# Patient Record
Sex: MALE | Race: WHITE
[De-identification: names, ages, dates, MRNs, and addresses within clinical notes are randomized per-mention and may not be internally consistent; named-entity substitution may affect disease eponyms.]

---

## 2017-03-14 ENCOUNTER — HOSPITAL ENCOUNTER (INPATIENT)
Dept: HOSPITAL 49 - FMS | Age: 82
LOS: 7 days | Discharge: NURSING, REHAB OR LONG-TERM CARE | DRG: 470 | End: 2017-03-21
Attending: ORTHOPAEDIC SURGERY | Admitting: ORTHOPAEDIC SURGERY
Payer: MEDICARE

## 2017-03-14 DIAGNOSIS — I48.0: ICD-10-CM

## 2017-03-14 DIAGNOSIS — I10: ICD-10-CM

## 2017-03-14 DIAGNOSIS — K21.9: ICD-10-CM

## 2017-03-14 DIAGNOSIS — Z87.891: ICD-10-CM

## 2017-03-14 DIAGNOSIS — Z79.82: ICD-10-CM

## 2017-03-14 DIAGNOSIS — M17.0: Primary | ICD-10-CM

## 2017-03-14 DIAGNOSIS — I48.91: ICD-10-CM

## 2017-03-14 DIAGNOSIS — D62: ICD-10-CM

## 2017-03-14 DIAGNOSIS — E78.5: ICD-10-CM

## 2017-03-14 DIAGNOSIS — Z83.3: ICD-10-CM

## 2017-03-14 DIAGNOSIS — E11.9: ICD-10-CM

## 2017-03-14 LAB
BILIRUBIN: NEGATIVE MG/DL
BLOOD: NEGATIVE ERY/UL
BUN SERPL-MCNC: 23 MG/DL (ref 6–25)
BUN/CREAT RATIO (CALC): 20 (ref 12.1–20.1)
CHLORIDE: 104 MMOL/L (ref 98–107)
CLARITY UR: CLEAR
CO2 (BICARBONATE): 23 MMOL/L (ref 23–33)
COLOR: YELLOW
CREATININE: 1.1 MG/DL (ref 0.7–1.2)
GLUCOSE (U): NORMAL MG/DL
GLUCOSE SERPL-MCNC: 90 MG/DL (ref 83–110)
KETONE (U): NEGATIVE MG/DL
LEUKOCYTES: NEGATIVE LEU/UL
NITRITE: NEGATIVE MG/DL
POTASSIUM: 4.7 MMOL/L (ref 3.5–5.1)
PROTEIN: NEGATIVE MG/DL
SPECIFIC GRAVITY: 1.01 (ref 1–1.03)
UROBILINOGEN: 0.2 MG/DL (ref 0.2–1)

## 2017-03-14 PROCEDURE — C1776 JOINT DEVICE (IMPLANTABLE): HCPCS

## 2017-03-14 PROCEDURE — 30233N1 TRANSFUSION OF NONAUTOLOGOUS RED BLOOD CELLS INTO PERIPHERAL VEIN, PERCUTANEOUS APPROACH: ICD-10-PCS | Performed by: INTERNAL MEDICINE

## 2017-03-14 PROCEDURE — C1713 ANCHOR/SCREW BN/BN,TIS/BN: HCPCS

## 2017-03-14 PROCEDURE — 0SRC0J9 REPLACEMENT OF RIGHT KNEE JOINT WITH SYNTHETIC SUBSTITUTE, CEMENTED, OPEN APPROACH: ICD-10-PCS | Performed by: ORTHOPAEDIC SURGERY

## 2017-03-14 PROCEDURE — 8E0YXBZ COMPUTER ASSISTED PROCEDURE OF LOWER EXTREMITY: ICD-10-PCS | Performed by: ORTHOPAEDIC SURGERY

## 2017-03-14 PROCEDURE — P9016 RBC LEUKOCYTES REDUCED: HCPCS

## 2017-03-15 LAB
BUN SERPL-MCNC: 17 MG/DL (ref 6–25)
BUN/CREAT RATIO (CALC): 17 (ref 12.1–20.1)
CHLORIDE: 101 MMOL/L (ref 98–107)
CO2 (BICARBONATE): 24 MMOL/L (ref 23–33)
CREATININE: 1 MG/DL (ref 0.7–1.2)
GLUCOSE SERPL-MCNC: 118 MG/DL (ref 83–110)
HCT: 32 % (ref 42–52)
HGB BLD-MCNC: 10.4 G/DL (ref 13.2–18)
MCH RBC QN AUTO: 30.1 PG (ref 25–31)
MCHC RBC AUTO-ENTMCNC: 32.5 G/DL (ref 32–36)
MCV: 92.8 FL (ref 78–100)
MPV: 10.4 FL (ref 6–9.5)
PLT: 158 K/UL (ref 150–400)
POTASSIUM: 5 MMOL/L (ref 3.5–5.1)
RBC: 3.45 M/UL (ref 4.7–6)
RDW: 13.2 % (ref 11.5–14)
WBC: 8.1 K/UL (ref 4–10.5)

## 2017-03-16 LAB
BUN SERPL-MCNC: 17 MG/DL (ref 6–25)
BUN/CREAT RATIO (CALC): 15 (ref 12.1–20.1)
CHLORIDE: 96 MMOL/L (ref 98–107)
CO2 (BICARBONATE): 25 MMOL/L (ref 23–33)
CREATININE: 1.1 MG/DL (ref 0.7–1.2)
GLUCOSE SERPL-MCNC: 173 MG/DL (ref 83–110)
HCT: 25.3 % (ref 42–52)
HGB BLD-MCNC: 8.5 G/DL (ref 13.2–18)
MCH RBC QN AUTO: 31 PG (ref 25–31)
MCHC RBC AUTO-ENTMCNC: 33.6 G/DL (ref 32–36)
MCV: 92.3 FL (ref 78–100)
MPV: 10.9 FL (ref 6–9.5)
PLT: 116 K/UL (ref 150–400)
POTASSIUM: 5 MMOL/L (ref 3.5–5.1)
RBC: 2.74 M/UL (ref 4.7–6)
RDW: 12.6 % (ref 11.5–14)
WBC: 6 K/UL (ref 4–10.5)

## 2017-03-17 LAB
BUN SERPL-MCNC: 17 MG/DL (ref 6–25)
BUN/CREAT RATIO (CALC): 14 (ref 12.1–20.1)
CHLORIDE: 98 MMOL/L (ref 98–107)
CO2 (BICARBONATE): 28 MMOL/L (ref 23–33)
CREATININE: 1.2 MG/DL (ref 0.7–1.2)
GLUCOSE SERPL-MCNC: 151 MG/DL (ref 83–110)
HCT: 26.6 % (ref 42–52)
HGB BLD-MCNC: 8.7 G/DL (ref 13.2–18)
MCH RBC QN AUTO: 30.6 PG (ref 25–31)
MCHC RBC AUTO-ENTMCNC: 32.7 G/DL (ref 32–36)
MCV: 93.7 FL (ref 78–100)
MPV: 11.1 FL (ref 6–9.5)
PLT: 128 K/UL (ref 150–400)
POTASSIUM: 4.9 MMOL/L (ref 3.5–5.1)
RBC: 2.84 M/UL (ref 4.7–6)
RDW: 12.8 % (ref 11.5–14)
WBC: 6.6 K/UL (ref 4–10.5)

## 2017-03-19 LAB
BUN SERPL-MCNC: 18 MG/DL (ref 6–25)
BUN/CREAT RATIO (CALC): 20 (ref 12.1–20.1)
CHLORIDE: 96 MMOL/L (ref 98–107)
CO2 (BICARBONATE): 25 MMOL/L (ref 23–33)
CREATININE: 0.9 MG/DL (ref 0.7–1.2)
GLUCOSE SERPL-MCNC: 195 MG/DL (ref 83–110)
HCT: 22.8 % (ref 42–52)
HGB BLD-MCNC: 7.5 G/DL (ref 13.2–18)
MAGNESIUM SERPL-MCNC: 1.68 MG/DL (ref 1.4–2.1)
MCH RBC QN AUTO: 31.1 PG (ref 25–31)
MCHC RBC AUTO-ENTMCNC: 32.9 G/DL (ref 32–36)
MCV: 94.6 FL (ref 78–100)
MPV: 10.2 FL (ref 6–9.5)
PLT: 138 K/UL (ref 150–400)
POTASSIUM: 5.1 MMOL/L (ref 3.5–5.1)
RBC: 2.41 M/UL (ref 4.7–6)
RDW: 12.9 % (ref 11.5–14)
WBC: 5.2 K/UL (ref 4–10.5)

## 2017-03-20 LAB
BUN SERPL-MCNC: 19 MG/DL (ref 6–25)
BUN SERPL-MCNC: 19 MG/DL (ref 6–25)
BUN/CREAT RATIO (CALC): 21 (ref 12.1–20.1)
BUN/CREAT RATIO (CALC): 21 (ref 12.1–20.1)
CHLORIDE: 94 MMOL/L (ref 98–107)
CHLORIDE: 97 MMOL/L (ref 98–107)
CO2 (BICARBONATE): 25 MMOL/L (ref 23–33)
CO2 (BICARBONATE): 26 MMOL/L (ref 23–33)
CREATININE: 0.9 MG/DL (ref 0.7–1.2)
CREATININE: 0.9 MG/DL (ref 0.7–1.2)
GLUCOSE SERPL-MCNC: 165 MG/DL (ref 83–110)
GLUCOSE SERPL-MCNC: 166 MG/DL (ref 83–110)
HCT: 29.2 % (ref 42–52)
HGB BLD-MCNC: 9.6 G/DL (ref 13.2–18)
MAGNESIUM SERPL-MCNC: 1.68 MG/DL (ref 1.4–2.1)
MCH RBC QN AUTO: 30.5 PG (ref 25–31)
MCHC RBC AUTO-ENTMCNC: 32.9 G/DL (ref 32–36)
MCV: 92.7 FL (ref 78–100)
MPV: 9.7 FL (ref 6–9.5)
PLT: 170 K/UL (ref 150–400)
POTASSIUM: 4.8 MMOL/L (ref 3.5–5.1)
POTASSIUM: 4.9 MMOL/L (ref 3.5–5.1)
RBC: 3.15 M/UL (ref 4.7–6)
RDW: 14 % (ref 11.5–14)
WBC: 4.1 K/UL (ref 4–10.5)

## 2017-03-21 ENCOUNTER — HOSPITAL ENCOUNTER (INPATIENT)
Dept: HOSPITAL 49 - FSNU | Age: 82
LOS: 14 days | Discharge: HOME HEALTH | DRG: 561 | End: 2017-04-04
Admitting: ORTHOPAEDIC SURGERY
Payer: MEDICARE

## 2017-03-21 DIAGNOSIS — E11.9: ICD-10-CM

## 2017-03-21 DIAGNOSIS — Z96.651: ICD-10-CM

## 2017-03-21 DIAGNOSIS — Z47.1: Primary | ICD-10-CM

## 2017-03-21 DIAGNOSIS — E78.5: ICD-10-CM

## 2017-03-21 DIAGNOSIS — I48.91: ICD-10-CM

## 2017-03-21 DIAGNOSIS — I10: ICD-10-CM

## 2017-03-21 LAB
BUN SERPL-MCNC: 24 MG/DL (ref 6–25)
BUN/CREAT RATIO (CALC): 24 (ref 12.1–20.1)
CHLORIDE: 94 MMOL/L (ref 98–107)
CO2 (BICARBONATE): 28 MMOL/L (ref 23–33)
CREATININE: 1 MG/DL (ref 0.7–1.2)
GLUCOSE SERPL-MCNC: 186 MG/DL (ref 83–110)
HCT: 28.4 % (ref 42–52)
HGB BLD-MCNC: 9.1 G/DL (ref 13.2–18)
MCH RBC QN AUTO: 30 PG (ref 25–31)
MCHC RBC AUTO-ENTMCNC: 32 G/DL (ref 32–36)
MCV: 93.7 FL (ref 78–100)
MPV: 9.9 FL (ref 6–9.5)
PLT: 184 K/UL (ref 150–400)
POTASSIUM: 5.2 MMOL/L (ref 3.5–5.1)
RBC: 3.03 M/UL (ref 4.7–6)
RDW: 14.1 % (ref 11.5–14)
WBC: 5.9 K/UL (ref 4–10.5)

## 2018-02-28 ENCOUNTER — OFFICE VISIT CONVERTED (OUTPATIENT)
Dept: FAMILY MEDICINE CLINIC | Age: 83
End: 2018-02-28
Attending: FAMILY MEDICINE

## 2018-05-23 ENCOUNTER — OFFICE VISIT CONVERTED (OUTPATIENT)
Dept: FAMILY MEDICINE CLINIC | Age: 83
End: 2018-05-23
Attending: FAMILY MEDICINE

## 2018-07-14 ENCOUNTER — OFFICE VISIT CONVERTED (OUTPATIENT)
Dept: FAMILY MEDICINE CLINIC | Age: 83
End: 2018-07-14
Attending: NURSE PRACTITIONER

## 2018-08-20 ENCOUNTER — OFFICE VISIT CONVERTED (OUTPATIENT)
Dept: FAMILY MEDICINE CLINIC | Age: 83
End: 2018-08-20
Attending: FAMILY MEDICINE

## 2018-11-20 ENCOUNTER — OFFICE VISIT CONVERTED (OUTPATIENT)
Dept: FAMILY MEDICINE CLINIC | Age: 83
End: 2018-11-20
Attending: FAMILY MEDICINE

## 2019-02-14 ENCOUNTER — OFFICE VISIT CONVERTED (OUTPATIENT)
Dept: FAMILY MEDICINE CLINIC | Age: 84
End: 2019-02-14
Attending: FAMILY MEDICINE

## 2019-02-15 ENCOUNTER — HOSPITAL ENCOUNTER (OUTPATIENT)
Dept: OTHER | Facility: HOSPITAL | Age: 84
Discharge: HOME | End: 2019-02-15
Attending: FAMILY MEDICINE

## 2019-02-15 LAB
ALBUMIN SERPL-MCNC: 3.9 G/DL (ref 3.5–5)
ALBUMIN/GLOB SERPL: 1.4 {RATIO} (ref 1.4–2.6)
ALP SERPL-CCNC: 98 U/L (ref 56–155)
ALT SERPL-CCNC: 25 U/L (ref 10–40)
ANION GAP SERPL CALC-SCNC: 19 MMOL/L (ref 8–19)
AST SERPL-CCNC: 24 U/L (ref 15–50)
BASOPHILS # BLD MANUAL: 0.03 10*3/UL (ref 0–0.2)
BASOPHILS NFR BLD MANUAL: 0.6 % (ref 0–3)
BILIRUB SERPL-MCNC: 0.29 MG/DL (ref 0.2–1.3)
BUN SERPL-MCNC: 27 MG/DL (ref 5–25)
BUN/CREAT SERPL: 20 {RATIO} (ref 6–20)
CALCIUM SERPL-MCNC: 9.6 MG/DL (ref 8.7–10.4)
CHLORIDE SERPL-SCNC: 109 MMOL/L (ref 99–111)
CHOLEST SERPL-MCNC: 145 MG/DL (ref 107–200)
CHOLEST/HDLC SERPL: 2.3 {RATIO} (ref 3–6)
CONV CO2: 22 MMOL/L (ref 22–32)
CONV CREATININE URINE, RANDOM: 83.9 MG/DL (ref 10–300)
CONV MICROALBUM.,U,RANDOM: 75.4 MG/L (ref 0–20)
CONV TOTAL PROTEIN: 6.7 G/DL (ref 6.3–8.2)
CREAT UR-MCNC: 1.33 MG/DL (ref 0.7–1.2)
DEPRECATED RDW RBC AUTO: 46 FL
EOSINOPHIL # BLD MANUAL: 0.27 10*3/UL (ref 0–0.7)
EOSINOPHIL NFR BLD MANUAL: 5.2 % (ref 0–7)
ERYTHROCYTE [DISTWIDTH] IN BLOOD BY AUTOMATED COUNT: 13 % (ref 11.5–14.5)
EST. AVERAGE GLUCOSE BLD GHB EST-MCNC: 151 MG/DL
GFR SERPLBLD BASED ON 1.73 SQ M-ARVRAT: 49 ML/MIN/{1.73_M2}
GLOBULIN UR ELPH-MCNC: 2.8 G/DL (ref 2–3.5)
GLUCOSE SERPL-MCNC: 148 MG/DL (ref 70–99)
GRANS (ABSOLUTE): 2.79 10*3/UL (ref 2–8)
GRANS: 53.3 % (ref 30–85)
HBA1C MFR BLD: 11.6 G/DL (ref 14–18)
HBA1C MFR BLD: 6.9 % (ref 3.5–5.7)
HCT VFR BLD AUTO: 35.4 % (ref 42–52)
HDLC SERPL-MCNC: 62 MG/DL (ref 40–60)
IMM GRANULOCYTES # BLD: 0.02 10*3/UL (ref 0–0.54)
IMM GRANULOCYTES NFR BLD: 0.4 % (ref 0–0.43)
LDLC SERPL CALC-MCNC: 47 MG/DL (ref 70–100)
LYMPHOCYTES # BLD MANUAL: 1.58 10*3/UL (ref 1–5)
LYMPHOCYTES NFR BLD MANUAL: 10.2 % (ref 3–10)
MCH RBC QN AUTO: 31 PG (ref 27–31)
MCHC RBC AUTO-ENTMCNC: 32.8 G/DL (ref 33–37)
MCV RBC AUTO: 94.7 FL (ref 80–96)
MICROALBUMIN/CREAT UR: 89.9 MG/G{CRE} (ref 0–25)
MONOCYTES # BLD AUTO: 0.53 10*3/UL (ref 0.2–1.2)
OSMOLALITY SERPL CALC.SUM OF ELEC: 306 MOSM/KG (ref 273–304)
PLATELET # BLD AUTO: 171 10*3/UL (ref 130–400)
PMV BLD AUTO: 11.3 FL (ref 7.4–10.4)
POTASSIUM SERPL-SCNC: 5.5 MMOL/L (ref 3.5–5.3)
RBC # BLD AUTO: 3.74 10*6/UL (ref 4.7–6.1)
SODIUM SERPL-SCNC: 144 MMOL/L (ref 135–147)
TRIGL SERPL-MCNC: 181 MG/DL (ref 40–150)
VARIANT LYMPHS NFR BLD MANUAL: 30.3 % (ref 20–45)
VLDLC SERPL-MCNC: 36 MG/DL (ref 5–37)
WBC # BLD AUTO: 5.22 10*3/UL (ref 4.8–10.8)

## 2019-03-13 ENCOUNTER — HOSPITAL ENCOUNTER (OUTPATIENT)
Dept: OTHER | Facility: HOSPITAL | Age: 84
Discharge: HOME | End: 2019-03-13
Attending: FAMILY MEDICINE

## 2019-03-13 LAB — TSH SERPL-ACNC: 3.76 M[IU]/L (ref 0.27–4.2)

## 2019-04-08 ENCOUNTER — OFFICE VISIT CONVERTED (OUTPATIENT)
Dept: FAMILY MEDICINE CLINIC | Age: 84
End: 2019-04-08
Attending: FAMILY MEDICINE

## 2019-04-08 ENCOUNTER — HOSPITAL ENCOUNTER (OUTPATIENT)
Dept: OTHER | Facility: HOSPITAL | Age: 84
Discharge: HOME | End: 2019-04-08
Attending: FAMILY MEDICINE

## 2019-04-15 ENCOUNTER — HOSPITAL ENCOUNTER (OUTPATIENT)
Dept: OTHER | Facility: HOSPITAL | Age: 84
Discharge: HOME | End: 2019-04-15
Attending: FAMILY MEDICINE

## 2019-04-15 ENCOUNTER — OFFICE VISIT CONVERTED (OUTPATIENT)
Dept: FAMILY MEDICINE CLINIC | Age: 84
End: 2019-04-15
Attending: FAMILY MEDICINE

## 2019-04-18 ENCOUNTER — OFFICE VISIT CONVERTED (OUTPATIENT)
Dept: FAMILY MEDICINE CLINIC | Age: 84
End: 2019-04-18
Attending: NURSE PRACTITIONER

## 2019-04-18 ENCOUNTER — HOSPITAL ENCOUNTER (OUTPATIENT)
Dept: OTHER | Facility: HOSPITAL | Age: 84
Discharge: HOME | End: 2019-04-18
Attending: NURSE PRACTITIONER

## 2019-04-18 LAB
ALBUMIN SERPL-MCNC: 4.1 G/DL (ref 3.5–5)
ALBUMIN/GLOB SERPL: 1.5 {RATIO} (ref 1.4–2.6)
ALP SERPL-CCNC: 75 U/L (ref 56–155)
ALT SERPL-CCNC: 28 U/L (ref 10–40)
AMYLASE SERPL-CCNC: 92 U/L (ref 30–150)
ANION GAP SERPL CALC-SCNC: 17 MMOL/L (ref 8–19)
AST SERPL-CCNC: 32 U/L (ref 15–50)
BASOPHILS # BLD AUTO: 0.02 10*3/UL (ref 0–0.2)
BASOPHILS NFR BLD AUTO: 0.2 % (ref 0–3)
BILIRUB SERPL-MCNC: 0.7 MG/DL (ref 0.2–1.3)
BUN SERPL-MCNC: 31 MG/DL (ref 5–25)
BUN/CREAT SERPL: 24 {RATIO} (ref 6–20)
CALCIUM SERPL-MCNC: 9.4 MG/DL (ref 8.7–10.4)
CHLORIDE SERPL-SCNC: 112 MMOL/L (ref 99–111)
CONV ABS IMM GRAN: 0.03 10*3/UL (ref 0–0.2)
CONV CO2: 20 MMOL/L (ref 22–32)
CONV IMMATURE GRAN: 0.4 % (ref 0–1.8)
CONV TOTAL PROTEIN: 6.9 G/DL (ref 6.3–8.2)
CREAT UR-MCNC: 1.29 MG/DL (ref 0.7–1.2)
DEPRECATED RDW RBC AUTO: 53.2 FL (ref 35.1–43.9)
EOSINOPHIL # BLD AUTO: 0.07 10*3/UL (ref 0–0.7)
EOSINOPHIL # BLD AUTO: 0.8 % (ref 0–7)
ERYTHROCYTE [DISTWIDTH] IN BLOOD BY AUTOMATED COUNT: 14.8 % (ref 11.6–14.4)
GFR SERPLBLD BASED ON 1.73 SQ M-ARVRAT: 51 ML/MIN/{1.73_M2}
GLOBULIN UR ELPH-MCNC: 2.8 G/DL (ref 2–3.5)
GLUCOSE SERPL-MCNC: 141 MG/DL (ref 70–99)
HBA1C MFR BLD: 10.7 G/DL (ref 14–18)
HCT VFR BLD AUTO: 33.6 % (ref 42–52)
LIPASE SERPL-CCNC: 37 U/L (ref 5–51)
LYMPHOCYTES # BLD AUTO: 1.28 10*3/UL (ref 1–5)
MCH RBC QN AUTO: 31 PG (ref 27–31)
MCHC RBC AUTO-ENTMCNC: 31.8 G/DL (ref 33–37)
MCV RBC AUTO: 97.4 FL (ref 80–96)
MONOCYTES # BLD AUTO: 0.77 10*3/UL (ref 0.2–1.2)
MONOCYTES NFR BLD AUTO: 9.1 % (ref 3–10)
NEUTROPHILS # BLD AUTO: 6.27 10*3/UL (ref 2–8)
NEUTROPHILS NFR BLD AUTO: 74.3 % (ref 30–85)
NRBC CBCN: 0 % (ref 0–0.7)
OSMOLALITY SERPL CALC.SUM OF ELEC: 305 MOSM/KG (ref 273–304)
PLATELET # BLD AUTO: 175 10*3/UL (ref 130–400)
PMV BLD AUTO: 11.5 FL (ref 9.4–12.4)
POTASSIUM SERPL-SCNC: 5.7 MMOL/L (ref 3.5–5.3)
RBC # BLD AUTO: 3.45 10*6/UL (ref 4.7–6.1)
SODIUM SERPL-SCNC: 143 MMOL/L (ref 135–147)
VARIANT LYMPHS NFR BLD MANUAL: 15.2 % (ref 20–45)
WBC # BLD AUTO: 8.44 10*3/UL (ref 4.8–10.8)

## 2019-04-22 LAB — BACTERIA SPEC AEROBE CULT: NORMAL

## 2019-04-24 ENCOUNTER — OFFICE VISIT CONVERTED (OUTPATIENT)
Dept: OTOLARYNGOLOGY | Facility: CLINIC | Age: 84
End: 2019-04-24
Attending: OTOLARYNGOLOGY

## 2019-04-24 ENCOUNTER — CONVERSION ENCOUNTER (OUTPATIENT)
Dept: OTOLARYNGOLOGY | Facility: CLINIC | Age: 84
End: 2019-04-24

## 2019-05-02 ENCOUNTER — HOSPITAL ENCOUNTER (OUTPATIENT)
Dept: DIABETES SERVICES | Facility: HOSPITAL | Age: 84
Setting detail: RECURRING SERIES
Discharge: HOME | End: 2019-05-03
Attending: FAMILY MEDICINE

## 2019-05-29 ENCOUNTER — OFFICE VISIT CONVERTED (OUTPATIENT)
Dept: FAMILY MEDICINE CLINIC | Age: 84
End: 2019-05-29
Attending: FAMILY MEDICINE

## 2019-05-30 ENCOUNTER — HOSPITAL ENCOUNTER (OUTPATIENT)
Dept: OTHER | Facility: HOSPITAL | Age: 84
Discharge: HOME | End: 2019-05-30
Attending: FAMILY MEDICINE

## 2019-05-30 LAB
ALBUMIN SERPL-MCNC: 4.2 G/DL (ref 3.5–5)
ALBUMIN/GLOB SERPL: 1.4 {RATIO} (ref 1.4–2.6)
ALP SERPL-CCNC: 95 U/L (ref 56–155)
ALT SERPL-CCNC: 26 U/L (ref 10–40)
ANION GAP SERPL CALC-SCNC: 19 MMOL/L (ref 8–19)
AST SERPL-CCNC: 35 U/L (ref 15–50)
BASOPHILS # BLD MANUAL: 0.04 10*3/UL (ref 0–0.2)
BASOPHILS NFR BLD MANUAL: 0.8 % (ref 0–3)
BILIRUB SERPL-MCNC: 0.29 MG/DL (ref 0.2–1.3)
BUN SERPL-MCNC: 30 MG/DL (ref 5–25)
BUN/CREAT SERPL: 21 {RATIO} (ref 6–20)
CALCIUM SERPL-MCNC: 9.6 MG/DL (ref 8.7–10.4)
CHLORIDE SERPL-SCNC: 103 MMOL/L (ref 99–111)
CHOLEST SERPL-MCNC: 182 MG/DL (ref 107–200)
CHOLEST/HDLC SERPL: 3 {RATIO} (ref 3–6)
CONV CO2: 23 MMOL/L (ref 22–32)
CONV TOTAL PROTEIN: 7.1 G/DL (ref 6.3–8.2)
CREAT UR-MCNC: 1.45 MG/DL (ref 0.7–1.2)
DEPRECATED RDW RBC AUTO: 47.2 FL
EOSINOPHIL # BLD MANUAL: 0.28 10*3/UL (ref 0–0.7)
EOSINOPHIL NFR BLD MANUAL: 5.8 % (ref 0–7)
ERYTHROCYTE [DISTWIDTH] IN BLOOD BY AUTOMATED COUNT: 13.5 % (ref 11.5–14.5)
EST. AVERAGE GLUCOSE BLD GHB EST-MCNC: 108 MG/DL
GFR SERPLBLD BASED ON 1.73 SQ M-ARVRAT: 44 ML/MIN/{1.73_M2}
GLOBULIN UR ELPH-MCNC: 2.9 G/DL (ref 2–3.5)
GLUCOSE SERPL-MCNC: 174 MG/DL (ref 70–99)
GRANS (ABSOLUTE): 2.63 10*3/UL (ref 2–8)
GRANS: 54.4 % (ref 30–85)
HBA1C MFR BLD: 11.6 G/DL (ref 14–18)
HBA1C MFR BLD: 5.4 % (ref 3.5–5.7)
HCT VFR BLD AUTO: 35.5 % (ref 42–52)
HDLC SERPL-MCNC: 61 MG/DL (ref 40–60)
IMM GRANULOCYTES # BLD: 0.02 10*3/UL (ref 0–0.54)
IMM GRANULOCYTES NFR BLD: 0.4 % (ref 0–0.43)
LDLC SERPL CALC-MCNC: 91 MG/DL (ref 70–100)
LYMPHOCYTES # BLD MANUAL: 1.39 10*3/UL (ref 1–5)
LYMPHOCYTES NFR BLD MANUAL: 9.9 % (ref 3–10)
MCH RBC QN AUTO: 30.8 PG (ref 27–31)
MCHC RBC AUTO-ENTMCNC: 32.7 G/DL (ref 33–37)
MCV RBC AUTO: 94.2 FL (ref 80–96)
MONOCYTES # BLD AUTO: 0.48 10*3/UL (ref 0.2–1.2)
OSMOLALITY SERPL CALC.SUM OF ELEC: 298 MOSM/KG (ref 273–304)
PLATELET # BLD AUTO: 166 10*3/UL (ref 130–400)
PMV BLD AUTO: 11.1 FL (ref 7.4–10.4)
POTASSIUM SERPL-SCNC: 5.6 MMOL/L (ref 3.5–5.3)
RBC # BLD AUTO: 3.77 10*6/UL (ref 4.7–6.1)
SODIUM SERPL-SCNC: 139 MMOL/L (ref 135–147)
TRIGL SERPL-MCNC: 151 MG/DL (ref 40–150)
TSH SERPL-ACNC: 3.12 M[IU]/L (ref 0.27–4.2)
VARIANT LYMPHS NFR BLD MANUAL: 28.7 % (ref 20–45)
VLDLC SERPL-MCNC: 30 MG/DL (ref 5–37)
WBC # BLD AUTO: 4.84 10*3/UL (ref 4.8–10.8)

## 2019-06-28 ENCOUNTER — OFFICE VISIT CONVERTED (OUTPATIENT)
Dept: FAMILY MEDICINE CLINIC | Age: 84
End: 2019-06-28
Attending: FAMILY MEDICINE

## 2019-09-05 ENCOUNTER — HOSPITAL ENCOUNTER (OUTPATIENT)
Dept: OTHER | Facility: HOSPITAL | Age: 84
Discharge: HOME | End: 2019-09-05
Attending: FAMILY MEDICINE

## 2019-09-05 ENCOUNTER — OFFICE VISIT CONVERTED (OUTPATIENT)
Dept: FAMILY MEDICINE CLINIC | Age: 84
End: 2019-09-05
Attending: FAMILY MEDICINE

## 2019-09-05 LAB
ALBUMIN SERPL-MCNC: 4.3 G/DL (ref 3.5–5)
ALBUMIN/GLOB SERPL: 1.5 {RATIO} (ref 1.4–2.6)
ALP SERPL-CCNC: 96 U/L (ref 56–155)
ALT SERPL-CCNC: 22 U/L (ref 10–40)
ANION GAP SERPL CALC-SCNC: 20 MMOL/L (ref 8–19)
AST SERPL-CCNC: 24 U/L (ref 15–50)
BASOPHILS # BLD MANUAL: 0.05 10*3/UL (ref 0–0.2)
BASOPHILS NFR BLD MANUAL: 0.9 % (ref 0–3)
BILIRUB SERPL-MCNC: 0.24 MG/DL (ref 0.2–1.3)
BUN SERPL-MCNC: 26 MG/DL (ref 5–25)
BUN/CREAT SERPL: 19 {RATIO} (ref 6–20)
CALCIUM SERPL-MCNC: 9.9 MG/DL (ref 8.7–10.4)
CHLORIDE SERPL-SCNC: 108 MMOL/L (ref 99–111)
CONV CO2: 19 MMOL/L (ref 22–32)
CONV TOTAL PROTEIN: 7.2 G/DL (ref 6.3–8.2)
CREAT UR-MCNC: 1.4 MG/DL (ref 0.7–1.2)
DEPRECATED RDW RBC AUTO: 50.7 FL
EOSINOPHIL # BLD MANUAL: 0.21 10*3/UL (ref 0–0.7)
EOSINOPHIL NFR BLD MANUAL: 3.7 % (ref 0–7)
ERYTHROCYTE [DISTWIDTH] IN BLOOD BY AUTOMATED COUNT: 14.7 % (ref 11.5–14.5)
EST. AVERAGE GLUCOSE BLD GHB EST-MCNC: 151 MG/DL
GFR SERPLBLD BASED ON 1.73 SQ M-ARVRAT: 46 ML/MIN/{1.73_M2}
GLOBULIN UR ELPH-MCNC: 2.9 G/DL (ref 2–3.5)
GLUCOSE SERPL-MCNC: 120 MG/DL (ref 70–99)
GRANS (ABSOLUTE): 3.26 10*3/UL (ref 2–8)
GRANS: 57 % (ref 30–85)
HBA1C MFR BLD: 11.7 G/DL (ref 14–18)
HBA1C MFR BLD: 6.9 % (ref 3.5–5.7)
HCT VFR BLD AUTO: 36.5 % (ref 42–52)
IMM GRANULOCYTES # BLD: 0.03 10*3/UL (ref 0–0.54)
IMM GRANULOCYTES NFR BLD: 0.5 % (ref 0–0.43)
LYMPHOCYTES # BLD MANUAL: 1.67 10*3/UL (ref 1–5)
LYMPHOCYTES NFR BLD MANUAL: 8.7 % (ref 3–10)
MCH RBC QN AUTO: 30 PG (ref 27–31)
MCHC RBC AUTO-ENTMCNC: 32.1 G/DL (ref 33–37)
MCV RBC AUTO: 93.6 FL (ref 80–96)
MONOCYTES # BLD AUTO: 0.5 10*3/UL (ref 0.2–1.2)
OSMOLALITY SERPL CALC.SUM OF ELEC: 298 MOSM/KG (ref 273–304)
PLATELET # BLD AUTO: 170 10*3/UL (ref 130–400)
PMV BLD AUTO: 10.7 FL (ref 7.4–10.4)
POTASSIUM SERPL-SCNC: 5.9 MMOL/L (ref 3.5–5.3)
RBC # BLD AUTO: 3.9 10*6/UL (ref 4.7–6.1)
SODIUM SERPL-SCNC: 141 MMOL/L (ref 135–147)
VARIANT LYMPHS NFR BLD MANUAL: 29.2 % (ref 20–45)
WBC # BLD AUTO: 5.72 10*3/UL (ref 4.8–10.8)

## 2019-09-12 ENCOUNTER — HOSPITAL ENCOUNTER (OUTPATIENT)
Dept: OTHER | Facility: HOSPITAL | Age: 84
Discharge: HOME | End: 2019-09-12
Attending: FAMILY MEDICINE

## 2019-09-12 LAB
ALBUMIN SERPL-MCNC: 4.4 G/DL (ref 3.5–5)
ALBUMIN/GLOB SERPL: 1.7 {RATIO} (ref 1.4–2.6)
ALP SERPL-CCNC: 93 U/L (ref 56–155)
ALT SERPL-CCNC: 25 U/L (ref 10–40)
ANION GAP SERPL CALC-SCNC: 22 MMOL/L (ref 8–19)
AST SERPL-CCNC: 28 U/L (ref 15–50)
BILIRUB SERPL-MCNC: 0.39 MG/DL (ref 0.2–1.3)
BUN SERPL-MCNC: 30 MG/DL (ref 5–25)
BUN/CREAT SERPL: 19 {RATIO} (ref 6–20)
CALCIUM SERPL-MCNC: 9.3 MG/DL (ref 8.7–10.4)
CHLORIDE SERPL-SCNC: 109 MMOL/L (ref 99–111)
CONV CO2: 17 MMOL/L (ref 22–32)
CONV TOTAL PROTEIN: 7 G/DL (ref 6.3–8.2)
CREAT UR-MCNC: 1.62 MG/DL (ref 0.7–1.2)
GFR SERPLBLD BASED ON 1.73 SQ M-ARVRAT: 38 ML/MIN/{1.73_M2}
GLOBULIN UR ELPH-MCNC: 2.6 G/DL (ref 2–3.5)
GLUCOSE SERPL-MCNC: 287 MG/DL (ref 70–99)
OSMOLALITY SERPL CALC.SUM OF ELEC: 311 MOSM/KG (ref 273–304)
POTASSIUM SERPL-SCNC: 6.1 MMOL/L (ref 3.5–5.3)
SODIUM SERPL-SCNC: 142 MMOL/L (ref 135–147)

## 2019-09-24 ENCOUNTER — HOSPITAL ENCOUNTER (OUTPATIENT)
Dept: OTHER | Facility: HOSPITAL | Age: 84
Discharge: HOME | End: 2019-09-24
Attending: FAMILY MEDICINE

## 2019-09-24 ENCOUNTER — OFFICE VISIT CONVERTED (OUTPATIENT)
Dept: FAMILY MEDICINE CLINIC | Age: 84
End: 2019-09-24
Attending: FAMILY MEDICINE

## 2019-09-24 LAB
ALBUMIN SERPL-MCNC: 4.1 G/DL (ref 3.5–5)
ALBUMIN/GLOB SERPL: 1.5 {RATIO} (ref 1.4–2.6)
ALP SERPL-CCNC: 89 U/L (ref 56–155)
ALT SERPL-CCNC: 23 U/L (ref 10–40)
ANION GAP SERPL CALC-SCNC: 20 MMOL/L (ref 8–19)
AST SERPL-CCNC: 25 U/L (ref 15–50)
BASOPHILS # BLD AUTO: 0.04 10*3/UL (ref 0–0.2)
BASOPHILS NFR BLD AUTO: 0.7 % (ref 0–3)
BILIRUB SERPL-MCNC: 0.26 MG/DL (ref 0.2–1.3)
BUN SERPL-MCNC: 27 MG/DL (ref 5–25)
BUN/CREAT SERPL: 19 {RATIO} (ref 6–20)
CALCIUM SERPL-MCNC: 9.8 MG/DL (ref 8.7–10.4)
CHLORIDE SERPL-SCNC: 111 MMOL/L (ref 99–111)
CONV ABS IMM GRAN: 0.04 10*3/UL (ref 0–0.2)
CONV CO2: 20 MMOL/L (ref 22–32)
CONV IMMATURE GRAN: 0.7 % (ref 0–1.8)
CONV TOTAL PROTEIN: 6.9 G/DL (ref 6.3–8.2)
CREAT UR-MCNC: 1.39 MG/DL (ref 0.7–1.2)
DEPRECATED RDW RBC AUTO: 53.6 FL (ref 35.1–43.9)
EOSINOPHIL # BLD AUTO: 0.13 10*3/UL (ref 0–0.7)
EOSINOPHIL # BLD AUTO: 2.3 % (ref 0–7)
ERYTHROCYTE [DISTWIDTH] IN BLOOD BY AUTOMATED COUNT: 15.2 % (ref 11.6–14.4)
GFR SERPLBLD BASED ON 1.73 SQ M-ARVRAT: 46 ML/MIN/{1.73_M2}
GLOBULIN UR ELPH-MCNC: 2.8 G/DL (ref 2–3.5)
GLUCOSE SERPL-MCNC: 72 MG/DL (ref 70–99)
HCT VFR BLD AUTO: 33.8 % (ref 42–52)
HGB BLD-MCNC: 10.4 G/DL (ref 14–18)
LYMPHOCYTES # BLD AUTO: 1.73 10*3/UL (ref 1–5)
LYMPHOCYTES NFR BLD AUTO: 30.6 % (ref 20–45)
MCH RBC QN AUTO: 29.6 PG (ref 27–31)
MCHC RBC AUTO-ENTMCNC: 30.8 G/DL (ref 33–37)
MCV RBC AUTO: 96.3 FL (ref 80–96)
MONOCYTES # BLD AUTO: 0.62 10*3/UL (ref 0.2–1.2)
MONOCYTES NFR BLD AUTO: 11 % (ref 3–10)
NEUTROPHILS # BLD AUTO: 3.1 10*3/UL (ref 2–8)
NEUTROPHILS NFR BLD AUTO: 54.7 % (ref 30–85)
NRBC CBCN: 0 % (ref 0–0.7)
OSMOLALITY SERPL CALC.SUM OF ELEC: 304 MOSM/KG (ref 273–304)
PLATELET # BLD AUTO: 183 10*3/UL (ref 130–400)
PMV BLD AUTO: 11.9 FL (ref 9.4–12.4)
POTASSIUM SERPL-SCNC: 5.6 MMOL/L (ref 3.5–5.3)
RBC # BLD AUTO: 3.51 10*6/UL (ref 4.7–6.1)
SODIUM SERPL-SCNC: 145 MMOL/L (ref 135–147)
WBC # BLD AUTO: 5.66 10*3/UL (ref 4.8–10.8)

## 2019-10-29 ENCOUNTER — OFFICE VISIT CONVERTED (OUTPATIENT)
Dept: FAMILY MEDICINE CLINIC | Age: 84
End: 2019-10-29
Attending: FAMILY MEDICINE

## 2019-12-03 ENCOUNTER — HOSPITAL ENCOUNTER (OUTPATIENT)
Dept: OTHER | Facility: HOSPITAL | Age: 84
Discharge: HOME | End: 2019-12-03
Attending: FAMILY MEDICINE

## 2019-12-03 ENCOUNTER — OFFICE VISIT CONVERTED (OUTPATIENT)
Dept: FAMILY MEDICINE CLINIC | Age: 84
End: 2019-12-03
Attending: FAMILY MEDICINE

## 2019-12-03 LAB
ALBUMIN SERPL-MCNC: 4.4 G/DL (ref 3.5–5)
ALBUMIN/GLOB SERPL: 1.6 {RATIO} (ref 1.4–2.6)
ALP SERPL-CCNC: 111 U/L (ref 56–155)
ALT SERPL-CCNC: 24 U/L (ref 10–40)
ANION GAP SERPL CALC-SCNC: 22 MMOL/L (ref 8–19)
AST SERPL-CCNC: 24 U/L (ref 15–50)
BASOPHILS # BLD MANUAL: 0.04 10*3/UL (ref 0–0.2)
BASOPHILS NFR BLD MANUAL: 0.6 % (ref 0–3)
BILIRUB SERPL-MCNC: 0.35 MG/DL (ref 0.2–1.3)
BUN SERPL-MCNC: 29 MG/DL (ref 5–25)
BUN/CREAT SERPL: 17 {RATIO} (ref 6–20)
CALCIUM SERPL-MCNC: 10.1 MG/DL (ref 8.7–10.4)
CHLORIDE SERPL-SCNC: 101 MMOL/L (ref 99–111)
CHOLEST SERPL-MCNC: 158 MG/DL (ref 107–200)
CHOLEST/HDLC SERPL: 2.7 {RATIO} (ref 3–6)
CONV CO2: 21 MMOL/L (ref 22–32)
CONV CREATININE URINE, RANDOM: 188 MG/DL (ref 10–300)
CONV MICROALBUM.,U,RANDOM: 370.9 MG/L (ref 0–20)
CONV TOTAL PROTEIN: 7.2 G/DL (ref 6.3–8.2)
CREAT UR-MCNC: 1.71 MG/DL (ref 0.7–1.2)
DEPRECATED RDW RBC AUTO: 44.2 FL
EOSINOPHIL # BLD MANUAL: 0.12 10*3/UL (ref 0–0.7)
EOSINOPHIL NFR BLD MANUAL: 1.8 % (ref 0–7)
ERYTHROCYTE [DISTWIDTH] IN BLOOD BY AUTOMATED COUNT: 12.9 % (ref 11.5–14.5)
EST. AVERAGE GLUCOSE BLD GHB EST-MCNC: 143 MG/DL
GFR SERPLBLD BASED ON 1.73 SQ M-ARVRAT: 36 ML/MIN/{1.73_M2}
GLOBULIN UR ELPH-MCNC: 2.8 G/DL (ref 2–3.5)
GLUCOSE SERPL-MCNC: 196 MG/DL (ref 70–99)
GRANS (ABSOLUTE): 3.99 10*3/UL (ref 2–8)
GRANS: 60 % (ref 30–85)
HBA1C MFR BLD: 13.4 G/DL (ref 14–18)
HBA1C MFR BLD: 6.6 % (ref 3.5–5.7)
HCT VFR BLD AUTO: 40.6 % (ref 42–52)
HDLC SERPL-MCNC: 58 MG/DL (ref 40–60)
IMM GRANULOCYTES # BLD: 0.03 10*3/UL (ref 0–0.54)
IMM GRANULOCYTES NFR BLD: 0.5 % (ref 0–0.43)
LDLC SERPL CALC-MCNC: 73 MG/DL (ref 70–100)
LYMPHOCYTES # BLD MANUAL: 1.91 10*3/UL (ref 1–5)
LYMPHOCYTES NFR BLD MANUAL: 8.3 % (ref 3–10)
MCH RBC QN AUTO: 30.4 PG (ref 27–31)
MCHC RBC AUTO-ENTMCNC: 33 G/DL (ref 33–37)
MCV RBC AUTO: 92.1 FL (ref 80–96)
MICROALBUMIN/CREAT UR: 197.3 MG/G{CRE} (ref 0–25)
MONOCYTES # BLD AUTO: 0.55 10*3/UL (ref 0.2–1.2)
OSMOLALITY SERPL CALC.SUM OF ELEC: 299 MOSM/KG (ref 273–304)
PLATELET # BLD AUTO: 181 10*3/UL (ref 130–400)
PMV BLD AUTO: 11.2 FL (ref 7.4–10.4)
POTASSIUM SERPL-SCNC: 4.5 MMOL/L (ref 3.5–5.3)
RBC # BLD AUTO: 4.41 10*6/UL (ref 4.7–6.1)
SODIUM SERPL-SCNC: 139 MMOL/L (ref 135–147)
TRIGL SERPL-MCNC: 137 MG/DL (ref 40–150)
TSH SERPL-ACNC: 4.2 M[IU]/L (ref 0.27–4.2)
VARIANT LYMPHS NFR BLD MANUAL: 28.8 % (ref 20–45)
VLDLC SERPL-MCNC: 27 MG/DL (ref 5–37)
WBC # BLD AUTO: 6.64 10*3/UL (ref 4.8–10.8)

## 2019-12-17 ENCOUNTER — HOSPITAL ENCOUNTER (OUTPATIENT)
Dept: OTHER | Facility: HOSPITAL | Age: 84
Discharge: HOME | End: 2019-12-17
Attending: FAMILY MEDICINE

## 2020-01-14 ENCOUNTER — OFFICE VISIT CONVERTED (OUTPATIENT)
Dept: FAMILY MEDICINE CLINIC | Age: 85
End: 2020-01-14
Attending: FAMILY MEDICINE

## 2020-01-27 ENCOUNTER — OFFICE VISIT CONVERTED (OUTPATIENT)
Dept: FAMILY MEDICINE CLINIC | Age: 85
End: 2020-01-27
Attending: FAMILY MEDICINE

## 2020-02-03 ENCOUNTER — OFFICE VISIT CONVERTED (OUTPATIENT)
Dept: FAMILY MEDICINE CLINIC | Age: 85
End: 2020-02-03
Attending: FAMILY MEDICINE

## 2020-02-03 ENCOUNTER — HOSPITAL ENCOUNTER (OUTPATIENT)
Dept: OTHER | Facility: HOSPITAL | Age: 85
Discharge: HOME | End: 2020-02-03
Attending: FAMILY MEDICINE

## 2020-02-03 LAB
ALBUMIN SERPL-MCNC: 4.1 G/DL (ref 3.5–5)
ALBUMIN/GLOB SERPL: 1.5 {RATIO} (ref 1.4–2.6)
ALP SERPL-CCNC: 83 U/L (ref 56–155)
ALT SERPL-CCNC: 17 U/L (ref 10–40)
ANION GAP SERPL CALC-SCNC: 15 MMOL/L (ref 8–19)
AST SERPL-CCNC: 21 U/L (ref 15–50)
BASOPHILS # BLD MANUAL: 0.04 10*3/UL (ref 0–0.2)
BASOPHILS NFR BLD MANUAL: 0.7 % (ref 0–3)
BILIRUB SERPL-MCNC: 0.33 MG/DL (ref 0.2–1.3)
BUN SERPL-MCNC: 18 MG/DL (ref 5–25)
BUN/CREAT SERPL: 17 {RATIO} (ref 6–20)
CALCIUM SERPL-MCNC: 9.5 MG/DL (ref 8.7–10.4)
CHLORIDE SERPL-SCNC: 106 MMOL/L (ref 99–111)
CONV CO2: 24 MMOL/L (ref 22–32)
CONV TOTAL PROTEIN: 6.8 G/DL (ref 6.3–8.2)
CREAT UR-MCNC: 1.06 MG/DL (ref 0.7–1.2)
DEPRECATED RDW RBC AUTO: 49.1 FL
EOSINOPHIL # BLD MANUAL: 0.21 10*3/UL (ref 0–0.7)
EOSINOPHIL NFR BLD MANUAL: 3.7 % (ref 0–7)
ERYTHROCYTE [DISTWIDTH] IN BLOOD BY AUTOMATED COUNT: 14.1 % (ref 11.5–14.5)
GFR SERPLBLD BASED ON 1.73 SQ M-ARVRAT: >60 ML/MIN/{1.73_M2}
GLOBULIN UR ELPH-MCNC: 2.7 G/DL (ref 2–3.5)
GLUCOSE SERPL-MCNC: 164 MG/DL (ref 70–99)
GRANS (ABSOLUTE): 3.36 10*3/UL (ref 2–8)
GRANS: 59.1 % (ref 30–85)
HBA1C MFR BLD: 11.2 G/DL (ref 14–18)
HCT VFR BLD AUTO: 34.6 % (ref 42–52)
IMM GRANULOCYTES # BLD: 0.04 10*3/UL (ref 0–0.54)
IMM GRANULOCYTES NFR BLD: 0.7 % (ref 0–0.43)
LYMPHOCYTES # BLD MANUAL: 1.48 10*3/UL (ref 1–5)
LYMPHOCYTES NFR BLD MANUAL: 9.7 % (ref 3–10)
MCH RBC QN AUTO: 30.5 PG (ref 27–31)
MCHC RBC AUTO-ENTMCNC: 32.4 G/DL (ref 33–37)
MCV RBC AUTO: 94.3 FL (ref 80–96)
MONOCYTES # BLD AUTO: 0.55 10*3/UL (ref 0.2–1.2)
OSMOLALITY SERPL CALC.SUM OF ELEC: 296 MOSM/KG (ref 273–304)
PLATELET # BLD AUTO: 178 10*3/UL (ref 130–400)
PMV BLD AUTO: 10.6 FL (ref 7.4–10.4)
POTASSIUM SERPL-SCNC: 5 MMOL/L (ref 3.5–5.3)
RBC # BLD AUTO: 3.67 10*6/UL (ref 4.7–6.1)
SODIUM SERPL-SCNC: 140 MMOL/L (ref 135–147)
TSH SERPL-ACNC: 3 M[IU]/L (ref 0.27–4.2)
VARIANT LYMPHS NFR BLD MANUAL: 26.1 % (ref 20–45)
WBC # BLD AUTO: 5.68 10*3/UL (ref 4.8–10.8)

## 2020-02-05 ENCOUNTER — OFFICE VISIT CONVERTED (OUTPATIENT)
Dept: FAMILY MEDICINE CLINIC | Age: 85
End: 2020-02-05
Attending: FAMILY MEDICINE

## 2020-02-12 ENCOUNTER — OFFICE VISIT CONVERTED (OUTPATIENT)
Dept: OTOLARYNGOLOGY | Facility: CLINIC | Age: 85
End: 2020-02-12
Attending: OTOLARYNGOLOGY

## 2020-03-04 ENCOUNTER — OFFICE VISIT CONVERTED (OUTPATIENT)
Dept: FAMILY MEDICINE CLINIC | Age: 85
End: 2020-03-04
Attending: FAMILY MEDICINE

## 2020-03-04 ENCOUNTER — HOSPITAL ENCOUNTER (OUTPATIENT)
Dept: OTHER | Facility: HOSPITAL | Age: 85
Discharge: HOME | End: 2020-03-04
Attending: FAMILY MEDICINE

## 2020-03-04 LAB
ALBUMIN SERPL-MCNC: 4 G/DL (ref 3.5–5)
ALBUMIN/GLOB SERPL: 1.3 {RATIO} (ref 1.4–2.6)
ALP SERPL-CCNC: 81 U/L (ref 56–155)
ALT SERPL-CCNC: 16 U/L (ref 10–40)
ANION GAP SERPL CALC-SCNC: 18 MMOL/L (ref 8–19)
AST SERPL-CCNC: 18 U/L (ref 15–50)
BASOPHILS # BLD MANUAL: 0.05 10*3/UL (ref 0–0.2)
BASOPHILS NFR BLD MANUAL: 0.9 % (ref 0–3)
BILIRUB SERPL-MCNC: 0.31 MG/DL (ref 0.2–1.3)
BUN SERPL-MCNC: 28 MG/DL (ref 5–25)
BUN/CREAT SERPL: 19 {RATIO} (ref 6–20)
CALCIUM SERPL-MCNC: 9.6 MG/DL (ref 8.7–10.4)
CHLORIDE SERPL-SCNC: 105 MMOL/L (ref 99–111)
CHOLEST SERPL-MCNC: 208 MG/DL (ref 107–200)
CHOLEST/HDLC SERPL: 3.3 {RATIO} (ref 3–6)
CONV CO2: 23 MMOL/L (ref 22–32)
CONV CREATININE URINE, RANDOM: 122.7 MG/DL (ref 10–300)
CONV MICROALBUM.,U,RANDOM: 232 MG/L (ref 0–20)
CONV TOTAL PROTEIN: 7.1 G/DL (ref 6.3–8.2)
CREAT UR-MCNC: 1.46 MG/DL (ref 0.7–1.2)
DEPRECATED RDW RBC AUTO: 47.7 FL
EOSINOPHIL # BLD MANUAL: 0.22 10*3/UL (ref 0–0.7)
EOSINOPHIL NFR BLD MANUAL: 3.9 % (ref 0–7)
ERYTHROCYTE [DISTWIDTH] IN BLOOD BY AUTOMATED COUNT: 13.9 % (ref 11.5–14.5)
EST. AVERAGE GLUCOSE BLD GHB EST-MCNC: 148 MG/DL
GFR SERPLBLD BASED ON 1.73 SQ M-ARVRAT: 43 ML/MIN/{1.73_M2}
GLOBULIN UR ELPH-MCNC: 3.1 G/DL (ref 2–3.5)
GLUCOSE SERPL-MCNC: 134 MG/DL (ref 70–99)
GRANS (ABSOLUTE): 3.05 10*3/UL (ref 2–8)
GRANS: 53.8 % (ref 30–85)
HBA1C MFR BLD: 12.5 G/DL (ref 14–18)
HBA1C MFR BLD: 6.8 % (ref 3.5–5.7)
HCT VFR BLD AUTO: 38.4 % (ref 42–52)
HDLC SERPL-MCNC: 63 MG/DL (ref 40–60)
IMM GRANULOCYTES # BLD: 0.07 10*3/UL (ref 0–0.54)
IMM GRANULOCYTES NFR BLD: 1.2 % (ref 0–0.43)
LDLC SERPL CALC-MCNC: 109 MG/DL (ref 70–100)
LYMPHOCYTES # BLD MANUAL: 1.72 10*3/UL (ref 1–5)
LYMPHOCYTES NFR BLD MANUAL: 9.9 % (ref 3–10)
MCH RBC QN AUTO: 30.3 PG (ref 27–31)
MCHC RBC AUTO-ENTMCNC: 32.6 G/DL (ref 33–37)
MCV RBC AUTO: 93.2 FL (ref 80–96)
MICROALBUMIN/CREAT UR: 189.1 MG/G{CRE} (ref 0–25)
MONOCYTES # BLD AUTO: 0.56 10*3/UL (ref 0.2–1.2)
OSMOLALITY SERPL CALC.SUM OF ELEC: 299 MOSM/KG (ref 273–304)
PLATELET # BLD AUTO: 183 10*3/UL (ref 130–400)
PMV BLD AUTO: 11.3 FL (ref 7.4–10.4)
POTASSIUM SERPL-SCNC: 5.1 MMOL/L (ref 3.5–5.3)
RBC # BLD AUTO: 4.12 10*6/UL (ref 4.7–6.1)
SODIUM SERPL-SCNC: 141 MMOL/L (ref 135–147)
TRIGL SERPL-MCNC: 181 MG/DL (ref 40–150)
TSH SERPL-ACNC: 3.84 M[IU]/L (ref 0.27–4.2)
VARIANT LYMPHS NFR BLD MANUAL: 30.3 % (ref 20–45)
VLDLC SERPL-MCNC: 36 MG/DL (ref 5–37)
WBC # BLD AUTO: 5.67 10*3/UL (ref 4.8–10.8)

## 2020-07-10 ENCOUNTER — OFFICE VISIT CONVERTED (OUTPATIENT)
Dept: FAMILY MEDICINE CLINIC | Age: 85
End: 2020-07-10
Attending: FAMILY MEDICINE

## 2020-07-10 ENCOUNTER — HOSPITAL ENCOUNTER (OUTPATIENT)
Dept: OTHER | Facility: HOSPITAL | Age: 85
Discharge: HOME | End: 2020-07-10
Attending: FAMILY MEDICINE

## 2020-07-10 LAB
ALBUMIN SERPL-MCNC: 4.1 G/DL (ref 3.5–5)
ALBUMIN/GLOB SERPL: 1.2 {RATIO} (ref 1.4–2.6)
ALP SERPL-CCNC: 113 U/L (ref 56–155)
ALT SERPL-CCNC: 22 U/L (ref 10–40)
ANION GAP SERPL CALC-SCNC: 19 MMOL/L (ref 8–19)
AST SERPL-CCNC: 30 U/L (ref 15–50)
BASOPHILS # BLD MANUAL: 0.05 10*3/UL (ref 0–0.2)
BASOPHILS NFR BLD MANUAL: 0.7 % (ref 0–3)
BILIRUB SERPL-MCNC: 0.24 MG/DL (ref 0.2–1.3)
BUN SERPL-MCNC: 34 MG/DL (ref 5–25)
BUN/CREAT SERPL: 21 {RATIO} (ref 6–20)
CALCIUM SERPL-MCNC: 9.6 MG/DL (ref 8.7–10.4)
CHLORIDE SERPL-SCNC: 103 MMOL/L (ref 99–111)
CONV CO2: 23 MMOL/L (ref 22–32)
CONV TOTAL PROTEIN: 7.4 G/DL (ref 6.3–8.2)
CREAT UR-MCNC: 1.65 MG/DL (ref 0.7–1.2)
DEPRECATED RDW RBC AUTO: 48.7 FL
EOSINOPHIL # BLD MANUAL: 0.27 10*3/UL (ref 0–0.7)
EOSINOPHIL NFR BLD MANUAL: 3.7 % (ref 0–7)
ERYTHROCYTE [DISTWIDTH] IN BLOOD BY AUTOMATED COUNT: 14.4 % (ref 11.5–14.5)
EST. AVERAGE GLUCOSE BLD GHB EST-MCNC: 160 MG/DL
GFR SERPLBLD BASED ON 1.73 SQ M-ARVRAT: 37 ML/MIN/{1.73_M2}
GLOBULIN UR ELPH-MCNC: 3.3 G/DL (ref 2–3.5)
GLUCOSE SERPL-MCNC: 179 MG/DL (ref 70–99)
GRANS (ABSOLUTE): 4.16 10*3/UL (ref 2–8)
GRANS: 56.7 % (ref 30–85)
HBA1C MFR BLD: 11.7 G/DL (ref 14–18)
HBA1C MFR BLD: 7.2 % (ref 3.5–5.7)
HCT VFR BLD AUTO: 36.1 % (ref 42–52)
IMM GRANULOCYTES # BLD: 0.03 10*3/UL (ref 0–0.54)
IMM GRANULOCYTES NFR BLD: 0.4 % (ref 0–0.43)
LYMPHOCYTES # BLD MANUAL: 2.22 10*3/UL (ref 1–5)
LYMPHOCYTES NFR BLD MANUAL: 8.3 % (ref 3–10)
MCH RBC QN AUTO: 29.8 PG (ref 27–31)
MCHC RBC AUTO-ENTMCNC: 32.4 G/DL (ref 33–37)
MCV RBC AUTO: 91.9 FL (ref 80–96)
MONOCYTES # BLD AUTO: 0.61 10*3/UL (ref 0.2–1.2)
OSMOLALITY SERPL CALC.SUM OF ELEC: 300 MOSM/KG (ref 273–304)
PLATELET # BLD AUTO: 210 10*3/UL (ref 130–400)
PMV BLD AUTO: 10.6 FL (ref 7.4–10.4)
POTASSIUM SERPL-SCNC: 5.5 MMOL/L (ref 3.5–5.3)
RBC # BLD AUTO: 3.93 10*6/UL (ref 4.7–6.1)
SODIUM SERPL-SCNC: 139 MMOL/L (ref 135–147)
T4 FREE SERPL-MCNC: 1.7 NG/DL (ref 0.9–1.8)
TSH SERPL-ACNC: 7.38 M[IU]/L (ref 0.27–4.2)
VARIANT LYMPHS NFR BLD MANUAL: 30.2 % (ref 20–45)
WBC # BLD AUTO: 7.34 10*3/UL (ref 4.8–10.8)

## 2020-09-10 ENCOUNTER — OFFICE VISIT CONVERTED (OUTPATIENT)
Dept: FAMILY MEDICINE CLINIC | Age: 85
End: 2020-09-10
Attending: FAMILY MEDICINE

## 2020-10-14 ENCOUNTER — OFFICE VISIT CONVERTED (OUTPATIENT)
Dept: FAMILY MEDICINE CLINIC | Age: 85
End: 2020-10-14
Attending: FAMILY MEDICINE

## 2020-10-14 ENCOUNTER — HOSPITAL ENCOUNTER (OUTPATIENT)
Dept: OTHER | Facility: HOSPITAL | Age: 85
Discharge: HOME | End: 2020-10-14
Attending: FAMILY MEDICINE

## 2020-10-14 LAB
ALBUMIN SERPL-MCNC: 4 G/DL (ref 3.5–5)
ALBUMIN/GLOB SERPL: 1.4 {RATIO} (ref 1.4–2.6)
ALP SERPL-CCNC: 128 U/L (ref 56–155)
ALT SERPL-CCNC: 29 U/L (ref 10–40)
ANION GAP SERPL CALC-SCNC: 20 MMOL/L (ref 8–19)
AST SERPL-CCNC: 30 U/L (ref 15–50)
BASOPHILS # BLD AUTO: 0.03 10*3/UL (ref 0–0.2)
BASOPHILS NFR BLD AUTO: 0.5 % (ref 0–3)
BILIRUB SERPL-MCNC: 0.19 MG/DL (ref 0.2–1.3)
BUN SERPL-MCNC: 36 MG/DL (ref 5–25)
BUN/CREAT SERPL: 19 {RATIO} (ref 6–20)
CALCIUM SERPL-MCNC: 9.1 MG/DL (ref 8.7–10.4)
CHLORIDE SERPL-SCNC: 103 MMOL/L (ref 99–111)
CHOLEST SERPL-MCNC: 184 MG/DL (ref 107–200)
CHOLEST/HDLC SERPL: 3.3 {RATIO} (ref 3–6)
CONV ABS IMM GRAN: 0.04 10*3/UL (ref 0–0.2)
CONV CO2: 21 MMOL/L (ref 22–32)
CONV IMMATURE GRAN: 0.6 % (ref 0–1.8)
CONV TOTAL PROTEIN: 6.9 G/DL (ref 6.3–8.2)
CREAT UR-MCNC: 1.87 MG/DL (ref 0.7–1.2)
DEPRECATED RDW RBC AUTO: 47.5 FL (ref 35.1–43.9)
EOSINOPHIL # BLD AUTO: 0.11 10*3/UL (ref 0–0.7)
EOSINOPHIL # BLD AUTO: 1.7 % (ref 0–7)
ERYTHROCYTE [DISTWIDTH] IN BLOOD BY AUTOMATED COUNT: 13.5 % (ref 11.6–14.4)
EST. AVERAGE GLUCOSE BLD GHB EST-MCNC: 154 MG/DL
GFR SERPLBLD BASED ON 1.73 SQ M-ARVRAT: 32 ML/MIN/{1.73_M2}
GLOBULIN UR ELPH-MCNC: 2.9 G/DL (ref 2–3.5)
GLUCOSE SERPL-MCNC: 101 MG/DL (ref 70–99)
HBA1C MFR BLD: 7 % (ref 3.5–5.7)
HCT VFR BLD AUTO: 37.2 % (ref 42–52)
HDLC SERPL-MCNC: 56 MG/DL (ref 40–60)
HGB BLD-MCNC: 11.9 G/DL (ref 14–18)
LDLC SERPL CALC-MCNC: 69 MG/DL (ref 70–100)
LYMPHOCYTES # BLD AUTO: 1.17 10*3/UL (ref 1–5)
LYMPHOCYTES NFR BLD AUTO: 17.7 % (ref 20–45)
MCH RBC QN AUTO: 30.3 PG (ref 27–31)
MCHC RBC AUTO-ENTMCNC: 32 G/DL (ref 33–37)
MCV RBC AUTO: 94.7 FL (ref 80–96)
MONOCYTES # BLD AUTO: 0.55 10*3/UL (ref 0.2–1.2)
MONOCYTES NFR BLD AUTO: 8.3 % (ref 3–10)
NEUTROPHILS # BLD AUTO: 4.7 10*3/UL (ref 2–8)
NEUTROPHILS NFR BLD AUTO: 71.2 % (ref 30–85)
NRBC CBCN: 0 % (ref 0–0.7)
OSMOLALITY SERPL CALC.SUM OF ELEC: 296 MOSM/KG (ref 273–304)
PLATELET # BLD AUTO: 175 10*3/UL (ref 130–400)
PMV BLD AUTO: 11.6 FL (ref 9.4–12.4)
POTASSIUM SERPL-SCNC: 4.6 MMOL/L (ref 3.5–5.3)
RBC # BLD AUTO: 3.93 10*6/UL (ref 4.7–6.1)
SODIUM SERPL-SCNC: 139 MMOL/L (ref 135–147)
T4 FREE SERPL-MCNC: 1.5 NG/DL (ref 0.9–1.8)
TRIGL SERPL-MCNC: 297 MG/DL (ref 40–150)
TSH SERPL-ACNC: 6.38 M[IU]/L (ref 0.27–4.2)
VLDLC SERPL-MCNC: 59 MG/DL (ref 5–37)
WBC # BLD AUTO: 6.6 10*3/UL (ref 4.8–10.8)

## 2020-11-17 ENCOUNTER — OFFICE VISIT CONVERTED (OUTPATIENT)
Dept: FAMILY MEDICINE CLINIC | Age: 85
End: 2020-11-17
Attending: NURSE PRACTITIONER

## 2020-11-17 ENCOUNTER — HOSPITAL ENCOUNTER (OUTPATIENT)
Dept: OTHER | Facility: HOSPITAL | Age: 85
Discharge: HOME | End: 2020-11-17
Attending: NURSE PRACTITIONER

## 2020-12-08 ENCOUNTER — HOSPITAL ENCOUNTER (OUTPATIENT)
Dept: OTHER | Facility: HOSPITAL | Age: 85
Discharge: HOME | End: 2020-12-08
Attending: FAMILY MEDICINE

## 2020-12-08 ENCOUNTER — OFFICE VISIT CONVERTED (OUTPATIENT)
Dept: FAMILY MEDICINE CLINIC | Age: 85
End: 2020-12-08
Attending: FAMILY MEDICINE

## 2020-12-08 LAB
ALBUMIN SERPL-MCNC: 3.9 G/DL (ref 3.5–5)
ALBUMIN/GLOB SERPL: 1.4 {RATIO} (ref 1.4–2.6)
ALP SERPL-CCNC: 130 U/L (ref 56–155)
ALT SERPL-CCNC: 23 U/L (ref 10–40)
ANION GAP SERPL CALC-SCNC: 17 MMOL/L (ref 8–19)
AST SERPL-CCNC: 29 U/L (ref 15–50)
BILIRUB SERPL-MCNC: 0.3 MG/DL (ref 0.2–1.3)
BUN SERPL-MCNC: 25 MG/DL (ref 5–25)
BUN/CREAT SERPL: 20 {RATIO} (ref 6–20)
CALCIUM SERPL-MCNC: 9.5 MG/DL (ref 8.7–10.4)
CHLORIDE SERPL-SCNC: 103 MMOL/L (ref 99–111)
CONV CO2: 23 MMOL/L (ref 22–32)
CONV TOTAL PROTEIN: 6.7 G/DL (ref 6.3–8.2)
CREAT UR-MCNC: 1.27 MG/DL (ref 0.7–1.2)
GFR SERPLBLD BASED ON 1.73 SQ M-ARVRAT: 51 ML/MIN/{1.73_M2}
GLOBULIN UR ELPH-MCNC: 2.8 G/DL (ref 2–3.5)
GLUCOSE SERPL-MCNC: 294 MG/DL (ref 70–99)
OSMOLALITY SERPL CALC.SUM OF ELEC: 301 MOSM/KG (ref 273–304)
POTASSIUM SERPL-SCNC: 5 MMOL/L (ref 3.5–5.3)
SODIUM SERPL-SCNC: 138 MMOL/L (ref 135–147)
T4 FREE SERPL-MCNC: 1.5 NG/DL (ref 0.9–1.8)
TSH SERPL-ACNC: 6.68 M[IU]/L (ref 0.27–4.2)

## 2021-01-14 ENCOUNTER — OFFICE VISIT CONVERTED (OUTPATIENT)
Dept: FAMILY MEDICINE CLINIC | Age: 86
End: 2021-01-14
Attending: NURSE PRACTITIONER

## 2021-03-05 ENCOUNTER — OFFICE VISIT CONVERTED (OUTPATIENT)
Dept: FAMILY MEDICINE CLINIC | Age: 86
End: 2021-03-05
Attending: FAMILY MEDICINE

## 2021-05-15 VITALS — RESPIRATION RATE: 18 BRPM | TEMPERATURE: 97.7 F | HEIGHT: 68 IN | WEIGHT: 166.25 LBS | BODY MASS INDEX: 25.2 KG/M2

## 2021-05-15 VITALS
WEIGHT: 176.25 LBS | OXYGEN SATURATION: 96 % | BODY MASS INDEX: 26.71 KG/M2 | HEART RATE: 56 BPM | HEIGHT: 68 IN | DIASTOLIC BLOOD PRESSURE: 41 MMHG | RESPIRATION RATE: 16 BRPM | SYSTOLIC BLOOD PRESSURE: 127 MMHG

## 2021-05-18 NOTE — PROGRESS NOTES
Darci Munson 1935     Office/Outpatient Visit    Visit Date: Thu, Sep 5, 2019 01:26 pm    Provider: Paulo Dyer MD (Assistant: Christie Alcaraz MA)    Location: [facility]        Electronically signed by Paulo Dyer MD on  09/19/2019 02:00:48 PM                             SUBJECTIVE:        CC:     Mr. Munson is a 84 year old White male.  This is a follow-up visit.  check up; pt unsure of his meds; possible rectal bleeding         HPI:     A1c was 5.4 on 5/30/19. He is on metformin 1,000 BID, glipizide 5 mg only if he checks glucose and its high and typically takes this at night. (200-250). Also on tradjenta 5 mg qd. He checks glucose 1-3x/day and its often 165 or 170. He eats cereal (raisin bran) or maybe eggs and alfaro at a restaurant for breakfast. Bologna sandwich for lunch. For dinner he eats hot dogs. He drinks  diet coca cola. He might snack on potato chips or peanut butter cracker.     Pt has Dx of gastroparesis. EGD in January 2019 showed retained food in stomach and gastric emptying study confirmed gastroparesis per GI note from 2014. EGD June 2018 showed 3+ gastritis and superficial ulcerations. Pt was started on erythromycin in 2014 per GI Dr. Alcaraz.     ROS:     CONSTITUTIONAL:  Negative for chills, fatigue and fever.      CARDIOVASCULAR:  Positive for chest pain.   Negative for dizziness, orthopnea, paroxysmal nocturnal dyspnea or pedal edema.      RESPIRATORY:  Negative for recent cough, dyspnea and frequent wheezing.      GASTROINTESTINAL:  Positive for acid reflux symptoms, anorexia ( loss of appetite ) and gastroparesis.   Negative for abdominal pain, constipation, diarrhea, nausea or vomiting.      MUSCULOSKELETAL:  Positive for arthralgias (left knee).      NEUROLOGICAL:  Negative for dizziness, headaches and memory loss.      PSYCHIATRIC:  Negative for anxiety and depression.          PMH/FMH/SH:     Last Reviewed on 6/28/2019 06:36 PM by Paulo Dyer    Past Medical  History:             PREVENTIVE HEALTH MAINTENANCE             EVALUATION FOR AORTIC ANEURYSM: was last done 17 with normal results     COLORECTAL CANCER SCREENING: Up to date (colonoscopy q10y; sigmoidoscopy q5y; Cologuard q3y) was last done 18, Results are in chart; colonoscopy with the following abnormalities noted-- severe sigmoid and descending colon diverticulosis and 3+ gastritis     EYE EXAM: Diabetic Eye Exam during this calendar year and results are in chart was last done 18 NO diabetic retinopathy     PSA: was last done 17 with normal results Hx prostate cancer/prostatectomy         PAST MEDICAL HISTORY         Positive for    Atrial Fibrillation: on Xarelto; ,     Coronary Artery Disease,    Hyperlipidemia and    Hypertension;     Positive for    Osteoarthritis;     Positive for    Type 2 Diabetes: complications include peripheral neuropathy and gastroparesis; and    Hypothyroidism: dx'd in 2017; 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; ;     Positive for    Prostate cancer: dx'd in ; ;         CURRENT MEDICAL PROVIDERS:    Cardiologist: KHAI Peterson    Dermatologist: Dr Alegre/Dr Couch    Gastroenterologist: Dr Alcaraz    Ophthalmologist: Dr Rodriguez    Orthopedist: Dr Atkins    Urologist: Dr Menendez    Audiologist: Dash ([facility])             ADVANCED DIRECTIVES: None         Surgical History:         Positive for    Arthroscopy: Rt knee; 3/14/17;,    Joint Replacement:    Knee Replacement: 3/2017; ; and    Prostatectomy;     Positive for    Rt hand surgery;;         Family History:     Father: ;  Lung Cancer     Mother: ;  Type 2 Diabetes         Social History:     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   Non-drinker         Substance Abuse History:     Last Reviewed on 6/28/2019 06:36 PM by Paulo Dyer        Mental Health History:     Last Reviewed on 6/28/2019 06:36 PM by Paulo Dyer        Communicable Diseases (eg STDs):     Last Reviewed on 6/28/2019 06:36 PM by Paulo Dyer            Current Problems:     Last Reviewed on 6/28/2019 06:36 PM by Paulo Dyer    Coronary artery disease     Type 2 DM     Gastric ulcer, unspecified chronicity, without mention of obstruction     Use of high risk medications     Chronic renal insufficiency, stage 3     Hypothyroidism     Unspecified PVD     Lower back pain     Chronic insomnia     Memory loss     Atrial fibrillation     Artificial joint replacement, Knee     Osteoarthritis of knee     Hearing loss     GERD     History of fall     Gastroparesis     Anemia, unspecified     Prostate cancer     Malignant melanoma of skin, other parts of face     Hypertriglyceridemia     Diabetes with neurological manifestations, type II or unspecified type, not stated as uncontrolled     Acute CVA     Cataract     Hypertension     Mixed hyperlipidemia     Type II DM     Sleep related leg cramps     Diverticulosis     Irritable bowel syndrome     Peripheral neuropathy     Epigastric abdominal pain     Screening for colorectal cancer     Other specified administrative purpose     Hyperkalemia         Immunizations:     Prevnar 13 (Pneumococcal PCV 13) 6/29/2017     Fluzone (3 + years dose) 11/20/2008     Fluzone (3 + years dose) 10/31/2012     Influenza, split virus (3+ years dose) 11/8/2007     Fluzone High-Dose pf (>=65 yr) 10/29/2013     Fluzone High-Dose pf (>=65 yr) 10/21/2014     Fluzone High-Dose pf (>=65 yr) 11/9/2015     Fluzone High-Dose pf (>=65 yr) 9/29/2016     Fluzone High-Dose pf (>=65 yr) 10/26/2017     Fluzone High-Dose pf (>=65 yr) 11/3/2018     PNEUMOVAX 23 (Pneumococcal PPV23) 2/11/2011         Allergies:     Last Reviewed on 6/28/2019 06:36 PM by  Paulo Dyer: (Adverse Reaction)    NSAIDs:        Current Medications:     Last Reviewed on 6/28/2019 06:36 PM by Paulo Dyer    Trazodone HCl 50mg Tablet 1 - 2 @ QHS PRN     Bacitracin/Neomycin/Polymyxin B 400units/3.5mg/5,000units per 1gm Topical Ointment Apply sufficient amount to affected area 2 to 3 x daily     Gabapentin 100mg Capsules 1 bid     Pantoprazole 40mg Tablets, Delayed Release 1 tab daily     Synthroid 0.112mg Tablet 1 daily in the morning     Cyclobenzaprine HCl 10mg Tablet Take 1 tablet(s) by mouth bid  prn     Glucophage 1,000mg Tablet 1 tab bid     Lisinopril 20mg Tablet 1 tab daily     Valacyclovir 500mg Tablet 1 tab qd     Tradjenta 5mg Tablet Take 1 tablet(s) by mouth daily     Nitroglycerin 0.4mg Tablets, Sublingual Dissolve 1 tablet(s) under the tongue may repeat every 5 minutes. If pain not relieved after three doses go to ER.     Ferrous Sulfate 325mg Tablets 1 tab bid     Lancet   Lancet Check blood once daily as directed     Glipizide 5mg Tablets 1 in morning, 1 at bedtime     Reglan  5mg Tablet one tablet TID     Norvasc 10mg Tablet Take 1 tablet(s) by mouth daily     Fish Oil 1,000mg Capsules 1 capsules daily     Xarelto 15mg Tablet 1 po daily     Amiodarone HCl 200mg Tablet one tablet tid     glucosamine/chondroitin 375/100/36/54mg BID     Aspirin 81mg Tablets, Enteric Coated Take 1 tablet(s) by mouth qam         OBJECTIVE:        Vitals:         Current: 9/5/2019 1:31:06 PM    Ht:  5 ft, 8 in;  Wt: 173.2 lbs;  BMI: 26.3    T: 97.9 F (oral);  BP: 124/55 mm Hg (left arm, sitting);  P: 65 bpm (left arm (BP Cuff), sitting);  sCr: 1.45 mg/dL;  GFR: 35.87        Exams:     PHYSICAL EXAM:     GENERAL:  well developed and nourished; appropriately groomed; in no apparent distress;     RESPIRATORY: normal respiratory rate and pattern with no distress; normal breath sounds with no rales, rhonchi, wheezes or rubs;     CARDIOVASCULAR: normal rate; rhythm is regular;      GASTROINTESTINAL: nontender; normal bowel sounds; no organomegaly;     MUSCULOSKELETAL: gait: slowed and uses a cane;  pain with range of motion in: left knee;  crepitus of left knee with extention and flexion;     NEUROLOGIC: mental status: oriented to person and place only;  GROSSLY INTACT     PSYCHIATRIC: affect/demeanor: in good spirits;  psychomotor: displays psychomotor retardation;  normal speech pattern; normal thought and perception;         ASSESSMENT           250.00   E11.8  Type 2 DM              DDx:     536.3   K31.84  Gastroparesis              DDx:         ORDERS:         Meds Prescribed:       Reglan  (Metoclopramide HCl) 10mg Tablet 1 tab every 6 to 8 hours  #120 ([address]) tablet(s) Refills: 2         Lab Orders:       [id number]  BDCB - [facility] CBC with 3 part diff  (Send-Out)         [id number]  COMP - [facility] Comp. Metabolic Panel  (Send-Out)         42006  A1CEG - [facility] Hemoglobin A1C  (Send-Out)                   PLAN:          Type 2 DM Pt really does not need medication for DM 2 according to most recent A1c but we will check labs and determine which medications are needed.     LABORATORY:  Labs ordered to be performed today include CBC, Comprehensive metabolic panel, and HgbA1C.            Orders:       [id number]  BDCBC - [facility] CBC with 3 part diff  (Send-Out)         [id number]  COMP - [facility] Comp. Metabolic Panel  (Send-Out)         90608  A1CEG - [facility] Hemoglobin A1C  (Send-Out)            Gastroparesis Reglan started for gastroparesis. Consider adding erythromycin 250 mg TID as well. Gastroparesis confirmed by gastric emptying study and EGD also showed retained food in 2014.           Prescriptions:       Reglan  (Metoclopramide HCl) 10mg Tablet 1 tab every 6 to 8 hours  #120 ([address]) tablet(s) Refills: 2             Other Orders:       [id number]  Office/outpatient visit; established patient, level 4  (In-House)           CHARGE CAPTURE           **Please note: ICD descriptions below are  intended for billing purposes only and may not represent clinical diagnoses**        Primary Diagnosis:         250.00 Type 2 DM            E11.8    Type 2 diabetes mellitus with unspecified complications    536.3 Gastroparesis            K31.84    Gastroparesis        Other Orders:           [id number]   Office/outpatient visit; established patient, level 4  (In-House)           ADDENDUMS:      ____________________________________    Addendum: 09/18/2019 10:09 AM - Hayley Lira         Visit Note Faxed to:        ABDEIL Ladd, [place]  (Gastroenterology); Number [phone]

## 2021-05-18 NOTE — PROGRESS NOTES
Darci Munson  1935     Office/Outpatient Visit    Visit Date: Tue, Jan 14, 2020 03:48 pm    Provider: Paulo Dyer MD (Assistant: Christie Alcaraz MA)    Location: [facility]        Electronically signed by Paulo Dyer MD on  01/15/2020 07:59:58 PM                             Subjective:        CC: Mr. Munson is a 84 year old White male.  scrape to right arm, pt says he fell         HPI:       A couple hours ago, pt was clearing some brush on his property and tripped and fell down an embankment. He was wearing long sleeves and sustained an avulsion lacertion to his right forearm, roughly 7x 5 cm.     ROS:     CONSTITUTIONAL:  Negative for chills, fatigue and fever.      CARDIOVASCULAR:  Negative for chest pain, dizziness, orthopnea, paroxysmal nocturnal dyspnea and pedal edema.      RESPIRATORY:  Negative for recent cough, dyspnea and frequent wheezing.      GASTROINTESTINAL:  Positive for acid reflux symptoms and anorexia ( loss of appetite ).   Negative for abdominal pain, constipation, diarrhea, nausea or vomiting.      MUSCULOSKELETAL:  Positive for arthralgias (left knee).      INTEGUMENTARY:  Positive for avulsion lacertion to his right forearm, roughly 7x 5 cm..      NEUROLOGICAL:  Positive for memory loss.   Negative for dizziness or headaches.      PSYCHIATRIC:  Negative for anxiety and depression.          Past Medical History / Family History / Social History:         Last Reviewed on 1/14/2020 08:22 PM by Paulo Dyer    Past Medical History:             PREVENTIVE HEALTH MAINTENANCE             EVALUATION FOR AORTIC ANEURYSM: was last done 7/5/17 with normal results     COLORECTAL CANCER SCREENING: Up to date (colonoscopy q10y; sigmoidoscopy q5y; Cologuard q3y) was last done 6/18/18, Results are in chart; colonoscopy with the following abnormalities noted-- severe sigmoid and descending colon diverticulosis and 3+ gastritis     EYE EXAM: Diabetic Eye Exam during this  calendar year and results are in chart was last done 18 NO diabetic retinopathy     PSA: was last done 17 with normal results Hx prostate cancer/prostatectomy         PAST MEDICAL HISTORY         Positive for    Atrial Fibrillation: on Xarelto; ,     Coronary Artery Disease,    Hyperlipidemia and    Hypertension;     Positive for    Osteoarthritis;     Positive for    Type 2 Diabetes: complications include peripheral neuropathy and gastroparesis; and    Hypothyroidism: dx'd in 2017; 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; ;     Positive for    Prostate cancer: dx'd in ; ;         CURRENT MEDICAL PROVIDERS:    Cardiologist: KHAI Peterson    Dermatologist: Dr Alegre/Dr Couch    Gastroenterologist: Dr Alcaraz    Ophthalmologist: Dr Rodriguez    Orthopedist: Dr Atkins    Urologist: Dr Menendez    Audiologist: Dash ([facility])             ADVANCED DIRECTIVES: None         Surgical History:         Positive for    Arthroscopy: Rt knee; 3/14/17;,    Joint Replacement:    Knee Replacement: 3/2017; ; and    Prostatectomy;     Positive for    Rt hand surgery;;         Family History:     Father: ;  Lung Cancer     Mother: ;  Type 2 Diabetes         Social History:     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  Non-drinker         Substance Abuse History:     Last Reviewed on 2020 08:22 PM by Paulo Dyer        Mental Health History:     Last Reviewed on 2020 08:22 PM by Paulo Dyer        Communicable Diseases (eg STDs):     Last Reviewed on 2020 08:22 PM by Paulo Dyer        Current Problems:     Last Reviewed on 2020 08:22 PM by Paulo Dyer    Hereditary and idiopathic neuropathy, unspecified    Irritable  bowel syndrome without diarrhea    Sleep related leg cramps    Essential (primary) hypertension    Type 2 diabetes mellitus with diabetic polyneuropathy    Mixed hyperlipidemia    Malignant neoplasm of prostate    Anemia, unspecified    Gastroparesis    Unilateral primary osteoarthritis, right knee    History of falling    Gastro-esophageal reflux disease without esophagitis    Unspecified hearing loss, bilateral    Unspecified atrial fibrillation    Presence of unspecified artificial knee joint    Low back pain    Unspecified dementia without behavioral disturbance    Primary insomnia    Hypothyroidism, unspecified    Peripheral vascular disease, unspecified    Chronic kidney disease, stage 3 (moderate)    Gastric ulcer, unspecified as acute or chronic, without hemorrhage or perforation    Atherosclerotic heart disease of native coronary artery without angina pectoris    Laceration without foreign body of right forearm, initial encounter    Encounter for other administrative examinations    Encounter for immunization        Immunizations:     Prevnar 13 (Pneumococcal PCV 13) 6/29/2017    Fluzone (3 + years dose) 11/20/2008    Fluzone (3 + years dose) 10/31/2012    Influenza, split virus (3+ years dose) 11/8/2007    Fluzone High-Dose pf (>=65 yr) 10/29/2013    Fluzone High-Dose pf (>=65 yr) 10/21/2014    Fluzone High-Dose pf (>=65 yr) 11/9/2015    Fluzone High-Dose pf (>=65 yr) 9/29/2016    Fluzone High-Dose pf (>=65 yr) 10/26/2017    Fluzone High-Dose pf (>=65 yr) 11/3/2018    Fluzone High-Dose pf (>=65 yr) 10/24/2019    PNEUMOVAX 23 (Pneumococcal PPV23) 2/11/2011        Allergies:     Last Reviewed on 1/14/2020 08:22 PM by Paulo Dyer:   (Adverse Reaction)    NSAIDs:          Current Medications:     Last Reviewed on 1/14/2020 08:22 PM by Paulo Dyer    Glipizide 5 mg oral tablet [1 in morning, 1 at bedtime]    glipiZIDE 5 mg oral tablet [TAKE 1/2 (ONE-HALF) TABLET BY MOUTH TWICE DAILY]     Glucophage 1,000 mg oral tablet [TAKE 1 TABLET BY MOUTH TWICE DAILY]    aspirin 81 mg oral tablet, delayed release (enteric coated) [Take 1 tablet(s) by mouth qam]    Nitroglycerin 0.4mg Tablets, Sublingual [Dissolve 1 tablet(s) under the tongue may repeat every 5 minutes. If pain not relieved after three doses go to ER.]    Lancet   Lancet [Check blood once daily as directed]    Pantoprazole 40 mg oral tablet, delayed release (enteric coated) [1 tab daily]    hydroCHLOROthiazide 12.5 mg oral tablet [Take 1 tablet(s) by mouth daily]    Ferrous Sulfate 325 mg (65 mg iron) oral tablet [1 tab bid]    Tradjenta 5mg Tablet [Take 1 tablet(s) by mouth daily]    Norvasc 10 mg oral tablet [Take 1 tablet(s) by mouth daily]    amiodarone 200 mg oral tablet [one tablet tid]    glucosamine/chondroitin 375/100/36/54mg BID     Xarelto 15 mg oral tablet [1 po daily]    Gabapentin 100 mg oral capsule [1 bid]    Synthroid 0.112mg Tablet [1 daily in the morning]    traZODone 50 mg oral tablet [1 - 2 @ QHS PRN]    Fish Oil 300-1,000 mg oral capsule [1 capsules daily ]    Sucralfate 1 gram oral tablet [1 tab 30 MIN BEFORE MEALS AND AT HS]    cyclobenzaprine 10 mg oral tablet [Take 1 tablet(s) by mouth bid  prn]    Reglan  10mg Tablet [1 tab every 6 to 8 hours]    Famciclovir 500 mg oral tablet [Take BID x 5 days]    Valacyclovir 500mg Tablet [1 tab qd]    neomycin-bacitracnZn-polymyxnB 3.5mg-400 unit- 5,000 unit/gram Topical Ointment [Apply sufficient amount to affected area 2 to 3 x daily]        Objective:        Vitals:         Current: 1/14/2020 3:52:08 PM    Ht:  5 ft, 8 in;  Wt: 171.2 lbs;  BMI: 26.0T: 98.4 F (oral);  BP: 146/46 mm Hg (left arm, sitting);  P: 69 bpm (left arm (BP Cuff), sitting);  sCr: 1.71 mg/dL;  GFR: 30.27        Exams:     PHYSICAL EXAM:     GENERAL:  well developed and nourished; appropriately groomed; in no apparent distress;     EYES: PERRL, EOMI     RESPIRATORY: normal respiratory rate and pattern with no  distress; normal breath sounds with no rales, rhonchi, wheezes or rubs;     CARDIOVASCULAR: normal rate; rhythm is regular;     GASTROINTESTINAL: nontender; normal bowel sounds; no organomegaly; rectal exam: guaiac negative stool; OTHER (enter);     SKIN: avulsion lacertion to his right forearm, roughly 7x 5 cm, wound is superficial with underlying dermis exposed but no subcutaneous fat is observed and there is minimal bleeding;     MUSCULOSKELETAL: gait: slowed and uses a cane;  pain with range of motion in: left knee;  crepitus of left knee with extention and flexion;     NEUROLOGIC: mental status: oriented to person and place only;  GROSSLY INTACT     PSYCHIATRIC: affect/demeanor: in good spirits;  normal psychomotor function; normal speech pattern; normal thought and perception;         Procedures:     Laceration without foreign body of right forearm, initial encounter        LACERATION REPAIR:     Informed consent obtained verbally.  The patient expressed understanding of the potential risks and complications discussed including pain, bleeding, and infection.  Sterile technique was observed.  Wound care instructions were given to the patient.          LOCATION:  avulsion lacertion to his right forearm    WOUND LENGTH:  roughly 7x 5 cm    PREP: betadine    ANESTHESIA: 1% lidocaine with epinephrine ( 2 cc ) infiltrated locally    IRRIGATION: a small amount of lidocaine prior to anesthesia    TYPE OF REPAIR: simple/single-layer repair    REASON FOR REPAIR: large defect    EPIDERMAL CLOSURE: steri-strips    FINAL DEFECT SIZE:  roughly 7x 5 cm             Assessment:         S51.811A   Laceration without foreign body of right forearm, initial encounter       Z23   Encounter for immunization           ORDERS:         Meds Prescribed:       [New Rx] Keflex 500 mg oral capsule [take 1 capsule (500 mg) by oral route 2 times per day], #10 (ten) capsules, Refills: 0 (zero)         Procedures Ordered:       [id number]   Immunization administration; one vaccine  (In-House)            67498  Tetanus and diphtheria toxoids (Td) adsorbed when admin to individuals 7 years or older, for IM use  (In-House)                      Plan:         Laceration without foreign body of right forearm, initial encounterWound cleaned with betadine then lightly irrigated with sterile lidocaine before lidocaine injected. Wound approximated by myself and then Hayley Lira. Hayley Lira applied steri strips x5, covered with non-adherent pad and wrapped with gauze and pt will RTC for wound inspection. Pt given Tdap today and keflex prescribed for infection prevention.          Prescriptions:       [New Rx] Keflex 500 mg oral capsule [take 1 capsule (500 mg) by oral route 2 times per day], #10 (ten) capsules, Refills: 0 (zero)         Encounter for immunization          Immunizations:       79075  Immunization administration; one vaccine  (In-House)            09896  Tetanus and diphtheria toxoids (Td) adsorbed when admin to individuals 7 years or older, for IM use  (In-House)                Dose (ml): 0.5  Site: right deltoid  Route: intramuscular  Administered by: Christie Alcaraz          : [employer]  Lot #: [id number]  Exp: 06/06/2021          NDC: [address]-75            Charge Capture:         Primary Diagnosis:     S51.811A  Laceration without foreign body of right forearm, initial encounter           Orders:      [id number]  Office/outpatient visit; established patient, level 3  (In-House)              Z23  Encounter for immunization           Orders:      [id number]  Immunization administration; one vaccine  (In-House)            99135  Tetanus and diphtheria toxoids (Td) adsorbed when admin to individuals 7 years or older, for IM use  (In-House)                  ADDENDUMS:      ____________________________________    Addendum: 02/13/2020 05:27 PM - Paulo Dyer        ADDENDUM: Add [id number]; Remove [id number]

## 2021-05-18 NOTE — PROGRESS NOTES
Darci Munson 1935     Office/Outpatient Visit    Visit Date: Tue, Sep 24, 2019 02:56 pm    Provider: Paulo Dyer MD (Assistant: Christie Alcaraz MA)    Location: [facility]        Electronically signed by Paulo Dyer MD on  10/13/2019 05:25:48 PM                             SUBJECTIVE:        CC:     Mr. Munson is a 84 year old White male.  This is a follow-up visit.  on hospital visit, pt says stools are still dark         HPI:     BP today is 151/53 with a HR of 66. He is on lisinopril 20 mg qd and amlodipine 10 mg qd and (amiodarone for afib). Potassium was recently elevated prompting an overnight admission. He has been not been taking lisinopril as this was d/c'd per Dr. Crouch 2/2 hyperkalemia.      DM 2 has been well controlled over the last year, with A1c being 6.6 -> 6.9 -> 5.4 and most recently 6.9 on 9/5/19. He is on metformin 1,000 BID, glipizide 5 mg only if he checks glucose and its high and typically takes this at night. (200-250). Also on tradjenta 5 mg qd. He checks glucose 1-3x/day and its often 165 or 170. He eats cereal (raisin bran) or maybe eggs and alfaro at a restaurant for breakfast. Bologna sandwich for lunch. For dinner he eats hot dogs. He drinks  diet coca cola. He might snack on potato chips or peanut butter cracker.     Pt has Dx of gastroparesis. EGD in January 2019 showed retained food in stomach and gastric emptying study confirmed gastroparesis per GI note from 2014. EGD June 2018 showed 3+ gastritis and superficial ulcerations. Pt was started on erythromycin in 2014 per GI Dr. Alcaraz. I prescribed reglan at recent visit and appetite improved but he developed diarrhea.     Cr has been trending up over the last year, with most recent Cr of 1.6 on 9/12. Baseline appears to be about 1.4. Pt was admitted to [facility] overnight (9/13-9/14) for hyperkalemia (6.0) and ITA (Cr 2.07) and melena. Sx resolved with minimal IVF and kayexalate and holding lisinopril. Nephro c/s  during admission ([place]). May have melena from indolent upper GI Bleed.     ROS:     CONSTITUTIONAL:  Negative for chills, fatigue and fever.      CARDIOVASCULAR:  Positive for chest pain.   Negative for dizziness, orthopnea, paroxysmal nocturnal dyspnea or pedal edema.      RESPIRATORY:  Negative for recent cough, dyspnea and frequent wheezing.      GASTROINTESTINAL:  Positive for acid reflux symptoms, anorexia ( loss of appetite ), melena and gastroparesis.   Negative for abdominal pain, constipation, diarrhea, nausea or vomiting.      MUSCULOSKELETAL:  Positive for arthralgias (left knee).      NEUROLOGICAL:  Negative for dizziness, headaches and memory loss.      PSYCHIATRIC:  Negative for anxiety and depression.          PMH/FMH/SH:     Last Reviewed on 6/28/2019 06:36 PM by Paulo Dyer    Past Medical History:             PREVENTIVE HEALTH MAINTENANCE             EVALUATION FOR AORTIC ANEURYSM: was last done 7/5/17 with normal results     COLORECTAL CANCER SCREENING: Up to date (colonoscopy q10y; sigmoidoscopy q5y; Cologuard q3y) was last done 6/18/18, Results are in chart; colonoscopy with the following abnormalities noted-- severe sigmoid and descending colon diverticulosis and 3+ gastritis     EYE EXAM: Diabetic Eye Exam during this calendar year and results are in chart was last done 5/8/18 NO diabetic retinopathy     PSA: was last done 11/21/17 with normal results Hx prostate cancer/prostatectomy         PAST MEDICAL HISTORY         Positive for    Atrial Fibrillation: on Xarelto; ,     Coronary Artery Disease,    Hyperlipidemia and    Hypertension;     Positive for    Osteoarthritis;     Positive for    Type 2 Diabetes: complications include peripheral neuropathy and gastroparesis; and    Hypothyroidism: dx'd in 8/2017; 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; ;     Positive for    Prostate cancer: dx'd in 2003; ;         CURRENT MEDICAL PROVIDERS:    Cardiologist: KHAI Peterson    Dermatologist: Dr Alegre/  Kho[facility]    Gastroenterologist: Dr Alcaraz    Ophthalmologist: Dr Rodriguez    Orthopedist: Dr Atkins    Urologist: Dr Menendez    Audiologist: Dash ([facility])             ADVANCED DIRECTIVES: None         Surgical History:         Positive for    Arthroscopy: Rt knee; 3/14/17;,    Joint Replacement:    Knee Replacement: 3/2017; ; and    Prostatectomy;     Positive for    Rt hand surgery;;         Family History:     Father: ;  Lung Cancer     Mother: ;  Type 2 Diabetes         Social History:     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  Non-drinker         Substance Abuse History:     Last Reviewed on 2019 06:36 PM by Paulo Dyer        Mental Health History:     Last Reviewed on 2019 06:36 PM by Paulo Dyer        Communicable Diseases (eg STDs):     Last Reviewed on 2019 06:36 PM by Paulo Dyer            Current Problems:     Last Reviewed on 2019 06:36 PM by Paulo Dyer    Coronary artery disease     Type 2 DM     Gastric ulcer, unspecified chronicity, without mention of obstruction     Use of high risk medications     Chronic renal insufficiency, stage 3     Hypothyroidism     Unspecified PVD     Lower back pain     Chronic insomnia     Memory loss     Atrial fibrillation     Artificial joint replacement, Knee     Osteoarthritis of knee     Hearing loss     GERD     History of fall     Gastroparesis     Anemia, unspecified     Prostate cancer     Malignant melanoma of skin, other parts of face     Hypertriglyceridemia     Diabetes with neurological manifestations, type II or unspecified type, not stated as uncontrolled     Acute CVA     Hypertension     Type II DM     Mixed hyperlipidemia      Sleep related leg cramps     Irritable bowel syndrome     Peripheral neuropathy     Hyperkalemia     Epigastric abdominal pain     Other specified administrative purpose         Immunizations:     Prevnar 13 (Pneumococcal PCV 13) 6/29/2017     Fluzone (3 + years dose) 11/20/2008     Fluzone (3 + years dose) 10/31/2012     Influenza, split virus (3+ years dose) 11/8/2007     Fluzone High-Dose pf (>=65 yr) 10/29/2013     Fluzone High-Dose pf (>=65 yr) 10/21/2014     Fluzone High-Dose pf (>=65 yr) 11/9/2015     Fluzone High-Dose pf (>=65 yr) 9/29/2016     Fluzone High-Dose pf (>=65 yr) 10/26/2017     Fluzone High-Dose pf (>=65 yr) 11/3/2018     PNEUMOVAX 23 (Pneumococcal PPV23) 2/11/2011         Allergies:     Last Reviewed on 9/05/2019 01:28 PM by Christie Alcaraz: (Adverse Reaction)    NSAIDs:        Current Medications:     Last Reviewed on 9/05/2019 01:29 PM by Christie Alcaraz    Glucophage 1,000mg Tablet 1 tab bid     Cyclobenzaprine HCl 10mg Tablet Take 1 tablet(s) by mouth bid  prn     Reglan  10mg Tablet 1 tab every 6 to 8 hours     Trazodone HCl 50mg Tablet 1 - 2 @ QHS PRN     Bacitracin/Neomycin/Polymyxin B 400units/3.5mg/5,000units per 1gm Topical Ointment Apply sufficient amount to affected area 2 to 3 x daily     Gabapentin 100mg Capsules 1 bid     Pantoprazole 40mg Tablets, Delayed Release 1 tab daily     Synthroid 0.112mg Tablet 1 daily in the morning     Lisinopril 20mg Tablet 1 tab daily     Valacyclovir 500mg Tablet 1 tab qd     Tradjenta 5mg Tablet Take 1 tablet(s) by mouth daily     Nitroglycerin 0.4mg Tablets, Sublingual Dissolve 1 tablet(s) under the tongue may repeat every 5 minutes. If pain not relieved after three doses go to ER.     Ferrous Sulfate 325mg Tablets 1 tab bid     Lancet   Lancet Check blood once daily as directed     Glipizide 5mg Tablets 1 in morning, 1 at bedtime     Norvasc 10mg Tablet Take 1 tablet(s) by mouth daily     Fish Oil 1,000mg Capsules 1  capsules daily     Xarelto 15mg Tablet 1 po daily     Amiodarone HCl 200mg Tablet one tablet tid     glucosamine/chondroitin 375/100/36/54mg BID     Aspirin 81mg Tablets, Enteric Coated Take 1 tablet(s) by mouth qam         OBJECTIVE:        Vitals:         Current: 9/24/2019 3:02:53 PM    Ht:  5 ft, 8 in;  Wt: 172.6 lbs;  BMI: 26.2    T: 97.9 F (oral);  BP: 151/53 mm Hg (left arm, sitting);  P: 66 bpm (left arm (BP Cuff), sitting);  sCr: 1.62 mg/dL;  GFR: 32.06        Exams:     PHYSICAL EXAM:     GENERAL:  well developed and nourished; appropriately groomed; in no apparent distress;     EYES: PERRL, EOMI     RESPIRATORY: normal respiratory rate and pattern with no distress; normal breath sounds with no rales, rhonchi, wheezes or rubs;     CARDIOVASCULAR: normal rate; rhythm is regular;     GASTROINTESTINAL: nontender; normal bowel sounds; no organomegaly; rectal exam: guaiac negative stool; OTHER (enter);     MUSCULOSKELETAL: gait: slowed and uses a cane;  pain with range of motion in: left knee;  crepitus of left knee with extention and flexion;     NEUROLOGIC: mental status: oriented to person and place only;  GROSSLY INTACT     PSYCHIATRIC: affect/demeanor: in good spirits;  psychomotor: displays psychomotor retardation;  normal speech pattern; normal thought and perception; Declines having a chaperone present during exam.          ASSESSMENT           401.1   I10  Hypertension              DDx:     250.00   E11.8  Type 2 DM              DDx:     536.3   K31.84  Gastroparesis              DDx:     585.3   N18.3  Chronic renal insufficiency, stage 3              DDx:         ORDERS:         Lab Orders:       [id number]  [facility] CBC with 3 part diff  (Send-Out)         [id number]  [facility] Comp. Metabolic Panel  (Send-Out)           Procedures Ordered:       REFER  Referral to Specialist or Other Facility  (Send-Out)                   PLAN:          Hypertension We are stopping lisinoprl 2/2 recent hyperkalemia. Cont  amlodipine 10 mg qd. HCTZ is avoided 2/2 CKD and metoprolol avoided as HR is already on the lower end (60s).     LABORATORY:  Labs ordered to be performed today include CBC and Comprehensive metabolic panel.            Orders:       [id number]  [facility] CBC with 3 part diff  (Send-Out)         [id number]  [facility] - [facility] Comp. Metabolic Panel  (Send-Out)            Type 2 DM DM 2 has been well controlled over the last year, with A1c being 6.6 -> 6.9 -> 5.4 and most recently 6.9 on 9/5/19. He is on metformin 1,000 BID, glipizide 5 mg only if he checks glucose and its high and typically takes this at night. (200-250). Also on tradjenta 5 mg qd. He checks glucose 1-3x/day and its often 165 or 170. He eats cereal (raisin bran) or maybe eggs and alfaro at a restaurant for breakfast. Bologna sandwich for lunch. For dinner he eats hot dogs. He drinks  diet coca cola. He might snack on potato chips or peanut butter cracker.          Gastroparesis Cont reglan as this has improved Sx of gastroparesis. Consider erythromycin if alternative is needed.          Chronic renal insufficiency, stage 3 Will recheck basic labs and try to arrange for nephrology apt in [place] instead of [facility].         REFERRALS:  Referral initiated to a nephrologist ( Dr. Lionel Crouch ).            Orders:       REFER  Referral to Specialist or Other Facility  (Send-Out)               CHARGE CAPTURE           **Please note: ICD descriptions below are intended for billing purposes only and may not represent clinical diagnoses**        Primary Diagnosis:         401.1 Hypertension            I10    Essential (primary) hypertension              Orders:          [id number]   Office/outpatient visit; established patient, level 4  (In-House)           250.00 Type 2 DM            E11.8    Type 2 diabetes mellitus with unspecified complications    536.3 Gastroparesis            K31.84    Gastroparesis    585.3 Chronic renal insufficiency, stage 3            N18.3    Chronic  kidney disease, stage 3 (moderate)

## 2021-05-18 NOTE — PROGRESS NOTES
Darci Munson 1935     Office/Outpatient Visit    Visit Date: Sat, Jul 14, 2018 09:04 am    Provider: Melody Goel N.P. (Assistant: Christie Alcaraz MA)    Location: [facility]        Electronically signed by Melody Goel N.P. on  07/14/2018 09:48:30 AM                             SUBJECTIVE:        CC:     Anuel Berry is a 83 year old White male.  presents today due to cough, cold/sinus symptoms PT DOES NOT HAVE MED LIST, DOES NOT KNOW WHAT HES TAKING; SAYS HE HAS A NEW MED FROM DR MELTON HE WANTS REFILLED (presumed to be sucralfate, is in med rec, pt says he thinks thats it)         HPI:         Patient to be evaluated for upper respiratory illness.  These have been present for the past one week.  The symptoms include chest congestion, cough and nasal congestion.  He denies fever.  He has already tried to relieve the symptoms with Has tried OTC cold medication but does not remember the name of it..      ROS:     CONSTITUTIONAL:  Negative for chills and fever.      EYES:  Negative for blurred vision and eye drainage.      E/N/T:  Positive for nasal congestion and sinus pressure.      CARDIOVASCULAR:  Negative for chest pain and palpitations.      RESPIRATORY:  Positive for recent cough ( typically dry ).   Negative for dyspnea or frequent wheezing.          PMH/FMH/SH:     Last Reviewed on 5/23/2018 10:47 AM by Joanna Malik    Past Medical History:             PREVENTIVE HEALTH MAINTENANCE             EVALUATION FOR AORTIC ANEURYSM: was last done 7/5/17 with normal results     COLORECTAL CANCER SCREENING: Up to date (colonoscopy q10y; sigmoidoscopy q5y; Cologuard q3y) was last done 6/18/18, Results are in chart; colonoscopy with the following abnormalities noted-- severe sigmoid and descending colon diverticulosis and 3+ gastritis     EYE EXAM: Diabetic Eye Exam during this calendar year and results are in chart was last done 5/8/18 NO diabetic retinopathy     PSA: was last done  17 with normal results Hx prostate cancer/prostatectomy         PAST MEDICAL HISTORY         Positive for    Atrial Fibrillation: on Xarelto; ,     Coronary Artery Disease,    Hyperlipidemia and    Hypertension;     Positive for    Osteoarthritis;     Positive for    Type 2 Diabetes: complications include peripheral neuropathy and gastroparesis; and    Hypothyroidism: dx'd in 2017; 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; ;     Positive for    Prostate cancer: dx'd in ; ;         CURRENT MEDICAL PROVIDERS:    Cardiologist: KHAI Peterson    Dermatologist: Dr Alegre/Dr Couch    Gastroenterologist: Dr Alcaraz    Ophthalmologist: Dr Rodriguez    Orthopedist: Dr Atkins    Urologist: Dr Menendez    Audiologist: Dash ([facility])             ADVANCED DIRECTIVES: None         Surgical History:         Positive for    Arthroscopy: Rt knee; 3/14/17;,    Joint Replacement:    Knee Replacement: 3/2017; ; and    Prostatectomy;     Positive for    Rt hand surgery;;         Family History:     Father: ;  Lung Cancer     Mother: ;  Type 2 Diabetes         Social History:     Occupation:    Retired     Marital Status:   10-19-16         Tobacco/Alcohol/Supplements:     Last Reviewed on 2018 10:47 AM by Joanna Malik    Tobacco: He has a past history of cigarette smoking; quit date:  .  Non-drinker         Substance Abuse History:     Last Reviewed on 2015 02:34 PM by Carri Cruz        Mental Health History:     Last Reviewed on 2015 02:34 PM by Carri Cruz        Communicable Diseases (eg STDs):     Last Reviewed on 2015 02:34 PM by Carri Cruz            Current Problems:     Last Reviewed on 2015 02:34 PM by Carri Cruz    Use of high risk medications     Chronic renal insufficiency, stage 3     Hypothyroidism     Unspecified PVD     Lower back pain     Chronic insomnia     Memory loss     Atrial fibrillation     Artificial joint  replacement, Knee     Osteoarthritis of knee     Hearing loss     GERD     History of fall     Gastroparesis     Anemia, unspecified     Prostate cancer     Malignant melanoma of skin, other parts of face     Hypertriglyceridemia     Diabetes with neurological manifestations, type II or unspecified type, not stated as uncontrolled     Acute CVA     Cataract     Hypertension     Mixed hyperlipidemia     Type II DM     Sleep related leg cramps     Diverticulosis     Irritable bowel syndrome     Peripheral neuropathy     Bright red blood per rectum (BRBPR)     Acute bronchitis     Penile sore     Myalgia     Screening for colorectal cancer     Other specified administrative purpose     Hyperkalemia         Immunizations:     Prevnar 13 (Pneumococcal PCV 13) 6/29/2017     Fluzone (3 + years dose) 11/20/2008     Fluzone (3 + years dose) 10/31/2012     Influenza, split virus (3+ years dose) 11/8/2007     Fluzone High-Dose pf (>=65 yr) 10/29/2013     Fluzone High-Dose pf (>=65 yr) 10/21/2014     Fluzone High-Dose pf (>=65 yr) 11/9/2015     Fluzone High-Dose pf (>=65 yr) 9/29/2016     Fluzone High-Dose pf (>=65 yr) 10/26/2017     PNEUMOVAX 23 (Pneumococcal PPV23) 2/11/2011         Allergies:     Last Reviewed on 7/14/2018 09:08 AM by Christie Alcaraz: (Adverse Reaction)    NSAIDs:        Current Medications:     Last Reviewed on 5/23/2018 10:14 AM by Cheyanne Flores    Trazodone HCl 50mg Tablet 1 - 2 @ QHS PRN     Dicyclomine HCl 10mg Capsules Take 1 capsule(s) by mouth bid     Glipizide 10mg Tablets 1/2 tab BID     Pantoprazole 40mg Tablets, Delayed Release 1 tab daily     Synthroid 0.1mg Tablet Take 1 tablet(s) by mouth daily     Lipitor 10mg Tablet 1/2 tablet by mouth daily at bedtime.     Glucophage 1,000mg Tablet 1/2 pill bid     Gabapentin 100mg Capsules 1 capsule HS     Tradjenta 5mg Tablet Take 1 tablet(s) by mouth daily     Nitroglycerin 0.4mg Tablets, Sublingual Dissolve 1 tablet(s) under the tongue  may repeat every 5 minutes. If pain not relieved after three doses go to ER.     Ferrous Sulfate 325mg Tablets 1 tab bid     Lancet   Lancet Check blood once daily as directed     Fish Oil 1,000mg Capsules 1 capsules daily     Xarelto 15mg Tablet 1 po daily     Amiodarone HCl 200mg Tablet 1 tab daily     glucosamine/chondroitin 375/100/36/54mg BID     Aspirin 81mg Tablets, Enteric Coated Take 1 tablet(s) by mouth qam     Sucralfate 1gm Tablets take 2 tabs bid         OBJECTIVE:        Vitals:         Current: 7/14/2018 9:06:30 AM    Ht:  5 ft, 8 in;  Wt: 173.8 lbs;  BMI: 26.4    T: 97.7 F (oral);  BP: 152/71 mm Hg (left arm, sitting);  P: 62 bpm (left arm (BP Cuff), sitting);  sCr: 1.18 mg/dL;  GFR: 44.90        Repeat:     9:32:21 AM     BP:   136/74mm Hg (left arm, sitting)         Exams:     PHYSICAL EXAM:     GENERAL: well developed, well nourished;  no apparent distress;     EYES: PERRL, EOMI     E/N/T: EARS: external auditory canal normal;  both TMs are dull;  NOSE: normal turbinates; bilateral maxillary sinus tenderness present; OROPHARYNX: oral mucosa is normal; posterior pharynx shows no exudate and post nasal drip;     NECK: range of motion is normal; trachea is midline;     RESPIRATORY: normal respiratory rate and pattern with no distress; normal breath sounds with no rales, rhonchi, wheezes or rubs;     CARDIOVASCULAR: normal rate; rhythm is regular;     NEUROLOGIC: mental status: alert and oriented x 3;     PSYCHIATRIC: appropriate affect and demeanor;         ASSESSMENT           461.8   J01.90  Acute sinusitis, other              DDx:         ORDERS:         Meds Prescribed:       Amoxicillin 875mg Tablet One PO BID X 10 days.  #20 (Twenty) tablet(s) Refills: 0                 PLAN:          Acute sinusitis, other         RECOMMENDATIONS given include: Push Fluids, Rest, Follow up if no improvement or worsening symptoms like high fevers, vomiting, weakness, or increasing shortness of air.    .       FOLLOW-UP: Schedule follow-up appointments on a p.r.n. basis. Chronic visit follow up           Prescriptions:       Amoxicillin 875mg Tablet One PO BID X 10 days.  #20 (Twenty) tablet(s) Refills: 0             Patient Recommendations:        For  Acute sinusitis, other:     Schedule follow-up appointments as needed.              CHARGE CAPTURE           **Please note: ICD descriptions below are intended for billing purposes only and may not represent clinical diagnoses**        Primary Diagnosis:         461.8 Acute sinusitis, other            J01.90    Acute sinusitis, unspecified              Orders:          [id number]   Office/outpatient visit; established patient, level 3  (In-House)

## 2021-05-18 NOTE — PROGRESS NOTES
Darci MunsonCora 1935     Office/Outpatient Visit    Visit Date: Tue, Nov 20, 2018 10:10 am    Provider: Joanna Malik MD (Assistant: [employer], MA)    Location: [facility]        Electronically signed by Joanna Malik MD on  11/23/2018 09:06:03 AM                             SUBJECTIVE:        CC: follow up for his diabetes and med refills         HPI:         Anuel Berry presents with hypertension.  He is not using any nonpharmacologic treatment modalities.  His current cardiac medication regimen includes an ACE inhibitor ( lisinopril ) and a calcium channel blocker ( norvasc ).  Anuel Berry does not check his blood pressure other than at his clinic appointments.  He is tolerating the medication well without side effects.  Compliance with treatment has been good; he follows up as directed.          Type II DM details; specifically, this is type 2, non-insulin requiring diabetes, complicated by peripheral neuropathy, peripheral vascular disease, and gastroparesis.  Compliance with treatment has been good; he takes his medication as directed, follows up as directed, and is keeping a glucose diary.  Depression screening is negative for anhedonia, anxious mood, guilt, sadness and feelings of worthlessness.      Tobacco screen: Non-smoker.  Current meds include an oral hypoglycemic ( Glucophage XR, Glucotrol, and tradjenta ),  Prinivil, and a lipid lowering agent.  He reports home blood glucose readings have been excellent, with average fasting glucoses running <120 mg/dL. He checks his glucose 1.  Most recent lab results include Hemoglobin A1c:  6.6 (%) (02/28/2018), LDL:  148 (mg/dL) (02/28/2018),  74 (mg/dL) (04/12/2018), HDL:  56 (mg/dL) (02/28/2018),  59 (mg/dL) (04/12/2018), Triglycerides:  304 (mg/dL) (02/28/2018),  124 (mg/dL) (04/12/2018), Microalbuminuria:  173.2 (mg/g creat) (03/01/2018),  263.8 (mg/g creat) (08/20/2018).  1 fall In regard to preventative care, he performs foot self-exams  several times per month and his last ophthalmology exam was in 11/2018.          With regard to the hypertriglyceridemia, currently, he is taking diet controlled.  Compliance with treatment has been good.  He specifically denies associated symptoms, including chest pain, muscle pain, headache, weakness, weight loss and weight gain.      chronic insomnia with LBP, he is sleeping much better on the gabapentin, but he is having more LBP radiating R hip, worse with ambulation, improves with rest, no paresthesias.  He tolerates gabapentin well, no signs of abuse or diversion         With regard to the hypothyroidism, he is currently taking Levothyroid, 100 mcg daily.  TSH was last checked 3 months ago.  The result was reported as normal ( 4 mU/L ).  He denies any related symptoms.      ROS:     CONSTITUTIONAL:  Negative for chills, fatigue and fever.      CARDIOVASCULAR:  Negative for chest pain, dizziness, orthopnea, paroxysmal nocturnal dyspnea and pedal edema.      RESPIRATORY:  Negative for recent cough, dyspnea and frequent wheezing.      GASTROINTESTINAL:  Negative for abdominal pain, constipation, diarrhea, nausea and vomiting.      NEUROLOGICAL:  Negative for dizziness, headaches and memory loss.      PSYCHIATRIC:  Positive for anxiety.   Negative for depression.          PMH/FMH/SH:     Last Reviewed on 11/20/2018 10:12 AM by Joanna Malik    Past Medical History:             PREVENTIVE HEALTH MAINTENANCE             EVALUATION FOR AORTIC ANEURYSM: was last done 7/5/17 with normal results     COLORECTAL CANCER SCREENING: Up to date (colonoscopy q10y; sigmoidoscopy q5y; Cologuard q3y) was last done 6/18/18, Results are in chart; colonoscopy with the following abnormalities noted-- severe sigmoid and descending colon diverticulosis and 3+ gastritis     EYE EXAM: Diabetic Eye Exam during this calendar year and results are in chart was last done 5/8/18 NO diabetic retinopathy     PSA: was last done 11/21/17  with normal results Hx prostate cancer/prostatectomy         PAST MEDICAL HISTORY         Positive for    Atrial Fibrillation: on Xarelto; ,     Coronary Artery Disease,    Hyperlipidemia and    Hypertension;     Positive for    Osteoarthritis;     Positive for    Type 2 Diabetes: complications include peripheral neuropathy and gastroparesis; and    Hypothyroidism: dx'd in 2017; 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; ;     Positive for    Prostate cancer: dx'd in ; ;         CURRENT MEDICAL PROVIDERS:    Cardiologist: KHAI Peterson    Dermatologist: Dr Alegre/Dr Couch    Gastroenterologist: Dr Alcaraz    Ophthalmologist: Dr Rodriguez    Orthopedist: Dr Atkins    Urologist: Dr Menendez    Audiologist: Dash ([facility])             ADVANCED DIRECTIVES: None         Surgical History:         Positive for    Arthroscopy: Rt knee; 3/14/17;,    Joint Replacement:    Knee Replacement: 3/2017; ; and    Prostatectomy;     Positive for    Rt hand surgery;;         Family History:     Father: ;  Lung Cancer     Mother: ;  Type 2 Diabetes         Social History:     Occupation:    Retired     Marital Status:   10-19-16         Tobacco/Alcohol/Supplements:     Last Reviewed on 2018 10:12 AM by Joanna Malik    Tobacco: He has a past history of cigarette smoking; quit date:  .  Non-drinker         Substance Abuse History:     Last Reviewed on 2015 02:34 PM by Carri Cruz        Mental Health History:     Last Reviewed on 2015 02:34 PM by Carri Cruz        Communicable Diseases (eg STDs):     Last Reviewed on 2015 02:34 PM by Carri Cruz            Immunizations:     Prevnar 13 (Pneumococcal PCV 13) 2017     Fluzone (3 + years dose) 2008     Fluzone (3 + years dose) 10/31/2012     Influenza, split virus (3+ years dose) 2007     Fluzone High-Dose pf (>=65 yr) 10/29/2013     Fluzone High-Dose pf (>=65 yr) 10/21/2014     Fluzone High-Dose pf  (>=65 yr) 11/9/2015     Fluzone High-Dose pf (>=65 yr) 9/29/2016     Fluzone High-Dose pf (>=65 yr) 10/26/2017     Fluzone High-Dose pf (>=65 yr) 11/3/2018     PNEUMOVAX 23 (Pneumococcal PPV23) 2/11/2011         Allergies:     Last Reviewed on 11/20/2018 09:31 AM by Randy Zhao    Mobic: (Adverse Reaction)    NSAIDs:        Current Medications:     Last Reviewed on 11/20/2018 09:32 AM by Randy Zhao    Dicyclomine HCl 10mg Capsules Take 1 capsule(s) by mouth bid     Cyclobenzaprine HCl 10mg Tablet Take 1 tablet(s) by mouth bid  prn     Trazodone HCl 50mg Tablet 1 - 2 @ [facility] PRN     Glucophage 1,000mg Tablet 1/2 pill bid     Gabapentin 100mg Capsules 1 capsule HS     Famvir 500mg Tablet Take BID x 5 days     Glipizide 10mg Tablets 1/2 tab BID     Synthroid 0.1mg Tablet Take 1 tablet(s) by mouth daily     Lipitor 10mg Tablet 1/2 tablet by mouth daily at bedtime.     Lisinopril 10mg Tablet 1 in am     Sucralfate 1gm/10ml Oral Suspension 40 ml bid     Pantoprazole 40mg Tablets, Delayed Release 1 tab daily     Tradjenta 5mg Tablet Take 1 tablet(s) by mouth daily     Nitroglycerin 0.4mg Tablets, Sublingual Dissolve 1 tablet(s) under the tongue may repeat every 5 minutes. If pain not relieved after three doses go to ER.     Ferrous Sulfate 325mg Tablets 1 tab bid     Lancet   Lancet Check blood once daily as directed     Norvasc 10mg Tablet Take 1 tablet(s) by mouth daily     Fish Oil 1,000mg Capsules 1 capsules daily     Xarelto 15mg Tablet 1 po daily     Amiodarone HCl 200mg Tablet one tablet tid     glucosamine/chondroitin 375/100/36/54mg BID     Aspirin 81mg Tablets, Enteric Coated Take 1 tablet(s) by mouth qam         OBJECTIVE:        Vitals:         Current:     Current: 11/20/2018 9:34:03 AM    Ht:  5 ft, 8 in;  Wt: 174 lbs;  BMI: 26.5    T: 97.5 F (oral);  BP: 126/92 mm Hg (right arm, sitting);  P: 70 bpm (right arm (BP Cuff), sitting);  sCr: 1.18 mg/dL;  GFR: 44.92        Repeat:     9:39:12 AM     BP:    124/54mm Hg (right arm, sitting)         Exams:     PHYSICAL EXAM:     GENERAL: Vitals recorded well developed, well nourished;  well groomed;  no apparent distress;     EYES: PERRL, EOMI     E/N/T: OROPHARYNX:  normal mucosa, dentition, gingiva, and posterior pharynx;     NECK:  supple, full ROM; no thyromegaly; no carotid bruits;     RESPIRATORY: normal respiratory rate and pattern with no distress; normal breath sounds with no rales, rhonchi, wheezes or rubs;     CARDIOVASCULAR: normal rate; rhythm is regular;  normal S1; normal S2; no systolic murmur; no edema;     GASTROINTESTINAL: nontender, nondistended; no hepatosplenomegaly or masses; no bruits; diastasis noted;     MUSCULOSKELETAL: normal gait;     NEUROLOGIC: coordination/cerebellar: normal finger-to-nose;  negative Romberg;  negative pronator drift;  GROSSLY INTACT     PSYCHIATRIC:  appropriate affect and demeanor; normal speech pattern; grossly normal memory;     Left foot exam    Protective sensation using Monofilament test: Slightly decreased, with estimated force of 0.2 grams applied.    Vascular status: normal peripheral vascular exam with palpable dorsal pedal and posterior tibal pulses and brisk digital capillary refill    Skin is intact without sores or ulcers    Right foot exam    Protective sensation using Monofilament test: Slightly decreased, with estimated force of 0.2 grams applied.    Vascular status: normal peripheral vascular exam with palpable dorsal pedal and posterior tibal pulses and brisk digital capillary refill    Skin is intact without sores or ulcers         Lab/Test Results:             Urine temperature:  confirmed (11/20/2018),     All urine drug screen levels confirmed negative:  yes (11/20/2018),     Date and time of last pill:  gabapentin 11/19/18, pt not sure of time    /mnp (11/20/2018),     Performed by:  pr (11/20/2018),     Collection Time:  10:57 (11/20/2018),             ASSESSMENT           401.1   I10   Hypertension              DDx:     250.00   E11.42  Type II DM              DDx:     272.1   E78.01  Hypertriglyceridemia              DDx:     427.31   I48.91  Atrial fibrillation              DDx:     307.42   F51.01  Chronic insomnia              DDx:     244.9   E03.9  Hypothyroidism              DDx:     724.2   M54.5  Lower back pain              DDx:     V58.69   Z79.899  Use of high risk medications              DDx:     585.3   N18.3  Chronic renal insufficiency, stage 3              DDx:     356.9   G60.9  Peripheral neuropathy              DDx:     531.90   K25.9  Gastric ulcer, unspecified chronicity, without mention of obstruction              DDx:     781.3   R27.8  Lack of coordination              DDx:         ORDERS:         Meds Prescribed:       Refill of: Gabapentin 100mg Capsules 1 bid  #60 (Sixty) capsule(s) Refills: 2         Lab Orders:       23539  Drug test prsmv read direct optical obs pr date  (In-House)         79220  DIAB - [facility] CMP A1C LIPID AND MICRO ALBUM CR RATIO: [id number]  (Send-Out)         54986  [facility] - [facility] TSH  (Send-Out)           Procedures Ordered:       REFER  Referral to Specialist or Other Facility  (Send-Out)           Other Orders:       2028F  Foot examination performed (includes examination through visual inspection, sensory exam with monofi  (In-House)                   PLAN:          Hypertension stable and well controlled          Type II DM well controlled, foot exam noted above, he is on ace, cant take statin, eye exam UTD, he wears diabetic shoes     LABORATORY:  Labs ordered to be performed today include Diabetes Panel 2;CMP, A1C, Lipid, Microalbumin:Creatinine Ratio and TSH.            Orders:       23200  DIAB - [facility] CMP A1C LIPID AND MICRO ALBUM CR RATIO: [id number]  (Send-Out)         20955  TSH - [facility] TSH  (Send-Out)            Hypertriglyceridemia off lipitor due to myalgias, will repeat labs          Atrial  fibrillation stable, on xarelto, no signs of bleeding, mild bruising          Chronic insomnia much better          Hypothyroidism repeat labs today, stable on meds          Lower back pain worse, will increase gabapentin to bid dosing          Use of high risk medications     gabo reviewed, drug screen performed and appropriate, consent is reviewed and signed and on the chart, he is aware of risk of addiction on this medication and understands that he will need to follow up for a review every 3 months and his medications will be adjusted or decreased as deemed appropriate at each visit.  No personal history of drug or alcohol abuse.  No concerns about diversion or abuse.  He denies side effects related to the medication.  He is  aware that she may be called in for pill counts           Prescriptions:       Refill of: Gabapentin 100mg Capsules 1 bid  #60 (Sixty) capsule(s) Refills: 2           Orders:       10284  Drug test prsmv read direct optical obs pr date  (In-House)            Chronic renal insufficiency, stage 3 due for labs          Peripheral neuropathy stable on gabapentin          Gastric ulcer, unspecified chronicity, without mention of obstruction stable on sucralfate and pantoprazole, he missed his appt with Dr Thakur, I will have Hayley angulo for him          Lack of coordination referal for PT         REFERRALS:  Referral initiated to physical therapy ( [facility]; for silver sneakers ).            Orders:       REFER  Referral to Specialist or Other Facility  (Send-Out)               Other Orders:       2028F  Foot examination performed (includes examination through visual inspection, sensory exam with monofi  (In-House)           CHARGE CAPTURE           **Please note: ICD descriptions below are intended for billing purposes only and may not represent clinical diagnoses**        Primary Diagnosis:         401.1 Hypertension            I10    Essential (primary)  hypertension              Orders:          80301   Office/outpatient visit; established patient, level 4  (In-House)           250.00 Type II DM            E11.42    Type 2 diabetes mellitus with diabetic polyneuropathy    272.1 Hypertriglyceridemia            E78.01    Familial hypercholesterolemia    427.31 Atrial fibrillation            I48.91    Unspecified atrial fibrillation    307.42 Chronic insomnia            F51.01    Primary insomnia    244.9 Hypothyroidism            E03.9    Hypothyroidism, unspecified    724.2 Lower back pain            M54.5    Low back pain    V58.69 Use of high risk medications            Z79.899    Other long term (current) drug therapy              Orders:          30660   Drug test prsmv read direct optical obs pr date  (In-House)           585.3 Chronic renal insufficiency, stage 3            N18.3    Chronic kidney disease, stage 3 (moderate)    356.9 Peripheral neuropathy            G60.9    Hereditary and idiopathic neuropathy, unspecified    531.90 Gastric ulcer, unspecified chronicity, without mention of obstruction            K25.9    Gastric ulcer, unspecified as acute or chronic, without hemorrhage or perforation    781.3 Lack of coordination            R27.8    Other lack of coordination        Other Orders:           2028F   Foot examination performed (includes examination through visual inspection, sensory exam with monofi  (In-House)           ADDENDUMS:      ____________________________________    Addendum: 11/27/2018 03:07 PM - Hayley Lira         Visit Note Faxed to:        Paulo Peterson  (Cardiology); Number [phone]            Addendum: 12/10/2018 09:37 AM - Hayley Lira         Visit Note Faxed to:        User Entered Recipient; Number [phone]            Addendum: 02/25/2019 04:34 PM - Liz Vázquez        Sent to referrals for PT. /mnp

## 2021-05-18 NOTE — PROGRESS NOTES
Darci Munson  1935     Office/Outpatient Visit    Visit Date: Tue, Dec 8, 2020 01:53 pm    Provider: Adrien Jackson MD (Assistant: Sheri Cortés MA)    Location: [facility]        Electronically signed by Adrien Jackson MD on  12/12/2020 01:25:11 PM                             Subjective:        CC: Anuel Bejarano is a 85 year old White male.  This is a follow-up visit.  PT DOESNT HAVE LIST OF MEDS AND STATES NO MEDS HAVE CHANGED         HPI:           Patient to be evaluated for type 2 diabetes mellitus with diabetic polyneuropathy.  Specifically, this is type 2, non-insulin requiring diabetes, complicated by peripheral neuropathy.  Compliance with treatment has been good; he takes his medication as directed and follows up as directed.  He denies experiencing any diabetes related symptoms.  Current meds include an oral hypoglycemic ( Tradjenta ( 5mg qd ) ).  He reports home blood glucose readings have been high, with average fasting glucoses in the 180 to low 200s mg/dL range.  Most recent lab results include Hemoglobin A1c:  7.0 (%) (10/14/2020).  Concurrent health problems include hypertension and hypothyroidism.        Pertaining to atrial fibrillation and coronary disease, he reports his symptoms are stable.  He follows with cardiology regularly.  His current regimen includes aspirin 81 mg daily, amiodarone 200 mg 3 times daily, Xarelto 15 mg daily, Imdur 30 mg daily and hydrochlorothiazide 25 mg daily.  He does not require rate control.      Pertaining to insomnia, sleep is stable on 50 to 100 mg of trazodone nightly as needed          Additionally, he presents with history of hypothyroidism, unspecified.  he is currently taking Synthroid, 112 mcg daily.  The result was reported as high ( 6.380 on 10/14/20 mU/L ).  He denies any related symptoms.  He reports no symptoms suggestive of adverse medication effect.  Pertinent medical history is positive for hypertension.            With regard  to the essential (primary) hypertension, his current cardiac medication regimen includes a diuretic ( HCTZ 25 mg daily ) and Imdur 30 mg daily.  Compliance with treatment has been good; he takes his medication as directed and follows up as directed.  He is tolerating the medication well without side effects.  Anuel Bejarano does not check his blood pressure other than at his clinic appointments.      ROS:     CONSTITUTIONAL:  Negative for chills, fatigue and fever.      EYES:  Negative for blurred vision.      CARDIOVASCULAR:  Negative for chest pain, dizziness, orthopnea, paroxysmal nocturnal dyspnea and pedal edema.      RESPIRATORY:  Negative for recent cough, dyspnea and frequent wheezing.      GASTROINTESTINAL:  Positive for acid reflux symptoms.   Negative for abdominal pain, constipation, diarrhea, nausea or vomiting.      MUSCULOSKELETAL:  Positive for left knee pain.      INTEGUMENTARY:  Negative for rash.      NEUROLOGICAL:  Positive for memory loss and paresthesias.   Negative for dizziness or headaches.      ENDOCRINE:  Negative for hair loss, temperature intolerances, polydipsia and polyphagia.      PSYCHIATRIC:  Positive for sleep disturbance.   Negative for anxiety, depression or suicidal thoughts.          Past Medical History / Family History / Social History:         Last Reviewed on 12/12/2020 01:24 PM by Adrien Jackson    Past Medical History:             PREVENTIVE HEALTH MAINTENANCE             EVALUATION FOR AORTIC ANEURYSM: was last done 7/5/17 with normal results     COLORECTAL CANCER SCREENING: Up to date (colonoscopy q10y; sigmoidoscopy q5y; Cologuard q3y) was last done 6/18/18, Results are in chart; colonoscopy with the following abnormalities noted-- severe sigmoid and descending colon diverticulosis and 3+ gastritis     EYE EXAM: Diabetic Eye Exam during this calendar year and results are in chart was last done 9/2020 NO diabetic retinopathy     PSA: was last done 11/21/17 with normal  results Hx prostate cancer/prostatectomy         PAST MEDICAL HISTORY         Positive for    Atrial Fibrillation: on Xarelto; ,     Coronary Artery Disease,    Hyperlipidemia and    Hypertension;     Positive for    Osteoarthritis;     Positive for    Type 2 Diabetes: complications include peripheral neuropathy and gastroparesis; and    Hypothyroidism: dx'd in 2017; 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; ;     Positive for    Prostate cancer: dx'd in ; ;     Positive for    Glaucoma;         CURRENT MEDICAL PROVIDERS:    Cardiologist: KHAI Peterson    Dermatologist: Dr Alegre/Dr Couch    E/N/T: Dr Mohan    Gastroenterologist: Dr Alcaraz    Ophthalmologist: Dr Rodriguez    Orthopedist: Dr Atkins    Urologist: Dr Menendez    Audiologist: Dash ([facility])             ADVANCED DIRECTIVES: None         Surgical History:         Positive for    Arthroscopy: Rt knee; 3/14/17;,    Cataract Removal: bilateral; ;,    Joint Replacement:    Knee Replacement: 3/2017; ; and    Prostatectomy;     Positive for    Rt hand surgery;;         Family History:     Father: ;  Lung Cancer     Mother: ;  Type 2 Diabetes         Social History:     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  Non-drinker         Substance Abuse History:     Last Reviewed on 2020 01:24 PM by Adrien Jackson        Mental Health History:     Last Reviewed on 2020 01:24 PM by Adrien Jackson        Communicable Diseases (eg STDs):     Last Reviewed on 2020 01:24 PM by Adrien Jackson        Current Problems:     Last Reviewed on 2020 01:24 PM by Adrien Jackson    Hereditary and idiopathic neuropathy, unspecified    Irritable bowel syndrome without diarrhea    Sleep related  leg cramps    Essential (primary) hypertension    Type 2 diabetes mellitus with diabetic polyneuropathy    Mixed hyperlipidemia    Malignant neoplasm of prostate    Anemia, unspecified    Unilateral primary osteoarthritis, right knee    Gastroparesis    History of falling    Gastro-esophageal reflux disease without esophagitis    Unspecified hearing loss, bilateral    Unspecified atrial fibrillation    Presence of unspecified artificial knee joint    Low back pain    Unspecified dementia without behavioral disturbance    Primary insomnia    Hypothyroidism, unspecified    Peripheral vascular disease, unspecified    Chronic kidney disease, stage 3 (moderate)    Gastric ulcer, unspecified as acute or chronic, without hemorrhage or perforation    Atherosclerotic heart disease of native coronary artery without angina pectoris    Laceration without foreign body of right forearm, initial encounter    Encounter for immunization    Unilateral primary osteoarthritis, left knee    Sensorineural hearing loss, bilateral    Other specified disorders of the skin and subcutaneous tissue    Encounter for other administrative examinations    Encounter for screening for depression    Pain in left foot        Immunizations:     influenza, high-dose, quadrivalent (FLUZONE HIGH-DOSE QUAD 2020-21) 9/10/2020    Td (adult), 2 Lf tetanus toxoid, preservative free, adsorbed (TDVAX) 1/14/2020    Prevnar 13 (Pneumococcal PCV 13) 6/29/2017    Fluzone (3 + years dose) 11/20/2008    Fluzone (3 + years dose) 10/31/2012    Influenza, split virus (3+ years dose) 11/8/2007    Fluzone High-Dose pf (>=65 yr) 10/29/2013    Fluzone High-Dose pf (>=65 yr) 10/21/2014    Fluzone High-Dose pf (>=65 yr) 11/9/2015    Fluzone High-Dose pf (>=65 yr) 9/29/2016    Fluzone High-Dose pf (>=65 yr) 10/26/2017    Fluzone High-Dose pf (>=65 yr) 11/3/2018    Fluzone High-Dose pf (>=65 yr) 10/24/2019    PNEUMOVAX 23 (Pneumococcal PPV23) 2/11/2011        Allergies:     Last  Reviewed on 12/12/2020 01:24 PM by Adrien Jackson:   (Adverse Reaction)    NSAIDs:      glipiZIDE:   (Adverse Reaction)        Current Medications:     Last Reviewed on 12/12/2020 01:24 PM by Adrien Jackson    latanoprost 0.005 % ophthalmic (eye) Drops [instill 1 drop OU]    aspirin 81 mg oral tablet, delayed release (enteric coated) [Take 1 tablet(s) by mouth qam]    Nitroglycerin 0.4mg Tablets, Sublingual [Dissolve 1 tablet(s) under the tongue may repeat every 5 minutes. If pain not relieved after three doses go to ER.]    Lancet   Lancet [Check blood once daily as directed]    pantoprazole 40 mg oral tablet, delayed release (enteric coated) [Take 1 tablet by mouth once daily]    hydroCHLOROthiazide 25 mg oral tablet [Take 1 tablet by mouth once daily]    Ferrous Sulfate 325 mg (65 mg iron) oral tablet [1 tab bid]    Tradjenta 5mg Tablet [Take 1 daily (PATIENT ASSISTANCE)]    amiodarone 200 mg oral tablet [one tablet tid]    glucosamine/chondroitin 375/100/36/54mg BID     Xarelto 15 mg oral tablet [1 po daily]    gabapentin 100 mg oral capsule [Take 1 capsule by mouth twice daily]    Synthroid 112 mcg oral tablet [TAKE 1 TABLET BY MOUTH ONCE DAILY IN THE MORNING]    traZODone 50 mg oral tablet [TAKE 1 TO 2 TABLETS BY MOUTH ONCE DAILY AT BEDTIME AS NEEDED]    Sucralfate 1 gram oral tablet [1 tab 30 MIN BEFORE MEALS AND AT HS]    cyclobenzaprine 10 mg oral tablet [Take 1 tablet by mouth twice daily as needed]    Reglan  10mg Tablet [1 tab every 6 to 8 hours]    Famciclovir 500 mg oral tablet [Take BID x 5 days]    neomycin-bacitracnZn-polymyxnB 3.5mg-400 unit- 5,000 unit/gram Topical Ointment [Apply sufficient amount to affected area 2 to 3 x daily]    melatonin 5 mg oral tablet,chewable [take 1 tab prior to bedtime each night ]    isosorbide mononitrate 30 mg oral Tablet, Extended Release 24 hr [take 1 tablet (30 mg) by oral route once daily in the morning]    mupirocin 2 % Topical Ointment [apply a small  amount to the affected area by topical route 3 times per day]        Objective:        Vitals:         Current: 12/8/2020 1:59:02 PM    Ht:  5 ft, 8 in;  Wt: 165 lbs;  BMI: 25.1T: 97.6 F (temporal);  BP: 137/73 mm Hg (left arm, sitting);  P: 77 bpm (left arm (BP Cuff), sitting);  sCr: 1.87 mg/dL;  GFR: 26.78        Exams:     PHYSICAL EXAM:     GENERAL:  well developed and nourished; appropriately groomed; in no apparent distress;     EYES: PERRL, EOMI     NECK: trachea is midline; thyroid is non-palpable;     RESPIRATORY: normal respiratory rate and pattern with no distress; normal breath sounds with no rales, rhonchi, wheezes or rubs;     CARDIOVASCULAR: normal rate; rhythm is regular;  a systolic murmur is noted: it is grade 2/6;     GASTROINTESTINAL: nontender;     MUSCULOSKELETAL: gait: slowed and uses a cane;  pain with range of motion in: left knee;  spine: kyphosis;     NEUROLOGIC: mental status: oriented to person, place, and time;  GROSSLY INTACT     PSYCHIATRIC: affect/demeanor: in good spirits;  normal psychomotor function; normal speech pattern; normal thought and perception;         Assessment:         E11.42   Type 2 diabetes mellitus with diabetic polyneuropathy       I48.91   Unspecified atrial fibrillation       I25.10   Atherosclerotic heart disease of native coronary artery without angina pectoris       F51.01   Primary insomnia       E03.9   Hypothyroidism, unspecified       I10   Essential (primary) hypertension           ORDERS:         Lab Orders:       [id number]  TSH - [facility] TSH  (Send-Out)            [id number]  COMP - [facility] Comp. Metabolic Panel  (Send-Out)                      Plan:         Type 2 diabetes mellitus with diabetic polyneuropathy- Controlled.  Continue Tradjenta 5 mg daily.  Will order A1c at next visit.        Unspecified atrial fibrillation- Stable.  Continue to follow with cardiology as directed.  Continue current regimen.        Atherosclerotic heart disease of native coronary artery  without angina pectoris- See above        Primary insomnia- Stable.  Continue trazodone 50 to 100 mg nightly as needed.        Hypothyroidism, unspecified- Symptomatically stable but not at goal but with lab values.  Last 2 TSHs have been abnormal.  Repeat TSH ordered today.  Continue Synthroid 112 mcg daily.          Orders:       [id number]  TSH - [facility] TSH  (Send-Out)              Essential (primary) hypertension- Stable.  Continue hydrochlorothiazide 25 mg daily and Imdur 30 mg daily.  Previous documentation recorded patient is on amlodipine 10 mg daily but this is not on his med list and he did not bring his medications with him today.  We will have him bring all his medications to next visit for complete reconciliation.    LABORATORY:  Labs ordered to be performed today include Comprehensive metabolic panel.            Orders:       [id number]  COMP - [facility] Comp. Metabolic Panel  (Send-Out)                  Charge Capture:         Primary Diagnosis:     E11.42  Type 2 diabetes mellitus with diabetic polyneuropathy           Orders:      [id number]  Office/outpatient visit; established patient, level 4  (In-House)              I48.91  Unspecified atrial fibrillation     I25.10  Atherosclerotic heart disease of native coronary artery without angina pectoris     F51.01  Primary insomnia     E03.9  Hypothyroidism, unspecified     I10  Essential (primary) hypertension

## 2021-05-18 NOTE — PROGRESS NOTES
Darci Munson  1935     Office/Outpatient Visit    Visit Date: Fri, Jul 10, 2020 11:38 am    Provider: Paulo Dyer MD (Assistant: Luna Price MA)    Location: [facility]        Electronically signed by Paulo Dyer MD on  07/10/2020 08:27:15 PM                             Subjective:        CC: Anuel Bejarano is a 85 year old White male.  This is a follow-up visit.  left knee injection         HPI:       BP today is 141/61 with a HR of 65. He is on amlodipine 10 mg qd, HCTZ 25 mg qd, imdur 20 mg qd and (amiodarone for afib). BP at home is about the same.      A1c over the last year+ has been 6.6 -> 6.9 -> 5.4 -> 6.9 -> 6.6 -> 6.8 on 3/4/20. He is on metformin 1,000 BID, glipizide 5 mg (only if glucose is high), and tradjenta 5 mg qd. He checks glucose 1-2x/day and its often low to mid 100s in the morning and higher at night like low 200s.      X-ray left knee on 4/8/19 showed moderate to severe tricompartmental OA. He has chronic bilateral knee pain that is worse on the left. He has received 3 steroid shots over the last 12 months, most recently just over 5 months ago on 2/3/20.    ROS:     CONSTITUTIONAL:  Negative for chills, fatigue and fever.      E/N/T:  Positive for diminished hearing ( bilaterally ).      CARDIOVASCULAR:  Negative for chest pain, dizziness, orthopnea, paroxysmal nocturnal dyspnea and pedal edema.      RESPIRATORY:  Negative for recent cough, dyspnea and frequent wheezing.      GASTROINTESTINAL:  Positive for acid reflux symptoms and anorexia ( loss of appetite ).   Negative for abdominal pain, constipation, diarrhea, nausea or vomiting.      MUSCULOSKELETAL:  Positive for arthralgias (left knee).      INTEGUMENTARY:  Positive for nodule on left posterior shoulder.      NEUROLOGICAL:  Positive for memory loss.   Negative for dizziness or headaches.      PSYCHIATRIC:  Negative for anxiety and depression.          Past Medical History / Family History / Social  History:         Last Reviewed on 7/10/2020 08:19 PM by Paulo Dyer    Past Medical History:             PREVENTIVE HEALTH MAINTENANCE             EVALUATION FOR AORTIC ANEURYSM: was last done 17 with normal results     COLORECTAL CANCER SCREENING: Up to date (colonoscopy q10y; sigmoidoscopy q5y; Cologuard q3y) was last done 18, Results are in chart; colonoscopy with the following abnormalities noted-- severe sigmoid and descending colon diverticulosis and 3+ gastritis     EYE EXAM: Diabetic Eye Exam during this calendar year and results are in chart was last done 18 NO diabetic retinopathy     PSA: was last done 17 with normal results Hx prostate cancer/prostatectomy         PAST MEDICAL HISTORY         Positive for    Atrial Fibrillation: on Xarelto; ,     Coronary Artery Disease,    Hyperlipidemia and    Hypertension;     Positive for    Osteoarthritis;     Positive for    Type 2 Diabetes: complications include peripheral neuropathy and gastroparesis; and    Hypothyroidism: dx'd in 2017; 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; ;     Positive for    Prostate cancer: dx'd in ; ;         CURRENT MEDICAL PROVIDERS:    Cardiologist: KHAI Peterson    Dermatologist: Dr Alegre/Dr Couch    E/N/T: Dr Mohan    Gastroenterologist: Dr Alcaraz    Ophthalmologist: Dr Rodriguez    Orthopedist: Dr Atkins    Urologist: Dr Menendez    Audiologist: Dash ([facility])             ADVANCED DIRECTIVES: None         Surgical History:         Positive for    Arthroscopy: Rt knee; 3/14/17;,    Joint Replacement:    Knee Replacement: 3/2017; ; and    Prostatectomy;     Positive for    Rt hand surgery;;         Family History:     Father: ;  Lung Cancer     Mother: ;  Type 2 Diabetes         Social History:     Occupation: Retired (Prior occupation: [employer]) likes to farm     Marital Status:  Liudmila  16     Children: 4 children         Tobacco/Alcohol/Supplements:     Last Reviewed on  7/10/2020 08:19 PM by Paulo Dyer    Tobacco: He has a past history of cigarette smoking; quit date:  1970.  Non-drinker         Substance Abuse History:     Last Reviewed on 7/10/2020 08:19 PM by Paulo Dyer        Mental Health History:     Last Reviewed on 7/10/2020 08:19 PM by Paulo Dyer        Communicable Diseases (eg STDs):     Last Reviewed on 7/10/2020 08:19 PM by Paulo Dyer        Current Problems:     Last Reviewed on 7/10/2020 08:19 PM by Paulo Dyer    Hereditary and idiopathic neuropathy, unspecified    Irritable bowel syndrome without diarrhea    Sleep related leg cramps    Essential (primary) hypertension    Type 2 diabetes mellitus with diabetic polyneuropathy    Mixed hyperlipidemia    Malignant neoplasm of prostate    Anemia, unspecified    Unilateral primary osteoarthritis, right knee    Gastroparesis    History of falling    Gastro-esophageal reflux disease without esophagitis    Unspecified hearing loss, bilateral    Unspecified atrial fibrillation    Presence of unspecified artificial knee joint    Low back pain    Unspecified dementia without behavioral disturbance    Primary insomnia    Hypothyroidism, unspecified    Peripheral vascular disease, unspecified    Chronic kidney disease, stage 3 (moderate)    Gastric ulcer, unspecified as acute or chronic, without hemorrhage or perforation    Atherosclerotic heart disease of native coronary artery without angina pectoris    Laceration without foreign body of right forearm, initial encounter    Encounter for immunization    Unilateral primary osteoarthritis, left knee    Sensorineural hearing loss, bilateral    Other specified disorders of the skin and subcutaneous tissue        Immunizations:     Td (adult), 2 Lf tetanus toxoid, preservative free, adsorbed (TDVAX) 1/14/2020    Prevnar 13 (Pneumococcal PCV 13) 6/29/2017    Fluzone (3 + years dose) 11/20/2008    Fluzone (3 + years dose) 10/31/2012    Influenza, split  virus (3+ years dose) 11/8/2007    Fluzone High-Dose pf (>=65 yr) 10/29/2013    Fluzone High-Dose pf (>=65 yr) 10/21/2014    Fluzone High-Dose pf (>=65 yr) 11/9/2015    Fluzone High-Dose pf (>=65 yr) 9/29/2016    Fluzone High-Dose pf (>=65 yr) 10/26/2017    Fluzone High-Dose pf (>=65 yr) 11/3/2018    Fluzone High-Dose pf (>=65 yr) 10/24/2019    PNEUMOVAX 23 (Pneumococcal PPV23) 2/11/2011        Allergies:     Last Reviewed on 7/10/2020 08:19 PM by Paulo Dyer:   (Adverse Reaction)    NSAIDs:          Current Medications:     Last Reviewed on 7/10/2020 08:19 PM by Paulo Dyer    glipiZIDE 5 mg oral tablet [Take 1/2 (one-half) tablet by mouth twice daily]    Glucophage 1,000 mg oral tablet [Take 1 tablet by mouth twice daily]    aspirin 81 mg oral tablet, delayed release (enteric coated) [Take 1 tablet(s) by mouth qam]    Nitroglycerin 0.4mg Tablets, Sublingual [Dissolve 1 tablet(s) under the tongue may repeat every 5 minutes. If pain not relieved after three doses go to ER.]    Lancet   Lancet [Check blood once daily as directed]    Pantoprazole 40 mg oral tablet, delayed release (enteric coated) [1 tab daily]    hydroCHLOROthiazide 25 mg oral tablet [take 1 tablet (25 mg) by oral route once per day]    Ferrous Sulfate 325 mg (65 mg iron) oral tablet [1 tab bid]    Tradjenta 5mg Tablet [Take 1 daily (PATIENT ASSISTANCE)]    amiodarone 200 mg oral tablet [one tablet tid]    glucosamine/chondroitin 375/100/36/54mg BID     Xarelto 15 mg oral tablet [1 po daily]    gabapentin 100 mg oral capsule [TAKE ONE CAPSULE BY MOUTH TWICE DAILY]    Synthroid 112 mcg oral tablet [TAKE 1 TABLET BY MOUTH ONCE DAILY IN THE MORNING]    traZODone 50 mg oral tablet [TAKE 1 TO 2 TABLETS BY MOUTH ONCE DAILY AT BEDTIME AS NEEDED]    Sucralfate 1 gram oral tablet [1 tab 30 MIN BEFORE MEALS AND AT HS]    cyclobenzaprine 10 mg oral tablet [Take 1 tablet by mouth twice daily as needed]    Reglan  10mg Tablet [1 tab every 6 to  8 hours]    Famciclovir 500 mg oral tablet [Take BID x 5 days]    neomycin-bacitracnZn-polymyxnB 3.5mg-400 unit- 5,000 unit/gram Topical Ointment [Apply sufficient amount to affected area 2 to 3 x daily]    melatonin 5 mg oral tablet,chewable [take 1 tab prior to bedtime each night ]    isosorbide mononitrate 20 mg oral tablet [Take 1 tablet by mouth once daily]        Objective:        Vitals:         Current: 7/10/2020 11:43:53 AM    Ht:  5 ft, 8 in;  Wt: 166.5 lbs;  BMI: 25.3T: 97.5 F (temporal);  BP: 141/61 mm Hg (left arm, sitting);  P: 65 bpm (left arm (BP Cuff), sitting);  sCr: 1.46 mg/dL;  GFR: 34.43        Exams:     PHYSICAL EXAM:     GENERAL:  well developed and nourished; appropriately groomed; in no apparent distress;     EYES: PERRL, EOMI     E/N/T: EARS: grossly diminished hearing bilaterally; OROPHARYNX: posterior pharynx, including tonsils, tongue, and uvula are normal;     NECK: trachea is midline;     RESPIRATORY: normal respiratory rate and pattern with no distress; normal breath sounds with no rales, rhonchi, wheezes or rubs;     CARDIOVASCULAR: normal rate; rhythm is regular;     GASTROINTESTINAL: nontender; normal bowel sounds;     MUSCULOSKELETAL: gait: slowed and uses a cane;  pain with range of motion in: left knee;  crepitus of left knee, TTP tibeal platue;     NEUROLOGIC: mental status: oriented to person, place, and time;  GROSSLY INTACT     PSYCHIATRIC: affect/demeanor: in good spirits;  normal psychomotor function; normal speech pattern; normal thought and perception;         Procedures:     Unilateral primary osteoarthritis, left knee        MEDICATION/VACCINATION ADMINISTRATION:     1. Kenalo mg given intra-articular; ( lot #[id number]; exp. 2021 ); administered by: Paulo Dyer Procedure Note:  Arthrocentesis/Injection     Arthrocentesis of left knee joint is performed.  Written informed consent was obtained.  The site is prepped with alcohol and betadine.  The joint is  injected with Kenalog 80 mg mg.  No complications.  Estimated blood loss: 0 cc.              Assessment:         I10   Essential (primary) hypertension       E11.42   Type 2 diabetes mellitus with diabetic polyneuropathy       M17.12   Unilateral primary osteoarthritis, left knee           ORDERS:         Lab Orders:       [id number]  BDCB - [facility] CBC with 3 part diff  (Send-Out)            63409  COMP - [facility] Comp. Metabolic Panel  (Send-Out)            [id number]  A1CEG - [facility] Hemoglobin A1C  (Send-Out)            23611  TSH - [facility] TSH  (Send-Out)              Procedures Ordered:       [id number]  Arthrocentesis, major joint  (In-House)            20610  Arthrocentesis, aspiration and/or injection; knee  (In-House)              Other Orders:         Kenalog, per 10 mg  (x8)                  Plan:         Essential (primary) hypertensionBP is slightly elevated but for now, cont amlodipine 10 mg qd, HCTZ 25 mg qd, imdur 20 mg qd and (amiodarone for afib). ACE/ARB avoided 2/2 hypokalemia.        Type 2 diabetes mellitus with diabetic polyneuropathyDM 2 is well controlled, with A1c of 6.8 on 3/4/20. Will recheck today, for now cont metformin 1,000 BID, glipizide 5 mg (only if glucose is high), and tradjenta 5 mg qd.     LABORATORY:  Labs ordered to be performed today include CBC, Comprehensive metabolic panel, HgbA1C, and TSH.            Orders:       [id number]  BDCBC - [facility] CBC with 3 part diff  (Send-Out)            95117  COMP - [facility] Comp. Metabolic Panel  (Send-Out)            06429  A1CEG - [facility] Hemoglobin A1C  (Send-Out)            08200  TSH - [facility] TSH  (Send-Out)              Unilateral primary osteoarthritis, left kneeSteroid shot given in left knee today without complication.           Orders:       20610  Arthrocentesis, major joint  (In-House)              Kenalog, per 10 mg  (x8)        20610  Arthrocentesis, aspiration and/or injection; knee  (In-House)                  Charge Capture:         Primary Diagnosis:     I10   Essential (primary) hypertension           Orders:      [id number]  Office/outpatient visit; established patient, level 4  (In-House)              E11.42  Type 2 diabetes mellitus with diabetic polyneuropathy     M17.12  Unilateral primary osteoarthritis, left knee           Orders:      32227  Arthrocentesis, major joint  (In-House)              Kenalog, per 10 mg  (x8)        27954  Arthrocentesis, aspiration and/or injection; knee  (In-House)

## 2021-05-18 NOTE — PROGRESS NOTES
Darci Munson  1935     Office/Outpatient Visit    Visit Date: Wed, Oct 14, 2020 01:55 pm    Provider: Paulo Dyer MD (Assistant: Estrella Rodrigues MA)    Location: [facility]        Electronically signed by Paulo Dyer MD on  10/14/2020 06:23:45 PM                             Subjective:        CC: Anuel Bejarano is a 85 year old White male.  Left knee injection         HPI:       Pt continues to have pain in left knee, worse with walking and especially with bending. X-ray left knee on 4/8/19 showed moderate to severe tricompartmental OA. He has chronic bilateral knee pain that is worse on the left.     ROS:     CONSTITUTIONAL:  Negative for chills, fatigue and fever.      E/N/T:  Positive for diminished hearing ( bilaterally ).      CARDIOVASCULAR:  Negative for chest pain, dizziness, orthopnea, paroxysmal nocturnal dyspnea and pedal edema.      RESPIRATORY:  Negative for recent cough, dyspnea and frequent wheezing.      GASTROINTESTINAL:  Positive for acid reflux symptoms and anorexia ( loss of appetite ).   Negative for abdominal pain, constipation, diarrhea, nausea or vomiting.      MUSCULOSKELETAL:  Positive for arthralgias (left knee).      INTEGUMENTARY:  Positive for nodule on left posterior shoulder.      NEUROLOGICAL:  Positive for memory loss and shakes with hypoglycemia.   Negative for dizziness or headaches.      PSYCHIATRIC:  Negative for anxiety and depression.          Past Medical History / Family History / Social History:         Last Reviewed on 10/14/2020 06:18 PM by Paulo Dyer    Past Medical History:             PREVENTIVE HEALTH MAINTENANCE             EVALUATION FOR AORTIC ANEURYSM: was last done 7/5/17 with normal results     COLORECTAL CANCER SCREENING: Up to date (colonoscopy q10y; sigmoidoscopy q5y; Cologuard q3y) was last done 6/18/18, Results are in chart; colonoscopy with the following abnormalities noted-- severe sigmoid and descending colon  diverticulosis and 3+ gastritis     EYE EXAM: Diabetic Eye Exam during this calendar year and results are in chart was last done 2020 NO diabetic retinopathy     PSA: was last done 17 with normal results Hx prostate cancer/prostatectomy         PAST MEDICAL HISTORY         Positive for    Atrial Fibrillation: on Xarelto; ,     Coronary Artery Disease,    Hyperlipidemia and    Hypertension;     Positive for    Osteoarthritis;     Positive for    Type 2 Diabetes: complications include peripheral neuropathy and gastroparesis; and    Hypothyroidism: dx'd in 2017; 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; ;     Positive for    Prostate cancer: dx'd in ; ;         CURRENT MEDICAL PROVIDERS:    Cardiologist: KHAI Peterson    Dermatologist: Dr Alegre/Dr Couch    E/N/T: Dr Mohan    Gastroenterologist: Dr Alcaraz    Ophthalmologist: Dr Rodriguez    Orthopedist: Dr Atkins    Urologist: Dr Menendez    Audiologist: Dash ([facility])             ADVANCED DIRECTIVES: None         Surgical History:         Positive for    Arthroscopy: Rt knee; 3/14/17;,    Joint Replacement:    Knee Replacement: 3/2017; ; and    Prostatectomy;     Positive for    Rt hand surgery;;         Family History:     Father: ;  Lung Cancer     Mother: ;  Type 2 Diabetes         Social History:     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  Non-drinker         Substance Abuse History:     Last Reviewed on 10/14/2020 06:18 PM by Paulo Dyer        Mental Health History:     Last Reviewed on 10/14/2020 06:18 PM by Paulo Dyer        Communicable Diseases (eg STDs):     Last Reviewed on 10/14/2020 06:18 PM by Paulo Dyer        Current Problems:     Last Reviewed on  10/14/2020 06:18 PM by Paulo Dyer    Hereditary and idiopathic neuropathy, unspecified    Irritable bowel syndrome without diarrhea    Sleep related leg cramps    Essential (primary) hypertension    Type 2 diabetes mellitus with diabetic polyneuropathy    Mixed hyperlipidemia    Malignant neoplasm of prostate    Anemia, unspecified    Unilateral primary osteoarthritis, right knee    Gastroparesis    History of falling    Gastro-esophageal reflux disease without esophagitis    Unspecified hearing loss, bilateral    Unspecified atrial fibrillation    Presence of unspecified artificial knee joint    Low back pain    Unspecified dementia without behavioral disturbance    Primary insomnia    Hypothyroidism, unspecified    Peripheral vascular disease, unspecified    Chronic kidney disease, stage 3 (moderate)    Gastric ulcer, unspecified as acute or chronic, without hemorrhage or perforation    Atherosclerotic heart disease of native coronary artery without angina pectoris    Laceration without foreign body of right forearm, initial encounter    Encounter for immunization    Unilateral primary osteoarthritis, left knee    Sensorineural hearing loss, bilateral    Other specified disorders of the skin and subcutaneous tissue    Encounter for other administrative examinations    Encounter for screening for depression        Immunizations:     influenza, high-dose, quadrivalent (FLUZONE HIGH-DOSE QUAD 2020-21) 9/10/2020    Td (adult), 2 Lf tetanus toxoid, preservative free, adsorbed (TDVAX) 1/14/2020    Prevnar 13 (Pneumococcal PCV 13) 6/29/2017    Fluzone (3 + years dose) 11/20/2008    Fluzone (3 + years dose) 10/31/2012    Influenza, split virus (3+ years dose) 11/8/2007    Fluzone High-Dose pf (>=65 yr) 10/29/2013    Fluzone High-Dose pf (>=65 yr) 10/21/2014    Fluzone High-Dose pf (>=65 yr) 11/9/2015    Fluzone High-Dose pf (>=65 yr) 9/29/2016    Fluzone High-Dose pf (>=65 yr) 10/26/2017    Fluzone High-Dose pf  (>=65 yr) 11/3/2018    Fluzone High-Dose pf (>=65 yr) 10/24/2019    PNEUMOVAX 23 (Pneumococcal PPV23) 2/11/2011        Allergies:     Last Reviewed on 10/14/2020 06:18 PM by Paulo Dyer:   (Adverse Reaction)    NSAIDs:          Current Medications:     Last Reviewed on 10/14/2020 06:18 PM by Paulo Dyer    glipiZIDE 5 mg oral tablet [Take 1/2 (one-half) tablet by mouth twice daily]    aspirin 81 mg oral tablet, delayed release (enteric coated) [Take 1 tablet(s) by mouth qam]    Glucophage 1,000 mg oral tablet [Take 1 tablet by mouth twice daily]    Nitroglycerin 0.4mg Tablets, Sublingual [Dissolve 1 tablet(s) under the tongue may repeat every 5 minutes. If pain not relieved after three doses go to ER.]    Lancet   Lancet [Check blood once daily as directed]    pantoprazole 40 mg oral tablet, delayed release (enteric coated) [Take 1 tablet by mouth once daily]    hydroCHLOROthiazide 25 mg oral tablet [Take 1 tablet by mouth once daily]    Ferrous Sulfate 325 mg (65 mg iron) oral tablet [1 tab bid]    Tradjenta 5mg Tablet [Take 1 daily (PATIENT ASSISTANCE)]    amiodarone 200 mg oral tablet [one tablet tid]    glucosamine/chondroitin 375/100/36/54mg BID     Xarelto 15 mg oral tablet [1 po daily]    gabapentin 100 mg oral capsule [Take 1 capsule by mouth twice daily]    Synthroid 112 mcg oral tablet [TAKE 1 TABLET BY MOUTH ONCE DAILY IN THE MORNING]    traZODone 50 mg oral tablet [TAKE 1 TO 2 TABLETS BY MOUTH ONCE DAILY AT BEDTIME AS NEEDED]    Sucralfate 1 gram oral tablet [1 tab 30 MIN BEFORE MEALS AND AT HS]    cyclobenzaprine 10 mg oral tablet [Take 1 tablet by mouth twice daily as needed]    Reglan  10mg Tablet [1 tab every 6 to 8 hours]    Famciclovir 500 mg oral tablet [Take BID x 5 days]    neomycin-bacitracnZn-polymyxnB 3.5mg-400 unit- 5,000 unit/gram Topical Ointment [Apply sufficient amount to affected area 2 to 3 x daily]    melatonin 5 mg oral tablet,chewable [take 1 tab prior to bedtime  each night ]    isosorbide mononitrate 30 mg oral Tablet, Extended Release 24 hr [take 1 tablet (30 mg) by oral route once daily in the morning]        Objective:        Vitals:         Current: 10/14/2020 1:58:28 PM    Ht:  5 ft, 8 in;  Wt: 162.6 lbs;  BMI: 24.7T: 97.2 F (temporal);  BP: 133/55 mm Hg (left arm, sitting);  P: 75 bpm (left arm (BP Cuff), sitting);  sCr: 1.65 mg/dL;  GFR: 30.16        Exams:     PHYSICAL EXAM:     GENERAL:  well developed and nourished; appropriately groomed; in no apparent distress;     EYES: PERRL, EOMI     E/N/T: EARS: grossly diminished hearing bilaterally; OROPHARYNX: posterior pharynx, including tonsils, tongue, and uvula are normal;     RESPIRATORY: normal respiratory rate and pattern with no distress; normal breath sounds with no rales, rhonchi, wheezes or rubs;     CARDIOVASCULAR: normal rate; rhythm is regular;     GASTROINTESTINAL: nontender;     MUSCULOSKELETAL: gait: slowed and uses a cane;  pain with range of motion in: left knee;  spine: kyphosis;  crepitus of left knee, TTP tibeal platue;     NEUROLOGIC: mental status: oriented to person, place, and time;  GROSSLY INTACT     PSYCHIATRIC: affect/demeanor: in good spirits;  normal psychomotor function; normal speech pattern; normal thought and perception;         Lab/Test Results:         Glucose, Serum: 43 (10/14/2020),     Performed by:: tony (10/14/2020),             Procedures:     Unilateral primary osteoarthritis, left knee    1. Kenalog 80 mg given intra-articular; administered by Paulo Dyer;  lot number [id number]; expires 06/2022 Patient experienced no reaction.  Procedure Note:  Arthrocentesis/Injection     Arthrocentesis of left knee joint is performed.  Written informed consent was obtained.  The site is prepped with alcohol and betadine.  The joint is injected with 2 cc of 2% lidocaine and Kenalog 80 mg mg.  No complications.  Estimated blood loss: 1 cc.              Assessment:         M17.12   Unilateral  primary osteoarthritis, left knee       E11.42   Type 2 diabetes mellitus with diabetic polyneuropathy           ORDERS:         Lab Orders:       [id number]  Glucose  (Send-Out)              Procedures Ordered:       [id number]  Collection of capillary blood specimen (eg, finger, heel, ear stick)  (In-House)            20610  Arthrocentesis, major joint  (In-House)            20610  Arthrocentesis, aspiration and/or injection; knee  (In-House)              Other Orders:       [id number]  Therapeutic injection  (In-House)              Kenalog, per 10 mg  (x8)                  Plan:         Unilateral primary osteoarthritis, left kneeSteroid injection administered to left knee today. As below, pt had some hypoglycemia that resolved with peanut butter and water kindly administered by Hayley Lira.           Orders:       [id number]  Therapeutic injection  (In-House)              Kenalog, per 10 mg  (x8)        67611  Arthrocentesis, major joint  (In-House)            20610  Arthrocentesis, aspiration and/or injection; knee  (In-House)              Type 2 diabetes mellitus with diabetic polyneuropathyTommy became very shaky at the end of his visit, order to check BS, result 43, gave PB crackers and sprite. He began to feel better. Sent to lab for bloodwork./libia    - I agree with this assessment. After I left the room patient felt shaky and he was cared for by Hayley Lira. I came back in the room to re-assess later and he was feeling better with the water and crackers given by Hayley.          Orders:       [id number]  Collection of capillary blood specimen (eg, finger, heel, ear stick)  (In-House)            91295  Glucose  (Send-Out)                  Charge Capture:         Primary Diagnosis:     M17.12  Unilateral primary osteoarthritis, left knee           Orders:      [id number]  Therapeutic injection  (In-House)              Kenalog, per 10 mg  (x8)        29040  Arthrocentesis, major joint  (In-House)            20610   Arthrocentesis, aspiration and/or injection; knee  (In-House)              E11.42  Type 2 diabetes mellitus with diabetic polyneuropathy           Orders:      [id number]  Collection of capillary blood specimen (eg, finger, heel, ear stick)  (In-House)                  ADDENDUMS:      ____________________________________    Addendum: 10/27/2020 05:40 PM - Paulo Dyer        ADDENDUM: Add [id number]

## 2021-05-18 NOTE — PROGRESS NOTES
Darci Munson  1935     Office/Outpatient Visit    Visit Date: Tue, Nov 17, 2020 03:30 pm    Provider: Jennifer Szymanski N.P. (Assistant: Roxanne Hall LPN)    Location: [facility]        Electronically signed by Jennifer Szymanski N.P. on  11/17/2020 09:19:45 PM                             Subjective:        CC: Anuel Bejarano is a 85 year old White male.  cow stepped on pts foot about 10 days ago. pt isn't very sure of his medications, and doesn't have a list with him.          HPI:           cow stepped on his left foot 10 days ago.  pain mild and intermittent mostly along bottom of foot.      ROS:     CONSTITUTIONAL:  Negative for chills, fatigue, fever, and weight change.      CARDIOVASCULAR:  Negative for chest pain, palpitations, tachycardia, orthopnea, and edema.      RESPIRATORY:  Negative for cough, dyspnea, and hemoptysis.      MUSCULOSKELETAL:  Positive for left foot pain.          Past Medical History / Family History / Social History:         Last Reviewed on 10/14/2020 06:18 PM by Paulo Dyer    Past Medical History:             PREVENTIVE HEALTH MAINTENANCE             EVALUATION FOR AORTIC ANEURYSM: was last done 7/5/17 with normal results     COLORECTAL CANCER SCREENING: Up to date (colonoscopy q10y; sigmoidoscopy q5y; Cologuard q3y) was last done 6/18/18, Results are in chart; colonoscopy with the following abnormalities noted-- severe sigmoid and descending colon diverticulosis and 3+ gastritis     EYE EXAM: Diabetic Eye Exam during this calendar year and results are in chart was last done 9/2020 NO diabetic retinopathy     PSA: was last done 11/21/17 with normal results Hx prostate cancer/prostatectomy         PAST MEDICAL HISTORY         Positive for    Atrial Fibrillation: on Xarelto; ,     Coronary Artery Disease,    Hyperlipidemia and    Hypertension;     Positive for    Osteoarthritis;     Positive for    Type 2 Diabetes: complications include peripheral neuropathy and  gastroparesis; and    Hypothyroidism: dx'd in 2017; 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; ;     Positive for    Prostate cancer: dx'd in ; ;     Positive for    Glaucoma;         CURRENT MEDICAL PROVIDERS:    Cardiologist: KHAI Peterson    Dermatologist: Dr Alegre/Dr Couch    E/N/T: Dr Mohan    Gastroenterologist: Dr Alcaraz    Ophthalmologist: Dr Rodriguez    Orthopedist: Dr Atkins    Urologist: Dr Menendez    Audiologist: Dash ([facility])             ADVANCED DIRECTIVES: None         Surgical History:         Positive for    Arthroscopy: Rt knee; 3/14/17;,    Cataract Removal: bilateral; ;,    Joint Replacement:    Knee Replacement: 3/2017; ; and    Prostatectomy;     Positive for    Rt hand surgery;;         Family History:     Father: ;  Lung Cancer     Mother: ;  Type 2 Diabetes         Social History:     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  Non-drinker         Substance Abuse History:     Last Reviewed on 10/14/2020 06:18 PM by Paulo Dyer        Mental Health History:     Last Reviewed on 10/14/2020 06:18 PM by Paulo Dyer        Communicable Diseases (eg STDs):     Last Reviewed on 10/14/2020 06:18 PM by Paulo Dyer        Current Problems:     Last Reviewed on 10/14/2020 06:18 PM by Paulo Dyer    Hereditary and idiopathic neuropathy, unspecified    Irritable bowel syndrome without diarrhea    Sleep related leg cramps    Essential (primary) hypertension    Type 2 diabetes mellitus with diabetic polyneuropathy    Mixed hyperlipidemia    Malignant neoplasm of prostate    Anemia, unspecified    Unilateral primary osteoarthritis, right knee    Gastroparesis    History of falling    Gastro-esophageal reflux  disease without esophagitis    Unspecified hearing loss, bilateral    Unspecified atrial fibrillation    Presence of unspecified artificial knee joint    Low back pain    Unspecified dementia without behavioral disturbance    Primary insomnia    Hypothyroidism, unspecified    Peripheral vascular disease, unspecified    Chronic kidney disease, stage 3 (moderate)    Gastric ulcer, unspecified as acute or chronic, without hemorrhage or perforation    Atherosclerotic heart disease of native coronary artery without angina pectoris    Laceration without foreign body of right forearm, initial encounter    Encounter for immunization    Unilateral primary osteoarthritis, left knee    Sensorineural hearing loss, bilateral    Other specified disorders of the skin and subcutaneous tissue    Encounter for other administrative examinations    Encounter for screening for depression        Immunizations:     influenza, high-dose, quadrivalent (FLUZONE HIGH-DOSE QUAD 2020-21) 9/10/2020    Td (adult), 2 Lf tetanus toxoid, preservative free, adsorbed (TDVAX) 1/14/2020    Prevnar 13 (Pneumococcal PCV 13) 6/29/2017    Fluzone (3 + years dose) 11/20/2008    Fluzone (3 + years dose) 10/31/2012    Influenza, split virus (3+ years dose) 11/8/2007    Fluzone High-Dose pf (>=65 yr) 10/29/2013    Fluzone High-Dose pf (>=65 yr) 10/21/2014    Fluzone High-Dose pf (>=65 yr) 11/9/2015    Fluzone High-Dose pf (>=65 yr) 9/29/2016    Fluzone High-Dose pf (>=65 yr) 10/26/2017    Fluzone High-Dose pf (>=65 yr) 11/3/2018    Fluzone High-Dose pf (>=65 yr) 10/24/2019    PNEUMOVAX 23 (Pneumococcal PPV23) 2/11/2011        Allergies:     Last Reviewed on 10/14/2020 06:18 PM by Paulo Dyer    Mobic:   (Adverse Reaction)    NSAIDs:      glipiZIDE:   (Adverse Reaction)        Current Medications:     Last Reviewed on 10/14/2020 06:18 PM by Paulo Dyer    latanoprost 0.005 % ophthalmic (eye) Drops [instill 1 drop OU]    aspirin 81 mg oral tablet,  delayed release (enteric coated) [Take 1 tablet(s) by mouth qam]    Nitroglycerin 0.4mg Tablets, Sublingual [Dissolve 1 tablet(s) under the tongue may repeat every 5 minutes. If pain not relieved after three doses go to ER.]    Lancet   Lancet [Check blood once daily as directed]    pantoprazole 40 mg oral tablet, delayed release (enteric coated) [Take 1 tablet by mouth once daily]    hydroCHLOROthiazide 25 mg oral tablet [Take 1 tablet by mouth once daily]    Ferrous Sulfate 325 mg (65 mg iron) oral tablet [1 tab bid]    Tradjenta 5mg Tablet [Take 1 daily (PATIENT ASSISTANCE)]    amiodarone 200 mg oral tablet [one tablet tid]    glucosamine/chondroitin 375/100/36/54mg BID     Xarelto 15 mg oral tablet [1 po daily]    gabapentin 100 mg oral capsule [Take 1 capsule by mouth twice daily]    Synthroid 112 mcg oral tablet [TAKE 1 TABLET BY MOUTH ONCE DAILY IN THE MORNING]    traZODone 50 mg oral tablet [TAKE 1 TO 2 TABLETS BY MOUTH ONCE DAILY AT BEDTIME AS NEEDED]    Sucralfate 1 gram oral tablet [1 tab 30 MIN BEFORE MEALS AND AT HS]    cyclobenzaprine 10 mg oral tablet [Take 1 tablet by mouth twice daily as needed]    Reglan  10mg Tablet [1 tab every 6 to 8 hours]    Famciclovir 500 mg oral tablet [Take BID x 5 days]    neomycin-bacitracnZn-polymyxnB 3.5mg-400 unit- 5,000 unit/gram Topical Ointment [Apply sufficient amount to affected area 2 to 3 x daily]    melatonin 5 mg oral tablet,chewable [take 1 tab prior to bedtime each night ]    isosorbide mononitrate 30 mg oral Tablet, Extended Release 24 hr [take 1 tablet (30 mg) by oral route once daily in the morning]        Objective:        Vitals:         Current: 11/17/2020 3:53:21 PM    Ht:  5 ft, 8 in;  Wt: 163.8 lbs;  BMI: 24.9T: 98.1 F (temporal);  BP: 149/72 mm Hg (right arm, sitting);  P: 76 bpm (right arm (BP Cuff), sitting);  sCr: 1.87 mg/dL;  GFR: 26.70        Exams:     PHYSICAL EXAM:     GENERAL:  well developed and nourished; appropriately groomed; in no  apparent distress;     RESPIRATORY: normal respiratory rate and pattern with no distress; normal breath sounds with no rales, rhonchi, wheezes or rubs;     CARDIOVASCULAR: normal rate; rhythm is regular;     SKIN: 0.5 cm healing scab top of left foot  with some mild erythema distally;     MUSCULOSKELETAL:  Normal range of motion, strength and tone;     NEUROLOGIC: mental status: alert and oriented x 3; GROSSLY INTACT     PSYCHIATRIC:  appropriate affect and demeanor; normal speech pattern; grossly normal memory;         Assessment:         M79.672   Pain in left foot           ORDERS:         Meds Prescribed:       [New Rx] mupirocin 2 % Topical Ointment [apply a small amount to the affected area by topical route 3 times per day], #15 (fifteen) grams, Refills: 0 (zero)         Radiology/Test Orders:       [id number]  Left radiologic examination, foot; complete, minimum of three views  (Send-Out)                      Plan:         Pain in left foot        RADIOLOGY:  I have ordered a left foot x-ray to be done today.            Prescriptions:       [New Rx] mupirocin 2 % Topical Ointment [apply a small amount to the affected area by topical route 3 times per day], #15 (fifteen) grams, Refills: 0 (zero)           Orders:       [id number]  Left radiologic examination, foot; complete, minimum of three views  (Send-Out)                  Charge Capture:         Primary Diagnosis:     M79.672  Pain in left foot           Orders:      [id number]  Office/outpatient visit; established patient, level 3  (In-House)

## 2021-05-18 NOTE — PROGRESS NOTES
Darci Munson 1935     Office/Outpatient Visit    Visit Date: Fri, Jun 28, 2019 10:11 am    Provider: Paulo Dyer MD (Assistant: Roxanne Hall LPN)    Location: [facility]        Electronically signed by Paulo Dyer MD on  06/28/2019 06:39:13 PM                             SUBJECTIVE:        CC: medication list has Ferrous Sulfate 325mg, pts list has 27mg Iron 2 a day.    pts list has 2 1000mg glucosamine     Mr. Munson is a 83 year old White male.  pt is here today for pain to his Left Knee. Hayley  states he is here to see about getting an injection in it.          HPI:     Pt has moderate to severe OA of left knee. This was seen on x-ray almost 3 months ago. He has chronic left knee pain that improved with a steroid shot almost 3 months ago. Pain has returned and he would like another steroid shot to improve his ambulation.         PHQ-9 Depression Screening: Completed form scanned and in chart; Total Score 11     ROS:     CONSTITUTIONAL:  Negative for chills, fatigue and fever.      CARDIOVASCULAR:  Positive for chest pain.   Negative for dizziness, orthopnea, paroxysmal nocturnal dyspnea or pedal edema.      RESPIRATORY:  Negative for recent cough, dyspnea and frequent wheezing.      GASTROINTESTINAL:  Negative for abdominal pain, constipation, diarrhea, nausea and vomiting.      MUSCULOSKELETAL:  Positive for arthralgias (left knee).      NEUROLOGICAL:  Negative for dizziness, headaches and memory loss.      PSYCHIATRIC:  Negative for anxiety and depression.          PMH/FMH/SH:     Last Reviewed on 6/28/2019 06:36 PM by Paulo Dyer    Past Medical History:             PREVENTIVE HEALTH MAINTENANCE             EVALUATION FOR AORTIC ANEURYSM: was last done 7/5/17 with normal results     COLORECTAL CANCER SCREENING: Up to date (colonoscopy q10y; sigmoidoscopy q5y; Cologuard q3y) was last done 6/18/18, Results are in chart; colonoscopy with the following abnormalities noted--  severe sigmoid and descending colon diverticulosis and 3+ gastritis     EYE EXAM: Diabetic Eye Exam during this calendar year and results are in chart was last done 18 NO diabetic retinopathy     PSA: was last done 17 with normal results Hx prostate cancer/prostatectomy         PAST MEDICAL HISTORY         Positive for    Atrial Fibrillation: on Xarelto; ,     Coronary Artery Disease,    Hyperlipidemia and    Hypertension;     Positive for    Osteoarthritis;     Positive for    Type 2 Diabetes: complications include peripheral neuropathy and gastroparesis; and    Hypothyroidism: dx'd in 2017; 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; ;     Positive for    Prostate cancer: dx'd in ; ;         CURRENT MEDICAL PROVIDERS:    Cardiologist: KHAI Peterson    Dermatologist: Dr Alegre/Dr Couch    Gastroenterologist: Dr Alcaraz    Ophthalmologist: Dr Rodriguez    Orthopedist: Dr Atkins    Urologist: Dr Menendez    Audiologist: Dash ([facility])             ADVANCED DIRECTIVES: None         Surgical History:         Positive for    Arthroscopy: Rt knee; 3/14/17;,    Joint Replacement:    Knee Replacement: 3/2017; ; and    Prostatectomy;     Positive for    Rt hand surgery;;         Family History:     Father: ;  Lung Cancer     Mother: ;  Type 2 Diabetes         Social History:     Occupation:    Retired     Marital Status:   10-19-16         Tobacco/Alcohol/Supplements:     Last Reviewed on 2019 06:36 PM by Paulo Dyer    Tobacco: He has a past history of cigarette smoking; quit date:  .  Non-drinker         Substance Abuse History:     Last Reviewed on 2019 06:36 PM by Paulo Dyer        Mental Health History:     Last Reviewed on 2019 06:36 PM by Paulo Dyer        Communicable Diseases (eg STDs):     Last Reviewed on 2019 06:36 PM by Paulo Dyer            Current Problems:     Last Reviewed on 2019 06:36 PM by Paulo Dyer    Coronary  artery disease     Type 2 DM     Gastric ulcer, unspecified chronicity, without mention of obstruction     Use of high risk medications     Chronic renal insufficiency, stage 3     Hypothyroidism     Unspecified PVD     Lower back pain     Chronic insomnia     Memory loss     Atrial fibrillation     Artificial joint replacement, Knee     Osteoarthritis of knee     Hearing loss     GERD     History of fall     Gastroparesis     Anemia, unspecified     Prostate cancer     Malignant melanoma of skin, other parts of face     Hypertriglyceridemia     Diabetes with neurological manifestations, type II or unspecified type, not stated as uncontrolled     Acute CVA     Cataract     Hypertension     Mixed hyperlipidemia     Type II DM     Sleep related leg cramps     Diverticulosis     Irritable bowel syndrome     Peripheral neuropathy     Epigastric abdominal pain     Screening for colorectal cancer     Other specified administrative purpose     Hyperkalemia         Immunizations:     Prevnar 13 (Pneumococcal PCV 13) 6/29/2017     Fluzone (3 + years dose) 11/20/2008     Fluzone (3 + years dose) 10/31/2012     Influenza, split virus (3+ years dose) 11/8/2007     Fluzone High-Dose pf (>=65 yr) 10/29/2013     Fluzone High-Dose pf (>=65 yr) 10/21/2014     Fluzone High-Dose pf (>=65 yr) 11/9/2015     Fluzone High-Dose pf (>=65 yr) 9/29/2016     Fluzone High-Dose pf (>=65 yr) 10/26/2017     Fluzone High-Dose pf (>=65 yr) 11/3/2018     PNEUMOVAX 23 (Pneumococcal PPV23) 2/11/2011         Allergies:     Last Reviewed on 6/28/2019 06:36 PM by Paulo Dyer: (Adverse Reaction)    NSAIDs:        Current Medications:     Last Reviewed on 6/28/2019 06:36 PM by Paulo Dyer    Bacitracin/Neomycin/Polymyxin B 400units/3.5mg/5,000units per 1gm Topical Ointment Apply sufficient amount to affected area 2 to 3 x daily     Gabapentin 100mg Capsules 1 bid     Pantoprazole 40mg Tablets, Delayed Release 1 tab daily     Trazodone HCl  50mg Tablet 1 - 2 @ QHS PRN     Synthroid 0.112mg Tablet 1 daily in the morning     Cyclobenzaprine HCl 10mg Tablet Take 1 tablet(s) by mouth bid  prn     Glucophage 1,000mg Tablet 1 tab bid     Lisinopril 20mg Tablet 1 tab daily     Valacyclovir 500mg Tablet 1 tab qd     Tradjenta 5mg Tablet Take 1 tablet(s) by mouth daily     Nitroglycerin 0.4mg Tablets, Sublingual Dissolve 1 tablet(s) under the tongue may repeat every 5 minutes. If pain not relieved after three doses go to ER.     Ferrous Sulfate 325mg Tablets 1 tab bid     Lancet   Lancet Check blood once daily as directed     Reglan  5mg Tablet one tablet TID     Norvasc 10mg Tablet Take 1 tablet(s) by mouth daily     Fish Oil 1,000mg Capsules 1 capsules daily     Xarelto 15mg Tablet 1 po daily     Amiodarone HCl 200mg Tablet one tablet tid     glucosamine/chondroitin 375/100/36/54mg BID     Aspirin 81mg Tablets, Enteric Coated Take 1 tablet(s) by mouth qam     Glipizide 5mg Tablets 1 in morning, 1 at bedtime         OBJECTIVE:        Vitals:         Current: 6/28/2019 10:28:13 AM    Ht:  5 ft, 8 in;  Wt: 169.2 lbs;  BMI: 25.7    T: 97.9 F (oral);  BP: 137/78 mm Hg (right arm, sitting);  P: 61 bpm (right arm (BP Cuff), sitting);  sCr: 1.45 mg/dL;  GFR: 36.12        Exams:     PHYSICAL EXAM:     GENERAL:  well developed and nourished; appropriately groomed; in no apparent distress;     RESPIRATORY: normal respiratory rate and pattern with no distress; normal breath sounds with no rales, rhonchi, wheezes or rubs;     CARDIOVASCULAR: normal rate; rhythm is regular;     GASTROINTESTINAL: nontender; normal bowel sounds; no organomegaly;     MUSCULOSKELETAL: gait: slowed;  pain with range of motion in: left knee;  crepitus of left knee with extention and flexion;     NEUROLOGIC: mental status: alert and oriented x 3; GROSSLY INTACT     PSYCHIATRIC:  appropriate affect and demeanor; normal speech pattern; grossly normal memory;         Procedures:     Osteoarthritis  of knee     Procedure Note:  Arthrocentesis/Injection     Arthrocentesis of left knee joint is performed.  Written informed consent was obtained.  The site is prepped with betadine.  The joint is injected with 2 cc of 2% lidocaine and Kenalog 80 mg.  No complications.  Estimated blood loss: 1 cc.      MEDICATION/VACCINATION ADMINISTRATION:     1. Kenalo mg (1 ml) given intra-articular; ( lot #[id number]; exp. 2020 ); administered by: Paulo Dyer Md.  2. Kenalo mg (1 ml) given intra-articular; ( lot #[id number]; exp. 2021 ); administered by: Paulo Dyer Md.              ASSESSMENT           715.16   M17.11  Osteoarthritis of knee              DDx:     V79.0   Z13.89  Screening for depression              DDx:         ORDERS:         Procedures Ordered:         Arthrocentesis, major joint  (In-House)           Arthrocentesis, aspiration and/or injection; knee  (In-House)           Other Orders:         Depression screen positive and follow up plan documented  (In-House)           Kenalog, per 10 mg (x8)                 PLAN:          Osteoarthritis of knee Kenalog 80 mg injected into left knee. No complications and pt hopes to get another injection in November (5 months) prior to rabbit hunting season.           Orders:         Arthrocentesis, major joint  (In-House)                     Kenalog, per 10 mg (x8)         Arthrocentesis, aspiration and/or injection; knee  (In-House)            Screening for depression     MIPS PHQ-9 Depression Screening: Completed form scanned and in chart; Total Score 11 Positive Depression Screen: Suicide Risk Assessment completed--denies suicidal/homicidal ideation           Orders:         Depression screen positive and follow up plan documented  (In-House)               CHARGE CAPTURE           **Please note: ICD descriptions below are intended for billing purposes only and may not represent clinical diagnoses**        Primary  Diagnosis:         715.16 Osteoarthritis of knee            M17.11    Unilateral primary osteoarthritis, right knee              Orders:          09985   Office/outpatient visit; established patient, level 3  (In-House)             20610   Arthrocentesis, major joint  (In-House)                                           Kenalog, per 10 mg (x8)           96442   Arthrocentesis, aspiration and/or injection; knee  (In-House)           V79.0 Screening for depression            Z13.89    Encounter for screening for other disorder              Orders:             Depression screen positive and follow up plan documented  (In-House)               ADDENDUMS:      ____________________________________    Addendum: 07/02/2019 02:38 PM - Paulo Dyer        ADDENDUM: Remove M17.11 and Add M17.12

## 2021-05-18 NOTE — PROGRESS NOTES
Darci Munson  1935     Office/Outpatient Visit    Visit Date: Wed, Mar 4, 2020 10:07 am    Provider: Paulo Dyer MD (Assistant: Jenn Shields LPN)    Location: [facility]        Electronically signed by Paulo Dyer MD on  03/04/2020 02:09:55 PM                             Subjective:        CC: Anuel Bejarano is a 84 year old White male.  This is a follow-up visit.          HPI:       BP today is 152/56 with a HR of 76. He is on amlodipine 10 mg qd, HCTZ 25 mg qd, and (amiodarone for afib). Lisinopril 20 mg qd was recently d/c'd by nephrology (Dr. Crouch) 2/2 hyperkalemia. BP at home is about the same.      A1c over the last year has been 6.6 -> 6.9 -> 5.4 -> 6.9 -> 6.6 on 12/3/19. He is on metformin 1,000 BID, glipizide 5 mg (only if glucose is high), and tradjenta 5 mg qd. He checks glucose 1-2x/day and its often low to mid 100s in the morning and higher at night like low 200s. He eats cereal (raisin bran) or maybe eggs and alfaro at a restaurant for breakfast. Bologna sandwich for lunch. For dinner he eats hot dogs. He drinks  diet coca cola. He might snack on potato chips or peanut butter cracker.      Pt has a raised nodule on left posterior shoulder. He says its been present for about 2 weeks. It doesn't itch or bleed.     ROS:     CONSTITUTIONAL:  Negative for chills, fatigue and fever.      E/N/T:  Positive for diminished hearing ( bilaterally ).      CARDIOVASCULAR:  Negative for chest pain, dizziness, orthopnea, paroxysmal nocturnal dyspnea and pedal edema.      RESPIRATORY:  Negative for recent cough, dyspnea and frequent wheezing.      GASTROINTESTINAL:  Positive for acid reflux symptoms and anorexia ( loss of appetite ).   Negative for abdominal pain, constipation, diarrhea, nausea or vomiting.      MUSCULOSKELETAL:  Positive for arthralgias (left knee).      INTEGUMENTARY:  Positive for nodule on left posterior shoulder.      NEUROLOGICAL:  Positive for memory loss.    Negative for dizziness or headaches.      PSYCHIATRIC:  Negative for anxiety and depression.          Past Medical History / Family History / Social History:         Last Reviewed on 3/04/2020 02:09 PM by Paulo Dyer    Past Medical History:             PREVENTIVE HEALTH MAINTENANCE             EVALUATION FOR AORTIC ANEURYSM: was last done 17 with normal results     COLORECTAL CANCER SCREENING: Up to date (colonoscopy q10y; sigmoidoscopy q5y; Cologuard q3y) was last done 18, Results are in chart; colonoscopy with the following abnormalities noted-- severe sigmoid and descending colon diverticulosis and 3+ gastritis     EYE EXAM: Diabetic Eye Exam during this calendar year and results are in chart was last done 18 NO diabetic retinopathy     PSA: was last done 17 with normal results Hx prostate cancer/prostatectomy         PAST MEDICAL HISTORY         Positive for    Atrial Fibrillation: on Xarelto; ,     Coronary Artery Disease,    Hyperlipidemia and    Hypertension;     Positive for    Osteoarthritis;     Positive for    Type 2 Diabetes: complications include peripheral neuropathy and gastroparesis; and    Hypothyroidism: dx'd in 2017; 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; ;     Positive for    Prostate cancer: dx'd in ; ;         CURRENT MEDICAL PROVIDERS:    Cardiologist: KHAI Peterson    Dermatologist: Dr Alegre/Dr Couch    E/N/T: Dr Mohan    Gastroenterologist: Dr Alcaraz    Ophthalmologist: Dr Rodriguez    Orthopedist: Dr Atkins    Urologist: Dr Menendez    Audiologist: Dash ([facility])             ADVANCED DIRECTIVES: None         Surgical History:         Positive for    Arthroscopy: Rt knee; 3/14/17;,    Joint Replacement:    Knee Replacement: 3/2017; ; and    Prostatectomy;     Positive for    Rt hand surgery;;         Family History:     Father: ;  Lung Cancer     Mother: ;  Type 2 Diabetes         Social History:     Occupation: Retired (Prior occupation: [employer])  likes to farm     Marital Status:  Liudmila  16     Children: 4 children         Tobacco/Alcohol/Supplements:     Last Reviewed on 3/04/2020 02:09 PM by Paulo Dyer    Tobacco: He has a past history of cigarette smoking; quit date:  .  Non-drinker         Substance Abuse History:     Last Reviewed on 3/04/2020 02:09 PM by Paulo Dyer        Mental Health History:     Last Reviewed on 3/04/2020 02:09 PM by Paulo Dyer        Communicable Diseases (eg STDs):     Last Reviewed on 3/04/2020 02:09 PM by Paulo Dyer        Current Problems:     Last Reviewed on 3/04/2020 02:09 PM by Paulo Dyer    Hereditary and idiopathic neuropathy, unspecified    Irritable bowel syndrome without diarrhea    Sleep related leg cramps    Essential (primary) hypertension    Type 2 diabetes mellitus with diabetic polyneuropathy    Mixed hyperlipidemia    Malignant neoplasm of prostate    Anemia, unspecified    Unilateral primary osteoarthritis, right knee    Gastroparesis    History of falling    Gastro-esophageal reflux disease without esophagitis    Unspecified hearing loss, bilateral    Unspecified atrial fibrillation    Presence of unspecified artificial knee joint    Low back pain    Unspecified dementia without behavioral disturbance    Primary insomnia    Hypothyroidism, unspecified    Peripheral vascular disease, unspecified    Chronic kidney disease, stage 3 (moderate)    Gastric ulcer, unspecified as acute or chronic, without hemorrhage or perforation    Atherosclerotic heart disease of native coronary artery without angina pectoris    Laceration without foreign body of right forearm, initial encounter    Encounter for other administrative examinations    Encounter for immunization    Unilateral primary osteoarthritis, left knee    Sensorineural hearing loss, bilateral    Other specified disorders of the skin and subcutaneous tissue        Immunizations:     Td (adult), 2 Lf tetanus toxoid,  preservative free, adsorbed (TDVAX) 1/14/2020    Prevnar 13 (Pneumococcal PCV 13) 6/29/2017    Fluzone (3 + years dose) 11/20/2008    Fluzone (3 + years dose) 10/31/2012    Influenza, split virus (3+ years dose) 11/8/2007    Fluzone High-Dose pf (>=65 yr) 10/29/2013    Fluzone High-Dose pf (>=65 yr) 10/21/2014    Fluzone High-Dose pf (>=65 yr) 11/9/2015    Fluzone High-Dose pf (>=65 yr) 9/29/2016    Fluzone High-Dose pf (>=65 yr) 10/26/2017    Fluzone High-Dose pf (>=65 yr) 11/3/2018    Fluzone High-Dose pf (>=65 yr) 10/24/2019    PNEUMOVAX 23 (Pneumococcal PPV23) 2/11/2011        Allergies:     Last Reviewed on 3/04/2020 02:09 PM by Paulo Dyer:   (Adverse Reaction)    NSAIDs:          Current Medications:     Last Reviewed on 3/04/2020 02:09 PM by Paulo Dyer    glipiZIDE 5 mg oral tablet [TAKE 1/2 (ONE-HALF) TABLET BY MOUTH TWICE DAILY]    Glucophage 1,000 mg oral tablet [TAKE 1 TABLET BY MOUTH TWICE DAILY]    aspirin 81 mg oral tablet, delayed release (enteric coated) [Take 1 tablet(s) by mouth qam]    Nitroglycerin 0.4mg Tablets, Sublingual [Dissolve 1 tablet(s) under the tongue may repeat every 5 minutes. If pain not relieved after three doses go to ER.]    Lancet   Lancet [Check blood once daily as directed]    Pantoprazole 40 mg oral tablet, delayed release (enteric coated) [1 tab daily]    hydroCHLOROthiazide 25 mg oral tablet [take 1 tablet (25 mg) by oral route once per day]    Ferrous Sulfate 325 mg (65 mg iron) oral tablet [1 tab bid]    Tradjenta 5mg Tablet [Take 1 daily (patient assistance)]    amiodarone 200 mg oral tablet [one tablet tid]    glucosamine/chondroitin 375/100/36/54mg BID     Xarelto 15 mg oral tablet [1 po daily]    gabapentin 100 mg oral capsule [TAKE 1 CAPSULE BY MOUTH TWICE DAILY]    Synthroid 0.112mg Tablet [1 daily in the morning]    traZODone 50 mg oral tablet [1 - 2 @ QHS PRN]    traZODone 50 mg oral tablet [TAKE 1 TO 2 TABLETS BY MOUTH ONCE DAILY AT BEDTIME  AS NEEDED]    Sucralfate 1 gram oral tablet [1 tab 30 MIN BEFORE MEALS AND AT HS]    cyclobenzaprine 10 mg oral tablet [Take 1 tablet(s) by mouth bid  prn]    Reglan  10mg Tablet [1 tab every 6 to 8 hours]    Famciclovir 500 mg oral tablet [Take BID x 5 days]    neomycin-bacitracnZn-polymyxnB 3.5mg-400 unit- 5,000 unit/gram Topical Ointment [Apply sufficient amount to affected area 2 to 3 x daily]    melatonin 5 mg oral tablet,chewable [take 1 tab prior to bedtime each night ]        Objective:        Vitals:         Current: 3/4/2020 10:12:17 AM    Ht:  5 ft, 8 in;  Wt: 167.6 lbs;  BMI: 25.5T: 97.8 F (oral);  BP: 152/56 mm Hg (right arm, sitting);  P: 76 bpm (right arm (BP Cuff), sitting);  sCr: 1.06 mg/dL;  GFR: 48.39        Exams:     PHYSICAL EXAM:     GENERAL:  well developed and nourished; appropriately groomed; in no apparent distress;     EYES: PERRL, EOMI     E/N/T: EARS: grossly diminished hearing bilaterally; OROPHARYNX: posterior pharynx, including tonsils, tongue, and uvula are normal;     NECK: trachea is midline;     RESPIRATORY: normal respiratory rate and pattern with no distress; normal breath sounds with no rales, rhonchi, wheezes or rubs;     CARDIOVASCULAR: normal rate; rhythm is regular;     GASTROINTESTINAL: nontender; normal bowel sounds;     SKIN: approx 1.3 cm nodule on left posterior shoulder/upper back, raised 5 or 7 mm, scaling on slightly erythematous base;     MUSCULOSKELETAL: gait: slowed and uses a cane;  pain with range of motion in: left knee;     NEUROLOGIC: mental status: oriented to person, place, and time;  GROSSLY INTACT     PSYCHIATRIC: affect/demeanor: in good spirits;  normal psychomotor function; normal speech pattern; normal thought and perception;         Assessment:         I10   Essential (primary) hypertension       E11.42   Type 2 diabetes mellitus with diabetic polyneuropathy       L98.8   Other specified disorders of the skin and subcutaneous tissue            ORDERS:         Meds Prescribed:       [New Rx] isosorbide mononitrate 20 mg oral tablet [take 1 tablet (20 mg) by oral route once per day ], #30 (thirty) tablets, Refills: 1 (one)         Lab Orders:       [id number]  DIAB - [facility] CMP A1C LIPID AND MICRO ALBUM CR RATIO: [id number]  (Send-Out)            [id number]  [facility] CBC with 3 part diff  (Send-Out)            93245  [facility] TSH  (Send-Out)            APPTO  Appointment need  (In-House)                      Plan:         Essential (primary) hypertensionBP remains elevated so imdur 20 mg qd is added. Pt is advised this often causes a headache but this often goes away after a few days or a week. Cont amlodipine 10 mg qd, HCTZ 25 mg qd, and (amiodarone for afib). Lisinopril 20 mg qd was recently d/c'd by nephrology (Dr. Crouch) 2/2 hyperkalemia.          Prescriptions:       [New Rx] isosorbide mononitrate 20 mg oral tablet [take 1 tablet (20 mg) by oral route once per day ], #30 (thirty) tablets, Refills: 1 (one)         Type 2 diabetes mellitus with diabetic polyneuropathyDiabetes has been very well controlled, will recheck labs today. Pt is not fasting. Cont metformin 1,000 BID, glipizide 5 mg (only if glucose is high), and tradjenta 5 mg qd.    LABORATORY:  Labs ordered to be performed today include CBC, Diabetes Panel 2;CMP, A1C, Lipid, Microalbumin:Creatinine Ratio, and TSH.            Orders:       [id number]  DIAB - [facility] CMP A1C LIPID AND MICRO ALBUM CR RATIO: [id number]  (Send-Out)            68855  [facility] CBC with 3 part diff  (Send-Out)            11181  [facility] TSH  (Send-Out)              Other specified disorders of the skin and subcutaneous tissuePt will RCT in 2 days for lesion removal and biopsy. If it has truly been present for just 2 weeks then wart or keratoacanthoma is most likely Dx.         FOLLOW-UP: Schedule follow-up appointment in 2 days.:.            Orders:       APPTO  Appointment need  (In-House)                   Patient Recommendations:        For  Other specified disorders of the skin and subcutaneous tissue:    Schedule a follow-up visit in 2 days.                    Monday Tuesday Wednesday Thursday Friday Saturday Sunday                Time:__________________ AM  PM    Date:______________________                APPOINTMENT INFORMATION:        Monday Tuesday Wednesday Thursday Friday Saturday Sunday            Time:___________________AM  PM   Date:_____________________             Charge Capture:         Primary Diagnosis:     I10  Essential (primary) hypertension           Orders:      [id number]  Office/outpatient visit; established patient, level 4  (In-House)              E11.42  Type 2 diabetes mellitus with diabetic polyneuropathy     L98.8  Other specified disorders of the skin and subcutaneous tissue           Orders:      APPTO  Appointment need  (In-House)

## 2021-05-18 NOTE — PROGRESS NOTES
Darci Munson  1935     Office/Outpatient Visit    Visit Date: Thu, Jan 14, 2021 12:55 pm    Provider: Melody Goel N.P. (Assistant: Regine Rodriguez MA)    Location: [facility]        Electronically signed by Melody Goel N.P. on  01/18/2021 01:15:26 PM                             Subjective:        CC: Anuel Bejarano is a 85 year old White male.  Patient presents today to discuss DM (doesnt know what meds he is taking)         HPI: Mr Munson is here today with concerns about his blood glucose. History of diabetes mellitus with polyneuropathy. Has been checking his fastingblood sugars over the past couple of weeks and his log reveals readings from 180s to 260s. He is unsure what medication that he is taking. Records have him taking Tradjenta 5 mg daily but reports that he thinks that he is taking Metformin. His daughter does his meds but she is not with him at time of visit. He reports that he was previously on Glipizide but unsure why this was stopped. Last Hemoglobin A1c:  7.0 (%) (10/14/2020). Review of chart reveals that during visit 10/2020 had episode of low blood sugar in the 40s during visit. Glipizide was stopped.    ROS:     CONSTITUTIONAL:  Negative for chills and fever.      CARDIOVASCULAR:  Negative for chest pain and palpitations.      RESPIRATORY:  Negative for dyspnea and frequent wheezing.      GASTROINTESTINAL:  Negative for abdominal pain and vomiting.      HEMATOLOGIC/LYMPHATIC:  Negative for easy bruising and lymphadenopathy.      PSYCHIATRIC:  Negative for depression and suicidal thoughts.          Past Medical History / Family History / Social History:         Last Reviewed on 12/12/2020 01:24 PM by Adrien Jackson    Past Medical History:             PREVENTIVE HEALTH MAINTENANCE             EVALUATION FOR AORTIC ANEURYSM: was last done 7/5/17 with normal results     COLORECTAL CANCER SCREENING: Up to date (colonoscopy q10y; sigmoidoscopy q5y; Cologuard q3y) was last done  18, Results are in chart; colonoscopy with the following abnormalities noted-- severe sigmoid and descending colon diverticulosis and 3+ gastritis     EYE EXAM: Diabetic Eye Exam during this calendar year and results are in chart was last done 2020 NO diabetic retinopathy     PSA: was last done 17 with normal results Hx prostate cancer/prostatectomy         PAST MEDICAL HISTORY         Positive for    Atrial Fibrillation: on Xarelto; ,     Coronary Artery Disease,    Hyperlipidemia and    Hypertension;     Positive for    Osteoarthritis;     Positive for    Type 2 Diabetes: complications include peripheral neuropathy and gastroparesis; and    Hypothyroidism: dx'd in 2017; 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; ;     Positive for    Prostate cancer: dx'd in ; ;     Positive for    Glaucoma;         CURRENT MEDICAL PROVIDERS:    Cardiologist: KHAI Peterson    Dermatologist: Dr Alegre/Dr Couch    E/N/T: Dr Mohan    Gastroenterologist: Dr Alcaraz    Ophthalmologist: Dr Rodriguez    Orthopedist: Dr Atkins    Urologist: Dr Menendez    Audiologist: Dash ([facility])             ADVANCED DIRECTIVES: None         Surgical History:         Positive for    Arthroscopy: Rt knee; 3/14/17;,    Cataract Removal: bilateral; ;,    Joint Replacement:    Knee Replacement: 3/2017; ; and    Prostatectomy;     Positive for    Rt hand surgery;;         Family History:     Father: ;  Lung Cancer     Mother: ;  Type 2 Diabetes         Social History:     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  Non-drinker         Substance Abuse History:     Last Reviewed on 2020 01:24 PM by Adrien Jackson        Mental Health History:     Last Reviewed on  12/12/2020 01:24 PM by Adrien Jackson        Communicable Diseases (eg STDs):     Last Reviewed on 12/12/2020 01:24 PM by Adrien Jackson        Current Problems:     Last Reviewed on 12/12/2020 01:24 PM by Adrien Jackson    Hereditary and idiopathic neuropathy, unspecified    Irritable bowel syndrome without diarrhea    Sleep related leg cramps    Essential (primary) hypertension    Type 2 diabetes mellitus with diabetic polyneuropathy    Mixed hyperlipidemia    Malignant neoplasm of prostate    Anemia, unspecified    Unilateral primary osteoarthritis, right knee    Gastroparesis    History of falling    Gastro-esophageal reflux disease without esophagitis    Unspecified hearing loss, bilateral    Unspecified atrial fibrillation    Presence of unspecified artificial knee joint    Low back pain    Unspecified dementia without behavioral disturbance    Primary insomnia    Hypothyroidism, unspecified    Peripheral vascular disease, unspecified    Chronic kidney disease, stage 3 (moderate)    Gastric ulcer, unspecified as acute or chronic, without hemorrhage or perforation    Atherosclerotic heart disease of native coronary artery without angina pectoris    Laceration without foreign body of right forearm, initial encounter    Encounter for immunization    Unilateral primary osteoarthritis, left knee    Sensorineural hearing loss, bilateral    Other specified disorders of the skin and subcutaneous tissue    Encounter for screening for depression    Pain in left foot    Encounter for other administrative examinations        Immunizations:     influenza, high-dose, quadrivalent (FLUZONE HIGH-DOSE QUAD 2020-21) 9/10/2020    Td (adult), 2 Lf tetanus toxoid, preservative free, adsorbed (TDVAX) 1/14/2020    Prevnar 13 (Pneumococcal PCV 13) 6/29/2017    Fluzone (3 + years dose) 11/20/2008    Fluzone (3 + years dose) 10/31/2012    Influenza, split virus (3+ years dose) 11/8/2007    Fluzone High-Dose pf (>=65 yr) 10/29/2013     Fluzone High-Dose pf (>=65 yr) 10/21/2014    Fluzone High-Dose pf (>=65 yr) 11/9/2015    Fluzone High-Dose pf (>=65 yr) 9/29/2016    Fluzone High-Dose pf (>=65 yr) 10/26/2017    Fluzone High-Dose pf (>=65 yr) 11/3/2018    Fluzone High-Dose pf (>=65 yr) 10/24/2019    PNEUMOVAX 23 (Pneumococcal PPV23) 2/11/2011        Allergies:     Last Reviewed on 1/14/2021 12:57 PM by Regine Rodriguez    Mobic:   (Adverse Reaction)    NSAIDs:      glipiZIDE:   (Adverse Reaction)        Current Medications:     Last Reviewed on 1/14/2021 12:57 PM by Regine Rodriguez    latanoprost 0.005 % ophthalmic (eye) Drops [instill 1 drop OU]    aspirin 81 mg oral tablet, delayed release (enteric coated) [Take 1 tablet(s) by mouth qam]    Nitroglycerin 0.4mg Tablets, Sublingual [Dissolve 1 tablet(s) under the tongue may repeat every 5 minutes. If pain not relieved after three doses go to ER.]    Lancet   Lancet [Check blood once daily as directed]    pantoprazole 40 mg oral tablet, delayed release (enteric coated) [Take 1 tablet by mouth once daily]    hydroCHLOROthiazide 25 mg oral tablet [Take 1 tablet by mouth once daily]    Ferrous Sulfate 325 mg (65 mg iron) oral tablet [1 tab bid]    Tradjenta 5mg Tablet [Take 1 daily (PATIENT ASSISTANCE)]    amiodarone 200 mg oral tablet [one tablet tid]    glucosamine/chondroitin 375/100/36/54mg BID     Xarelto 15 mg oral tablet [1 po daily]    gabapentin 100 mg oral capsule [Take 1 capsule by mouth twice daily]    traZODone 50 mg oral tablet [TAKE 1 TO 2 TABLETS BY MOUTH ONCE DAILY AT BEDTIME AS NEEDED]    Sucralfate 1 gram oral tablet [1 tab 30 MIN BEFORE MEALS AND AT HS]    cyclobenzaprine 10 mg oral tablet [Take 1 tablet by mouth twice daily as needed]    Reglan  10mg Tablet [1 tab every 6 to 8 hours]    Famciclovir 500 mg oral tablet [Take BID x 5 days]    neomycin-bacitracnZn-polymyxnB 3.5mg-400 unit- 5,000 unit/gram Topical Ointment [Apply sufficient amount to affected area 2 to 3 x daily]     melatonin 5 mg oral tablet,chewable [take 1 tab prior to bedtime each night ]    isosorbide mononitrate 30 mg oral Tablet, Extended Release 24 hr [take 1 tablet (30 mg) by oral route once daily in the morning]    mupirocin 2 % Topical Ointment [apply a small amount to the affected area by topical route 3 times per day]    Synthroid 125 mcg oral tablet [take 1 tablet (125 mcg) by oral route once daily]        Objective:        Vitals:         Historical:     12/8/2020  BP:   137/73 mm Hg ( (left arm, , sitting, );) 12/8/2020  P:   77bpm ( (left arm (BP Cuff), , sitting, );) 12/8/2020  Wt:   165lbs    Current: 1/14/2021 12:59:56 PM    Ht:  5 ft, 8 in;  Wt: 160.4 lbs;  BMI: 24.4T: 97.5 F (temporal);  BP: 180/58 mm Hg (left arm, sitting);  P: 71 bpm (left arm (BP Cuff), sitting);  sCr: 1.27 mg/dL;  GFR: 38.96        Repeat:     1:1:46 PM  BP:   168/52mm Hg (left arm, sitting, HR: 67) 1:33:50 PM  BP:   154/84mm Hg    Exams:     PHYSICAL EXAM:     GENERAL: well developed, well nourished;  no apparent distress;     EYES: PERRL, EOMI     RESPIRATORY: normal respiratory rate and pattern with no distress; normal breath sounds with no rales, rhonchi, wheezes or rubs;     CARDIOVASCULAR: normal rate; rhythm is regular;     NEUROLOGIC: mental status: alert and oriented x 3;     PSYCHIATRIC: appropriate affect and demeanor;         Lab/Test Results:         Hemoglobin A1c: 8.2 (01/14/2021),     Performed by:: shila (01/14/2021),             Assessment:         E11.42   Type 2 diabetes mellitus with diabetic polyneuropathy           ORDERS:         Lab Orders:       [id number]*  Hgb A1c fast lab  (In-House)              Procedures Ordered:       [id number]  Collection of capillary blood specimen (eg, finger, heel, ear stick)  (In-House)                      Plan:         Type 2 diabetes mellitus with diabetic [id number] is up to 8.2 today. Discussed with patient that I would call his daughter when she got off work to review medications  prior to making any adjustments in medications. Attempted to call afternoon of 1/14/2021 without answer. Will attempt call again tomorrow. May need to add another medication for diabetes. Additionally will discuss with his PCP.     LABORATORY:  Labs ordered to be performed today include Hgb A1c inhouse fast lab.      FOLLOW-UP: Chronic visit follow up           Orders:       [id number]*  Hgb A1c fast lab  (In-House)            [id number]  Collection of capillary blood specimen (eg, finger, heel, ear stick)  (In-House)                  Charge Capture:         Primary Diagnosis:     E11.42  Type 2 diabetes mellitus with diabetic polyneuropathy           Orders:      [id number]  Office/outpatient visit; established patient, level 3  (In-House)            [id number]*  Hgb A1c fast lab  (In-House)            [id number]  Collection of capillary blood specimen (eg, finger, heel, ear stick)  (In-House)

## 2021-05-18 NOTE — PROGRESS NOTES
"Darci Munson  1935     Office/Outpatient Visit    Visit Date: Tue, Dec 3, 2019 10:48 am    Provider: Paulo Dyer MD (Assistant: Liz Vázquez MA)    Location: [facility]        Electronically signed by Paulo Dyer MD on  12/03/2019 06:36:40 PM                             Subjective:        CC: (PT DOES NOT HAVE HCTZ OR SUCRALFATE ON MED LIST)Mr. Munson is a 84 year old White male.  This is a follow-up visit.          HPI: I gave pt steroid shots in his knees to help with arthritis pain but unfortunately knee pain persisted and he did not go rabbit hunting.       BP today is 137/59 with a HR of 74. He is on amlodipine 10 mg qd and HCTZ 12.5 mg qd. (As well as amiodarone for afib). He checks BP periodically and is typically systolic in 130s. No headaches or blurry vision.      A1c over the last year has been 6.6 -> 6.9 -> 5.4 and most recently 6.9 on 9/5/19. He is on metformin 1,000 BID, glipizide 5 mg (only if glucose is high), tradjenta 5 mg qd. He checks glucose 1-2x/day and its often higher at night. He eats cereal (raisin bran) or maybe eggs and alfaro at a restaurant for breakfast. Bologna sandwich for lunch. For dinner he eats hot dogs. He drinks  diet coca cola. He might snack on potato chips or peanut butter cracker.      Pt admits memory is \"not too good.\" He struggles to remember various things. He lives by himself, he eats at [employer], [employer] or canned food at home. He manages his finances. His daughter Sharon comes by once a week and helps with his medicine.     ROS:     CONSTITUTIONAL:  Negative for chills, fatigue and fever.      CARDIOVASCULAR:  Negative for chest pain, dizziness, orthopnea, paroxysmal nocturnal dyspnea and pedal edema.      RESPIRATORY:  Negative for recent cough, dyspnea and frequent wheezing.      GASTROINTESTINAL:  Positive for acid reflux symptoms and anorexia ( loss of appetite ).   Negative for abdominal pain, constipation, diarrhea, " nausea or vomiting.      MUSCULOSKELETAL:  Positive for arthralgias (left knee).      NEUROLOGICAL:  Positive for memory loss.   Negative for dizziness or headaches.      PSYCHIATRIC:  Negative for anxiety and depression.          Past Medical History / Family History / Social History:         Last Reviewed on 2019 06:27 PM by Paulo Dyer    Past Medical History:             PREVENTIVE HEALTH MAINTENANCE             EVALUATION FOR AORTIC ANEURYSM: was last done 17 with normal results     COLORECTAL CANCER SCREENING: Up to date (colonoscopy q10y; sigmoidoscopy q5y; Cologuard q3y) was last done 18, Results are in chart; colonoscopy with the following abnormalities noted-- severe sigmoid and descending colon diverticulosis and 3+ gastritis     EYE EXAM: Diabetic Eye Exam during this calendar year and results are in chart was last done 18 NO diabetic retinopathy     PSA: was last done 17 with normal results Hx prostate cancer/prostatectomy         PAST MEDICAL HISTORY         Positive for    Atrial Fibrillation: on Xarelto; ,     Coronary Artery Disease,    Hyperlipidemia and    Hypertension;     Positive for    Osteoarthritis;     Positive for    Type 2 Diabetes: complications include peripheral neuropathy and gastroparesis; and    Hypothyroidism: dx'd in 2017; 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; ;     Positive for    Prostate cancer: dx'd in ; ;         CURRENT MEDICAL PROVIDERS:    Cardiologist: KHAI Peterson    Dermatologist: Dr Alegre/Dr Couch    Gastroenterologist: Dr Alcaraz    Ophthalmologist: Dr Rodriguez    Orthopedist: Dr Atkins    Urologist: Dr Menendez    Audiologist: Dash ([facility])             ADVANCED DIRECTIVES: None         Surgical History:         Positive for    Arthroscopy: Rt knee; 3/14/17;,    Joint Replacement:    Knee Replacement: 3/2017; ; and    Prostatectomy;     Positive for    Rt hand surgery;;         Family History:     Father: ;  Lung Cancer      Mother: ;  Type 2 Diabetes         Social History:     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  Non-drinker         Substance Abuse History:     Last Reviewed on 2019 06:27 PM by Paulo Dyer        Mental Health History:     Last Reviewed on 2019 06:27 PM by Paulo Dyer        Communicable Diseases (eg STDs):     Last Reviewed on 2019 06:27 PM by Paulo Dyer        Current Problems:     Last Reviewed on 2019 06:27 PM by Paulo Dyer    Hereditary and idiopathic neuropathy, unspecified    Irritable bowel syndrome without diarrhea    Sleep related leg cramps    Essential (primary) hypertension    Type 2 diabetes mellitus with diabetic polyneuropathy    Mixed hyperlipidemia    Malignant neoplasm of prostate    Anemia, unspecified    Gastroparesis    Unilateral primary osteoarthritis, right knee    History of falling    Gastro-esophageal reflux disease without esophagitis    Unspecified hearing loss, bilateral    Unspecified atrial fibrillation    Presence of unspecified artificial knee joint    Primary insomnia    Low back pain    Unspecified dementia without behavioral disturbance    Chronic kidney disease, stage 3 (moderate)    Hypothyroidism, unspecified    Peripheral vascular disease, unspecified    Gastric ulcer, unspecified as acute or chronic, without hemorrhage or perforation    Atherosclerotic heart disease of native coronary artery without angina pectoris        Immunizations:     Prevnar 13 (Pneumococcal PCV 13) 2017    Fluzone (3 + years dose) 2008    Fluzone (3 + years dose) 10/31/2012    Influenza, split virus (3+ years dose) 2007    Fluzone High-Dose pf (>=65 yr) 10/29/2013     Fluzone High-Dose pf (>=65 yr) 10/21/2014    Fluzone High-Dose pf (>=65 yr) 11/9/2015    Fluzone High-Dose pf (>=65 yr) 9/29/2016    Fluzone High-Dose pf (>=65 yr) 10/26/2017    Fluzone High-Dose pf (>=65 yr) 11/3/2018    Fluzone High-Dose pf (>=65 yr) 10/24/2019    PNEUMOVAX 23 (Pneumococcal PPV23) 2/11/2011        Allergies:     Last Reviewed on 12/03/2019 06:27 PM by Paulo Dyer    Mobic:   (Adverse Reaction)    NSAIDs:          Current Medications:     Last Reviewed on 12/03/2019 06:27 PM by Paulo Dyer    Glipizide 5mg Tablets [1 in morning, 1 at bedtime]    Glucophage 1,000mg Tablet [1 tab bid]    Aspirin (ASA) 81mg Tablets, Enteric Coated [Take 1 tablet(s) by mouth qam]    Nitroglycerin 0.4mg Tablets, Sublingual [Dissolve 1 tablet(s) under the tongue may repeat every 5 minutes. If pain not relieved after three doses go to ER.]    Lancet   Lancet [Check blood once daily as directed]    Pantoprazole 40mg Tablets, Delayed Release [1 tab daily]    Hydrochlorothiazide (HCTZ) 12.5mg Tablet [Take 1 tablet(s) by mouth daily]    Ferrous Sulfate 325mg Tablets [1 tab bid]    Tradjenta 5mg Tablet [Take 1 tablet(s) by mouth daily]    Norvasc 10mg Tablet [Take 1 tablet(s) by mouth daily]    Amiodarone HCl 200mg Tablet [one tablet tid]    glucosamine/chondroitin 375/100/36/54mg BID    Xarelto 15mg Tablet [1 po daily]    Gabapentin 100mg Capsules [1 bid]    Synthroid 0.112mg Tablet [1 daily in the morning]    Trazodone HCl 50mg Tablet [1 - 2 @ QHS PRN]    Fish Oil 1,000mg Capsules [1 capsules daily ]    Sucralfate 1gm Tablets [1 tab 30 MIN BEFORE MEALS AND AT HS]    Cyclobenzaprine HCl 10mg Tablet [Take 1 tablet(s) by mouth bid  prn]    Reglan  10mg Tablet [1 tab every 6 to 8 hours]    Famciclovir 500mg Tablet [Take BID x 5 days]    Valacyclovir 500mg Tablet [1 tab qd]    Bacitracin/Neomycin/Polymyxin B 400units/3.5mg/5,000units per 1gm Topical Ointment [Apply sufficient amount to affected area 2 to 3 x daily]         Objective:        Vitals:         Current: 12/3/2019 10:54:31 AM    Ht:  5 ft, 8 in;  Wt: 167.6 lbs;  BMI: 25.5T: 97.9 F (oral);  BP: 137/59 mm Hg (left arm, sitting);  P: 74 bpm (left arm (BP Cuff), sitting);  sCr: 1.39 mg/dL;  GFR: 36.90        Exams:     PHYSICAL EXAM:     GENERAL:  well developed and nourished; appropriately groomed; in no apparent distress;     EYES: PERRL, EOMI     RESPIRATORY: normal respiratory rate and pattern with no distress; normal breath sounds with no rales, rhonchi, wheezes or rubs;     CARDIOVASCULAR: normal rate; rhythm is regular;     GASTROINTESTINAL: nontender; normal bowel sounds; no organomegaly; rectal exam: guaiac negative stool; OTHER (enter);     MUSCULOSKELETAL: gait: slowed and uses a cane;  pain with range of motion in: left knee;  crepitus of left knee with extention and flexion;     NEUROLOGIC: MOCA score of 15/30; mental status: oriented to person and place only;  GROSSLY INTACT     PSYCHIATRIC: affect/demeanor: in good spirits;  normal psychomotor function; normal speech pattern; normal thought and perception;         Assessment:         I10   Essential (primary) hypertension       E11.42   Type 2 diabetes mellitus with diabetic polyneuropathy       F03.90   Unspecified dementia without behavioral disturbance           ORDERS:         Radiology/Test Orders:       [id number]  MRI, brain; without contrast  (Send-Out)              Lab Orders:       [id number]  BDCBC - [facility] CBC with 3 part diff  (Send-Out)            [id number]  DIAB - [facility] CMP A1C LIPID AND MICRO ALBUM CR RATIO: [id number]  (Send-Out)            [id number]  TSH - [facility] TSH  (Send-Out)            APPTO  Appointment need  (In-House)                      Plan:         Essential (primary) hypertensionBP well controlled, cont amlodipine 10 mg qd and HCTZ 12.5 mg qd.         Type 2 diabetes mellitus with diabetic polyneuropathyDM 2 is very well controlled. Will recheck A1c today. Cont metformin 1,000 BID,  glipizide 5 mg (only if glucose is high), tradjenta 5 mg qd.     LABORATORY:  Labs ordered to be performed today include CBC, Diabetes Panel 2;CMP, A1C, Lipid, Microalbumin:Creatinine Ratio, and TSH.      FOLLOW-UP: Schedule a follow-up appointment in 6 weeks.:.            Orders:       [id number]  BDCB - [facility] CBC with 3 part diff  (Send-Out)            89528  DIAB2 - [facility] CMP A1C LIPID AND MICRO ALBUM CR RATIO: [id number]  (Send-Out)            [id number]  TSH - [facility] TSH  (Send-Out)            APPTO  Appointment need  (In-House)              Unspecified dementia without behavioral disturbanceMemory today seemed impaired during HPI discussion. He some times contradicted some answers and had a hard time recalling recent events. MOCA performed and pt scored 15/30. MRI Brain is ordered to help assess and depending on results will refer to neurology. Pt saw neurology earlier this year regarding a head tremor that appears to be benign. Currently, pt is taking good care of himself although I worry his driving. It might be prudent to call his daughter Sharon to discuss his declining memory.        RADIOLOGY:  I have ordered a head MRI without contrast to be done today.            Orders:       [id number]  MRI, brain; without contrast  (Send-Out)                  Patient Recommendations:        For  Type 2 diabetes mellitus with diabetic polyneuropathy:    Schedule a follow-up visit in 6 weeks.                APPOINTMENT INFORMATION:        Monday Tuesday Wednesday Thursday Friday Saturday Sunday            Time:___________________AM  PM   Date:_____________________             Charge Capture:         Primary Diagnosis:     I10  Essential (primary) hypertension           Orders:      [id number]  Office/outpatient visit; established patient, level 4  (In-House)              E11.42  Type 2 diabetes mellitus with diabetic polyneuropathy           Orders:      APPTO  Appointment need  (In-House)              F03.90   Unspecified dementia without behavioral disturbance

## 2021-05-18 NOTE — PROGRESS NOTES
Darci MunsonCora 1935     Office/Outpatient Visit    Visit Date: Mon, Aug 20, 2018 01:41 pm    Provider: Joanna Malik MD (Assistant: Hayley Lira RN)    Location: [facility]        Electronically signed by Joanna Malik MD on  08/28/2018 11:16:31 AM                             SUBJECTIVE:        CC:     Anuel Berry is a 83 year old White male.  [facility]         HPI:         Anuel Berry is here for a Medicare wellness visit.  Individual and family medical history was reviewed and updated A list of current providers and suppliers reviewed and updated A current height, weight, BMI, blood pressure, and pulse were recorded in the vitals section of the note and have been reviewed A review of cognitive impairment was performed and was negative.  A review of functional ability and level of safety was performed and was negative In regard to hearing, he reports having trouble hearing the TV/radio when others do not and having to strain to hear or understand conversations, but not wearing hearing aid(s).  Concerning home safety, he reports that at home he DOES have throw rugs, handrails on stairs and functioning smoke alarms, but not poor lighting, a slippery bath or shower or grab bars in the bath.      Immunization Status: ** >10 years since last Td booster; Age>60, no shingles vaccination; Hep A;     Physical Activity: ** Exercises infrequently; Has not had any falls or only one fall without injury in the past year.  Advance Care Directive updated today in [facility] Tobacco: He has a past history of cigarette smoking; quit date:  1970.    Preventative Health updated today.          PHQ-9 Depression Screening: Completed form scanned and in chart; Total Score 5 Alcohol Consumption Screening: Completed form scanned and in chart; Total Score 0         Dx with hypertension; he is not using any nonpharmacologic treatment modalities.  His current cardiac medication regimen includes a calcium channel blocker ( norvasc ).   Anuel Berry does not check his blood pressure other than at his clinic appointments.  He is tolerating the medication well without side effects.  Compliance with treatment has been good; he follows up as directed.          Additionally, he presents with history of type II DM.  specifically, this is type 2, non-insulin requiring diabetes, complicated by peripheral neuropathy, peripheral vascular disease, and gastroparesis.  Compliance with treatment has been good; he takes his medication as directed, follows up as directed, and is keeping a glucose diary.      Tobacco screen: Non-smoker.  Current meds include an oral hypoglycemic ( Glucophage XR, Glucotrol, and tradjenta ),  Prinivil, and a lipid lowering agent.  He reports home blood glucose readings have been excellent, with average fasting glucoses running <120 mg/dL.  Most recent lab results include Hemoglobin A1c:  6.5 (%) (08/22/2017), LDL:  107 (mg/dL) (08/22/2017), HDL:  54 (mg/dL) (08/22/2017), Triglycerides:  177 (mg/dL) (08/22/2017), Microalbuminuria:  59.2 (mg/g creat) (08/22/2017), Microalbumin, Urine, rand:  84.2 (mg/L) (08/22/2017).  His most recent systolic blood pressure was < 130 mmHg. The last diastolic blood pressure was < 80 mmHg.  1 fall In regard to preventative care, he performs foot self-exams several times per month and his last ophthalmology exam was in 5/2017.      ROS:     CONSTITUTIONAL:  Negative for chills, fatigue and fever.      E/N/T:  Positive for increased cold sores.   Negative for ear pain or frequent rhinorrhea.      CARDIOVASCULAR:  Negative for chest pain, dizziness, orthopnea, paroxysmal nocturnal dyspnea and pedal edema.      RESPIRATORY:  Negative for recent cough, dyspnea and frequent wheezing.      GASTROINTESTINAL:  Negative for abdominal pain, constipation, diarrhea, nausea and vomiting.      NEUROLOGICAL:  Negative for dizziness, headaches and memory loss.      PSYCHIATRIC:  Positive for anxiety.   Negative for  depression.          PMH/FMH/SH:     Last Reviewed on 2018 02:40 PM by Joanna Malik    Past Medical History:             PREVENTIVE HEALTH MAINTENANCE             EVALUATION FOR AORTIC ANEURYSM: was last done 17 with normal results     COLORECTAL CANCER SCREENING: Up to date (colonoscopy q10y; sigmoidoscopy q5y; Cologuard q3y) was last done 18, Results are in chart; colonoscopy with the following abnormalities noted-- severe sigmoid and descending colon diverticulosis and 3+ gastritis     EYE EXAM: Diabetic Eye Exam during this calendar year and results are in chart was last done 18 NO diabetic retinopathy     PSA: was last done 17 with normal results Hx prostate cancer/prostatectomy         PAST MEDICAL HISTORY         Positive for    Atrial Fibrillation: on Xarelto; ,     Coronary Artery Disease,    Hyperlipidemia and    Hypertension;     Positive for    Osteoarthritis;     Positive for    Type 2 Diabetes: complications include peripheral neuropathy and gastroparesis; and    Hypothyroidism: dx'd in 2017; 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; ;     Positive for    Prostate cancer: dx'd in ; ;         CURRENT MEDICAL PROVIDERS:    Cardiologist: KHAI Peterson    Dermatologist: Dr Alegre/Dr Couch    Gastroenterologist: Dr Alcaraz    Ophthalmologist: Dr Rodriguez    Orthopedist: Dr Atkins    Urologist: Dr Menendez    Audiologist: Dash ([facility])             ADVANCED DIRECTIVES: None         Surgical History:         Positive for    Arthroscopy: Rt knee; 3/14/17;,    Joint Replacement:    Knee Replacement: 3/2017; ; and    Prostatectomy;     Positive for    Rt hand surgery;;         Family History:     Father: ;  Lung Cancer     Mother: ;  Type 2 Diabetes         Social History:     Occupation:    Retired     Marital Status:   10-19-16         Tobacco/Alcohol/Supplements:     Last Reviewed on 2018 02:40 PM by Joanna Malik    Tobacco: He has a past  history of cigarette smoking; quit date:  1970.  Non-drinker         Substance Abuse History:     Last Reviewed on 8/24/2015 02:34 PM by Carri Cruz        Mental Health History:     Last Reviewed on 8/24/2015 02:34 PM by Carri Cruz        Communicable Diseases (eg STDs):     Last Reviewed on 8/24/2015 02:34 PM by Carri Cruz            Immunizations:     Prevnar 13 (Pneumococcal PCV 13) 6/29/2017     Fluzone (3 + years dose) 11/20/2008     Fluzone (3 + years dose) 10/31/2012     Influenza, split virus (3+ years dose) 11/8/2007     Fluzone High-Dose pf (>=65 yr) 10/29/2013     Fluzone High-Dose pf (>=65 yr) 10/21/2014     Fluzone High-Dose pf (>=65 yr) 11/9/2015     Fluzone High-Dose pf (>=65 yr) 9/29/2016     Fluzone High-Dose pf (>=65 yr) 10/26/2017     PNEUMOVAX 23 (Pneumococcal PPV23) 2/11/2011         Allergies:     Last Reviewed on 7/14/2018 09:08 AM by Christie Alcaraz: (Adverse Reaction)    NSAIDs:        Current Medications:     Last Reviewed on 5/23/2018 10:14 AM by Cheyanne Flores    Glipizide 10mg Tablets 1/2 tab BID     Lipitor 10mg Tablet 1/2 tablet by mouth daily at bedtime.     Pantoprazole 40mg Tablets, Delayed Release 1 tab daily     Glucophage 1,000mg Tablet 1/2 pill bid     Trazodone HCl 50mg Tablet 1 - 2 @ QHS PRN     Dicyclomine HCl 10mg Capsules Take 1 capsule(s) by mouth bid     Synthroid 0.1mg Tablet Take 1 tablet(s) by mouth daily     Gabapentin 100mg Capsules 1 capsule HS     Tradjenta 5mg Tablet Take 1 tablet(s) by mouth daily     Nitroglycerin 0.4mg Tablets, Sublingual Dissolve 1 tablet(s) under the tongue may repeat every 5 minutes. If pain not relieved after three doses go to ER.     Ferrous Sulfate 325mg Tablets 1 tab bid     Lancet   Lancet Check blood once daily as directed     Fish Oil 1,000mg Capsules 1 capsules daily     Xarelto 15mg Tablet 1 po daily     Amiodarone HCl 200mg Tablet 1 tab daily     glucosamine/chondroitin 375/100/36/54mg BID      Aspirin 81mg Tablets, Enteric Coated Take 1 tablet(s) by mouth qam     Sucralfate 1gm Tablets take 2 tabs bid         OBJECTIVE:        Vitals:         Current: 8/20/2018 1:44:26 PM    Ht:  5 ft, 8 in;  Wt: 173 lbs;  BMI: 26.3    T: 97.9 F (oral);  BP: 163/60 mm Hg (left arm, sitting);  P: 86 bpm (left arm (BP Cuff), sitting);  sCr: 1.18 mg/dL;  GFR: 44.81    VA: 20/25 OD, 20/25 OS (with correction)        Exams:     PHYSICAL EXAM:     GENERAL: Vitals recorded well developed, well nourished;  well groomed;  no apparent distress;     EYES: PERRL, EOMI     E/N/T: OROPHARYNX:  normal mucosa, dentition, gingiva, and posterior pharynx;     NECK:  supple, full ROM; no thyromegaly; no carotid bruits;     RESPIRATORY: normal respiratory rate and pattern with no distress; normal breath sounds with no rales, rhonchi, wheezes or rubs;     CARDIOVASCULAR: normal rate; rhythm is regular;  normal S1; normal S2; no systolic murmur; no edema;     GASTROINTESTINAL: nontender, nondistended; no hepatosplenomegaly or masses; no bruits; diastasis noted;     SKIN:  no significant rashes or lesions; no suspicious moles;     MUSCULOSKELETAL: normal gait;     NEUROLOGIC: GROSSLY INTACT     PSYCHIATRIC:  appropriate affect and demeanor; normal speech pattern; grossly normal memory;     Left foot exam    Protective sensation using Monofilament test: Slightly decreased, with estimated force of 0.2 grams applied.    Vascular status: normal peripheral vascular exam with palpable dorsal pedal and posterior tibal pulses and brisk digital capillary refill    Skin is intact without sores or ulcers    Right foot exam    Protective sensation using Monofilament test: Slightly decreased, with estimated force of 0.2 grams applied.    Vascular status: normal peripheral vascular exam with palpable dorsal pedal and posterior tibal pulses and brisk digital capillary refill    Skin is intact without sores or ulcers         Lab/Test Results:              Hemoglobin A1c:  6.8 (08/20/2018),     Performed by::  tc (08/20/2018),             ASSESSMENT           V70.0   Z00.00  Health checkup              DDx:     V79.0   Z13.89  Screening for depression              DDx:     401.1   I10  Hypertension              DDx:     585.3   N18.3  Chronic renal insufficiency, stage 3              DDx:     356.9   G60.9  Peripheral neuropathy              DDx:     250.00   E11.42  Type II DM              DDx:     531.90   K25.9  Gastric ulcer, unspecified chronicity, without mention of obstruction              DDx:     054.9   B00.89  Cold sore              DDx:         ORDERS:         Meds Prescribed:       Refill of: Dicyclomine HCl 10mg Capsules Take 1 capsule(s) by mouth bid  #180 (One [place] [employer]y) capsule(s) Refills: 0       Refill of: Lipitor (Atorvastatin Calcium) 10mg Tablet 1/2 tablet by mouth daily at bedtime.  #45 (Forty Five) tablet(s) Refills: 1       Refill of: Trazodone HCl (Trazodone HCl)  50mg Tablet 1 - 2 @ QHS PRN  #40 (Forty) tablet(s) Refills: 1       Refill of: Sucralfate 1gm/10ml Oral Suspension 40 ml bid  #2400 ([employer] [place]) ml Refills: 2       Lisinopril 10mg Tablet 1 in am  #90 (Ninety) tablet(s) Refills: 1       Famvir (Famciclovir) 500mg Tablet Take BID x 5 days  #10 (Ten) tablet(s) Refills: 0         Radiology/Test Orders:       3017F  Colorectal CA screen results documented and reviewed (PV)  (In-House)           Lab Orders:       02080*  Hgb A1c fast lab  (In-House)         88435  [facility] Microablbumin, quantitative  (Send-Out)           Procedures Ordered:         Annual wellness visit, includes a PPPS, subsequent visit  (In-House)         REFER  Referral to Specialist or Other Facility  (Send-Out)         72606  Collection of capillary blood specimen (eg, finger, heel, ear stick)  (In-House)           Other Orders:         Depression screen negative  (In-House)         1101F  Pt screen for fall risk; document no  falls in past year or only 1 fall w/o injury in past year (MAMTA)  (In-House)           Negative EtOH screen  (In-House)         2028F  Foot examination performed (includes examination through visual inspection, sensory exam with monofi  (In-House)                   PLAN:          Health checkup MEDICARE WELLNESS EXAM-HE IS DONE WITH COLONOSCOPY, WILL GET FLU VACCINE YEARLY, PREVNAR/PNEUMOVAX VACCINE UTD, UTD ON SHINGLES, DECLINES TD, NO FALL RISK, MOD MEMORY ISSUES, NO DEPRESSION, HE LIVES ALONE, SAFETY ISSUES REVIEWED WITH HIM TODAY. 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 referal for dietician for his diabetes         REFERRALS:  Referral initiated to Dietitian and an E/N/T ( Dr. Vincenzo Mohan, [facility]; for evaluation of HEARING LOSS ).            Orders:         Annual wellness visit, includes a PPPS, subsequent visit  (In-House)         REFER  Referral to Specialist or Other Facility  (Send-Out)            Screening for depression     MIPS Negative Depression Screen Negative alcohol screen     COLORECTAL CANCER SCREENING: Results are in chart           Orders:         Depression screen negative  (In-House)         1101F  Pt screen for fall risk; document no falls in past year or only 1 fall w/o injury in past year (MAMTA)  (In-House)           Negative EtOH screen  (In-House)         3017F  Colorectal CA screen results documented and reviewed (PV)  (In-House)            Hypertension elevated -Will start ace           Prescriptions:       Lisinopril 10mg Tablet 1 in am  #90 (Ninety) tablet(s) Refills: 1          Chronic renal insufficiency, stage 3 avoid nsaids, cont lab checks every 6 months          Peripheral neuropathy stable on gabapentin face to face done in May with urine tox and gabo-due in 3 months            Prescriptions:       Refill of: Dicyclomine HCl 10mg Capsules Take 1 capsule(s) by mouth bid  #180 (One [place] and [employer]y) capsule(s) Refills: 0       Refill of: Lipitor (Atorvastatin Calcium) 10mg Tablet 1/2 tablet by mouth daily at bedtime.  #45 (Forty Five) tablet(s) Refills: 1       Refill of: Trazodone HCl (Trazodone HCl)  50mg Tablet 1 - 2 @ QHS PRN  #40 (Forty) tablet(s) Refills: 1          Type II DM stable and well controlled, he is on statin, he has diabetic shoes, eye exam is UTD, ace started today     LABORATORY:  Labs ordered to be performed today include Hgb A1c inhouse fast lab and urine microalbumin.            Orders:       54929*  Hgb A1c fast lab  (In-House)         12489  [facility] Microablbumin, quantitative  (Send-Out)         51054  Collection of capillary blood specimen (eg, finger, heel, ear stick)  (In-House)            Gastric ulcer, unspecified chronicity, without mention of obstruction lets call Dr Thakur and see if Mr Munson needs f/u on this gastritis in stomach, and superficial antral ulcerations. not tolerated carafate pills-getting lodged in his throat-will try liquid form, he is to cont PPI           Prescriptions:       Refill of: Sucralfate 1gm/10ml Oral Suspension 40 ml bid  #2400 ([employer] [place]) ml Refills: 2          Cold sore will start prn anti viral           Prescriptions:       Famvir (Famciclovir) 500mg Tablet Take BID x 5 days  #10 (Ten) tablet(s) Refills: 0             Other Orders:       2028F  Foot examination performed (includes examination through visual inspection, sensory exam with monofi  (In-House)           CHARGE CAPTURE           **Please note: ICD descriptions below are intended for billing purposes only and may not represent clinical diagnoses**        Primary Diagnosis:         V70.0 Health checkup            Z00.00    Encounter for general adult medical examination without abnormal findings              Orders:          16171   Preventive  medicine, established patient, age 65+ years  (In-House)                Annual wellness visit, includes a PPPS, subsequent visit  (In-House)           V79.0 Screening for depression            Z13.89    Encounter for screening for other disorder              Orders:          26542 -25  Office/outpatient visit; established patient, level 4  (In-House)                Depression screen negative  (In-House)             1101F   Pt screen for fall risk; document no falls in past year or only 1 fall w/o injury in past year (MAMTA)  (In-House)                Negative EtOH screen  (In-House)             3017F   Colorectal CA screen results documented and reviewed (PV)  (In-House)           401.1 Hypertension            I10    Essential (primary) hypertension    585.3 Chronic renal insufficiency, stage 3            N18.3    Chronic kidney disease, stage 3 (moderate)    356.9 Peripheral neuropathy            G60.9    Hereditary and idiopathic neuropathy, unspecified    250.00 Type II DM            E11.42    Type 2 diabetes mellitus with diabetic polyneuropathy              Orders:          58589*   Hgb A1c fast lab  (In-House)             28939   Collection of capillary blood specimen (eg, finger, heel, ear stick)  (In-House)           531.90 Gastric ulcer, unspecified chronicity, without mention of obstruction            K25.9    Gastric ulcer, unspecified as acute or chronic, without hemorrhage or perforation    054.9 Cold sore            B00.89    Other herpesviral infection        Other Orders:           2028F   Foot examination performed (includes examination through visual inspection, sensory exam with monofi  (In-House)           ADDENDUMS:      ____________________________________    Addendum: 02/05/2019 12:13 PM - Hayley Lira         Visit Note Faxed to:        Vincenzo Mohan  (Otolaryngology); Number [phone]

## 2021-05-18 NOTE — PROGRESS NOTES
"Darci Munson  1935     Office/Outpatient Visit    Visit Date: Mon, Jan 27, 2020 11:04 am    Provider: Paulo Dyer MD (Assistant: Sheri Cortés MA)    Location: [facility]        Electronically signed by Paulo Dyer MD on  01/27/2020 06:11:00 PM                             Subjective:        CC: Anuel Bejarano is a 84 year old White male.  This is a follow-up visit.  check on right arm         HPI:       2 weeks ago, pt was clearing some brush on his property and tripped and fell down an embankment. He was wearing long sleeves and sustained an avulsion lacertion to his right forearm, roughly 7x 5 cm. Pt was seen later that day and wound was cleanes by myself and a small amount of lidocaine was injected to help with wound cleaning and approximation. 4 steri strips were placed and wound was bandaged by Hayley Lira. Since then pt has come back to clinic for wound exam and re-bandaging and pt's daughter has helped change bandages as well. He reports his arm feels well and there has been no drainage of blood or puss.      Pt typically goes to bed around 1 or 2 am. He takes 1 or 2 trazadone 50 mg qHS. ABotu 2-3 nights a week he has a hard time falling asleep, last night he did not sleep at all because he was not sleepy. He does not nap. He might wake up around 8 or 9am. He reports his appetite is \"too good\".    ROS:     CONSTITUTIONAL:  Negative for chills, fatigue and fever.      CARDIOVASCULAR:  Negative for chest pain, dizziness, orthopnea, paroxysmal nocturnal dyspnea and pedal edema.      RESPIRATORY:  Negative for recent cough, dyspnea and frequent wheezing.      GASTROINTESTINAL:  Positive for acid reflux symptoms and anorexia ( loss of appetite ).   Negative for abdominal pain, constipation, diarrhea, nausea or vomiting.      MUSCULOSKELETAL:  Positive for arthralgias (both knees).      INTEGUMENTARY:  Positive for avulsion lacertion to his right forearm, roughly 7x 5 cm..      " NEUROLOGICAL:  Positive for memory loss.   Negative for dizziness or headaches.      PSYCHIATRIC:  Positive for insomnia.   Negative for anxiety or depression.          Past Medical History / Family History / Social History:         Last Reviewed on 2020 08:22 PM by Paulo Dyer    Past Medical History:             PREVENTIVE HEALTH MAINTENANCE             EVALUATION FOR AORTIC ANEURYSM: was last done 17 with normal results     COLORECTAL CANCER SCREENING: Up to date (colonoscopy q10y; sigmoidoscopy q5y; Cologuard q3y) was last done 18, Results are in chart; colonoscopy with the following abnormalities noted-- severe sigmoid and descending colon diverticulosis and 3+ gastritis     EYE EXAM: Diabetic Eye Exam during this calendar year and results are in chart was last done 18 NO diabetic retinopathy     PSA: was last done 17 with normal results Hx prostate cancer/prostatectomy         PAST MEDICAL HISTORY         Positive for    Atrial Fibrillation: on Xarelto; ,     Coronary Artery Disease,    Hyperlipidemia and    Hypertension;     Positive for    Osteoarthritis;     Positive for    Type 2 Diabetes: complications include peripheral neuropathy and gastroparesis; and    Hypothyroidism: dx'd in 2017; 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; ;     Positive for    Prostate cancer: dx'd in ; ;         CURRENT MEDICAL PROVIDERS:    Cardiologist: KHAI Peterson    Dermatologist: Dr Alegre/Dr Couch    Gastroenterologist: Dr Alcaraz    Ophthalmologist: Dr Rodriguez    Orthopedist: Dr Atkins    Urologist: Dr Menendez    Audiologist: Dash ([facility])             ADVANCED DIRECTIVES: None         Surgical History:         Positive for    Arthroscopy: Rt knee; 3/14/17;,    Joint Replacement:    Knee Replacement: 3/2017; ; and    Prostatectomy;     Positive for    Rt hand surgery;;         Family History:     Father: ;  Lung Cancer     Mother: ;  Type 2 Diabetes         Social History:      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  Non-drinker         Substance Abuse History:     Last Reviewed on 2020 08:22 PM by Paulo Dyer        Mental Health History:     Last Reviewed on 2020 08:22 PM by Paulo Dyer        Communicable Diseases (eg STDs):     Last Reviewed on 2020 08:22 PM by Paulo Dyer        Current Problems:     Last Reviewed on 2020 08:22 PM by Paulo Dyer    Hereditary and idiopathic neuropathy, unspecified    Irritable bowel syndrome without diarrhea    Sleep related leg cramps    Essential (primary) hypertension    Type 2 diabetes mellitus with diabetic polyneuropathy    Mixed hyperlipidemia    Malignant neoplasm of prostate    Anemia, unspecified    Gastroparesis    Unilateral primary osteoarthritis, right knee    History of falling    Gastro-esophageal reflux disease without esophagitis    Unspecified hearing loss, bilateral    Unspecified atrial fibrillation    Presence of unspecified artificial knee joint    Low back pain    Unspecified dementia without behavioral disturbance    Primary insomnia    Hypothyroidism, unspecified    Peripheral vascular disease, unspecified    Chronic kidney disease, stage 3 (moderate)    Gastric ulcer, unspecified as acute or chronic, without hemorrhage or perforation    Atherosclerotic heart disease of native coronary artery without angina pectoris    Laceration without foreign body of right forearm, initial encounter    Encounter for other administrative examinations    Encounter for immunization        Immunizations:     Td (adult), 2 Lf tetanus toxoid, preservative free, adsorbed (TDVAX) 2020    Prevnar 13 (Pneumococcal PCV 13) 2017    Fluzone (3 +  years dose) 11/20/2008    Fluzone (3 + years dose) 10/31/2012    Influenza, split virus (3+ years dose) 11/8/2007    Fluzone High-Dose pf (>=65 yr) 10/29/2013    Fluzone High-Dose pf (>=65 yr) 10/21/2014    Fluzone High-Dose pf (>=65 yr) 11/9/2015    Fluzone High-Dose pf (>=65 yr) 9/29/2016    Fluzone High-Dose pf (>=65 yr) 10/26/2017    Fluzone High-Dose pf (>=65 yr) 11/3/2018    Fluzone High-Dose pf (>=65 yr) 10/24/2019    PNEUMOVAX 23 (Pneumococcal PPV23) 2/11/2011        Allergies:     Last Reviewed on 1/14/2020 08:22 PM by Paulo Dyer:   (Adverse Reaction)    NSAIDs:          Current Medications:     Last Reviewed on 1/14/2020 08:22 PM by Paulo Dyer    Glipizide 5 mg oral tablet [1 in morning, 1 at bedtime]    glipiZIDE 5 mg oral tablet [TAKE 1/2 (ONE-HALF) TABLET BY MOUTH TWICE DAILY]    Glucophage 1,000 mg oral tablet [TAKE 1 TABLET BY MOUTH TWICE DAILY]    aspirin 81 mg oral tablet, delayed release (enteric coated) [Take 1 tablet(s) by mouth qam]    Nitroglycerin 0.4mg Tablets, Sublingual [Dissolve 1 tablet(s) under the tongue may repeat every 5 minutes. If pain not relieved after three doses go to ER.]    Lancet   Lancet [Check blood once daily as directed]    Pantoprazole 40 mg oral tablet, delayed release (enteric coated) [1 tab daily]    hydroCHLOROthiazide 12.5 mg oral tablet [Take 1 tablet(s) by mouth daily]    Ferrous Sulfate 325 mg (65 mg iron) oral tablet [1 tab bid]    Tradjenta 5mg Tablet [Take 1 tablet(s) by mouth daily]    Norvasc 10 mg oral tablet [Take 1 tablet(s) by mouth daily]    amiodarone 200 mg oral tablet [one tablet tid]    glucosamine/chondroitin 375/100/36/54mg BID     Xarelto 15 mg oral tablet [1 po daily]    Gabapentin 100 mg oral capsule [1 bid]    Synthroid 0.112mg Tablet [1 daily in the morning]    traZODone 50 mg oral tablet [1 - 2 @ QHS PRN]    Fish Oil 300-1,000 mg oral capsule [1 capsules daily ]    Sucralfate 1 gram oral tablet [1 tab 30 MIN BEFORE MEALS  AND AT HS]    cyclobenzaprine 10 mg oral tablet [Take 1 tablet(s) by mouth bid  prn]    Reglan  10mg Tablet [1 tab every 6 to 8 hours]    Famciclovir 500 mg oral tablet [Take BID x 5 days]    Valacyclovir 500mg Tablet [1 tab qd]    neomycin-bacitracnZn-polymyxnB 3.5mg-400 unit- 5,000 unit/gram Topical Ointment [Apply sufficient amount to affected area 2 to 3 x daily]    Keflex 500 mg oral capsule [take 1 capsule (500 mg) by oral route 2 times per day]        Objective:        Vitals:         Current: 1/27/2020 11:11:05 AM    Ht:  5 ft, 8 in;  Wt: 176.8 lbs;  BMI: 26.9T: 98 F (oral);  BP: 156/51 mm Hg (right arm, sitting);  P: 70 bpm (right arm (BP Cuff), sitting);  sCr: 1.71 mg/dL;  GFR: 30.68        Exams:     PHYSICAL EXAM:     GENERAL:  well developed and nourished; appropriately groomed; in no apparent distress;     EYES: PERRL, EOMI     RESPIRATORY: normal respiratory rate and pattern with no distress; normal breath sounds with no rales, rhonchi, wheezes or rubs;     CARDIOVASCULAR: normal rate; rhythm is regular;     GASTROINTESTINAL: nontender; normal bowel sounds; no organomegaly; rectal exam: guaiac negative stool; OTHER (enter);     SKIN: lacertion to right forearm, roughly 7x 5 cm in a semi-circular shape; a good scab is present, there is minimal bruising, no bleeding or oozing observed;     MUSCULOSKELETAL: gait: slowed and uses a cane;  pain with range of motion in: left knee;  crepitus of left knee with extention and flexion;     NEUROLOGIC: mental status: oriented to person and place only;  GROSSLY INTACT     PSYCHIATRIC: affect/demeanor: in good spirits;  normal psychomotor function; normal speech pattern; normal thought and perception;         Assessment:         S51.811A   Laceration without foreign body of right forearm, initial encounter       F51.01   Primary insomnia           ORDERS:         Meds Prescribed:       [New Rx] melatonin 5 mg oral tablet,chewable [take 1 tab prior to bedtime each  night ], #90 (ninety) tablets, Refills: 0 (zero)         Lab Orders:       APPTO  Appointment need  (In-House)                      Plan:         Laceration without foreign body of right forearm, initial encounterSteri strips were removed and lesion was bandages by María Lauren. Lesion appears to be healing well, no sign of infection.         FOLLOW-UP: Schedule a follow-up appointment in 1 week.:.:for steroid injections           Orders:       APPTO  Appointment need  (In-House)              Primary insomniaInsomnia 2 or 3 nights a week despite trazadone 50 mg 1 or 2 tabs. Pt advised to try melatonin and avoid naps.           Prescriptions:       [New Rx] melatonin 5 mg oral tablet,chewable [take 1 tab prior to bedtime each night ], #90 (ninety) tablets, Refills: 0 (zero)             Patient Recommendations:        For  Laceration without foreign body of right forearm, initial encounter:    Schedule a follow-up visit in 1 week.                APPOINTMENT INFORMATION:        Monday Tuesday Wednesday Thursday Friday Saturday Sunday            Time:___________________AM  PM   Date:_____________________             Charge Capture:         Primary Diagnosis:     S51.811A  Laceration without foreign body of right forearm, initial encounter           Orders:      43230  Office/outpatient visit; established patient, level 3  (In-House)            APPTO  Appointment need  (In-House)              F51.01  Primary insomnia

## 2021-05-18 NOTE — PROGRESS NOTES
Darci Munson 1935     Office/Outpatient Visit    Visit Date: Mon, Apr 15, 2019 02:06 pm    Provider: Paulo Dyer MD (Assistant: Luna Price MA)    Location: [facility]        Electronically signed by Paulo Dyer MD on  04/15/2019 06:55:46 PM                             SUBJECTIVE:        CC:     Mr. Munson is a 83 year old White male.  This is a follow-up visit.  L knee injection. Patient states he is still having chest discomfort. SOB when he walks and gets chest pain.          HPI:     Pt reports chest pain and shortness of breath is better now than it was before. He has not felt all that well since knee operation 1.5 years ago. He says he was down for about a year after that. He is scheduled for a nuclear stress test on 5/2 (just over 2 weeks). He is wondering if there's any other way to check out his chest pain.     Pt has a head tremor off and on for 20 years. He feels this is getting worse. He has not been evaluated by a neurologist.     Pt has chronic pain in left knee, with recent x-ray showing tricompartmental OA. He is here for a knee injection.     ROS:     CONSTITUTIONAL:  Negative for chills, fatigue and fever.      CARDIOVASCULAR:  Positive for chest pain.   Negative for dizziness, orthopnea, paroxysmal nocturnal dyspnea or pedal edema.      RESPIRATORY:  Negative for recent cough, dyspnea and frequent wheezing.      GASTROINTESTINAL:  Negative for abdominal pain, constipation, diarrhea, nausea and vomiting.      MUSCULOSKELETAL:  Positive for arthralgias.      NEUROLOGICAL:  Negative for dizziness, headaches and memory loss.      PSYCHIATRIC:  Positive for anxiety.   Negative for depression.          PMH/FMH/SH:     Last Reviewed on 4/15/2019 06:50 PM by Paulo Dyer    Past Medical History:             PREVENTIVE HEALTH MAINTENANCE             EVALUATION FOR AORTIC ANEURYSM: was last done 7/5/17 with normal results     COLORECTAL CANCER SCREENING: Up to date  (colonoscopy q10y; sigmoidoscopy q5y; Cologuard q3y) was last done 18, Results are in chart; colonoscopy with the following abnormalities noted-- severe sigmoid and descending colon diverticulosis and 3+ gastritis     EYE EXAM: Diabetic Eye Exam during this calendar year and results are in chart was last done 18 NO diabetic retinopathy     PSA: was last done 17 with normal results Hx prostate cancer/prostatectomy         PAST MEDICAL HISTORY         Positive for    Atrial Fibrillation: on Xarelto; ,     Coronary Artery Disease,    Hyperlipidemia and    Hypertension;     Positive for    Osteoarthritis;     Positive for    Type 2 Diabetes: complications include peripheral neuropathy and gastroparesis; and    Hypothyroidism: dx'd in 2017; 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; ;     Positive for    Prostate cancer: dx'd in ; ;         CURRENT MEDICAL PROVIDERS:    Cardiologist: KHAI Peterson    Dermatologist: Dr Alegre/Dr Couch    Gastroenterologist: Dr Alcaraz    Ophthalmologist: Dr Rodriguez    Orthopedist: Dr Atkins    Urologist: Dr Menendez    Audiologist: Dash ([facility])             ADVANCED DIRECTIVES: None         Surgical History:         Positive for    Arthroscopy: Rt knee; 3/14/17;,    Joint Replacement:    Knee Replacement: 3/2017; ; and    Prostatectomy;     Positive for    Rt hand surgery;;         Family History:     Father: ;  Lung Cancer     Mother: ;  Type 2 Diabetes         Social History:     Occupation:    Retired     Marital Status:   10-19-16         Tobacco/Alcohol/Supplements:     Last Reviewed on 4/15/2019 06:50 PM by Paulo Dyer    Tobacco: He has a past history of cigarette smoking; quit date:  .  Non-drinker         Substance Abuse History:     Last Reviewed on 4/15/2019 06:50 PM by Paulo Dyer        Mental Health History:     Last Reviewed on 4/15/2019 06:50 PM by Paulo Dyer        Communicable Diseases (eg STDs):     Last  Reviewed on 4/15/2019 06:50 PM by Paulo Dyer            Current Problems:     Last Reviewed on 4/15/2019 06:50 PM by Paulo Dyer    Type 2 DM     Gastric ulcer, unspecified chronicity, without mention of obstruction     Use of high risk medications     Chronic renal insufficiency, stage 3     Hypothyroidism     Unspecified PVD     Lower back pain     Chronic insomnia     Memory loss     Atrial fibrillation     Artificial joint replacement, Knee     Osteoarthritis of knee     Hearing loss     GERD     History of fall     Gastroparesis     Anemia, unspecified     Prostate cancer     Malignant melanoma of skin, other parts of face     Hypertriglyceridemia     Diabetes with neurological manifestations, type II or unspecified type, not stated as uncontrolled     Acute CVA     Cataract     Hypertension     Mixed hyperlipidemia     Type II DM     Sleep related leg cramps     Diverticulosis     Irritable bowel syndrome     Peripheral neuropathy     Head tremor     Chest pain, other type     Irritation of skin lesion     Knee pain     Screening for colorectal cancer     Other specified administrative purpose     Hyperkalemia         Immunizations:     Prevnar 13 (Pneumococcal PCV 13) 6/29/2017     Fluzone (3 + years dose) 11/20/2008     Fluzone (3 + years dose) 10/31/2012     Influenza, split virus (3+ years dose) 11/8/2007     Fluzone High-Dose pf (>=65 yr) 10/29/2013     Fluzone High-Dose pf (>=65 yr) 10/21/2014     Fluzone High-Dose pf (>=65 yr) 11/9/2015     Fluzone High-Dose pf (>=65 yr) 9/29/2016     Fluzone High-Dose pf (>=65 yr) 10/26/2017     Fluzone High-Dose pf (>=65 yr) 11/3/2018     PNEUMOVAX 23 (Pneumococcal PPV23) 2/11/2011         Allergies:     Last Reviewed on 4/15/2019 06:50 PM by Paulo Dyer    Mobic: (Adverse Reaction)    NSAIDs:        Current Medications:     Last Reviewed on 4/15/2019 06:50 PM by Paulo Dyer    Pantoprazole 40mg Tablets, Delayed Release 1 tab daily     Trazodone  HCl 50mg Tablet 1 - 2 @ QHS PRN     Synthroid 0.112mg Tablet 1 daily in the morning     Lipitor 10mg Tablet 1/2 tablet by mouth daily at bedtime.     Cyclobenzaprine HCl 10mg Tablet Take 1 tablet(s) by mouth bid  prn     Glipizide 5mg Tablets Take 1/2 tablet(s) by mouth bid     Glucophage 1,000mg Tablet 1 tab bid     Lisinopril 20mg Tablet 1 tab daily     Valacyclovir 500mg Tablet 1 tab qd     Gabapentin 100mg Capsules 1 bid     Sucralfate 1gm/10ml Oral Suspension 40 ml bid     Tradjenta 5mg Tablet Take 1 tablet(s) by mouth daily     Nitroglycerin 0.4mg Tablets, Sublingual Dissolve 1 tablet(s) under the tongue may repeat every 5 minutes. If pain not relieved after three doses go to ER.     Ferrous Sulfate 325mg Tablets 1 tab bid     Lancet   Lancet Check blood once daily as directed     Reglan  5mg Tablet one tablet TID     Norvasc 10mg Tablet Take 1 tablet(s) by mouth daily     Fish Oil 1,000mg Capsules 1 capsules daily     Xarelto 15mg Tablet 1 po daily     glucosamine/chondroitin 375/100/36/54mg BID     Aspirin 81mg Tablets, Enteric Coated Take 1 tablet(s) by mouth qam     Bacitracin/Neomycin/Polymyxin B 400units/3.5mg/5,000units per 1gm Topical Ointment Apply sufficient amount to affected area 2 to 3 x daily     Famciclovir 500mg Tablet Take BID x 5 days     Dicyclomine HCl 10mg Capsules 1 po bid     Amiodarone HCl 200mg Tablet one tablet tid         OBJECTIVE:        Vitals:         Current: 4/15/2019 2:15:42 PM    Ht:  5 ft, 8 in;  Wt: 178.4 lbs;  BMI: 27.1    T: 97.8 F (oral);  BP: 142/42 mm Hg (right arm, sitting);  P: 64 bpm (right arm (BP Cuff), sitting);  sCr: 1.33 mg/dL;  GFR: 40.28        Exams:     PHYSICAL EXAM:     GENERAL: Vitals recorded well developed, well nourished;  well groomed;  no apparent distress;     EYES: PERRL, EOMI     E/N/T: OROPHARYNX:  normal mucosa, dentition, gingiva, and posterior pharynx;     RESPIRATORY: normal respiratory rate and pattern with no distress; normal breath sounds  with no rales, rhonchi, wheezes or rubs;     CARDIOVASCULAR: normal rate; rhythm is regular;  normal S1; normal S2; no systolic murmur; no edema;     GASTROINTESTINAL: nontender, nondistended; no hepatosplenomegaly or masses; no bruits; diastasis noted;     MUSCULOSKELETAL: gait: slowed;  mild to moderate left knee TTP of joint line, crepitus on flexion and extension;     NEUROLOGIC: GROSSLY INTACT     PSYCHIATRIC:  appropriate affect and demeanor; normal speech pattern; grossly normal memory;         Procedures:     Osteoarthritis of knee     Procedure Note:  Arthrocentesis/Injection     Arthrocentesis of left knee joint is performed.  Written informed consent was obtained.  The site is prepped with betadine.  The joint is injected with 2 cc of 2% lidocaine and Kenalog 80 mg.  No complications.  Estimated blood loss: 1 cc.              ASSESSMENT           786.59   R07.89  Chest pain, other type              DDx:     781.0   R25.1  Head tremor              DDx:     715.16   M17.11  Osteoarthritis of knee              DDx:         ORDERS:         Radiology/Test Orders:       [id number]  Computed tomography, head or brain; without contrast material  (Send-Out)           Procedures Ordered:       REFER  Referral to Specialist or Other Facility  (Send-Out)         71488  Arthrocentesis, major joint  (In-House)         32985  Arthrocentesis, aspiration and/or injection; knee  (In-House)           Other Orders:         Kenalog, per 10 mg (x8)                 PLAN:          Chest pain, other type This is actually improved from recent visit and pt encouraged to proceed with stress test.          Head tremor Previous imaging has been reassuring although pt has not seen a neurologist. Giancarlo get CT Head and refer to neurologist.         RADIOLOGY:  I have ordered a head CT w/out contrast to be done today.      REFERRALS:  Referral initiated to a neurologist.            Orders:       [id number]  Computed tomography, head or brain;  without contrast material  (Send-Out)         REFER  Referral to Specialist or Other Facility  (Send-Out)            Osteoarthritis of knee Steroid injection given to left today, no complications.           Orders:       [id number]  Arthrocentesis, major joint  (In-House)                     Kenalog, per 10 mg (x8)       [id number]  Arthrocentesis, aspiration and/or injection; knee  (In-House)               CHARGE CAPTURE           **Please note: ICD descriptions below are intended for billing purposes only and may not represent clinical diagnoses**        Primary Diagnosis:         786.59 Chest pain, other type            R07.89    Other chest pain              Orders:          26518   Office/outpatient visit; established patient, level 4  (In-House)           781.0 Head tremor            R25.1    Tremor, unspecified    715.16 Osteoarthritis of knee            M17.11    Unilateral primary osteoarthritis, right knee              Orders:          20610   Arthrocentesis, major joint  (In-House)                                           Kenalog, per 10 mg (x8)           20610   Arthrocentesis, aspiration and/or injection; knee  (In-House)

## 2021-05-18 NOTE — PROGRESS NOTES
Darci MunsonCora 1935     Office/Outpatient Visit    Visit Date: Wed, Feb 28, 2018 01:09 pm    Provider: Joanna Malik MD (Assistant: Cheyanne Flores MA)    Location: [facility]        Electronically signed by Joanna Malik MD on  03/02/2018 11:06:59 AM                             SUBJECTIVE:        CC: NIDDM follow up         HPI:         Patient to be evaluated for nIDDM.  Specifically, this is type 2, non-insulin requiring diabetes, complicated by peripheral neuropathy, peripheral vascular disease, and gastroparesis.  Compliance with treatment has been good; he takes his medication as directed, follows up as directed, and is keeping a glucose diary.      Tobacco screen: Non-smoker.  Current meds include an oral hypoglycemic ( Glucophage XR, Glucotrol, and tradjenta ),  Prinivil, and a lipid lowering agent.  He reports home blood glucose readings have been excellent, with average fasting glucoses running <120 mg/dL.  Most recent lab results include Hemoglobin A1c:  6.5 (%) (08/22/2017), LDL:  107 (mg/dL) (08/22/2017), HDL:  54 (mg/dL) (08/22/2017), Triglycerides:  177 (mg/dL) (08/22/2017), Microalbuminuria:  59.2 (mg/g creat) (08/22/2017), Microalbumin, Urine, rand:  84.2 (mg/L) (08/22/2017).  His most recent systolic blood pressure was < 130 mmHg. The last diastolic blood pressure was < 80 mmHg.  1 fall In regard to preventative care, he performs foot self-exams several times per month and his last ophthalmology exam was in 5/2017.          Dx with hypertension; he is not using any nonpharmacologic treatment modalities.  His current cardiac medication regimen includes a calcium channel blocker ( norvasc ).  Anuel Berry does not check his blood pressure other than at his clinic appointments.  He is tolerating the medication well without side effects.  Compliance with treatment has been good; he follows up as directed.      CRI-stage 3     chronic anemia, he is on an iron supplement     h/o  fall-he fell on his chest and hit his nose, he thinks he tripped     chronic GERD, stable on meds     chronic afib, he is stable and controlled on meds, sees cardiology yearly     Anuel Berry is not sleeping well     ROS:     CONSTITUTIONAL:  Negative for chills, fatigue and fever.      E/N/T:  Negative for ear pain, nasal congestion and frequent rhinorrhea.      CARDIOVASCULAR:  Negative for chest pain, dizziness, orthopnea, paroxysmal nocturnal dyspnea and pedal edema.      RESPIRATORY:  Negative for recent cough, dyspnea and frequent wheezing.      GASTROINTESTINAL:  Negative for abdominal pain, constipation, diarrhea, nausea and vomiting.      INTEGUMENTARY:  Negative for rash.      NEUROLOGICAL:  Negative for dizziness, headaches and memory loss.      PSYCHIATRIC:  Positive for anxiety.   Negative for depression.          PMH/FMH/SH:     Last Reviewed on 2/28/2018 01:49 PM by Joanna Malik    Past Medical History:             PREVENTIVE HEALTH MAINTENANCE             EVALUATION FOR AORTIC ANEURYSM: was last done 7/5/17 with normal results     COLORECTAL CANCER SCREENING: Up to date (colonoscopy q10y; sigmoidoscopy q5y; Cologuard q3y) was last done 10/30/08, Results are in chart; colonoscopy with normal results; declines any further; Hemoccult X3 negative 11/2017     EYE EXAM: Diabetic Eye Exam during this calendar year and results are in chart was last done 5/16/17 no diabetic retinopathy     PSA: was last done 11/21/17 with normal results Hx prostate cancer/prostatectomy         PAST MEDICAL HISTORY         Positive for    Atrial Fibrillation: on Xarelto; ,     Coronary Artery Disease,    Hyperlipidemia and    Hypertension;     Positive for    Osteoarthritis;     Positive for    Type 2 Diabetes;     Positive for    Prostate cancer: dx'd in 2003; ;         CURRENT MEDICAL PROVIDERS:    Cardiologist: KHAI Peterson    Dermatologist: Dr Alegre/Dr Couch    Gastroenterologist: Dr Alcaraz    Ophthalmologist:   Michael    Orthopedist: Dr Atkins    Urologist: Dr Menendez    Audiologist: Dash ([facility])             ADVANCED DIRECTIVES: None         Surgical History:         Positive for    Arthroscopy: Rt knee; 3/14/17;,    Joint Replacement:    Knee Replacement: 3/2017; ; and    Prostatectomy;     Positive for    Rt hand surgery;;         Family History:     Father: ;  Lung Cancer     Mother: ;  Type 2 Diabetes         Social History:     Occupation:    Retired     Marital Status:   10-19-16         Tobacco/Alcohol/Supplements:     Last Reviewed on 2018 01:49 PM by Joanna Malik    Tobacco: He has a past history of cigarette smoking; quit date:  .  Non-drinker         Substance Abuse History:     Last Reviewed on 2015 02:34 PM by Carri Cruz        Mental Health History:     Last Reviewed on 2015 02:34 PM by Carri Cruz        Communicable Diseases (eg STDs):     Last Reviewed on 2015 02:34 PM by Carri Cruz            Allergies:     Last Reviewed on 2017 12:24 PM by Cheyanne Flores: (Adverse Reaction)    NSAIDs:        Current Medications:     Last Reviewed on 2017 12:20 PM by Cheyanne Flores    Metoprolol Succinate 50mg Tablets, Extended Release Take 1 tablet(s) by mouth daily     Synthroid 0.075mg Tablet Take 1 tablet(s) by mouth daily     Glipizide 10mg Tablets 1/2 tab BID     Glucophage 1,000mg Tablet 1/2 pill bid     Tradjenta 5mg Tablet Take 1 tablet(s) by mouth daily     Pantoprazole 40mg Tablets, Delayed Release 1 tab daily     Dicyclomine HCl 10mg Capsules Take 1 capsule(s) by mouth qid PRN     Gabapentin 100mg Capsules 1 capsule HS     Ferrous Sulfate 325mg Tablets 1 TAB DAILY     Voltaren  1% Topical Gel Apply 4 g to affected area up to 4 times a day     Tramadol 50mg Tablet 1  tid , prn pain     Erythromycin Base 333mg Tablets, Enteric Coated Take 1 tablet(s) by mouth bid with 2 largest meals      Nitroglycerin 0.4mg Tablets, Sublingual Dissolve 1 tablet(s) under the tongue may repeat every 5 minutes. If pain not relieved after three doses go to ER.     Lancet   Lancet Check blood once daily as directed     Xarelto 15mg Tablet 1 po daily     Acetaminophen 325mg Tablet 2 tablets every 8 hours as needed for pain.     Amiodarone HCl 200mg Tablet 1 tab daily     Colace 100mg Capsules 1 cap po every day     glucosamine/chondroitin 375/100/36/54mg BID     Norvasc 10mg Tablet Take 1 tablet(s) by mouth daily     Aspirin 81mg Tablets, Enteric Coated Take 1 tablet(s) by mouth qam         OBJECTIVE:        Vitals:         Current: 2/28/2018 1:12:49 PM    Ht:  5 ft, 8 in;  Wt: 173 lbs;  BMI: 26.3    T: 97.3 F (oral);  BP: 123/63 mm Hg (left arm, sitting);  P: 50 bpm (left arm (BP Cuff), sitting);  sCr: 1.29 mg/dL;  GFR: 41.68        Exams:     PHYSICAL EXAM:     GENERAL: Vitals recorded well developed, well nourished;  well groomed;  no apparent distress;     EYES: PERRL, EOMI     E/N/T: OROPHARYNX:  normal mucosa, dentition, gingiva, and posterior pharynx;     NECK:  supple, full ROM; no thyromegaly; no carotid bruits;     RESPIRATORY: normal respiratory rate and pattern with no distress; normal breath sounds with no rales, rhonchi, wheezes or rubs;     CARDIOVASCULAR: normal rate; rhythm is regular;  normal S1; normal S2; no systolic murmur; no edema; decreased cap refill 3 seconds in finger tips;     GASTROINTESTINAL: nontender, nondistended; no hepatosplenomegaly or masses; no bruits; diastasis noted;     MUSCULOSKELETAL: normal gait;     NEUROLOGIC: GROSSLY INTACT     PSYCHIATRIC:  appropriate affect and demeanor; normal speech pattern; grossly normal memory;     Left foot exam    Protective sensation using Monofilament test: Slightly decreased, with estimated force of 0.2 grams applied.    Vascular status: normal peripheral vascular exam with palpable dorsal pedal and posterior tibal pulses and brisk digital  capillary refill    Skin is intact without sores or ulcers    Right foot exam    Protective sensation using Monofilament test: Slightly decreased, with estimated force of 0.2 grams applied.    Vascular status: normal peripheral vascular exam with palpable dorsal pedal and posterior tibal pulses and brisk digital capillary refill    Skin is intact without sores or ulcers         Lab/Test Results:             Urine temperature:  confirmed (02/28/2018),     All urine drug screen levels confirmed negative:  yes (02/28/2018),     Date and time of last pill:  Gabapentin 2-28-18 at 8am Tramadol doesn't know when last taken. (02/28/2018),     Performed by:  pr (02/28/2018),     Collection Time:  14:00 (02/28/2018),             ASSESSMENT           250.00   E11.42  NIDDM              DDx:     401.1   I10  Hypertension              DDx:     585.3   N18.3  Chronic renal insufficiency, stage 3              DDx:     285.9   D64.9  Anemia, unspecified              DDx:     356.9   G60.9  Peripheral neuropathy              DDx:     V15.88   Z91.81  History of fall              DDx:     272.2   E78.2  Mixed hyperlipidemia              DDx:     443.9   I73.9  Unspecified PVD              DDx:     V58.69   Z79.899  Use of high risk medications              DDx:     276.7   E87.5  Hyperkalemia              DDx:     530.81   K21.9  GERD              DDx:     427.31   I48.91  Atrial fibrillation              DDx:     307.42   F51.01  Chronic insomnia              DDx:         ORDERS:         Lab Orders:       [id number]  BDCB2 - [facility] CBC w/o diff  (Send-Out)         41936  DIAB2 - [facility] CMP A1C LIPID AND MICRO ALBUM CR RATIO: [id number]  (Send-Out)         17336  Drug test prsmv read direct optical obs pr date  (In-House)           Other Orders:       2028F  Foot examination performed (includes examination through visual inspection, sensory exam with monofi  (In-House)                   PLAN:          NIDDM stable and well  controlled,  he is afraid he cant afford the tradjenta, NOT ON statin and ace, foot exam noted above, eye exam UTD he needs safety HA and help with tradjenta-Count includes the [facility] clinic told him he  had to get  it on  his own.          Hypertension stable and well controlled          Chronic renal insufficiency, stage 3 stable          Anemia, unspecified due for labs, cont OTC iron supplementation     LABORATORY:  Labs ordered to be performed today include CBC W/O DIFF and Diabetes Panel 2;CMP, A1C, Lipid, Microalbumin:Creatinine Ratio.            Orders:       [id number]  BDCB2 - [facility] CBC w/o diff  (Send-Out)         28144  DIAB - [facility] CMP A1C LIPID AND MICRO ALBUM CR RATIO: [id number]  (Send-Out)            Peripheral neuropathy stable, recommend diabetic shoes          History of fall given safety HA          Mixed hyperlipidemia due for labs          Unspecified PVD stable          Use of high risk medications           Orders:       54521  Drug test prsmv read direct optical obs pr date  (In-House)            Hyperkalemia recheck labs          GERD stable          Atrial fibrillation chronic afib, he is stable and controlled on meds, sees cardiology yearly -last seen 11/2017          Chronic insomnia not sleeping, will have him try melatonin 3 mg qhs             Other Orders:       2028F  Foot examination performed (includes examination through visual inspection, sensory exam with monofi  (In-House)           CHARGE CAPTURE           **Please note: ICD descriptions below are intended for billing purposes only and may not represent clinical diagnoses**        Primary Diagnosis:         250.00 NIDDM            E11.42    Type 2 diabetes mellitus with diabetic polyneuropathy              Orders:          00475   Office/outpatient visit; established patient, level 4  (In-House)           401.1 Hypertension            I10    Essential (primary) hypertension    585.3 Chronic renal insufficiency, stage 3             N18.3    Chronic kidney disease, stage 3 (moderate)    285.9 Anemia, unspecified            D64.9    Anemia, unspecified    356.9 Peripheral neuropathy            G60.9    Hereditary and idiopathic neuropathy, unspecified    V15.88 History of fall            Z91.81    History of falling    272.2 Mixed hyperlipidemia            E78.2    Mixed hyperlipidemia    443.9 Unspecified PVD            I73.9    Peripheral vascular disease, unspecified    V58.69 Use of high risk medications            Z79.899    Other long term (current) drug therapy              Orders:          95391   Drug test prsmv read direct optical obs pr date  (In-House)           276.7 Hyperkalemia            E87.5    Hyperkalemia    530.81 GERD            K21.9    Gastro-esophageal reflux disease without esophagitis    427.31 Atrial fibrillation            I48.91    Unspecified atrial fibrillation    307.42 Chronic insomnia            F51.01    Primary insomnia        Other Orders:           2028F   Foot examination performed (includes examination through visual inspection, sensory exam with monofi  (In-House)

## 2021-05-18 NOTE — PROGRESS NOTES
Darci Munson 1935     Office/Outpatient Visit    Visit Date: Tue, Oct 29, 2019 01:21 pm    Provider: Paulo Dyer MD (Assistant: Rebecca Flores LPN)    Location: [facility]        Electronically signed by Paulo Dyer MD on  10/29/2019 06:06:21 PM                             SUBJECTIVE:        HPI:     BP today is 159/60 with a HR of 73. He is on amlodipine 10 mg qd and (amiodarone for afib). Lisinopril 20 mg qd was recently d/c'd by nephrology (Dr. Crouch) 2/2 hyperkalemia.     X-ray left knee on 4/8/19 showed moderate to severe tricompartmental OA. He has chronic bilateral knee pain that is worse on the left. He has received 2 steroid shots this year, most recently 4 months ago in June. He would like another today in anticipation of rabbit hunting season as he very much enjoys this and the shot will help improve his ambulation.     ROS:     CONSTITUTIONAL:  Negative for chills, fatigue and fever.      CARDIOVASCULAR:  Negative for chest pain, dizziness, orthopnea, paroxysmal nocturnal dyspnea and pedal edema.      RESPIRATORY:  Negative for recent cough, dyspnea and frequent wheezing.      GASTROINTESTINAL:  Positive for acid reflux symptoms and anorexia ( loss of appetite ).   Negative for abdominal pain, constipation, diarrhea, nausea or vomiting.      MUSCULOSKELETAL:  Positive for arthralgias (left knee).      NEUROLOGICAL:  Negative for dizziness, headaches and memory loss.      PSYCHIATRIC:  Negative for anxiety and depression.          PMH/FM/SH:     Last Reviewed on 10/29/2019 06:06 PM by Paulo Dyer    Past Medical History:             PREVENTIVE HEALTH MAINTENANCE             EVALUATION FOR AORTIC ANEURYSM: was last done 7/5/17 with normal results     COLORECTAL CANCER SCREENING: Up to date (colonoscopy q10y; sigmoidoscopy q5y; Cologuard q3y) was last done 6/18/18, Results are in chart; colonoscopy with the following abnormalities noted-- severe sigmoid and descending colon  diverticulosis and 3+ gastritis     EYE EXAM: Diabetic Eye Exam during this calendar year and results are in chart was last done 18 NO diabetic retinopathy     PSA: was last done 17 with normal results Hx prostate cancer/prostatectomy         PAST MEDICAL HISTORY         Positive for    Atrial Fibrillation: on Xarelto; ,     Coronary Artery Disease,    Hyperlipidemia and    Hypertension;     Positive for    Osteoarthritis;     Positive for    Type 2 Diabetes: complications include peripheral neuropathy and gastroparesis; and    Hypothyroidism: dx'd in 2017; 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; ;     Positive for    Prostate cancer: dx'd in ; ;         CURRENT MEDICAL PROVIDERS:    Cardiologist: KHAI Peterson    Dermatologist: Dr Alegre/Dr Couch    Gastroenterologist: Dr Alcaraz    Ophthalmologist: Dr Rodriguez    Orthopedist: Dr Atkins    Urologist: Dr Menendez    Audiologist: Dash ([facility])             ADVANCED DIRECTIVES: None         Surgical History:         Positive for    Arthroscopy: Rt knee; 3/14/17;,    Joint Replacement:    Knee Replacement: 3/2017; ; and    Prostatectomy;     Positive for    Rt hand surgery;;         Family History:     Father: ;  Lung Cancer     Mother: ;  Type 2 Diabetes         Social History:     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  Non-drinker         Substance Abuse History:     Last Reviewed on 10/29/2019 06:06 PM by Paulo Dyer        Mental Health History:     Last Reviewed on 10/29/2019 06:06 PM by Paulo Dyer        Communicable Diseases (eg STDs):     Last Reviewed on 10/29/2019 06:06 PM by Paulo Dyer            Current Problems:     Last Reviewed on 10/29/2019 06:06 PM  by Paulo Dyer    Coronary artery disease     Type 2 DM     Gastric ulcer, unspecified chronicity, without mention of obstruction     Use of high risk medications     Chronic renal insufficiency, stage 3     Hypothyroidism     Unspecified PVD     Lower back pain     Chronic insomnia     Memory loss     Atrial fibrillation     Artificial joint replacement, Knee     Osteoarthritis of knee     Hearing loss     GERD     History of fall     Gastroparesis     Anemia, unspecified     Prostate cancer     Malignant melanoma of skin, other parts of face     Hypertriglyceridemia     Diabetes with neurological manifestations, type II or unspecified type, not stated as uncontrolled     Acute CVA     Hypertension     Mixed hyperlipidemia     Type II DM     Sleep related leg cramps     Irritable bowel syndrome     Peripheral neuropathy     Hyperkalemia     Epigastric abdominal pain     Other specified administrative purpose         Immunizations:     Prevnar 13 (Pneumococcal PCV 13) 6/29/2017     Fluzone (3 + years dose) 11/20/2008     Fluzone (3 + years dose) 10/31/2012     Influenza, split virus (3+ years dose) 11/8/2007     Fluzone High-Dose pf (>=65 yr) 10/29/2013     Fluzone High-Dose pf (>=65 yr) 10/21/2014     Fluzone High-Dose pf (>=65 yr) 11/9/2015     Fluzone High-Dose pf (>=65 yr) 9/29/2016     Fluzone High-Dose pf (>=65 yr) 10/26/2017     Fluzone High-Dose pf (>=65 yr) 11/3/2018     PNEUMOVAX 23 (Pneumococcal PPV23) 2/11/2011         Allergies:     Last Reviewed on 10/29/2019 06:06 PM by Paulo Dyer    Mobic: (Adverse Reaction)    NSAIDs:        Current Medications:     Last Reviewed on 10/29/2019 06:06 PM by Paulo Dyer    Gabapentin 100mg Capsules 1 bid     Famciclovir 500mg Tablet Take BID x 5 days     Glucophage 1,000mg Tablet 1 tab bid     Cyclobenzaprine HCl 10mg Tablet Take 1 tablet(s) by mouth bid  prn     Reglan  10mg Tablet 1 tab every 6 to 8 hours     Trazodone HCl 50mg Tablet 1 - 2 @ QHS PRN      Bacitracin/Neomycin/Polymyxin B 400units/3.5mg/5,000units per 1gm Topical Ointment Apply sufficient amount to affected area 2 to 3 x daily     Pantoprazole 40mg Tablets, Delayed Release 1 tab daily     Synthroid 0.112mg Tablet 1 daily in the morning     Valacyclovir 500mg Tablet 1 tab qd     Tradjenta 5mg Tablet Take 1 tablet(s) by mouth daily     Nitroglycerin 0.4mg Tablets, Sublingual Dissolve 1 tablet(s) under the tongue may repeat every 5 minutes. If pain not relieved after three doses go to ER.     Ferrous Sulfate 325mg Tablets 1 tab bid     Lancet   Lancet Check blood once daily as directed     Glipizide 5mg Tablets 1 in morning, 1 at bedtime     Norvasc 10mg Tablet Take 1 tablet(s) by mouth daily     Fish Oil 1,000mg Capsules 1 capsules daily     Xarelto 15mg Tablet 1 po daily     Amiodarone HCl 200mg Tablet one tablet tid     glucosamine/chondroitin 375/100/36/54mg BID     Aspirin 81mg Tablets, Enteric Coated Take 1 tablet(s) by mouth qam     Sucralfate 1gm Tablets 1 tab 30 MIN BEFORE MEALS AND AT HS         OBJECTIVE:        Vitals:         Current: 10/29/2019 1:29:33 PM    Ht:  5 ft, 8 in;  Wt: 175 lbs;  BMI: 26.6    T: 97.9 F (oral);  BP: 159/60 mm Hg (right arm, sitting);  P: 73 bpm (right arm (BP Cuff), sitting);  sCr: 1.39 mg/dL;  GFR: 37.58        Exams:     PHYSICAL EXAM:     GENERAL:  well developed and nourished; appropriately groomed; in no apparent distress;     EYES: PERRL, EOMI     RESPIRATORY: normal respiratory rate and pattern with no distress; normal breath sounds with no rales, rhonchi, wheezes or rubs;     CARDIOVASCULAR: normal rate; rhythm is regular;     GASTROINTESTINAL: nontender; normal bowel sounds; no organomegaly; rectal exam: guaiac negative stool; OTHER (enter);     MUSCULOSKELETAL: gait: slowed and uses a cane;  pain with range of motion in: left knee;  crepitus of left knee with extention and flexion;     NEUROLOGIC: mental status: oriented to person, place, and time;   GROSSLY INTACT     PSYCHIATRIC: affect/demeanor: in good spirits;  normal psychomotor function; normal speech pattern; normal thought and perception;         Procedures:     Osteoarthritis of knee     Procedure Note:  Arthrocentesis/Injection     Arthrocentesis of left knee joint is performed.  Written informed consent was obtained.  The site is prepped with alcohol and betadine.  The joint is injected with 2 cc of 2% lidocaine and Kenalog 80 mg.  No complications.  Estimated blood loss: 1 cc.              ASSESSMENT           401.1   I10  Hypertension              DDx:     715.16   M17.11  Osteoarthritis of knee              DDx:         ORDERS:         Meds Prescribed:       Hydrochlorothiazide (HCTZ) 12.5mg Tablet Take 1 tablet(s) by mouth daily  #30 (Thirty) tablet(s) Refills: 1         Lab Orders:       APPTO  Appointment need  (In-House)         FUTURE  Future order to be done at patients convenience  (Send-Out)         93273  [facility] CBC with 3 part diff  (Send-Out)         [id number]  [facility] Comp. Metabolic Panel  (Send-Out)           Procedures Ordered:       20610  Arthrocentesis, major joint  (In-House)         20610  Arthrocentesis, aspiration and/or injection; knee  (In-House)           Other Orders:         Kenalog, per 10 mg (x8)                 PLAN:          Hypertension HCTZ 12.5 mg qd is started given HTN today. Cont amlodipine 10 mg qd and pt will get a CBC and CMP in 1 week to ensure no electrolyte derangement with HCTZ.         FOLLOW-UP TESTING #1: FOLLOW-UP LABORATORY:  Labs to be scheduled in the future include CBC and CMP.            Prescriptions:       Hydrochlorothiazide (HCTZ) 12.5mg Tablet Take 1 tablet(s) by mouth daily  #30 (Thirty) tablet(s) Refills: 1           Orders:       FUTURE  Future order to be done at patients convenience  (Send-Out)         43863  [facility] CBC with 3 part diff  (Send-Out)         01245  [facility] Comp. Metabolic Panel  (Send-Out)             Osteoarthritis of knee Steroid shot given in left knee prior to the start of rabbit hunting season. No complications and pt indicates knee feels a little better just after shot is given.           Orders:       44801  Arthrocentesis, major joint  (In-House)                     Kenalog, per 10 mg (x8)       00835  Arthrocentesis, aspiration and/or injection; knee  (In-House)               Other Orders:       67951  Office/outpatient visit; established patient, level 3  (In-House)         APPTO  Appointment need  (In-House)           Patient Recommendations:        For  Hypertension:             The following laboratory testing has been ordered: CBC metabolic panel, comprehensive             CHARGE CAPTURE           **Please note: ICD descriptions below are intended for billing purposes only and may not represent clinical diagnoses**        Primary Diagnosis:         401.1 Hypertension            I10    Essential (primary) hypertension    715.16 Osteoarthritis of knee            M17.11    Unilateral primary osteoarthritis, right knee              Orders:          20610   Arthrocentesis, major joint  (In-House)                                           Kenalog, per 10 mg (x8)           28963   Arthrocentesis, aspiration and/or injection; knee  (In-House)               Other Orders:           40884   Office/outpatient visit; established patient, level 3  (In-House)             APPTO   Appointment need  (In-House)           ADDENDUMS:      ____________________________________    Addendum: 11/04/2019 12:56 PM - Paulo Dyer        Chief Complaint: Left knee pain and steroid shot

## 2021-05-18 NOTE — PROGRESS NOTES
Darci Munson 1935     Office/Outpatient Visit    Visit Date: Wed, May 29, 2019 11:40 am    Provider: Paulo Dyer MD (Assistant: Luna Price MA)    Location: [facility]        Electronically signed by Paulo Dyer MD on  06/09/2019 05:44:17 PM                             SUBJECTIVE:        CC: (NOT TAKING REGLAN)     Mr. Munson is a 83 year old White male.  This is a follow-up visit.          HPI:     Pt recently had a stress test that was reassuring and showed stable CAD. No medicines were changed but he ws given a 24 hr holter monitor that was placed yesterday to assess his afib.     Pt has been very stable DM 2, with most recent A1c being 6.9 and several recent A1c results borderline pre-diabetes and regular diabetes. He is on metformin and glipizide, he does not check glucose very often.     Pt had a positive h/pylori breath test in April, which was ordered for epigastric abdominal pain. This has not completely resolved and he would like a retest to see if h.pylori is still there. He has an apt with GI ABDIEL Ladd on 6/7.     Pt needs a refill of gabapentin for diabetic peripheral neuropathy. He reports this medicine helps and he denies any side effects like sleepiness or confusion.     ROS:     CONSTITUTIONAL:  Negative for chills, fatigue and fever.      CARDIOVASCULAR:  Positive for chest pain.   Negative for dizziness, orthopnea, paroxysmal nocturnal dyspnea or pedal edema.      RESPIRATORY:  Negative for recent cough, dyspnea and frequent wheezing.      GASTROINTESTINAL:  Positive for abdominal pain ( epigastric; improved ).   Negative for constipation, diarrhea, nausea or vomiting.      MUSCULOSKELETAL:  Positive for arthralgias.      NEUROLOGICAL:  Positive for paresthesias.   Negative for dizziness, headaches or memory loss.      PSYCHIATRIC:  Positive for anxiety.   Negative for depression.          PMH/FMH/SH:     Last Reviewed on 4/15/2019 06:50 PM by Paulo Dyer  M    Past Medical History:             PREVENTIVE HEALTH MAINTENANCE             EVALUATION FOR AORTIC ANEURYSM: was last done 17 with normal results     COLORECTAL CANCER SCREENING: Up to date (colonoscopy q10y; sigmoidoscopy q5y; Cologuard q3y) was last done 18, Results are in chart; colonoscopy with the following abnormalities noted-- severe sigmoid and descending colon diverticulosis and 3+ gastritis     EYE EXAM: Diabetic Eye Exam during this calendar year and results are in chart was last done 18 NO diabetic retinopathy     PSA: was last done 17 with normal results Hx prostate cancer/prostatectomy         PAST MEDICAL HISTORY         Positive for    Atrial Fibrillation: on Xarelto; ,     Coronary Artery Disease,    Hyperlipidemia and    Hypertension;     Positive for    Osteoarthritis;     Positive for    Type 2 Diabetes: complications include peripheral neuropathy and gastroparesis; and    Hypothyroidism: dx'd in 2017; 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; ;     Positive for    Prostate cancer: dx'd in ; ;         CURRENT MEDICAL PROVIDERS:    Cardiologist: KHAI Peterson    Dermatologist: Dr Alegre/Dr Couch    Gastroenterologist: Dr Alcaraz    Ophthalmologist: Dr Rodriguez    Orthopedist: Dr Atkins    Urologist: Dr Menendez    Audiologist: Dash ([facility])             ADVANCED DIRECTIVES: None         Surgical History:         Positive for    Arthroscopy: Rt knee; 3/14/17;,    Joint Replacement:    Knee Replacement: 3/2017; ; and    Prostatectomy;     Positive for    Rt hand surgery;;         Family History:     Father: ;  Lung Cancer     Mother: ;  Type 2 Diabetes         Social History:     Occupation:    Retired     Marital Status:   10-19-16         Tobacco/Alcohol/Supplements:     Last Reviewed on 2019 04:13 PM by Spurling, Sarah C    Tobacco: He has a past history of cigarette smoking; quit date:  .  Non-drinker         Substance Abuse History:      Last Reviewed on 4/15/2019 06:50 PM by Paulo Dyer        Mental Health History:     Last Reviewed on 4/15/2019 06:50 PM by Paulo Dyer        Communicable Diseases (eg STDs):     Last Reviewed on 4/15/2019 06:50 PM by Paulo Dyer            Current Problems:     Last Reviewed on 4/15/2019 06:50 PM by Paulo Dyer    Type 2 DM     Gastric ulcer, unspecified chronicity, without mention of obstruction     Use of high risk medications     Chronic renal insufficiency, stage 3     Hypothyroidism     Unspecified PVD     Lower back pain     Chronic insomnia     Memory loss     Atrial fibrillation     Artificial joint replacement, Knee     Osteoarthritis of knee     Hearing loss     GERD     History of fall     Gastroparesis     Anemia, unspecified     Prostate cancer     Malignant melanoma of skin, other parts of face     Hypertriglyceridemia     Diabetes with neurological manifestations, type II or unspecified type, not stated as uncontrolled     Acute CVA     Cataract     Hypertension     Mixed hyperlipidemia     Type II DM     Sleep related leg cramps     Diverticulosis     Irritable bowel syndrome     Peripheral neuropathy     Epigastric abdominal pain     Diarrhea     Head tremor     Chest pain, other type     Irritation of skin lesion     Knee pain     Screening for colorectal cancer     Other specified administrative purpose     Hyperkalemia         Immunizations:     Prevnar 13 (Pneumococcal PCV 13) 6/29/2017     Fluzone (3 + years dose) 11/20/2008     Fluzone (3 + years dose) 10/31/2012     Influenza, split virus (3+ years dose) 11/8/2007     Fluzone High-Dose pf (>=65 yr) 10/29/2013     Fluzone High-Dose pf (>=65 yr) 10/21/2014     Fluzone High-Dose pf (>=65 yr) 11/9/2015     Fluzone High-Dose pf (>=65 yr) 9/29/2016     Fluzone High-Dose pf (>=65 yr) 10/26/2017     Fluzone High-Dose pf (>=65 yr) 11/3/2018     PNEUMOVAX 23 (Pneumococcal PPV23) 2/11/2011         Allergies:     Last Reviewed  on 5/29/2019 11:47 AM by Luna Price: (Adverse Reaction)    NSAIDs:        Current Medications:     Last Reviewed on 4/18/2019 04:13 PM by Spurling, Sarah C    Bacitracin/Neomycin/Polymyxin B 400units/3.5mg/5,000units per 1gm Topical Ointment Apply sufficient amount to affected area 2 to 3 x daily     Gabapentin 100mg Capsules 1 bid     Pantoprazole 40mg Tablets, Delayed Release 1 tab daily     Trazodone HCl 50mg Tablet 1 - 2 @ QHS PRN     Synthroid 0.112mg Tablet 1 daily in the morning     Cyclobenzaprine HCl 10mg Tablet Take 1 tablet(s) by mouth bid  prn     Glipizide 5mg Tablets Take 1/2 tablet(s) by mouth bid     Glucophage 1,000mg Tablet 1 tab bid     Lisinopril 20mg Tablet 1 tab daily     Valacyclovir 500mg Tablet 1 tab qd     Tradjenta 5mg Tablet Take 1 tablet(s) by mouth daily     Nitroglycerin 0.4mg Tablets, Sublingual Dissolve 1 tablet(s) under the tongue may repeat every 5 minutes. If pain not relieved after three doses go to ER.     Ferrous Sulfate 325mg Tablets 1 tab bid     Lancet   Lancet Check blood once daily as directed     Reglan  5mg Tablet one tablet TID     Norvasc 10mg Tablet Take 1 tablet(s) by mouth daily     Fish Oil 1,000mg Capsules 1 capsules daily     Xarelto 15mg Tablet 1 po daily     Amiodarone HCl 200mg Tablet one tablet tid     glucosamine/chondroitin 375/100/36/54mg BID     Aspirin 81mg Tablets, Enteric Coated Take 1 tablet(s) by mouth qam         OBJECTIVE:        Vitals:         Current: 5/29/2019 11:48:39 AM    Ht:  5 ft, 8 in;  Wt: 171.2 lbs;  BMI: 26.0    T: 98.1 F (oral);  BP: 107/74 mm Hg (left arm, sitting);  P: 58 bpm (left arm (BP Cuff), sitting);  sCr: 1.29 mg/dL;  GFR: 40.81        Exams:     PHYSICAL EXAM:     GENERAL: Vitals recorded well developed, well nourished;  well groomed;  no apparent distress;     EYES: PERRL, EOMI     E/N/T: OROPHARYNX:  normal mucosa, dentition, gingiva, and posterior pharynx;     RESPIRATORY: normal respiratory rate and  pattern with no distress; normal breath sounds with no rales, rhonchi, wheezes or rubs;     CARDIOVASCULAR: normal rate; rhythm is regular;  normal S1; normal S2; no systolic murmur; no edema;     GASTROINTESTINAL: nontender, nondistended; no hepatosplenomegaly or masses; no bruits; diastasis noted;     MUSCULOSKELETAL: gait: slowed;     NEUROLOGIC: mental status: alert and oriented x 3; GROSSLY INTACT     PSYCHIATRIC:  appropriate affect and demeanor; normal speech pattern; grossly normal memory;         ASSESSMENT           414.9   I25.10  Coronary artery disease              DDx:     250.00   E11.8  Type 2 DM              DDx:     789.06   R10.13  Epigastric abdominal pain              DDx:     250.60   E11.40  Diabetes with neurological manifestations, type II or unspecified type, not stated as uncontrolled              DDx:         ORDERS:         Meds Prescribed:       Refill of: Gabapentin 100mg Capsules 1 bid  #60 (Sixty) capsule(s) Refills: 1         Lab Orders:       [id number]  BDCB - [facility] CBC with 3 part diff  (Send-Out)         21716  DIAB1 - [facility] LIPID,CMP, A1C: 07464, 33288, 58742  (Send-Out)         72725  TSH - [facility] TSH  (Send-Out)         41620  HPUBT - [facility] H.pylori Breath test  (Send-Out)                   PLAN:          Coronary artery disease f/u with cardiology as needed. CAD appears to be stable per reassuring stress test.          Type 2 DM A1c is now in normal range and pt is counseled to stop glipizide to avoid hypoglycemia.     LABORATORY:  Labs ordered to be performed today include CBC, Diabetes Panel 1; CMP, Lipid, A1C, and TSH.            Orders:       11921  BDCB - [facility] CBC with 3 part diff  (Send-Out)         16803  DIAB1 - [facility] LIPID,CMP, A1C: 28432, 49059, 68298  (Send-Out)         81836  TSH - [facility] TSH  (Send-Out)            Epigastric abdominal pain Pt has apt with Dale Ladd on 6/7 ( 10 days). Repeat h.pylori ordered and is negative. Consider EGD.     LABORATORY:  Labs ordered to be  performed today include H.pylori urea breath test ([facility]).            Orders:       [id number]  HPUBT - [facility] H.pylori Breath test  (Send-Out)            Diabetes with neurological manifestations, type II or unspecified type, not stated as uncontrolled Refill of gabapentin given.     Controlled substance documentation: Дмитрий reviewed; drug screen performed and appropriate; consent is reviewed and signed and on the chart.  He is aware of risk of addiction on this medication, understands that he will need to follow up for a review every 3 months and his medications will be adjusted or decreased as deemed appropriate at each visit.  No history of drug or alcohol abuse.  No concerns about diversion or abuse. He denies side effects related to the medication.  He is aware that he may be called in for pill counts.  The dosing of this medication will be reviewed on a regular basis and reduced if possible..  Ongoing use of a controlled substance is necessary for this patient to have a normal quality of life           Prescriptions:       Refill of: Gabapentin 100mg Capsules 1 bid  #60 (Sixty) capsule(s) Refills: 1             CHARGE CAPTURE           **Please note: ICD descriptions below are intended for billing purposes only and may not represent clinical diagnoses**        Primary Diagnosis:         414.9 Coronary artery disease            I25.10    Atherosclerotic heart disease of native coronary artery without angina pectoris              Orders:          85928   Office/outpatient visit; established patient, level 4  (In-House)           250.00 Type 2 DM            E11.8    Type 2 diabetes mellitus with unspecified complications    789.06 Epigastric abdominal pain            R10.13    Epigastric pain    250.60 Diabetes with neurological manifestations, type II or unspecified type, not stated as uncontrolled            E11.40    Type 2 diabetes mellitus with diabetic neuropathy, unspecified

## 2021-05-18 NOTE — PROGRESS NOTES
Darci Munson  1935     Office/Outpatient Visit    Visit Date: Fri, Mar 5, 2021 03:08 pm    Provider: Adrien Jackson MD (Assistant: Christie Alcaraz MA)    Location: [facility]        Electronically signed by Adrien Jackson MD on  03/05/2021 07:02:29 PM                             Subjective:        CC: Anuel Bejarano is a 85 year old White male.  This is a follow-up visit.  check up; trazodone not on patients med list         HPI:       Pertaining to atrial fibrillation and coronary disease, darci reports his symptoms are stable.  He follows with cardiology regularly.  His current regimen includes aspirin 81 mg daily, amiodarone 200 mg 3 times daily, Xarelto 15 mg daily, Imdur 30 mg daily and hydrochlorothiazide 25 mg daily.  He does not require rate control.        Pertaining to insomnia, sleep is stable on 50 to 100 mg of trazodone nightly as needed.        Darci has a known history of anemia.  He takes ferrous sulfate daily.  His most recent hemoglobin was 10.9 and was stable.  He denies easy bruising or bleeding.          With regard to the type 2 diabetes mellitus with diabetic polyneuropathy, specifically, this is type 2, non-insulin requiring diabetes, complicated by peripheral neuropathy.  Compliance with treatment has been good; he takes his medication as directed and follows up as directed.  He denies experiencing any diabetes related symptoms.  Current meds include an oral hypoglycemic ( Glucophage ( 500mg bid ), Glucotrol ( 2.5 mg BID ), and Tradjenta ( 5mg qd ) ).  He reports home blood glucose readings have been high, with average fasting glucoses in the 180 to low 200s mg/dL range.  Most recent lab results include Hemoglobin A1c:  8.2 (01/14/2021).  Concurrent health problems include hypertension and hypothyroidism.            Additionally, he presents with history of essential (primary) hypertension.  his current cardiac medication regimen includes a diuretic ( HCTZ 25 mg daily ), a  calcium channel blocker ( amlodipine 10 mg QD ), and Imdur 30 mg daily.  Compliance with treatment has been good; he takes his medication as directed and follows up as directed.  He is tolerating the medication well without side effects.  Anuel Bejarano does not check his blood pressure other than at his clinic appointments.        Pertaining to GERD, Darci reports that his symptoms are stable on his current regimen of Protonix 40 mg daily.  He denies dysphagia, bowel pain, nausea, vomiting, diarrhea, melena or hematochezia.          In regard to the hypothyroidism, unspecified, he is currently taking Synthroid, 125 mcg daily.  The result was reported as high ( 6.680 on 12/8/20 mU/L ).  He denies any related symptoms.  He reports no symptoms suggestive of adverse medication effect.  Pertinent medical history is positive for hypertension.        Darci has a longstanding history of bilateral knee osteoarthritis.  He has had his right knee replaced.  His left knee seems to be worsening progressively over time.  He notes that he got a joint injection a little over 3 months ago and found this helpful.  He would like to repeat this today.    ROS:     CONSTITUTIONAL:  Negative for chills, fatigue and fever.      EYES:  Negative for blurred vision.      CARDIOVASCULAR:  Negative for chest pain, dizziness, orthopnea, paroxysmal nocturnal dyspnea and pedal edema.      RESPIRATORY:  Negative for recent cough, dyspnea and frequent wheezing.      GASTROINTESTINAL:  Positive for acid reflux symptoms.   Negative for abdominal pain, constipation, diarrhea, nausea or vomiting.      MUSCULOSKELETAL:  Positive for left knee pain.      INTEGUMENTARY:  Negative for rash.      NEUROLOGICAL:  Positive for memory loss and paresthesias.   Negative for dizziness or headaches.      ENDOCRINE:  Negative for hair loss, temperature intolerances, polydipsia and polyphagia.      PSYCHIATRIC:  Positive for sleep disturbance.   Negative for anxiety,  depression or suicidal thoughts.          Past Medical History / Family History / Social History:         Last Reviewed on 3/05/2021 07:02 PM by Adrien Jackson    Past Medical History:             PREVENTIVE HEALTH MAINTENANCE             EVALUATION FOR AORTIC ANEURYSM: was last done 17 with normal results     COLORECTAL CANCER SCREENING: Up to date (colonoscopy q10y; sigmoidoscopy q5y; Cologuard q3y) was last done 18, Results are in chart; colonoscopy with the following abnormalities noted-- severe sigmoid and descending colon diverticulosis and 3+ gastritis     EYE EXAM: Diabetic Eye Exam during this calendar year and results are in chart was last done 2020 NO diabetic retinopathy     PSA: was last done 17 with normal results Hx prostate cancer/prostatectomy         PAST MEDICAL HISTORY         Positive for    Atrial Fibrillation: on Xarelto; ,     Coronary Artery Disease,    Hyperlipidemia and    Hypertension;     Positive for    Osteoarthritis;     Positive for    Type 2 Diabetes: complications include peripheral neuropathy and gastroparesis; and    Hypothyroidism: dx'd in 2017; 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; ;     Positive for    Prostate cancer: dx'd in ; ;     Positive for    Glaucoma;         CURRENT MEDICAL PROVIDERS:    Cardiologist: KHAI Peterson    Dermatologist: Dr Alegre/Dr Couch    E/N/T: Dr Mohan    Gastroenterologist: Dr Alcaraz    Ophthalmologist: Dr Rodriguez    Orthopedist: Dr Atkins    Urologist: Dr Menendez    Audiologist: Dash ([facility])             ADVANCED DIRECTIVES: None         Surgical History:         Positive for    Arthroscopy: Rt knee; 3/14/17;,    Cataract Removal: bilateral; ;,    Joint Replacement:    Knee Replacement: 3/2017; ; and    Prostatectomy;     Positive for    Rt hand surgery;;         Family History:     Father: ;  Lung Cancer     Mother: ;  Type 2 Diabetes         Social History:     Occupation: Retired (Prior  occupation: GE) likes to farm     Marital Status:  Liudmila  16     Children: 4 children         Tobacco/Alcohol/Supplements:     Last Reviewed on 3/05/2021 07:02 PM by Adrien Jackson    Tobacco: He has a past history of cigarette smoking; quit date:  .  Non-drinker         Substance Abuse History:     Last Reviewed on 3/05/2021 07:02 PM by Adrien Jackson        Mental Health History:     Last Reviewed on 3/05/2021 07:02 PM by Adrien Jackson        Communicable Diseases (eg STDs):     Last Reviewed on 3/05/2021 07:02 PM by Adrien Jackson        Current Problems:     Last Reviewed on 3/05/2021 07:02 PM by Adrien Jackson    Hereditary and idiopathic neuropathy, unspecified    Irritable bowel syndrome without diarrhea    Sleep related leg cramps    Type 2 diabetes mellitus with diabetic polyneuropathy    Mixed hyperlipidemia    Essential (primary) hypertension    Malignant neoplasm of prostate    Anemia, unspecified    Unilateral primary osteoarthritis, right knee    Gastroparesis    History of falling    Gastro-esophageal reflux disease without esophagitis    Unspecified hearing loss, bilateral    Unspecified atrial fibrillation    Presence of unspecified artificial knee joint    Low back pain    Primary insomnia    Unspecified dementia without behavioral disturbance    Hypothyroidism, unspecified    Chronic kidney disease, stage 3 (moderate)    Peripheral vascular disease, unspecified    Gastric ulcer, unspecified as acute or chronic, without hemorrhage or perforation    Atherosclerotic heart disease of native coronary artery without angina pectoris    Encounter for immunization    Unilateral primary osteoarthritis, left knee    Laceration without foreign body of right forearm, initial encounter    Sensorineural hearing loss, bilateral    Other specified disorders of the skin and subcutaneous tissue    Encounter for screening for depression    Pain in left foot    Encounter for other administrative  examinations        Immunizations:     influenza, high-dose, quadrivalent (FLUZONE HIGH-DOSE QUAD 2020-21) 9/10/2020    Td (adult), 2 Lf tetanus toxoid, preservative free, adsorbed (TDVAX) 1/14/2020    Prevnar 13 (Pneumococcal PCV 13) 6/29/2017    Fluzone (3 + years dose) 11/20/2008    Fluzone (3 + years dose) 10/31/2012    Influenza, split virus (3+ years dose) 11/8/2007    Fluzone High-Dose pf (>=65 yr) 10/29/2013    Fluzone High-Dose pf (>=65 yr) 10/21/2014    Fluzone High-Dose pf (>=65 yr) 11/9/2015    Fluzone High-Dose pf (>=65 yr) 9/29/2016    Fluzone High-Dose pf (>=65 yr) 10/26/2017    Fluzone High-Dose pf (>=65 yr) 11/3/2018    Fluzone High-Dose pf (>=65 yr) 10/24/2019    PNEUMOVAX 23 (Pneumococcal PPV23) 2/11/2011        Allergies:     Last Reviewed on 3/05/2021 07:02 PM by Adrien Jackson    Mobic:   (Adverse Reaction)    NSAIDs:      glipiZIDE:   (Adverse Reaction)        Current Medications:     Last Reviewed on 3/05/2021 07:02 PM by Adrien Jackson    latanoprost 0.005 % ophthalmic (eye) Drops [instill 1 drop OU]    aspirin 81 mg oral tablet, delayed release (enteric coated) [Take 1 tablet(s) by mouth qam]    Nitroglycerin 0.4mg Tablets, Sublingual [Dissolve 1 tablet(s) under the tongue may repeat every 5 minutes. If pain not relieved after three doses go to ER.]    Lancet   Lancet [Check blood once daily as directed]    pantoprazole 40 mg oral tablet, delayed release (enteric coated) [Take 1 tablet by mouth once daily]    hydroCHLOROthiazide 25 mg oral tablet [Take 1 tablet by mouth once daily]    Ferrous Sulfate 325 mg (65 mg iron) oral tablet [1 tab bid]    Tradjenta 5mg Tablet [Take 1 daily (PATIENT ASSISTANCE)]    amiodarone 200 mg oral tablet [one tablet tid]    glucosamine/chondroitin 375/100/36/54mg BID     Xarelto 15 mg oral tablet [1 po daily]    gabapentin 100 mg oral capsule [Take 1 capsule by mouth twice daily]    traZODone 50 mg oral tablet [TAKE 1 TO 2 TABLETS BY MOUTH ONCE DAILY AT BEDTIME  AS NEEDED]    Sucralfate 1 gram oral tablet [1 tab 30 MIN BEFORE MEALS AND AT HS]    cyclobenzaprine 10 mg oral tablet [Take 1 tablet by mouth twice daily as needed]    Reglan  10mg Tablet [1 tab every 6 to 8 hours]    Famciclovir 500 mg oral tablet [Take BID x 5 days]    neomycin-bacitracnZn-polymyxnB 3.5mg-400 unit- 5,000 unit/gram Topical Ointment [Apply sufficient amount to affected area 2 to 3 x daily]    melatonin 5 mg oral tablet,chewable [take 1 tab prior to bedtime each night ]    isosorbide mononitrate 30 mg oral Tablet, Extended Release 24 hr [take 1 tablet (30 mg) by oral route once daily in the morning]    mupirocin 2 % Topical Ointment [apply a small amount to the affected area by topical route 3 times per day]    Synthroid 125 mcg oral tablet [take 1 tablet (125 mcg) by oral route once daily]    Vitamin D3     glipiZIDE 5 mg oral tablet [Take 1/2 (one-half) tablet by mouth twice daily]    AMLODIPINE   TAB 10MG     metFORMIN 500 mg oral tablet [take 1 tablet (500 mg) by oral route 2 times per day with morning andevening meals]        Objective:        Vitals:         Current: 3/5/2021 3:13:52 PM    Ht:  5 ft, 8 in;  Wt: 164.4 lbs;  BMI: 25.0T: 97.6 F (temporal);  BP: 148/74 mm Hg (left arm, sitting);  P: 68 bpm (left arm (BP Cuff), sitting);  sCr: 1.27 mg/dL;  GFR: 39.37        Exams:     PHYSICAL EXAM:     GENERAL:  well developed and nourished; appropriately groomed; in no apparent distress;     EYES: PERRL, EOMI     NECK: trachea is midline; thyroid is non-palpable;     RESPIRATORY: normal respiratory rate and pattern with no distress; normal breath sounds with no rales, rhonchi, wheezes or rubs;     CARDIOVASCULAR: normal rate; rhythm is regular;  a systolic murmur is noted: it is grade 2/6;     GASTROINTESTINAL: nontender;     MUSCULOSKELETAL: gait: slowed and uses a cane;  pain with range of motion in: left knee;  spine: kyphosis;     NEUROLOGIC: mental status: oriented to person, place, and  time;  GROSSLY INTACT     PSYCHIATRIC: affect/demeanor: in good spirits;  normal psychomotor function; normal speech pattern; normal thought and perception;         Procedures:     Unilateral primary osteoarthritis, left knee    Procedure Note:  Arthrocentesis/Injection     Arthrocentesis of left knee joint is performed.  Informed consent obtained.  Risks of joint aspiration/injection reviewed including cellulitis, minor bleeding into the joint, and joint infection.  Patient understands and agrees to these risks The site is prepped with alcohol.  The joint is injected with 4 cc of 1% lidocaine and Kenalog 40 mg.  No complications.  Estimated blood loss: 0 cc.              Assessment:         D64.9   Anemia, unspecified       I48.91   Unspecified atrial fibrillation       F51.01   Primary insomnia       I25.10   Atherosclerotic heart disease of native coronary artery without angina pectoris       E11.42   Type 2 diabetes mellitus with diabetic polyneuropathy       I10   Essential (primary) hypertension       K21.9   Gastro-esophageal reflux disease without esophagitis       E03.9   Hypothyroidism, unspecified       M17.12   Unilateral primary osteoarthritis, left knee           ORDERS:         Procedures Ordered:       20610  Arthrocentesis, major joint  (In-House)            20610  Arthrocentesis, aspiration and/or injection; knee  (In-House)              Other Orders:         Kenalog, per 10 mg  (x4)                  Plan:         Anemia, unspecified- Stable.  Most recent hemoglobin 0.9.  Continue ferrous sulfate.        Unspecified atrial fibrillation- Stable.  Continue to follow with cardiology as directed.  Continue current regimen.        Primary insomnia- Stable.  Continue trazodone 50 to 100 mg nightly as needed.        Atherosclerotic heart disease of native coronary artery without angina pectoris- See above            Type 2 diabetes mellitus with diabetic polyneuropathy- Not controlled.  Continue  Tradjenta 5 mg daily. Recenty restarted metformin 500 mg BID and glipizide 2.5 mg BID.         Essential (primary) hypertension- Stable.  Continue hydrochlorothiazide 25 mg daily, dur 30 mg daily and amlodipine 10 mg daily        Gastro-esophageal reflux disease without esophagitisStable.  Continue Protonix 40 mg daily.        Hypothyroidism, unspecified- Symptomatically stable but not at goal but with lab values.  Last 3 TSHs have been abnormal.  Continue Synthroid 125 mcg daily. Repeat TSH 6 weeks from previous draw.        Unilateral primary osteoarthritis, left kneeCurrently flared.  Left knee injection performed today.  See procedure details above.          Orders:       20610  Arthrocentesis, major joint  (In-House)              Kenalog, per 10 mg  (x4)        20610  Arthrocentesis, aspiration and/or injection; knee  (In-House)                  Charge Capture:         Primary Diagnosis:     D64.9  Anemia, unspecified           Orders:      80372  Office/outpatient visit; established patient, level 4  (In-House)              I48.91  Unspecified atrial fibrillation     F51.01  Primary insomnia     I25.10  Atherosclerotic heart disease of native coronary artery without angina pectoris     E11.42  Type 2 diabetes mellitus with diabetic polyneuropathy     I10  Essential (primary) hypertension     K21.9  Gastro-esophageal reflux disease without esophagitis     E03.9  Hypothyroidism, unspecified     M17.12  Unilateral primary osteoarthritis, left knee           Orders:      20610  Arthrocentesis, major joint  (In-House)              Kenalog, per 10 mg  (x4)        33718  Arthrocentesis, aspiration and/or injection; knee  (In-House)

## 2021-05-18 NOTE — PROGRESS NOTES
Darci Munson  1935     Office/Outpatient Visit    Visit Date: Mon, Feb 3, 2020 11:13 am    Provider: Paulo Dyer MD (Assistant: Sheri Cortés MA)    Location: [facility]        Electronically signed by Paulo Dyer MD on  02/04/2020 08:38:52 AM                             Subjective:        CC: Anuel Bejarano is a 84 year old White male.  left knee injection         HPI:       X-ray left knee on 4/8/19 showed moderate to severe tricompartmental OA. He has chronic bilateral knee pain that is worse on the left. He has received 3 steroid shots over the last 12 months, most recently just over 3 months ago on 10/29/19.      BP today is 158/54 with a HR of 65. He is on amlodipine 10 mg qd, HCTZ 12.5 mg qd, and (amiodarone for afib). Lisinopril 20 mg qd was recently d/c'd by nephrology (Dr. Crouch) 2/2 hyperkalemia.    ROS:     CONSTITUTIONAL:  Negative for chills, fatigue and fever.      CARDIOVASCULAR:  Negative for chest pain, dizziness, orthopnea, paroxysmal nocturnal dyspnea and pedal edema.      RESPIRATORY:  Negative for recent cough, dyspnea and frequent wheezing.      GASTROINTESTINAL:  Positive for acid reflux symptoms and anorexia ( loss of appetite ).   Negative for abdominal pain, constipation, diarrhea, nausea or vomiting.      MUSCULOSKELETAL:  Positive for arthralgias (left knee).      NEUROLOGICAL:  Positive for memory loss.   Negative for dizziness or headaches.      PSYCHIATRIC:  Negative for anxiety and depression.          Past Medical History / Family History / Social History:         Last Reviewed on 2/03/2020 07:12 PM by Paulo Dyer    Past Medical History:             PREVENTIVE HEALTH MAINTENANCE             EVALUATION FOR AORTIC ANEURYSM: was last done 7/5/17 with normal results     COLORECTAL CANCER SCREENING: Up to date (colonoscopy q10y; sigmoidoscopy q5y; Cologuard q3y) was last done 6/18/18, Results are in chart; colonoscopy with the following abnormalities  noted-- severe sigmoid and descending colon diverticulosis and 3+ gastritis     EYE EXAM: Diabetic Eye Exam during this calendar year and results are in chart was last done 18 NO diabetic retinopathy     PSA: was last done 17 with normal results Hx prostate cancer/prostatectomy         PAST MEDICAL HISTORY         Positive for    Atrial Fibrillation: on Xarelto; ,     Coronary Artery Disease,    Hyperlipidemia and    Hypertension;     Positive for    Osteoarthritis;     Positive for    Type 2 Diabetes: complications include peripheral neuropathy and gastroparesis; and    Hypothyroidism: dx'd in 2017; 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; ;     Positive for    Prostate cancer: dx'd in ; ;         CURRENT MEDICAL PROVIDERS:    Cardiologist: KHAI Peterson    Dermatologist: Dr Alegre/Dr Couch    Gastroenterologist: Dr Alcaraz    Ophthalmologist: Dr Rodriguez    Orthopedist: Dr Atkins    Urologist: Dr Menendez    Audiologist: Dash ([facility])             ADVANCED DIRECTIVES: None         Surgical History:         Positive for    Arthroscopy: Rt knee; 3/14/17;,    Joint Replacement:    Knee Replacement: 3/2017; ; and    Prostatectomy;     Positive for    Rt hand surgery;;         Family History:     Father: ;  Lung Cancer     Mother: ;  Type 2 Diabetes         Social History:     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  Non-drinker         Substance Abuse History:     Last Reviewed on 2020 07:12 PM by Paulo Dyer        Mental Health History:     Last Reviewed on 2020 07:12 PM by Paulo Dyer        Communicable Diseases (eg STDs):     Last Reviewed on 2020 07:12 PM by Paulo Dyer        Current Problems:      Last Reviewed on 2/03/2020 07:12 PM by Paulo Dyer    Hereditary and idiopathic neuropathy, unspecified    Irritable bowel syndrome without diarrhea    Sleep related leg cramps    Essential (primary) hypertension    Type 2 diabetes mellitus with diabetic polyneuropathy    Mixed hyperlipidemia    Malignant neoplasm of prostate    Anemia, unspecified    Gastroparesis    Unilateral primary osteoarthritis, right knee    History of falling    Gastro-esophageal reflux disease without esophagitis    Unspecified hearing loss, bilateral    Unspecified atrial fibrillation    Presence of unspecified artificial knee joint    Low back pain    Unspecified dementia without behavioral disturbance    Primary insomnia    Hypothyroidism, unspecified    Peripheral vascular disease, unspecified    Chronic kidney disease, stage 3 (moderate)    Gastric ulcer, unspecified as acute or chronic, without hemorrhage or perforation    Atherosclerotic heart disease of native coronary artery without angina pectoris    Laceration without foreign body of right forearm, initial encounter    Encounter for other administrative examinations    Encounter for immunization    Unilateral primary osteoarthritis, left knee        Immunizations:     Td (adult), 2 Lf tetanus toxoid, preservative free, adsorbed (TDVAX) 1/14/2020    Prevnar 13 (Pneumococcal PCV 13) 6/29/2017    Fluzone (3 + years dose) 11/20/2008    Fluzone (3 + years dose) 10/31/2012    Influenza, split virus (3+ years dose) 11/8/2007    Fluzone High-Dose pf (>=65 yr) 10/29/2013    Fluzone High-Dose pf (>=65 yr) 10/21/2014    Fluzone High-Dose pf (>=65 yr) 11/9/2015    Fluzone High-Dose pf (>=65 yr) 9/29/2016    Fluzone High-Dose pf (>=65 yr) 10/26/2017    Fluzone High-Dose pf (>=65 yr) 11/3/2018    Fluzone High-Dose pf (>=65 yr) 10/24/2019    PNEUMOVAX 23 (Pneumococcal PPV23) 2/11/2011        Allergies:     Last Reviewed on 2/03/2020 07:12 PM by Paulo Dyer    Mobic:   (Adverse  Reaction)    NSAIDs:          Current Medications:     Last Reviewed on 2/03/2020 07:12 PM by Paulo Dyer    Glipizide 5 mg oral tablet [1 in morning, 1 at bedtime]    glipiZIDE 5 mg oral tablet [TAKE 1/2 (ONE-HALF) TABLET BY MOUTH TWICE DAILY]    Glucophage 1,000 mg oral tablet [TAKE 1 TABLET BY MOUTH TWICE DAILY]    aspirin 81 mg oral tablet, delayed release (enteric coated) [Take 1 tablet(s) by mouth qam]    Nitroglycerin 0.4mg Tablets, Sublingual [Dissolve 1 tablet(s) under the tongue may repeat every 5 minutes. If pain not relieved after three doses go to ER.]    Lancet   Lancet [Check blood once daily as directed]    Pantoprazole 40 mg oral tablet, delayed release (enteric coated) [1 tab daily]    hydroCHLOROthiazide 12.5 mg oral tablet [TAKE 1 TABLET BY MOUTH ONCE DAILY]    Ferrous Sulfate 325 mg (65 mg iron) oral tablet [1 tab bid]    Tradjenta 5mg Tablet [Take 1 tablet(s) by mouth daily]    Norvasc 10 mg oral tablet [Take 1 tablet(s) by mouth daily]    amiodarone 200 mg oral tablet [one tablet tid]    glucosamine/chondroitin 375/100/36/54mg BID     Xarelto 15 mg oral tablet [1 po daily]    gabapentin 100 mg oral capsule [TAKE 1 CAPSULE BY MOUTH TWICE DAILY]    Synthroid 0.112mg Tablet [1 daily in the morning]    traZODone 50 mg oral tablet [1 - 2 @ QHS PRN]    Fish Oil 300-1,000 mg oral capsule [1 capsules daily ]    Sucralfate 1 gram oral tablet [1 tab 30 MIN BEFORE MEALS AND AT HS]    cyclobenzaprine 10 mg oral tablet [Take 1 tablet(s) by mouth bid  prn]    Reglan  10mg Tablet [1 tab every 6 to 8 hours]    Famciclovir 500 mg oral tablet [Take BID x 5 days]    Valacyclovir 500mg Tablet [1 tab qd]    neomycin-bacitracnZn-polymyxnB 3.5mg-400 unit- 5,000 unit/gram Topical Ointment [Apply sufficient amount to affected area 2 to 3 x daily]    melatonin 5 mg oral tablet,chewable [take 1 tab prior to bedtime each night ]        Objective:        Vitals:         Current: 2/3/2020 11:39:12 AM    Ht:  5 ft, 8  in;  Wt: 174.4 lbs;  BMI: 26.5T: 97.5 F (oral);  BP: 158/54 mm Hg (left arm, sitting);  P: 65 bpm (left arm (BP Cuff), sitting);  sCr: 1.71 mg/dL;  GFR: 30.51        Exams:     PHYSICAL EXAM:     GENERAL:  well developed and nourished; appropriately groomed; in no apparent distress;     EYES: PERRL, EOMI     E/N/T: OROPHARYNX: posterior pharynx, including tonsils, tongue, and uvula are normal;     NECK: trachea is midline;     RESPIRATORY: normal respiratory rate and pattern with no distress; normal breath sounds with no rales, rhonchi, wheezes or rubs;     CARDIOVASCULAR: normal rate; rhythm is regular;     GASTROINTESTINAL: nontender; normal bowel sounds; no organomegaly; rectal exam: guaiac negative stool; OTHER (enter);     MUSCULOSKELETAL: gait: slowed and uses a cane;  pain with range of motion in: left knee;  crepitus of left knee with extention and flexion;     NEUROLOGIC: mental status: oriented to person, place, and time;  GROSSLY INTACT     PSYCHIATRIC: affect/demeanor: in good spirits;  normal psychomotor function; normal speech pattern; normal thought and perception;         Procedures:     Unilateral primary osteoarthritis, left knee    Procedure Note:  Arthrocentesis/Injection     Arthrocentesis of left knee joint is performed.  Written informed consent was obtained.  The site is prepped with alcohol and betadine.  The joint is injected with Kenalog 80 mg, lot # [id number], expires 09/2021 mg.  No complications.              Assessment:         M17.12   Unilateral primary osteoarthritis, left knee       I10   Essential (primary) hypertension           ORDERS:         Meds Prescribed:       [Refilled] hydroCHLOROthiazide 25 mg oral tablet [take 1 tablet (25 mg) by oral route once per day], #90 (ninety) tablets, Refills: 0 (zero)         Lab Orders:       51550  BDCBC - [facility] CBC with 3 part diff  (Send-Out)            94210  COMP - [facility] Comp. Metabolic Panel  (Send-Out)            57953  TSH - [facility] TSH   (Send-Out)            APPTO  Appointment need  (In-House)              Procedures Ordered:       [id number]  Arthrocentesis, major joint  (In-House)            20610  Arthrocentesis, aspiration and/or injection; knee  (In-House)              Other Orders:         Kenalog, per 10 mg  (x8)                  Plan:         Unilateral primary osteoarthritis, left kneePt given steroid injection to left knee for severe OA.           Orders:       20610  Arthrocentesis, major joint  (In-House)              Kenalog, per 10 mg  (x8)        20610  Arthrocentesis, aspiration and/or injection; knee  (In-House)              Essential (primary) hypertensionBP is again elevated after d/c of lisinopril 20 mg qd in September 2019 for hyperkalemia. HCTZ is therefore increased from 12.5 to 25 mg, cont amlodipine 10 mg qd and (amiodarone for afib).    LABORATORY:  Labs ordered to be performed today include CBC, Comprehensive metabolic panel, and TSH.      FOLLOW-UP: Schedule a follow-up visit in 1 month.:.            Prescriptions:       [Refilled] hydroCHLOROthiazide 25 mg oral tablet [take 1 tablet (25 mg) by oral route once per day], #90 (ninety) tablets, Refills: 0 (zero)           Orders:       [id number]  CBC - [facility] CBC with 3 part diff  (Send-Out)            79911  COMP [facility] Comp. Metabolic Panel  (Send-Out)            42021  TSH [facility] TSH  (Send-Out)            APPTO  Appointment need  (In-House)                  Patient Recommendations:        For  Essential (primary) hypertension:    Schedule a follow-up visit in 1 month.                APPOINTMENT INFORMATION:        Monday Tuesday Wednesday Thursday Friday Saturday Sunday            Time:___________________AM  PM   Date:_____________________             Charge Capture:         Primary Diagnosis:     M17.12  Unilateral primary osteoarthritis, left knee           Orders:      [id number]  Office/outpatient visit; established patient, level 3  (In-House)            20610   Arthrocentesis, major joint  (In-House)              Kenalog, per 10 mg  (x8)        39498  Arthrocentesis, aspiration and/or injection; knee  (In-House)              I10  Essential (primary) hypertension           Orders:      APPTO  Appointment need  (In-House)

## 2021-05-18 NOTE — PROGRESS NOTES
Darci Munson  1935     Office/Outpatient Visit    Visit Date: Wed, Feb 5, 2020 02:53 pm    Provider: Paulo Dyer MD (Assistant: Luna Price MA)    Location: [facility]        Electronically signed by Paulo Dyer MD on  02/05/2020 07:41:03 PM                             Subjective:        CC: NOT TAKING FISH OIL, NORVASC Anuel Bejarano is a 84 year old White male.  He presents with unable to hear out of right ear.          HPI:       Pt has long-standing hearing loss in right ear and he typically wears a hearing aid only his left ear as his right ear is almost completely deaf. For the last couple days hsi hearing has decreased in his left ear and he now cannot hear almost anything. He [facility] for an audiogram about 10 months ago. He saw Dr. River 9 months ago and     ROS:     CONSTITUTIONAL:  Negative for chills, fatigue and fever.      E/N/T:  Positive for diminished hearing ( bilaterally ).      CARDIOVASCULAR:  Negative for chest pain, dizziness, orthopnea, paroxysmal nocturnal dyspnea and pedal edema.      RESPIRATORY:  Negative for recent cough, dyspnea and frequent wheezing.      GASTROINTESTINAL:  Positive for acid reflux symptoms and anorexia ( loss of appetite ).   Negative for abdominal pain, constipation, diarrhea, nausea or vomiting.      MUSCULOSKELETAL:  Positive for arthralgias (left knee).      NEUROLOGICAL:  Positive for memory loss.   Negative for dizziness or headaches.      PSYCHIATRIC:  Negative for anxiety and depression.          Past Medical History / Family History / Social History:         Last Reviewed on 2/03/2020 07:12 PM by Paulo Dyer    Past Medical History:             PREVENTIVE HEALTH MAINTENANCE             EVALUATION FOR AORTIC ANEURYSM: was last done 7/5/17 with normal results     COLORECTAL CANCER SCREENING: Up to date (colonoscopy q10y; sigmoidoscopy q5y; Cologuard q3y) was last done 6/18/18, Results are in chart; colonoscopy with  the following abnormalities noted-- severe sigmoid and descending colon diverticulosis and 3+ gastritis     EYE EXAM: Diabetic Eye Exam during this calendar year and results are in chart was last done 18 NO diabetic retinopathy     PSA: was last done 17 with normal results Hx prostate cancer/prostatectomy         PAST MEDICAL HISTORY         Positive for    Atrial Fibrillation: on Xarelto; ,     Coronary Artery Disease,    Hyperlipidemia and    Hypertension;     Positive for    Osteoarthritis;     Positive for    Type 2 Diabetes: complications include peripheral neuropathy and gastroparesis; and    Hypothyroidism: dx'd in 2017; 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; ;     Positive for    Prostate cancer: dx'd in ; ;         CURRENT MEDICAL PROVIDERS:    Cardiologist: KHAI Peterson    Dermatologist: Dr Alegre/Dr Couch    Gastroenterologist: Dr Alcaraz    Ophthalmologist: Dr Rodriguez    Orthopedist: Dr Atkins    Urologist: Dr Menendez    Audiologist: Dash ([facility])             ADVANCED DIRECTIVES: None         Surgical History:         Positive for    Arthroscopy: Rt knee; 3/14/17;,    Joint Replacement:    Knee Replacement: 3/2017; ; and    Prostatectomy;     Positive for    Rt hand surgery;;         Family History:     Father: ;  Lung Cancer     Mother: ;  Type 2 Diabetes         Social History:     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  Non-drinker         Substance Abuse History:     Last Reviewed on 2020 07:12 PM by Paulo Dyer        Mental Health History:     Last Reviewed on 2020 07:12 PM by Paulo Dyer        Communicable Diseases (eg STDs):     Last Reviewed on 2020 07:12 PM by Paulo Dyer         Current Problems:     Last Reviewed on 2/03/2020 07:12 PM by Paulo Dyer    Hereditary and idiopathic neuropathy, unspecified    Irritable bowel syndrome without diarrhea    Sleep related leg cramps    Essential (primary) hypertension    Type 2 diabetes mellitus with diabetic polyneuropathy    Mixed hyperlipidemia    Malignant neoplasm of prostate    Anemia, unspecified    Gastroparesis    Unilateral primary osteoarthritis, right knee    History of falling    Gastro-esophageal reflux disease without esophagitis    Unspecified hearing loss, bilateral    Unspecified atrial fibrillation    Presence of unspecified artificial knee joint    Low back pain    Unspecified dementia without behavioral disturbance    Primary insomnia    Hypothyroidism, unspecified    Peripheral vascular disease, unspecified    Chronic kidney disease, stage 3 (moderate)    Gastric ulcer, unspecified as acute or chronic, without hemorrhage or perforation    Atherosclerotic heart disease of native coronary artery without angina pectoris    Laceration without foreign body of right forearm, initial encounter    Encounter for other administrative examinations    Encounter for immunization    Unilateral primary osteoarthritis, left knee        Immunizations:     Td (adult), 2 Lf tetanus toxoid, preservative free, adsorbed (TDVAX) 1/14/2020    Prevnar 13 (Pneumococcal PCV 13) 6/29/2017    Fluzone (3 + years dose) 11/20/2008    Fluzone (3 + years dose) 10/31/2012    Influenza, split virus (3+ years dose) 11/8/2007    Fluzone High-Dose pf (>=65 yr) 10/29/2013    Fluzone High-Dose pf (>=65 yr) 10/21/2014    Fluzone High-Dose pf (>=65 yr) 11/9/2015    Fluzone High-Dose pf (>=65 yr) 9/29/2016    Fluzone High-Dose pf (>=65 yr) 10/26/2017    Fluzone High-Dose pf (>=65 yr) 11/3/2018    Fluzone High-Dose pf (>=65 yr) 10/24/2019    PNEUMOVAX 23 (Pneumococcal PPV23) 2/11/2011        Allergies:     Last Reviewed on 2/03/2020 07:12 PM by Paulo Dyer     Mobic:   (Adverse Reaction)    NSAIDs:          Current Medications:     Last Reviewed on 2/03/2020 07:12 PM by Paulo Dyer    Glipizide 5 mg oral tablet [1 in morning, 1 at bedtime]    glipiZIDE 5 mg oral tablet [TAKE 1/2 (ONE-HALF) TABLET BY MOUTH TWICE DAILY]    Glucophage 1,000 mg oral tablet [TAKE 1 TABLET BY MOUTH TWICE DAILY]    aspirin 81 mg oral tablet, delayed release (enteric coated) [Take 1 tablet(s) by mouth qam]    Nitroglycerin 0.4mg Tablets, Sublingual [Dissolve 1 tablet(s) under the tongue may repeat every 5 minutes. If pain not relieved after three doses go to ER.]    Lancet   Lancet [Check blood once daily as directed]    Pantoprazole 40 mg oral tablet, delayed release (enteric coated) [1 tab daily]    hydroCHLOROthiazide 25 mg oral tablet [take 1 tablet (25 mg) by oral route once per day]    Ferrous Sulfate 325 mg (65 mg iron) oral tablet [1 tab bid]    Tradjenta 5mg Tablet [Take 1 tablet(s) by mouth daily]    Norvasc 10 mg oral tablet [Take 1 tablet(s) by mouth daily]    amiodarone 200 mg oral tablet [one tablet tid]    glucosamine/chondroitin 375/100/36/54mg BID     Xarelto 15 mg oral tablet [1 po daily]    gabapentin 100 mg oral capsule [TAKE 1 CAPSULE BY MOUTH TWICE DAILY]    Synthroid 0.112mg Tablet [1 daily in the morning]    traZODone 50 mg oral tablet [1 - 2 @ QHS PRN]    Fish Oil 300-1,000 mg oral capsule [1 capsules daily ]    Sucralfate 1 gram oral tablet [1 tab 30 MIN BEFORE MEALS AND AT HS]    cyclobenzaprine 10 mg oral tablet [Take 1 tablet(s) by mouth bid  prn]    Reglan  10mg Tablet [1 tab every 6 to 8 hours]    Famciclovir 500 mg oral tablet [Take BID x 5 days]    Valacyclovir 500mg Tablet [1 tab qd]    neomycin-bacitracnZn-polymyxnB 3.5mg-400 unit- 5,000 unit/gram Topical Ointment [Apply sufficient amount to affected area 2 to 3 x daily]    melatonin 5 mg oral tablet,chewable [take 1 tab prior to bedtime each night ]        Objective:        Vitals:         Current:  "2/5/2020 3:01:32 PM    Ht:  5 ft, 8 in;  Wt: 174.4 lbs (Estimated);  BMI: 26.5T: 97.9 F (oral);  BP: 160/62 mm Hg (left arm, sitting);  P: 70 bpm (left arm (BP Cuff), sitting);  sCr: 1.06 mg/dL;  GFR: 49.21        Exams:     PHYSICAL EXAM:     GENERAL:  well developed and nourished; appropriately groomed; in no apparent distress;     EYES: PERRL, EOMI     E/N/T: EARS: grossly diminished hearing bilaterally; OROPHARYNX: posterior pharynx, including tonsils, tongue, and uvula are normal;     NECK: trachea is midline;     RESPIRATORY: normal respiratory rate and pattern with no distress; normal breath sounds with no rales, rhonchi, wheezes or rubs;     CARDIOVASCULAR: normal rate; rhythm is regular;     GASTROINTESTINAL: nontender; normal bowel sounds; no organomegaly; rectal exam: guaiac negative stool; OTHER (enter);     SKIN: laceration on right arm is healing nicely;     MUSCULOSKELETAL: gait: slowed and uses a cane;  pain with range of motion in: left knee;     NEUROLOGIC: mental status: oriented to person, place, and time;  GROSSLY INTACT     PSYCHIATRIC: affect/demeanor: in good spirits;  normal psychomotor function; normal speech pattern; normal thought and perception;         Assessment:         H90.3   Sensorineural hearing loss, bilateral           ORDERS:         Procedures Ordered:       REFER  Referral to Specialist or Other Facility  (Send-Out)                      Plan:         Sensorineural hearing loss, bilateralPt referred to ENT for bilateral hearing loss. Right ear hearing loss is chronic and stable but left \"good\" ear is somewhat suddenly diminished hearing. Pt saw Dr. Mohan about 9 months ago and apparently was supposed to f/u.         REFERRALS:  Referral initiated to an E/N/T ( Dr. Vincenzo Mohan, [facility] ).            Orders:       REFER  Referral to Specialist or Other Facility  (Send-Out)                  Charge Capture:         Primary Diagnosis:     H90.3  Sensorineural hearing loss, " bilateral           Orders:      19693  Office/outpatient visit; established patient, level 3  (In-House)

## 2021-05-18 NOTE — PROGRESS NOTES
Darci Munson 1935     Office/Outpatient Visit    Visit Date: Wed, May 23, 2018 10:00 am    Provider: Joanna Malik MD (Assistant: Cheyanne Flores MA)    Location: [facility]        Electronically signed by Joanna Malik MD on  05/29/2018 10:09:39 AM                             SUBJECTIVE:        CC: blood in stool, body aches         HPI:         URI noted.  These have been present for the past 10 days.  The symptoms include chest congestion, productive, yellow cough, subjective fever,  nasal congestion and nasal discharge.  He denies body aches, hemoptysis, ear complaints, headache, sinus pain/pressure or wheezing.  He denies exposure to ill contacts.  He has already tried to relieve the symptoms with congestion med OTC.  Medical history is significant for no tobacco use, flu vaccination is UTD.      BS are doing well         With regard to the hypertriglyceridemia, most recent lab tests include TSH:  5.950 (mIU/L) (05/10/2018), Total Cholesterol:  158 (mg/dL) (04/12/2018), HDL:  59 (mg/dL) (04/12/2018), Triglycerides:  124 (mg/dL) (04/12/2018), LDL:  74 (mg/dL) (04/12/2018).  Currently, he is taking Pravachol.  Compliance with treatment has been good.  He reports muscle pain.  Anuel Berry specifically denies chest pain, headache, weakness, weight loss or weight gain.      chronic afib, on BB metoprolol and xarelto, he saw Paulo Peterson and he is decreasing his BB for low heart rate     chronic insomnia is not better on melatonin         Hypothyroidism details; he is currently taking Levothyroid, 100 mcg daily.  TSH was last checked one month ago.  The result was reported as high ( 5.9 mU/L ).  He denies any related symptoms.      leg aches since starting the lipitor     BRBPR, comes and goes, he is on xarelto, states this has been going on a while, he denies constipation and hemorrhoids     ROS:     CONSTITUTIONAL:  Negative for chills, fatigue and fever.      CARDIOVASCULAR:  Negative for  chest pain, dizziness, orthopnea, paroxysmal nocturnal dyspnea and pedal edema.      RESPIRATORY:  Negative for recent cough, dyspnea and frequent wheezing.      GASTROINTESTINAL:  Negative for abdominal pain, constipation, diarrhea, nausea and vomiting.      NEUROLOGICAL:  Negative for dizziness, headaches and memory loss.      PSYCHIATRIC:  Positive for anxiety.   Negative for depression.          PMH/FMH/SH:     Last Reviewed on 5/23/2018 10:47 AM by Joanna Malik    Past Medical History:             PREVENTIVE HEALTH MAINTENANCE             EVALUATION FOR AORTIC ANEURYSM: was last done 7/5/17 with normal results     COLORECTAL CANCER SCREENING: Up to date (colonoscopy q10y; sigmoidoscopy q5y; Cologuard q3y) was last done 10/30/08, Results are in chart; colonoscopy with normal results; declines any further; Hemoccult X3 negative 11/2017     EYE EXAM: Diabetic Eye Exam during this calendar year and results are in chart was last done 5/8/18 NO diabetic retinopathy     PSA: was last done 11/21/17 with normal results Hx prostate cancer/prostatectomy         PAST MEDICAL HISTORY         Positive for    Atrial Fibrillation: on Xarelto; ,     Coronary Artery Disease,    Hyperlipidemia and    Hypertension;     Positive for    Osteoarthritis;     Positive for    Type 2 Diabetes: complications include peripheral neuropathy and gastroparesis; and    Hypothyroidism: dx'd in 8/2017; 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; ;     Positive for    Prostate cancer: dx'd in 2003; ;         CURRENT MEDICAL PROVIDERS:    Cardiologist: KHAI Peterson    Dermatologist: Dr Alegre/Dr Couch    Gastroenterologist: Dr Alcaraz    Ophthalmologist: Dr Rodriguez    Orthopedist: Dr Atkins    Urologist: Dr Menendez    Audiologist: Dash ([facility])             ADVANCED DIRECTIVES: None         Surgical History:         Positive for    Arthroscopy: Rt knee; 3/14/17;,    Joint Replacement:    Knee Replacement: 3/2017; ; and    Prostatectomy;      Positive for    Rt hand surgery;;         Family History:     Father: ;  Lung Cancer     Mother: ;  Type 2 Diabetes         Social History:     Occupation:    Retired     Marital Status:   10-19-16         Tobacco/Alcohol/Supplements:     Last Reviewed on 2018 10:47 AM by Joanna Malik    Tobacco: He has a past history of cigarette smoking; quit date:  .  Non-drinker         Substance Abuse History:     Last Reviewed on 2015 02:34 PM by Carri Cruz        Mental Health History:     Last Reviewed on 2015 02:34 PM by Carri Cruz        Communicable Diseases (eg STDs):     Last Reviewed on 2015 02:34 PM by Carri Cruz            Allergies:     Last Reviewed on 2018 01:15 PM by Cheyanne Flores: (Adverse Reaction)    NSAIDs:        Current Medications:     Last Reviewed on 2018 01:15 PM by Cheyanne Flores    Dicyclomine HCl 10mg Capsules Take 1 capsule(s) by mouth bid     Glipizide 10mg Tablets 1/2 tab BID     Pantoprazole 40mg Tablets, Delayed Release 1 tab daily     Synthroid 0.1mg Tablet Take 1 tablet(s) by mouth daily     Lipitor 10mg Tablet 1 tab qhs     Glucophage 1,000mg Tablet 1/2 pill bid     Gabapentin 100mg Capsules 1 capsule HS     Tradjenta 5mg Tablet Take 1 tablet(s) by mouth daily     Nitroglycerin 0.4mg Tablets, Sublingual Dissolve 1 tablet(s) under the tongue may repeat every 5 minutes. If pain not relieved after three doses go to ER.     Metoprolol Succinate 50mg Tablets, Extended Release Take 1 tablet(s) by mouth daily     Ferrous Sulfate 325mg Tablets 1 tab bid     Lancet   Lancet Check blood once daily as directed     Xarelto 15mg Tablet 1 po daily     Acetaminophen 325mg Tablet 2 tablets every 8 hours as needed for pain.     Amiodarone HCl 200mg Tablet 1 tab daily     glucosamine/chondroitin 375/100/36/54mg BID     Norvasc 10mg Tablet Take 1 tablet(s) by mouth daily     Aspirin 81mg Tablets, Enteric Coated  Take 1 tablet(s) by mouth qam         OBJECTIVE:        Vitals:         Current: 5/23/2018 10:05:44 AM    Ht:  5 ft, 8 in;  Wt: 174 lbs;  BMI: 26.5    T: 97.4 F (oral);  BP: 138/55 mm Hg (left arm, sitting);  P: 45 bpm (left arm (BP Cuff), sitting);  sCr: 1.36 mg/dL;  GFR: 39.63        Exams:     PHYSICAL EXAM:     GENERAL: Vitals recorded well developed, well nourished;  well groomed;  no apparent distress;     EYES: PERRL, EOMI     E/N/T: OROPHARYNX:  normal mucosa, dentition, gingiva, and posterior pharynx;     NECK:  supple, full ROM; no thyromegaly; no carotid bruits;     RESPIRATORY: normal respiratory rate and pattern with no distress; coarse breath sounds in the apices;     CARDIOVASCULAR: normal rate; rhythm is regular;  normal S1; normal S2; no systolic murmur; no edema;     GASTROINTESTINAL: nontender, nondistended; no hepatosplenomegaly or masses; no bruits; diastasis noted;     MUSCULOSKELETAL: normal gait;     NEUROLOGIC: GROSSLY INTACT     PSYCHIATRIC:  appropriate affect and demeanor; normal speech pattern; grossly normal memory;         Lab/Test Results:             Urine temperature:  confirmed (05/23/2018),     All urine drug screen levels confirmed negative:  yes (05/23/2018),     Date and time of last pill:  Pt could not recall (05/23/2018),     Performed by:  tls (05/23/2018),     Collection Time:  11:30 (05/23/2018),             ASSESSMENT           466.0   J20.9  Acute bronchitis              DDx:     250.00   E11.42  Type II DM              DDx:     272.1   E78.01  Hypertriglyceridemia              DDx:     427.31   I48.91  Atrial fibrillation              DDx:     307.42   F51.01  Chronic insomnia              DDx:     244.9   E03.9  Hypothyroidism              DDx:     729.1   M79.1  Myalgia              DDx:     569.3   K62.5  Bright red blood per rectum (BRBPR)              DDx:     607.2   N48.29  Penile sore              DDx:     724.2   M54.5  Lower back pain              DDx:      V58.69   Z79.899  Use of high risk medications              DDx:         ORDERS:         Meds Prescribed:       Trazodone HCl 50mg Tablet 1 - 2 @ QHS PRN  #40 (Forty) tablet(s) Refills: 0       Amoxicillin 875mg Tablet One PO BID X 10 days.  #20 (Twenty) tablet(s) Refills: 0         Lab Orders:       [id number]  BMP - [facility] Basic Metabolic Panel  (Send-Out)         17920  BDCB2 - H CBC w/o diff  (Send-Out)         71146  CK - [facility]- CK total  (Send-Out)         61494  Drug test prsmv read direct optical obs pr date  (In-House)         42540  Blood, occult, perioxidase activity (eg, guaiac), qualitative; feces, 1-3 simultaneous determination  (Send-Out)         82807  NEURU - [facility] GAPAPENTIN CONFIRMATION  (Send-Out)           Procedures Ordered:       REFER  Referral to Specialist or Other Facility  (Send-Out)                   PLAN:          Acute bronchitis         RECOMMENDATIONS given include: Push Fluids, Rest, Follow up if no improvement or worsening symptoms like high fevers, vomiting, weakness, or increasing shortness of air.    .            Prescriptions:       Amoxicillin 875mg Tablet One PO BID X 10 days.  #20 (Twenty) tablet(s) Refills: 0          Type II DM stable and well controlled          Hypertriglyceridemia labs are good, with myalgias I will cut his lipitor in half          Atrial fibrillation with bradycardia-needs metoprolol decreased to 1/2 per day          Chronic insomnia will try trazodone           Prescriptions:       Trazodone HCl 50mg Tablet 1 - 2 @ QHS PRN  #40 (Forty) tablet(s) Refills: 0          Hypothyroidism repeat labs in 3 months          Myalgia     LABORATORY:  Labs ordered to be performed today include basic metabolic panel, CBC W/O DIFF, and CK, total.            Orders:       [id number]  BMP - [facility] Basic Metabolic Panel  (Send-Out)         28887  BDCB2 - H CBC w/o diff  (Send-Out)         25316  CK - [facility]- CK total  (Send-Out)            Bright red blood per rectum (BRBPR) BRBPR, comes  and goes, he is on xarelto, states this has been going on a while, he denies constipation and hemorrhoids -thinks this has been going on for 3 months         REFERRALS:  Referral initiated to a general surgeon ( Dr. Nikita Thakur ).            Orders:       REFER  Referral to Specialist or Other Facility  (Send-Out)         53729  Blood, occult, perioxidase activity (eg, guaiac), qualitative; feces, 1-3 simultaneous determination  (Send-Out)            Penile sore suspect herpes, he is to come in the  next time he has an outbreak          Lower back pain today is his face to face for chronic gabapentin refills, he is functional on meds, tolerates meds and has no confusion or falls on meds          Use of high risk medications gabo reviewed, drug screen performed and appropriate, consent is reviewed and signed and on the chart, pt is aware of risk of addiction on this medication and understands that he will need to follow up for a review every 3 months and his medications will be adjusted or decreased as deemed appropriate at each visit.  No personal history of drug or alcohol abuse.  No concerns about diversion or abuse.  He denies side effects related to the medication.  He is  aware that he may be called in for pill counts.     LABORATORY:  Labs ordered to be performed today include Drug Screen Urine H Confirmation GAPABENTIN.            Orders:       [id number]  Drug test prsmv read direct optical obs pr date  (In-House)         80857  NEURU - [facility] GAPAPENTIN CONFIRMATION  (Send-Out)               CHARGE CAPTURE           **Please note: ICD descriptions below are intended for billing purposes only and may not represent clinical diagnoses**        Primary Diagnosis:         466.0 Acute bronchitis            J20.9    Acute bronchitis, unspecified              Orders:          00742   Office/outpatient visit; established patient, level 4  (In-House)           250.00 Type II DM            E11.42    Type 2 diabetes  mellitus with diabetic polyneuropathy    272.1 Hypertriglyceridemia            E78.01    Familial hypercholesterolemia    427.31 Atrial fibrillation            I48.91    Unspecified atrial fibrillation    307.42 Chronic insomnia            F51.01    Primary insomnia    244.9 Hypothyroidism            E03.9    Hypothyroidism, unspecified    729.1 Myalgia            M79.1    Myalgia    569.3 Bright red blood per rectum (BRBPR)            K62.5    Hemorrhage of anus and rectum    607.2 Penile sore            N48.29    Other inflammatory disorders of penis    724.2 Lower back pain            M54.5    Low back pain    V58.69 Use of high risk medications            Z79.899    Other long term (current) drug therapy              Orders:          23785   Drug test prsmv read direct optical obs pr date  (In-House)               ADDENDUMS:      ____________________________________    Date: 05/23/2018 11:51 AM    Author: Haily Yanes         Visit Note Faxed to:        Nikita Thakur  (General Surgery); Number [phone]

## 2021-05-18 NOTE — PROGRESS NOTES
Anuel Munson 1935     Office/Outpatient Visit    Visit Date: Mon, Apr 8, 2019 03:13 pm    Provider: Paulo Dyer MD (Assistant: Christie Alcaraz MA)    Location: [facility]        Electronically signed by Paulo Dyer MD on  04/08/2019 08:53:57 PM                             SUBJECTIVE:        CC:     Darci is a 83 year old White male.  This is a follow-up visit.  med refills, pt does not know what meds he is taking but says nothing has changed; bilateral knee pain; head shaking; intermittent chest discomfort over past couple days         HPI:     Pt reports intermittent chest pains, discomfort. This is an on-going issue. More often when he walks. [place], more disomfort, pressure-like. Stress test 2 years ago was relatively normal.     Bilateral knee pain, worse on left. He had a right knee replacement 1.5-2 years ago. His left knee hurts when he is walking for a while. He would like a knee steroid injection.     Pt has a lesion on his midline lower abdomen that was intially created during his prostatectomy that was performed by an anterior approach. He reports his incision healed nicely but has recently been scrapped and is slow to heal. No oozing or other sign of infection.     ROS:     CONSTITUTIONAL:  Negative for chills, fatigue and fever.      CARDIOVASCULAR:  Negative for chest pain, dizziness, orthopnea, paroxysmal nocturnal dyspnea and pedal edema.      RESPIRATORY:  Negative for recent cough, dyspnea and frequent wheezing.      GASTROINTESTINAL:  Negative for abdominal pain, constipation, diarrhea, nausea and vomiting.      INTEGUMENTARY:  Positive for slowly healing lesion on lower abdomen..      NEUROLOGICAL:  Negative for dizziness, headaches and memory loss.      PSYCHIATRIC:  Positive for anxiety.   Negative for depression.          PMH/FMH/SH:     Last Reviewed on 4/08/2019 04:05 PM by Paulo Dyer    Past Medical History:             PREVENTIVE HEALTH MAINTENANCE              EVALUATION FOR AORTIC ANEURYSM: was last done 17 with normal results     COLORECTAL CANCER SCREENING: Up to date (colonoscopy q10y; sigmoidoscopy q5y; Cologuard q3y) was last done 18, Results are in chart; colonoscopy with the following abnormalities noted-- severe sigmoid and descending colon diverticulosis and 3+ gastritis     EYE EXAM: Diabetic Eye Exam during this calendar year and results are in chart was last done 18 NO diabetic retinopathy     PSA: was last done 17 with normal results Hx prostate cancer/prostatectomy         PAST MEDICAL HISTORY         Positive for    Atrial Fibrillation: on Xarelto; ,     Coronary Artery Disease,    Hyperlipidemia and    Hypertension;     Positive for    Osteoarthritis;     Positive for    Type 2 Diabetes: complications include peripheral neuropathy and gastroparesis; and    Hypothyroidism: dx'd in 2017; 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; ;     Positive for    Prostate cancer: dx'd in ; ;         CURRENT MEDICAL PROVIDERS:    Cardiologist: KHAI Peterson    Dermatologist: Dr Alegre/Dr Couch    Gastroenterologist: Dr Alcaraz    Ophthalmologist: Dr Rodriguez    Orthopedist: Dr Atkins    Urologist: Dr Menendez    Audiologist: Dash ([facility])             ADVANCED DIRECTIVES: None         Surgical History:         Positive for    Arthroscopy: Rt knee; 3/14/17;,    Joint Replacement:    Knee Replacement: 3/2017; ; and    Prostatectomy;     Positive for    Rt hand surgery;;         Family History:     Father: ;  Lung Cancer     Mother: ;  Type 2 Diabetes         Social History:     Occupation:    Retired     Marital Status:   10-19-16         Tobacco/Alcohol/Supplements:     Last Reviewed on 2019 04:05 PM by Paulo Dyer    Tobacco: He has a past history of cigarette smoking; quit date:  .  Non-drinker         Substance Abuse History:     Last Reviewed on 2019 04:05 PM by Paulo Dyer        Mental  Health History:     Last Reviewed on 4/08/2019 04:05 PM by Paulo Dyer        Communicable Diseases (eg STDs):     Last Reviewed on 4/08/2019 04:05 PM by Paulo Dyer            Current Problems:     Last Reviewed on 4/08/2019 04:05 PM by Paulo Dyer    Type 2 DM     Gastric ulcer, unspecified chronicity, without mention of obstruction     Use of high risk medications     Chronic renal insufficiency, stage 3     Hypothyroidism     Unspecified PVD     Lower back pain     Chronic insomnia     Memory loss     Atrial fibrillation     Artificial joint replacement, Knee     Osteoarthritis of knee     Hearing loss     GERD     History of fall     Gastroparesis     Anemia, unspecified     Prostate cancer     Malignant melanoma of skin, other parts of face     Hypertriglyceridemia     Diabetes with neurological manifestations, type II or unspecified type, not stated as uncontrolled     Acute CVA     Cataract     Hypertension     Type II DM     Mixed hyperlipidemia     Sleep related leg cramps     Diverticulosis     Irritable bowel syndrome     Peripheral neuropathy     Knee pain     Chest pain, other type     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     Screening for colorectal cancer     Other specified administrative purpose     Hyperkalemia         Immunizations:     Prevnar 13 (Pneumococcal PCV 13) 6/29/2017     Fluzone (3 + years dose) 11/20/2008     Fluzone (3 + years dose) 10/31/2012     Influenza, split virus (3+ years dose) 11/8/2007     Fluzone High-Dose pf (>=65 yr) 10/29/2013     Fluzone High-Dose pf (>=65 yr) 10/21/2014     Fluzone High-Dose pf (>=65 yr) 11/9/2015     Fluzone High-Dose pf (>=65 yr) 9/29/2016     Fluzone High-Dose pf (>=65 yr) 10/26/2017     Fluzone High-Dose pf (>=65 yr) 11/3/2018     PNEUMOVAX 23 (Pneumococcal PPV23) 2/11/2011         Allergies:     Last Reviewed on 4/08/2019 04:05 PM by Paulo Dyer    Mobic: (Adverse Reaction)    NSAIDs:        Current Medications:     Last Reviewed  on 4/08/2019 04:05 PM by Paulo Dyer    Pantoprazole 40mg Tablets, Delayed Release 1 tab daily     Trazodone HCl 50mg Tablet 1 - 2 @ QHS PRN     Synthroid 0.112mg Tablet 1 daily in the morning     Lipitor 10mg Tablet 1/2 tablet by mouth daily at bedtime.     Cyclobenzaprine HCl 10mg Tablet Take 1 tablet(s) by mouth bid  prn     Glipizide 5mg Tablets Take 1/2 tablet(s) by mouth bid     Glucophage 1,000mg Tablet 1 tab bid     Lisinopril 20mg Tablet 1 tab daily     Valacyclovir 500mg Tablet 1 tab qd     Gabapentin 100mg Capsules 1 bid     Sucralfate 1gm/10ml Oral Suspension 40 ml bid     Tradjenta 5mg Tablet Take 1 tablet(s) by mouth daily     Nitroglycerin 0.4mg Tablets, Sublingual Dissolve 1 tablet(s) under the tongue may repeat every 5 minutes. If pain not relieved after three doses go to ER.     Ferrous Sulfate 325mg Tablets 1 tab bid     Lancet   Lancet Check blood once daily as directed     Reglan  5mg Tablet one tablet TID     Norvasc 10mg Tablet Take 1 tablet(s) by mouth daily     Fish Oil 1,000mg Capsules 1 capsules daily     Xarelto 15mg Tablet 1 po daily     glucosamine/chondroitin 375/100/36/54mg BID     Aspirin 81mg Tablets, Enteric Coated Take 1 tablet(s) by mouth qam     Famciclovir 500mg Tablet Take BID x 5 days     Dicyclomine HCl 10mg Capsules 1 po bid     Amiodarone HCl 200mg Tablet one tablet tid         OBJECTIVE:        Vitals:         Current: 4/8/2019 3:20:01 PM    Ht:  5 ft, 8 in;  Wt: 177.2 lbs;  BMI: 26.9    T: 98.3 F (oral);  BP: 150/63 mm Hg (left arm, sitting);  P: 61 bpm (left arm (BP Cuff), sitting);  sCr: 1.33 mg/dL;  GFR: 40.16        Exams:     PHYSICAL EXAM:     GENERAL: Vitals recorded well developed, well nourished;  well groomed;  no apparent distress;     EYES: PERRL, EOMI     E/N/T: OROPHARYNX:  normal mucosa, dentition, gingiva, and posterior pharynx;     RESPIRATORY: normal respiratory rate and pattern with no distress; normal breath sounds with no rales, rhonchi, wheezes  or rubs;     CARDIOVASCULAR: normal rate; rhythm is regular;  normal S1; normal S2; no systolic murmur; no edema;     GASTROINTESTINAL: nontender, nondistended; no hepatosplenomegaly or masses; no bruits; diastasis noted;     SKIN: small (1 cm) scab in midline lower abdomen/suprapubic region, no surrounding erythema, wamrth, or tenderness;     MUSCULOSKELETAL: gait: slowed;     NEUROLOGIC: GROSSLY INTACT     PSYCHIATRIC:  appropriate affect and demeanor; normal speech pattern; grossly normal memory;         Lab/Test Results:         LABORATORY RESULTS: EKG performed by tls         ASSESSMENT           786.59   R07.89  Chest pain, other type              DDx:     719.46   M25.561  Knee pain              DDx:     782.9   R23.4  Irritation of skin lesion              DDx:         ORDERS:         Meds Prescribed:       Bacitracin/Neomycin/Polymyxin B 400units/3.5mg/5,000units per 1gm Topical Ointment Apply sufficient amount to affected area 2 to 3 x daily  #30 (Thirty) gm Refills: 2         Radiology/Test Orders:       [id number]  Electrocardiogram, routine with at least 12 leads; with interpretation and report  (In-House)         [id number]  Left  radiologic examination, knee; three views  (Send-Out)                   PLAN:          Chest pain, other type Presentation c/w stable angina, nuclear stress test ordered. EKG in clinic without sign of acute cardiac ischemia.         TESTS/PROCEDURES:  Will proceed with Stress Test- Adenosine (can't pass treadmill test) to be performed/scheduled now.            Orders:       [id number]  Electrocardiogram, routine with at least 12 leads; with interpretation and report  (In-House)            Knee pain Pt will RTC soon for steroid injection of knee.         RADIOLOGY:  I have ordered a left knee x-ray to be done today.            Orders:       [id number]  Left  radiologic examination, knee; three views  (Send-Out)            Irritation of skin lesion bacitracin ordered for lesion on abdomen to  help with healing.           Prescriptions:       Bacitracin/Neomycin/Polymyxin B 400units/3.5mg/5,000units per 1gm Topical Ointment Apply sufficient amount to affected area 2 to 3 x daily  #30 (Thirty) gm Refills: 2             CHARGE CAPTURE           **Please note: ICD descriptions below are intended for billing purposes only and may not represent clinical diagnoses**        Primary Diagnosis:         786.59 Chest pain, other type            R07.89    Other chest pain              Orders:          [id number]   Office/outpatient visit; established patient, level 4  (In-House)             52818   Electrocardiogram, routine with at least 12 leads; with interpretation and report  (In-House)           719.46 Knee pain            M25.561    Pain in right knee    782.9 Irritation of skin lesion            R23.4    Changes in skin texture        ADDENDUMS:      ____________________________________    Date: 04/24/2019 09:46 AM    Author: Hayley Lira         Visit Note Faxed to:        User Entered Recipient; Number [phone]

## 2021-05-18 NOTE — PROGRESS NOTES
Darci Munson  1935     Office/Outpatient Visit    Visit Date: Thu, Sep 10, 2020 12:35 pm    Provider: Paulo Dyer MD (Assistant: Randy Zhao, )    Location: [facility]        Electronically signed by Paulo Dyer MD on  09/10/2020 02:15:56 PM                             Subjective:        CC: Anuel Bejarano is a 85 year old White male.  This is a follow-up visit.          HPI:           PHQ-9 Depression Screening: Completed form scanned and in chart; Total Score 11       BP today is 148/55 with a HR of 71. He is on amlodipine 10 mg qd, HCTZ 25 mg qd, imdur 20 mg qd and (amiodarone for afib). BP at home is about the same.      A1c increased from 6.8 to 7.2 from 3/4/20 to 7/10/20. He is on metformin 1,000 BID, glipizide 5 mg (only if glucose is high), and tradjenta 5 mg qd. He checks glucose 1-2x/day and its often mid 100s in the morning and higher at night like mid 200s. He drinks diet soda.    ROS:     CONSTITUTIONAL:  Negative for chills, fatigue and fever.      E/N/T:  Positive for diminished hearing ( bilaterally ).      CARDIOVASCULAR:  Negative for chest pain, dizziness, orthopnea, paroxysmal nocturnal dyspnea and pedal edema.      RESPIRATORY:  Negative for recent cough, dyspnea and frequent wheezing.      GASTROINTESTINAL:  Positive for acid reflux symptoms and anorexia ( loss of appetite ).   Negative for abdominal pain, constipation, diarrhea, nausea or vomiting.      MUSCULOSKELETAL:  Positive for arthralgias (left knee).      INTEGUMENTARY:  Positive for nodule on left posterior shoulder.      NEUROLOGICAL:  Positive for memory loss.   Negative for dizziness or headaches.      PSYCHIATRIC:  Negative for anxiety and depression.          Past Medical History / Family History / Social History:         Last Reviewed on 9/10/2020 02:15 PM by Paulo Dyer    Past Medical History:             PREVENTIVE HEALTH MAINTENANCE             EVALUATION FOR AORTIC ANEURYSM: was last  done 17 with normal results     COLORECTAL CANCER SCREENING: Up to date (colonoscopy q10y; sigmoidoscopy q5y; Cologuard q3y) was last done 18, Results are in chart; colonoscopy with the following abnormalities noted-- severe sigmoid and descending colon diverticulosis and 3+ gastritis     EYE EXAM: Diabetic Eye Exam during this calendar year and results are in chart was last done 18 NO diabetic retinopathy     PSA: was last done 17 with normal results Hx prostate cancer/prostatectomy         PAST MEDICAL HISTORY         Positive for    Atrial Fibrillation: on Xarelto; ,     Coronary Artery Disease,    Hyperlipidemia and    Hypertension;     Positive for    Osteoarthritis;     Positive for    Type 2 Diabetes: complications include peripheral neuropathy and gastroparesis; and    Hypothyroidism: dx'd in 2017; 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; ;     Positive for    Prostate cancer: dx'd in ; ;         CURRENT MEDICAL PROVIDERS:    Cardiologist: KHAI Peterson    Dermatologist: Dr Alegre/Dr Couch    E/N/T: Dr Mohan    Gastroenterologist: Dr Alcaraz    Ophthalmologist: Dr Rodriguez    Orthopedist: Dr Atkins    Urologist: Dr Menendez    Audiologist: Dash ([facility])             ADVANCED DIRECTIVES: None         Surgical History:         Positive for    Arthroscopy: Rt knee; 3/14/17;,    Joint Replacement:    Knee Replacement: 3/2017; ; and    Prostatectomy;     Positive for    Rt hand surgery;;         Family History:     Father: ;  Lung Cancer     Mother: ;  Type 2 Diabetes         Social History:     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  Non-drinker         Substance Abuse History:     Last Reviewed on 9/10/2020 02:15 PM  by Paulo Dyer        Mental Health History:     Last Reviewed on 9/10/2020 02:15 PM by Paulo Dyer        Communicable Diseases (eg STDs):     Last Reviewed on 9/10/2020 02:15 PM by Paulo Dyer        Current Problems:     Last Reviewed on 9/10/2020 02:15 PM by Paulo Dyer    Hereditary and idiopathic neuropathy, unspecified    Irritable bowel syndrome without diarrhea    Sleep related leg cramps    Essential (primary) hypertension    Type 2 diabetes mellitus with diabetic polyneuropathy    Mixed hyperlipidemia    Malignant neoplasm of prostate    Anemia, unspecified    Unilateral primary osteoarthritis, right knee    Gastroparesis    History of falling    Gastro-esophageal reflux disease without esophagitis    Unspecified hearing loss, bilateral    Unspecified atrial fibrillation    Presence of unspecified artificial knee joint    Low back pain    Unspecified dementia without behavioral disturbance    Primary insomnia    Hypothyroidism, unspecified    Peripheral vascular disease, unspecified    Chronic kidney disease, stage 3 (moderate)    Gastric ulcer, unspecified as acute or chronic, without hemorrhage or perforation    Atherosclerotic heart disease of native coronary artery without angina pectoris    Laceration without foreign body of right forearm, initial encounter    Encounter for immunization    Unilateral primary osteoarthritis, left knee    Sensorineural hearing loss, bilateral    Other specified disorders of the skin and subcutaneous tissue    Encounter for other administrative examinations    Encounter for screening for depression        Immunizations:     Td (adult), 2 Lf tetanus toxoid, preservative free, adsorbed (TDVAX) 1/14/2020    Prevnar 13 (Pneumococcal PCV 13) 6/29/2017    Fluzone (3 + years dose) 11/20/2008    Fluzone (3 + years dose) 10/31/2012    Influenza, split virus (3+ years dose) 11/8/2007    Fluzone High-Dose pf (>=65 yr) 10/29/2013    Fluzone High-Dose pf (>=65  yr) 10/21/2014    Fluzone High-Dose pf (>=65 yr) 11/9/2015    Fluzone High-Dose pf (>=65 yr) 9/29/2016    Fluzone High-Dose pf (>=65 yr) 10/26/2017    Fluzone High-Dose pf (>=65 yr) 11/3/2018    Fluzone High-Dose pf (>=65 yr) 10/24/2019    PNEUMOVAX 23 (Pneumococcal PPV23) 2/11/2011        Allergies:     Last Reviewed on 9/10/2020 02:15 PM by Paulo Dyer:   (Adverse Reaction)    NSAIDs:          Current Medications:     Last Reviewed on 9/10/2020 02:15 PM by Paulo Dyer    glipiZIDE 5 mg oral tablet [Take 1/2 (one-half) tablet by mouth twice daily]    aspirin 81 mg oral tablet, delayed release (enteric coated) [Take 1 tablet(s) by mouth qam]    Nitroglycerin 0.4mg Tablets, Sublingual [Dissolve 1 tablet(s) under the tongue may repeat every 5 minutes. If pain not relieved after three doses go to ER.]    Lancet   Lancet [Check blood once daily as directed]    pantoprazole 40 mg oral tablet, delayed release (enteric coated) [Take 1 tablet by mouth once daily]    hydroCHLOROthiazide 25 mg oral tablet [Take 1 tablet by mouth once daily]    Ferrous Sulfate 325 mg (65 mg iron) oral tablet [1 tab bid]    Tradjenta 5mg Tablet [Take 1 daily (PATIENT ASSISTANCE)]    amiodarone 200 mg oral tablet [one tablet tid]    glucosamine/chondroitin 375/100/36/54mg BID     Xarelto 15 mg oral tablet [1 po daily]    gabapentin 100 mg oral capsule [Take 1 capsule by mouth twice daily]    Synthroid 112 mcg oral tablet [TAKE 1 TABLET BY MOUTH ONCE DAILY IN THE MORNING]    traZODone 50 mg oral tablet [TAKE 1 TO 2 TABLETS BY MOUTH ONCE DAILY AT BEDTIME AS NEEDED]    Sucralfate 1 gram oral tablet [1 tab 30 MIN BEFORE MEALS AND AT HS]    cyclobenzaprine 10 mg oral tablet [Take 1 tablet by mouth twice daily as needed]    Reglan  10mg Tablet [1 tab every 6 to 8 hours]    Famciclovir 500 mg oral tablet [Take BID x 5 days]    neomycin-bacitracnZn-polymyxnB 3.5mg-400 unit- 5,000 unit/gram Topical Ointment [Apply sufficient amount to  affected area 2 to 3 x daily]    melatonin 5 mg oral tablet,chewable [take 1 tab prior to bedtime each night ]    isosorbide mononitrate 20 mg oral tablet [Take 1 tablet by mouth once daily]        Objective:        Vitals:         Current: 9/10/2020 12:42:03 PM    Ht:  5 ft, 8 in;  Wt: 164.2 lbs;  BMI: 25.0T: 97.1 F (temporal);  BP: 148/55 mm Hg (left arm, sitting);  P: 71 bpm (left arm (BP Cuff), sitting);  sCr: 1.65 mg/dL;  GFR: 30.29        Exams:     PHYSICAL EXAM:     GENERAL:  well developed and nourished; appropriately groomed; in no apparent distress;     EYES: PERRL, EOMI     E/N/T: EARS: grossly diminished hearing bilaterally; OROPHARYNX: posterior pharynx, including tonsils, tongue, and uvula are normal;     RESPIRATORY: normal respiratory rate and pattern with no distress; normal breath sounds with no rales, rhonchi, wheezes or rubs;     CARDIOVASCULAR: normal rate; rhythm is regular;     GASTROINTESTINAL: nontender;     MUSCULOSKELETAL: gait: slowed and uses a cane;  pain with range of motion in: left knee;  spine: kyphosis;  crepitus of left knee, TTP tibeal platue;     NEUROLOGIC: mental status: oriented to person, place, and time;  GROSSLY INTACT     PSYCHIATRIC: affect/demeanor: in good spirits;  normal psychomotor function; normal speech pattern; normal thought and perception;         Assessment:         Z13.31   Encounter for screening for depression       I10   Essential (primary) hypertension       E11.42   Type 2 diabetes mellitus with diabetic polyneuropathy       Z23   Encounter for immunization           ORDERS:         Meds Prescribed:       [Refilled] isosorbide mononitrate 30 mg oral Tablet, Extended Release 24 hr [take 1 tablet (30 mg) by oral route once daily in the morning], #90 (ninety) tablets, Refills: 3 (three)         Lab Orders:       APPTO  Appointment need  (In-House)            FUTURE  Future order to be done at patients convenience  (Send-Out)            61766  [facility]  CBC with 3 part diff  (Send-Out)            71874  [facility] [place] [address] LIPID,CMP, A1C: 32479, 35238, 29590  (Send-Out)            02756  TSH - [facility] TSH  (Send-Out)              Procedures Ordered:       30094  Fluzone High Dose  (In-House)              Other Orders:         Depression screen negative  (In-House)              Administration of influenza virus vaccine  (x1)                  Plan:         Encounter for screening for depression    MIPS PHQ-9 Depression Screening: Completed form scanned and in chart; Total Score 11; Positive Depression Screen but after further evaluation the patient does not have a diagnosis of depression.            Orders:         Depression screen negative  (In-House)              Essential (primary) hypertensionBP slightly elevated again so Imdur is increased from 20 to 30 mg qd. Cont amlodipine 10 mg qd and HCTZ 25 mg qd.          Prescriptions:       [Refilled] isosorbide mononitrate 30 mg oral Tablet, Extended Release 24 hr [take 1 tablet (30 mg) by oral route once daily in the morning], #90 (ninety) tablets, Refills: 3 (three)         Type 2 diabetes mellitus with diabetic [id number] increased from 6.8 to 7.2 from 3/4/20 to 7/10/20 btu sice Cr is steadily > 1.5 we are stopping metformin today. Pt advised to take glipizide 5 mg 1/2 tab BID more scheduled than as needed since glucose has been high lately. Cont tradjenta 5 mg qd.        FOLLOW-UP: Schedule a follow-up visit in 1 month.:.      FOLLOW-UP TESTING #1: FOLLOW-UP LABORATORY:  Labs to be scheduled in the future include CBC, Diabetes Panel 1; CMP, Lipid, A1C, and TSH.   Schedule a follow-up visit in 1 month.            Orders:       APPTO  Appointment need  (In-House)            FUTURE  Future order to be done at patients convenience  (Send-Out)            85499  [facility] CBC with 3 part diff  (Send-Out)            75145  DIAB - [facility] LIPID,CMP, A1C: 32904, 09618, 89167  (Send-Out)            84484  TSH [facility]  TSH  (Send-Out)              Encounter for immunization          Immunizations:       27916  Fluzone High Dose  (In-House)                Dose (ml): 0.7  Site: left deltoid  Route: intramuscular  Administered by: Carly Donato          : [employer]  Lot #: [id number]  Exp: 06/30/2021          NDC: [address]-90        Administration of influenza virus vaccine  (x1)              Patient Recommendations:        For  Type 2 diabetes mellitus with diabetic polyneuropathy:    Schedule a follow-up visit in 1 month.                APPOINTMENT INFORMATION:        Monday Tuesday Wednesday Thursday Friday Saturday Sunday            Time:___________________AM  PM   Date:_____________________         The following laboratory testing has been ordered: CBC TSH Schedule the above testing in 1 month.              Charge Capture:         Primary Diagnosis:     Z13.31  Encounter for screening for depression           Orders:      77379  Office/outpatient visit; established patient, level 4  (In-House)              Depression screen negative  (In-House)              I10  Essential (primary) hypertension     E11.42  Type 2 diabetes mellitus with diabetic polyneuropathy           Orders:      APPTO  Appointment need  (In-House)              Z23  Encounter for immunization           Orders:      02215  Fluzone High Dose  (In-House)              Administration of influenza virus vaccine  (x1)

## 2021-05-18 NOTE — PROGRESS NOTES
Darci Munson 1935     Office/Outpatient Visit    Visit Date: Thu, Apr 18, 2019 04:08 pm    Provider: Jennifer Szymanski N.P. (Assistant: Sarah Spurling, MA)    Location: [facility]        Electronically signed by Jennifer Szymanski N.P. on  04/18/2019 08:59:58 PM                             SUBJECTIVE:        CC:     Mr. Munson is a 83 year old White male.  Pt is here for SOA for over a week, and he said he has had diarrhea and he has been having some chest for the last couple of days, constant.          HPI:         Patient complains of diarrhea.  This has been a problem for the past 4 days.  Stools are described as loose.  No recent travel history.  There has been no recent ingestion of antibiotics, shellfish, or undercooked hamburger.  He denies exposure to ill contacts.  1-2 times per day increased to 4-5 episodes last night         Epigastric abdominal pain details; this is located primarily in the epigastric region.  It does not radiate.  It began 3 days ago.  The onset of pain occurred with no apparent trigger.      ROS:     CONSTITUTIONAL:  Negative for chills, fatigue, fever, and weight change.      CARDIOVASCULAR:  Negative for chest pain, palpitations, tachycardia, orthopnea, and edema.      RESPIRATORY:  Negative for cough, dyspnea, and hemoptysis.      GASTROINTESTINAL:  Positive for abdominal pain ( epigastric ) and diarrhea.   Negative for acid reflux symptoms, hematochezia, melena, nausea or vomiting.      GENITOURINARY:  Negative for dysuria, genital lesions, hematuria, impotence, polyuria, and changes in urine stream.      MUSCULOSKELETAL:  Negative for arthralgias, back pain, and myalgias.      NEUROLOGICAL:  Negative for dizziness, headaches, paresthesias, and weakness.          PMH/FMH/SH:     Last Reviewed on 4/15/2019 06:50 PM by Paulo Dyer    Past Medical History:             PREVENTIVE HEALTH MAINTENANCE             EVALUATION FOR AORTIC ANEURYSM: was last done 7/5/17 with  normal results     COLORECTAL CANCER SCREENING: Up to date (colonoscopy q10y; sigmoidoscopy q5y; Cologuard q3y) was last done 18, Results are in chart; colonoscopy with the following abnormalities noted-- severe sigmoid and descending colon diverticulosis and 3+ gastritis     EYE EXAM: Diabetic Eye Exam during this calendar year and results are in chart was last done 18 NO diabetic retinopathy     PSA: was last done 17 with normal results Hx prostate cancer/prostatectomy         PAST MEDICAL HISTORY         Positive for    Atrial Fibrillation: on Xarelto; ,     Coronary Artery Disease,    Hyperlipidemia and    Hypertension;     Positive for    Osteoarthritis;     Positive for    Type 2 Diabetes: complications include peripheral neuropathy and gastroparesis; and    Hypothyroidism: dx'd in 2017; 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; ;     Positive for    Prostate cancer: dx'd in ; ;         CURRENT MEDICAL PROVIDERS:    Cardiologist: KHAI Peterson    Dermatologist: Dr Alegre/Dr Couch    Gastroenterologist: Dr Alcaraz    Ophthalmologist: Dr Rodriguez    Orthopedist: Dr Atkins    Urologist: Dr Menendez    Audiologist: Dash ([facility])             ADVANCED DIRECTIVES: None         Surgical History:         Positive for    Arthroscopy: Rt knee; 3/14/17;,    Joint Replacement:    Knee Replacement: 3/2017; ; and    Prostatectomy;     Positive for    Rt hand surgery;;         Family History:     Father: ;  Lung Cancer     Mother: ;  Type 2 Diabetes         Social History:     Occupation:    Retired     Marital Status:   10-19-16         Tobacco/Alcohol/Supplements:     Last Reviewed on 4/15/2019 06:50 PM by Paulo Dyer    Tobacco: He has a past history of cigarette smoking; quit date:  .  Non-drinker         Substance Abuse History:     Last Reviewed on 4/15/2019 06:50 PM by Paulo Dyer        Mental Health History:     Last Reviewed on 4/15/2019 06:50 PM by Manisha  Paulo GALLOWAY        Communicable Diseases (eg STDs):     Last Reviewed on 4/15/2019 06:50 PM by Paulo Dyer            Current Problems:     Last Reviewed on 4/15/2019 06:50 PM by Paulo Dyer    Type 2 DM     Gastric ulcer, unspecified chronicity, without mention of obstruction     Use of high risk medications     Chronic renal insufficiency, stage 3     Hypothyroidism     Unspecified PVD     Lower back pain     Chronic insomnia     Memory loss     Atrial fibrillation     Artificial joint replacement, Knee     Osteoarthritis of knee     Hearing loss     GERD     History of fall     Gastroparesis     Anemia, unspecified     Prostate cancer     Malignant melanoma of skin, other parts of face     Hypertriglyceridemia     Diabetes with neurological manifestations, type II or unspecified type, not stated as uncontrolled     Acute CVA     Cataract     Hypertension     Mixed hyperlipidemia     Type II DM     Sleep related leg cramps     Diverticulosis     Irritable bowel syndrome     Peripheral neuropathy     Head tremor     Chest pain, other type     Irritation of skin lesion     Knee pain     Screening for colorectal cancer     Other specified administrative purpose     Hyperkalemia         Immunizations:     Prevnar 13 (Pneumococcal PCV 13) 6/29/2017     Fluzone (3 + years dose) 11/20/2008     Fluzone (3 + years dose) 10/31/2012     Influenza, split virus (3+ years dose) 11/8/2007     Fluzone High-Dose pf (>=65 yr) 10/29/2013     Fluzone High-Dose pf (>=65 yr) 10/21/2014     Fluzone High-Dose pf (>=65 yr) 11/9/2015     Fluzone High-Dose pf (>=65 yr) 9/29/2016     Fluzone High-Dose pf (>=65 yr) 10/26/2017     Fluzone High-Dose pf (>=65 yr) 11/3/2018     PNEUMOVAX 23 (Pneumococcal PPV23) 2/11/2011         Allergies:     Last Reviewed on 4/15/2019 06:50 PM by Paulo Dyer    Mobic: (Adverse Reaction)    NSAIDs:        Current Medications:     Last Reviewed on 4/18/2019 04:13 PM by Spurling, Sarah C    Famciclovir  500mg Tablet Take BID x 5 days     Bacitracin/Neomycin/Polymyxin B 400units/3.5mg/5,000units per 1gm Topical Ointment Apply sufficient amount to affected area 2 to 3 x daily     Pantoprazole 40mg Tablets, Delayed Release 1 tab daily     Trazodone HCl 50mg Tablet 1 - 2 @ [facility] PRN     Synthroid 0.112mg Tablet 1 daily in the morning     Lipitor 10mg Tablet 1/2 tablet by mouth daily at bedtime.     Cyclobenzaprine HCl 10mg Tablet Take 1 tablet(s) by mouth bid  prn     Glipizide 5mg Tablets Take 1/2 tablet(s) by mouth bid     Glucophage 1,000mg Tablet 1 tab bid     Lisinopril 20mg Tablet 1 tab daily     Valacyclovir 500mg Tablet 1 tab qd     Sucralfate 1gm/10ml Oral Suspension 40 ml bid     Tradjenta 5mg Tablet Take 1 tablet(s) by mouth daily     Nitroglycerin 0.4mg Tablets, Sublingual Dissolve 1 tablet(s) under the tongue may repeat every 5 minutes. If pain not relieved after three doses go to ER.     Ferrous Sulfate 325mg Tablets 1 tab bid     Lancet   Lancet Check blood once daily as directed     Dicyclomine HCl 10mg Capsules 1 po bid     Reglan  5mg Tablet one tablet TID     Norvasc 10mg Tablet Take 1 tablet(s) by mouth daily     Fish Oil 1,000mg Capsules 1 capsules daily     Xarelto 15mg Tablet 1 po daily     Amiodarone HCl 200mg Tablet one tablet tid     glucosamine/chondroitin 375/100/36/54mg BID     Aspirin 81mg Tablets, Enteric Coated Take 1 tablet(s) by mouth qam     Gabapentin 100mg Capsules 1 bid         OBJECTIVE:        Vitals:         Historical:     04/15/2019  BP:   142/42 mm Hg ( (right arm, , sitting, );)     04/15/2019  Wt:   178.4lbs        Current: 4/18/2019 4:16:57 PM    Ht:  5 ft, 8 in;  Wt: 174.6 lbs;  BMI: 26.5    T: 98.3 F (oral);  BP: 149/47 mm Hg (left arm, sitting);  P: 72 bpm (left arm (BP Cuff), sitting);  sCr: 1.33 mg/dL;  GFR: 39.91        Repeat:     4:18:20 PM     BP:   152/49mm Hg (right arm, sitting, second take    heart rate: 68)         Exams:     PHYSICAL EXAM:     GENERAL:  well  developed and nourished; appropriately groomed; in no apparent distress;     RESPIRATORY: normal respiratory rate and pattern with no distress; normal breath sounds with no rales, rhonchi, wheezes or rubs;     CARDIOVASCULAR: normal rate; rhythm is regular;     GASTROINTESTINAL: nontender; normal bowel sounds; no organomegaly;     MUSCULOSKELETAL:  Normal range of motion, strength and tone;     NEUROLOGIC: mental status: alert and oriented x 3; GROSSLY INTACT     PSYCHIATRIC:  appropriate affect and demeanor; normal speech pattern; grossly normal memory;         ASSESSMENT           787.91   R19.7  Diarrhea              DDx:     789.06   R10.13  Epigastric abdominal pain              DDx:         ORDERS:         Lab Orders:       [id number]  AMYS - [facility] Amylase, Serum  (Send-Out)         08160  BDCBC - [facility] CBC with 3 part diff  (Send-Out)         30463  COMP - [facility] Comp. Metabolic Panel  (Send-Out)         70668  LIP - [facility] Lipase, Serum  (Send-Out)         45043  HPUBT - [facility] H.pylori Breath test  (Send-Out)         69388  STLC - [facility] Stool Culture  (Send-Out)         50640  CDTEX - [facility] Clostridium Difficile Toxins A & B; dna probe  (Send-Out)                   PLAN:          Diarrhea         RECOMMENDATIONS given include: clear liquid diet for now, and advance as tolerated, begin oral fluid replacement with a glucose and electrolyte containing solution, and Rest.            Orders:       89116  STLC - [facility] Stool Culture  (Send-Out)         43396  CDTEX - [facility] Clostridium Difficile Toxins A & B; dna probe  (Send-Out)            Epigastric abdominal pain     LABORATORY:  Labs ordered to be performed today include amylase, CBC, Comprehensive metabolic panel, H.pylori urea breath test ([facility]), and Lipase.      RECOMMENDATIONS given include: to ER if worsens..            Orders:       [id number]  AMYS - [facility] Amylase, Serum  (Send-Out)         32402  BDCBC - [facility] CBC with 3 part diff  (Send-Out)         03756  COMP - [facility] Comp. Metabolic  Panel  (Send-Out)         48082  LIP - [facility] Lipase, Serum  (Send-Out)         20092  HPUBT - [facility] H.pylori Breath test  (Send-Out)               Patient Recommendations:        For  Diarrhea:     You should replace fluid losses by drinking frequent, small amounts of a glucose and electrolyte containing solution. There are several commercially available products.              CHARGE CAPTURE           **Please note: ICD descriptions below are intended for billing purposes only and may not represent clinical diagnoses**        Primary Diagnosis:         787.91 Diarrhea            R19.7    Diarrhea, unspecified              Orders:          72095   Office/outpatient visit; established patient, level 3  (In-House)           789.06 Epigastric abdominal pain            R10.13    Epigastric pain

## 2021-05-18 NOTE — PROGRESS NOTES
Darci Munson 1935     Office/Outpatient Visit    Visit Date: Thu, Feb 14, 2019 01:55 pm    Provider: Paulo Dyer MD (Assistant: Sharon Couch LPN)    Location: [facility]        Electronically signed by Paulo Dyer MD on  02/19/2019 09:42:26 PM                             SUBJECTIVE:        CC:     Anuel Berry is a 83 year old White male.  This is a follow-up visit.  Follow up about blood sugar         HPI:     Last A1c was 6.6 on 11/20/18. He is on metformin 500 mg BID and glipizide 5 mg BID. He checks his sugar about once or twice a day, more so at night before bed. Glucose last night was 180, which is somewhat typical for him. Usually 130-140 in the morning     Pt has sores on his lips and in his groin. He says that Dr. Malik told him this was due to an STD maybe herpes but I cannot find any record of this.     BP today is 150/50 w/ HR of 66. He is on lisinopril 10 mg, amlodipine 10 mg, amiodarone 200 mg qd     ROS:     CONSTITUTIONAL:  Negative for chills, fatigue and fever.      CARDIOVASCULAR:  Negative for chest pain, dizziness, orthopnea, paroxysmal nocturnal dyspnea and pedal edema.      RESPIRATORY:  Negative for recent cough, dyspnea and frequent wheezing.      GASTROINTESTINAL:  Negative for abdominal pain, constipation, diarrhea, nausea and vomiting.      INTEGUMENTARY:  Positive for rash (cold sore).      NEUROLOGICAL:  Negative for dizziness, headaches and memory loss.      PSYCHIATRIC:  Positive for anxiety.   Negative for depression.          [facility]/:     Last Reviewed on 2/19/2019 09:41 PM by Paulo Dyer    Past Medical History:             PREVENTIVE HEALTH MAINTENANCE             EVALUATION FOR AORTIC ANEURYSM: was last done 7/5/17 with normal results     COLORECTAL CANCER SCREENING: Up to date (colonoscopy q10y; sigmoidoscopy q5y; Cologuard q3y) was last done 6/18/18, Results are in chart; colonoscopy with the following abnormalities noted-- severe sigmoid  and descending colon diverticulosis and 3+ gastritis     EYE EXAM: Diabetic Eye Exam during this calendar year and results are in chart was last done 18 NO diabetic retinopathy     PSA: was last done 17 with normal results Hx prostate cancer/prostatectomy         PAST MEDICAL HISTORY         Positive for    Atrial Fibrillation: on Xarelto; ,     Coronary Artery Disease,    Hyperlipidemia and    Hypertension;     Positive for    Osteoarthritis;     Positive for    Type 2 Diabetes: complications include peripheral neuropathy and gastroparesis; and    Hypothyroidism: dx'd in 2017; 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; ;     Positive for    Prostate cancer: dx'd in ; ;         CURRENT MEDICAL PROVIDERS:    Cardiologist: KHAI Peterson    Dermatologist: Dr Alegre/Dr Couch    Gastroenterologist: Dr Alcaraz    Ophthalmologist: Dr Rodriguez    Orthopedist: Dr Atkins    Urologist: Dr Menendez    Audiologist: Dash ([facility])             ADVANCED DIRECTIVES: None         Surgical History:         Positive for    Arthroscopy: Rt knee; 3/14/17;,    Joint Replacement:    Knee Replacement: 3/2017; ; and    Prostatectomy;     Positive for    Rt hand surgery;;         Family History:     Father: ;  Lung Cancer     Mother: ;  Type 2 Diabetes         Social History:     Occupation:    Retired     Marital Status:   10-19-16         Tobacco/Alcohol/Supplements:     Last Reviewed on 2019 09:41 PM by Paulo Deyr    Tobacco: He has a past history of cigarette smoking; quit date:  .  Non-drinker         Substance Abuse History:     Last Reviewed on 2019 09:41 PM by Paulo Dyer        Mental Health History:     Last Reviewed on 2019 09:41 PM by Paulo Dyer        Communicable Diseases (eg STDs):     Last Reviewed on 2019 09:41 PM by Paulo Dyer            Current Problems:     Last Reviewed on 2019 09:41 PM by Paulo Dyer    Type 2 DM     Gastric  ulcer, unspecified chronicity, without mention of obstruction     Use of high risk medications     Chronic renal insufficiency, stage 3     Hypothyroidism     Unspecified PVD     Lower back pain     Chronic insomnia     Memory loss     Atrial fibrillation     Artificial joint replacement, Knee     Osteoarthritis of knee     Hearing loss     GERD     History of fall     Gastroparesis     Anemia, unspecified     Prostate cancer     Malignant melanoma of skin, other parts of face     Hypertriglyceridemia     Diabetes with neurological manifestations, type II or unspecified type, not stated as uncontrolled     Acute CVA     Cataract     Hypertension     Type II DM     Mixed hyperlipidemia     Sleep related leg cramps     Diverticulosis     Irritable bowel syndrome     Peripheral neuropathy     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     Screening for colorectal cancer     Other specified administrative purpose     Hyperkalemia         Immunizations:     Prevnar 13 (Pneumococcal PCV 13) 6/29/2017     Fluzone (3 + years dose) 11/20/2008     Fluzone (3 + years dose) 10/31/2012     Influenza, split virus (3+ years dose) 11/8/2007     Fluzone High-Dose pf (>=65 yr) 10/29/2013     Fluzone High-Dose pf (>=65 yr) 10/21/2014     Fluzone High-Dose pf (>=65 yr) 11/9/2015     Fluzone High-Dose pf (>=65 yr) 9/29/2016     Fluzone High-Dose pf (>=65 yr) 10/26/2017     Fluzone High-Dose pf (>=65 yr) 11/3/2018     PNEUMOVAX 23 (Pneumococcal PPV23) 2/11/2011         Allergies:     Last Reviewed on 2/19/2019 09:41 PM by Paulo Dyer    Mobic: (Adverse Reaction)    NSAIDs:        Current Medications:     Last Reviewed on 2/19/2019 09:41 PM by Paulo Dyer    Cyclobenzaprine HCl 10mg Tablet Take 1 tablet(s) by mouth bid  prn     Trazodone HCl 50mg Tablet 1 - 2 @ QHS PRN     Glucophage 1,000mg Tablet 1/2 pill bid     Pantoprazole 40mg Tablets, Delayed Release 1 tab daily     Glipizide 10mg Tablets 1/2 tab BID     Synthroid 0.112mg Tablet 1  daily in the morning     Gabapentin 100mg Capsules 1 bid     Lipitor 10mg Tablet 1/2 tablet by mouth daily at bedtime.     Lisinopril 10mg Tablet 1 in am     Sucralfate 1gm/10ml Oral Suspension 40 ml bid     Tradjenta 5mg Tablet Take 1 tablet(s) by mouth daily     Nitroglycerin 0.4mg Tablets, Sublingual Dissolve 1 tablet(s) under the tongue may repeat every 5 minutes. If pain not relieved after three doses go to ER.     Ferrous Sulfate 325mg Tablets 1 tab bid     Lancet   Lancet Check blood once daily as directed     Reglan  5mg Tablet one tablet TID     Norvasc 10mg Tablet Take 1 tablet(s) by mouth daily     Fish Oil 1,000mg Capsules 1 capsules daily     Xarelto 15mg Tablet 1 po daily     Amiodarone HCl 200mg Tablet one tablet tid     glucosamine/chondroitin 375/100/36/54mg BID     Aspirin 81mg Tablets, Enteric Coated Take 1 tablet(s) by mouth qam     Famciclovir 500mg Tablet Take BID x 5 days     Dicyclomine HCl 10mg Capsules 1 po bid         OBJECTIVE:        Vitals:         Current: 2/14/2019 2:02:03 PM    Ht:  5 ft, 8 in;  Wt: 177.2 lbs;  BMI: 26.9    T: 97.7 F (oral);  BP: 150/50 mm Hg (left arm, sitting);  P: 66 bpm (left arm (BP Cuff), sitting);  sCr: 1.36 mg/dL;  GFR: 39.28        Repeat:     2:03:37 PM     BP:   135/43mm Hg (left arm, sitting)     2:03:46 PM     P:   66bpm (left arm (BP Cuff), sitting)         Exams:     PHYSICAL EXAM:     GENERAL: Vitals recorded well developed, well nourished;  well groomed;  no apparent distress;     EYES: PERRL, EOMI     E/N/T: OROPHARYNX:  normal mucosa, dentition, gingiva, and posterior pharynx;     RESPIRATORY: normal respiratory rate and pattern with no distress; normal breath sounds with no rales, rhonchi, wheezes or rubs;     CARDIOVASCULAR: normal rate; rhythm is regular;  normal S1; normal S2; no systolic murmur; no edema;     GASTROINTESTINAL: nontender, nondistended; no hepatosplenomegaly or masses; no bruits; diastasis noted;     SKIN: small, very slightly  raised lesion on pt's lower abdomen, non blanching and nontender;     MUSCULOSKELETAL: gait: slowed;     NEUROLOGIC: GROSSLY INTACT     PSYCHIATRIC:  appropriate affect and demeanor; normal speech pattern; grossly normal memory;         ASSESSMENT           250.00   E11.8  Type 2 DM              DDx:     054.2   B00.89  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              DDx:     401.1   I10  Hypertension              DDx:         ORDERS:         Meds Prescribed:       Valacyclovir 500mg Tablet 1 tab qd  #90 (Ninety) tablet(s) Refills: 2       Refill of: Lisinopril 20mg Tablet 1 tab daily  #90 (Ninety) tablet(s) Refills: 1       Refill of: Glucophage (Metformin HCl) 1,000mg Tablet 1 tab bid  #180 (One [place] and [address]) tablet(s) Refills: 0       Refill of: Glipizide 5mg Tablets Take 1/2 tablet(s) by mouth bid  #90 (Ninety) tablet(s) Refills: 2         Lab Orders:       [id number]  [facility] CBC with 3 part diff  (Send-Out)         [id number]  [address] [place] [address] CMP A1C LIPID AND MICRO ALBUM CR RATIO: [id number]  (Send-Out)                   PLAN:          Type 2 DM     LABORATORY:  Labs ordered to be performed today include CBC and Diabetes Panel 2;CMP, A1C, Lipid, Microalbumin:Creatinine Ratio.            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 Pt has been on famcyclivir, however so this could have suppressed lesion or they may be almost healed. Will switch patient to valacyclovir suppressive therapy for now.            Prescriptions:       Valacyclovir 500mg Tablet 1 tab qd  #90 (Ninety) tablet(s) Refills: 2          Hypertension lisinopril increased from 10 to 20 mg, cont amlodipine and amiodarone.           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

## 2021-05-28 ENCOUNTER — OFFICE VISIT CONVERTED (OUTPATIENT)
Dept: FAMILY MEDICINE CLINIC | Age: 86
End: 2021-05-28
Attending: FAMILY MEDICINE

## 2021-05-28 ENCOUNTER — HOSPITAL ENCOUNTER (OUTPATIENT)
Dept: OTHER | Facility: HOSPITAL | Age: 86
Discharge: HOME | End: 2021-05-28
Attending: FAMILY MEDICINE

## 2021-05-28 LAB
ALBUMIN SERPL-MCNC: 4.5 G/DL (ref 3.5–5)
ALBUMIN/GLOB SERPL: 1.4 {RATIO} (ref 1.4–2.6)
ALP SERPL-CCNC: 132 U/L (ref 56–155)
ALT SERPL-CCNC: 17 U/L (ref 10–40)
ANION GAP SERPL CALC-SCNC: 19 MMOL/L (ref 8–19)
AST SERPL-CCNC: 23 U/L (ref 15–50)
BASOPHILS # BLD MANUAL: 0.03 10*3/UL (ref 0–0.2)
BASOPHILS NFR BLD MANUAL: 0.4 % (ref 0–3)
BILIRUB SERPL-MCNC: 0.39 MG/DL (ref 0.2–1.3)
BUN SERPL-MCNC: 29 MG/DL (ref 5–25)
BUN/CREAT SERPL: 18 {RATIO} (ref 6–20)
CALCIUM SERPL-MCNC: 10.1 MG/DL (ref 8.7–10.4)
CHLORIDE SERPL-SCNC: 102 MMOL/L (ref 99–111)
CHOLEST SERPL-MCNC: 199 MG/DL (ref 107–200)
CHOLEST/HDLC SERPL: 3.6 {RATIO} (ref 3–6)
CONV CO2: 24 MMOL/L (ref 22–32)
CONV TOTAL PROTEIN: 7.8 G/DL (ref 6.3–8.2)
CREAT UR-MCNC: 1.62 MG/DL (ref 0.7–1.2)
DEPRECATED RDW RBC AUTO: 46.8 FL
EOSINOPHIL # BLD MANUAL: 0.23 10*3/UL (ref 0–0.7)
EOSINOPHIL NFR BLD MANUAL: 3.2 % (ref 0–7)
ERYTHROCYTE [DISTWIDTH] IN BLOOD BY AUTOMATED COUNT: 13.5 % (ref 11.5–14.5)
EST. AVERAGE GLUCOSE BLD GHB EST-MCNC: 166 MG/DL
GFR SERPLBLD BASED ON 1.73 SQ M-ARVRAT: 38 ML/MIN/{1.73_M2}
GLOBULIN UR ELPH-MCNC: 3.3 G/DL (ref 2–3.5)
GLUCOSE SERPL-MCNC: 222 MG/DL (ref 70–99)
GRANS (ABSOLUTE): 4.27 10*3/UL (ref 2–8)
GRANS: 60.3 % (ref 30–85)
HBA1C MFR BLD: 12.8 G/DL (ref 14–18)
HBA1C MFR BLD: 7.4 % (ref 3.5–5.7)
HCT VFR BLD AUTO: 39.8 % (ref 42–52)
HDLC SERPL-MCNC: 55 MG/DL (ref 40–60)
IMM GRANULOCYTES # BLD: 0.02 10*3/UL (ref 0–0.54)
IMM GRANULOCYTES NFR BLD: 0.3 % (ref 0–0.43)
LDLC SERPL CALC-MCNC: 96 MG/DL (ref 70–100)
LYMPHOCYTES # BLD MANUAL: 1.95 10*3/UL (ref 1–5)
LYMPHOCYTES NFR BLD MANUAL: 8.3 % (ref 3–10)
MCH RBC QN AUTO: 30.1 PG (ref 27–31)
MCHC RBC AUTO-ENTMCNC: 32.2 G/DL (ref 33–37)
MCV RBC AUTO: 93.6 FL (ref 80–96)
MONOCYTES # BLD AUTO: 0.59 10*3/UL (ref 0.2–1.2)
OSMOLALITY SERPL CALC.SUM OF ELEC: 303 MOSM/KG (ref 273–304)
PLATELET # BLD AUTO: 202 10*3/UL (ref 130–400)
PMV BLD AUTO: 11.2 FL (ref 7.4–10.4)
POTASSIUM SERPL-SCNC: 5 MMOL/L (ref 3.5–5.3)
RBC # BLD AUTO: 4.25 10*6/UL (ref 4.7–6.1)
SODIUM SERPL-SCNC: 140 MMOL/L (ref 135–147)
T4 FREE SERPL-MCNC: 1.6 NG/DL (ref 0.9–1.8)
TRIGL SERPL-MCNC: 238 MG/DL (ref 40–150)
TSH SERPL-ACNC: 7.68 M[IU]/L (ref 0.27–4.2)
VARIANT LYMPHS NFR BLD MANUAL: 27.5 % (ref 20–45)
VLDLC SERPL-MCNC: 48 MG/DL (ref 5–37)
WBC # BLD AUTO: 7.09 10*3/UL (ref 4.8–10.8)

## 2021-06-05 NOTE — PROGRESS NOTES
Darci Munson  1935     Office/Outpatient Visit    Visit Date: Fri, May 28, 2021 11:01 am    Provider: Adrien Jackson MD (Assistant: Jenn Shields LPN)    Location: [facility]        Electronically signed by Adrien Jackson MD on  05/28/2021 02:49:17 PM                             Subjective:        CC: Anuel Bejarano is a 85 year old White male.  This is a follow-up visit.          HPI:           Patient to be evaluated for essential (primary) hypertension.  His current cardiac medication regimen includes a diuretic ( HCTZ 25 mg daily ), a calcium channel blocker ( amlodipine 10 mg QD ), and Imdur 30 mg daily.  Compliance with treatment has been good; he takes his medication as directed and follows up as directed.  He is tolerating the medication well without side effects.  Anuel Bejarano does not check his blood pressure other than at his clinic appointments.            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  Compliance with treatment has been good; he takes his medication as directed and follows up as directed.  He denies experiencing any diabetes related symptoms.  Current meds include an oral hypoglycemic ( Glucophage ( 500mg bid ), Glucotrol ( 2.5 mg BID ), and Tradjenta ( 5mg qd ) ).  He reports home blood glucose readings have been high, with average fasting glucoses in the 180 to low 200s mg/dL range.  Most recent lab results include Hemoglobin A1c:  8.2 (01/14/2021).  Concurrent health problems include hypertension and hypothyroidism.        Darci reports a prior history of a surgical procedure to repair a problem with his foreskin. He is unsure what procedure was done. He says that he has started having issues again with the skin near the head of his penis. He is requesting a urology referral.        Pertaining to GERD, Darci reports that his symptoms are stable  on his current regimen of Protonix 40 mg daily.  He denies dysphagia, bowel pain, nausea, vomiting, diarrhea, melena or hematochezia.      Pertaining to atrial fibrillation and coronary disease, Raphael reports his symptoms are stable.  He follows with cardiology regularly.  His current regimen includes aspirin 81 mg daily, amiodarone 200 mg 3 times daily, Xarelto 15 mg daily, Imdur 30 mg daily and hydrochlorothiazide 25 mg daily.  He does not require rate control.          Pertaining to insomnia, sleep is stable on 50 to 100 mg of trazodone nightly as needed.          In regard to the hypothyroidism, unspecified, he is currently taking Synthroid, 125 mcg daily.  The result was reported as high ( 6.680 on 12/8/20 mU/L ).  He denies any related symptoms.  He reports no symptoms suggestive of adverse medication effect.  Pertinent medical history is positive for hypertension.        Darci has a longstanding history of bilateral knee osteoarthritis.  He has had his right knee replaced.  His left knee seems to be worsening progressively over time.  He received a joint injection at his last visit in March and this improved his pain for a while but this has since worn off. He says that his pain is currently flared. He has been taking tylenol without much benefit.    ROS:     CONSTITUTIONAL:  Negative for chills, fatigue and fever.      EYES:  Negative for blurred vision.      CARDIOVASCULAR:  Negative for chest pain, dizziness, orthopnea, paroxysmal nocturnal dyspnea and pedal edema.      RESPIRATORY:  Negative for recent cough, dyspnea and frequent wheezing.      GASTROINTESTINAL:  Positive for acid reflux symptoms.   Negative for abdominal pain, constipation, diarrhea, nausea or vomiting.      MUSCULOSKELETAL:  Positive for left knee pain.      INTEGUMENTARY:  Negative for rash.      NEUROLOGICAL:  Positive for memory loss and paresthesias.   Negative for dizziness or headaches.      ENDOCRINE:  Negative for hair loss,  temperature intolerances, polydipsia and polyphagia.      PSYCHIATRIC:  Positive for sleep disturbance.   Negative for anxiety, depression or suicidal thoughts.          Past Medical History / Family History / Social History:         Last Reviewed on 5/28/2021 02:48 PM by Adrien Jackson    Past Medical History:             PREVENTIVE HEALTH MAINTENANCE             EVALUATION FOR AORTIC ANEURYSM: was last done 7/5/17 with normal results     COLORECTAL CANCER SCREENING: Up to date (colonoscopy q10y; sigmoidoscopy q5y; Cologuard q3y) was last done 6/18/18, Results are in chart; colonoscopy with the following abnormalities noted-- severe sigmoid and descending colon diverticulosis and 3+ gastritis     EYE EXAM: Diabetic Eye Exam during this calendar year and results are in chart was last done 9/2020 NO diabetic retinopathy     PSA: was last done 11/21/17 with normal results Hx prostate cancer/prostatectomy         PAST MEDICAL HISTORY         Positive for    Atrial Fibrillation: on Xarelto; ,     Coronary Artery Disease,    Hyperlipidemia and    Hypertension;     Positive for    Osteoarthritis;     Positive for    Type 2 Diabetes: complications include peripheral neuropathy and gastroparesis; and    Hypothyroidism: dx'd in 8/2017; 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; ;     Positive for    Prostate cancer: dx'd in 2003; ;     Positive for    Glaucoma;         CURRENT MEDICAL PROVIDERS:    Cardiologist: KHAI Peterson    Dermatologist: Dr Alegre/Dr Couch    E/N/T: Dr Mohan    Gastroenterologist: Dr Alcaraz    Ophthalmologist: Dr Rodriguez    Orthopedist: Dr Atkins    Urologist: Dr Menendez    Audiologist: Dash ([facility])             ADVANCED DIRECTIVES: None         Surgical History:         Positive for    Arthroscopy: Rt knee; 3/14/17;,    Cataract Removal: bilateral; 2014;,    Joint Replacement:    Knee Replacement: 3/2017; ; and    Prostatectomy;     Positive for    Rt hand surgery;;         Family History:     Father:  ;  Lung Cancer     Mother: ;  Type 2 Diabetes         Social History:     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  Non-drinker         Substance Abuse History:     Last Reviewed on 2021 02:48 PM by Adrien Jackson        Mental Health History:     Last Reviewed on 2021 02:48 PM by Adrien Jackson        Communicable Diseases (eg STDs):     Last Reviewed on 2021 02:48 PM by Adrien Jackson        Current Problems:     Last Reviewed on 2021 02:48 PM by Adrien Jackson    Hereditary and idiopathic neuropathy, unspecified    Irritable bowel syndrome without diarrhea    Sleep related leg cramps    Essential (primary) hypertension    Type 2 diabetes mellitus with diabetic polyneuropathy    Mixed hyperlipidemia    Malignant neoplasm of prostate    Anemia, unspecified    Unilateral primary osteoarthritis, right knee    Gastroparesis    History of falling    Gastro-esophageal reflux disease without esophagitis    Unspecified hearing loss, bilateral    Unspecified atrial fibrillation    Presence of unspecified artificial knee joint    Low back pain    Unspecified dementia without behavioral disturbance    Primary insomnia    Hypothyroidism, unspecified    Peripheral vascular disease, unspecified    Chronic kidney disease, stage 3 (moderate)    Gastric ulcer, unspecified as acute or chronic, without hemorrhage or perforation    Atherosclerotic heart disease of native coronary artery without angina pectoris    Laceration without foreign body of right forearm, initial encounter    Encounter for immunization    Unilateral primary osteoarthritis, left knee    Sensorineural hearing loss, bilateral    Other specified disorders of the skin and subcutaneous  tissue    Encounter for screening for depression    Pain in left foot    Phimosis    Bilateral primary osteoarthritis of knee        Immunizations:     influenza, high-dose, quadrivalent (FLUZONE HIGH-DOSE QUAD 2020-21) 9/10/2020    Td (adult), 2 Lf tetanus toxoid, preservative free, adsorbed (TDVAX) 1/14/2020    Prevnar 13 (Pneumococcal PCV 13) 6/29/2017    Fluzone (3 + years dose) 11/20/2008    Fluzone (3 + years dose) 10/31/2012    Influenza, split virus (3+ years dose) 11/8/2007    Fluzone High-Dose pf (>=65 yr) 10/29/2013    Fluzone High-Dose pf (>=65 yr) 10/21/2014    Fluzone High-Dose pf (>=65 yr) 11/9/2015    Fluzone High-Dose pf (>=65 yr) 9/29/2016    Fluzone High-Dose pf (>=65 yr) 10/26/2017    Fluzone High-Dose pf (>=65 yr) 11/3/2018    Fluzone High-Dose pf (>=65 yr) 10/24/2019    PNEUMOVAX 23 (Pneumococcal PPV23) 2/11/2011        Allergies:     Last Reviewed on 5/28/2021 02:48 PM by Adrien Jackson:   (Adverse Reaction)    NSAIDs:      glipiZIDE:   (Adverse Reaction)        Current Medications:     Last Reviewed on 5/28/2021 02:48 PM by Adrien Jackson    latanoprost 0.005 % ophthalmic (eye) Drops [instill 1 drop OU]    Vitamin D3     glipiZIDE 5 mg oral tablet [Take 1/2 (one-half) tablet by mouth twice daily]    aspirin 81 mg oral tablet, delayed release (enteric coated) [Take 1 tablet(s) by mouth qam]    Nitroglycerin 0.4mg Tablets, Sublingual [Dissolve 1 tablet(s) under the tongue may repeat every 5 minutes. If pain not relieved after three doses go to ER.]    Lancet   Lancet [Check blood once daily as directed]    pantoprazole 40 mg oral tablet, delayed release (enteric coated) [Take 1 tablet by mouth once daily]    hydroCHLOROthiazide 25 mg oral tablet [Take 1 tablet by mouth once daily]    Ferrous Sulfate 325 mg (65 mg iron) oral tablet [1 tab bid]    Tradjenta 5mg Tablet [Take 1 daily (PATIENT ASSISTANCE)]    amiodarone 200 mg oral tablet [one tablet tid]    glucosamine/chondroitin  375/100/36/54mg BID     Xarelto 15 mg oral tablet [1 po daily]    gabapentin 100 mg oral capsule [Take 1 capsule by mouth twice daily]    traZODone 50 mg oral tablet [TAKE 1 TO 2 TABLETS BY MOUTH ONCE DAILY AT BEDTIME AS NEEDED]    Sucralfate 1 gram oral tablet [1 tab 30 MIN BEFORE MEALS AND AT HS]    cyclobenzaprine 10 mg oral tablet [Take 1 tablet by mouth twice daily as needed]    Reglan  10mg Tablet [1 tab every 6 to 8 hours]    Famciclovir 500 mg oral tablet [Take BID x 5 days]    neomycin-bacitracnZn-polymyxnB 3.5mg-400 unit- 5,000 unit/gram Topical Ointment [Apply sufficient amount to affected area 2 to 3 x daily]    melatonin 5 mg oral tablet,chewable [take 1 tab prior to bedtime each night ]    isosorbide mononitrate 30 mg oral Tablet, Extended Release 24 hr [take 1 tablet (30 mg) by oral route once daily in the morning]    mupirocin 2 % Topical Ointment [apply a small amount to the affected area by topical route 3 times per day]    AMLODIPINE   TAB 10MG     Euthyrox 125 mcg oral tablet [Take 1 tablet by mouth once daily]    metFORMIN 500 mg oral tablet [take 1 tablet (500 mg) by oral route 2 times per day with morning andevening meals]        Objective:        Vitals:         Current: 5/28/2021 11:06:59 AM    Ht:  5 ft, 8 in;  Wt: 158.8 lbs;  BMI: 24.1T: 97.4 F (temporal);  BP: 136/56 mm Hg (right arm, sitting);  P: 79 bpm (right arm (BP Cuff), sitting);  sCr: 1.27 mg/dL;  GFR: 38.80        Exams:     PHYSICAL EXAM:     GENERAL:  well developed and nourished; appropriately groomed; in no apparent distress;     EYES: PERRL, EOMI     NECK: trachea is midline; thyroid is non-palpable;     RESPIRATORY: normal respiratory rate and pattern with no distress; normal breath sounds with no rales, rhonchi, wheezes or rubs;     CARDIOVASCULAR: normal rate; rhythm is regular;  a systolic murmur is noted: it is grade 2/6;     GASTROINTESTINAL: nontender;     MUSCULOSKELETAL: gait: slowed and uses a cane;  pain with  range of motion in: left knee;  spine: kyphosis;     NEUROLOGIC: mental status: oriented to person, place, and time;  GROSSLY INTACT     PSYCHIATRIC: affect/demeanor: in good spirits;  normal psychomotor function; normal speech pattern; normal thought and perception;         Assessment:         I10   Essential (primary) hypertension       E11.42   Type 2 diabetes mellitus with diabetic polyneuropathy       N47.1   Phimosis       K21.9   Gastro-esophageal reflux disease without esophagitis       I48.91   Unspecified atrial fibrillation       F51.01   Primary insomnia       E03.9   Hypothyroidism, unspecified       I25.10   Atherosclerotic heart disease of native coronary artery without angina pectoris       M17.0   Bilateral primary osteoarthritis of knee           ORDERS:         Lab Orders:       [id number]  [facility] PHYSICAL: CMP, CBC, TSH, LIPID: [id number], [id number], [id number], [id number]  (Send-Out)            [id number]  [address] Hemoglobin A1C  (Send-Out)              Procedures Ordered:       REFER  Referral to Specialist or Other Facility  (Send-Out)                      Plan:         Essential (primary) hypertension- Stable.  Continue hydrochlorothiazide 25 mg daily, dur 30 mg daily and amlodipine 10 mg daily    LABORATORY:  Labs ordered to be performed today include PHYSICAL PANEL; CMP, CBC, TSH, LIPID.            Orders:       [id number]  [facility] PHYSICAL: CMP, CBC, TSH, LIPID: [id number], [id number], [id number], [id number]  (Send-Out)              Type 2 diabetes mellitus with diabetic polyneuropathy- Continue Tradjenta 5 mg daily. Recenty restarted metformin 500 mg BID and glipizide 2.5 mg BID.    LABORATORY:  Labs ordered to be performed today include HgbA1C.            Orders:       [id number]  [address] Hemoglobin A1C  (Send-Out)              Phimosis- Urology referral placed         REFERRALS:  Referral initiated to a urologist ( Dr. Alex Georges - [facility] ).            Orders:       REFER  Referral to Specialist or Other  Facility  (Send-Out)              Gastro-esophageal reflux disease without esophagitis- Stable.  Continue Protonix 40 mg daily.            Unspecified atrial fibrillation- Stable.  Continue to follow with cardiology as directed.  Continue current regimen.        Primary insomnia- Stable.  Continue trazodone 50 to 100 mg nightly as needed.        Hypothyroidism, unspecified- Symptomatically stable but not at goal but with lab values.  Last 3 TSHs have been abnormal.  Continue Synthroid 125 mcg daily. Repeat TSH today.         Atherosclerotic heart disease of native coronary artery without angina pectoris- See above        Bilateral primary osteoarthritis of knee- Not controlled. Likely degenerative in nature. Continue tylenol 1000 mg TID. Start tramadol 50 mg BID prn for breakthrough pain. Will repeat knee injection at next visit.             Charge Capture:         Primary Diagnosis:     I10  Essential (primary) hypertension           Orders:      [id number]  Office/outpatient visit; established patient, level 4  (In-House)              E11.42  Type 2 diabetes mellitus with diabetic polyneuropathy     N47.1  Phimosis     K21.9  Gastro-esophageal reflux disease without esophagitis     I48.91  Unspecified atrial fibrillation     F51.01  Primary insomnia     E03.9  Hypothyroidism, unspecified     I25.10  Atherosclerotic heart disease of native coronary artery without angina pectoris     M17.0  Bilateral primary osteoarthritis of knee

## 2021-06-09 ENCOUNTER — PROCEDURE VISIT (OUTPATIENT)
Dept: FAMILY MEDICINE CLINIC | Age: 86
End: 2021-06-09

## 2021-06-09 VITALS
HEART RATE: 73 BPM | WEIGHT: 166.6 LBS | DIASTOLIC BLOOD PRESSURE: 53 MMHG | SYSTOLIC BLOOD PRESSURE: 148 MMHG | BODY MASS INDEX: 25.33 KG/M2

## 2021-06-09 DIAGNOSIS — I10 ESSENTIAL HYPERTENSION: ICD-10-CM

## 2021-06-09 DIAGNOSIS — M17.0 BILATERAL PRIMARY OSTEOARTHRITIS OF KNEE: Primary | ICD-10-CM

## 2021-06-09 PROBLEM — E78.5 HYPERLIPIDEMIA: Status: ACTIVE | Noted: 2021-06-09

## 2021-06-09 PROBLEM — I48.0 PAROXYSMAL ATRIAL FIBRILLATION: Status: ACTIVE | Noted: 2017-11-30

## 2021-06-09 PROBLEM — E11.9 TYPE 2 DIABETES MELLITUS: Status: ACTIVE | Noted: 2017-05-30

## 2021-06-09 PROBLEM — N18.9 CHRONIC KIDNEY DISEASE: Status: ACTIVE | Noted: 2021-06-09

## 2021-06-09 PROBLEM — E03.9 HYPOTHYROIDISM: Status: ACTIVE | Noted: 2021-06-09

## 2021-06-09 PROCEDURE — 20610 DRAIN/INJ JOINT/BURSA W/O US: CPT | Performed by: FAMILY MEDICINE

## 2021-06-09 PROCEDURE — 99213 OFFICE O/P EST LOW 20 MIN: CPT | Performed by: FAMILY MEDICINE

## 2021-06-09 RX ORDER — TRIAMCINOLONE ACETONIDE 40 MG/ML
40 INJECTION, SUSPENSION INTRA-ARTICULAR; INTRAMUSCULAR
Status: COMPLETED | OUTPATIENT
Start: 2021-06-09 | End: 2021-06-09

## 2021-06-09 RX ORDER — LIDOCAINE HYDROCHLORIDE 10 MG/ML
5 INJECTION, SOLUTION INFILTRATION; PERINEURAL
Status: COMPLETED | OUTPATIENT
Start: 2021-06-09 | End: 2021-06-09

## 2021-06-09 RX ADMIN — TRIAMCINOLONE ACETONIDE 40 MG: 40 INJECTION, SUSPENSION INTRA-ARTICULAR; INTRAMUSCULAR at 17:50

## 2021-06-09 RX ADMIN — LIDOCAINE HYDROCHLORIDE 5 ML: 10 INJECTION, SOLUTION INFILTRATION; PERINEURAL at 17:50

## 2021-06-09 NOTE — PROGRESS NOTES
[facility]  Office Visit Note    Darci Munson presents to [facility]  Encounter Date: 06/09/2021     Chief Complaint  Follow-up (Left knee injection)    Subjective    History of Present Illness:  Darci presents to clinic today for a left knee injection. This is a long standing issue and is worsening over time. He has a prior history of right knee replacement He has found injections helpful in the past. His current regimen is tylenol 1000 mg TID and tramadol 50 mg BID. He is currently trying to get cardiac clearance to get his left knee replaced.     Review of Systems:  Review of Systems   Constitutional: Negative for chills, fatigue and fever.   Eyes: Negative for blurred vision.   Respiratory: Negative for cough and shortness of breath.    Cardiovascular: Negative for chest pain, palpitations and leg swelling.   Gastrointestinal: Positive for GERD. Negative for abdominal pain, nausea and vomiting.   Musculoskeletal:        Positive for left knee pain   Neurological: Positive for numbness and memory problem. Negative for dizziness, weakness and headache.        Objective   Vital Signs:  /53 (BP Location: Right arm, Patient Position: Sitting, Cuff Size: Adult)   Pulse 73   Wt 75.6 kg (166 lb 9.6 oz)   BMI 25.33 kg/m²      Physical Examination:  Physical Exam  Constitutional:       General: He is not in acute distress.     Appearance: Normal appearance.   HENT:      Head: Normocephalic and atraumatic.   Eyes:      Conjunctiva/sclera: Conjunctivae normal.   Cardiovascular:      Rate and Rhythm: Normal rate and regular rhythm.      Heart sounds: Murmur heard.   Systolic murmur is present with a grade of 2/6.     Pulmonary:      Effort: Pulmonary effort is normal.      Breath sounds: Normal breath sounds.   Musculoskeletal:      Left knee: Swelling and deformity present.   Skin:     General: Skin is warm and dry.      Findings: No rash.   Neurological:       General: No focal deficit present.      Mental Status: He is alert and oriented to person, place, and time.   Psychiatric:         Mood and Affect: Mood normal.         Behavior: Behavior normal.         Result Review   The following data was reviewed by: Adrien Jackson MD on 06/09/2021:  Hemoglobin A1C With EAG (05/28/2021 11:59)  Physical [facility] Panel (05/28/2021 11:59)  T4, Free (05/28/2021 11:59)      Procedures:  Arthrocentesis    Date/Time: 6/9/2021 5:50 PM  Performed by: Adrien Jackson MD  Authorized by: Adrien Jackson MD   Indications: joint swelling and pain   Body area: knee  Joint: left knee  Local anesthesia used: no    Anesthesia:  Local anesthesia used: no    Sedation:  Patient sedated: no    Needle gauge: 25G.  Ultrasound guidance: no  Approach: lateral  Comments: 1 cc of kenalog and 4 cc of lidocaine injected into left knee via lateral approach without complications.           Assessment and Plan:  Diagnoses and all orders for this visit:    1. Bilateral primary osteoarthritis of knee (Primary)  - Left knee injection performed in office today; see details above. Continue to follow with orthopedics for possible replacement. Continue tylenol and tramdol as needed    2. Essential hypertension  - Borderline/slightly high. Patient thinks 2/2 to pain. He declines changes at this time. Continue hydrochlorothiazide 25 mg daily, Imdur 30 mg daily and amlodipine 10 mg daily       Other orders  -     Arthrocentesis        Return if symptoms worsen or fail to improve.    Patient was given instructions and counseling regarding his condition or for health maintenance advice. Please see specific information pulled into the AVS if appropriate.      Adrien Jackson MD   6/9/2021 17:41 EDT

## 2021-06-25 RX ORDER — GLIPIZIDE 5 MG/1
2.5 TABLET ORAL 2 TIMES DAILY
Qty: 90 TABLET | Refills: 0 | Status: SHIPPED | OUTPATIENT
Start: 2021-06-25 | End: 2021-09-02

## 2021-06-25 NOTE — TELEPHONE ENCOUNTER
LAST OV: 5/28/21  NEXT OV: 11/30/21  LAST LAB: 5/28/21 PHYSICAL and A1c    PLEASE SIGN OR ADVISE:    Physical [facility] Panel  Order: [id number]  Status:  Final result   Visible to patient:  No (not released)   Next appt:  06/28/2021 at 02:00 PM in Urology (Cristal Montiel, APRZEYNEP)       Component   Ref Range & Units 4 wk ago   Glucose   70 - 99 mg/dL 222High     BUN   5 - 25 mg/dL 29High     Creatinine   0.70 - 1.20 mg/dL 1.62High     BUN/Creatinine Ratio   6 - 20  18    GFR   >60 mL/min/ 38Low     Comment: Interpretative Data:   ------------------------------------   STAGE                  GFR   Stage 1                90 mL/min or greater   Stage 2                60-89 mL/min   Stage 3                30-59 mL/min   Stage 4                15-29 mL/min   Value <60 mL/min for 3 or more months is defined as CKD.    Sodium   135 - 147 mmol/L 140    Potassium   3.5 - 5.3 mmol/L 5.0    Chloride   99 - 111 mmol/L 102    CO2   22 - 32 mmol/L 24    Anion Gap   8 - 19 mmol/L 19    OSMOLALITY CALC   273 - 304 303    Total Protein   6.3 - 8.2 g/dL 7.8    Comment: If Patient is receiving dextran as a blood volume expander   result may show a potential interference.    Albumin   3.5 - 5.0 g/dL 4.5    Globulin   2.0 - 3.5 g/dL 3.3    A/G Ratio   1.4 - 2.6  1.4    Calcium   8.7 - 10.4 mg/dL 10.1    Alkaline Phosphatase   56 - 155 U/L 132    ALT (SGPT)   10 - 40 U/L 17    AST (SGOT)   15 - 50 U/L 23    Total Bilirubin   0.20 - 1.30 mg/dL 0.39    WBC   4.80 - 10.80 10*3/uL 7.09    RBC   4.70 - 6.10 10*6/uL 4.25Low     Hgb   14.00 - 18.00 g/dL 12.80Low     Hematocrit   42.0 - 52.0 % 39.8Low     MCV   80.0 - 96.0 fL 93.6    MCH   27.0 - 31.0 pg 30.1    MCHC   33.0 - 37.0 32.2Low     Platelets   130.00 - 400.00 10*3/uL 202.00    RDW-SD   NA 46.8    RDW   11.5 - 14.5 % 13.5    MPV   7.4 - 10.4 fL 11.2High     Comment: Testing performed by:   [facility]   CLIA#[id number]   [address]    [place] [address]    Neutrophil Absolute   2.00 - 8.00 10*3/uL 4.27    Lymphocytes Absolute   1.00 - 5.00 10*3/uL 1.95    Monocytes Absolute   0.20 - 1.20 10*3/uL 0.59    Eosinophils Absolute   0.00 - 0.70 10*3/uL 0.23    Basophils Absolute   0.00 - 0.20 10*3/uL 0.03    Immature Grans Absolute   0.00 - 0.54 10*3/uL 0.02    Neutrophil Rel %   30.0 - 85.0 % 60.3    Lymphocyte %   20.0 - 45.0 % 27.5    Monocyte %   3.0 - 10.0 % 8.3    Eosinophil %   0.00 - 7.00 % 3.20    Basophil %   0.00 - 3.00 % 0.40    Immature Granulocyte Rel %   0.00 - 0.43 % 0.30    TSH   0.270 - 4.200 m[iU]/L 7.680High     Triglycerides   40 - 150 mg/dL 238High     Comment: <150 mg/dL-Normal   150-199 mg/dL-Borderline High   200-499 mg/dL-High   >500 mg/dL- Very High    Total Cholesterol   107 - 200 mg/dL 199    Comment: <200 mg/dL-Desirable   200-239 mg/dL-Borderline High   >240 mg/dL-High   >500 mg/dL-Very High    HDL Cholesterol   40 - 60 mg/dL 55    Comment: <40 mg/dL-Low   >60 mg/dL- Desirable    Chol/HDL Ratio   3.0 - 6.0 NA 3.6    LDL Cholesterol    70 - 100 mg/dL 96    Comment: Recommended  LDL Levels   <100 mg/dL-Optimal   100-129 mg/dL-Near Optimal/above optimal   130-159 mg/dL-Borderline High   160-189 mg/dL-High   >190 mg/dL-Very High    VLDL Cholesterol   5 - 37 mg/dL 48High           Specimen Collected: 05/28/21 11:59 Last Resulted: 05/28/21 23:09        Lab Flowsheet     Order Details     View Encounter     Lab and Collection Details     Routing     Result History              Other Results from 5/28/2021    Contains abnormal data Hemoglobin A1C With EAG    Status:  Final result   Visible to patient:  No (not released)   Next appt:  06/28/2021 at 02:00 PM in Urology (Cristal Montiel, SHAWANDA)  Order: [id number]       Component   Ref Range & Units 4 wk ago   Mean Bld Glu Estim.   mg/dL 166    Hemoglobin A1C   3.5 - 5.7 % 7.4High     Comment: **Interpretation**   <7% in Controlled Diabetic Patients.   Assay Range 3.4-18.2%   ADA  2010 Standards of Medical Care in Diabetes suggest using   a cut point of >= 6.5% for diagnosis of diabetes and an A1C   range of 5.7%-6.4% as a category of increased risk for future   diabetes.          Specimen Collected: 05/28/21 11:59 Last Resulted: 05/28/21 21:12

## 2021-06-29 RX ORDER — LEVOTHYROXINE SODIUM 125 UG/1
TABLET ORAL
Qty: 30 TABLET | Refills: 0 | Status: SHIPPED | OUTPATIENT
Start: 2021-06-29 | End: 2021-08-11

## 2021-06-29 RX ORDER — PANTOPRAZOLE SODIUM 40 MG/1
TABLET, DELAYED RELEASE ORAL
Qty: 90 TABLET | Refills: 0 | Status: SHIPPED | OUTPATIENT
Start: 2021-06-29 | End: 2021-09-02

## 2021-06-30 ENCOUNTER — TELEPHONE (OUTPATIENT)
Dept: CASE MANAGEMENT | Facility: OTHER | Age: 86
End: 2021-06-30

## 2021-06-30 DIAGNOSIS — E03.9 ACQUIRED HYPOTHYROIDISM: ICD-10-CM

## 2021-06-30 DIAGNOSIS — I10 HYPERTENSION, ESSENTIAL: ICD-10-CM

## 2021-06-30 DIAGNOSIS — E11.42 TYPE 2 DIABETES MELLITUS WITH POLYNEUROPATHY (HCC): Primary | ICD-10-CM

## 2021-06-30 NOTE — TELEPHONE ENCOUNTER
Nya, daughter called to ask for a refill on her dad's medication.  We discussed the changes or qualifications for CCM and explained that since Hayley had left 3 months ago, I really haven't done much for them and inquired if they needed CCM program.  She would like to stay in the program but not sure if he qualifies. I will place him in High Risk case management and outreach to him monthly to check on him.  Updated care team and contacts for patient.  Reviewed with daughter upcoming appointments.  She has my desk number and will reach out as needed.  He really is in good shape and just needs some diabetes instructions periodically.

## 2021-07-01 VITALS
SYSTOLIC BLOOD PRESSURE: 107 MMHG | BODY MASS INDEX: 25.94 KG/M2 | HEART RATE: 58 BPM | HEIGHT: 68 IN | TEMPERATURE: 98.1 F | WEIGHT: 171.2 LBS | DIASTOLIC BLOOD PRESSURE: 74 MMHG

## 2021-07-01 VITALS
HEART RATE: 62 BPM | BODY MASS INDEX: 26.34 KG/M2 | WEIGHT: 173.8 LBS | HEIGHT: 68 IN | TEMPERATURE: 97.7 F | DIASTOLIC BLOOD PRESSURE: 74 MMHG | SYSTOLIC BLOOD PRESSURE: 136 MMHG

## 2021-07-01 VITALS
TEMPERATURE: 97.3 F | BODY MASS INDEX: 26.22 KG/M2 | SYSTOLIC BLOOD PRESSURE: 123 MMHG | HEART RATE: 50 BPM | DIASTOLIC BLOOD PRESSURE: 63 MMHG | WEIGHT: 173 LBS | HEIGHT: 68 IN

## 2021-07-01 VITALS
BODY MASS INDEX: 25.64 KG/M2 | WEIGHT: 169.2 LBS | TEMPERATURE: 97.9 F | DIASTOLIC BLOOD PRESSURE: 78 MMHG | SYSTOLIC BLOOD PRESSURE: 137 MMHG | HEART RATE: 61 BPM | HEIGHT: 68 IN

## 2021-07-01 VITALS
TEMPERATURE: 97.9 F | HEIGHT: 68 IN | DIASTOLIC BLOOD PRESSURE: 53 MMHG | SYSTOLIC BLOOD PRESSURE: 151 MMHG | BODY MASS INDEX: 26.16 KG/M2 | WEIGHT: 172.6 LBS | HEART RATE: 66 BPM

## 2021-07-01 VITALS
WEIGHT: 173 LBS | SYSTOLIC BLOOD PRESSURE: 163 MMHG | HEART RATE: 86 BPM | BODY MASS INDEX: 26.22 KG/M2 | HEIGHT: 68 IN | TEMPERATURE: 97.9 F | DIASTOLIC BLOOD PRESSURE: 60 MMHG

## 2021-07-01 VITALS
DIASTOLIC BLOOD PRESSURE: 55 MMHG | TEMPERATURE: 97.9 F | HEART RATE: 65 BPM | BODY MASS INDEX: 26.25 KG/M2 | WEIGHT: 173.2 LBS | HEIGHT: 68 IN | SYSTOLIC BLOOD PRESSURE: 124 MMHG

## 2021-07-01 VITALS
DIASTOLIC BLOOD PRESSURE: 43 MMHG | SYSTOLIC BLOOD PRESSURE: 135 MMHG | HEIGHT: 68 IN | HEART RATE: 66 BPM | WEIGHT: 177.2 LBS | TEMPERATURE: 97.7 F | BODY MASS INDEX: 26.86 KG/M2

## 2021-07-01 VITALS
WEIGHT: 178.4 LBS | HEART RATE: 64 BPM | HEIGHT: 68 IN | BODY MASS INDEX: 27.04 KG/M2 | DIASTOLIC BLOOD PRESSURE: 42 MMHG | TEMPERATURE: 97.8 F | SYSTOLIC BLOOD PRESSURE: 142 MMHG

## 2021-07-01 VITALS
DIASTOLIC BLOOD PRESSURE: 59 MMHG | HEART RATE: 74 BPM | HEIGHT: 68 IN | BODY MASS INDEX: 25.4 KG/M2 | TEMPERATURE: 97.9 F | WEIGHT: 167.6 LBS | SYSTOLIC BLOOD PRESSURE: 137 MMHG

## 2021-07-01 VITALS
BODY MASS INDEX: 26.86 KG/M2 | TEMPERATURE: 98.3 F | HEIGHT: 68 IN | SYSTOLIC BLOOD PRESSURE: 150 MMHG | DIASTOLIC BLOOD PRESSURE: 63 MMHG | WEIGHT: 177.2 LBS | HEART RATE: 61 BPM

## 2021-07-01 VITALS
TEMPERATURE: 98.3 F | DIASTOLIC BLOOD PRESSURE: 49 MMHG | HEIGHT: 68 IN | SYSTOLIC BLOOD PRESSURE: 152 MMHG | HEART RATE: 72 BPM | BODY MASS INDEX: 26.46 KG/M2 | WEIGHT: 174.6 LBS

## 2021-07-01 VITALS
DIASTOLIC BLOOD PRESSURE: 60 MMHG | TEMPERATURE: 97.9 F | WEIGHT: 175 LBS | HEART RATE: 73 BPM | SYSTOLIC BLOOD PRESSURE: 159 MMHG | BODY MASS INDEX: 26.52 KG/M2 | HEIGHT: 68 IN

## 2021-07-01 VITALS
HEART RATE: 45 BPM | WEIGHT: 174 LBS | BODY MASS INDEX: 26.37 KG/M2 | HEIGHT: 68 IN | DIASTOLIC BLOOD PRESSURE: 55 MMHG | SYSTOLIC BLOOD PRESSURE: 138 MMHG | TEMPERATURE: 97.4 F

## 2021-07-02 VITALS
BODY MASS INDEX: 26.43 KG/M2 | DIASTOLIC BLOOD PRESSURE: 62 MMHG | SYSTOLIC BLOOD PRESSURE: 160 MMHG | WEIGHT: 174.4 LBS | TEMPERATURE: 97.9 F | HEART RATE: 70 BPM | HEIGHT: 68 IN

## 2021-07-02 VITALS
SYSTOLIC BLOOD PRESSURE: 152 MMHG | TEMPERATURE: 97.8 F | BODY MASS INDEX: 25.4 KG/M2 | DIASTOLIC BLOOD PRESSURE: 56 MMHG | HEART RATE: 76 BPM | WEIGHT: 167.6 LBS | HEIGHT: 68 IN

## 2021-07-02 VITALS
HEIGHT: 68 IN | HEART RATE: 76 BPM | SYSTOLIC BLOOD PRESSURE: 149 MMHG | TEMPERATURE: 98.1 F | DIASTOLIC BLOOD PRESSURE: 72 MMHG | WEIGHT: 163.8 LBS | BODY MASS INDEX: 24.83 KG/M2

## 2021-07-02 VITALS
DIASTOLIC BLOOD PRESSURE: 46 MMHG | TEMPERATURE: 98.4 F | WEIGHT: 171.2 LBS | SYSTOLIC BLOOD PRESSURE: 146 MMHG | HEIGHT: 68 IN | HEART RATE: 69 BPM | BODY MASS INDEX: 25.94 KG/M2

## 2021-07-02 VITALS
SYSTOLIC BLOOD PRESSURE: 136 MMHG | DIASTOLIC BLOOD PRESSURE: 56 MMHG | TEMPERATURE: 97.4 F | BODY MASS INDEX: 24.07 KG/M2 | HEIGHT: 68 IN | HEART RATE: 79 BPM | WEIGHT: 158.8 LBS

## 2021-07-02 VITALS
BODY MASS INDEX: 25.23 KG/M2 | DIASTOLIC BLOOD PRESSURE: 61 MMHG | TEMPERATURE: 97.5 F | HEART RATE: 65 BPM | HEIGHT: 68 IN | SYSTOLIC BLOOD PRESSURE: 141 MMHG | WEIGHT: 166.5 LBS

## 2021-07-02 VITALS
SYSTOLIC BLOOD PRESSURE: 158 MMHG | BODY MASS INDEX: 26.43 KG/M2 | HEART RATE: 65 BPM | TEMPERATURE: 97.5 F | WEIGHT: 174.4 LBS | DIASTOLIC BLOOD PRESSURE: 54 MMHG | HEIGHT: 68 IN

## 2021-07-02 VITALS
WEIGHT: 165 LBS | TEMPERATURE: 97.6 F | BODY MASS INDEX: 25.01 KG/M2 | HEIGHT: 68 IN | SYSTOLIC BLOOD PRESSURE: 137 MMHG | DIASTOLIC BLOOD PRESSURE: 73 MMHG | HEART RATE: 77 BPM

## 2021-07-02 VITALS
DIASTOLIC BLOOD PRESSURE: 51 MMHG | WEIGHT: 176.8 LBS | HEIGHT: 68 IN | SYSTOLIC BLOOD PRESSURE: 156 MMHG | TEMPERATURE: 98 F | HEART RATE: 70 BPM | BODY MASS INDEX: 26.8 KG/M2

## 2021-07-02 VITALS
BODY MASS INDEX: 24.64 KG/M2 | WEIGHT: 162.6 LBS | HEIGHT: 68 IN | HEART RATE: 75 BPM | TEMPERATURE: 97.2 F | SYSTOLIC BLOOD PRESSURE: 133 MMHG | DIASTOLIC BLOOD PRESSURE: 55 MMHG

## 2021-07-02 VITALS
TEMPERATURE: 97.1 F | SYSTOLIC BLOOD PRESSURE: 148 MMHG | DIASTOLIC BLOOD PRESSURE: 55 MMHG | HEIGHT: 68 IN | WEIGHT: 164.2 LBS | HEART RATE: 71 BPM | BODY MASS INDEX: 24.89 KG/M2

## 2021-07-02 VITALS
HEIGHT: 68 IN | TEMPERATURE: 97.5 F | SYSTOLIC BLOOD PRESSURE: 154 MMHG | DIASTOLIC BLOOD PRESSURE: 84 MMHG | HEART RATE: 71 BPM | BODY MASS INDEX: 24.31 KG/M2 | WEIGHT: 160.4 LBS

## 2021-07-02 VITALS
HEART RATE: 68 BPM | BODY MASS INDEX: 24.92 KG/M2 | TEMPERATURE: 97.6 F | DIASTOLIC BLOOD PRESSURE: 74 MMHG | SYSTOLIC BLOOD PRESSURE: 148 MMHG | WEIGHT: 164.4 LBS | HEIGHT: 68 IN

## 2021-07-09 ENCOUNTER — PATIENT OUTREACH (OUTPATIENT)
Dept: CASE MANAGEMENT | Facility: OTHER | Age: 86
End: 2021-07-09

## 2021-07-09 DIAGNOSIS — E11.42 TYPE 2 DIABETES MELLITUS WITH POLYNEUROPATHY (HCC): Primary | ICD-10-CM

## 2021-07-09 NOTE — OUTREACH NOTE
Ambulatory Case Management Note    Care Coordination    Called and left a message for Nya to return my call.  Attempting to update chart, check on advanced directive, close care gaps.  I did change his appointment type to [facility] exam for he is due.  Updated care team.  Left message for her to return my call at her convenience.      Mamta Maravilla RN  Ambulatory Case Management    7/9/2021, 13:05 EDT

## 2021-07-14 ENCOUNTER — PATIENT OUTREACH (OUTPATIENT)
Dept: CASE MANAGEMENT | Facility: OTHER | Age: 86
End: 2021-07-14

## 2021-07-14 NOTE — OUTREACH NOTE
Patient Outreach    Attempted to contact daughter to review his care.  Left message on voice mail. Also reminded of appointment next week with Cristal Montiel.     Ambulatory Case Management Note      Mamta Maravilla RN  Ambulatory Case Management    7/14/2021, 13:49 EDT      Patient Outreach    Spoke with Darci and reminded him of his appointment on Monday.

## 2021-07-19 ENCOUNTER — OFFICE VISIT (OUTPATIENT)
Dept: UROLOGY | Facility: CLINIC | Age: 86
End: 2021-07-19

## 2021-07-19 VITALS — BODY MASS INDEX: 25.74 KG/M2 | HEIGHT: 67 IN | WEIGHT: 164 LBS | HEART RATE: 78 BPM

## 2021-07-19 DIAGNOSIS — N48.1 BALANITIS: Primary | ICD-10-CM

## 2021-07-19 PROBLEM — C61 PROSTATE CA: Status: ACTIVE | Noted: 2021-07-19

## 2021-07-19 PROBLEM — G62.9 PERIPHERAL NEUROPATHY: Status: ACTIVE | Noted: 2021-07-19

## 2021-07-19 PROBLEM — I34.0 MITRAL VALVE REGURGITATION: Status: ACTIVE | Noted: 2019-11-22

## 2021-07-19 PROBLEM — H91.90 HEARING LOSS: Status: ACTIVE | Noted: 2021-07-19

## 2021-07-19 PROBLEM — Z91.89 AT RISK FOR NEGATIVE RESPONSE TO MEDICATION: Status: ACTIVE | Noted: 2018-11-27

## 2021-07-19 PROBLEM — I25.10 CORONARY ARTERIOSCLEROSIS: Status: ACTIVE | Noted: 2017-04-17

## 2021-07-19 PROBLEM — Z97.4 HEARING AID WORN: Status: ACTIVE | Noted: 2021-07-19

## 2021-07-19 PROBLEM — E11.9 DIABETES (HCC): Status: ACTIVE | Noted: 2021-07-19

## 2021-07-19 PROBLEM — E78.00 HYPERCHOLESTEROLEMIA: Status: ACTIVE | Noted: 2017-10-17

## 2021-07-19 PROBLEM — E61.1 IRON DEFICIENCY: Status: ACTIVE | Noted: 2021-07-19

## 2021-07-19 PROBLEM — R00.1 BRADYCARDIA: Status: ACTIVE | Noted: 2018-05-21

## 2021-07-19 PROCEDURE — 99213 OFFICE O/P EST LOW 20 MIN: CPT | Performed by: NURSE PRACTITIONER

## 2021-07-19 RX ORDER — TRAMADOL HYDROCHLORIDE 50 MG/1
50 TABLET ORAL EVERY 8 HOURS PRN
COMMUNITY
Start: 2021-06-08 | End: 2021-11-05

## 2021-07-19 RX ORDER — ISOSORBIDE MONONITRATE 30 MG/1
30 TABLET, EXTENDED RELEASE ORAL DAILY
COMMUNITY
Start: 2021-02-01

## 2021-07-19 RX ORDER — AMLODIPINE BESYLATE 10 MG/1
10 TABLET ORAL DAILY
COMMUNITY
Start: 2021-06-19

## 2021-07-19 RX ORDER — LATANOPROST 50 UG/ML
SOLUTION/ DROPS OPHTHALMIC
COMMUNITY
Start: 2021-04-15

## 2021-07-19 RX ORDER — ASPIRIN 81 MG/1
81 TABLET ORAL DAILY
COMMUNITY
End: 2023-02-17

## 2021-07-19 RX ORDER — GABAPENTIN 100 MG/1
100 CAPSULE ORAL 2 TIMES DAILY
COMMUNITY
Start: 2021-06-11 | End: 2021-09-01

## 2021-07-19 RX ORDER — FERROUS SULFATE 325(65) MG
325 TABLET ORAL 2 TIMES DAILY
COMMUNITY

## 2021-07-19 RX ORDER — HYDROCHLOROTHIAZIDE 25 MG/1
25 TABLET ORAL DAILY
COMMUNITY
Start: 2021-03-23 | End: 2021-09-21 | Stop reason: SDUPTHER

## 2021-07-19 RX ORDER — METOCLOPRAMIDE 5 MG/1
5 TABLET ORAL 2 TIMES DAILY
COMMUNITY
Start: 2021-03-28 | End: 2022-07-22 | Stop reason: SDDI

## 2021-07-19 RX ORDER — AMIODARONE HYDROCHLORIDE 200 MG/1
200 TABLET ORAL DAILY
COMMUNITY
Start: 2021-03-09

## 2021-07-19 RX ORDER — NITROGLYCERIN 0.4 MG/1
TABLET SUBLINGUAL
COMMUNITY

## 2021-07-19 RX ORDER — LEVOTHYROXINE SODIUM 0.03 MG/1
TABLET ORAL
COMMUNITY
End: 2021-09-02

## 2021-07-19 RX ORDER — GLIPIZIDE 5 MG/1
0.5 TABLET ORAL
COMMUNITY
Start: 2021-04-07 | End: 2021-10-13 | Stop reason: SDUPTHER

## 2021-07-19 RX ORDER — PANTOPRAZOLE SODIUM 40 MG/1
TABLET, DELAYED RELEASE ORAL
COMMUNITY
Start: 2021-03-08 | End: 2021-11-05

## 2021-07-19 RX ORDER — AMIODARONE HYDROCHLORIDE 200 MG/1
200 TABLET ORAL DAILY
COMMUNITY
Start: 2021-06-28 | End: 2021-11-05 | Stop reason: SDUPTHER

## 2021-07-19 RX ORDER — RIVAROXABAN 15 MG/1
TABLET, FILM COATED ORAL
COMMUNITY
Start: 2021-04-29 | End: 2021-11-08 | Stop reason: ALTCHOICE

## 2021-07-19 NOTE — PROGRESS NOTES
"Chief Complaint: Bladder Problem    Subjective         History of Present Illness  Darci Munson is a 86 y.o. male presents to [facility] to be seen for issues with skin on head of penis.    He reports that the skin is \"stuck\".    He states that this has been like this for about a year.     He states that he can pull the skin back but it splits.         Objective     Past Medical History:   Diagnosis Date   • Anemia, unspecified    • Atherosclerotic heart disease of native coronary artery without angina pectoris    • CAD (coronary artery disease)    • CKD (chronic kidney disease), stage III (CMS/HCC)    • Essential (primary) hypertension    • Gastric ulcer, unspecified as acute or chronic, without hemorrhage or perforation    • Gastroparesis    • GERD without esophagitis    • Glaucoma    • Hereditary and idiopathic neuropathy, unspecified    • History of falling    • HLD (hyperlipidemia)    • HTN (hypertension)    • Hypotension, unspecified    • Hypothyroidism     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   • Irritable bowel syndrome without diarrhea    • Laceration without foreign body of right forearm, initial encounter    • Low back pain    • Malignant neoplasm of prostate (CMS/HCC)    • Mixed hyperlipidemia    • OA (osteoarthritis)    • Other specified disorders of the skin and subcutaneous tissue    • Pain in left foot    • Peripheral vascular disease, unspecified (CMS/HCC)    • Phimosis    • Presence of unspecified artificial knee joint    • Primary insomnia    • Primary osteoarthritis of both knees    • Prostate cancer (CMS/HCC)    • Sensorineural hearing loss (SNHL) of both ears    • Sigmoid diverticulosis     severe sigmoid and descending colon diverticulosis and 3+ gastritis   • Sleep related leg cramps    • 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)     and gastroparesis   • Unspecified atrial fibrillation (CMS/HCC)    • Unspecified dementia without behavioral disturbance " (CMS/[facility])    • Unspecified hearing loss, bilateral        Past Surgical History:   Procedure Laterality Date   • CATARACT EXTRACTION Bilateral 2014   • HAND SURGERY Right    • JOINT REPLACEMENT     • KNEE ARTHROSCOPY Right 03/14/2017   • PROSTATECTOMY     • REPLACEMENT TOTAL KNEE  03/2017         Current Outpatient Medications:   •  amiodarone (PACERONE) 200 MG tablet, amiodarone 200 mg oral tablet take 1 tablet (200 mg) by oral route once daily   Active, Disp: , Rfl:   •  amLODIPine (NORVASC) 10 MG tablet, Take 10 mg by mouth Daily., Disp: , Rfl:   •  aspirin (aspirin) 81 MG EC tablet, Aspir-81 81 mg oral tablet,delayed release (DR/EC) take 1 tablet (81 mg) by oral route once daily   Active, Disp: , Rfl:   •  Euthyrox 125 MCG tablet, Take 1 tablet by mouth once daily, Disp: 30 tablet, Rfl: 0  •  ferrous sulfate 325 (65 FE) MG tablet, ferrous sulfate 325 mg (65 mg iron) oral tablet take 1 tablet (325 mg) by oral route once daily   Active, Disp: , Rfl:   •  gabapentin (NEURONTIN) 100 MG capsule, Take 100 mg by mouth 2 (Two) Times a Day., Disp: , Rfl:   •  glipizide (GLUCOTROL) 5 MG tablet, Take 0.5 tablets by mouth 2 (Two) Times a Day., Disp: 90 tablet, Rfl: 0  •  glipizide (GLUCOTROL) 5 MG tablet, Take 0.5 tablets by mouth., Disp: , Rfl:   •  Glucosamine-Chondroitin 166.7-133.3 MG capsule, Glucosamine Chondroitin PLUS [phone] mg oral capsule take 1 capsule by oral route 2 times a day   Active, Disp: , Rfl:   •  hydroCHLOROthiazide (HYDRODIURIL) 25 MG tablet, Take 25 mg by mouth Daily., Disp: , Rfl:   •  isosorbide mononitrate (IMDUR) 30 MG 24 hr tablet, Take 1 tablet by mouth Daily., Disp: , Rfl:   •  latanoprost (XALATAN) 0.005 % ophthalmic solution, INSTILL 1 DROP INTO BOTH EYES DAILY, Disp: , Rfl:   •  levothyroxine (Synthroid) 25 MCG tablet, Synthroid 25 mcg oral tablet take 1 tablet (25 mcg) by oral route once daily   Active, Disp: , Rfl:   •  linagliptin (Tradjenta) 5 MG tablet tablet, Tradjenta 5 mg  oral tablet take 1 tablet (5 mg) by oral route once daily   Active, Disp: , Rfl:   •  metFORMIN (GLUCOPHAGE) 500 MG tablet, Take 500 mg by mouth Daily With Breakfast & Dinner., Disp: , Rfl:   •  metoclopramide (REGLAN) 5 MG tablet, Take 5 mg by mouth., Disp: , Rfl:   •  nitroglycerin (NITROSTAT) 0.4 MG SL tablet, nitroglycerin 0.4 mg sublingual tablet, sublingual place 1 tablet (0.4 mg) by buccal route at the first sign of an attack; no more than 3 tabs are recommended within a 15 minute period.   Active, Disp: , Rfl:   •  Pacerone 200 MG tablet, Take 200 mg by mouth Daily., Disp: , Rfl:   •  pantoprazole (PROTONIX) 40 MG EC tablet, Take 1 tablet by mouth once daily, Disp: 90 tablet, Rfl: 0  •  traMADol (ULTRAM) 50 MG tablet, Take 50 mg by mouth Every 8 (Eight) Hours As Needed., Disp: , Rfl:   •  Xarelto 15 MG tablet, TAKE 1 TABLET BY MOUTH ONCE DAILY IN THE EVENING WITH A MEAL, Disp: , Rfl:   •  Clotrimazole 1 % ointment, Apply 1 application topically 2 (Two) Times a Day., Disp: 15 g, Rfl: 2  •  pantoprazole (PROTONIX) 40 MG EC tablet, pantoprazole 40 mg oral tablet,delayed release (DR/EC) take 1 tablet (40 mg) by oral route once daily   Active, Disp: , Rfl:   •  triamcinolone (KENALOG) 0.1 % ointment, Apply  topically to the appropriate area as directed 2 (Two) Times a Day., Disp: 15 g, Rfl: 2    Allergies   Allergen Reactions   • Meloxicam Unknown - High Severity   • Nsaids Unknown - High Severity        Family History   Problem Relation Age of Onset   • Diabetes type II Mother    • Lung cancer Father        Social History     Socioeconomic History   • Marital status:      Spouse name: Liudmila ( 16)   • Number of children: 4   • Years of education: Not on file   • Highest education level: Not on file   Tobacco Use   • Smoking status: Former Smoker     Types: Cigarettes     Quit date: 1970     Years since quittin.5   Vaping Use   • Vaping Use: Never used   Substance and Sexual Activity   • Alcohol  "use: Never       Vital Signs:   Pulse 78   Ht 170.2 cm (67\")   Wt 74.4 kg (164 lb)   BMI 25.69 kg/m²      Physical Exam  Genitourinary:     Penis: Phimosis, erythema and discharge (scant white clumpy ) present. No tenderness or swelling.           Result Review :{Labs  Result Review  Imaging  Med Tab  Media :23}   The following data was reviewed by: SHAWANDA Yepez on 07/19/2021:  No results found for this or any previous visit.         Procedures        Assessment and Plan    Diagnoses and all orders for this visit:    1. Balanitis (Primary)  -     Clotrimazole 1 % ointment; Apply 1 application topically 2 (Two) Times a Day.  Dispense: 15 g; Refill: 2  -     triamcinolone (KENALOG) 0.1 % ointment; Apply  topically to the appropriate area as directed 2 (Two) Times a Day.  Dispense: 15 g; Refill: 2      Discussed with patient that it appears he has balanitis at this point in time and I would like to trial treating him with topical ointments to see if this will help to alleviate some of the swelling and irritation to his foreskin.  We will follow-up with him in office in 6 weeks or sooner if needed.    I spent  minutes caring for Darci on this date of service. This time includes time spent by me in the following activities:reviewing tests, obtaining and/or reviewing a separately obtained history, performing a medically appropriate examination and/or evaluation , counseling and educating the patient/family/caregiver, ordering medications, tests, or procedures, and documenting information in the medical record  Follow Up   Return in about 6 weeks (around 8/30/2021) for f/u balanitis .  Patient was given instructions and counseling regarding his condition or for health maintenance advice. Please see specific information pulled into the AVS if appropriate.        "

## 2021-07-25 RX ORDER — CYCLOBENZAPRINE HCL 10 MG
TABLET ORAL
Qty: 90 TABLET | Refills: 0 | Status: SHIPPED | OUTPATIENT
Start: 2021-07-25 | End: 2021-09-27

## 2021-08-05 ENCOUNTER — PATIENT OUTREACH (OUTPATIENT)
Dept: CASE MANAGEMENT | Facility: OTHER | Age: 86
End: 2021-08-05

## 2021-08-11 RX ORDER — LEVOTHYROXINE SODIUM 0.12 MG/1
125 TABLET ORAL DAILY
Qty: 90 TABLET | Refills: 1 | Status: SHIPPED | OUTPATIENT
Start: 2021-08-11 | End: 2021-11-05 | Stop reason: SDUPTHER

## 2021-08-16 ENCOUNTER — TELEPHONE (OUTPATIENT)
Dept: FAMILY MEDICINE CLINIC | Age: 86
End: 2021-08-16

## 2021-08-16 NOTE — TELEPHONE ENCOUNTER
DELETE AFTER REVIEWING: Telephone encounter to be sent to the  pool     Caller: Sharon Davalos    Relationship: Emergency Contact    Best call back number: [phone]    What is the best time to reach you: ANYTIME    Who are you requesting to speak with (clinical staff, provider,  specific staff member): DR. DOLLY TAYLOR OR NURSE     What was the call regarding: PATIENT DAUGHTER IS CALLING WANTING TO KNOW IF THE NEW MEDICATION THAT THEY RECEIVED IF THEY NEED TO ADD IT TO WHAT IT TAKES ALREADY OR IF THEY NEED TO STOP SOME OF THE OTHER BEFORE STARTING THIS.     Do you require a callback: YES

## 2021-08-24 NOTE — TELEPHONE ENCOUNTER
I have tried to call back without success.  I left a voice message reminding about appointment on Thursday.

## 2021-08-30 ENCOUNTER — TELEPHONE (OUTPATIENT)
Dept: FAMILY MEDICINE CLINIC | Age: 86
End: 2021-08-30

## 2021-09-01 RX ORDER — GABAPENTIN 100 MG/1
CAPSULE ORAL
Qty: 60 CAPSULE | Refills: 0 | Status: SHIPPED | OUTPATIENT
Start: 2021-09-01 | End: 2021-10-31 | Stop reason: SDUPTHER

## 2021-09-01 NOTE — TELEPHONE ENCOUNTER
His A1c is actually close to normal.  We will recheck this tomorrow and if it is stable or even better, no think he needs an additional medication.  However, if it is worsened we can consider starting Jardiance.  We will discuss this at his office visit tomorrow.    Adrien Jackson MD   9/1/2021 19:46 EDT

## 2021-09-02 ENCOUNTER — LAB (OUTPATIENT)
Dept: LAB | Facility: HOSPITAL | Age: 86
End: 2021-09-02

## 2021-09-02 ENCOUNTER — OFFICE VISIT (OUTPATIENT)
Dept: FAMILY MEDICINE CLINIC | Age: 86
End: 2021-09-02

## 2021-09-02 VITALS
BODY MASS INDEX: 26.3 KG/M2 | DIASTOLIC BLOOD PRESSURE: 64 MMHG | SYSTOLIC BLOOD PRESSURE: 162 MMHG | HEART RATE: 69 BPM | HEIGHT: 67 IN | WEIGHT: 167.6 LBS

## 2021-09-02 DIAGNOSIS — K92.1 MELENA: ICD-10-CM

## 2021-09-02 DIAGNOSIS — I25.10 CORONARY ARTERIOSCLEROSIS: Primary | ICD-10-CM

## 2021-09-02 DIAGNOSIS — E11.42 TYPE 2 DIABETES MELLITUS WITH DIABETIC POLYNEUROPATHY, WITHOUT LONG-TERM CURRENT USE OF INSULIN (HCC): ICD-10-CM

## 2021-09-02 DIAGNOSIS — I10 ESSENTIAL HYPERTENSION: ICD-10-CM

## 2021-09-02 DIAGNOSIS — H61.23 BILATERAL IMPACTED CERUMEN: ICD-10-CM

## 2021-09-02 DIAGNOSIS — I48.0 PAROXYSMAL ATRIAL FIBRILLATION (HCC): ICD-10-CM

## 2021-09-02 DIAGNOSIS — K21.9 GASTROESOPHAGEAL REFLUX DISEASE WITHOUT ESOPHAGITIS: ICD-10-CM

## 2021-09-02 DIAGNOSIS — E03.9 HYPOTHYROIDISM, UNSPECIFIED TYPE: ICD-10-CM

## 2021-09-02 PROBLEM — E11.9 DIABETES: Status: RESOLVED | Noted: 2021-07-19 | Resolved: 2021-09-02

## 2021-09-02 PROBLEM — F51.01 PRIMARY INSOMNIA: Status: ACTIVE | Noted: 2021-09-02

## 2021-09-02 LAB
BASOPHILS # BLD AUTO: 0.04 10*3/MM3 (ref 0–0.2)
BASOPHILS NFR BLD AUTO: 0.6 % (ref 0–1.5)
DEPRECATED RDW RBC AUTO: 45.6 FL (ref 37–54)
EOSINOPHIL # BLD AUTO: 0.22 10*3/MM3 (ref 0–0.4)
EOSINOPHIL NFR BLD AUTO: 3.3 % (ref 0.3–6.2)
ERYTHROCYTE [DISTWIDTH] IN BLOOD BY AUTOMATED COUNT: 13.4 % (ref 12.3–15.4)
HCT VFR BLD AUTO: 34.5 % (ref 37.5–51)
HGB BLD-MCNC: 11.3 G/DL (ref 13–17.7)
IMM GRANULOCYTES # BLD AUTO: 0.02 10*3/MM3 (ref 0–0.05)
IMM GRANULOCYTES NFR BLD AUTO: 0.3 % (ref 0–0.5)
LYMPHOCYTES # BLD AUTO: 2.21 10*3/MM3 (ref 0.7–3.1)
LYMPHOCYTES NFR BLD AUTO: 33.4 % (ref 19.6–45.3)
MCH RBC QN AUTO: 30.3 PG (ref 26.6–33)
MCHC RBC AUTO-ENTMCNC: 32.8 G/DL (ref 31.5–35.7)
MCV RBC AUTO: 92.5 FL (ref 79–97)
MONOCYTES # BLD AUTO: 0.6 10*3/MM3 (ref 0.1–0.9)
MONOCYTES NFR BLD AUTO: 9.1 % (ref 5–12)
NEUTROPHILS NFR BLD AUTO: 3.52 10*3/MM3 (ref 1.7–7)
NEUTROPHILS NFR BLD AUTO: 53.3 % (ref 42.7–76)
PLATELET # BLD AUTO: 174 10*3/MM3 (ref 140–450)
PMV BLD AUTO: 10.9 FL (ref 6–12)
RBC # BLD AUTO: 3.73 10*6/MM3 (ref 4.14–5.8)
WBC # BLD AUTO: 6.61 10*3/MM3 (ref 3.4–10.8)

## 2021-09-02 PROCEDURE — 85025 COMPLETE CBC W/AUTO DIFF WBC: CPT

## 2021-09-02 PROCEDURE — 36415 COLL VENOUS BLD VENIPUNCTURE: CPT

## 2021-09-02 PROCEDURE — 84443 ASSAY THYROID STIM HORMONE: CPT

## 2021-09-02 PROCEDURE — 99214 OFFICE O/P EST MOD 30 MIN: CPT | Performed by: FAMILY MEDICINE

## 2021-09-02 PROCEDURE — 84439 ASSAY OF FREE THYROXINE: CPT

## 2021-09-02 PROCEDURE — 69209 REMOVE IMPACTED EAR WAX UNI: CPT | Performed by: FAMILY MEDICINE

## 2021-09-02 PROCEDURE — 83036 HEMOGLOBIN GLYCOSYLATED A1C: CPT

## 2021-09-02 PROCEDURE — 80053 COMPREHEN METABOLIC PANEL: CPT

## 2021-09-02 RX ORDER — CLOTRIMAZOLE 1 %
CREAM (GRAM) TOPICAL
COMMUNITY
Start: 2021-07-19 | End: 2021-11-08 | Stop reason: ALTCHOICE

## 2021-09-02 RX ORDER — SUCRALFATE 1 G/1
1 TABLET ORAL 4 TIMES DAILY
Qty: 120 TABLET | Refills: 0 | Status: SHIPPED | OUTPATIENT
Start: 2021-09-02 | End: 2021-10-13 | Stop reason: SDUPTHER

## 2021-09-02 NOTE — ASSESSMENT & PLAN NOTE
Not controlled in office today.  Medication regimen changes denied.  Continue HCTZ 25 mg daily, amlodipine 10 mg daily and Imdur 30 mg daily.

## 2021-09-02 NOTE — PROGRESS NOTES
"[facility]  Office Visit Note    Darci Munson presents to [facility]  Encounter Date: 09/02/2021     Chief Complaint  Hypertension (Follow up), Atrial Fibrillation (Follow up), and Diabetes (Follow up)    Subjective    History of Present Illness:    1.  Cardiac history: Darci has a known history of both atrial fibrillation and CAD.  He says that his symptoms are stable.  He follows with cardiology regularly.  His current regimen includes aspirin 81 mg daily, amiodarone 200 mg 3 times daily, Xarelto 15 mg daily, Imdur 30 mg daily and HCTZ 25 mg daily.  2.  Hypertension: Pertaining hypertension, Darci reports that he does not check his blood pressure regularly at home.  His current regimen includes HCTZ 25 mg daily, amlodipine 10 mg daily and Imdur 30 mg daily.  3.  Diabetes: Pertaining diabetes, Darci reports that he does not check his blood sugar regularly.  His most recent A1c was 7.4% on 5/28/2021.  His current regimen includes Metformin 500 mg twice daily, glipizide 2.5 mg twice daily and Tradjenta 5 mg daily.  4.  Hypothyroidism: Pertaining hypothyroidism, Darci takes Synthroid 125 mg daily.  His most recent TSH was 7.680 on 5/28/2021 but free T4 was normal at that time.  He denies signs or symptoms of thyroid dysfunction.  5 GERD/ Melena: Darci reports that he has been experiencing dark stools for the last several weeks.  He has a prior history of a peptic ulcer.  He said he was previously given a medication that helped him and would like to restart this.  He would like to try the medication before referral to gastroenterology.  No hematochezia.  No abdominal pain.  6.  Darci reports that his hearing has been even further decreased from baseline (he wears hearing aids) and that his ears feel \"stopped up.\"  In the past, when he says he has felt this way, his ear canals have been blocked with wax.    Review of Systems:  Review of Systems   Constitutional: " "Negative for chills, fatigue and fever.   HENT: Positive for ear pain and hearing loss. Negative for trouble swallowing.    Eyes: Negative for blurred vision.   Respiratory: Negative for cough and shortness of breath.    Cardiovascular: Negative for chest pain, palpitations and leg swelling.   Gastrointestinal: Positive for blood in stool and GERD. Negative for abdominal pain, constipation, diarrhea, nausea and vomiting.   Neurological: Negative for dizziness, weakness, numbness and headache.   Psychiatric/Behavioral: Negative for sleep disturbance, suicidal ideas and depressed mood. The patient is not nervous/anxious.         Objective   Vital Signs:  /64 (BP Location: Right arm, Patient Position: Sitting, Cuff Size: Adult)   Pulse 69   Ht 170.2 cm (67\")   Wt 76 kg (167 lb 9.6 oz)   BMI 26.25 kg/m²      Physical Examination:  Physical Exam  Vitals reviewed.   Constitutional:       General: He is not in acute distress.     Appearance: Normal appearance.   HENT:      Head: Normocephalic and atraumatic.      Right Ear: There is impacted cerumen.      Left Ear: There is impacted cerumen.   Eyes:      Conjunctiva/sclera: Conjunctivae normal.   Cardiovascular:      Rate and Rhythm: Normal rate and regular rhythm.      Heart sounds: No murmur heard.     Pulmonary:      Effort: Pulmonary effort is normal. No respiratory distress.      Breath sounds: Normal breath sounds.   Abdominal:      General: There is no distension.      Palpations: Abdomen is soft.      Tenderness: There is no abdominal tenderness.   Musculoskeletal:      Right lower leg: No edema.      Left lower leg: No edema.   Skin:     General: Skin is warm and dry.      Findings: No rash.   Neurological:      General: No focal deficit present.      Mental Status: He is alert and oriented to person, place, and time.   Psychiatric:         Mood and Affect: Mood normal.         Behavior: Behavior normal.         Result Review   The following data was " reviewed by: Adrien Jackson MD on 09/02/2021:  Hemoglobin A1C With EAG (05/28/2021 11:59)  Physical [facility] Panel (05/28/2021 11:59)  T4, Free (05/28/2021 11:59)    Procedures:    No procedures associated with this visit.     Assessment and Plan:  Diagnoses and all orders for this visit:    1. Coronary arteriosclerosis (Primary)  Assessment & Plan:  Stable.  Continue follow cardiology as directed.  Continue aspirin 81 mg daily and Imdur 30 mg daily.      2. Essential hypertension  Assessment & Plan:  Not controlled in office today.  Medication regimen changes denied.  Continue HCTZ 25 mg daily, amlodipine 10 mg daily and Imdur 30 mg daily.    Orders:  -     Comprehensive metabolic panel; Future    3. Type 2 diabetes mellitus with diabetic polyneuropathy, without long-term current use of insulin (CMS/[facility])  Assessment & Plan:  Near goal.  Continue Metformin 500 mg twice daily and glipizide 2.5 mg twice daily. Repeat A1c ordered today.    Orders:  -     Hemoglobin A1c; Future  -     Comprehensive metabolic panel; Future    4. Paroxysmal atrial fibrillation (CMS/HCC)  Assessment & Plan:  Stable.  Continue to follow cardiology as directed.  Continue amiodarone 20 mg 3 times daily, Xarelto 50 mg daily      5. Hypothyroidism, unspecified type  Assessment & Plan:  Most recently not controlled.  Continue Synthroid 125 mcg daily.  Repeat TSH ordered today.    Orders:  -     TSH Rfx On Abnormal To Free T4; Future    6. Gastroesophageal reflux disease without esophagitis  7. Melena  -     Concern for recurrence of bleeding ulcer.  Advised GI referral for evaluation for EGD.  Patient declines at this time wishing to try medication trials.  As such, we will start sucralfate and continue Protonix.  Strong ED precautions given.  Will order a CBC to keep track of the patient's hemoglobin status.  - CBC w AUTO Differential; Future    8. Bilateral impacted cerumen  -Cerumen removed by nursing staff in office today with  improvement in symptoms.  See procedure note    Other orders  -     Cancel: POC Glycosylated Hemoglobin (Hb A1C)  -     sucralfate (Carafate) 1 g tablet; Take 1 tablet by mouth 4 (Four) Times a Day.  Dispense: 120 tablet; Refill: 0  -     Cancel: Ear Cerumen Removal  -     Ear Cerumen Removal      Return in about 3 months (around 12/2/2021).    Patient was given instructions and counseling regarding his condition or for health maintenance advice. Please see specific information pulled into the AVS if appropriate.      Adrien Jackson MD   9/2/2021

## 2021-09-02 NOTE — ASSESSMENT & PLAN NOTE
Near goal.  Continue Metformin 500 mg twice daily and glipizide 2.5 mg twice daily. Repeat A1c ordered today.

## 2021-09-02 NOTE — PROGRESS NOTES
Ear Cerumen Removal    Date/Time: 9/2/2021 2:10 PM  Performed by: Christie Alcaraz  Authorized by: Adrien Jackson MD     Anesthesia:  Local Anesthetic: none  Location details: left ear and right ear  Patient tolerance: patient tolerated the procedure well with no immediate complications  Procedure type: irrigation   Sedation:  Patient sedated: no

## 2021-09-02 NOTE — ASSESSMENT & PLAN NOTE
Stable.  Continue follow cardiology as directed.  Continue aspirin 81 mg daily and Imdur 30 mg daily.

## 2021-09-02 NOTE — ASSESSMENT & PLAN NOTE
Stable.  Continue to follow cardiology as directed.  Continue amiodarone 20 mg 3 times daily, Xarelto 50 mg daily

## 2021-09-03 LAB
ALBUMIN SERPL-MCNC: 4.3 G/DL (ref 3.5–5.2)
ALBUMIN/GLOB SERPL: 1.5 G/DL
ALP SERPL-CCNC: 127 U/L (ref 39–117)
ALT SERPL W P-5'-P-CCNC: 12 U/L (ref 1–41)
ANION GAP SERPL CALCULATED.3IONS-SCNC: 13.4 MMOL/L (ref 5–15)
AST SERPL-CCNC: 15 U/L (ref 1–40)
BILIRUB SERPL-MCNC: 0.3 MG/DL (ref 0–1.2)
BUN SERPL-MCNC: 25 MG/DL (ref 8–23)
BUN/CREAT SERPL: 16.3 (ref 7–25)
CALCIUM SPEC-SCNC: 9.4 MG/DL (ref 8.6–10.5)
CHLORIDE SERPL-SCNC: 103 MMOL/L (ref 98–107)
CO2 SERPL-SCNC: 22.6 MMOL/L (ref 22–29)
CREAT SERPL-MCNC: 1.53 MG/DL (ref 0.76–1.27)
GFR SERPL CREATININE-BSD FRML MDRD: 43 ML/MIN/1.73
GLOBULIN UR ELPH-MCNC: 2.8 GM/DL
GLUCOSE SERPL-MCNC: 142 MG/DL (ref 65–99)
HBA1C MFR BLD: 7.62 % (ref 4.8–5.6)
POTASSIUM SERPL-SCNC: 4.9 MMOL/L (ref 3.5–5.2)
PROT SERPL-MCNC: 7.1 G/DL (ref 6–8.5)
SODIUM SERPL-SCNC: 139 MMOL/L (ref 136–145)
T4 FREE SERPL-MCNC: 1.34 NG/DL (ref 0.93–1.7)
TSH SERPL DL<=0.05 MIU/L-ACNC: 7.31 UIU/ML (ref 0.27–4.2)

## 2021-09-11 DIAGNOSIS — E61.1 IRON DEFICIENCY: Primary | ICD-10-CM

## 2021-09-13 ENCOUNTER — LAB (OUTPATIENT)
Dept: LAB | Facility: HOSPITAL | Age: 86
End: 2021-09-13

## 2021-09-13 ENCOUNTER — PATIENT OUTREACH (OUTPATIENT)
Dept: CASE MANAGEMENT | Facility: OTHER | Age: 86
End: 2021-09-13

## 2021-09-13 DIAGNOSIS — E61.1 IRON DEFICIENCY: ICD-10-CM

## 2021-09-13 LAB
BASOPHILS # BLD AUTO: 0.06 10*3/MM3 (ref 0–0.2)
BASOPHILS NFR BLD AUTO: 1 % (ref 0–1.5)
DEPRECATED RDW RBC AUTO: 46.7 FL (ref 37–54)
EOSINOPHIL # BLD AUTO: 0.32 10*3/MM3 (ref 0–0.4)
EOSINOPHIL NFR BLD AUTO: 5.4 % (ref 0.3–6.2)
ERYTHROCYTE [DISTWIDTH] IN BLOOD BY AUTOMATED COUNT: 13.5 % (ref 12.3–15.4)
HCT VFR BLD AUTO: 36.5 % (ref 37.5–51)
HGB BLD-MCNC: 11.8 G/DL (ref 13–17.7)
IMM GRANULOCYTES # BLD AUTO: 0.03 10*3/MM3 (ref 0–0.05)
IMM GRANULOCYTES NFR BLD AUTO: 0.5 % (ref 0–0.5)
LYMPHOCYTES # BLD AUTO: 1.81 10*3/MM3 (ref 0.7–3.1)
LYMPHOCYTES NFR BLD AUTO: 30.4 % (ref 19.6–45.3)
MCH RBC QN AUTO: 30 PG (ref 26.6–33)
MCHC RBC AUTO-ENTMCNC: 32.3 G/DL (ref 31.5–35.7)
MCV RBC AUTO: 92.9 FL (ref 79–97)
MONOCYTES # BLD AUTO: 0.38 10*3/MM3 (ref 0.1–0.9)
MONOCYTES NFR BLD AUTO: 6.4 % (ref 5–12)
NEUTROPHILS NFR BLD AUTO: 3.35 10*3/MM3 (ref 1.7–7)
NEUTROPHILS NFR BLD AUTO: 56.3 % (ref 42.7–76)
PLATELET # BLD AUTO: 199 10*3/MM3 (ref 140–450)
PMV BLD AUTO: 10.4 FL (ref 6–12)
RBC # BLD AUTO: 3.93 10*6/MM3 (ref 4.14–5.8)
WBC # BLD AUTO: 5.95 10*3/MM3 (ref 3.4–10.8)

## 2021-09-13 PROCEDURE — 85025 COMPLETE CBC W/AUTO DIFF WBC: CPT

## 2021-09-13 PROCEDURE — 36415 COLL VENOUS BLD VENIPUNCTURE: CPT

## 2021-09-13 NOTE — OUTREACH NOTE
Patient Outreach    Called and spoke with Nya daughter regarding lab results.  I did also speak with Darci, but he could not hear anything being said.  Dr. Ashley reviewed labs and recommended to repeat CBC in 1 week due to slight anemia and complaining of dark stools.  Daughter reports that he is having knee replacement surgery October 12 - Dr. Atkins at [facility] and is having preop labs done tomorrow.  I will check online tomorrow to see if a CBC was ordered and check against our results.      Daughter was stating that the medication they applied for earlier in the year (Jardiance 10mg) came in the mail this week.  This was recommended due to his renal function and his Metformin was decreased to 500mg bid.  He is taking currently Glipizide 5mg 1/2 tab daily, Metformin 500 bid, and Tradjenta 5mg for his diabetes.  His A1c on 9/2/21 was 7.62.  Is it ok to add the Jardiance? Would he stop another agent in its place? Again this was suggested to try to preserve renal function - over a year ago (not sure who made the recommendation) - creatnine 1.53 and bun 25 on 9/2/21.  He does not see a nephrologist - went 1 time and never returned.     They are going to discuss who they want for their PCP.    Patient Outreach    Spoke with Nya and decided on PCP and scheduled appointment. Nya says that he prefers a male provider.  I have printed and scanned some old records to review.  He is an old patient of [employer] and I am unfamiliar with all of his care.  Will review chart at length.   I still cannot find any kind of surgical clearance from our office, maybe they only need from cardiology.

## 2021-09-17 NOTE — PROGRESS NOTES
Called and left a message for Nya to call me back.  Needs new pcp, but left vm regarding not starting new med.

## 2021-09-17 NOTE — PROGRESS NOTES
Since he is getting ready to have surgery I would hold off on any medication changes at this time.  Keep the medication on hand and after recovery from the surgery can be further discussion about need for the medication.    [facility],  Lionel Mayer MD

## 2021-09-21 DIAGNOSIS — D64.9 ANEMIA, UNSPECIFIED TYPE: Primary | ICD-10-CM

## 2021-09-21 RX ORDER — HYDROCHLOROTHIAZIDE 25 MG/1
12.5 TABLET ORAL DAILY
Qty: 30 TABLET | Refills: 0 | Status: SHIPPED | OUTPATIENT
Start: 2021-09-21 | End: 2021-10-27

## 2021-09-21 NOTE — TELEPHONE ENCOUNTER
Rx Refill Note  Requested Prescriptions     Pending Prescriptions Disp Refills   • hydroCHLOROthiazide (HYDRODIURIL) 25 MG tablet 30 tablet 0     Sig: Take 0.5 tablets by mouth Daily.      Last office visit with prescribing clinician: 9/2/2021      Next office visit with prescribing clinician: 11/30/2021      Lab Results   Component Value Date    GLUCOSE 142 (H) 09/02/2021    BUN 25 (H) 09/02/2021    CREATININE 1.53 (H) 09/02/2021    EGFRIFNONA 43 (L) 09/02/2021    BCR 16.3 09/02/2021    K 4.9 09/02/2021    CO2 22.6 09/02/2021    CALCIUM 9.4 09/02/2021    ALBUMIN 4.30 09/02/2021    LABIL2 1.4 05/28/2021    AST 15 09/02/2021    ALT 12 09/02/2021     María Lauren LPN  09/21/21, 14:36 EDT

## 2021-09-27 RX ORDER — CYCLOBENZAPRINE HCL 10 MG
10 TABLET ORAL 2 TIMES DAILY PRN
Qty: 30 TABLET | Refills: 0 | Status: SHIPPED | OUTPATIENT
Start: 2021-09-27 | End: 2021-10-31

## 2021-09-27 NOTE — TELEPHONE ENCOUNTER
Rx Refill Note  Requested Prescriptions     Pending Prescriptions Disp Refills   • cyclobenzaprine (FLEXERIL) 10 MG tablet [Pharmacy Med Name: Cyclobenzaprine HCl 10 MG Oral Tablet] 90 tablet 0     Sig: Take 1 tablet by mouth twice daily as needed      Last office visit with prescribing clinician: 9/2/2021      Next office visit with prescribing clinician: 11/22/21 EST CARE WITH [facility]O       {TIP  Is Refill Pharmacy correct?YES   Nathaly Almodovar  09/27/21, 14:18 EDT

## 2021-09-30 ENCOUNTER — PATIENT OUTREACH (OUTPATIENT)
Dept: CASE MANAGEMENT | Facility: OTHER | Age: 86
End: 2021-09-30

## 2021-10-13 ENCOUNTER — PATIENT OUTREACH (OUTPATIENT)
Dept: CASE MANAGEMENT | Facility: OTHER | Age: 86
End: 2021-10-13

## 2021-10-13 DIAGNOSIS — R10.13 ABDOMINAL PAIN, EPIGASTRIC: ICD-10-CM

## 2021-10-13 DIAGNOSIS — E11.42 TYPE 2 DIABETES MELLITUS WITH POLYNEUROPATHY (HCC): Primary | ICD-10-CM

## 2021-10-13 RX ORDER — SUCRALFATE 1 G/1
1 TABLET ORAL 4 TIMES DAILY
Qty: 360 TABLET | Refills: 0 | Status: SHIPPED | OUTPATIENT
Start: 2021-10-13 | End: 2022-02-09 | Stop reason: SDUPTHER

## 2021-10-13 RX ORDER — GLIPIZIDE 5 MG/1
2.5 TABLET ORAL
Qty: 90 TABLET | Refills: 0 | Status: SHIPPED | OUTPATIENT
Start: 2021-10-13 | End: 2022-01-20

## 2021-10-13 NOTE — TELEPHONE ENCOUNTER
Called and spoke with daughter Nya regarding medication refills.  Called and spoke with [facility] pharmacist regarding his last refill on several of his medications.  Called and spoke with daughter to ask her if she would look at the bottles to see when the refill date and add 90 days.  It appears that she is delinquent with several.  Nya admits that she fills the medication bottles for her dad and to her knowledge he is not missing any days.  I attempted to move his appointment up closer after reviewing Dr. Jackson's last note, but Nya declines stating that he cannot do early morning appointments.  Reviewed last lab from [facility] and worsening kidney function and elevated liver enzymes.  With him being on blood thinner and complaints of dark stools, again I attempted to move appointment up, but declined.  There was no stool test for microscopic blood ordered.  Nya stated that she had called [facility] earlier in the week for 3 medications.  There are no refill encounters in his chart, but will get them sent in today.

## 2021-10-13 NOTE — OUTREACH NOTE
Called daughter Nya to check on her dad's surgery because I did not see him listed inpatient at [facility].  She reports that Dr. Atkins office cancelled the surgery and has not been rescheduled.  Mr. Munson is still scheduled to see Dr. Mayer in November.  Reviewed all labs and encouraged to move appointment up but declined by daughter. Refilled medications she requested.

## 2021-10-26 RX ORDER — GABAPENTIN 100 MG/1
100 CAPSULE ORAL 2 TIMES DAILY
Qty: 60 CAPSULE | Refills: 0 | Status: CANCELLED | OUTPATIENT
Start: 2021-10-26

## 2021-10-27 RX ORDER — HYDROCHLOROTHIAZIDE 25 MG/1
TABLET ORAL
Qty: 30 TABLET | Refills: 0 | Status: SHIPPED | OUTPATIENT
Start: 2021-10-27 | End: 2022-02-05

## 2021-10-28 ENCOUNTER — PATIENT OUTREACH (OUTPATIENT)
Dept: CASE MANAGEMENT | Facility: OTHER | Age: 86
End: 2021-10-28

## 2021-10-28 NOTE — OUTREACH NOTE
Patient Outreach    Called and spoke with Nya.  Reminded of upcoming appointment on Monday with cardiology and Friday with Dr. Mayer.  He does not have a living will, but will need one for upcoming surgery.  He has not had booster, encouraged.  Also needs UDS for gabapentin refill, notified.

## 2021-10-29 ENCOUNTER — CLINICAL SUPPORT (OUTPATIENT)
Dept: FAMILY MEDICINE CLINIC | Age: 86
End: 2021-10-29

## 2021-10-29 DIAGNOSIS — Z79.899 HIGH RISK MEDICATION USE: Primary | ICD-10-CM

## 2021-10-29 LAB
AMPHET+METHAMPHET UR QL: NEGATIVE
AMPHETAMINES UR QL: NEGATIVE
BARBITURATES UR QL SCN: NEGATIVE
BENZODIAZ UR QL SCN: NEGATIVE
BUPRENORPHINE SERPL-MCNC: NEGATIVE NG/ML
CANNABINOIDS SERPL QL: NEGATIVE
COCAINE UR QL: NEGATIVE
EXPIRATION DATE: NORMAL
Lab: NORMAL
MDMA UR QL SCN: NEGATIVE
METHADONE UR QL SCN: NEGATIVE
OPIATES UR QL: NEGATIVE
OXYCODONE UR QL SCN: NEGATIVE
PCP UR QL SCN: NEGATIVE

## 2021-10-29 PROCEDURE — 80305 DRUG TEST PRSMV DIR OPT OBS: CPT | Performed by: FAMILY MEDICINE

## 2021-10-31 DIAGNOSIS — E11.42 TYPE 2 DIABETES MELLITUS WITH DIABETIC POLYNEUROPATHY, WITHOUT LONG-TERM CURRENT USE OF INSULIN (HCC): Primary | ICD-10-CM

## 2021-10-31 RX ORDER — CYCLOBENZAPRINE HCL 10 MG
TABLET ORAL
Qty: 30 TABLET | Refills: 0 | Status: SHIPPED | OUTPATIENT
Start: 2021-10-31 | End: 2021-12-01 | Stop reason: SDUPTHER

## 2021-10-31 RX ORDER — GABAPENTIN 100 MG/1
100 CAPSULE ORAL 2 TIMES DAILY
Qty: 60 CAPSULE | Refills: 0 | Status: SHIPPED | OUTPATIENT
Start: 2021-10-31 | End: 2021-12-06

## 2021-11-05 ENCOUNTER — OFFICE VISIT (OUTPATIENT)
Dept: FAMILY MEDICINE CLINIC | Age: 86
End: 2021-11-05

## 2021-11-05 VITALS
DIASTOLIC BLOOD PRESSURE: 54 MMHG | HEIGHT: 67 IN | TEMPERATURE: 97.5 F | SYSTOLIC BLOOD PRESSURE: 127 MMHG | HEART RATE: 75 BPM | WEIGHT: 166.4 LBS | BODY MASS INDEX: 26.12 KG/M2

## 2021-11-05 DIAGNOSIS — I10 PRIMARY HYPERTENSION: ICD-10-CM

## 2021-11-05 DIAGNOSIS — N18.31 STAGE 3A CHRONIC KIDNEY DISEASE (HCC): ICD-10-CM

## 2021-11-05 DIAGNOSIS — E11.42 TYPE 2 DIABETES MELLITUS WITH DIABETIC POLYNEUROPATHY, WITHOUT LONG-TERM CURRENT USE OF INSULIN (HCC): ICD-10-CM

## 2021-11-05 DIAGNOSIS — I25.10 CORONARY ARTERIOSCLEROSIS: Primary | ICD-10-CM

## 2021-11-05 DIAGNOSIS — E78.2 MIXED HYPERLIPIDEMIA: ICD-10-CM

## 2021-11-05 DIAGNOSIS — K92.1 MELENA: ICD-10-CM

## 2021-11-05 DIAGNOSIS — Z23 ENCOUNTER FOR IMMUNIZATION: ICD-10-CM

## 2021-11-05 DIAGNOSIS — M17.12 PRIMARY OSTEOARTHRITIS OF LEFT KNEE: ICD-10-CM

## 2021-11-05 DIAGNOSIS — G25.0 ESSENTIAL TREMOR: ICD-10-CM

## 2021-11-05 DIAGNOSIS — H91.93 BILATERAL HEARING LOSS, UNSPECIFIED HEARING LOSS TYPE: ICD-10-CM

## 2021-11-05 DIAGNOSIS — E03.9 ACQUIRED HYPOTHYROIDISM: ICD-10-CM

## 2021-11-05 DIAGNOSIS — I48.0 PAROXYSMAL ATRIAL FIBRILLATION (HCC): ICD-10-CM

## 2021-11-05 DIAGNOSIS — K31.84 GASTROPARESIS: ICD-10-CM

## 2021-11-05 PROCEDURE — 2028F FOOT EXAM PERFORMED: CPT | Performed by: FAMILY MEDICINE

## 2021-11-05 PROCEDURE — G0008 ADMIN INFLUENZA VIRUS VAC: HCPCS | Performed by: FAMILY MEDICINE

## 2021-11-05 PROCEDURE — 99214 OFFICE O/P EST MOD 30 MIN: CPT | Performed by: FAMILY MEDICINE

## 2021-11-05 PROCEDURE — 90662 IIV NO PRSV INCREASED AG IM: CPT | Performed by: FAMILY MEDICINE

## 2021-11-05 RX ORDER — LEVOTHYROXINE SODIUM 137 UG/1
137 TABLET ORAL DAILY
Qty: 90 TABLET | Refills: 1 | Status: SHIPPED | OUTPATIENT
Start: 2021-11-05 | End: 2021-12-17 | Stop reason: DRUGHIGH

## 2021-11-05 RX ORDER — PANTOPRAZOLE SODIUM 40 MG/1
40 TABLET, DELAYED RELEASE ORAL DAILY
Qty: 30 TABLET | Refills: 1
Start: 2021-11-05 | End: 2021-11-15 | Stop reason: SDUPTHER

## 2021-11-05 RX ORDER — MULTIPLE VITAMINS W/ MINERALS TAB 9MG-400MCG
1 TAB ORAL DAILY
COMMUNITY
End: 2021-11-08 | Stop reason: SDUPTHER

## 2021-11-05 NOTE — PROGRESS NOTES
Chief Complaint  Hypertension, Diabetes, Heartburn, Pain, Med Refill, and Establish Care    Subjective          Darci Munson presents to [facility]  History of Present Illness  This is my first time to meet Mr. Munson.  He is a very pleasant gentleman who is extremely hard of hearing.  He just saw cardiology yesterday and I reviewed the note from Paulo Peterson.  He is going to be transitioning over from Xarelto to Eliquis at the present is still taking the Xarelto.  Seems to be a little bit of confusion about hematuria versus blood in the stool.  Patient tells me today that he did not report hematuria that he reported blood in the stool.  When I asked him how he knows there is blood in the stool he says his stool is dark in color.  I reminded him that he is taking an iron supplement which can also darken stool.  His hemoglobin is low but has been stable.  He evidently was supposed to be on Imdur 30 mg but not had the medication filled since April.  Our nurse navigator contact the cardiology office for clarification.  Is not clear if he has been taking the pantoprazole or not.  Has not had it filled since March.  If he is having blood in the stool should probably be on the pantoprazole since he says he has had a history of an ulcer in the past.  Review of the record shows that he has been diagnosed with essential tremor by Dr. Torre but patient says he is really only bothered by that occasionally and is most noted if he is  drinking glass of water.  As far as his diabetes hemoglobin A1c is not terrible.  Measured 7.6 on September 2 at our lab and measured 7.4 on September 14 at [facility]'s lab.  Still could be better.  Evidently he has Jardiance 10 mg at home he has not started according to the nurse navigator.  His renal function has been a little bit compromised but he is only stage IIIa.  Also has history of osteoarthritis.  Status post joint replacement on the right knee.  Still  having difficulty with ambulation can hardly stand for any period of time.  He is considering having left knee replacement on the left by Dr. Dubin      Current Outpatient Medications   Medication Sig Dispense Refill   • amiodarone (PACERONE) 200 MG tablet amiodarone 200 mg oral tablet take 1 tablet (200 mg) by oral route once daily   Active     • amLODIPine (NORVASC) 10 MG tablet Take 10 mg by mouth Daily.     • aspirin (aspirin) 81 MG EC tablet Aspir-81 81 mg oral tablet,delayed release (DR/EC) take 1 tablet (81 mg) by oral route once daily   Active     • clotrimazole (LOTRIMIN) 1 % cream APPLY 1 APPLICATION TOPICALLY TWICE DAILY     • cyclobenzaprine (FLEXERIL) 10 MG tablet TAKE 1 TABLET BY MOUTH TWICE DAILY AS NEEDED FOR MUSCLE SPASM 30 tablet 0   • ferrous sulfate 325 (65 FE) MG tablet Take 325 mg by mouth 2 (Two) Times a Day.     • gabapentin (NEURONTIN) 100 MG capsule Take 1 capsule by mouth 2 (Two) Times a Day. 60 capsule 0   • glipizide (GLUCOTROL) 5 MG tablet Take 0.5 tablets by mouth 2 (Two) Times a Day Before Meals. 90 tablet 0   • Glucosamine-Chondroitin 166.7-133.3 MG capsule Glucosamine Chondroitin PLUS 174-420-00-54 mg oral capsule take 1 capsule by oral route 2 times a day   Active     • hydroCHLOROthiazide (HYDRODIURIL) 25 MG tablet Take 1/2 (one-half) tablet by mouth once daily 30 tablet 0   • latanoprost (XALATAN) 0.005 % ophthalmic solution INSTILL 1 DROP INTO BOTH EYES DAILY     • levothyroxine (SYNTHROID, LEVOTHROID) 137 MCG tablet Take 1 tablet by mouth Daily. 90 tablet 1   • metFORMIN (GLUCOPHAGE) 500 MG tablet Take 1 tablet by mouth Daily With Breakfast & Dinner. 180 tablet 0   • metoclopramide (REGLAN) 5 MG tablet Take 5 mg by mouth.     • multivitamin with minerals (VITAMIN D3 COMPLETE PO) Take 1 tablet by mouth Daily.     • nitroglycerin (NITROSTAT) 0.4 MG SL tablet nitroglycerin 0.4 mg sublingual tablet, sublingual place 1 tablet (0.4 mg) by buccal route at the first sign of an attack;  "no more than 3 tabs are recommended within a 15 minute period.   Active     • sucralfate (Carafate) 1 g tablet Take 1 tablet by mouth 4 (Four) Times a Day. 360 tablet 0   • isosorbide mononitrate (IMDUR) 30 MG 24 hr tablet Take 1 tablet by mouth Daily.     • pantoprazole (PROTONIX) 40 MG EC tablet Take 1 tablet by mouth Daily. 30 tablet 1   • Xarelto 15 MG tablet TAKE 1 TABLET BY MOUTH ONCE DAILY IN THE EVENING WITH A MEAL       No current facility-administered medications for this visit.       Review of Systems   Constitutional: Negative for chills, fatigue and fever.   Respiratory: Negative for chest tightness, shortness of breath and wheezing.    Cardiovascular: Negative for chest pain and palpitations.   Gastrointestinal: Negative for constipation, diarrhea, nausea and vomiting.   Genitourinary: Negative for dysuria, hematuria and urgency.   Neurological: Negative for weakness and headache.   Psychiatric/Behavioral: Negative for hallucinations.            Objective   Vital Signs:   /54   Pulse 75   Temp 97.5 °F (36.4 °C) (Oral)   Ht 170.2 cm (67.01\")   Wt 75.5 kg (166 lb 6.4 oz)   BMI 26.06 kg/m²     Physical Exam  Constitutional:       Appearance: Normal appearance.   HENT:      Head:      Comments: Very hard of hearing even with hearing aids bilaterally     Mouth/Throat:      Mouth: Mucous membranes are moist.      Pharynx: Oropharynx is clear. No oropharyngeal exudate or posterior oropharyngeal erythema.   Eyes:      General: No scleral icterus.     Conjunctiva/sclera: Conjunctivae normal.   Neck:      Vascular: No carotid bruit.   Cardiovascular:      Rate and Rhythm: Normal rate and regular rhythm.      Heart sounds: Normal heart sounds. No murmur heard.      Pulmonary:      Effort: No respiratory distress.      Breath sounds: No wheezing or rales.   Musculoskeletal:      Right lower leg: No edema.      Left lower leg: No edema.   Feet:      Right foot:      Protective Sensation: 5 sites tested. " 3 sites sensed.      Left foot:      Protective Sensation: 5 sites tested. 3 sites sensed.      Comments: 5th toe deformity bilat L>R.  Callus on heels bilat. dorsalis pedis and posterior tibial pulses are good bilaterally  Lymphadenopathy:      Cervical: No cervical adenopathy.   Neurological:      General: No focal deficit present.      Mental Status: He is alert.   Psychiatric:         Attention and Perception: Attention normal.         Mood and Affect: Mood normal.         Speech: Speech normal.            Result Review :                     Assessment and Plan      I did suggest to him that he should consider getting a Lifeline type device    Diagnoses and all orders for this visit:    1. Coronary arteriosclerosis (Primary)  Comments:  Seems stable at present.  Continue to follow with cardiology.  Did review cardiology note today.    2. Gastroparesis  Assessment & Plan:  She had a visit [facility] gastroenterology in the past diagnosed with gastroparesis.  He has not evidently followed up and not taking erythromycin.  He does report feeling bloated and gassy    Orders:  -     pantoprazole (PROTONIX) 40 MG EC tablet; Take 1 tablet by mouth Daily.  Dispense: 30 tablet; Refill: 1    3. Essential tremor  Comments:  Does not seem to be a major issue to him right now    4. Encounter for immunization  -     Fluzone High-Dose 65+yrs ([id number])    5. Type 2 diabetes mellitus with diabetic polyneuropathy, without long-term current use of insulin ([facility])  Assessment & Plan:  Will get him on Jandiance if he indeed has supplies thru the indigent program.  Would just do 10 mg daily for now.    He also could benefit from Diabetic shoes, form completed      6. Primary hypertension  Comments:  His blood pressure looks good today we will continue on current regimen    7. Mixed hyperlipidemia  Comments:  Last lipid profile looked okay.  We will continue on current regimen for now    8. Acquired hypothyroidism  Comments:  Increase  to 137 mcg daliy  Orders:  -     levothyroxine (SYNTHROID, LEVOTHROID) 137 MCG tablet; Take 1 tablet by mouth Daily.  Dispense: 90 tablet; Refill: 1    9. Paroxysmal atrial fibrillation (HCC)  Comments:  Rate is controlled.  He is anticoagulated.  Followed by cardiology.    10. Melena  Comments:  We will test the stool for blood try to verify if the dark stools melena or related to his iron tablet  Orders:  -     Occult Blood X 1, Stool - Stool, Per Rectum; Future  -     pantoprazole (PROTONIX) 40 MG EC tablet; Take 1 tablet by mouth Daily.  Dispense: 30 tablet; Refill: 1    11. Stage 3a chronic kidney disease (HCC)  Assessment & Plan:  We will go ahead and add the Jardiance.  Avoid nephrotoxins.        Follow Up   No follow-ups on file.  Patient was given instructions and counseling regarding his condition or for health maintenance advice. Please see specific information pulled into the AVS if appropriate.

## 2021-11-05 NOTE — ASSESSMENT & PLAN NOTE
She had a visit [facility] gastroenterology in the past diagnosed with gastroparesis.  He has not evidently followed up and not taking erythromycin.  He does report feeling bloated and gassy

## 2021-11-05 NOTE — ASSESSMENT & PLAN NOTE
Will get him on Jandiance if he indeed has supplies thru the indigent program.  Would just do 10 mg daily for now.    He also could benefit from Diabetic shoes, form completed

## 2021-11-08 ENCOUNTER — TELEPHONE (OUTPATIENT)
Dept: CASE MANAGEMENT | Facility: OTHER | Age: 86
End: 2021-11-08

## 2021-11-08 DIAGNOSIS — R10.13 ABDOMINAL PAIN, EPIGASTRIC: ICD-10-CM

## 2021-11-08 DIAGNOSIS — E11.42 TYPE 2 DIABETES MELLITUS WITH POLYNEUROPATHY (HCC): Primary | ICD-10-CM

## 2021-11-08 DIAGNOSIS — E11.42 TYPE 2 DIABETES MELLITUS WITH POLYNEUROPATHY (HCC): ICD-10-CM

## 2021-11-08 RX ORDER — MELATONIN
1000 2 TIMES DAILY
COMMUNITY

## 2021-11-08 RX ORDER — DIPHENOXYLATE HYDROCHLORIDE AND ATROPINE SULFATE 2.5; .025 MG/1; MG/1
1 TABLET ORAL DAILY
COMMUNITY

## 2021-11-08 RX ORDER — HYDROCHLOROTHIAZIDE 25 MG/1
TABLET ORAL
Qty: 30 TABLET | Refills: 0 | OUTPATIENT
Start: 2021-11-08

## 2021-11-08 RX ORDER — GLUCOSAMINE SULFATE 500 MG
1000 CAPSULE ORAL 2 TIMES DAILY
COMMUNITY

## 2021-11-08 NOTE — TELEPHONE ENCOUNTER
Spoke with daughter Nya.  Informed her of medication change with Levothyroxine, diabethic shoe order and take jardiance prescription that she has at home.  We went through each medication and medication list updated in chart.  He was mistakenly taking a whole 25mg HCTZ instead of 1/2.  Advised to go back to taking 1/2 tablet and monitor blood pressure due to could be cause to decreased renal function and to follow directions on bottle.  She also is requesting a refill on Pantoprazole 40mg - [facility] - if he is to continue taking.  He also takes Reglan 5mg bid (Rx Dr. Thakur) OK to take both?  Also informed daughter that Flexaril is not a long term medication and should not be put in medication planner with everyday medications, only PRN - not appropriate use of med and to stop if not needed due to risk of falling and he is still driving - drowsiness.

## 2021-11-12 ENCOUNTER — PATIENT OUTREACH (OUTPATIENT)
Dept: CASE MANAGEMENT | Facility: OTHER | Age: 86
End: 2021-11-12

## 2021-11-12 DIAGNOSIS — E11.42 TYPE 2 DIABETES MELLITUS WITH POLYNEUROPATHY (HCC): Primary | ICD-10-CM

## 2021-11-12 NOTE — TELEPHONE ENCOUNTER
Complicated question.  He had seen Dr. Edmundo Alcaraz in the past and diagnosed with gastroparesis  He was prescribed erythromycin but apparently never did follow-up or was lost to follow-up  The best advice would come from the gastroenterology office.  Not a great fan of long-term use of Reglan in elderly people  Like I said, it is a complicated question, especially for a gentleman that I have only seen once, and who has hearing deficit that makes it hard to communicate with.  Lets see how he does off of the Reglan and the pantoprazole.  If his symptoms worsen we can always add them back.  The general rule is the fewer the medicines the better

## 2021-11-12 NOTE — OUTREACH NOTE
[facility] Interim Update    Ambulatory Case Management Note    Following up with conversation with medications, still straightening out.  See telephone encounter 11/8/21.  Left message for Nya to return my call.     Mamta Maravilla RN  Ambulatory Case Management    11/12/2021, 10:40 EST    [facility] Interim Update    11/15/2021 Spoke with Nya.  Informed will need documents signed and bank statements.  Also need stool samples returned to assess if blood in stool.

## 2021-11-15 DIAGNOSIS — K92.1 MELENA: ICD-10-CM

## 2021-11-15 DIAGNOSIS — K31.84 GASTROPARESIS: ICD-10-CM

## 2021-11-15 RX ORDER — PANTOPRAZOLE SODIUM 40 MG/1
40 TABLET, DELAYED RELEASE ORAL DAILY
Qty: 90 TABLET | Refills: 1 | Status: SHIPPED | OUTPATIENT
Start: 2021-11-15 | End: 2022-02-09 | Stop reason: SDUPTHER

## 2021-11-15 NOTE — TELEPHONE ENCOUNTER
Spoke with Nya.  He most recently (2019) saw Dr. Thakur (note reviewed) who placed him on the Reglan.  Will need results of the stool test and may need to follow up with Dr. Thakur.  Advised to stop Reglan.  Dr. Thakur's note says to stay on PPI indefinitely due to erosive gastritis.

## 2021-11-22 ENCOUNTER — LAB (OUTPATIENT)
Dept: LAB | Facility: HOSPITAL | Age: 86
End: 2021-11-22

## 2021-11-22 DIAGNOSIS — K92.1 MELENA: ICD-10-CM

## 2021-11-22 LAB — HEMOCCULT STL QL: POSITIVE

## 2021-11-22 PROCEDURE — 82272 OCCULT BLD FECES 1-3 TESTS: CPT

## 2021-11-26 DIAGNOSIS — K31.84 GASTROPARESIS: ICD-10-CM

## 2021-11-26 DIAGNOSIS — R19.5 HEME POSITIVE STOOL: Primary | ICD-10-CM

## 2021-11-30 ENCOUNTER — PATIENT OUTREACH (OUTPATIENT)
Dept: CASE MANAGEMENT | Facility: OTHER | Age: 86
End: 2021-11-30

## 2021-11-30 ENCOUNTER — TELEPHONE (OUTPATIENT)
Dept: CASE MANAGEMENT | Facility: OTHER | Age: 86
End: 2021-11-30

## 2021-11-30 DIAGNOSIS — E11.22 TYPE 2 DIABETES MELLITUS WITH DIABETIC CHRONIC KIDNEY DISEASE, UNSPECIFIED CKD STAGE, UNSPECIFIED WHETHER LONG TERM INSULIN USE (HCC): ICD-10-CM

## 2021-11-30 DIAGNOSIS — E03.9 HYPOTHYROIDISM, UNSPECIFIED TYPE: ICD-10-CM

## 2021-11-30 DIAGNOSIS — M17.12 PRIMARY OSTEOARTHRITIS OF LEFT KNEE: Primary | ICD-10-CM

## 2021-11-30 DIAGNOSIS — K21.9 GASTROESOPHAGEAL REFLUX DISEASE WITHOUT ESOPHAGITIS: ICD-10-CM

## 2021-11-30 NOTE — TELEPHONE ENCOUNTER
Nya called back.  She will gather the financial information for PAP that is needed.  I have faxed the application to Nya for her to sign for her dad.  She has filled out the living will document but waiting on notarized/2 witnesses to complete.      Mr. Munson is not wanting to travel to [place] and would prefer to go to [facility] doc.  Will let referrals know to switch.     Nya reports that her dad did have a fall last Tue/Wed but seemed to improve over Thanksgiving, but now having pain again and using walker.  Advised that he be seen by someone, us or Dr. Atkins.  Phone number given to [place] office and she will follow up with me regarding this.

## 2021-11-30 NOTE — TELEPHONE ENCOUNTER
Wanted to follow up with Nya regarding patient assistance program.  Waiting on income, sign documents and living will if she has one.  Will call [employer] to see if they have a copy - none on file.  Referral has not been completed to Dr. Alcaraz. Needs covid booster.

## 2021-11-30 NOTE — OUTREACH NOTE
[facility] End of Month Documentation    This Chronic Medical Management Care Plan for Darci Munson, 86 y.o. male, has been monitored and managed and a new plan of care implemented for the month of November.  A cumulative time of 54  minutes was spent on this patient record this month, including electronic communication with primary care provider; electronic communication with other providers; chart review, Monitor and managing care.  Referral to gasto.  Cordinating care with daughter to assist with PAP..    Regarding the patient's problems: has Type 2 diabetes mellitus (HCC); Chronic kidney disease; Hypothyroidism; Hypertensive disorder; Hyperlipidemia; Paroxysmal atrial fibrillation (HCC); Abdominal pain, epigastric; Hearing aid worn; At risk for negative response to medication; Bloating; Bradycardia; Coronary arteriosclerosis; Early satiety; Gastroparesis; Hearing loss; Iron deficiency; Mitral valve regurgitation; Peripheral neuropathy; Prostate CA (HCC); Balanitis; Gastroesophageal reflux disease without esophagitis; Primary insomnia; Essential tremor; Melena; and Primary osteoarthritis of left knee on their problem list., the following items were addressed: transitions to medical care; medical records; medications, Updating medication list and education with family to be sure our med list are the same. and any changes can be found within the plan section of the note.  A detailed listing of time spent for chronic care management is tracked within each outreach encounter.  Current medications include:  has a current medication list which includes the following prescription(s): amiodarone, amlodipine, apixaban, aspirin, cholecalciferol, cyclobenzaprine, empagliflozin, ferrous sulfate, gabapentin, glipizide, glucosamine, hydrochlorothiazide, isosorbide mononitrate, latanoprost, levothyroxine, metformin, metoclopramide, multivitamin, nitroglycerin, pantoprazole, and sucralfate. and the patient is reported to be caregiver  will take responsibility for med compliance, Effective when taking.,  Medications are reported to be effective, effective.  Regarding these diagnoses, referrals were made to the following provider(s):  gastro.  All notes on chart for PCP to review.    The patient was monitored remotely for blood glucose, Minimal monitioring at home..    The patient's physical needs include:  needs assistance with ADLs; physician referral; physical healthcare; help taking medications as prescribed, some ADL's.     The patient's mental support needs include:  continued support, Great family support.    The patient's cognitive support needs include:  continued support, impaired somewhat, age related    The patient's psychosocial support needs include:  continued support; medication management or adherence    The patient's functional needs include: medication education; physician referral; physical healthcare, Referral for gastro and ortho.    The patient's environmental needs include:  transportation needs.    Care Plan overall comments:  Cordinating care with daughter, referral to specialist needed.  He also need medication management.    Refer to previous outreach notes for more information on the areas listed above.    Monthly Billing Diagnoses  (M17.12) Primary osteoarthritis of left knee    (K21.9) Gastroesophageal reflux disease without esophagitis    (E11.22) Type 2 diabetes mellitus with diabetic chronic kidney disease, unspecified CKD stage, unspecified whether long term insulin use (HCC)    (E03.9) Hypothyroidism, unspecified type    Medications   · Medications have been reconciled    Care Plan progress this month:      Recently Modified Care Plans Updates made since 10/30/2021 12:00 AM     Diabetes         Problem Priority Last Modified     DIET MANAGEMENT --  11/15/2021  9:05 AM by Mamta Maravilla, ROCKY              Goal Recent Progress Last Modified     Learn to Manage Calories --  11/15/2021  9:05 AM by Mamta Maravilla, RN               Task Due Date Last Modified     Provide resources for diabetic diet --  11/15/2021  9:05 AM by Mamta Maravilla RN        Provide resources on counting calories --  11/15/2021  9:05 AM by Mamta Maravilla RN              Problem Priority Last Modified     HBA1C UNCONTROLLED --  11/15/2021  9:05 AM by Mamta Maravilla RN              Goal Recent Progress Last Modified     Establish Regular Follow-Ups with PCP --  11/15/2021  9:05 AM by Mamta Maravilla RN              Task Due Date Last Modified     Refer patient to PCP --  11/15/2021  9:06 AM by Mamta Maravilla RN        Determine patient's next PCP visit --  11/15/2021  9:06 AM by Mamta Maravilla RN        Discuss schedule for PCP visits with patient --  11/15/2021  9:07 AM by Mamta Maravilla RN              Goal Recent Progress Last Modified     Reduce HbA1c levels below 9% --  11/15/2021  9:05 AM by Mamta Maravilla RN              Task Due Date Last Modified     Educate patient on diet and exercise --  11/15/2021  9:05 AM by Mamta Maravilla RN        Educate patient on regular BS checks --  11/15/2021  9:05 AM by Mamta Maravilla RN        Discuss diabetes treatment plan with patient --  11/15/2021  9:07 AM by Mamta Maravilla RN              Problem Priority Last Modified     MEDICATION ADHERENCE --  11/15/2021  9:05 AM by Mamta Maravilla RN              Goal Recent Progress Last Modified     Consistently take medications as prescribed --  11/15/2021  9:05 AM by Mamta Maravilla RN              Task Due Date Last Modified     Discuss barriers to medication adherence with patient --  11/15/2021  9:07 AM by Mamta Maravilla RN        Educate patient on frequency and refill details of meds --  11/15/2021  9:05 AM by Mamta Maravilla RN        Assist patients in setting up daily notes/alarms for meds. Consider pill box use --  11/15/2021  9:05 AM by Mamta Maravilla RN              Problem Priority Last Modified     PATIENT IS INACTIVE --  11/15/2021  9:05 AM by  Mamta Maravilla RN              Goal Recent Progress Last Modified     Exercise at least 20 minutes per day --  11/15/2021  9:05 AM by Mamta Maravilla RN              Task Due Date Last Modified     Discuss barriers to activity with patient. Update SDOH. --  11/15/2021  9:07 AM by Mamta Maravilla RN        Develop exercise plan with patient --  11/15/2021  9:05 AM by Mamta Maravilla RN        Explore community resources including walking groups, assistance programs, and home videos. --  11/15/2021  9:05 AM by Mamta Maravilla RN              Problem Priority Last Modified     KNOWLEDGE DEFICIT --  11/15/2021  9:05 AM by Mamta Maravilla RN              Goal Recent Progress Last Modified     Patient able to teach back disease management --  11/15/2021  9:05 AM by Mamta Maravilla RN              Task Due Date Last Modified     Refer to value-based pharmacist --  11/15/2021  9:05 AM by Mamta Maravilla RN        Refer to [facility] Certified Diabetic Educators --  11/15/2021  9:05 AM by Mamta Maravilla RN        Provide diabetic educational resources --  11/15/2021  9:05 AM by Mamta Maravilla RN        Educate on hyper/hypo-glycemia signs and symptoms --  11/15/2021  9:05 AM by aMmta Maravilla RN        Educate/provide resource for ADA blood glucose and HbA1c targets --  11/15/2021  9:05 AM by Mamta Maravilla RN        Educate on health prevention for diabetes: eye/foot exam, nephropathy screening, and potential statin use --  11/15/2021  9:07 AM by Mamta Maravilla RN                      Instructions   · Patient was provided an electronic copy of care plan  · CCM services were explained and offered and patient has accepted these services.  · Patient has given their written consent to receive CCM services and understands that this includes the authorization of electronic communication of medical information with the other treating providers.  · Patient understands that they may stop CCM services at any time and these  changes will be effective at the end of the calendar month and will effectively revocate the agreement of CCM services.  · Patient understands that only one practitioner can furnish and be paid for CCM services during one calendar month.  Patient also understands that there may be co-payment or deductible fees in association with CCM services.  · Patient will continue with at least monthly follow-up calls with the Nurse Navigator.    Mamta Maravilla RN  Ambulatory Case Management    11/30/2021, 14:20 EST

## 2021-12-01 ENCOUNTER — OFFICE VISIT (OUTPATIENT)
Dept: FAMILY MEDICINE CLINIC | Age: 86
End: 2021-12-01

## 2021-12-01 ENCOUNTER — HOSPITAL ENCOUNTER (OUTPATIENT)
Dept: GENERAL RADIOLOGY | Facility: HOSPITAL | Age: 86
Discharge: HOME | End: 2021-12-01
Admitting: NURSE PRACTITIONER

## 2021-12-01 VITALS
HEART RATE: 68 BPM | HEIGHT: 67 IN | WEIGHT: 166.8 LBS | SYSTOLIC BLOOD PRESSURE: 108 MMHG | DIASTOLIC BLOOD PRESSURE: 56 MMHG | BODY MASS INDEX: 26.18 KG/M2 | TEMPERATURE: 98.4 F

## 2021-12-01 DIAGNOSIS — M25.551 RIGHT HIP PAIN: ICD-10-CM

## 2021-12-01 DIAGNOSIS — F51.01 PRIMARY INSOMNIA: ICD-10-CM

## 2021-12-01 DIAGNOSIS — G25.81 RESTLESS LEG: ICD-10-CM

## 2021-12-01 DIAGNOSIS — M25.551 RIGHT HIP PAIN: Primary | ICD-10-CM

## 2021-12-01 DIAGNOSIS — E11.22 TYPE 2 DIABETES MELLITUS WITH DIABETIC CHRONIC KIDNEY DISEASE, UNSPECIFIED CKD STAGE, UNSPECIFIED WHETHER LONG TERM INSULIN USE (HCC): ICD-10-CM

## 2021-12-01 PROCEDURE — 99213 OFFICE O/P EST LOW 20 MIN: CPT | Performed by: NURSE PRACTITIONER

## 2021-12-01 PROCEDURE — 73502 X-RAY EXAM HIP UNI 2-3 VIEWS: CPT

## 2021-12-01 RX ORDER — CYCLOBENZAPRINE HCL 10 MG
10 TABLET ORAL 2 TIMES DAILY PRN
Qty: 90 TABLET | Refills: 1 | Status: SHIPPED | OUTPATIENT
Start: 2021-12-01 | End: 2022-02-09

## 2021-12-01 NOTE — PROGRESS NOTES
"Chief Complaint  Hip Pain (right hip hurting after a fall )    Subjective    Patient is a 86 year old male in today after a fall 3 weeks ago with complaints of right hip pain. Patiet reports he was standing I the kitchen trying to put his pants on when his feet got tangled and he fell. No treatment was saught at that time. Patient reports pain with weight bearing that has improved since fall but is still there. Patient reports trouble sleeping at night and also asking about blood sugar medications.          Darci Munson presents to [facility]  Hip Pain   The incident occurred more than 1 week ago. The incident occurred at home. The injury mechanism was a fall. The pain is present in the right hip. The quality of the pain is described as shooting. The pain is at a severity of 7/10. The pain is moderate. The pain has been improving since onset. Associated symptoms include an inability to bear weight. He reports no foreign bodies present. The symptoms are aggravated by movement and weight bearing. He has tried acetaminophen for the symptoms. The treatment provided mild relief.       Objective   Vital Signs:   /56 (BP Location: Left arm, Patient Position: Sitting)   Pulse 68   Temp 98.4 °F (36.9 °C) (Oral)   Ht 170.2 cm (67.01\")   Wt 75.7 kg (166 lb 12.8 oz)   BMI 26.12 kg/m²     Physical Exam  HENT:      Head: Normocephalic.   Cardiovascular:      Rate and Rhythm: Normal rate and regular rhythm.      Pulses: Normal pulses.      Heart sounds: Normal heart sounds.   Pulmonary:      Effort: Pulmonary effort is normal. No respiratory distress.      Breath sounds: Normal breath sounds. No stridor. No wheezing, rhonchi or rales.   Musculoskeletal:      Right hip: Tenderness present. Decreased range of motion.   Skin:     General: Skin is warm and dry.   Neurological:      Mental Status: He is alert and oriented to person, place, and time.   Psychiatric:         Mood and Affect: " Mood normal.        Result Review :                 Assessment and Plan    Diagnoses and all orders for this visit:    1. Right hip pain (Primary)  -     XR Hip With or Without Pelvis 2 - 3 View Right; Future    2. Restless leg  -     cyclobenzaprine (FLEXERIL) 10 MG tablet; Take 1 tablet by mouth 2 (Two) Times a Day As Needed for Muscle Spasms.  Dispense: 90 tablet; Refill: 1    3. Primary insomnia  Comments:  Follow up with PCP to discuss possible treatments    4. Type 2 diabetes mellitus with diabetic chronic kidney disease, unspecified CKD stage, unspecified whether long term insulin use (HCC)  Comments:  Follow up with PCP to discuss medications        Follow Up   Return in about 2 weeks (around 12/15/2021), or if symptoms worsen or fail to improve.  Patient was given instructions and counseling regarding his condition or for health maintenance advice. Please see specific information pulled into the AVS if appropriate.

## 2021-12-02 NOTE — PROGRESS NOTES
Xray negative for fracture and acute changes. Continue discussed treatment and keep upcoming appointment with PCP.

## 2021-12-06 ENCOUNTER — PATIENT OUTREACH (OUTPATIENT)
Dept: CASE MANAGEMENT | Facility: OTHER | Age: 86
End: 2021-12-06

## 2021-12-06 DIAGNOSIS — M17.12 PRIMARY OSTEOARTHRITIS OF LEFT KNEE: Primary | ICD-10-CM

## 2021-12-06 DIAGNOSIS — E11.42 TYPE 2 DIABETES MELLITUS WITH DIABETIC POLYNEUROPATHY, WITHOUT LONG-TERM CURRENT USE OF INSULIN (HCC): ICD-10-CM

## 2021-12-06 RX ORDER — GABAPENTIN 100 MG/1
CAPSULE ORAL
Qty: 60 CAPSULE | Refills: 0 | Status: SHIPPED | OUTPATIENT
Start: 2021-12-06 | End: 2022-01-20

## 2021-12-06 NOTE — OUTREACH NOTE
Ambulatory Case Management Note    CCM Interim Update    Called Darci and spoke with him.  Inquired about how his hip pain was and he said it was still hurting.  I will fax office note and x-ray to Dr. Atkins.  Checked on referral and informed that they need to contact daughter.  Faxed patient assistance paperwork to company.  Inquired about blood sugars and he reports that they are too high.  He is seeing Dr. Mayer next week and can repeat A1c to see what needs to be adjusted.  He needs to bring all his medications to appointment.        Mamta Maravilla RN  Ambulatory Case Management    12/6/2021, 15:42 EST

## 2021-12-13 ENCOUNTER — LAB (OUTPATIENT)
Dept: LAB | Facility: HOSPITAL | Age: 86
End: 2021-12-13

## 2021-12-13 ENCOUNTER — TRANSCRIBE ORDERS (OUTPATIENT)
Dept: ADMINISTRATIVE | Facility: HOSPITAL | Age: 86
End: 2021-12-13

## 2021-12-13 DIAGNOSIS — D64.9 ANEMIA, UNSPECIFIED TYPE: Primary | ICD-10-CM

## 2021-12-13 DIAGNOSIS — D64.9 ANEMIA, UNSPECIFIED TYPE: ICD-10-CM

## 2021-12-13 LAB
IRON 24H UR-MRATE: 62 MCG/DL (ref 59–158)
IRON SATN MFR SERPL: 13 % (ref 20–50)
TIBC SERPL-MCNC: 471 MCG/DL (ref 298–536)
TRANSFERRIN SERPL-MCNC: 316 MG/DL (ref 200–360)
VIT B12 BLD-MCNC: 535 PG/ML (ref 211–946)

## 2021-12-13 PROCEDURE — 84466 ASSAY OF TRANSFERRIN: CPT

## 2021-12-13 PROCEDURE — 83540 ASSAY OF IRON: CPT

## 2021-12-13 PROCEDURE — 36415 COLL VENOUS BLD VENIPUNCTURE: CPT

## 2021-12-13 PROCEDURE — 82607 VITAMIN B-12: CPT

## 2021-12-14 ENCOUNTER — OFFICE VISIT (OUTPATIENT)
Dept: FAMILY MEDICINE CLINIC | Age: 86
End: 2021-12-14

## 2021-12-14 ENCOUNTER — LAB (OUTPATIENT)
Dept: LAB | Facility: HOSPITAL | Age: 86
End: 2021-12-14

## 2021-12-14 VITALS
OXYGEN SATURATION: 99 % | WEIGHT: 165.4 LBS | HEIGHT: 68 IN | RESPIRATION RATE: 22 BRPM | HEART RATE: 74 BPM | BODY MASS INDEX: 25.07 KG/M2 | SYSTOLIC BLOOD PRESSURE: 151 MMHG | TEMPERATURE: 97.5 F | DIASTOLIC BLOOD PRESSURE: 55 MMHG

## 2021-12-14 DIAGNOSIS — E78.2 MIXED HYPERLIPIDEMIA: ICD-10-CM

## 2021-12-14 DIAGNOSIS — K31.84 GASTROPARESIS: ICD-10-CM

## 2021-12-14 DIAGNOSIS — I48.0 PAROXYSMAL ATRIAL FIBRILLATION (HCC): ICD-10-CM

## 2021-12-14 DIAGNOSIS — I10 PRIMARY HYPERTENSION: ICD-10-CM

## 2021-12-14 DIAGNOSIS — K92.1 MELENA: ICD-10-CM

## 2021-12-14 DIAGNOSIS — M25.559 HIP PAIN: ICD-10-CM

## 2021-12-14 DIAGNOSIS — E03.9 ACQUIRED HYPOTHYROIDISM: ICD-10-CM

## 2021-12-14 DIAGNOSIS — E11.22 TYPE 2 DIABETES MELLITUS WITH DIABETIC CHRONIC KIDNEY DISEASE, UNSPECIFIED CKD STAGE, UNSPECIFIED WHETHER LONG TERM INSULIN USE (HCC): ICD-10-CM

## 2021-12-14 DIAGNOSIS — E55.9 VITAMIN D INSUFFICIENCY: ICD-10-CM

## 2021-12-14 DIAGNOSIS — Z79.01 ANTICOAGULATED: ICD-10-CM

## 2021-12-14 DIAGNOSIS — E11.22 TYPE 2 DIABETES MELLITUS WITH DIABETIC CHRONIC KIDNEY DISEASE, UNSPECIFIED CKD STAGE, UNSPECIFIED WHETHER LONG TERM INSULIN USE (HCC): Primary | ICD-10-CM

## 2021-12-14 LAB
25(OH)D3 SERPL-MCNC: 44.8 NG/ML
ALBUMIN SERPL-MCNC: 4.3 G/DL (ref 3.5–5.2)
ALBUMIN/GLOB SERPL: 1.3 G/DL
ALP SERPL-CCNC: 200 U/L (ref 39–117)
ALT SERPL W P-5'-P-CCNC: 16 U/L (ref 1–41)
ANION GAP SERPL CALCULATED.3IONS-SCNC: 12.7 MMOL/L (ref 5–15)
AST SERPL-CCNC: 18 U/L (ref 1–40)
BASOPHILS # BLD AUTO: 0.04 10*3/MM3 (ref 0–0.2)
BASOPHILS NFR BLD AUTO: 0.8 % (ref 0–1.5)
BILIRUB SERPL-MCNC: 0.3 MG/DL (ref 0–1.2)
BUN SERPL-MCNC: 26 MG/DL (ref 8–23)
BUN/CREAT SERPL: 17.1 (ref 7–25)
CALCIUM SPEC-SCNC: 10.1 MG/DL (ref 8.6–10.5)
CHLORIDE SERPL-SCNC: 100 MMOL/L (ref 98–107)
CHOLEST SERPL-MCNC: 184 MG/DL (ref 0–200)
CO2 SERPL-SCNC: 25.3 MMOL/L (ref 22–29)
CREAT SERPL-MCNC: 1.52 MG/DL (ref 0.76–1.27)
DEPRECATED RDW RBC AUTO: 45.1 FL (ref 37–54)
EOSINOPHIL # BLD AUTO: 0.24 10*3/MM3 (ref 0–0.4)
EOSINOPHIL NFR BLD AUTO: 4.5 % (ref 0.3–6.2)
ERYTHROCYTE [DISTWIDTH] IN BLOOD BY AUTOMATED COUNT: 13.3 % (ref 12.3–15.4)
EXPIRATION DATE: NORMAL
GFR SERPL CREATININE-BSD FRML MDRD: 44 ML/MIN/1.73
GLOBULIN UR ELPH-MCNC: 3.2 GM/DL
GLUCOSE SERPL-MCNC: 222 MG/DL (ref 65–99)
HBA1C MFR BLD: 7.4 %
HBA1C MFR BLD: 7.54 % (ref 4.8–5.6)
HCT VFR BLD AUTO: 37.9 % (ref 37.5–51)
HDLC SERPL-MCNC: 56 MG/DL (ref 40–60)
HGB BLD-MCNC: 12.1 G/DL (ref 13–17.7)
IMM GRANULOCYTES # BLD AUTO: 0.02 10*3/MM3 (ref 0–0.05)
IMM GRANULOCYTES NFR BLD AUTO: 0.4 % (ref 0–0.5)
LDLC SERPL CALC-MCNC: 87 MG/DL (ref 0–100)
LDLC/HDLC SERPL: 1.39 {RATIO}
LYMPHOCYTES # BLD AUTO: 1.89 10*3/MM3 (ref 0.7–3.1)
LYMPHOCYTES NFR BLD AUTO: 35.7 % (ref 19.6–45.3)
Lab: NORMAL
MCH RBC QN AUTO: 29.7 PG (ref 26.6–33)
MCHC RBC AUTO-ENTMCNC: 31.9 G/DL (ref 31.5–35.7)
MCV RBC AUTO: 92.9 FL (ref 79–97)
MONOCYTES # BLD AUTO: 0.53 10*3/MM3 (ref 0.1–0.9)
MONOCYTES NFR BLD AUTO: 10 % (ref 5–12)
NEUTROPHILS NFR BLD AUTO: 2.57 10*3/MM3 (ref 1.7–7)
NEUTROPHILS NFR BLD AUTO: 48.6 % (ref 42.7–76)
PLATELET # BLD AUTO: 205 10*3/MM3 (ref 140–450)
PMV BLD AUTO: 10.1 FL (ref 6–12)
POTASSIUM SERPL-SCNC: 4.5 MMOL/L (ref 3.5–5.2)
PROT SERPL-MCNC: 7.5 G/DL (ref 6–8.5)
RBC # BLD AUTO: 4.08 10*6/MM3 (ref 4.14–5.8)
SODIUM SERPL-SCNC: 138 MMOL/L (ref 136–145)
TRIGL SERPL-MCNC: 251 MG/DL (ref 0–150)
TSH SERPL DL<=0.05 MIU/L-ACNC: 8.43 UIU/ML (ref 0.27–4.2)
VLDLC SERPL-MCNC: 41 MG/DL (ref 5–40)
WBC NRBC COR # BLD: 5.29 10*3/MM3 (ref 3.4–10.8)

## 2021-12-14 PROCEDURE — 84443 ASSAY THYROID STIM HORMONE: CPT | Performed by: FAMILY MEDICINE

## 2021-12-14 PROCEDURE — 3051F HG A1C>EQUAL 7.0%<8.0%: CPT | Performed by: FAMILY MEDICINE

## 2021-12-14 PROCEDURE — 83036 HEMOGLOBIN GLYCOSYLATED A1C: CPT | Performed by: FAMILY MEDICINE

## 2021-12-14 PROCEDURE — 80053 COMPREHEN METABOLIC PANEL: CPT

## 2021-12-14 PROCEDURE — 99214 OFFICE O/P EST MOD 30 MIN: CPT | Performed by: FAMILY MEDICINE

## 2021-12-14 PROCEDURE — 82306 VITAMIN D 25 HYDROXY: CPT | Performed by: FAMILY MEDICINE

## 2021-12-14 PROCEDURE — 85025 COMPLETE CBC W/AUTO DIFF WBC: CPT

## 2021-12-14 PROCEDURE — 83036 HEMOGLOBIN GLYCOSYLATED A1C: CPT

## 2021-12-14 PROCEDURE — 80061 LIPID PANEL: CPT | Performed by: FAMILY MEDICINE

## 2021-12-14 NOTE — PROGRESS NOTES
Chief Complaint  Diabetes, Medication Problem, and Hip Pain (Fall 2-3 weeks ago. )    Subjective          Darci Munson presents to [facility]  History of Present Illness  Here with his son, Yuan.  Patient's daughter is the one who normally takes care of his medical issues.  She sets up his medications for him.  Yuan admits he is just not all that familiar with what is going on with him health wise.  Patient did have a fall recently.  He does not have a lifeline type device.  Talked about the wisdom of getting 1 of those.  He does use a cane for ambulation.  Talked about possibility of getting a Rollator for when he is out of the house.  He apparently was scheduled to have knee surgery middle of December but that has been pushed back to January now.  I am suggesting to them that they may want to see cardiology prior to surgery.  Patient was concerned that his blood sugars may have been running a little too high lately.  We will check hemoglobin A1c here in the office was pretty good at 7.4.  He had had some problems in the past with some hypoglycemia so I do not think we want to push for tight control.  Evidently did not get the appointment with gastroenterology set up.  He did have heme positive stool on testing.  Also has a history of gastroparesis which has not been followed up on.  He has been taking aspirin.  He is anticoagulated which causes some extra concern given heme positive stool.      Current Outpatient Medications   Medication Sig Dispense Refill   • amiodarone (PACERONE) 200 MG tablet Take 200 mg by mouth Daily.     • amLODIPine (NORVASC) 10 MG tablet Take 10 mg by mouth Daily.     • apixaban (ELIQUIS) 5 MG tablet tablet Take 5 mg by mouth 2 (Two) Times a Day.     • aspirin (aspirin) 81 MG EC tablet Take 81 mg by mouth Daily.     • cholecalciferol (VITAMIN D3) 25 MCG (1000 UT) tablet Take 1,000 Units by mouth 2 (Two) Times a Day.     • cyclobenzaprine (FLEXERIL) 10 MG  tablet Take 1 tablet by mouth 2 (Two) Times a Day As Needed for Muscle Spasms. 90 tablet 1   • empagliflozin (JARDIANCE) 10 MG tablet tablet Take 10 mg by mouth Daily. PATIENT ASSISTANCE PROGRAM     • ferrous sulfate 325 (65 FE) MG tablet Take 325 mg by mouth 2 (Two) Times a Day.     • gabapentin (NEURONTIN) 100 MG capsule Take 1 capsule by mouth twice daily 60 capsule 0   • glipizide (GLUCOTROL) 5 MG tablet Take 0.5 tablets by mouth 2 (Two) Times a Day Before Meals. 90 tablet 0   • Glucosamine 500 MG capsule Take 1,000 mg by mouth 2 (Two) Times a Day. OTC     • hydroCHLOROthiazide (HYDRODIURIL) 25 MG tablet Take 1/2 (one-half) tablet by mouth once daily 30 tablet 0   • isosorbide mononitrate (IMDUR) 30 MG 24 hr tablet Take 30 mg by mouth Daily.     • latanoprost (XALATAN) 0.005 % ophthalmic solution INSTILL 1 DROP INTO BOTH EYES DAILY     • metoclopramide (REGLAN) 5 MG tablet Take 5 mg by mouth 2 (Two) Times a Day.     • multivitamin (THERAGRAN) tablet tablet Take 1 tablet by mouth Daily.     • nitroglycerin (NITROSTAT) 0.4 MG SL tablet nitroglycerin 0.4 mg sublingual tablet, sublingual place 1 tablet (0.4 mg) by buccal route at the first sign of an attack; no more than 3 tabs are recommended within a 15 minute period.   Active     • pantoprazole (PROTONIX) 40 MG EC tablet Take 1 tablet by mouth Daily. 90 tablet 1   • sucralfate (Carafate) 1 g tablet Take 1 tablet by mouth 4 (Four) Times a Day. 360 tablet 0   • levothyroxine (SYNTHROID, LEVOTHROID) 150 MCG tablet Take 1 tablet by mouth Every Morning. 90 tablet 1   • metFORMIN (GLUCOPHAGE) 500 MG tablet TAKE 1 TABLET BY MOUTH ONCE DAILY WITH BREAKFAST AND WITH SUPPER 180 tablet 0   • PEG-KCl-NaCl-NaSulf-Na Asc-C (Plenvu) 140 g reconstituted solution solution Take 140 g by mouth Take As Directed. 1 each 0     No current facility-administered medications for this visit.       Review of Systems   Constitutional: Negative for chills, fatigue and fever.   Respiratory:  "Negative for chest tightness, shortness of breath and wheezing.    Cardiovascular: Negative for chest pain and palpitations.   Gastrointestinal: Negative for constipation, diarrhea, nausea and vomiting.   Genitourinary: Negative for dysuria, hematuria and urgency.   Neurological: Negative for weakness and headache.   Psychiatric/Behavioral: Negative for hallucinations.            Objective   Vital Signs:   /55 (BP Location: Right arm, Patient Position: Sitting)   Pulse 74   Temp 97.5 °F (36.4 °C)   Resp 22   Ht 172.7 cm (68\")   Wt 75 kg (165 lb 6.4 oz)   SpO2 99%   BMI 25.15 kg/m²     Physical Exam  Constitutional:       Appearance: Normal appearance.   Neck:      Vascular: No carotid bruit.   Cardiovascular:      Rate and Rhythm: Normal rate and regular rhythm.      Heart sounds: Murmur heard.    Systolic murmur is present with a grade of 3/6.       Comments: Was pleased that he was in regular rhythm today  Pulmonary:      Effort: No respiratory distress.      Breath sounds: No wheezing or rales.   Musculoskeletal:      Right lower leg: No edema.      Left lower leg: No edema.      Comments: Minimally tender palpation over the right hip area.  Significant angulation of the left knee.   Lymphadenopathy:      Cervical: No cervical adenopathy.   Neurological:      General: No focal deficit present.      Mental Status: He is alert.   Psychiatric:         Attention and Perception: Attention normal.         Mood and Affect: Mood normal.         Speech: Speech normal.            Result Review :                     Assessment and Plan    Diagnoses and all orders for this visit:    1. Type 2 diabetes mellitus with diabetic chronic kidney disease, unspecified CKD stage, unspecified whether long term insulin use (HCC) (Primary)  Comments:  Unsatisfied with his hemoglobin A1c 7.4.  We will let him stay on the current regimen for his diabetes  Orders:  -     POC Glycosylated Hemoglobin (Hb A1C)  -     Hemoglobin " A1c; Future    2. Primary hypertension  Comments:  I did ask him to check his blood pressure on a weekly basis and get us a copy of those results after a month or 2.  Orders:  -     Comprehensive Metabolic Panel; Future    3. Mixed hyperlipidemia  Comments:  Check lab predicate medication change based on results  Orders:  -     Lipid Panel  -     Comprehensive Metabolic Panel; Future    4. Acquired hypothyroidism  Comments:  Again check lab predicate medication change based on result.  He is also on amiodarone  Orders:  -     TSH    5. Paroxysmal atrial fibrillation (HCC)  -     Ambulatory Referral to Cardiology    6. Anticoagulated  Comments:  stop ASA  Orders:  -     Ambulatory Referral to Cardiology    7. Hip pain  Comments:  He says that he is symptoms are better day by day.  Reviewed x-ray results.  Discussed importance of fall precautions and recommend lifeline device    8. Melena  Comments:  stop ASA.  Get him into see gastroenterology follow-up on his heme positive stool and his gastroparesis.  Iinitially diagnosed with gastroparesis by Dr. Alcaraz  Orders:  -     Ambulatory Referral to Gastroenterology  -     CBC Auto Differential; Future    9. Gastroparesis  Comments:  Was diagnosed by Dr. Alcaraz but I do not believe he ever had follow-up  Orders:  -     Ambulatory Referral to Gastroenterology    10. Vitamin D insufficiency  -     Vitamin D 25 Hydroxy        Follow Up   No follow-ups on file.  Patient was given instructions and counseling regarding his condition or for health maintenance advice. Please see specific information pulled into the AVS if appropriate.

## 2021-12-17 ENCOUNTER — PATIENT OUTREACH (OUTPATIENT)
Dept: CASE MANAGEMENT | Facility: OTHER | Age: 86
End: 2021-12-17

## 2021-12-17 DIAGNOSIS — E11.22 TYPE 2 DIABETES MELLITUS WITH DIABETIC CHRONIC KIDNEY DISEASE, UNSPECIFIED CKD STAGE, UNSPECIFIED WHETHER LONG TERM INSULIN USE (HCC): Primary | ICD-10-CM

## 2021-12-17 DIAGNOSIS — E03.9 ACQUIRED HYPOTHYROIDISM: Primary | ICD-10-CM

## 2021-12-17 RX ORDER — LEVOTHYROXINE SODIUM 0.15 MG/1
150 TABLET ORAL
Qty: 90 TABLET | Refills: 1 | Status: SHIPPED | OUTPATIENT
Start: 2021-12-17 | End: 2022-02-10

## 2021-12-17 RX ORDER — LEVOTHYROXINE SODIUM 0.15 MG/1
150 TABLET ORAL DAILY
COMMUNITY
End: 2021-12-17 | Stop reason: SDUPTHER

## 2021-12-17 NOTE — OUTREACH NOTE
Ambulatory Case Management Note    CCM Interim Update    Mr. Munson was in the office this week.  His referral for Gastro had been discontinued due to they could not reach him.  While in the office, referrals set the appointment back up.  He had some labs additionally and mailed copy to patient.  New Rx sent to pharmacy.  Reminder for gastro appt.  Attempted to reach daughter, left voice message.     Mamta Maravilla RN  Ambulatory Case Management    12/17/2021, 14:37 EST

## 2021-12-27 ENCOUNTER — PATIENT OUTREACH (OUTPATIENT)
Dept: CASE MANAGEMENT | Facility: OTHER | Age: 86
End: 2021-12-27

## 2021-12-27 DIAGNOSIS — M17.12 PRIMARY OSTEOARTHRITIS OF LEFT KNEE: ICD-10-CM

## 2021-12-27 DIAGNOSIS — E03.9 ACQUIRED HYPOTHYROIDISM: ICD-10-CM

## 2021-12-27 DIAGNOSIS — E11.22 TYPE 2 DIABETES MELLITUS WITH DIABETIC CHRONIC KIDNEY DISEASE, UNSPECIFIED CKD STAGE, UNSPECIFIED WHETHER LONG TERM INSULIN USE (HCC): Primary | ICD-10-CM

## 2021-12-27 NOTE — OUTREACH NOTE
[facility] End of Month Documentation    This Chronic Medical Management Care Plan for Anuel Munson, 86 y.o. male, has been monitored and managed and a new plan of care implemented for the month of December.  A cumulative time of 45  minutes was spent on this patient record this month, including electronic communication with primary care provider; electronic communication with other providers; chart review, Monitor and managing care.  Referral to gasto.  Cordinating care with daughter to assist with PAP..    Regarding the patient's problems: has Type 2 diabetes mellitus (HCC); Chronic kidney disease; Hypothyroidism; Hypertensive disorder; Hyperlipidemia; Paroxysmal atrial fibrillation (HCC); Abdominal pain, epigastric; Hearing aid worn; At risk for negative response to medication; Bloating; Bradycardia; Coronary arteriosclerosis; Early satiety; Gastroparesis; Hearing loss; Iron deficiency; Mitral valve regurgitation; Peripheral neuropathy; Prostate CA (HCC); Balanitis; Gastroesophageal reflux disease without esophagitis; Primary insomnia; Essential tremor; Melena; Primary osteoarthritis of left knee; Anticoagulated; Hip pain; and Vitamin D insufficiency on their problem list., the following items were addressed: transitions to medical care; medical records; medications, Updating medication list and education with family to be sure our med list are the same. and any changes can be found within the plan section of the note.  A detailed listing of time spent for chronic care management is tracked within each outreach encounter.  Current medications include:  has a current medication list which includes the following prescription(s): amiodarone, amlodipine, apixaban, aspirin, cholecalciferol, cyclobenzaprine, empagliflozin, ferrous sulfate, gabapentin, glipizide, glucosamine, hydrochlorothiazide, isosorbide mononitrate, latanoprost, levothyroxine, metformin, metoclopramide, multivitamin, nitroglycerin, pantoprazole, and  sucralfate. and the patient is reported to be caregiver will take responsibility for med compliance, Effective when taking.,  Medications are reported to be effective, effective.  Regarding these diagnoses, referrals were made to the following provider(s):  specialists.  All notes on chart for PCP to review.    The patient was monitored remotely for blood glucose, Minimal monitioring at home..    The patient's physical needs include:  needs assistance with ADLs; physician referral; physical healthcare; help taking medications as prescribed, some ADL's.     The patient's mental support needs include:  continued support, Great family support.    The patient's cognitive support needs include:  continued support, impaired somewhat, age related    The patient's psychosocial support needs include:  continued support; medication management or adherence    The patient's functional needs include: medication education; physician referral; physical healthcare, Referral for gastro and ortho.    The patient's environmental needs include:  transportation needs.    Care Plan overall comments:  Cordinating care with daughter, referral to specialist needed.  He also need medication management.    Refer to previous outreach notes for more information on the areas listed above.    Monthly Billing Diagnoses  (E11.22) Type 2 diabetes mellitus with diabetic chronic kidney disease, unspecified CKD stage, unspecified whether long term insulin use (HCC)    (E03.9) Acquired hypothyroidism    (M17.12) Primary osteoarthritis of left knee    Medications   · Medications have been reconciled    Care Plan progress this month:      Recently Modified Care Plans Updates made since 11/26/2021 12:00 AM    No recently modified care plans.          Instructions   · Patient was provided an electronic copy of care plan  · CCM services were explained and offered and patient has accepted these services.  · Patient has given their written consent to receive  CCM services and understands that this includes the authorization of electronic communication of medical information with the other treating providers.  · Patient understands that they may stop CCM services at any time and these changes will be effective at the end of the calendar month and will effectively revocate the agreement of CCM services.  · Patient understands that only one practitioner can furnish and be paid for CCM services during one calendar month.  Patient also understands that there may be co-payment or deductible fees in association with CCM services.  · Patient will continue with at least monthly follow-up calls with the Nurse Navigator.    Mamta Maravilla RN  Ambulatory Case Management    12/27/2021, 12:48 EST

## 2021-12-29 ENCOUNTER — PATIENT OUTREACH (OUTPATIENT)
Dept: CASE MANAGEMENT | Facility: OTHER | Age: 86
End: 2021-12-29

## 2021-12-29 DIAGNOSIS — E11.22 TYPE 2 DIABETES MELLITUS WITH DIABETIC CHRONIC KIDNEY DISEASE, UNSPECIFIED CKD STAGE, UNSPECIFIED WHETHER LONG TERM INSULIN USE (HCC): Primary | ICD-10-CM

## 2021-12-29 PROCEDURE — 99490 CHRNC CARE MGMT STAFF 1ST 20: CPT | Performed by: FAMILY MEDICINE

## 2021-12-29 PROCEDURE — 99439 CHRNC CARE MGMT STAF EA ADDL: CPT | Performed by: FAMILY MEDICINE

## 2021-12-29 NOTE — OUTREACH NOTE
Ambulatory Case Management Note    [facility] Interim Update    Spoke with Elmira at Dr Atkins office. Informed of Dr. Mayer recommendation of referral to cardiology and she has scheduled him to see Paulo Peterson on 1/13/22.  Nya daughter states that cardiology states that they do not need to see him again, he has been cleared for surgery.  Expressed that following Dr. Mayer recommendation and if cardiology wishes not to see him again, that decision must be made by her dad/cardiologist.  He is scheduled for surgery on 1/19/22.  Elmira requested labs and office note to be faxed to her.  Informed her that he is seeing Gastro early in January for heme positive stool and this could delay surgery again.  She understood.  Daughter aware also.  Nya confirms that he is not taking the Trajenta in question earlier.        Mamta Maravilla RN  Ambulatory Case Management    12/29/2021, 11:22 EST

## 2022-01-04 ENCOUNTER — OFFICE VISIT (OUTPATIENT)
Dept: GASTROENTEROLOGY | Facility: CLINIC | Age: 87
End: 2022-01-04

## 2022-01-04 VITALS
HEART RATE: 75 BPM | WEIGHT: 155.4 LBS | BODY MASS INDEX: 23.55 KG/M2 | HEIGHT: 68 IN | DIASTOLIC BLOOD PRESSURE: 60 MMHG | SYSTOLIC BLOOD PRESSURE: 128 MMHG

## 2022-01-04 DIAGNOSIS — D50.9 IRON DEFICIENCY ANEMIA, UNSPECIFIED IRON DEFICIENCY ANEMIA TYPE: ICD-10-CM

## 2022-01-04 DIAGNOSIS — K92.1 HEMATOCHEZIA: ICD-10-CM

## 2022-01-04 DIAGNOSIS — R63.4 WEIGHT LOSS: ICD-10-CM

## 2022-01-04 DIAGNOSIS — R19.5 OCCULT BLOOD IN STOOLS: Primary | ICD-10-CM

## 2022-01-04 PROCEDURE — 99214 OFFICE O/P EST MOD 30 MIN: CPT | Performed by: NURSE PRACTITIONER

## 2022-01-04 RX ORDER — POLYETHYLENE GLYCOL 3350, SODIUM SULFATE, SODIUM CHLORIDE, POTASSIUM CHLORIDE, ASCORBIC ACID, SODIUM ASCORBATE 140-9-5.2G
1 KIT ORAL TAKE AS DIRECTED
Qty: 1 EACH | Refills: 0 | Status: ON HOLD | OUTPATIENT
Start: 2022-01-04 | End: 2022-07-01

## 2022-01-04 NOTE — PROGRESS NOTES
"Patient Name: Darci Munson   Visit Date: 01/04/2022   Patient ID: [id number]  Provider: SHAWANDA Husain    Sex: male  Location:  Location Address:  Location Phone: [address] [place] [address] [phone]    YOB: 1935      Primary Care Provider Adrien Mayer MD      Referring Provider: Adrien Mayer MD        Chief Complaint  GI Bleeding (Dark Blood ) and GI Problem (Gastroparesis )    History of Present Illness  Darci Munson is a 86 y.o. who presents to [facility] on referral from Adrien Mayer MD for a gastroenterology evaluation of GI Bleeding (Dark Blood ) and GI Problem (Gastroparesis )     Mr. Munson presents today due to hematochezia for the last year. Bowel movement every other day. Stool is soft. Occasional rectal straining however feels \"relaxed\" most of the time. Previously took 81mg aspirin daily for cardiac prevention however reports they d/c when the bleeding started. Appetite stable as he is eating 3 meals a day however has had a 5lb weight loss in the last week. 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    Daughter is also present for the visit today and expresses that patient has iron deficiency anemia and is taking iron twice daily.  Occasional acid reflux however feels that is controlled mainly with his Protonix 40 mg once daily.  Reports a history of gastroparesis however denies any nausea, vomiting, or early satiety.    Labs Result Review Imaging    Past Medical History:   Diagnosis Date   • Anemia, unspecified    • Atherosclerotic heart disease of native coronary artery without angina pectoris    • CAD (coronary artery disease)    • CKD (chronic kidney disease), stage III (HCC)    • Essential (primary) hypertension    • Gastric ulcer, unspecified as acute or chronic, without hemorrhage or perforation    • Gastroparesis    • GERD without esophagitis    • Glaucoma "    • Hereditary and idiopathic neuropathy, unspecified    • History of falling    • HLD (hyperlipidemia)    • HTN (hypertension)    • Hypotension, unspecified    • Hypothyroidism     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   • Irritable bowel syndrome without diarrhea    • Laceration without foreign body of right forearm, initial encounter    • Low back pain    • Malignant neoplasm of prostate (HCC)    • Mixed hyperlipidemia    • OA (osteoarthritis)    • Other specified disorders of the skin and subcutaneous tissue    • Pain in left foot    • Peripheral vascular disease, unspecified (HCC)    • Phimosis    • Presence of unspecified artificial knee joint    • Primary insomnia    • Primary osteoarthritis of both knees    • Prostate cancer (HCC)    • Sensorineural hearing loss (SNHL) of both ears    • Sigmoid diverticulosis     severe sigmoid and descending colon diverticulosis and 3+ gastritis   • Sleep related leg cramps    • Type 2 diabetes mellitus with diabetic polyneuropathy (HCC)     and gastroparesis   • Unspecified atrial fibrillation (HCC)    • Unspecified dementia without behavioral disturbance (HCC)    • Unspecified hearing loss, bilateral        Past Surgical History:   Procedure Laterality Date   • CATARACT EXTRACTION Bilateral 2014   • COLONOSCOPY     • HAND SURGERY Right    • JOINT REPLACEMENT     • KNEE ARTHROSCOPY Right 03/14/2017   • PROSTATECTOMY     • REPLACEMENT TOTAL KNEE  03/2017   • UPPER GASTROINTESTINAL ENDOSCOPY           Current Outpatient Medications:   •  amiodarone (PACERONE) 200 MG tablet, Take 200 mg by mouth Daily., Disp: , Rfl:   •  amLODIPine (NORVASC) 10 MG tablet, Take 10 mg by mouth Daily., Disp: , Rfl:   •  apixaban (ELIQUIS) 5 MG tablet tablet, Take 5 mg by mouth 2 (Two) Times a Day., Disp: , Rfl:   •  aspirin (aspirin) 81 MG EC tablet, Take 81 mg by mouth Daily., Disp: , Rfl:   •  cholecalciferol (VITAMIN D3) 25 MCG (1000 UT) tablet, Take 1,000 Units by mouth 2 (Two) Times a Day., Disp: ,  Rfl:   •  cyclobenzaprine (FLEXERIL) 10 MG tablet, Take 1 tablet by mouth 2 (Two) Times a Day As Needed for Muscle Spasms., Disp: 90 tablet, Rfl: 1  •  empagliflozin (JARDIANCE) 10 MG tablet tablet, Take 10 mg by mouth Daily. PATIENT ASSISTANCE PROGRAM, Disp: , Rfl:   •  ferrous sulfate 325 (65 FE) MG tablet, Take 325 mg by mouth 2 (Two) Times a Day., Disp: , Rfl:   •  gabapentin (NEURONTIN) 100 MG capsule, Take 1 capsule by mouth twice daily, Disp: 60 capsule, Rfl: 0  •  glipizide (GLUCOTROL) 5 MG tablet, Take 0.5 tablets by mouth 2 (Two) Times a Day Before Meals., Disp: 90 tablet, Rfl: 0  •  Glucosamine 500 MG capsule, Take 1,000 mg by mouth 2 (Two) Times a Day. OTC, Disp: , Rfl:   •  hydroCHLOROthiazide (HYDRODIURIL) 25 MG tablet, Take 1/2 (one-half) tablet by mouth once daily, Disp: 30 tablet, Rfl: 0  •  isosorbide mononitrate (IMDUR) 30 MG 24 hr tablet, Take 30 mg by mouth Daily., Disp: , Rfl:   •  metFORMIN (GLUCOPHAGE) 500 MG tablet, Take 1 tablet by mouth Daily With Breakfast & Dinner., Disp: 180 tablet, Rfl: 0  •  multivitamin (THERAGRAN) tablet tablet, Take 1 tablet by mouth Daily., Disp: , Rfl:   •  pantoprazole (PROTONIX) 40 MG EC tablet, Take 1 tablet by mouth Daily., Disp: 90 tablet, Rfl: 1  •  sucralfate (Carafate) 1 g tablet, Take 1 tablet by mouth 4 (Four) Times a Day., Disp: 360 tablet, Rfl: 0  •  latanoprost (XALATAN) 0.005 % ophthalmic solution, INSTILL 1 DROP INTO BOTH EYES DAILY, Disp: , Rfl:   •  levothyroxine (SYNTHROID, LEVOTHROID) 150 MCG tablet, Take 1 tablet by mouth Every Morning., Disp: 90 tablet, Rfl: 1  •  metoclopramide (REGLAN) 5 MG tablet, Take 5 mg by mouth 2 (Two) Times a Day., Disp: , Rfl:   •  nitroglycerin (NITROSTAT) 0.4 MG SL tablet, nitroglycerin 0.4 mg sublingual tablet, sublingual place 1 tablet (0.4 mg) by buccal route at the first sign of an attack; no more than 3 tabs are recommended within a 15 minute period.   Active, Disp: , Rfl:   •  PEG-KCl-NaCl-NaSulf-Na Asc-C  "(Plenvu) 140 g reconstituted solution solution, Take 140 g by mouth Take As Directed., Disp: 1 each, Rfl: 0     Allergies   Allergen Reactions   • Meloxicam Unknown - High Severity   • Nsaids Unknown - High Severity       Family History   Problem Relation Age of Onset   • Diabetes type II Mother    • Lung cancer Father         Social History     Social History Narrative   • Not on file         Objective     Review of Systems   Constitutional: Positive for unexpected weight loss. Negative for appetite change.   Gastrointestinal: Positive for anal bleeding, blood in stool and indigestion.        Vital Signs:   /60 (BP Location: Left arm, Patient Position: Sitting, Cuff Size: Small Adult)   Pulse 75   Ht 172.7 cm (68\")   Wt 70.5 kg (155 lb 6.4 oz)   BMI 23.63 kg/m²       Physical Exam  Constitutional:       General: He is not in acute distress.     Appearance: Normal appearance. He is well-developed and normal weight.   HENT:      Head: Normocephalic and atraumatic.   Eyes:      Conjunctiva/sclera: Conjunctivae normal.      Pupils: Pupils are equal, round, and reactive to light.      Visual Fields: Right eye visual fields normal and left eye visual fields normal.   Cardiovascular:      Rate and Rhythm: Normal rate and regular rhythm.      Heart sounds: Normal heart sounds.   Pulmonary:      Effort: Pulmonary effort is normal. No retractions.      Breath sounds: Normal breath sounds and air entry.   Abdominal:      General: Bowel sounds are normal. There is no distension.      Palpations: Abdomen is soft.      Tenderness: There is no abdominal tenderness.      Comments: No appreciable hepatosplenomegaly or ascites   Musculoskeletal:         General: Normal range of motion.      Cervical back: Normal range of motion and neck supple.   Skin:     General: Skin is warm and dry.   Neurological:      Mental Status: He is alert and oriented to person, place, and time.   Psychiatric:         Mood and Affect: Mood and " affect normal.         Behavior: Behavior normal.         Result Review :{Labs  Result Review  Imaging  Med Tab  Media  Procedures :23}   The following data was reviewed by: SHAWANDA Husain on 01/04/2022:  CMP    CMP 5/28/21 9/2/21 12/14/21   Glucose 222 (A) 142 (A) 222 (A)   BUN 29 (A) 25 (A) 26 (A)   Creatinine 1.62 (A) 1.53 (A) 1.52 (A)   eGFR Non African Am  43 (A) 44 (A)   Sodium 140 139 138   Potassium 5.0 4.9 4.5   Chloride 102 103 100   Calcium 10.1 9.4 10.1   Albumin 4.5 4.30 4.30   Total Bilirubin 0.39 0.3 0.3   Alkaline Phosphatase 132 127 (A) 200 (A)   AST (SGOT) 23 15 18   ALT (SGPT) 17 12 16   (A) Abnormal value            CBC w/diff    CBC w/Diff 9/2/21 9/13/21 12/14/21   WBC 6.61 5.95 5.29   RBC 3.73 (A) 3.93 (A) 4.08 (A)   Hemoglobin 11.3 (A) 11.8 (A) 12.1 (A)   Hematocrit 34.5 (A) 36.5 (A) 37.9   MCV 92.5 92.9 92.9   MCH 30.3 30.0 29.7   MCHC 32.8 32.3 31.9   RDW 13.4 13.5 13.3   Platelets 174 199 205   Neutrophil Rel % 53.3 56.3 48.6   Immature Granulocyte Rel % 0.3 0.5 0.4   Lymphocyte Rel % 33.4 30.4 35.7   Monocyte Rel % 9.1 6.4 10.0   Eosinophil Rel % 3.3 5.4 4.5   Basophil Rel % 0.6 1.0 0.8   (A) Abnormal value            Lab Results   Component Value Date    IRON 62 12/13/2021    TIBC 471 12/13/2021    LABIRON 13 (L) 12/13/2021          Occult blood 11/22/2021: Positive.       Assessment and Plan    Diagnoses and all orders for this visit:    1. Occult blood in stools (Primary)  -     Case Request; Standing  -     Case Request  -     Case Request; Standing  -     Case Request    2. Hematochezia  -     Case Request; Standing  -     Case Request  -     Case Request; Standing  -     Case Request    3. Weight loss  -     Case Request; Standing  -     Case Request  -     Case Request; Standing  -     Case Request    4. Iron deficiency anemia, unspecified iron deficiency anemia type  -     Case Request; Standing  -     Case Request    Other orders  -     Follow Anesthesia Guidelines /  Protocol; Future  -     Obtain Informed Consent; Future  -     PEG-KCl-NaCl-NaSulf-Na Asc-C (Plenvu) 140 g reconstituted solution solution; Take 140 g by mouth Take As Directed.  Dispense: 1 each; Refill: 0  -     Follow Anesthesia Guidelines / Protocol; Future  -     Obtain Informed Consent; Future      ESOPHAGOGASTRODUODENOSCOPY (N/A)       Follow Up   Return for Follow up after procedure.  Patient was given instructions and counseling regarding his condition or for health maintenance advice. Please see specific information pulled into the AVS if appropriate.

## 2022-01-10 ENCOUNTER — PATIENT OUTREACH (OUTPATIENT)
Dept: CASE MANAGEMENT | Facility: OTHER | Age: 87
End: 2022-01-10

## 2022-01-10 DIAGNOSIS — M17.12 PRIMARY OSTEOARTHRITIS OF LEFT KNEE: Primary | ICD-10-CM

## 2022-01-10 NOTE — OUTREACH NOTE
Ambulatory Case Management Note    Gastro note reviewed and daughter had left me a voicemail. Called and left a message with Elmira that Darci is indeed scheduled for surgery (EGD,colonoscopy) at the end of March.  She will let me know what Dr. Atkins's -plans are for surgery.      Elmira called back to state that he is definitely seeing cardiology and the surgery will probably going to be pushed back to February, will continue to follow.     Mamta Maravilla RN  Ambulatory Case Management    1/10/2022, 15:34 EST

## 2022-01-12 DIAGNOSIS — E11.42 TYPE 2 DIABETES MELLITUS WITH POLYNEUROPATHY: ICD-10-CM

## 2022-01-14 ENCOUNTER — PATIENT OUTREACH (OUTPATIENT)
Dept: CASE MANAGEMENT | Facility: OTHER | Age: 87
End: 2022-01-14

## 2022-01-14 DIAGNOSIS — M17.12 PRIMARY OSTEOARTHRITIS OF LEFT KNEE: Primary | ICD-10-CM

## 2022-01-14 NOTE — OUTREACH NOTE
Ambulatory Case Management Note    Applications for Eliquis and Jardiance were filled out.  Attempting to reach programs to see if applications at the end of the year were ever processed.  No confirmation was received.     Reviewed note from Paulo Peterson, cleared for surgery, hold blood thinner 2 days prior.      Will continue to follow.     Mamta Maravilla RN  Ambulatory Case Management    1/14/2022, 13:09 EST  Ambulatory Case Management Note    After waiting on hold for an hour, the jardiance program is still waiting on proof of income and the Eliquis never got sent.  Left message for Nya to bring to me on Monday to get these sent.     Mamta Maravilla RN  Ambulatory Case Management    1/14/2022, 15:51 EST

## 2022-01-19 ENCOUNTER — PATIENT OUTREACH (OUTPATIENT)
Dept: CASE MANAGEMENT | Facility: OTHER | Age: 87
End: 2022-01-19

## 2022-01-19 DIAGNOSIS — M17.12 PRIMARY OSTEOARTHRITIS OF LEFT KNEE: Primary | ICD-10-CM

## 2022-01-19 DIAGNOSIS — E11.22 TYPE 2 DIABETES MELLITUS WITH DIABETIC CHRONIC KIDNEY DISEASE, UNSPECIFIED CKD STAGE, UNSPECIFIED WHETHER LONG TERM INSULIN USE: ICD-10-CM

## 2022-01-19 DIAGNOSIS — E11.42 TYPE 2 DIABETES MELLITUS WITH DIABETIC POLYNEUROPATHY, WITHOUT LONG-TERM CURRENT USE OF INSULIN: ICD-10-CM

## 2022-01-19 DIAGNOSIS — E11.42 TYPE 2 DIABETES MELLITUS WITH POLYNEUROPATHY: ICD-10-CM

## 2022-01-19 NOTE — OUTREACH NOTE
Ambulatory Case Management Note    Logged onto [facility] to see progress from surgery today.  No records.  Called Nya and she reports that it was cancelled due to covid and has not been rescheduled.  She will touch base with me when she hears something from Dr. Atkins office. She has the advanced directive in her purse and will drop off sometime for me.  Also she will bring in proof of income when she gets a chance.  Does not need Eliquis since pays only $40.  Will continue to follow.     Mamta Maravilla RN  Ambulatory Case Management    1/19/2022, 14:43 EST

## 2022-01-20 ENCOUNTER — PATIENT ROUNDING (BHMG ONLY) (OUTPATIENT)
Dept: GASTROENTEROLOGY | Facility: CLINIC | Age: 87
End: 2022-01-20

## 2022-01-20 RX ORDER — GLIPIZIDE 5 MG/1
TABLET ORAL
Qty: 90 TABLET | Refills: 0 | Status: SHIPPED | OUTPATIENT
Start: 2022-01-20 | End: 2022-02-05

## 2022-01-20 RX ORDER — GABAPENTIN 100 MG/1
CAPSULE ORAL
Qty: 60 CAPSULE | Refills: 0 | Status: SHIPPED | OUTPATIENT
Start: 2022-01-20 | End: 2022-02-05

## 2022-01-20 NOTE — PROGRESS NOTES
January 20, 2022    Hello, may I speak with Darci Munson?    My name is Marlen GALLOWAY      I am  [place] [facility]  [address] [facility] [place] [address] [phone].    Before we get started may I verify your date of birth? 1935    I am calling to officially welcome you to our practice and ask about your recent visit. Is this a good time to talk? Per Daughter he is not available at the moment    Tell me about your visit with us. What things went well?        We're always looking for ways to make our patients' experiences even better. Do you have recommendations on ways we may improve?      Overall were you satisfied with your first visit to our practice?      I appreciate you taking the time to speak with me today. Is there anything else I can do for you?       Thank you, and have a great day.

## 2022-01-28 ENCOUNTER — PATIENT OUTREACH (OUTPATIENT)
Dept: CASE MANAGEMENT | Facility: OTHER | Age: 87
End: 2022-01-28

## 2022-01-28 DIAGNOSIS — E03.9 ACQUIRED HYPOTHYROIDISM: ICD-10-CM

## 2022-01-28 DIAGNOSIS — M17.12 PRIMARY OSTEOARTHRITIS OF LEFT KNEE: Primary | ICD-10-CM

## 2022-01-28 DIAGNOSIS — E11.22 TYPE 2 DIABETES MELLITUS WITH DIABETIC CHRONIC KIDNEY DISEASE, UNSPECIFIED CKD STAGE, UNSPECIFIED WHETHER LONG TERM INSULIN USE: ICD-10-CM

## 2022-01-28 PROCEDURE — 99439 CHRNC CARE MGMT STAF EA ADDL: CPT | Performed by: FAMILY MEDICINE

## 2022-01-28 PROCEDURE — 99490 CHRNC CARE MGMT STAFF 1ST 20: CPT | Performed by: FAMILY MEDICINE

## 2022-01-28 NOTE — OUTREACH NOTE
[facility] End of Month Documentation    This Chronic Medical Management Care Plan for Darci Munson, 86 y.o. male, has been monitored and managed and a new plan of care implemented for the month of January.  A cumulative time of 70  minutes was spent on this patient record this month, including electronic communication with primary care provider; electronic communication with other providers; chart review, Monitor and managing care.  Cordinating care with daughter to assist with PAP..    Regarding the patient's problems: has Type 2 diabetes mellitus (HCC); Chronic kidney disease; Hypothyroidism; Hypertensive disorder; Hyperlipidemia; Paroxysmal atrial fibrillation (HCC); Abdominal pain, epigastric; Hearing aid worn; At risk for negative response to medication; Bloating; Bradycardia; Coronary arteriosclerosis; Early satiety; Gastroparesis; Hearing loss; Iron deficiency; Mitral valve regurgitation; Peripheral neuropathy; Prostate CA (HCC); Balanitis; Gastroesophageal reflux disease without esophagitis; Primary insomnia; Essential tremor; Melena; Primary osteoarthritis of left knee; Anticoagulated; Hip pain; Vitamin D insufficiency; Occult blood in stools; Weight loss; and Iron deficiency anemia on their problem list., the following items were addressed: transitions to medical care; medical records; medications, Updating medication list and education with family to be sure our med list are the same. and any changes can be found within the plan section of the note.  A detailed listing of time spent for chronic care management is tracked within each outreach encounter.  Current medications include:  has a current medication list which includes the following prescription(s): amiodarone, amlodipine, apixaban, aspirin, cholecalciferol, cyclobenzaprine, empagliflozin, ferrous sulfate, gabapentin, glipizide, glucosamine, hydrochlorothiazide, isosorbide mononitrate, latanoprost, levothyroxine, metformin, metoclopramide, multivitamin,  nitroglycerin, pantoprazole, plenvu, and sucralfate. and the patient is reported to be caregiver will take responsibility for med compliance, Effective when taking.,  Medications are reported to be effective, effective.  Regarding these diagnoses, referrals were made to the following provider(s):  specialists.  All notes on chart for PCP to review.    The patient was monitored remotely for blood glucose, Minimal monitioring at home..    The patient's physical needs include:  needs assistance with ADLs; physician referral; physical healthcare; help taking medications as prescribed, some ADL's.     The patient's mental support needs include:  continued support, Great family support.    The patient's cognitive support needs include:  continued support, impaired somewhat, age related    The patient's psychosocial support needs include:  continued support; medication management or adherence; no access to community activities; no opportunity for leisure activities, Lives alone, needs transportation.    The patient's functional needs include: medication education; physician referral; physical healthcare, Monitoring needs , surgery postponed.    The patient's environmental needs include:  transportation needs.    Care Plan overall comments:  Cordinating care with daughter, referral to specialist needed.  He also need medication management.    Refer to previous outreach notes for more information on the areas listed above.    Monthly Billing Diagnoses  (M17.12) Primary osteoarthritis of left knee    (E11.22) Type 2 diabetes mellitus with diabetic chronic kidney disease, unspecified CKD stage, unspecified whether long term insulin use (HCC)    (E03.9) Acquired hypothyroidism    Medications   · Medications have been reconciled    Care Plan progress this month:      Recently Modified Care Plans Updates made since 12/28/2021 12:00 AM    No recently modified care plans.        Instructions   · Patient was provided an electronic  copy of care plan  · CCM services were explained and offered and patient has accepted these services.  · Patient has given their written consent to receive CCM services and understands that this includes the authorization of electronic communication of medical information with the other treating providers.  · Patient understands that they may stop CCM services at any time and these changes will be effective at the end of the calendar month and will effectively revocate the agreement of CCM services.  · Patient understands that only one practitioner can furnish and be paid for CCM services during one calendar month.  Patient also understands that there may be co-payment or deductible fees in association with CCM services.  · Patient will continue with at least monthly follow-up calls with the Nurse Navigator.    Mamta Maravilla RN  Ambulatory Case Management    1/28/2022, 14:41 EST

## 2022-02-03 DIAGNOSIS — E11.42 TYPE 2 DIABETES MELLITUS WITH POLYNEUROPATHY: ICD-10-CM

## 2022-02-03 DIAGNOSIS — E11.42 TYPE 2 DIABETES MELLITUS WITH DIABETIC POLYNEUROPATHY, WITHOUT LONG-TERM CURRENT USE OF INSULIN: ICD-10-CM

## 2022-02-05 RX ORDER — GABAPENTIN 100 MG/1
CAPSULE ORAL
Qty: 60 CAPSULE | Refills: 0 | Status: SHIPPED | OUTPATIENT
Start: 2022-02-05 | End: 2022-02-09 | Stop reason: SDUPTHER

## 2022-02-05 RX ORDER — HYDROCHLOROTHIAZIDE 25 MG/1
TABLET ORAL
Qty: 30 TABLET | Refills: 0 | Status: SHIPPED | OUTPATIENT
Start: 2022-02-05 | End: 2022-02-09 | Stop reason: SDUPTHER

## 2022-02-05 RX ORDER — GLIPIZIDE 5 MG/1
TABLET ORAL
Qty: 30 TABLET | Refills: 0 | Status: SHIPPED | OUTPATIENT
Start: 2022-02-05 | End: 2022-06-17 | Stop reason: SDUPTHER

## 2022-02-08 ENCOUNTER — PATIENT OUTREACH (OUTPATIENT)
Dept: CASE MANAGEMENT | Facility: OTHER | Age: 87
End: 2022-02-08

## 2022-02-08 DIAGNOSIS — E11.22 TYPE 2 DIABETES MELLITUS WITH DIABETIC CHRONIC KIDNEY DISEASE, UNSPECIFIED CKD STAGE, UNSPECIFIED WHETHER LONG TERM INSULIN USE: Primary | ICD-10-CM

## 2022-02-08 NOTE — OUTREACH NOTE
Ambulatory Case Management Note    Called Darci to remind him of his appointment tomorrow with Dr. Mayer.  He cannot bring meds, daughter has them.  Called [facility] to please fax over list of dispensed meds for the past 6 months.        Mamta Maravilla RN  Ambulatory Case Management    2/8/2022, 17:17 EST    Received list from [facility].  3 medications it appears that he has run out of or not filled.  Called Cardiology and they have sent refills.  Called and spoke with Nya, daughter who preplans medications for her.  She admits that she has had

## 2022-02-09 ENCOUNTER — LAB (OUTPATIENT)
Dept: LAB | Facility: HOSPITAL | Age: 87
End: 2022-02-09

## 2022-02-09 ENCOUNTER — OFFICE VISIT (OUTPATIENT)
Dept: FAMILY MEDICINE CLINIC | Age: 87
End: 2022-02-09

## 2022-02-09 ENCOUNTER — TELEPHONE (OUTPATIENT)
Dept: FAMILY MEDICINE CLINIC | Age: 87
End: 2022-02-09

## 2022-02-09 VITALS
BODY MASS INDEX: 24.86 KG/M2 | WEIGHT: 164 LBS | TEMPERATURE: 97.8 F | HEART RATE: 83 BPM | DIASTOLIC BLOOD PRESSURE: 63 MMHG | HEIGHT: 68 IN | OXYGEN SATURATION: 100 % | SYSTOLIC BLOOD PRESSURE: 135 MMHG

## 2022-02-09 DIAGNOSIS — K31.84 GASTROPARESIS: ICD-10-CM

## 2022-02-09 DIAGNOSIS — L60.2 THICKENED NAILS: ICD-10-CM

## 2022-02-09 DIAGNOSIS — M17.12 PRIMARY OSTEOARTHRITIS OF LEFT KNEE: ICD-10-CM

## 2022-02-09 DIAGNOSIS — I10 PRIMARY HYPERTENSION: ICD-10-CM

## 2022-02-09 DIAGNOSIS — M79.675 PAIN IN TOES OF BOTH FEET: ICD-10-CM

## 2022-02-09 DIAGNOSIS — E03.9 ACQUIRED HYPOTHYROIDISM: ICD-10-CM

## 2022-02-09 DIAGNOSIS — I48.0 PAROXYSMAL ATRIAL FIBRILLATION: ICD-10-CM

## 2022-02-09 DIAGNOSIS — E78.2 MIXED HYPERLIPIDEMIA: ICD-10-CM

## 2022-02-09 DIAGNOSIS — Z79.01 ANTICOAGULATED: ICD-10-CM

## 2022-02-09 DIAGNOSIS — M79.674 PAIN IN TOES OF BOTH FEET: ICD-10-CM

## 2022-02-09 DIAGNOSIS — E11.22 TYPE 2 DIABETES MELLITUS WITH DIABETIC CHRONIC KIDNEY DISEASE, UNSPECIFIED CKD STAGE, UNSPECIFIED WHETHER LONG TERM INSULIN USE: Primary | ICD-10-CM

## 2022-02-09 DIAGNOSIS — R10.13 ABDOMINAL PAIN, EPIGASTRIC: ICD-10-CM

## 2022-02-09 DIAGNOSIS — K92.1 MELENA: ICD-10-CM

## 2022-02-09 DIAGNOSIS — E11.42 TYPE 2 DIABETES MELLITUS WITH DIABETIC POLYNEUROPATHY, WITHOUT LONG-TERM CURRENT USE OF INSULIN: ICD-10-CM

## 2022-02-09 DIAGNOSIS — N18.31 STAGE 3A CHRONIC KIDNEY DISEASE: ICD-10-CM

## 2022-02-09 PROBLEM — Z00.00 MEDICARE ANNUAL WELLNESS VISIT, SUBSEQUENT: Status: ACTIVE | Noted: 2022-02-09

## 2022-02-09 LAB
ALBUMIN SERPL-MCNC: 4.4 G/DL (ref 3.5–5.2)
ALBUMIN UR-MCNC: 16.1 MG/DL
ALBUMIN/GLOB SERPL: 1.3 G/DL
ALP SERPL-CCNC: 166 U/L (ref 39–117)
ALT SERPL W P-5'-P-CCNC: 16 U/L (ref 1–41)
ANION GAP SERPL CALCULATED.3IONS-SCNC: 10.1 MMOL/L (ref 5–15)
AST SERPL-CCNC: 16 U/L (ref 1–40)
BASOPHILS # BLD AUTO: 0.07 10*3/MM3 (ref 0–0.2)
BASOPHILS NFR BLD AUTO: 1.1 % (ref 0–1.5)
BILIRUB SERPL-MCNC: 0.3 MG/DL (ref 0–1.2)
BUN SERPL-MCNC: 25 MG/DL (ref 8–23)
BUN/CREAT SERPL: 19.8 (ref 7–25)
CALCIUM SPEC-SCNC: 10.4 MG/DL (ref 8.6–10.5)
CHLORIDE SERPL-SCNC: 103 MMOL/L (ref 98–107)
CO2 SERPL-SCNC: 27.9 MMOL/L (ref 22–29)
CREAT SERPL-MCNC: 1.26 MG/DL (ref 0.76–1.27)
CREAT UR-MCNC: 58 MG/DL
DEPRECATED RDW RBC AUTO: 47.6 FL (ref 37–54)
EOSINOPHIL # BLD AUTO: 0.24 10*3/MM3 (ref 0–0.4)
EOSINOPHIL NFR BLD AUTO: 3.7 % (ref 0.3–6.2)
ERYTHROCYTE [DISTWIDTH] IN BLOOD BY AUTOMATED COUNT: 13.8 % (ref 12.3–15.4)
GFR SERPL CREATININE-BSD FRML MDRD: 54 ML/MIN/1.73
GLOBULIN UR ELPH-MCNC: 3.4 GM/DL
GLUCOSE SERPL-MCNC: 161 MG/DL (ref 65–99)
HBA1C MFR BLD: 8 % (ref 4.8–5.6)
HCT VFR BLD AUTO: 45.7 % (ref 37.5–51)
HGB BLD-MCNC: 14.4 G/DL (ref 13–17.7)
IMM GRANULOCYTES # BLD AUTO: 0.02 10*3/MM3 (ref 0–0.05)
IMM GRANULOCYTES NFR BLD AUTO: 0.3 % (ref 0–0.5)
LYMPHOCYTES # BLD AUTO: 2.01 10*3/MM3 (ref 0.7–3.1)
LYMPHOCYTES NFR BLD AUTO: 30.8 % (ref 19.6–45.3)
MCH RBC QN AUTO: 29.1 PG (ref 26.6–33)
MCHC RBC AUTO-ENTMCNC: 31.5 G/DL (ref 31.5–35.7)
MCV RBC AUTO: 92.5 FL (ref 79–97)
MICROALBUMIN/CREAT UR: 277.6 MG/G
MONOCYTES # BLD AUTO: 0.63 10*3/MM3 (ref 0.1–0.9)
MONOCYTES NFR BLD AUTO: 9.6 % (ref 5–12)
NEUTROPHILS NFR BLD AUTO: 3.56 10*3/MM3 (ref 1.7–7)
NEUTROPHILS NFR BLD AUTO: 54.5 % (ref 42.7–76)
PLATELET # BLD AUTO: 175 10*3/MM3 (ref 140–450)
PMV BLD AUTO: 10.9 FL (ref 6–12)
POTASSIUM SERPL-SCNC: 4.7 MMOL/L (ref 3.5–5.2)
PROT SERPL-MCNC: 7.8 G/DL (ref 6–8.5)
RBC # BLD AUTO: 4.94 10*6/MM3 (ref 4.14–5.8)
SODIUM SERPL-SCNC: 141 MMOL/L (ref 136–145)
TSH SERPL DL<=0.05 MIU/L-ACNC: 6.21 UIU/ML (ref 0.27–4.2)
WBC NRBC COR # BLD: 6.53 10*3/MM3 (ref 3.4–10.8)

## 2022-02-09 PROCEDURE — 84443 ASSAY THYROID STIM HORMONE: CPT | Performed by: FAMILY MEDICINE

## 2022-02-09 PROCEDURE — 80053 COMPREHEN METABOLIC PANEL: CPT | Performed by: FAMILY MEDICINE

## 2022-02-09 PROCEDURE — 1170F FXNL STATUS ASSESSED: CPT | Performed by: FAMILY MEDICINE

## 2022-02-09 PROCEDURE — 85025 COMPLETE CBC W/AUTO DIFF WBC: CPT | Performed by: FAMILY MEDICINE

## 2022-02-09 PROCEDURE — 99214 OFFICE O/P EST MOD 30 MIN: CPT | Performed by: FAMILY MEDICINE

## 2022-02-09 PROCEDURE — 83036 HEMOGLOBIN GLYCOSYLATED A1C: CPT | Performed by: FAMILY MEDICINE

## 2022-02-09 PROCEDURE — 82570 ASSAY OF URINE CREATININE: CPT | Performed by: FAMILY MEDICINE

## 2022-02-09 PROCEDURE — 36415 COLL VENOUS BLD VENIPUNCTURE: CPT | Performed by: FAMILY MEDICINE

## 2022-02-09 PROCEDURE — 82043 UR ALBUMIN QUANTITATIVE: CPT | Performed by: FAMILY MEDICINE

## 2022-02-09 PROCEDURE — G0439 PPPS, SUBSEQ VISIT: HCPCS | Performed by: FAMILY MEDICINE

## 2022-02-09 PROCEDURE — 1159F MED LIST DOCD IN RCRD: CPT | Performed by: FAMILY MEDICINE

## 2022-02-09 RX ORDER — GABAPENTIN 100 MG/1
100 CAPSULE ORAL 2 TIMES DAILY
Qty: 60 CAPSULE | Refills: 2 | Status: SHIPPED | OUTPATIENT
Start: 2022-02-09 | End: 2022-06-15 | Stop reason: SDUPTHER

## 2022-02-09 RX ORDER — HYDROCHLOROTHIAZIDE 25 MG/1
25 TABLET ORAL DAILY
Qty: 90 TABLET | Refills: 1 | Status: SHIPPED | OUTPATIENT
Start: 2022-02-09 | End: 2022-10-10

## 2022-02-09 RX ORDER — LEVOTHYROXINE SODIUM 137 UG/1
TABLET ORAL
COMMUNITY
Start: 2021-11-05 | End: 2022-02-09 | Stop reason: DRUGHIGH

## 2022-02-09 RX ORDER — SUCRALFATE 1 G/1
1 TABLET ORAL DAILY
COMMUNITY
Start: 2021-10-13 | End: 2022-02-09 | Stop reason: DRUGHIGH

## 2022-02-09 RX ORDER — SUCRALFATE 1 G/1
1 TABLET ORAL 4 TIMES DAILY
Qty: 360 TABLET | Refills: 0 | Status: SHIPPED | OUTPATIENT
Start: 2022-02-09 | End: 2022-04-08 | Stop reason: SDUPTHER

## 2022-02-09 RX ORDER — PANTOPRAZOLE SODIUM 40 MG/1
40 TABLET, DELAYED RELEASE ORAL DAILY
Qty: 90 TABLET | Refills: 1 | Status: SHIPPED | OUTPATIENT
Start: 2022-02-09 | End: 2022-07-22 | Stop reason: SDDI

## 2022-02-09 NOTE — ASSESSMENT & PLAN NOTE
Nails are trimmed for him.  Hopefully getting some relief from the discomfort.  The second nail right foot get Tickle scan he did have a little bit of bleeding (couple drops).  Resolved with pressure and silver nitrate

## 2022-02-09 NOTE — TELEPHONE ENCOUNTER
Called and left a message for Katrin,  Tradjenta had been stopped a while ago.  She can however fill out one for the Jardiance 10mg if she would like.

## 2022-02-09 NOTE — ASSESSMENT & PLAN NOTE
He is quite concerned that his blood sugars have been running higher.  We will recheck another hemoglobin A1c.  To get medication change based on results

## 2022-02-09 NOTE — PROGRESS NOTES
The ABCs of the Annual Wellness Visit  Subsequent Medicare Wellness Visit    Chief Complaint   Patient presents with   • Medicare Wellness-subsequent     pt needs carafate,HCTZ,and pantoprazole refill   • Hypertension   • Other      Subjective    History of Present Illness:  Darci Munson is a 86 y.o. male who presents for a Subsequent Medicare Wellness Visit.    The following portions of the patient's history were reviewed and   updated as appropriate: allergies, current medications, past family history, past medical history, past social history, past surgical history and problem list.    Compared to one year ago, the patient feels his physical   health is the same.    Compared to one year ago, the patient feels his mental   health is the same.    Recent Hospitalizations:  He was not admitted to the hospital during the last year.   Chronic/Acute Health Issues:  He is quite hard of hearing so communication is challenging.  He does not ck BP at home.   Tolerating medication okay.  He is still having some black stool.  He does take some Pepto, which could account for stool color.  He is prescribed Eliquis and Amio for afib, but pharmacy did not confirm that he has had the prescriptions filled lately.  He is not aware of any palpitations.  His knee operation was postponed  He says BS are staying around 200 checks twice a week.  In Dec Hba1c was 7.54  Reports some numbness in heels, but leg pain is better on gabapentin.   Toes hurt, but thinks that is from the long nails (asks for help trimming)        Current Medical Providers:  Patient Care Team:  Adrien Mayer MD as PCP - General (Family Medicine)  Mamta Maravilla RN as Ambulatory  (Population Health)  Cristal Montiel APRN as Nurse Practitioner (Urology)  Paulo Peterson APRN as Nurse Practitioner (Cardiology)  Jamal Rodriguez OD as Consulting Physician (Optometry)  Ruma Alegre MD as Consulting Physician (Dermatology)  Cleveland Atkins DO  as Consulting Physician (Orthopedic Surgery)  Nikita Thakur MD as Consulting Physician (General Surgery)    Outpatient Medications Prior to Visit   Medication Sig Dispense Refill   • amiodarone (PACERONE) 200 MG tablet Take 200 mg by mouth Daily.     • amLODIPine (NORVASC) 10 MG tablet Take 10 mg by mouth Daily.     • apixaban (ELIQUIS) 5 MG tablet tablet Take 5 mg by mouth 2 (Two) Times a Day.     • glipizide (GLUCOTROL) 5 MG tablet TAKE 1/2 (ONE-HALF) TABLET BY MOUTH TWICE DAILY BEFORE MEAL(S) 30 tablet 0   • isosorbide mononitrate (IMDUR) 30 MG 24 hr tablet Take 30 mg by mouth Daily.     • levothyroxine (SYNTHROID, LEVOTHROID) 150 MCG tablet Take 1 tablet by mouth Every Morning. 90 tablet 1   • metFORMIN (GLUCOPHAGE) 500 MG tablet TAKE 1 TABLET BY MOUTH ONCE DAILY WITH BREAKFAST AND WITH SUPPER (Patient taking differently: Take 500 mg by mouth 2 (Two) Times a Day With Meals.) 180 tablet 0   • nitroglycerin (NITROSTAT) 0.4 MG SL tablet nitroglycerin 0.4 mg sublingual tablet, sublingual place 1 tablet (0.4 mg) by buccal route at the first sign of an attack; no more than 3 tabs are recommended within a 15 minute period.   Active     • PEG-KCl-NaCl-NaSulf-Na Asc-C (Plenvu) 140 g reconstituted solution solution Take 140 g by mouth Take As Directed. 1 each 0   • gabapentin (NEURONTIN) 100 MG capsule Take 1 capsule by mouth twice daily 60 capsule 0   • hydroCHLOROthiazide (HYDRODIURIL) 25 MG tablet Take 1/2 (one-half) tablet by mouth once daily (Patient taking differently: Take 25 mg by mouth Daily. Take 1/2 tab twice a day) 30 tablet 0   • pantoprazole (PROTONIX) 40 MG EC tablet Take 1 tablet by mouth Daily. 90 tablet 1   • sucralfate (Carafate) 1 g tablet Take 1 tablet by mouth 4 (Four) Times a Day. 360 tablet 0   • aspirin (aspirin) 81 MG EC tablet Take 81 mg by mouth Daily.     • cholecalciferol (VITAMIN D3) 25 MCG (1000 UT) tablet Take 1,000 Units by mouth 2 (Two) Times a Day.     • empagliflozin  (JARDIANCE) 10 MG tablet tablet Take 10 mg by mouth Daily. PATIENT ASSISTANCE PROGRAM     • ferrous sulfate 325 (65 FE) MG tablet Take 325 mg by mouth 2 (Two) Times a Day.     • Glucosamine 500 MG capsule Take 1,000 mg by mouth 2 (Two) Times a Day. OTC     • latanoprost (XALATAN) 0.005 % ophthalmic solution INSTILL 1 DROP INTO BOTH EYES DAILY     • metoclopramide (REGLAN) 5 MG tablet Take 5 mg by mouth 2 (Two) Times a Day.     • multivitamin (THERAGRAN) tablet tablet Take 1 tablet by mouth Daily.     • cyclobenzaprine (FLEXERIL) 10 MG tablet Take 1 tablet by mouth 2 (Two) Times a Day As Needed for Muscle Spasms. 90 tablet 1   • levothyroxine (Euthyrox) 137 MCG tablet      • sucralfate (CARAFATE) 1 g tablet Take 1 tablet by mouth Daily.       No facility-administered medications prior to visit.       No opioid medication identified on active medication list. I have reviewed chart for other potential  high risk medication/s and harmful drug interactions in the elderly.          Aspirin is on active medication list. Aspirin use is not indicated based on review of current medical condition/s. Risk of harm outweighs potential benefits. Patient instructed to discontinue this medication.  .      Patient Active Problem List   Diagnosis   • Type 2 diabetes mellitus (HCC)   • Chronic kidney disease   • Hypothyroidism   • Hypertensive disorder   • Hyperlipidemia   • Paroxysmal atrial fibrillation (HCC)   • Abdominal pain, epigastric   • Hearing aid worn   • At risk for negative response to medication   • Bloating   • Bradycardia   • Coronary arteriosclerosis   • Early satiety   • Gastroparesis   • Hearing loss   • Iron deficiency   • Mitral valve regurgitation   • Peripheral neuropathy   • Prostate CA (HCC)   • Balanitis   • Gastroesophageal reflux disease without esophagitis   • Primary insomnia   • Essential tremor   • Melena   • Primary osteoarthritis of left knee   • Anticoagulated   • Hip pain   • Vitamin D insufficiency  "  • Occult blood in stools   • Weight loss   • Iron deficiency anemia   • Medicare annual wellness visit, subsequent   • Pain in toes of both feet   • Thickened nails     Advance Care Planning  Advance Directive is not on file.  ACP discussion was held with the patient during this visit. Patient does not have an advance directive, information provided.          Objective    Vitals:    02/09/22 0950   BP: 135/63   BP Location: Left arm   Patient Position: Sitting   Pulse: 83   Temp: 97.8 °F (36.6 °C)   SpO2: 100%   Weight: 74.4 kg (164 lb)   Height: 172.7 cm (68\")     BMI Readings from Last 1 Encounters:   02/09/22 24.94 kg/m²   BMI is within normal parameters. No follow-up required.    Does the patient have evidence of cognitive impairment? No    Physical Exam  Vitals and nursing note reviewed.   Constitutional:       General: He is not in acute distress.     Appearance: Normal appearance. He is obese.   HENT:      Head:      Comments: [place], wears hearing aids     Right Ear: Tympanic membrane and ear canal normal.      Left Ear: Tympanic membrane and ear canal normal.      Mouth/Throat:      Mouth: Mucous membranes are moist.      Pharynx: Oropharynx is clear. No oropharyngeal exudate.   Eyes:      General: No scleral icterus.     Conjunctiva/sclera: Conjunctivae normal.   Neck:      Vascular: No carotid bruit.   Cardiovascular:      Rate and Rhythm: Normal rate and regular rhythm.      Heart sounds: No murmur heard.  No friction rub. No gallop.    Pulmonary:      Effort: No respiratory distress.      Breath sounds: No wheezing or rales.   Abdominal:      General: Bowel sounds are normal.      Palpations: Abdomen is soft. There is no mass.      Tenderness: There is no abdominal tenderness. There is no guarding or rebound.          Comments: Longitudinal abd scar   Musculoskeletal:         General: No swelling.      Right lower leg: No edema.      Left lower leg: No edema.      Right foot: No deformity.      Left " foot: No deformity.   Feet:      Right foot:      Protective Sensation: 5 sites tested. 5 sites sensed.      Skin integrity: No ulcer or callus.      Toenail Condition: Right toenails are normal.      Left foot:      Protective Sensation: 5 sites tested. 5 sites sensed.      Skin integrity: No ulcer or callus.      Toenail Condition: Left toenails are normal.      Comments: Dorsalis pedis posterior tibial pulses are good bilateral and feet are without concerning calluses, ulcerations, or other deformities. Sensation intact to monofilament testing. He does have thickened, yellow toenails x 10    Lymphadenopathy:      Cervical: No cervical adenopathy.   Skin:     Coloration: Skin is not jaundiced.      Findings: No lesion.   Neurological:      General: No focal deficit present.      Mental Status: He is alert and oriented to person, place, and time.   Psychiatric:         Mood and Affect: Mood normal.         Behavior: Behavior normal.         Thought Content: Thought content normal.         Judgment: Judgment normal.       Lab Results   Component Value Date    TRIG 251 (H) 2021    HDL 56 2021    LDL 87 2021    VLDL 41 (H) 2021    HGBA1C 7.54 (H) 2021    HGBA1C 7.4 2021   CBC Auto Differential (2021 09:11)  Comprehensive Metabolic Panel (2021 09:11)  Hemoglobin A1c (2021 09:11)  TSH (2021 09:11)  Vitamin D 25 Hydroxy (2021 09:11)  Lipid Panel (2021 09:11)  POC Glycosylated Hemoglobin (Hb A1C) (2021 08:41)  Vitamin B12 (2021 15:29)  Iron Profile (2021 15:29)           HEALTH RISK ASSESSMENT    Smoking Status:  Social History     Tobacco Use   Smoking Status Former Smoker   • Packs/day: 1.00   • Years: 27.00   • Pack years: 27.00   • Types: Cigarettes   • Start date:    • Quit date: 1970   • Years since quittin.1   Smokeless Tobacco Never Used     Alcohol Consumption:  Social History     Substance and Sexual Activity    Alcohol Use Never     Fall Risk Screen:    STEADI Fall Risk Assessment was completed, and patient is at HIGH risk for falls. Assessment completed on:2/9/2022    Depression Screening:  PHQ-2/PHQ-9 Depression Screening 2/9/2022   Little interest or pleasure in doing things 0   Feeling down, depressed, or hopeless 0   Total Score 0       Health Habits and Functional and Cognitive Screening:  Functional & Cognitive Status 2/9/2022   Do you have difficulty preparing food and eating? No   Do you have difficulty bathing yourself, getting dressed or grooming yourself? No   Do you have difficulty using the toilet? No   Do you have difficulty moving around from place to place? No   Do you have trouble with steps or getting out of a bed or a chair? No   Current Diet Well Balanced Diet   Dental Exam Up to date   Eye Exam Up to date   Exercise (times per week) 1 times per week   Current Exercises Include Walking   Do you need help using the phone?  No   Are you deaf or do you have serious difficulty hearing?  Yes   Do you need help with transportation? Yes   Do you need help shopping? No   Do you need help preparing meals?  No   Do you need help with housework?  No   Do you need help with laundry? No   Do you need help taking your medications? No   Do you need help managing money? No   Do you ever drive or ride in a car without wearing a seat belt? No       Age-appropriate Screening Schedule:  Refer to the list below for future screening recommendations based on patient's age, sex and/or medical conditions. Orders for these recommended tests are listed in the plan section. The patient has been provided with a written plan.    Health Maintenance   Topic Date Due   • ZOSTER VACCINE (1 of 2) Never done   • URINE MICROALBUMIN  03/04/2021   • HEMOGLOBIN A1C  06/14/2022   • DIABETIC EYE EXAM  10/26/2022   • LIPID PANEL  12/14/2022   • TDAP/TD VACCINES (2 - Tdap) 01/14/2030   • INFLUENZA VACCINE  Completed               Assessment/Plan   CMS Preventative Services Quick Reference  Risk Factors Identified During Encounter  Inactivity/Sedentary  The above risks/problems have been discussed with the patient.  Follow up actions/plans if indicated are seen below in the Assessment/Plan Section.  Pertinent information has been shared with the patient in the After Visit Summary.    Diagnoses and all orders for this visit:    1. Type 2 diabetes mellitus with diabetic chronic kidney disease, unspecified CKD stage, unspecified whether long term insulin use (HCC) (Primary)  Assessment & Plan:  He is quite concerned that his blood sugars have been running higher.  We will recheck another hemoglobin A1c.  To get medication change based on results    Orders:  -     Hemoglobin A1c  -     Microalbumin / Creatinine Urine Ratio - Urine, Clean Catch    2. Acquired hypothyroidism  Comments:  We did change his thyroid dosage after last lab we will repeat labs today  Orders:  -     TSH    3. Primary hypertension  Comments:  Blood pressure looks good continue on current regimen  Orders:  -     Comprehensive Metabolic Panel  -     CBC & Differential  -     hydroCHLOROthiazide (HYDRODIURIL) 25 MG tablet; Take 1 tablet by mouth Daily. Take 1/2 tab twice a day  Dispense: 90 tablet; Refill: 1    4. Mixed hyperlipidemia  Comments:  Not currently on medication.  We will check lab predicate medication needs based on results  Orders:  -     Comprehensive Metabolic Panel    5. Paroxysmal atrial fibrillation (HCC)  Comments:  We will confirm with his daughter regarding medication compliance.    6. Anticoagulated  Comments:  Will check a CBC today.  He reports black stool but I suspect that may be due to the Pepto.  Still, at this time I do not recommend he take aspirin    7. Stage 3a chronic kidney disease (HCC)  Comments:  Repeat labs today.  Orders:  -     CBC & Differential  -     Microalbumin / Creatinine Urine Ratio - Urine, Clean Catch    8.  Gastroparesis  Comments:  He has seen gastroenterology and is scheduled for scopes next month  Orders:  -     pantoprazole (PROTONIX) 40 MG EC tablet; Take 1 tablet by mouth Daily.  Dispense: 90 tablet; Refill: 1    9. Primary osteoarthritis of left knee  Comments:  He is still wanting to pursue surgery but has been put off until after his endoscopies  Assessment & Plan:  Being followed by Dr. Atkins. The patient hopes to proceed with TKR      10. Type 2 diabetes mellitus with diabetic polyneuropathy, without long-term current use of insulin ([facility])  Assessment & Plan:  He is quite concerned that his blood sugars have been running higher.  We will recheck another hemoglobin A1c.  To get medication change based on results    Orders:  -     gabapentin (NEURONTIN) 100 MG capsule; Take 1 capsule by mouth 2 (Two) Times a Day.  Dispense: 60 capsule; Refill: 2    11. Melena  Comments:  We will test the stool for blood try to verify if the dark stools melena or related to his iron tablet  Orders:  -     pantoprazole (PROTONIX) 40 MG EC tablet; Take 1 tablet by mouth Daily.  Dispense: 90 tablet; Refill: 1    12. Abdominal pain, epigastric  -     sucralfate (Carafate) 1 g tablet; Take 1 tablet by mouth 4 (Four) Times a Day.  Dispense: 360 tablet; Refill: 0    13. Pain in toes of both feet  Assessment & Plan:  Nails are trimmed for him.  Hopefully getting some relief from the discomfort.  The second nail right foot get Tickle scan he did have a little bit of bleeding (couple drops).  Resolved with pressure and silver nitrate      14. Thickened nails  Comments:  As noted, nails trimmed      Follow Up:   No follow-ups on file.     An After Visit Summary and PPPS were made available to the patient.

## 2022-02-10 DIAGNOSIS — E03.9 ACQUIRED HYPOTHYROIDISM: ICD-10-CM

## 2022-02-10 DIAGNOSIS — E11.22 TYPE 2 DIABETES MELLITUS WITH DIABETIC CHRONIC KIDNEY DISEASE, UNSPECIFIED CKD STAGE, UNSPECIFIED WHETHER LONG TERM INSULIN USE: Primary | ICD-10-CM

## 2022-02-10 RX ORDER — LEVOTHYROXINE SODIUM 175 UG/1
175 TABLET ORAL DAILY
Qty: 90 TABLET | Refills: 0 | Status: SHIPPED | OUTPATIENT
Start: 2022-02-10 | End: 2022-04-08 | Stop reason: SDUPTHER

## 2022-02-11 ENCOUNTER — PATIENT OUTREACH (OUTPATIENT)
Dept: CASE MANAGEMENT | Facility: OTHER | Age: 87
End: 2022-02-11

## 2022-02-11 DIAGNOSIS — E11.22 TYPE 2 DIABETES MELLITUS WITH DIABETIC CHRONIC KIDNEY DISEASE, UNSPECIFIED CKD STAGE, UNSPECIFIED WHETHER LONG TERM INSULIN USE: Primary | ICD-10-CM

## 2022-02-11 NOTE — TELEPHONE ENCOUNTER
Spoke with Katrin, Jardiance has been increased to 25mg.  She needs new script and will send off.  Sent to Dr. Mayer to print.  Called [facility] and 90 day will cost $141.- $45 month.

## 2022-02-11 NOTE — OUTREACH NOTE
Ambulatory Case Management Note    Called and spoke with Nya regarding the changes in her dad's medication.  Mailed copy to patient.  Daughter Nya pre-plans her dads medication.        Mamta Maravilla RN  Ambulatory Case Management    2/11/2022, 13:40 EST

## 2022-02-14 DIAGNOSIS — E11.22 TYPE 2 DIABETES MELLITUS WITH DIABETIC CHRONIC KIDNEY DISEASE, UNSPECIFIED CKD STAGE, UNSPECIFIED WHETHER LONG TERM INSULIN USE: ICD-10-CM

## 2022-02-15 DIAGNOSIS — E11.22 TYPE 2 DIABETES MELLITUS WITH DIABETIC CHRONIC KIDNEY DISEASE, UNSPECIFIED CKD STAGE, UNSPECIFIED WHETHER LONG TERM INSULIN USE: ICD-10-CM

## 2022-02-16 ENCOUNTER — PATIENT OUTREACH (OUTPATIENT)
Dept: CASE MANAGEMENT | Facility: OTHER | Age: 87
End: 2022-02-16

## 2022-02-16 DIAGNOSIS — E11.42 TYPE 2 DIABETES MELLITUS WITH POLYNEUROPATHY: Primary | ICD-10-CM

## 2022-02-16 NOTE — OUTREACH NOTE
Ambulatory Case Management Note    Called [facility] to check to see if prescriptions filled yet.  Most are ready at [facility] to .    Will check with Katrin at [facility] to see if she received the prescription for Jardiance.      Mamta Maravilla RN  Ambulatory Case Management    2/16/2022, 14:09 EST

## 2022-02-24 ENCOUNTER — PATIENT OUTREACH (OUTPATIENT)
Dept: CASE MANAGEMENT | Facility: OTHER | Age: 87
End: 2022-02-24

## 2022-02-24 DIAGNOSIS — M17.12 PRIMARY OSTEOARTHRITIS OF LEFT KNEE: ICD-10-CM

## 2022-02-24 DIAGNOSIS — Z79.01 ANTICOAGULATED: ICD-10-CM

## 2022-02-24 DIAGNOSIS — E11.42 TYPE 2 DIABETES MELLITUS WITH POLYNEUROPATHY: Primary | ICD-10-CM

## 2022-02-25 NOTE — OUTREACH NOTE
[facility] End of Month Documentation    This Chronic Medical Management Care Plan for Darci Munson, 86 y.o. male, has been monitored and managed and a new plan of care implemented for the month of February.  A cumulative time of 38  minutes was spent on this patient record this month, including electronic communication with primary care provider; electronic communication with other providers; chart review; phone call with relative; face to face with provider, Monitor and managing care.  Cordinating care with daughter to assist with PAP..    Regarding the patient's problems: has Type 2 diabetes mellitus (HCC); Chronic kidney disease; Hypothyroidism; Hypertensive disorder; Hyperlipidemia; Paroxysmal atrial fibrillation (HCC); Abdominal pain, epigastric; Hearing aid worn; At risk for negative response to medication; Bloating; Bradycardia; Coronary arteriosclerosis; Early satiety; Gastroparesis; Hearing loss; Iron deficiency; Mitral valve regurgitation; Peripheral neuropathy; Prostate CA (HCC); Balanitis; Gastroesophageal reflux disease without esophagitis; Primary insomnia; Essential tremor; Melena; Primary osteoarthritis of left knee; Anticoagulated; Hip pain; Vitamin D insufficiency; Occult blood in stools; Weight loss; Iron deficiency anemia; Medicare annual wellness visit, subsequent; Pain in toes of both feet; and Thickened nails on their problem list., the following items were addressed: transitions to medical care; medical records; medications, Updating medication list and education with family to be sure our med list are the same. and any changes can be found within the plan section of the note.  A detailed listing of time spent for chronic care management is tracked within each outreach encounter.  Current medications include:  has a current medication list which includes the following prescription(s): amiodarone, amlodipine, apixaban, aspirin, cholecalciferol, empagliflozin, ferrous sulfate, gabapentin, glipizide,  glucosamine, hydrochlorothiazide, isosorbide mononitrate, latanoprost, levothyroxine, metformin, metoclopramide, multivitamin, nitroglycerin, pantoprazole, plenvu, and sucralfate. and the patient is reported to be caregiver will take responsibility for med compliance, Effective when taking.,  Medications are reported to be effective, effective.  Regarding these diagnoses, referrals were made to the following provider(s):  specialists.  All notes on chart for PCP to review.    The patient was monitored remotely for blood glucose, Minimal monitioring at home..    The patient's physical needs include:  needs assistance with ADLs; physician referral; physical healthcare; help taking medications as prescribed, some ADL's.     The patient's mental support needs include:  continued support, Great family support.    The patient's cognitive support needs include:  continued support, impaired somewhat, age related    The patient's psychosocial support needs include:  continued support; medication management or adherence; no access to community activities; no opportunity for leisure activities, Lives alone, needs transportation.    The patient's functional needs include: medication education; physician referral; physical healthcare, Monitoring needs , surgery postponed.    The patient's environmental needs include:  transportation needs.    Care Plan overall comments:  Cordinating care with daughter, referral to specialist needed.  He also need medication management.    Refer to previous outreach notes for more information on the areas listed above.    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)    (M17.12) Primary osteoarthritis of left knee    (Z79.01) Anticoagulated    Medications   · Medications have been reconciled    Care Plan progress this month:      Recently Modified Care Plans Updates made since 1/24/2022 12:00 AM    No recently modified care plans.           Instructions   · Patient was provided an electronic copy of care plan  · CCM services were explained and offered and patient has accepted these services.  · Patient has given their written consent to receive CCM services and understands that this includes the authorization of electronic communication of medical information with the other treating providers.  · Patient understands that they may stop CCM services at any time and these changes will be effective at the end of the calendar month and will effectively revocate the agreement of CCM services.  · Patient understands that only one practitioner can furnish and be paid for CCM services during one calendar month.  Patient also understands that there may be co-payment or deductible fees in association with CCM services.  · Patient will continue with at least monthly follow-up calls with the Nurse Navigator.    Mamta Maravilla RN  Ambulatory Case Management    2/24/2022, 20:01 EST

## 2022-03-07 ENCOUNTER — TELEPHONE (OUTPATIENT)
Dept: GASTROENTEROLOGY | Facility: CLINIC | Age: 87
End: 2022-03-07

## 2022-03-07 NOTE — TELEPHONE ENCOUNTER
Patient sister called, stating she needs to reschedule Darci procedure on 03/29. He call back is [phone]

## 2022-03-09 ENCOUNTER — PATIENT OUTREACH (OUTPATIENT)
Dept: CASE MANAGEMENT | Facility: OTHER | Age: 87
End: 2022-03-09

## 2022-03-09 DIAGNOSIS — E11.42 TYPE 2 DIABETES MELLITUS WITH POLYNEUROPATHY: Primary | ICD-10-CM

## 2022-03-09 NOTE — OUTREACH NOTE
"AMBULATORY CASE MANAGEMENT NOTE    Name and Relationship of Patient/Support Person: Sahron Davalos - Emergency Contact     71  Minutes of time spent in patient care, reviewing records prior to the encounter, discussing with patient, entering orders and documentation.     Called daughter to check in after reviewing refill history.  He is now taking all his medications as prescribed now.      Nya reported to me that he will be having his knee replacement surgery on March 15, 2022 by Dr. Cleveland Atkins.    Inquired if she is then cancelling or rescheduling his EGD that was scheduled for late in the month.  She states that she has placed a call to their office without a return phone call.  Reached out to gastro office via secure chat and requested that someone call her back.    Nya states that he did have preoperative labs Friday at [facility] and will request results.    Inquired about post-op care/rehab.  Nya states that her dad is refusing to go \"to the nursing home\", explained that rehab will be the key to successful surgical recovery and that this would be encouraged.  She states that she will be assuming the care of him staying with him post op and using home health.  Encouraged her to speak with her dad again and to advise him to go to rehab - nursing home.  He will probably be a 2 person assist for the first few days, and she does not have the training and could cause more harm than benefit.  She agrees but states her dad is refusing.  Will continue to follow.  Supportive care given.   Inquired with Nya the stop Eliquis schedule and she states she has not been told to stop his Eliquis pre-op.  This raised concerns that either Dr. Atkins or Jessica does not have an updated medication list.  Called and left a message for Elmira to return my call to verify this list.  Nya was instructed to call cardiology office for guidance.    There is concern with his A1c elevation and positive hemoccult that he is cancelling his " EGD.  Had already notified Dr. Atkins's office in the past of this finding and was the delay of the surgery, but this has not been resolved yet.      AMBULATORY CASE MANAGEMENT NOTE    Name and Relationship of Patient/Support Person: Sharon Davalos - Emergency Contact    Elmira from Dr. Atkins office called me back and reviewed in media tab, as suspected [facility] did not have an up to date medication list and they were not aware that he was on a blood thinner.  Faxed an updated medication list to Elmira and she will update.  Luckily, this was caught in time. Also discussed with office concern about post-op care.     MELY R  Ambulatory Case Management    3/9/2022, 11:24 EST    Education Documentation  No documentation found.        MELY R  Ambulatory Case Management    3/9/2022, 10:20 EST   Adult Patient Profile  Questions/Answers    Flowsheet Row Most Recent Value   Symptoms/Conditions Managed at Home cardiovascular, gastrointestinal   Barriers to Managing Health financial resources, medication, unable to afford, understanding health advice   Cardiovascular Symptoms/Conditions dysrhythmia   Cardiovascular Management Strategies adequate rest, medication therapy   Cardiovascular Self-Management Outcome 3 (uncertain)   Gastrointestinal Symptoms/Conditions bleeding   Gastrointestinal Management Strategies other (see comments)  [referral to Gastroenterology for EGD]   Gastrointestinal Self-Management Outcome 3 (uncertain)   Missed Doses of Prescribed Medications During Past Week yes   Taken Prescribed Medications at Different Time or Schedule During Past Week yes   Taken More or Less Medication Than Prescribed no   Barriers to Taking Medication as Prescribed unable to afford medicine   How to be Addressed Darci   How Well Do You Speak English? very well   Source of Information health record        Adult Wellness Assessment  Questions/Answers    Flowsheet Row Most Recent Value   Help with Reading Health-Related  Information occasionally   Problems Learning about Medical Condition occasionally   Confidence in Filling Out Forms by Self sometimes   Regular Exercise no   Home and Personal Safety Concerns none   Phone Family/Friends/Neighbors per Week 1   Gather with Friends/Family per Week 2   Lutheran/Faith Services per Week 0   Club/Organization Meetings per Year 0   Social Isolation Score 3

## 2022-03-10 NOTE — TELEPHONE ENCOUNTER
Spoke with patient's sister and was informed that this patient is having knee surgery that week and that they needed to reschedule. Patient scopes was rescheduled to 7/1/22 and the arrival time would be 8:30am arrival time. Patient's sister verbalized understanding.

## 2022-03-16 ENCOUNTER — READMISSION MANAGEMENT (OUTPATIENT)
Dept: CALL CENTER | Facility: HOSPITAL | Age: 87
End: 2022-03-16

## 2022-03-17 ENCOUNTER — TRANSITIONAL CARE MANAGEMENT TELEPHONE ENCOUNTER (OUTPATIENT)
Dept: CALL CENTER | Facility: HOSPITAL | Age: 87
End: 2022-03-17

## 2022-03-17 NOTE — OUTREACH NOTE
Call Center TCM Note    Flowsheet Row Responses   [facility] patient discharged from? Non-   Does the patient have one of the following disease processes/diagnoses(primary or secondary)? Total Joint Replacement   TCM attempt successful? Yes   Call start time 0854   Call end time 0900   Discharge diagnosis Total Knee   Meds reviewed with patient/caregiver? Yes   Is the patient having any side effects they believe may be caused by any medication additions or changes? No   Does the patient have all medications ordered at discharge? Yes   Is the patient taking all medications as directed (includes completed medication regime)? Yes   Medication comments Daughter stated pt is having nightmares while taking pain meds, pt is using ice and elevation   Comments regarding appointments Daughter stated she thinks pt has an ortho appt in 10days   Does the patient have a primary care provider?  Yes   Does the patient have an appointment with their PCP within 7 days of discharge? Yes  [Daughter stated pt has an appt but was not sure when]   Has the patient kept scheduled appointments due by today? N/A   What is the Home health agency?  [facility]   Has home health visited the patient within 72 hours of discharge? Call prior to 72 hours   Psychosocial issues? No   Did the patient receive a copy of their discharge instructions? Yes   Nursing interventions Reviewed instructions with patient   What is the patient's perception of their health status since discharge? Same   Is the patient/caregiver able to teach back signs and symptoms related to disease process for when to call PCP? Yes   Is the patient/caregiver able to teach back signs and symptoms related to disease process for when to call 911? Yes   Is the patient/caregiver able to teach back the hierarchy of who to call/visit for symptoms/problems? PCP, Specialist, Home health nurse, Urgent Care, ED, 911 Yes   If the patient is a current smoker, are they able to teach back  resources for cessation? Not a smoker   TCM call completed? Yes          ROCK OGDEN RN    3/17/2022, 09:02 EDT

## 2022-03-21 ENCOUNTER — PATIENT OUTREACH (OUTPATIENT)
Dept: CASE MANAGEMENT | Facility: OTHER | Age: 87
End: 2022-03-21

## 2022-03-21 DIAGNOSIS — E11.42 TYPE 2 DIABETES MELLITUS WITH POLYNEUROPATHY: Primary | ICD-10-CM

## 2022-03-21 NOTE — OUTREACH NOTE
"AMBULATORY CASE MANAGEMENT NOTE    Name and Relationship of Patient/Support Person: Sharon Davalos - Emergency Contact      12 Minutes of time spent in patient care, reviewing records prior to the encounter, discussing with patient, entering orders and documentation.     Called Nya to check on her dad.  Things are going OK.  Home health is coming 2 times a day.  He was discharged on the 3/16/22.  Some confusion she thinks with anesthesia and fatigue.  Stopped pain medication due to hallucinations.    PT came today and he was able to do some activities.  Follow up with Dr. Atkins on 3/30/22.  Does not need Dr Mayer appointment unless \"he gets worse\".  Oxygen level was only 92 today and some wheezing.  Informed if he gets any lower, she will need to take him to the ER for eval and she was agreeable.  She is also reporting that he is having like a tremor in his hands when he falls asleep and will fall asleep with eyes open - home health warned that that it could be a seizure, but has no history.  Nya will continue to monitor.  Will check on him in a day or so and she can call for any concerns.     MELY GAMBOA  Ambulatory Case Management    3/21/2022, 17:17 EDT  "

## 2022-03-24 ENCOUNTER — PATIENT OUTREACH (OUTPATIENT)
Dept: CASE MANAGEMENT | Facility: OTHER | Age: 87
End: 2022-03-24

## 2022-03-24 DIAGNOSIS — E11.42 TYPE 2 DIABETES MELLITUS WITH POLYNEUROPATHY: Primary | ICD-10-CM

## 2022-03-24 NOTE — OUTREACH NOTE
AMBULATORY CASE MANAGEMENT NOTE    Name and Relationship of Patient/Support Person: Susannah Sharon MARIO - Emergency Contact    Doing better today, not eating as much.  Constipation still with pain meds, taking senna. Blood sugar doing ok.  No longer wheezing, nurse came out and said he was fine.  OT/PT is seeing also.  Seeing Dr. Atkins on 3/30/22.     Education Documentation  No documentation found.        MELY GAMBOA  Ambulatory Case Management    3/24/2022, 16:11 EDT

## 2022-03-29 ENCOUNTER — PATIENT OUTREACH (OUTPATIENT)
Dept: CASE MANAGEMENT | Facility: OTHER | Age: 87
End: 2022-03-29

## 2022-03-29 DIAGNOSIS — M17.12 PRIMARY OSTEOARTHRITIS OF LEFT KNEE: ICD-10-CM

## 2022-03-29 DIAGNOSIS — E11.42 TYPE 2 DIABETES MELLITUS WITH POLYNEUROPATHY: Primary | ICD-10-CM

## 2022-03-29 DIAGNOSIS — E03.9 ACQUIRED HYPOTHYROIDISM: ICD-10-CM

## 2022-03-29 PROCEDURE — 99439 CHRNC CARE MGMT STAF EA ADDL: CPT | Performed by: FAMILY MEDICINE

## 2022-03-29 PROCEDURE — 99490 CHRNC CARE MGMT STAFF 1ST 20: CPT | Performed by: FAMILY MEDICINE

## 2022-03-29 NOTE — OUTREACH NOTE
[facility] End of Month Documentation    This Chronic Medical Management Care Plan for Darci Munson, 86 y.o. male, has been monitored and managed and a new plan of care implemented for the month of March.  A cumulative time of 86  minutes was spent on this patient record this month, including electronic communication with primary care provider; electronic communication with other providers; chart review; phone call with relative; face to face with provider.    Regarding the patient's problems: has Type 2 diabetes mellitus (HCC); Chronic kidney disease; Hypothyroidism; Hypertensive disorder; Hyperlipidemia; Paroxysmal atrial fibrillation (HCC); Abdominal pain, epigastric; Hearing aid worn; At risk for negative response to medication; Bloating; Bradycardia; Coronary arteriosclerosis; Early satiety; Gastroparesis; Hearing loss; Iron deficiency; Mitral valve regurgitation; Peripheral neuropathy; Prostate CA (HCC); Balanitis; Gastroesophageal reflux disease without esophagitis; Primary insomnia; Essential tremor; Melena; Primary osteoarthritis of left knee; Anticoagulated; Hip pain; Vitamin D insufficiency; Occult blood in stools; Weight loss; Iron deficiency anemia; Medicare annual wellness visit, subsequent; Pain in toes of both feet; and Thickened nails on their problem list., the following items were addressed: transitions to medical care; medical records; medications and any changes can be found within the plan section of the note.  A detailed listing of time spent for chronic care management is tracked within each outreach encounter.  Current medications include:  has a current medication list which includes the following prescription(s): amiodarone, amlodipine, apixaban, aspirin, cholecalciferol, empagliflozin, ferrous sulfate, gabapentin, glipizide, glucosamine, hydrochlorothiazide, isosorbide mononitrate, latanoprost, levothyroxine, metformin, metoclopramide, multivitamin, nitroglycerin, pantoprazole, plenvu, and  sucralfate. and the patient is reported to be caregiver will take responsibility for med compliance,  Medications are reported to be effective.  Regarding these diagnoses, referrals were made to the following provider(s):  specialists.  All notes on chart for PCP to review.    The patient was monitored remotely for blood glucose.    The patient's physical needs include:  needs assistance with ADLs; physician referral; physical healthcare; help taking medications as prescribed, some ADL's.     The patient's mental support needs include:  continued support, Great family support.    The patient's cognitive support needs include:  continued support; medication, impaired somewhat, age related    The patient's psychosocial support needs include:  continued support; medication management or adherence; no access to community activities; no opportunity for leisure activities, Lives alone, needs transportation.    The patient's functional needs include: medication education; physician referral; physical healthcare, Monitoring needs ,    The patient's environmental needs include:  transportation needs.    Care Plan overall comments:  Cordinating care with daughter,  He also need medication management.    Refer to previous outreach notes for more information on the areas listed above.    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)    (M17.12) Primary osteoarthritis of left knee    (E03.9) Acquired hypothyroidism    Medications   · Medications have been reconciled    Care Plan progress this month:      Recently Modified Care Plans Updates made since 2/26/2022 12:00 AM     Wellness (Adult)         Problem Priority Last Modified     ELS MEDICATION ADHERENCE (WELLNESS) (ADULT) --  3/9/2022 10:15 AM by Mamta Maravilla, ROCKY              Goal Recent Progress Last Modified     Medication Adherence Maintained --  3/9/2022 10:15 AM by Mamta Maravilla, ROCKY     Evidence-based guidance:   Develop a  complete and accurate medication list including those prescribed and over-the-counter, those taken only occasionally and those not taken by mouth such as injections, inhalers, ointments or creams and drops.   Review all medications to determine if patient or caregiver knows why the medications are given and if taken as prescribed.   Complete or review a medication adherence assessment including barriers to medication adherence.   Arrange and encourage counseling and medication review by pharmacist.   Assess barriers to medication adherence.   Manage poor understanding or health literacy by using easy to understand language, teach-back, visual aids and teaching only 2 or 3 points at a time.   Assess presence of side effects; provide suggestions to manage or reduce side effects.   Consult with provider and/or pharmacist regarding substitute medication, changing dose, simplification of regimen or safe discontinuation of some medications.   Encourage the use of medication reminders such as clock or cell phone alarm, color coding, pillboxes for am/pm and days of the week, pharmacy refill reminder, auto-refill system or mail-order option.   Assist with resources when cost is a barrier; refer to prescription assistance programs; confirm that generics are prescribed whenever possible; consider 90-day prescriptions to reduce copay cost; synchronize refills.   Provide help to complete medication assistance applications or health insurance forms as needed.   Complete a follow-up call 2 to 3 weeks after medication self-management plan developed; assess adherence and understanding, as well as listen to patient or caregiver concerns; amend plan as needed.   Provide frequent follow-up providing motivation, encouragement and support when medication nonadherence is identified.    Notes:              Task Due Date Last Modified     Optimize Medication Use --  3/9/2022 10:19 AM by Mamta Maravilla RN     Care Management Activities:       - barriers to medication adherence identified  - medication list reviewed  - medication-adherence assessment completed      Notes:                   Atrial Fibrillation (Adult)         Problem Priority Last Modified     ELS DYSRHYTHMIA (ATRIAL FIBRILLATION) (ADULT) --  3/9/2022 10:17 AM by Mamta Maravilla RN              Goal Recent Progress Last Modified     Heart Rate and Rhythm Monitored and Managed --  3/9/2022 10:17 AM by Mamta Maravilla, RN     Evidence-based guidance:   Assess heart rate, rhythm and presence of symptoms at each encounter using pulse palpation, blood pressure monitor and review of symptom diary.   Consider pulse check plus an electrocardiogram (ECG) in women over the age of 75 years.   Prepare patient for diagnostic studies, such as 24-hour ambulatory monitoring, echocardiogram or implantable loop recorder, based on presentation and risk factors, such as cryptogenic stroke.   Prepare patient for use of pharmacologic therapy, such as beta-blocker, calcium channel blocker, digoxin, antiarrhythmic, based on presentation, risk factors and patient preferences.   Monitor side effects and expect periodic adjustments.   Provide an opportunity for shared decision-making when uncontrolled rate and rhythm persist and invasive therapy is considered, such as left atrial ablation, pacemaker, cardioversion or cardiac resynchronization therapy.   Provide reassurance, as initial symptoms and potential recurrence are frightening to patient and family/caregiver and may impact quality of life.   Provide prompt follow-up after hospitalization to support transition of care; consider referral to home care or community support program, especially in presence of comorbidity.   Encourage exercise 2 to 3 times per week, based on ability and tolerance, to improve physical and cardiac function and quality of life.    Notes:              Task Due Date Last Modified     Alleviate Barriers to Dysrhythmia Management --   3/9/2022 10:18 AM by Mamta Maravilla RN     Care Management Activities:      - medication-adherence assessment completed      Notes:                Problem Priority Last Modified     ELS STROKE RISK (ATRIAL FIBRILLATION) (ADULT) --  3/9/2022 10:17 AM by Mamta Maravilla RN              Goal Recent Progress Last Modified     Stroke Risk Minimized --  3/9/2022 10:17 AM by Mamta Maravilla RN     Evidence-based guidance:   Provide anticipatory guidance regarding the signs/symptoms of stroke and heart failure.   Assess bleeding risk using a validated tool; review results with patient.   Assess and review stroke risk using a validated tool that include age and sex differences to determine choice, benefit and timing of anticoagulant therapy.   Prepare patient for use of pharmacologic therapy, such as nonvitamin K oral anticoagulant, vitamin K antagonist, low-molecular-weight heparin or antiplatelet (in combination with another agent) based on stroke risk score.   Monitor side effects and anticipate need for periodic adjustments.   Prepare patient for periodic coagulation laboratory testing when taking vitamin K antagonist; consider at-home monitoring and management.   Review alcohol risk screen or self-report of alcohol use (amount and frequency) when anticoagulant (especially vitamin K antagonist) is prescribed.   Encourage consistent consumption, not restriction, of dietary vitamin K, such as green, leafy vegetables, during warfarin therapy and especially during initiation phase.   Provide anticipatory guidance regarding the risk of bleeding, awareness of signs/symptoms of bleeding and minimizing bleeding risk, including fall and injury prevention.   Perform medication-adherence assessment when taking oral anticoagulants that do not require periodic monitoring or when poor anticoagulation control based on monitoring is noted.   Address barriers to treatment adherence, such as cognitive function, illness, drug interaction,  medication cost, presence of depression or anxiety and lifestyle factors, including diet and alcohol consumption.   Promote shared decision-making using stroke and treatment benefits and risks, lifestyle and patient preferences to determine type of antithrombotic therapy.    Notes:              Task Due Date Last Modified     Minimize Stroke Risk --  3/9/2022 10:19 AM by Mely Maravilla, RN     Care Management Activities:      - not discussed during this outreach      Notes:                          Instructions   · Patient was provided an electronic copy of care plan  · CCM services were explained and offered and patient has accepted these services.  · Patient has given their written consent to receive CCM services and understands that this includes the authorization of electronic communication of medical information with the other treating providers.  · Patient understands that they may stop CCM services at any time and these changes will be effective at the end of the calendar month and will effectively revocate the agreement of CCM services.  · Patient understands that only one practitioner can furnish and be paid for CCM services during one calendar month.  Patient also understands that there may be co-payment or deductible fees in association with CCM services.  · Patient will continue with at least monthly follow-up calls with the Nurse Navigator.    MELY GAMBOA  Ambulatory Case Management    3/29/2022, 13:25 EDT

## 2022-04-05 ENCOUNTER — PATIENT OUTREACH (OUTPATIENT)
Dept: CASE MANAGEMENT | Facility: OTHER | Age: 87
End: 2022-04-05

## 2022-04-05 DIAGNOSIS — M17.12 PRIMARY OSTEOARTHRITIS OF LEFT KNEE: ICD-10-CM

## 2022-04-05 DIAGNOSIS — E11.42 TYPE 2 DIABETES MELLITUS WITH POLYNEUROPATHY: Primary | ICD-10-CM

## 2022-04-08 ENCOUNTER — TELEPHONE (OUTPATIENT)
Dept: FAMILY MEDICINE CLINIC | Age: 87
End: 2022-04-08

## 2022-04-08 DIAGNOSIS — E03.9 ACQUIRED HYPOTHYROIDISM: ICD-10-CM

## 2022-04-08 DIAGNOSIS — R10.13 ABDOMINAL PAIN, EPIGASTRIC: ICD-10-CM

## 2022-04-08 DIAGNOSIS — E11.42 TYPE 2 DIABETES MELLITUS WITH POLYNEUROPATHY: ICD-10-CM

## 2022-04-08 NOTE — TELEPHONE ENCOUNTER
Caller: [facility], [place], [id number]  [place], [place] - [address] - [phone] [facility] [phone] FX    Relationship: Pharmacy    Requested Prescriptions:   Requested Prescriptions     Pending Prescriptions Disp Refills   • levothyroxine (Synthroid) 175 MCG tablet 90 tablet 0     Sig: Take 1 tablet by mouth Daily.   • sucralfate (Carafate) 1 g tablet 360 tablet 0     Sig: Take 1 tablet by mouth 4 (Four) Times a Day.   • metFORMIN (GLUCOPHAGE) 500 MG tablet 180 tablet 0        Pharmacy where request should be sent: [facility] [place], [id number] - [place], [place] - [address] - [phone] [facility] [phone] FX       PHARMACY REQUESTING REFILLS - THEY ARE MAKING UP PILL PACKS    Karsten Ryder Rep   04/08/22 10:26 EDT

## 2022-04-09 RX ORDER — SUCRALFATE 1 G/1
1 TABLET ORAL 4 TIMES DAILY
Qty: 360 TABLET | Refills: 0 | Status: SHIPPED | OUTPATIENT
Start: 2022-04-09 | End: 2022-07-22 | Stop reason: SDDI

## 2022-04-09 RX ORDER — LEVOTHYROXINE SODIUM 175 UG/1
175 TABLET ORAL DAILY
Qty: 90 TABLET | Refills: 0 | Status: SHIPPED | OUTPATIENT
Start: 2022-04-09 | End: 2022-05-06 | Stop reason: SDUPTHER

## 2022-04-15 ENCOUNTER — PATIENT OUTREACH (OUTPATIENT)
Dept: CASE MANAGEMENT | Facility: OTHER | Age: 87
End: 2022-04-15

## 2022-04-15 DIAGNOSIS — M17.12 PRIMARY OSTEOARTHRITIS OF LEFT KNEE: ICD-10-CM

## 2022-04-15 DIAGNOSIS — E11.42 TYPE 2 DIABETES MELLITUS WITH POLYNEUROPATHY: Primary | ICD-10-CM

## 2022-04-15 NOTE — OUTREACH NOTE
AMBULATORY CASE MANAGEMENT NOTE    Name and Relationship of Patient/Support Person: Sharon Davalos - Emergency Contact    Left message to call back regarding care.     Education Documentation  No documentation found.        MELY R  Ambulatory Case Management

## 2022-04-15 NOTE — OUTREACH NOTE
AMBULATORY CASE MANAGEMENT NOTE    Name and Relationship of Patient/Support Person: Sharon Davalos - Emergency Contact    Called Nya to check in with her dad.  He is doing amazingly well.  He does have an appointment with dermatology next week with Christie Alegre.    Blood sugars were high, suprisingly because he wasn't eating very well, but they have leveled back off.  He has lost maybe 15lbs with this surgery.  Pain medication made him a little nauseated and did not eat well.     Will request lab order for him to do in advance of his upcoming appointment so Dr. Mayer can address the diabetes at the time of the visit    No signs of blood in stool or bleeding. Will need repeat iron level.     Education Documentation  Blood Glucose Monitoring, taught by Mely Maravilla, RN at 4/15/2022 10:02 AM.  Learner: Family  Readiness: Acceptance  Method: Explanation  Response: Needs Reinforcement    A1C Goal, taught by Mely Maravilla, RN at 4/15/2022 10:02 AM.  Learner: Family  Readiness: Acceptance  Method: Explanation  Response: Needs Reinforcement    Frequency of Testing, taught by Mely Maravilla, RN at 4/15/2022 10:02 AM.  Learner: Family  Readiness: Acceptance  Method: Explanation  Response: Needs Reinforcement          MELY GAMBOA  Ambulatory Case Management    4/15/2022, 09:37 EDT

## 2022-04-16 DIAGNOSIS — E03.9 ACQUIRED HYPOTHYROIDISM: ICD-10-CM

## 2022-04-16 DIAGNOSIS — E78.2 MIXED HYPERLIPIDEMIA: Primary | ICD-10-CM

## 2022-04-16 DIAGNOSIS — N18.31 STAGE 3A CHRONIC KIDNEY DISEASE: ICD-10-CM

## 2022-04-16 DIAGNOSIS — I10 PRIMARY HYPERTENSION: ICD-10-CM

## 2022-04-16 DIAGNOSIS — E11.22 TYPE 2 DIABETES MELLITUS WITH DIABETIC CHRONIC KIDNEY DISEASE, UNSPECIFIED CKD STAGE, UNSPECIFIED WHETHER LONG TERM INSULIN USE: ICD-10-CM

## 2022-04-16 DIAGNOSIS — D50.9 IRON DEFICIENCY ANEMIA, UNSPECIFIED IRON DEFICIENCY ANEMIA TYPE: ICD-10-CM

## 2022-04-20 NOTE — TELEPHONE ENCOUNTER
"Let him know this is a medication that would be best to stop if we can due to possible side effects.  Does he have strong believes that the mediation is actually helping?  And in what way? (Don't give a hint about what the medication is supposed to be doing.  I want to hear what \"he\" thinks it is doing.)  I'll consider the refill after I hear his response.     mas  "

## 2022-04-21 NOTE — TELEPHONE ENCOUNTER
Daughter states that he is having restless legs type symptoms at nighttime.  Complains of them drawing up/hurting. Anything different to prescribe?

## 2022-04-21 NOTE — TELEPHONE ENCOUNTER
Actually, it is not even on his med-list to be able to refill.  So just have him to take HALF a tablet BID prn of any of the pills he has remaining.  If he does not have any pills, then just stay off of them until follow up.    mas

## 2022-04-21 NOTE — TELEPHONE ENCOUNTER
This is probably not the best medication for that issue.  Lets wean him off per the direction on the new Rx.   We can reassess at his upcoming appt    mas

## 2022-04-22 ENCOUNTER — PATIENT OUTREACH (OUTPATIENT)
Dept: CASE MANAGEMENT | Facility: OTHER | Age: 87
End: 2022-04-22

## 2022-04-22 DIAGNOSIS — E11.42 TYPE 2 DIABETES MELLITUS WITH POLYNEUROPATHY: Primary | ICD-10-CM

## 2022-04-22 NOTE — OUTREACH NOTE
AMBULATORY CASE MANAGEMENT NOTE    Name and Relationship of Patient/Support Person:  -     Faxed for dermatology notes.     Education Documentation  No documentation found.        MELY GAMBOA  Ambulatory Case Management    4/22/2022, 16:12 EDT

## 2022-04-25 RX ORDER — CYCLOBENZAPRINE HCL 10 MG
TABLET ORAL
Qty: 30 TABLET | Refills: 0 | OUTPATIENT
Start: 2022-04-25

## 2022-04-27 ENCOUNTER — PATIENT OUTREACH (OUTPATIENT)
Dept: CASE MANAGEMENT | Facility: OTHER | Age: 87
End: 2022-04-27

## 2022-04-27 DIAGNOSIS — E11.42 TYPE 2 DIABETES MELLITUS WITH POLYNEUROPATHY: Primary | ICD-10-CM

## 2022-04-27 DIAGNOSIS — M17.12 PRIMARY OSTEOARTHRITIS OF LEFT KNEE: ICD-10-CM

## 2022-04-27 PROCEDURE — 99490 CHRNC CARE MGMT STAFF 1ST 20: CPT | Performed by: FAMILY MEDICINE

## 2022-04-27 NOTE — OUTREACH NOTE
AMBULATORY CASE MANAGEMENT NOTE    Name and Relationship of Patient/Support Person:  -     Medication reconcilliation reveals not filled Jardiance since March, however could be receiving through [facility] PAP.  Called and left message for Nya for clarification.  Also reminded of lab order and to complete before office visit next Friday.  Faxed updated med list to cardiology    Education Documentation  No documentation found.      MELY R  Ambulatory Case Management    4/27/2022, 16:31 EDT    CCM End of Month Documentation    This Chronic Medical Management Care Plan for Darci Munson, 86 y.o. male, has been monitored and managed and a new plan of care implemented for the month of April.  A cumulative time of 32  minutes was spent on this patient record this month, including electronic communication with primary care provider; electronic communication with other providers; chart review; phone call with relative.    Regarding the patient's problems: has Type 2 diabetes mellitus (HCC); Chronic kidney disease; Hypothyroidism; Hypertensive disorder; Hyperlipidemia; Paroxysmal atrial fibrillation (HCC); Abdominal pain, epigastric; Hearing aid worn; At risk for negative response to medication; Bloating; Bradycardia; Coronary arteriosclerosis; Early satiety; Gastroparesis; Hearing loss; Iron deficiency; Mitral valve regurgitation; Peripheral neuropathy; Prostate CA (HCC); Balanitis; Gastroesophageal reflux disease without esophagitis; Primary insomnia; Essential tremor; Melena; Primary osteoarthritis of left knee; Anticoagulated; Hip pain; Vitamin D insufficiency; Occult blood in stools; Weight loss; Iron deficiency anemia; Medicare annual wellness visit, subsequent; Pain in toes of both feet; and Thickened nails on their problem list., the following items were addressed: transitions to medical care; medical records; medications and any changes can be found within the plan section of the note.  A detailed listing of time spent  for chronic care management is tracked within each outreach encounter.  Current medications include:  has a current medication list which includes the following prescription(s): amiodarone, amlodipine, apixaban, aspirin, cholecalciferol, empagliflozin, ferrous sulfate, gabapentin, glipizide, glucosamine, hydrochlorothiazide, isosorbide mononitrate, latanoprost, levothyroxine, metformin, metoclopramide, multivitamin, nitroglycerin, pantoprazole, plenvu, and sucralfate. and the patient is reported to be caregiver will take responsibility for med compliance,  Medications are reported to be effective.  Regarding these diagnoses, referrals were made to the following provider(s):  specialists.  All notes on chart for PCP to review.    The patient was monitored remotely for blood glucose; pain; medications.    The patient's physical needs include:  needs assistance with ADLs; physician referral; physical healthcare; help taking medications as prescribed.     The patient's mental support needs include:  continued support, Great family support.    The patient's cognitive support needs include:  continued support; medication, impaired somewhat, age related    The patient's psychosocial support needs include:  continued support; medication management or adherence; no access to community activities; no opportunity for leisure activities    The patient's functional needs include: medication education; physician referral; physical healthcare    The patient's environmental needs include:  transportation needs.    Care Plan overall comments:  Cordinating care with daughter,  He also need medication management.    Refer to previous outreach notes for more information on the areas listed above.    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)    (M17.12) Primary osteoarthritis of left knee    Medications   · Medications have been reconciled    Care Plan progress this month:      Recently  Modified Care Plans Updates made since 3/27/2022 12:00 AM     Diabetes Type 2 (Adult)         Problem Priority Last Modified     ELS GLYCEMIC MANAGEMENT (DIABETES, TYPE 2) (ADULT) --  4/15/2022  9:52 AM by Mamta Maravilla RN              Goal Recent Progress Last Modified     Glycemic Management Optimized --  4/15/2022  9:52 AM by Mamta Maravilla RN     Evidence-based guidance:   Anticipate A1C testing (point-of-care) every 3 to 6 months based on goal attainment.   Review mutually-set A1C goal or target range.   Anticipate use of antihyperglycemic with or without insulin and periodic adjustments; consider active involvement of pharmacist.   Provide medical nutrition therapy and development of individualized eating.   Compare self-reported symptoms of hypo or hyperglycemia to blood glucose levels, diet and fluid intake, current medications, psychosocial and physiologic stressors, change in activity and barriers to care adherence.   Promote self-monitoring of blood glucose levels.   Assess and address barriers to management plan, such as food insecurity, age, developmental ability, depression, anxiety, fear of hypoglycemia or weight gain, as well as medication cost, side effects and complicated regimen.   Consider referral to community-based diabetes education program, visiting nurse, community health worker or health .   Encourage regular dental care for treatment of periodontal disease; refer to dental provider when needed.    Notes:              Task Due Date Last Modified     Alleviate Barriers to Glycemic Management --  4/15/2022  9:52 AM by Mamta Maravilla RN     Care Management Activities:      - not discussed during this outreach      Notes:              Problem Priority Last Modified     ELS DISEASE PROGRESSION (DIABETES, TYPE 2) (ADULT) --  4/15/2022  9:52 AM by Mamta Maravilla RN              Goal Recent Progress Last Modified     Disease Progression Prevented or Minimized --  4/15/2022  9:52 AM by  Mamta Maravilla, RN     Evidence-based guidance:   Prepare patient for laboratory and diagnostic exams based on risk and presentation.   Encourage lifestyle changes, such as increased intake of plant-based foods, stress reduction, consistent physical activity and smoking cessation to prevent long-term complications and chronic disease.    Individualize activity and exercise recommendations while considering potential limitations, such as neuropathy, retinopathy or the ability to prevent hyperglycemia or hypoglycemia.    Prepare patient for use of pharmacologic therapy that may include antihypertensive, analgesic, prostaglandin E1 with periodic adjustments, based on presenting chronic condition and laboratory results.   Assess signs/symptoms and risk factors for hypertension, sleep-disordered breathing, neuropathy (including changes in gait and balance), retinopathy, nephropathy and sexual dysfunction.   Address pregnancy planning and contraceptive choice, especially when prescribing antihypertensive or statin.   Ensure completion of annual comprehensive foot exam and dilated eye exam.    Implement additional individualized goals and interventions based on identified risk factors.   Prepare patient for consultation or referral for specialist care, such as ophthalmology, neurology, cardiology, podiatry, nephrology or perinatology.    Notes:              Task Due Date Last Modified     Monitor and Manage Follow-up for Comorbidities --  4/15/2022  9:52 AM by Mamta Maravilla, RN     Care Management Activities:      - not discussed during this outreach      Notes:                        Instructions   · Patient was provided an electronic copy of care plan  · CCM services were explained and offered and patient has accepted these services.  · Patient has given their written consent to receive CCM services and understands that this includes the authorization of electronic communication of medical information with the other  treating providers.  · Patient understands that they may stop CCM services at any time and these changes will be effective at the end of the calendar month and will effectively revocate the agreement of CCM services.  · Patient understands that only one practitioner can furnish and be paid for CCM services during one calendar month.  Patient also understands that there may be co-payment or deductible fees in association with CCM services.  · Patient will continue with at least monthly follow-up calls with the Nurse Navigator.    MELY GAMBOA  Ambulatory Case Management    4/27/2022, 16:31 EDT

## 2022-05-02 ENCOUNTER — LAB (OUTPATIENT)
Dept: LAB | Facility: HOSPITAL | Age: 87
End: 2022-05-02

## 2022-05-02 DIAGNOSIS — E03.9 ACQUIRED HYPOTHYROIDISM: ICD-10-CM

## 2022-05-02 DIAGNOSIS — D50.9 IRON DEFICIENCY ANEMIA, UNSPECIFIED IRON DEFICIENCY ANEMIA TYPE: ICD-10-CM

## 2022-05-02 DIAGNOSIS — E11.22 TYPE 2 DIABETES MELLITUS WITH DIABETIC CHRONIC KIDNEY DISEASE, UNSPECIFIED CKD STAGE, UNSPECIFIED WHETHER LONG TERM INSULIN USE: ICD-10-CM

## 2022-05-02 DIAGNOSIS — N18.31 STAGE 3A CHRONIC KIDNEY DISEASE: ICD-10-CM

## 2022-05-02 DIAGNOSIS — I10 PRIMARY HYPERTENSION: ICD-10-CM

## 2022-05-02 DIAGNOSIS — E78.2 MIXED HYPERLIPIDEMIA: ICD-10-CM

## 2022-05-02 LAB
ALBUMIN SERPL-MCNC: 4.3 G/DL (ref 3.5–5.2)
ALBUMIN/GLOB SERPL: 1.5 G/DL
ALP SERPL-CCNC: 123 U/L (ref 39–117)
ALT SERPL W P-5'-P-CCNC: 13 U/L (ref 1–41)
ANION GAP SERPL CALCULATED.3IONS-SCNC: 15 MMOL/L (ref 5–15)
AST SERPL-CCNC: 18 U/L (ref 1–40)
BASOPHILS # BLD AUTO: 0.04 10*3/MM3 (ref 0–0.2)
BASOPHILS NFR BLD AUTO: 0.7 % (ref 0–1.5)
BILIRUB SERPL-MCNC: <0.2 MG/DL (ref 0–1.2)
BUN SERPL-MCNC: 21 MG/DL (ref 8–23)
BUN/CREAT SERPL: 18.3 (ref 7–25)
CALCIUM SPEC-SCNC: 9.9 MG/DL (ref 8.6–10.5)
CHLORIDE SERPL-SCNC: 102 MMOL/L (ref 98–107)
CHOLEST SERPL-MCNC: 229 MG/DL (ref 0–200)
CO2 SERPL-SCNC: 22 MMOL/L (ref 22–29)
CREAT SERPL-MCNC: 1.15 MG/DL (ref 0.76–1.27)
DEPRECATED RDW RBC AUTO: 49.9 FL (ref 37–54)
EGFRCR SERPLBLD CKD-EPI 2021: 62 ML/MIN/1.73
EOSINOPHIL # BLD AUTO: 0.26 10*3/MM3 (ref 0–0.4)
EOSINOPHIL NFR BLD AUTO: 4.5 % (ref 0.3–6.2)
ERYTHROCYTE [DISTWIDTH] IN BLOOD BY AUTOMATED COUNT: 14.5 % (ref 12.3–15.4)
GLOBULIN UR ELPH-MCNC: 2.8 GM/DL
GLUCOSE SERPL-MCNC: 234 MG/DL (ref 65–99)
HBA1C MFR BLD: 7.1 % (ref 4.8–5.6)
HCT VFR BLD AUTO: 41.7 % (ref 37.5–51)
HDLC SERPL-MCNC: 66 MG/DL (ref 40–60)
HGB BLD-MCNC: 13.1 G/DL (ref 13–17.7)
IMM GRANULOCYTES # BLD AUTO: 0.01 10*3/MM3 (ref 0–0.05)
IMM GRANULOCYTES NFR BLD AUTO: 0.2 % (ref 0–0.5)
IRON 24H UR-MRATE: 34 MCG/DL (ref 59–158)
IRON SATN MFR SERPL: 9 % (ref 20–50)
LDLC SERPL CALC-MCNC: 110 MG/DL (ref 0–100)
LDLC/HDLC SERPL: 1.52 {RATIO}
LYMPHOCYTES # BLD AUTO: 2.1 10*3/MM3 (ref 0.7–3.1)
LYMPHOCYTES NFR BLD AUTO: 36.7 % (ref 19.6–45.3)
MCH RBC QN AUTO: 29.2 PG (ref 26.6–33)
MCHC RBC AUTO-ENTMCNC: 31.4 G/DL (ref 31.5–35.7)
MCV RBC AUTO: 92.9 FL (ref 79–97)
MONOCYTES # BLD AUTO: 0.47 10*3/MM3 (ref 0.1–0.9)
MONOCYTES NFR BLD AUTO: 8.2 % (ref 5–12)
NEUTROPHILS NFR BLD AUTO: 2.84 10*3/MM3 (ref 1.7–7)
NEUTROPHILS NFR BLD AUTO: 49.7 % (ref 42.7–76)
PLATELET # BLD AUTO: 183 10*3/MM3 (ref 140–450)
PMV BLD AUTO: 10.7 FL (ref 6–12)
POTASSIUM SERPL-SCNC: 4.6 MMOL/L (ref 3.5–5.2)
PROT SERPL-MCNC: 7.1 G/DL (ref 6–8.5)
RBC # BLD AUTO: 4.49 10*6/MM3 (ref 4.14–5.8)
SODIUM SERPL-SCNC: 139 MMOL/L (ref 136–145)
TIBC SERPL-MCNC: 368 MCG/DL (ref 298–536)
TRANSFERRIN SERPL-MCNC: 247 MG/DL (ref 200–360)
TRIGL SERPL-MCNC: 312 MG/DL (ref 0–150)
TSH SERPL DL<=0.05 MIU/L-ACNC: 5.38 UIU/ML (ref 0.27–4.2)
VLDLC SERPL-MCNC: 53 MG/DL (ref 5–40)
WBC NRBC COR # BLD: 5.72 10*3/MM3 (ref 3.4–10.8)

## 2022-05-02 PROCEDURE — 36415 COLL VENOUS BLD VENIPUNCTURE: CPT

## 2022-05-02 PROCEDURE — 83036 HEMOGLOBIN GLYCOSYLATED A1C: CPT

## 2022-05-02 PROCEDURE — 85025 COMPLETE CBC W/AUTO DIFF WBC: CPT

## 2022-05-02 PROCEDURE — 80061 LIPID PANEL: CPT

## 2022-05-02 PROCEDURE — 83540 ASSAY OF IRON: CPT

## 2022-05-02 PROCEDURE — 84443 ASSAY THYROID STIM HORMONE: CPT

## 2022-05-02 PROCEDURE — 80053 COMPREHEN METABOLIC PANEL: CPT

## 2022-05-02 PROCEDURE — 84466 ASSAY OF TRANSFERRIN: CPT

## 2022-05-03 ENCOUNTER — PATIENT OUTREACH (OUTPATIENT)
Dept: CASE MANAGEMENT | Facility: OTHER | Age: 87
End: 2022-05-03

## 2022-05-03 DIAGNOSIS — E11.42 TYPE 2 DIABETES MELLITUS WITH POLYNEUROPATHY: Primary | ICD-10-CM

## 2022-05-03 NOTE — OUTREACH NOTE
AMBULATORY CASE MANAGEMENT NOTE    Name and Relationship of Patient/Support Person:  -     5   Minutes of time spent in patient care, reviewing records prior to the encounter, discussing with patient, entering orders and documentation.     Following up to confirm that labs were done in advance of appointment.  Reviewed.     Education Documentation  No documentation found.      MELY GAMBOA  Ambulatory Case Management    5/3/2022, 12:56 EDT

## 2022-05-06 ENCOUNTER — OFFICE VISIT (OUTPATIENT)
Dept: FAMILY MEDICINE CLINIC | Age: 87
End: 2022-05-06

## 2022-05-06 ENCOUNTER — TELEPHONE (OUTPATIENT)
Dept: FAMILY MEDICINE CLINIC | Age: 87
End: 2022-05-06

## 2022-05-06 VITALS
OXYGEN SATURATION: 98 % | DIASTOLIC BLOOD PRESSURE: 72 MMHG | BODY MASS INDEX: 22.43 KG/M2 | HEIGHT: 68 IN | SYSTOLIC BLOOD PRESSURE: 118 MMHG | HEART RATE: 93 BPM | WEIGHT: 148 LBS

## 2022-05-06 DIAGNOSIS — I48.0 PAROXYSMAL ATRIAL FIBRILLATION: ICD-10-CM

## 2022-05-06 DIAGNOSIS — E78.2 MIXED HYPERLIPIDEMIA: ICD-10-CM

## 2022-05-06 DIAGNOSIS — N18.31 STAGE 3A CHRONIC KIDNEY DISEASE: ICD-10-CM

## 2022-05-06 DIAGNOSIS — Z79.01 ANTICOAGULATED: ICD-10-CM

## 2022-05-06 DIAGNOSIS — E11.22 TYPE 2 DIABETES MELLITUS WITH DIABETIC CHRONIC KIDNEY DISEASE, UNSPECIFIED CKD STAGE, UNSPECIFIED WHETHER LONG TERM INSULIN USE: ICD-10-CM

## 2022-05-06 DIAGNOSIS — I10 PRIMARY HYPERTENSION: ICD-10-CM

## 2022-05-06 DIAGNOSIS — D50.9 IRON DEFICIENCY ANEMIA, UNSPECIFIED IRON DEFICIENCY ANEMIA TYPE: ICD-10-CM

## 2022-05-06 DIAGNOSIS — E03.9 ACQUIRED HYPOTHYROIDISM: Primary | ICD-10-CM

## 2022-05-06 PROBLEM — C44.90 SKIN CANCER: Status: ACTIVE | Noted: 2022-05-06

## 2022-05-06 PROCEDURE — 99214 OFFICE O/P EST MOD 30 MIN: CPT | Performed by: FAMILY MEDICINE

## 2022-05-06 RX ORDER — LEVOTHYROXINE SODIUM 0.2 MG/1
200 TABLET ORAL DAILY
Qty: 90 TABLET | Refills: 1 | Status: SHIPPED | OUTPATIENT
Start: 2022-05-06 | End: 2023-01-05 | Stop reason: SDUPTHER

## 2022-05-06 NOTE — TELEPHONE ENCOUNTER
Pharmacy Name:  [facility]     Pharmacy representative name: ARELI     Pharmacy representative phone number:  [phone]    What medication are you calling in regards to: LEVOTHYROXINE 175 MCG     What question does the pharmacy have: PHARMACY STATES THAT THEY ARE PUTTING PATIENT'S PILL PACKS TOGETHER NOW AND WOULD LIKE FOR PCP TO PRESCRIBE AN ADDITIONAL LEVOTHYROXINE  25 MCG TO GO ALONG WITH HIS CURRENT 175 MCG UNTIL NEXT MONTH WHEN HE STARTS  MCG OF LEVOTHYROXINE   STATES THAT THE PATIENT IS NON CONPLIANT SOMETIMES AND MAY NOT UNDERSTAND THE DIRECTIONS    Who is the provider that prescribed the medication: DR. OLIVEROS

## 2022-05-06 NOTE — PROGRESS NOTES
Chief Complaint  Hypertension (3 month follow up ), Hypothyroidism, Hyperlipidemia, Diabetes, Chronic Kidney Disease, and Atrial Fibrillation    Subjective          Darci Munson presents to [facility]  History of Present Illness  Says that in general he feels pretty good.  Had the left knee replacement in getting around a lot better.  He has completed his formal physical therapy but still doing the exercises at home.  Lost a lot of weight because couldn't eat for few weeks after knee surgery  Nya takes care of his medication and she is not here today  Kofi, his son was with him  He had lab work few days ago which we reviewed.  Overall looks pretty good his hemoglobin A1c is adequate  Blood pressure looks good today and he is tolerating his medication  Has a history of atrial fibrillation and is not aware of any kind of palpitations and is tolerating his medication.  He is anticoagulated with Eliquis  Does have history of some blood in the stool in the past and some melena and he is scheduled for endoscopy  July 1.  Does report some difficulty with sleeping at nighttime.  We talked about general sleep hygiene recommendations.  He does admit that he lays in bed awake for long period of time encouraged him to get out of bed if not asleep within 20 minutes        Current Outpatient Medications   Medication Sig Dispense Refill   • amiodarone (PACERONE) 200 MG tablet Take 200 mg by mouth Daily.     • amLODIPine (NORVASC) 10 MG tablet Take 10 mg by mouth Daily.     • apixaban (ELIQUIS) 5 MG tablet tablet Take 5 mg by mouth 2 (Two) Times a Day.     • aspirin 81 MG EC tablet Take 81 mg by mouth Daily.     • cholecalciferol (VITAMIN D3) 25 MCG (1000 UT) tablet Take 1,000 Units by mouth 2 (Two) Times a Day.     • empagliflozin (Jardiance) 25 MG tablet tablet Take 1 tablet by mouth Daily. PATIENT ASSISTANCE MEDICATION 90 tablet 3   • ferrous sulfate 325 (65 FE) MG tablet Take 325 mg by mouth 2  "(Two) Times a Day.     • gabapentin (NEURONTIN) 100 MG capsule Take 1 capsule by mouth 2 (Two) Times a Day. 60 capsule 2   • glipizide (GLUCOTROL) 5 MG tablet TAKE 1/2 (ONE-HALF) TABLET BY MOUTH TWICE DAILY BEFORE MEAL(S) 30 tablet 0   • Glucosamine 500 MG capsule Take 1,000 mg by mouth 2 (Two) Times a Day. OTC     • hydroCHLOROthiazide (HYDRODIURIL) 25 MG tablet Take 1 tablet by mouth Daily. Take 1/2 tab twice a day 90 tablet 1   • isosorbide mononitrate (IMDUR) 30 MG 24 hr tablet Take 30 mg by mouth Daily.     • latanoprost (XALATAN) 0.005 % ophthalmic solution INSTILL 1 DROP INTO BOTH EYES DAILY     • levothyroxine (Synthroid) 200 MCG tablet Take 1 tablet by mouth Daily. 90 tablet 1   • metFORMIN (GLUCOPHAGE) 500 MG tablet Take 1 tablet by mouth 2 (Two) Times a Day With Meals. 180 tablet 0   • multivitamin (THERAGRAN) tablet tablet Take 1 tablet by mouth Daily.     • nitroglycerin (NITROSTAT) 0.4 MG SL tablet nitroglycerin 0.4 mg sublingual tablet, sublingual place 1 tablet (0.4 mg) by buccal route at the first sign of an attack; no more than 3 tabs are recommended within a 15 minute period.   Active     • pantoprazole (PROTONIX) 40 MG EC tablet Take 1 tablet by mouth Daily. 90 tablet 1   • PEG-KCl-NaCl-NaSulf-Na Asc-C (Plenvu) 140 g reconstituted solution solution Take 140 g by mouth Take As Directed. 1 each 0   • sucralfate (Carafate) 1 g tablet Take 1 tablet by mouth 4 (Four) Times a Day. 360 tablet 0   • metoclopramide (REGLAN) 5 MG tablet Take 5 mg by mouth 2 (Two) Times a Day.       No current facility-administered medications for this visit.       Review of Systems         Objective   Vital Signs:   /72 (BP Location: Left arm, Patient Position: Sitting, Cuff Size: Adult)   Pulse 93   Ht 172.7 cm (68\")   Wt 67.1 kg (148 lb)   SpO2 98%   BMI 22.50 kg/m²     Physical Exam  Constitutional:       Appearance: Normal appearance.   Neck:      Vascular: No carotid bruit.   Cardiovascular:      Rate and " Rhythm: Normal rate. Rhythm irregular.      Heart sounds: Murmur heard.   Pulmonary:      Effort: No respiratory distress.      Breath sounds: No wheezing or rales.   Musculoskeletal:      Right lower leg: No edema.      Left lower leg: No edema.      Comments: Left knee is postsurgical still little swollen some redness around the inferior aspect of the scar overall looks good   Lymphadenopathy:      Cervical: No cervical adenopathy.   Neurological:      General: No focal deficit present.      Mental Status: He is alert.   Psychiatric:         Attention and Perception: Attention normal.         Mood and Affect: Mood normal.         Speech: Speech normal.            Result Review :   The following data was reviewed by: Adrien Mayer MD on 05/06/2022:  Iron and TIBC (05/02/2022 10:09)  TSH (05/02/2022 10:09)  CBC Auto Differential (05/02/2022 10:09)  Comprehensive Metabolic Panel (05/02/2022 10:09)  Lipid Panel (05/02/2022 10:09)  Hemoglobin A1c (05/02/2022 10:09)                  Assessment and Plan    Diagnoses and all orders for this visit:    1. Acquired hypothyroidism (Primary)  Comments:  TSH was still little high.  We will increase the Synthroid to 200 mcg daily  Orders:  -     levothyroxine (Synthroid) 200 MCG tablet; Take 1 tablet by mouth Daily.  Dispense: 90 tablet; Refill: 1    2. Type 2 diabetes mellitus with diabetic chronic kidney disease, unspecified CKD stage, unspecified whether long term insulin use (HCC)  Comments:  His hemoglobin A1c look good continue on current regimen    3. Primary hypertension  Comments:  Blood pressure looks good continue on his current medications    4. Mixed hyperlipidemia  Comments:  Reviewed lipid profile looks okay continue current regimen    5. Paroxysmal atrial fibrillation (HCC)  Comments:  Asymptomatic.  He is anticoagulated.  He is scheduled to get endoscopy.  Remains iron deficient    6. Anticoagulated  Comments:  As above    7. Stage 3a chronic kidney  disease (HCC)  Comments:  Renal function actually improved considerably.    8. Iron deficiency anemia, unspecified iron deficiency anemia type  Comments:  Continue with the iron supplement twice a day.  Depending on what is found at endoscopy might need to see hematology        Follow Up {Instructions Charge Capture  Follow-up Communications :23}  No follow-ups on file.  Patient was given instructions and counseling regarding his condition or for health maintenance advice. Please see specific information pulled into the AVS if appropriate.

## 2022-05-07 NOTE — TELEPHONE ENCOUNTER
For simplicity and to avoid confusion for the patient it is okay to complete the 175 mcg daily this month, and start on the 200 mcg with the new pill pack next month    mas

## 2022-05-11 ENCOUNTER — PATIENT OUTREACH (OUTPATIENT)
Dept: CASE MANAGEMENT | Facility: OTHER | Age: 87
End: 2022-05-11

## 2022-05-11 DIAGNOSIS — E11.42 TYPE 2 DIABETES MELLITUS WITH POLYNEUROPATHY: Primary | ICD-10-CM

## 2022-05-11 NOTE — OUTREACH NOTE
AMBULATORY CASE MANAGEMENT NOTE    Name and Relationship of Patient/Support Person:  -     Reviewed Dr. Mayer note.  Change in medication.  Will continue to follow and monitor.     Education Documentation  No documentation found.        MELY GAMBOA  Ambulatory Case Management    5/11/2022, 12:06 EDT

## 2022-05-19 ENCOUNTER — PATIENT OUTREACH (OUTPATIENT)
Dept: CASE MANAGEMENT | Facility: OTHER | Age: 87
End: 2022-05-19

## 2022-05-19 DIAGNOSIS — E11.42 TYPE 2 DIABETES MELLITUS WITH POLYNEUROPATHY: Primary | ICD-10-CM

## 2022-05-19 NOTE — OUTREACH NOTE
AMBULATORY CASE MANAGEMENT NOTE    Name and Relationship of Patient/Support Person:  -     Received fax from [facility].  They fill out the PAP for Darci for Jardiance.  However we are running into the name game again.  Darci has 2 different names on his insurance cards, hospital records, prescription,pharmacy and drivers licence.  Called daughter and urged her to please make this a priority to get this straightened out.  Called PAP program and confirmed his identity and they are to ship out samples tomorrow.  Asked [facility] to fill out any future applications as Darci Munson since we cannot alter our prescriptions names.  Informed drivers license is .   Nya reports that he is doing good otherwise.     Education Documentation  No documentation found.        MELY GAMBOA  Ambulatory Case Management    2022, 16:27 EDT

## 2022-05-26 ENCOUNTER — PATIENT OUTREACH (OUTPATIENT)
Dept: CASE MANAGEMENT | Facility: OTHER | Age: 87
End: 2022-05-26

## 2022-05-26 DIAGNOSIS — E11.42 TYPE 2 DIABETES MELLITUS WITH POLYNEUROPATHY: Primary | ICD-10-CM

## 2022-05-26 DIAGNOSIS — M17.12 PRIMARY OSTEOARTHRITIS OF LEFT KNEE: ICD-10-CM

## 2022-05-26 DIAGNOSIS — E03.9 ACQUIRED HYPOTHYROIDISM: ICD-10-CM

## 2022-05-26 PROCEDURE — 99490 CHRNC CARE MGMT STAFF 1ST 20: CPT | Performed by: FAMILY MEDICINE

## 2022-05-26 NOTE — OUTREACH NOTE
[facility] End of Month Documentation    This Chronic Medical Management Care Plan for Darci Munson, 86 y.o. male, has been monitored and managed and a new plan of care implemented for the month of May.  A cumulative time of 30  minutes was spent on this patient record this month, including electronic communication with primary care provider; chart review; phone call with relative.    Regarding the patient's problems: has Type 2 diabetes mellitus (HCC); Chronic kidney disease; Hypothyroidism; Hypertensive disorder; Hyperlipidemia; Paroxysmal atrial fibrillation (HCC); Abdominal pain, epigastric; Hearing aid worn; At risk for negative response to medication; Bloating; Bradycardia; Coronary arteriosclerosis; Early satiety; Gastroparesis; Hearing loss; Iron deficiency; Mitral valve regurgitation; Peripheral neuropathy; Prostate CA (HCC); Balanitis; Gastroesophageal reflux disease without esophagitis; Primary insomnia; Essential tremor; Melena; Primary osteoarthritis of left knee; Anticoagulated; Hip pain; Vitamin D insufficiency; Occult blood in stools; Weight loss; Iron deficiency anemia; Medicare annual wellness visit, subsequent; Pain in toes of both feet; Thickened nails; and Skin cancer on their problem list., the following items were addressed: transitions to medical care; medical records; medications and any changes can be found within the plan section of the note.  A detailed listing of time spent for chronic care management is tracked within each outreach encounter.  Current medications include:  has a current medication list which includes the following prescription(s): amiodarone, amlodipine, apixaban, aspirin, cholecalciferol, empagliflozin, ferrous sulfate, gabapentin, glipizide, glucosamine, hydrochlorothiazide, isosorbide mononitrate, latanoprost, levothyroxine, metformin, metoclopramide, multivitamin, nitroglycerin, pantoprazole, plenvu, and sucralfate. and the patient is reported to be caregiver will take  responsibility for med compliance; patient is compliant with medication protocol, Effective when taking.,  Medications are reported to be effective.  Regarding these diagnoses, referrals were made to the following provider(s):  specialists.  All notes on chart for PCP to review.    The patient was monitored remotely for blood glucose; pain; medications, Minimal monitioring at home..    The patient's physical needs include:  needs assistance with ADLs; physician referral; physical healthcare; help taking medications as prescribed, some ADL's.     The patient's mental support needs include:  continued support, Great family support.    The patient's cognitive support needs include:  continued support; medication, impaired somewhat, age related    The patient's psychosocial support needs include:  continued support; medication management or adherence; no access to community activities; no opportunity for leisure activities, Lives alone, needs transportation.    The patient's functional needs include: medication education; physical healthcare, Monitoring needs ,    The patient's environmental needs include:  no involvement in outside activities or no access to other activities, transportation needs.    Care Plan overall comments:  Cordinating care with daughter,  He also need medication management.    Refer to previous outreach notes for more information on the areas listed above.    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            Instructions   · Patient was provided an electronic copy of care plan  · CCM services were explained and offered and patient has accepted these services.  · Patient has  given their written consent to receive CCM services and understands that this includes the authorization of electronic communication of medical information with the other treating providers.  · Patient understands that they may stop CCM services at any time and these changes will be effective at the end of the calendar month and will effectively revocate the agreement of CCM services.  · Patient understands that only one practitioner can furnish and be paid for CCM services during one calendar month.  Patient also understands that there may be co-payment or deductible fees in association with CCM services.  · Patient will continue with at least monthly follow-up calls with the Nurse Navigator.    MELY GAMBOA  Ambulatory Case Management    5/26/2022, 13:54 EDT

## 2022-06-08 ENCOUNTER — PATIENT OUTREACH (OUTPATIENT)
Dept: CASE MANAGEMENT | Facility: OTHER | Age: 87
End: 2022-06-08

## 2022-06-08 DIAGNOSIS — E11.22 TYPE 2 DIABETES MELLITUS WITH DIABETIC CHRONIC KIDNEY DISEASE, UNSPECIFIED CKD STAGE, UNSPECIFIED WHETHER LONG TERM INSULIN USE: Primary | ICD-10-CM

## 2022-06-08 NOTE — OUTREACH NOTE
AMBULATORY CASE MANAGEMENT NOTE    Name and Relationship of Patient/Support Person: [facility] - [place], [place] - [address] - [phone] [facility] [phone] FX - Pharmacy    Called pharmacy to verify if he is getting medications filled regularly.  No.  Got prepackaged but not taking.  Called Darci and left a message to call me to see if I can identify any barriers.  Called Sharon also.      Education Documentation  No documentation found.      MELY R  Ambulatory Case Management    6/8/2022, 15:20 EDT     Attempted to reach patient again. Left voice message to return call.

## 2022-06-15 ENCOUNTER — PATIENT OUTREACH (OUTPATIENT)
Dept: CASE MANAGEMENT | Facility: OTHER | Age: 87
End: 2022-06-15

## 2022-06-15 DIAGNOSIS — E11.42 TYPE 2 DIABETES MELLITUS WITH DIABETIC POLYNEUROPATHY, WITHOUT LONG-TERM CURRENT USE OF INSULIN: ICD-10-CM

## 2022-06-15 DIAGNOSIS — E11.42 TYPE 2 DIABETES MELLITUS WITH POLYNEUROPATHY: ICD-10-CM

## 2022-06-15 DIAGNOSIS — E11.22 TYPE 2 DIABETES MELLITUS WITH DIABETIC CHRONIC KIDNEY DISEASE, UNSPECIFIED CKD STAGE, UNSPECIFIED WHETHER LONG TERM INSULIN USE: Primary | ICD-10-CM

## 2022-06-15 RX ORDER — GLIPIZIDE 5 MG/1
2.5 TABLET ORAL
Qty: 30 TABLET | Refills: 1 | Status: CANCELLED | OUTPATIENT
Start: 2022-06-15

## 2022-06-15 NOTE — TELEPHONE ENCOUNTER
[facility]  [place], [place] - [address] - [phone] [facility] [phone] FX    Relationship: Pharmacy    Best call back number: [phone]    Requested Prescriptions:   Requested Prescriptions     Pending Prescriptions Disp Refills   • glipizide (GLUCOTROL) 5 MG tablet 30 tablet 0   • gabapentin (NEURONTIN) 100 MG capsule 60 capsule 2     Sig: Take 1 capsule by mouth 2 (Two) Times a Day.        [facility]  [place] [place] - [address] - [phone] [facility] [phone] FX  [place] [address] [address] [facility] [phone] [facility] [phone] FX     Does the patient have less than a 3 day supply:  [x] Yes  [] No    Karsten Field Rep   06/15/22 15:47 EDT

## 2022-06-15 NOTE — OUTREACH NOTE
AMBULATORY CASE MANAGEMENT NOTE    Name and Relationship of Patient/Support Person: Katrin at [facility] - Other     20  Minutes of time spent in patient care, reviewing records prior to the encounter, discussing with patient, entering orders and documentation.     Spoke with Mr. Munson and inquired if he was taking his medications as prescribed - the medication history reveals inconsistency.  Darci states that he is.  There is a question, I have reached out to Katrin at [facility] to see that even though the drug company has not sent medication, that he has not gone without.  Darci reports his blood sugars are in the 200's.      Darci inquired that he was having a procedure on 7/1/22 and he has not been given any guidance.  I need to find out about his blood thinner to see if it needs to be stopped in advance.  Called Nya to call me with information.      Education Documentation  No documentation found.        MELY GAMBOA  Ambulatory Case Management    6/15/2022, 15:27 EDT

## 2022-06-16 NOTE — TELEPHONE ENCOUNTER
Pharmacy Name: [facility]     Pharmacy representative name: ARELI    Pharmacy representative phone number: [phone]    What medication are you calling in regards to: gabapentin (NEURONTIN) 100 MG capsule AND glipizide (GLUCOTROL) 5 MG tablet    What question does the pharmacy have: PHARMACY STATES THAT THEY SENT OVER AN E SCRIPT REQUEST ON 06/14/2022 AND CALLED IN ON 06/15/2022 TO REQUEST REFILLS  PHARMACY NEEDS CLARIFICATION IF PATIENT IS STILL ON THESE MEDICATIONS AND IF SO THEY NEED A REFILL SENT OVER FOR THEM PLEASE. THANKS     Who is the provider that prescribed the medication:  DR. OLIVEROS

## 2022-06-17 DIAGNOSIS — E11.42 TYPE 2 DIABETES MELLITUS WITH POLYNEUROPATHY: ICD-10-CM

## 2022-06-17 RX ORDER — GABAPENTIN 100 MG/1
100 CAPSULE ORAL 2 TIMES DAILY
Qty: 60 CAPSULE | Refills: 1 | Status: SHIPPED | OUTPATIENT
Start: 2022-06-17 | End: 2022-09-23

## 2022-06-17 RX ORDER — GLIPIZIDE 5 MG/1
2.5 TABLET ORAL
Qty: 90 TABLET | Refills: 0 | Status: SHIPPED | OUTPATIENT
Start: 2022-06-17 | End: 2022-09-20 | Stop reason: SDUPTHER

## 2022-06-20 ENCOUNTER — TELEPHONE (OUTPATIENT)
Dept: GASTROENTEROLOGY | Facility: CLINIC | Age: 87
End: 2022-06-20

## 2022-06-20 NOTE — TELEPHONE ENCOUNTER
I have called and spoke to patient with an upcoming procedure reminder. Patient confirmed.   Patient takes eliquis and requires a clearance. Eliquis prescribed by Dr Peterson.

## 2022-06-22 NOTE — TELEPHONE ENCOUNTER
Patient aware that we have received clearance and he is to hold eliquis 2 days prior to procedure.

## 2022-06-30 ENCOUNTER — PATIENT OUTREACH (OUTPATIENT)
Dept: CASE MANAGEMENT | Facility: OTHER | Age: 87
End: 2022-06-30

## 2022-06-30 DIAGNOSIS — E03.9 ACQUIRED HYPOTHYROIDISM: ICD-10-CM

## 2022-06-30 DIAGNOSIS — M17.12 PRIMARY OSTEOARTHRITIS OF LEFT KNEE: ICD-10-CM

## 2022-06-30 DIAGNOSIS — E11.22 TYPE 2 DIABETES MELLITUS WITH DIABETIC CHRONIC KIDNEY DISEASE, UNSPECIFIED CKD STAGE, UNSPECIFIED WHETHER LONG TERM INSULIN USE: Primary | ICD-10-CM

## 2022-06-30 PROCEDURE — 99490 CHRNC CARE MGMT STAFF 1ST 20: CPT | Performed by: FAMILY MEDICINE

## 2022-06-30 NOTE — OUTREACH NOTE
AMBULATORY CASE MANAGEMENT NOTE    Name and Relationship of Patient/Support Person:  -     6  Minutes of time spent in patient care, reviewing records prior to the encounter, discussing with patient, entering orders and documentation.     Verified that Eliquis order had been given to stop before procedure tomorrow.  Done.  Will follow up on procedure next week.    Medication reconciliation, glipizide refill send to mail in pharmacy.       MELY GAMBOA  Ambulatory Case Management    6/30/2022, 13:44 EDT     [facility] End of Month Documentation    This Chronic Medical Management Care Plan for Darci Munson, 87 y.o. male, has been monitored and managed and a new plan of care implemented for the month of June.  A cumulative time of 36  minutes was spent on this patient record this month, including electronic communication with primary care provider; chart review; phone call with relative; electronic communication with pharmacist.    Regarding the patient's problems: has Type 2 diabetes mellitus (HCC); Chronic kidney disease; Hypothyroidism; Hypertensive disorder; Hyperlipidemia; Paroxysmal atrial fibrillation (HCC); Abdominal pain, epigastric; Hearing aid worn; At risk for negative response to medication; Bloating; Bradycardia; Coronary arteriosclerosis; Early satiety; Gastroparesis; Hearing loss; Iron deficiency; Mitral valve regurgitation; Peripheral neuropathy; Prostate CA (HCC); Balanitis; Gastroesophageal reflux disease without esophagitis; Primary insomnia; Essential tremor; Melena; Primary osteoarthritis of left knee; Anticoagulated; Hip pain; Vitamin D insufficiency; Occult blood in stools; Weight loss; Iron deficiency anemia; Medicare annual wellness visit, subsequent; Pain in toes of both feet; Thickened nails; and Skin cancer on their problem list., the following items were addressed: transitions to medical care; medical records; medications and any changes can be found within the plan section of the note.  A detailed  listing of time spent for chronic care management is tracked within each outreach encounter.  Current medications include:  has a current medication list which includes the following prescription(s): amiodarone, amlodipine, apixaban, aspirin, cholecalciferol, empagliflozin, ferrous sulfate, gabapentin, glipizide, glucosamine, hydrochlorothiazide, isosorbide mononitrate, latanoprost, levothyroxine, metformin, metoclopramide, multivitamin, nitroglycerin, pantoprazole, plenvu, and sucralfate. and the patient is reported to be caregiver will take responsibility for med compliance; patient is compliant with medication protocol,  Medications are reported to be effective.  Regarding these diagnoses, referrals were made to the following provider(s):  specialists.  All notes on chart for PCP to review.    The patient was monitored remotely for blood glucose; pain; medications.    The patient's physical needs include:  needs assistance with ADLs; physician referral; physical healthcare; help taking medications as prescribed.     The patient's mental support needs include:  continued support, Great family support.    The patient's cognitive support needs include:  continued support; medication, impaired somewhat, age related    The patient's psychosocial support needs include:  continued support; medication management or adherence; no access to community activities; no opportunity for leisure activities, Lives alone, needs transportation.    The patient's functional needs include: medication education; physical healthcare, Monitoring needs ,    The patient's environmental needs include:  no involvement in outside activities or no access to other activities    Care Plan overall comments:  Cordinating care with daughter,  He also need medication management.    Refer to previous outreach notes for more information on the areas listed above.    Monthly Billing Diagnoses  (E11.22) Type 2 diabetes mellitus with diabetic chronic  kidney disease, unspecified CKD stage, unspecified whether long term insulin use (HCC)    (M17.12) Primary osteoarthritis of left knee    (E03.9) Acquired hypothyroidism    Medications   · Medications have been reconciled    Care Plan progress this month:      Recently Modified Care Plans Updates made since 5/30/2022 12:00 AM    No recently modified care plans.            Instructions   · Patient was provided an electronic copy of care plan  · CCM services were explained and offered and patient has accepted these services.  · Patient has given their written consent to receive CCM services and understands that this includes the authorization of electronic communication of medical information with the other treating providers.  · Patient understands that they may stop CCM services at any time and these changes will be effective at the end of the calendar month and will effectively revocate the agreement of CCM services.  · Patient understands that only one practitioner can furnish and be paid for CCM services during one calendar month.  Patient also understands that there may be co-payment or deductible fees in association with CCM services.  · Patient will continue with at least monthly follow-up calls with the Nurse Navigator.    MELY GAMBOA  Ambulatory Case Management    6/30/2022, 13:44 EDT

## 2022-07-01 ENCOUNTER — ANESTHESIA (OUTPATIENT)
Dept: GASTROENTEROLOGY | Facility: HOSPITAL | Age: 87
End: 2022-07-01

## 2022-07-01 ENCOUNTER — HOSPITAL ENCOUNTER (OUTPATIENT)
Facility: HOSPITAL | Age: 87
Setting detail: HOSPITAL OUTPATIENT SURGERY
Discharge: HOME | End: 2022-07-01
Attending: INTERNAL MEDICINE | Admitting: INTERNAL MEDICINE

## 2022-07-01 ENCOUNTER — ANESTHESIA EVENT (OUTPATIENT)
Dept: GASTROENTEROLOGY | Facility: HOSPITAL | Age: 87
End: 2022-07-01

## 2022-07-01 VITALS
RESPIRATION RATE: 18 BRPM | SYSTOLIC BLOOD PRESSURE: 123 MMHG | WEIGHT: 146.39 LBS | BODY MASS INDEX: 22.26 KG/M2 | OXYGEN SATURATION: 96 % | HEART RATE: 74 BPM | DIASTOLIC BLOOD PRESSURE: 76 MMHG | TEMPERATURE: 97.4 F

## 2022-07-01 DIAGNOSIS — R63.4 WEIGHT LOSS: ICD-10-CM

## 2022-07-01 DIAGNOSIS — R19.5 OCCULT BLOOD IN STOOLS: ICD-10-CM

## 2022-07-01 DIAGNOSIS — K92.1 HEMATOCHEZIA: ICD-10-CM

## 2022-07-01 DIAGNOSIS — D50.9 IRON DEFICIENCY ANEMIA, UNSPECIFIED IRON DEFICIENCY ANEMIA TYPE: ICD-10-CM

## 2022-07-01 LAB — GLUCOSE BLDC GLUCOMTR-MCNC: 229 MG/DL (ref 70–99)

## 2022-07-01 PROCEDURE — 82962 GLUCOSE BLOOD TEST: CPT

## 2022-07-01 PROCEDURE — 25010000002 ONDANSETRON PER 1 MG: Performed by: ANESTHESIOLOGY

## 2022-07-01 PROCEDURE — 43239 EGD BIOPSY SINGLE/MULTIPLE: CPT | Performed by: INTERNAL MEDICINE

## 2022-07-01 PROCEDURE — 25010000002 PROPOFOL 10 MG/ML EMULSION: Performed by: NURSE ANESTHETIST, CERTIFIED REGISTERED

## 2022-07-01 PROCEDURE — 88305 TISSUE EXAM BY PATHOLOGIST: CPT | Performed by: INTERNAL MEDICINE

## 2022-07-01 PROCEDURE — 45385 COLONOSCOPY W/LESION REMOVAL: CPT | Performed by: INTERNAL MEDICINE

## 2022-07-01 RX ORDER — ONDANSETRON 2 MG/ML
4 INJECTION INTRAMUSCULAR; INTRAVENOUS ONCE
Status: COMPLETED | OUTPATIENT
Start: 2022-07-01 | End: 2022-07-01

## 2022-07-01 RX ORDER — SODIUM CHLORIDE, SODIUM LACTATE, POTASSIUM CHLORIDE, CALCIUM CHLORIDE 600; 310; 30; 20 MG/100ML; MG/100ML; MG/100ML; MG/100ML
30 INJECTION, SOLUTION INTRAVENOUS CONTINUOUS
Status: DISCONTINUED | OUTPATIENT
Start: 2022-07-01 | End: 2022-07-01 | Stop reason: HOSPADM

## 2022-07-01 RX ORDER — PHENYLEPHRINE HCL IN 0.9% NACL 1 MG/10 ML
SYRINGE (ML) INTRAVENOUS AS NEEDED
Status: DISCONTINUED | OUTPATIENT
Start: 2022-07-01 | End: 2022-07-01 | Stop reason: SURG

## 2022-07-01 RX ORDER — LIDOCAINE HYDROCHLORIDE 20 MG/ML
INJECTION, SOLUTION EPIDURAL; INFILTRATION; INTRACAUDAL; PERINEURAL AS NEEDED
Status: DISCONTINUED | OUTPATIENT
Start: 2022-07-01 | End: 2022-07-01 | Stop reason: SURG

## 2022-07-01 RX ADMIN — Medication 100 MCG: at 10:23

## 2022-07-01 RX ADMIN — ONDANSETRON 4 MG: 2 INJECTION INTRAMUSCULAR; INTRAVENOUS at 09:40

## 2022-07-01 RX ADMIN — Medication 100 MCG: at 10:00

## 2022-07-01 RX ADMIN — Medication 100 MCG: at 10:02

## 2022-07-01 RX ADMIN — Medication 100 MCG: at 10:25

## 2022-07-01 RX ADMIN — LIDOCAINE HYDROCHLORIDE 40 MG: 20 INJECTION, SOLUTION EPIDURAL; INFILTRATION; INTRACAUDAL; PERINEURAL at 09:54

## 2022-07-01 RX ADMIN — SODIUM CHLORIDE, POTASSIUM CHLORIDE, SODIUM LACTATE AND CALCIUM CHLORIDE 30 ML/HR: 600; 310; 30; 20 INJECTION, SOLUTION INTRAVENOUS at 09:20

## 2022-07-01 RX ADMIN — PROPOFOL 150 MCG/KG/MIN: 10 INJECTION, EMULSION INTRAVENOUS at 09:54

## 2022-07-01 NOTE — ANESTHESIA POSTPROCEDURE EVALUATION
Patient: Darci Munson    Procedure Summary     Date: 07/01/22 Room / Location: [facility] ENDOSCOPY 1 / [facility] ENDOSCOPY    Anesthesia Start: 0952 Anesthesia Stop: 1035    Procedures:       ESOPHAGOGASTRODUODENOSCOPY WITH BX, POLYPECTOMY (N/A )      COLONOSCOPY WITH POLYPECTOMIES, HOT SNARE (N/A ) Diagnosis:       Occult blood in stools      Hematochezia      Weight loss      Iron deficiency anemia, unspecified iron deficiency anemia type      (Occult blood in stools [R19.5])      (Hematochezia [K92.1])      (Weight loss [R63.4])      (Iron deficiency anemia, unspecified iron deficiency anemia type [D50.9])    Surgeons: Jennifer Mann MD Provider: Cooper Mckeon MD    Anesthesia Type: general ASA Status: 3          Anesthesia Type: general    Vitals  Vitals Value Taken Time   /76 07/01/22 1053   Temp 36.3 °C (97.4 °F) 07/01/22 1033   Pulse 77 07/01/22 1057   Resp 18 07/01/22 1053   SpO2 96 % 07/01/22 1057   Vitals shown include unvalidated device data.        Post Anesthesia Care and Evaluation    Patient location during evaluation: bedside  Patient participation: complete - patient participated  Level of consciousness: awake and alert  Pain management: adequate    Airway patency: patent  Anesthetic complications: No anesthetic complications  PONV Status: none  Cardiovascular status: acceptable  Respiratory status: acceptable  Hydration status: acceptable    Comments: An Anesthesiologist personally participated in the most demanding procedures (including induction and emergence if applicable) in the anesthesia plan, monitored the course of anesthesia administration at frequent intervals and remained physically present and available for immediate diagnosis and treatment of emergencies.       bradycardia and hypotension

## 2022-07-05 ENCOUNTER — TELEPHONE (OUTPATIENT)
Dept: GASTROENTEROLOGY | Facility: CLINIC | Age: 87
End: 2022-07-05

## 2022-07-05 DIAGNOSIS — D50.9 IRON DEFICIENCY ANEMIA, UNSPECIFIED IRON DEFICIENCY ANEMIA TYPE: Primary | ICD-10-CM

## 2022-07-05 LAB
CYTO UR: NORMAL
LAB AP CASE REPORT: NORMAL
LAB AP CLINICAL INFORMATION: NORMAL
PATH REPORT.FINAL DX SPEC: NORMAL
PATH REPORT.GROSS SPEC: NORMAL

## 2022-07-05 NOTE — TELEPHONE ENCOUNTER
----- Message from SHAWANDA Husain sent at 7/5/2022 10:02 AM EDT -----  Colon polyps found were noncancerous.  Given age would not recommend to continue colon cancer surveillance unless patient so wishes per guidelines.  Stomach biopsy consistent with Gastritis.  Continue PPI and avoid NSAIDs.  Stomach polyp benign.    I am going to order blood work for evaluation of patient's iron and hemoglobin before follow-up.  Please make sure patient has follow-up scheduled in [place].

## 2022-07-05 NOTE — TELEPHONE ENCOUNTER
Patients daughter notified of results and recommendations, ok per consent. Patient has been scheduled for a follow up appt in [place] on 07/11. Patients daughter also aware of lab order placed to obtain prior to appt.

## 2022-07-08 ENCOUNTER — LAB (OUTPATIENT)
Dept: LAB | Facility: HOSPITAL | Age: 87
End: 2022-07-08

## 2022-07-08 DIAGNOSIS — D50.9 IRON DEFICIENCY ANEMIA, UNSPECIFIED IRON DEFICIENCY ANEMIA TYPE: ICD-10-CM

## 2022-07-08 LAB
BASOPHILS # BLD AUTO: 0.04 10*3/MM3 (ref 0–0.2)
BASOPHILS NFR BLD AUTO: 0.7 % (ref 0–1.5)
DEPRECATED RDW RBC AUTO: 47.8 FL (ref 37–54)
EOSINOPHIL # BLD AUTO: 0.29 10*3/MM3 (ref 0–0.4)
EOSINOPHIL NFR BLD AUTO: 4.9 % (ref 0.3–6.2)
ERYTHROCYTE [DISTWIDTH] IN BLOOD BY AUTOMATED COUNT: 14.2 % (ref 12.3–15.4)
HCT VFR BLD AUTO: 43.1 % (ref 37.5–51)
HGB BLD-MCNC: 13.5 G/DL (ref 13–17.7)
IMM GRANULOCYTES # BLD AUTO: 0.02 10*3/MM3 (ref 0–0.05)
IMM GRANULOCYTES NFR BLD AUTO: 0.3 % (ref 0–0.5)
IRON 24H UR-MRATE: 65 MCG/DL (ref 59–158)
IRON SATN MFR SERPL: 16 % (ref 20–50)
LYMPHOCYTES # BLD AUTO: 2.2 10*3/MM3 (ref 0.7–3.1)
LYMPHOCYTES NFR BLD AUTO: 37.5 % (ref 19.6–45.3)
MCH RBC QN AUTO: 28.5 PG (ref 26.6–33)
MCHC RBC AUTO-ENTMCNC: 31.3 G/DL (ref 31.5–35.7)
MCV RBC AUTO: 90.9 FL (ref 79–97)
MONOCYTES # BLD AUTO: 0.5 10*3/MM3 (ref 0.1–0.9)
MONOCYTES NFR BLD AUTO: 8.5 % (ref 5–12)
NEUTROPHILS NFR BLD AUTO: 2.81 10*3/MM3 (ref 1.7–7)
NEUTROPHILS NFR BLD AUTO: 48.1 % (ref 42.7–76)
PLATELET # BLD AUTO: 174 10*3/MM3 (ref 140–450)
PMV BLD AUTO: 10.3 FL (ref 6–12)
RBC # BLD AUTO: 4.74 10*6/MM3 (ref 4.14–5.8)
TIBC SERPL-MCNC: 414 MCG/DL (ref 298–536)
TRANSFERRIN SERPL-MCNC: 278 MG/DL (ref 200–360)
WBC NRBC COR # BLD: 5.86 10*3/MM3 (ref 3.4–10.8)

## 2022-07-08 PROCEDURE — 36415 COLL VENOUS BLD VENIPUNCTURE: CPT

## 2022-07-08 PROCEDURE — 83540 ASSAY OF IRON: CPT

## 2022-07-08 PROCEDURE — 84466 ASSAY OF TRANSFERRIN: CPT

## 2022-07-08 PROCEDURE — 85025 COMPLETE CBC W/AUTO DIFF WBC: CPT | Performed by: NURSE PRACTITIONER

## 2022-07-11 ENCOUNTER — OFFICE VISIT (OUTPATIENT)
Dept: GASTROENTEROLOGY | Facility: CLINIC | Age: 87
End: 2022-07-11

## 2022-07-11 VITALS — HEIGHT: 68 IN | TEMPERATURE: 98 F | BODY MASS INDEX: 23.76 KG/M2 | WEIGHT: 156.8 LBS

## 2022-07-11 DIAGNOSIS — K29.70 GASTRITIS WITHOUT BLEEDING, UNSPECIFIED CHRONICITY, UNSPECIFIED GASTRITIS TYPE: ICD-10-CM

## 2022-07-11 DIAGNOSIS — K64.8 INTERNAL HEMORRHOIDS: ICD-10-CM

## 2022-07-11 DIAGNOSIS — K63.5 POLYP OF COLON, UNSPECIFIED PART OF COLON, UNSPECIFIED TYPE: Primary | ICD-10-CM

## 2022-07-11 DIAGNOSIS — D50.9 IRON DEFICIENCY ANEMIA, UNSPECIFIED IRON DEFICIENCY ANEMIA TYPE: ICD-10-CM

## 2022-07-11 DIAGNOSIS — K57.90 DIVERTICULOSIS: ICD-10-CM

## 2022-07-11 PROCEDURE — 99214 OFFICE O/P EST MOD 30 MIN: CPT | Performed by: NURSE PRACTITIONER

## 2022-07-11 NOTE — PROGRESS NOTES
Chief Complaint  Follow-up (FOLLOW UP COLONOSCOPY/EGD)    History of Present Illness  Darci Munson is a 87 y.o. who presents to [facility]STR[facility]OLOGY for follow up of Follow-up (FOLLOW UP COLONOSCOPY/EGD).    Mr. Munson presents today for f/u iron deficency anemia.     Recently had an EGD and colonoscopy with no signs of active bleeding.    Gastroparesis was noted on EGD however patient reports he is asymptomatic besides occasional flatulence.  Denies any abdominal pain, abdominal bloating, breakthrough heartburn, nausea, or vomiting.  Appetite stable and patient has actually gained a pound since last visit.    Bowel movement once a day, solid stool. Does still occasionally see dark blood on toliet tissue however unsure of color as he is color blind.      Labs Result Review Imaging    Past Medical History:   Diagnosis Date   • Anemia, unspecified    • Atherosclerotic heart disease of native coronary artery without angina pectoris    • CAD (coronary artery disease)    • CKD (chronic kidney disease), stage III (HCC)    • Essential (primary) hypertension    • Gastric ulcer, unspecified as acute or chronic, without hemorrhage or perforation    • Gastroparesis    • GERD without esophagitis    • Glaucoma    • Hereditary and idiopathic neuropathy, unspecified    • History of falling    • HLD (hyperlipidemia)    • HTN (hypertension)    • Hypotension, unspecified    • Hypothyroidism     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   • Irritable bowel syndrome without diarrhea    • Laceration without foreign body of right forearm, initial encounter    • Low back pain    • Malignant neoplasm of prostate (HCC)    • Mixed hyperlipidemia    • OA (osteoarthritis)    • Other specified disorders of the skin and subcutaneous tissue    • Pain in left foot    • Peripheral vascular disease, unspecified (HCC)    • Phimosis    • Presence of unspecified artificial knee joint    • Primary insomnia    • Primary osteoarthritis of both  knees    • Prostate cancer (HCC)    • Sensorineural hearing loss (SNHL) of both ears    • Sigmoid diverticulosis     severe sigmoid and descending colon diverticulosis and 3+ gastritis   • Sleep related leg cramps    • Type 2 diabetes mellitus with diabetic polyneuropathy (HCC)     and gastroparesis   • Unspecified atrial fibrillation (HCC)    • Unspecified dementia without behavioral disturbance (HCC)    • Unspecified hearing loss, bilateral        Past Surgical History:   Procedure Laterality Date   • CATARACT EXTRACTION Bilateral 2014   • COLONOSCOPY     • COLONOSCOPY N/A 7/1/2022    Procedure: COLONOSCOPY WITH POLYPECTOMIES, HOT SNARE;  Surgeon: Jennifer Mann MD;  Location: [facility] ENDOSCOPY;  Service: Gastroenterology;  Laterality: N/A;  DIVERTICULOSIS, COLON POLYPS, HEMORRHOIDS   • ENDOSCOPY N/A 7/1/2022    Procedure: ESOPHAGOGASTRODUODENOSCOPY WITH BX, POLYPECTOMY;  Surgeon: Jennifer Mann MD;  Location: [facility] ENDOSCOPY;  Service: Gastroenterology;  Laterality: N/A;  GASTRIC POLYP, GASTRITIS, HIATAL HERNIA   • HAND SURGERY Right    • JOINT REPLACEMENT     • KNEE ARTHROSCOPY Right 03/14/2017   • PROSTATECTOMY     • REPLACEMENT TOTAL KNEE  03/2017   • UPPER GASTROINTESTINAL ENDOSCOPY         Current Outpatient Medications on File Prior to Visit   Medication Sig Dispense Refill   • amiodarone (PACERONE) 200 MG tablet Take 200 mg by mouth Daily.     • amLODIPine (NORVASC) 10 MG tablet Take 10 mg by mouth Daily.     • apixaban (ELIQUIS) 5 MG tablet tablet Take 5 mg by mouth 2 (Two) Times a Day.     • aspirin 81 MG EC tablet Take 81 mg by mouth Daily.     • cholecalciferol (VITAMIN D3) 25 MCG (1000 UT) tablet Take 1,000 Units by mouth 2 (Two) Times a Day.     • empagliflozin (Jardiance) 25 MG tablet tablet Take 1 tablet by mouth Daily. PATIENT ASSISTANCE MEDICATION 90 tablet 3   • ferrous sulfate 325 (65 FE) MG tablet Take 325 mg by mouth 2 (Two) Times a Day.     • gabapentin (NEURONTIN) 100 MG  "capsule Take 1 capsule by mouth 2 (Two) Times a Day. 60 capsule 1   • glipizide (GLUCOTROL) 5 MG tablet Take 0.5 tablets by mouth 2 (Two) Times a Day Before Meals. 90 tablet 0   • Glucosamine 500 MG capsule Take 1,000 mg by mouth 2 (Two) Times a Day. OTC     • hydroCHLOROthiazide (HYDRODIURIL) 25 MG tablet Take 1 tablet by mouth Daily. Take 1/2 tab twice a day 90 tablet 1   • isosorbide mononitrate (IMDUR) 30 MG 24 hr tablet Take 30 mg by mouth Daily.     • latanoprost (XALATAN) 0.005 % ophthalmic solution INSTILL 1 DROP INTO BOTH EYES DAILY     • levothyroxine (Synthroid) 200 MCG tablet Take 1 tablet by mouth Daily. 90 tablet 1   • metFORMIN (GLUCOPHAGE) 500 MG tablet Take 1 tablet by mouth 2 (Two) Times a Day With Meals. 180 tablet 0   • metoclopramide (REGLAN) 5 MG tablet Take 5 mg by mouth 2 (Two) Times a Day.     • multivitamin (THERAGRAN) tablet tablet Take 1 tablet by mouth Daily.     • nitroglycerin (NITROSTAT) 0.4 MG SL tablet nitroglycerin 0.4 mg sublingual tablet, sublingual place 1 tablet (0.4 mg) by buccal route at the first sign of an attack; no more than 3 tabs are recommended within a 15 minute period.   Active     • pantoprazole (PROTONIX) 40 MG EC tablet Take 1 tablet by mouth Daily. 90 tablet 1   • sucralfate (Carafate) 1 g tablet Take 1 tablet by mouth 4 (Four) Times a Day. 360 tablet 0     No current facility-administered medications on file prior to visit.       Social History     Social History Narrative   • Not on file         Objective     Review of Systems   Constitutional: Negative for appetite change.   Gastrointestinal: Negative for abdominal pain, nausea and vomiting.        Vital Signs:   Temp 98 °F (36.7 °C)   Ht 172.7 cm (67.99\")   Wt 71.1 kg (156 lb 12.8 oz)   BMI 23.85 kg/m²       Physical Exam  Constitutional:       General: He is not in acute distress.     Appearance: Normal appearance. He is well-developed and normal weight.   HENT:      Head: Normocephalic and atraumatic. "   Eyes:      Conjunctiva/sclera: Conjunctivae normal.      Pupils: Pupils are equal, round, and reactive to light.      Visual Fields: Right eye visual fields normal and left eye visual fields normal.   Cardiovascular:      Rate and Rhythm: Normal rate and regular rhythm.      Heart sounds: Normal heart sounds.   Pulmonary:      Effort: Pulmonary effort is normal. No retractions.      Breath sounds: Normal breath sounds and air entry.   Abdominal:      General: Bowel sounds are normal. There is no distension.      Palpations: Abdomen is soft.      Tenderness: There is no abdominal tenderness.      Comments: No appreciable hepatosplenomegaly or ascites   Musculoskeletal:         General: Normal range of motion.      Cervical back: Normal range of motion and neck supple.   Skin:     General: Skin is warm and dry.   Neurological:      Mental Status: He is alert and oriented to person, place, and time.   Psychiatric:         Mood and Affect: Mood and affect normal.         Behavior: Behavior normal.         Result Review :   The following data was reviewed by: SHAWANDA Husain on 07/11/2022:  CBC w/diff    CBC w/Diff 2/9/22 5/2/22 7/8/22   WBC 6.53 5.72 5.86   RBC 4.94 4.49 4.74   Hemoglobin 14.4 13.1 13.5   Hematocrit 45.7 41.7 43.1   MCV 92.5 92.9 90.9   MCH 29.1 29.2 28.5   MCHC 31.5 31.4 (A) 31.3 (A)   RDW 13.8 14.5 14.2   Platelets 175 183 174   Neutrophil Rel % 54.5 49.7 48.1   Immature Granulocyte Rel % 0.3 0.2 0.3   Lymphocyte Rel % 30.8 36.7 37.5   Monocyte Rel % 9.6 8.2 8.5   Eosinophil Rel % 3.7 4.5 4.9   Basophil Rel % 1.1 0.7 0.7   (A) Abnormal value            CMP    CMP 12/14/21 2/9/22 5/2/22   Glucose 222 (A) 161 (A) 234 (A)   BUN 26 (A) 25 (A) 21   Creatinine 1.52 (A) 1.26 1.15   eGFR Non African Am 44 (A) 54 (A)    Sodium 138 141 139   Potassium 4.5 4.7 4.6   Chloride 100 103 102   Calcium 10.1 10.4 9.9   Albumin 4.30 4.40 4.30   Total Bilirubin 0.3 0.3 <0.2   Alkaline Phosphatase 200 (A) 166  (A) 123 (A)   AST (SGOT) 18 16 18   ALT (SGPT) 16 16 13   (A) Abnormal value            Lipase   Date Value Ref Range Status   04/18/2019 37 5 - 51 U/L Final     Amylase   Date Value Ref Range Status   04/18/2019 92 30 - 150 U/L Final     Iron   Date Value Ref Range Status   07/08/2022 65 59 - 158 mcg/dL Final     TIBC   Date Value Ref Range Status   07/08/2022 414 298 - 536 mcg/dL Final     Iron Saturation   Date Value Ref Range Status   07/08/2022 16 (L) 20 - 50 % Final     Transferrin   Date Value Ref Range Status   07/08/2022 278 200 - 360 mg/dL Final        EGD 7/1/2022: Gastritis.  Single gastric polyp.  Large amount of food residue in the stomach.  Colonoscopy 7/1/2022: Multiple colon polyps.  Severe diverticulosis in entire colon.  Internal hemorrhoids.  Decreased finger tone found on digital rectal exam.  Cecum polyp-fragments of tubular adenoma.  Ascending colon polyp-fragments of tubular adenoma.  Transverse colon polyp-tubulovillous adenoma  Sigmoid colon polyp-tubulovillous adenoma.  Stomach biopsy-mild chronic inactive gastritis.  Stomach polyp-hyperplastic polyp.     Assessment and Plan    Diagnoses and all orders for this visit:    1. Polyp of colon, unspecified part of colon, unspecified type (Primary)    2. Diverticulosis    3. Iron deficiency anemia, unspecified iron deficiency anemia type    4. Internal hemorrhoids    5. Gastritis without bleeding, unspecified chronicity, unspecified gastritis type      * Surgery not found *     Educated patient on iron deficiency.  Hemoglobin is normal however we did further discuss capsule endoscopy for further evaluation of iron deficiency.  Given that patient is asymptomatic and is gaining weight and appetite has returned he would like to hold off on capsule endoscopy at this time.  Encouraged him to have someone check his stool and/or toilet tissue next time he sees blood for clarification of color.  Please call office if any symptoms return and/or  hemoglobin level drops and we will proceed with capsule endoscopy if patient is agreeable.      Follow Up   Return if symptoms worsen or fail to improve.  Patient was given instructions and counseling regarding his condition or for health maintenance advice. Please see specific information pulled into the AVS if appropriate.

## 2022-07-12 ENCOUNTER — PATIENT OUTREACH (OUTPATIENT)
Dept: CASE MANAGEMENT | Facility: OTHER | Age: 87
End: 2022-07-12

## 2022-07-12 DIAGNOSIS — E11.22 TYPE 2 DIABETES MELLITUS WITH DIABETIC CHRONIC KIDNEY DISEASE, UNSPECIFIED CKD STAGE, UNSPECIFIED WHETHER LONG TERM INSULIN USE: Primary | ICD-10-CM

## 2022-07-12 NOTE — OUTREACH NOTE
AMBULATORY CASE MANAGEMENT NOTE    Name and Relationship of Patient/Support Person: Darci Munson - Self    Called and left a message with Darci.  Following up on Egd/colonoscopy all were without evidence of bleeding. His iron levels have improved.     Would like to get a blood sugar log from him if possible.    Also reminded him he has cardiology appointment tomorrow.      Education Documentation  No documentation found.        MELY GAMBOA  Ambulatory Case Management    7/12/2022, 11:43 EDT

## 2022-07-19 ENCOUNTER — TELEPHONE (OUTPATIENT)
Dept: GASTROENTEROLOGY | Facility: CLINIC | Age: 87
End: 2022-07-19

## 2022-07-19 ENCOUNTER — OFFICE VISIT (OUTPATIENT)
Dept: FAMILY MEDICINE CLINIC | Age: 87
End: 2022-07-19

## 2022-07-19 VITALS
WEIGHT: 154.2 LBS | BODY MASS INDEX: 23.37 KG/M2 | DIASTOLIC BLOOD PRESSURE: 55 MMHG | OXYGEN SATURATION: 99 % | TEMPERATURE: 98.6 F | HEART RATE: 77 BPM | HEIGHT: 68 IN | SYSTOLIC BLOOD PRESSURE: 132 MMHG

## 2022-07-19 DIAGNOSIS — R07.9 CHEST PAIN, UNSPECIFIED TYPE: ICD-10-CM

## 2022-07-19 PROCEDURE — 93000 ELECTROCARDIOGRAM COMPLETE: CPT | Performed by: FAMILY MEDICINE

## 2022-07-19 PROCEDURE — 99214 OFFICE O/P EST MOD 30 MIN: CPT | Performed by: PHYSICIAN ASSISTANT

## 2022-07-19 RX ORDER — ASPIRIN 325 MG
325 TABLET ORAL DAILY
Status: DISCONTINUED | OUTPATIENT
Start: 2022-07-19 | End: 2022-07-19

## 2022-07-19 RX ORDER — ASPIRIN 81 MG/1
324 TABLET, CHEWABLE ORAL ONCE
Status: DISCONTINUED | OUTPATIENT
Start: 2022-07-19 | End: 2022-07-19

## 2022-07-19 RX ADMIN — ASPIRIN 324 MG: 81 TABLET ORAL at 13:13

## 2022-07-19 NOTE — PROGRESS NOTES
Subjective     CHIEF COMPLAINT    No chief complaint on file.           This is an 87-year-old male presenting to the clinic complaining of chest pain since about midnight last night.  He is having anterior sternal pain.  He states he took some Tylenol last night for his pain but did not see much improvement.  He did not take any aspirin or nitro.  Denies any nausea or vomiting, diaphoresis, or radiation of pain.  He does have a pretty significant cardiac history including A. fib and coronary artery disease.  He follows with cardiology and takes Eliquis and amiodarone.  Last stress test is reported to be normal and cardiology note.              Review of Systems   Constitutional: Negative for diaphoresis.   Respiratory: Negative for chest tightness and shortness of breath.    Cardiovascular: Positive for chest pain. Negative for palpitations and leg swelling.   Gastrointestinal: Negative for nausea and vomiting.   Musculoskeletal: Negative for arthralgias (no pain in left arm).            Past Medical History:   Diagnosis Date   • Anemia, unspecified    • Atherosclerotic heart disease of native coronary artery without angina pectoris    • CAD (coronary artery disease)    • CKD (chronic kidney disease), stage III (HCC)    • Essential (primary) hypertension    • Gastric ulcer, unspecified as acute or chronic, without hemorrhage or perforation    • Gastroparesis    • GERD without esophagitis    • Glaucoma    • Hereditary and idiopathic neuropathy, unspecified    • History of falling    • HLD (hyperlipidemia)    • HTN (hypertension)    • Hypotension, unspecified    • Hypothyroidism     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   • Irritable bowel syndrome without diarrhea    • Laceration without foreign body of right forearm, initial encounter    • Low back pain    • Malignant neoplasm of prostate (HCC)    • Mixed hyperlipidemia    • OA (osteoarthritis)    • Other specified disorders of the skin and subcutaneous tissue    • Pain in  left foot    • Peripheral vascular disease, unspecified (HCC)    • Phimosis    • Presence of unspecified artificial knee joint    • Primary insomnia    • Primary osteoarthritis of both knees    • Prostate cancer (HCC)    • Sensorineural hearing loss (SNHL) of both ears    • Sigmoid diverticulosis     severe sigmoid and descending colon diverticulosis and 3+ gastritis   • Sleep related leg cramps    • Type 2 diabetes mellitus with diabetic polyneuropathy (HCC)     and gastroparesis   • Unspecified atrial fibrillation (HCC)    • Unspecified dementia without behavioral disturbance (HCC)    • Unspecified hearing loss, bilateral             Past Surgical History:   Procedure Laterality Date   • CATARACT EXTRACTION Bilateral    • COLONOSCOPY     • COLONOSCOPY N/A 2022    Procedure: COLONOSCOPY WITH POLYPECTOMIES, HOT SNARE;  Surgeon: Jennifer Mann MD;  Location: [facility] ENDOSCOPY;  Service: Gastroenterology;  Laterality: N/A;  DIVERTICULOSIS, COLON POLYPS, HEMORRHOIDS   • ENDOSCOPY N/A 2022    Procedure: ESOPHAGOGASTRODUODENOSCOPY WITH BX, POLYPECTOMY;  Surgeon: Jennifer Mann MD;  Location: [facility] ENDOSCOPY;  Service: Gastroenterology;  Laterality: N/A;  GASTRIC POLYP, GASTRITIS, HIATAL HERNIA   • HAND SURGERY Right    • JOINT REPLACEMENT     • KNEE ARTHROSCOPY Right 2017   • PROSTATECTOMY     • REPLACEMENT TOTAL KNEE  2017   • UPPER GASTROINTESTINAL ENDOSCOPY              Family History   Problem Relation Age of Onset   • Diabetes type II Mother    • Lung cancer Father             Social History     Socioeconomic History   • Marital status:      Spouse name: Liudmila ( 16)   • Number of children: 4   Tobacco Use   • Smoking status: Former Smoker     Packs/day: 1.00     Years: 27.00     Pack years: 27.00     Types: Cigarettes     Start date:      Quit date: 1970     Years since quittin.5   • Smokeless tobacco: Never Used   Vaping Use   • Vaping Use: Never used    Substance and Sexual Activity   • Alcohol use: Never   • Drug use: Never   • Sexual activity: Defer            Allergies   Allergen Reactions   • Meloxicam Irritability   • Nsaids Hives            Current Outpatient Medications on File Prior to Visit   Medication Sig Dispense Refill   • amiodarone (PACERONE) 200 MG tablet Take 200 mg by mouth Daily.     • amLODIPine (NORVASC) 10 MG tablet Take 10 mg by mouth Daily.     • apixaban (ELIQUIS) 5 MG tablet tablet Take 5 mg by mouth 2 (Two) Times a Day.     • aspirin 81 MG EC tablet Take 81 mg by mouth Daily.     • cholecalciferol (VITAMIN D3) 25 MCG (1000 UT) tablet Take 1,000 Units by mouth 2 (Two) Times a Day.     • empagliflozin (Jardiance) 25 MG tablet tablet Take 1 tablet by mouth Daily. PATIENT ASSISTANCE MEDICATION 90 tablet 3   • ferrous sulfate 325 (65 FE) MG tablet Take 325 mg by mouth 2 (Two) Times a Day.     • gabapentin (NEURONTIN) 100 MG capsule Take 1 capsule by mouth 2 (Two) Times a Day. 60 capsule 1   • glipizide (GLUCOTROL) 5 MG tablet Take 0.5 tablets by mouth 2 (Two) Times a Day Before Meals. 90 tablet 0   • Glucosamine 500 MG capsule Take 1,000 mg by mouth 2 (Two) Times a Day. OTC     • hydroCHLOROthiazide (HYDRODIURIL) 25 MG tablet Take 1 tablet by mouth Daily. Take 1/2 tab twice a day 90 tablet 1   • isosorbide mononitrate (IMDUR) 30 MG 24 hr tablet Take 30 mg by mouth Daily.     • latanoprost (XALATAN) 0.005 % ophthalmic solution INSTILL 1 DROP INTO BOTH EYES DAILY     • levothyroxine (Synthroid) 200 MCG tablet Take 1 tablet by mouth Daily. 90 tablet 1   • metFORMIN (GLUCOPHAGE) 500 MG tablet Take 1 tablet by mouth 2 (Two) Times a Day With Meals. 180 tablet 0   • metoclopramide (REGLAN) 5 MG tablet Take 5 mg by mouth 2 (Two) Times a Day.     • multivitamin (THERAGRAN) tablet tablet Take 1 tablet by mouth Daily.     • nitroglycerin (NITROSTAT) 0.4 MG SL tablet nitroglycerin 0.4 mg sublingual tablet, sublingual place 1 tablet (0.4 mg) by buccal  "route at the first sign of an attack; no more than 3 tabs are recommended within a 15 minute period.   Active     • pantoprazole (PROTONIX) 40 MG EC tablet Take 1 tablet by mouth Daily. 90 tablet 1   • sucralfate (Carafate) 1 g tablet Take 1 tablet by mouth 4 (Four) Times a Day. 360 tablet 0     No current facility-administered medications on file prior to visit.            /55 (BP Location: Left arm, Patient Position: Sitting, Cuff Size: Adult)   Pulse 77   Temp 98.6 °F (37 °C) (Oral)   Ht 172.7 cm (67.99\")   Wt 69.9 kg (154 lb 3.2 oz)   SpO2 99% Comment: room air  BMI 23.45 kg/m²          Objective     Physical Exam  Vitals and nursing note reviewed.   Constitutional:       General: He is not in acute distress.     Appearance: Normal appearance. He is not toxic-appearing or diaphoretic.   Cardiovascular:      Rate and Rhythm: Normal rate and regular rhythm.      Heart sounds: Murmur heard.   Pulmonary:      Effort: Pulmonary effort is normal. No respiratory distress.      Breath sounds: Normal breath sounds.   Skin:     General: Skin is warm and dry.   Neurological:      Mental Status: He is alert and oriented to person, place, and time.   Psychiatric:         Mood and Affect: Mood normal.         Behavior: Behavior normal.              Procedures                    Lab Results (last 24 hours)     ** No results found for the last 24 hours. **                No Radiology Exams Resulted Within Past 24 Hours                    Diagnoses and all orders for this visit:    1. Chest pain, unspecified type (Primary)  Comments:  He was transferred to ER via EMS for further evaluation.   Orders:  -     Discontinue: aspirin chewable tablet 324 mg  -     aspirin tablet 325 mg  -     ECG 12 Lead                           FOR FULL DISCHARGE INSTRUCTIONS/COMMENTS/HANDOUTS please see the AVS  "

## 2022-07-19 NOTE — TELEPHONE ENCOUNTER
----- Message from SHAWANDA Husain sent at 7/15/2022  1:57 PM EDT -----  Iron is still slightly decreased however hemoglobin level normal.  Labs are much improved!  Would continue taking oral iron tablet once daily if patient can tolerate.

## 2022-07-19 NOTE — PROGRESS NOTES
Procedure     ECG 12 Lead    Date/Time: 7/19/2022 12:19 PM  Performed by: Magi Morton MD  Authorized by: Janelle Oswald PA-C   Comparison: not compared with previous ECG   Rhythm: sinus rhythm  Rate: normal  BPM: 77  Conduction: conduction normal  QRS axis: normal  Other findings: left ventricular hypertrophy  Comments: Borderline EKG.  Due to active chest pain history, pt was sent to ED via EMS.  KRYSTAL

## 2022-07-21 ENCOUNTER — PATIENT OUTREACH (OUTPATIENT)
Dept: CASE MANAGEMENT | Facility: OTHER | Age: 87
End: 2022-07-21

## 2022-07-21 DIAGNOSIS — E03.9 HYPOTHYROIDISM, UNSPECIFIED TYPE: ICD-10-CM

## 2022-07-21 DIAGNOSIS — E11.42 TYPE 2 DIABETES MELLITUS WITH POLYNEUROPATHY: Primary | ICD-10-CM

## 2022-07-21 NOTE — OUTREACH NOTE
AMBULATORY CASE MANAGEMENT NOTE    Name and Relationship of Patient/Support Person: Darci Munson    Called Darci to check on him after his ER visit.  He reports that he was diagnosed with gerd.  Inquired if he was taking his Pantoprazole, he doesn't know.  He has not been compliant with his medications and he reports that he just doesn't remember a lot of the times.  We discussed checking off on a calendar and he stated that he can't remember to do that either.     MELY GAMBOA  Ambulatory Case Management    7/21/2022, 09:13 EDT     AMBULATORY CASE MANAGEMENT NOTE    Name and Relationship of Patient/Support Person: Darci Munson    Follow up on medications again, he did get omeprazole filled at pharmacy.  Will mail him reminder of upcoming appointment.  Mailed updated list of medications also.     MELY GAMBOA  Ambulatory Case Management    7/22/2022, 10:07 EDT     [facility] End of Month Documentation    This Chronic Medical Management Care Plan for Darci Munson, 87 y.o. male, has been monitored and managed and a new plan of care implemented for the month of July.  A cumulative time of 38  minutes was spent on this patient record this month, including electronic communication with primary care provider; chart review; phone call with relative; electronic communication with pharmacist.    Regarding the patient's problems: has Type 2 diabetes mellitus (HCC); Chronic kidney disease; Hypothyroidism; Hypertensive disorder; Hyperlipidemia; Paroxysmal atrial fibrillation (HCC); Abdominal pain, epigastric; Hearing aid worn; At risk for negative response to medication; Bloating; Bradycardia; Coronary arteriosclerosis; Early satiety; Gastroparesis; Hearing loss; Iron deficiency; Mitral valve regurgitation; Peripheral neuropathy; Prostate CA (HCC); Balanitis; Gastroesophageal reflux disease without esophagitis; Primary insomnia; Essential tremor; Melena; Primary osteoarthritis of left knee; Anticoagulated; Hip pain; Vitamin D  insufficiency; Occult blood in stools; Weight loss; Iron deficiency anemia; Medicare annual wellness visit, subsequent; Pain in toes of both feet; Thickened nails; and Skin cancer on their problem list., the following items were addressed: transitions to medical care; medical records; medications, Updating medication list and education with patient. and any changes can be found within the plan section of the note.  A detailed listing of time spent for chronic care management is tracked within each outreach encounter.  Current medications include:  has a current medication list which includes the following prescription(s): amiodarone, amlodipine, apixaban, aspirin, cholecalciferol, empagliflozin, ferrous sulfate, gabapentin, glipizide, glucosamine, hydrochlorothiazide, isosorbide mononitrate, latanoprost, levothyroxine, metformin, multivitamin, nitroglycerin, and omeprazole. and the patient is reported to be patient is noncompliant with medication protocol,  Medications are reported to be non-effective in controlling symptoms and changes have been made to the medication protocol.  Regarding these diagnoses, referrals were made to the following provider(s):  specialists.  All notes on chart for PCP to review.    The patient was monitored remotely for blood glucose; pain; medications.    The patient's physical needs include:  needs assistance with ADLs; physician referral; physical healthcare; help taking medications as prescribed.     The patient's mental support needs include:  continued support    The patient's cognitive support needs include:  continued support; medication    The patient's psychosocial support needs include:  continued support; medication management or adherence; no access to community activities; no opportunity for leisure activities    The patient's functional needs include: medication education; physical healthcare    The patient's environmental needs include:  no involvement in outside  activities or no access to other activities    Care Plan overall comments:  Cordinating care with daughter,  He also need medication management.    Refer to previous outreach notes for more information on the areas listed above.    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)    (E03.9) Hypothyroidism, unspecified type    Medications   · Medications have been reconciled    Care Plan progress this month:      Recently Modified Care Plans Updates made since 6/21/2022 12:00 AM     Wellness (Adult)         Problem Priority Last Modified     ELS MEDICATION ADHERENCE (WELLNESS) (ADULT) --  3/9/2022 10:15 AM by Mamta Maravilla, RN              Goal Recent Progress Last Modified     Medication Adherence Maintained --  3/9/2022 10:15 AM by Mamta Maravilla, RN     Evidence-based guidance:   Develop a complete and accurate medication list including those prescribed and over-the-counter, those taken only occasionally and those not taken by mouth such as injections, inhalers, ointments or creams and drops.   Review all medications to determine if patient or caregiver knows why the medications are given and if taken as prescribed.   Complete or review a medication adherence assessment including barriers to medication adherence.   Arrange and encourage counseling and medication review by pharmacist.   Assess barriers to medication adherence.   Manage poor understanding or health literacy by using easy to understand language, teach-back, visual aids and teaching only 2 or 3 points at a time.   Assess presence of side effects; provide suggestions to manage or reduce side effects.   Consult with provider and/or pharmacist regarding substitute medication, changing dose, simplification of regimen or safe discontinuation of some medications.   Encourage the use of medication reminders such as clock or cell phone alarm, color coding, pillboxes for am/pm and days of the week, pharmacy refill  reminder, auto-refill system or mail-order option.   Assist with resources when cost is a barrier; refer to prescription assistance programs; confirm that generics are prescribed whenever possible; consider 90-day prescriptions to reduce copay cost; synchronize refills.   Provide help to complete medication assistance applications or health insurance forms as needed.   Complete a follow-up call 2 to 3 weeks after medication self-management plan developed; assess adherence and understanding, as well as listen to patient or caregiver concerns; amend plan as needed.   Provide frequent follow-up providing motivation, encouragement and support when medication nonadherence is identified.    Notes:              Task Due Date Last Modified     Optimize Medication Use --  7/22/2022 10:09 AM by Mamta Maravilla, RN     Care Management Activities:      - barriers to medication adherence identified  - medication list compiled  - medication list reviewed  - medication-adherence assessment completed  - strategies for improving adherence encouraged      Notes:                   Diabetes Type 2 (Adult)         Problem Priority Last Modified     ELS GLYCEMIC MANAGEMENT (DIABETES, TYPE 2) (ADULT) --  4/15/2022  9:52 AM by Mamta Maravilla, RN              Goal Recent Progress Last Modified     Glycemic Management Optimized --  4/15/2022  9:52 AM by Mamta Maravilla, RN     Evidence-based guidance:   Anticipate A1C testing (point-of-care) every 3 to 6 months based on goal attainment.   Review mutually-set A1C goal or target range.   Anticipate use of antihyperglycemic with or without insulin and periodic adjustments; consider active involvement of pharmacist.   Provide medical nutrition therapy and development of individualized eating.   Compare self-reported symptoms of hypo or hyperglycemia to blood glucose levels, diet and fluid intake, current medications, psychosocial and physiologic stressors, change in activity and barriers to care  adherence.   Promote self-monitoring of blood glucose levels.   Assess and address barriers to management plan, such as food insecurity, age, developmental ability, depression, anxiety, fear of hypoglycemia or weight gain, as well as medication cost, side effects and complicated regimen.   Consider referral to community-based diabetes education program, visiting nurse, community health worker or health .   Encourage regular dental care for treatment of periodontal disease; refer to dental provider when needed.    Notes:              Task Due Date Last Modified     Alleviate Barriers to Glycemic Management --  7/22/2022 10:10 AM by Mamta Maravilla RN     Care Management Activities:      - barriers to adherence to treatment plan identified  - use of blood glucose monitoring log promoted      Notes:                Problem Priority Last Modified     ELS DISEASE PROGRESSION (DIABETES, TYPE 2) (ADULT) --  4/15/2022  9:52 AM by Mamta Maravilla RN              Goal Recent Progress Last Modified     Disease Progression Prevented or Minimized --  4/15/2022  9:52 AM by Mamta Maravilla RN     Evidence-based guidance:   Prepare patient for laboratory and diagnostic exams based on risk and presentation.   Encourage lifestyle changes, such as increased intake of plant-based foods, stress reduction, consistent physical activity and smoking cessation to prevent long-term complications and chronic disease.    Individualize activity and exercise recommendations while considering potential limitations, such as neuropathy, retinopathy or the ability to prevent hyperglycemia or hypoglycemia.    Prepare patient for use of pharmacologic therapy that may include antihypertensive, analgesic, prostaglandin E1 with periodic adjustments, based on presenting chronic condition and laboratory results.   Assess signs/symptoms and risk factors for hypertension, sleep-disordered breathing, neuropathy (including changes in gait and balance),  retinopathy, nephropathy and sexual dysfunction.   Address pregnancy planning and contraceptive choice, especially when prescribing antihypertensive or statin.   Ensure completion of annual comprehensive foot exam and dilated eye exam.    Implement additional individualized goals and interventions based on identified risk factors.   Prepare patient for consultation or referral for specialist care, such as ophthalmology, neurology, cardiology, podiatry, nephrology or perinatology.    Notes:              Task Due Date Last Modified     Monitor and Manage Follow-up for Comorbidities --  7/22/2022 10:09 AM by Mely Maravilla RN     Care Management Activities:      - activity based on tolerance and functional limitations encouraged      Notes:                        Instructions   · Patient was provided an electronic copy of care plan  · CCM services were explained and offered and patient has accepted these services.  · Patient has given their written consent to receive CCM services and understands that this includes the authorization of electronic communication of medical information with the other treating providers.  · Patient understands that they may stop CCM services at any time and these changes will be effective at the end of the calendar month and will effectively revocate the agreement of CCM services.  · Patient understands that only one practitioner can furnish and be paid for CCM services during one calendar month.  Patient also understands that there may be co-payment or deductible fees in association with CCM services.  · Patient will continue with at least monthly follow-up calls with the Nurse Navigator.    MELY GAMBOA  Ambulatory Case Management    7/22/2022, 10:58 EDT

## 2022-07-22 PROCEDURE — 99490 CHRNC CARE MGMT STAFF 1ST 20: CPT | Performed by: FAMILY MEDICINE

## 2022-07-22 RX ORDER — ASPIRIN 81 MG/1
324 TABLET ORAL ONCE
Status: COMPLETED | OUTPATIENT
Start: 2022-07-22 | End: 2022-07-19

## 2022-07-22 RX ORDER — ASPIRIN 325 MG
324 TABLET, DELAYED RELEASE (ENTERIC COATED) ORAL DAILY
Status: DISCONTINUED | OUTPATIENT
Start: 2022-07-22 | End: 2022-07-22

## 2022-07-22 RX ORDER — OMEPRAZOLE 40 MG/1
40 CAPSULE, DELAYED RELEASE ORAL DAILY
COMMUNITY
Start: 2022-07-19 | End: 2022-07-25

## 2022-07-25 RX ORDER — PANTOPRAZOLE SODIUM 40 MG/1
40 TABLET, DELAYED RELEASE ORAL DAILY
COMMUNITY
End: 2022-08-30 | Stop reason: SDUPTHER

## 2022-07-25 RX ORDER — SUCRALFATE 1 G/1
1 TABLET ORAL 4 TIMES DAILY
COMMUNITY

## 2022-07-25 NOTE — TELEPHONE ENCOUNTER
Nya called to say that he is taking the panotprazole and carafate that was discontinued off the list.  Called and spoke with dennis who is at the pharmacy.  Pharmacy states that they are not running the prescriptions through the insurance, cheaper for cash pay.  Added medications back to list, informed Nya and told her to d/c the omeprazole, should not be taking both.

## 2022-08-08 ENCOUNTER — LAB (OUTPATIENT)
Dept: LAB | Facility: HOSPITAL | Age: 87
End: 2022-08-08

## 2022-08-08 ENCOUNTER — HOSPITAL ENCOUNTER (OUTPATIENT)
Dept: GENERAL RADIOLOGY | Facility: HOSPITAL | Age: 87
Discharge: HOME | End: 2022-08-08

## 2022-08-08 ENCOUNTER — OFFICE VISIT (OUTPATIENT)
Dept: FAMILY MEDICINE CLINIC | Age: 87
End: 2022-08-08

## 2022-08-08 VITALS
BODY MASS INDEX: 23.79 KG/M2 | WEIGHT: 157 LBS | SYSTOLIC BLOOD PRESSURE: 134 MMHG | HEIGHT: 68 IN | DIASTOLIC BLOOD PRESSURE: 45 MMHG | HEART RATE: 67 BPM

## 2022-08-08 DIAGNOSIS — M79.674 PAIN OF TOE OF RIGHT FOOT: ICD-10-CM

## 2022-08-08 DIAGNOSIS — M79.674 PAIN OF TOE OF RIGHT FOOT: Primary | ICD-10-CM

## 2022-08-08 LAB
BASOPHILS # BLD AUTO: 0.06 10*3/MM3 (ref 0–0.2)
BASOPHILS NFR BLD AUTO: 0.8 % (ref 0–1.5)
DEPRECATED RDW RBC AUTO: 48.6 FL (ref 37–54)
EOSINOPHIL # BLD AUTO: 0.27 10*3/MM3 (ref 0–0.4)
EOSINOPHIL NFR BLD AUTO: 3.6 % (ref 0.3–6.2)
ERYTHROCYTE [DISTWIDTH] IN BLOOD BY AUTOMATED COUNT: 14.3 % (ref 12.3–15.4)
HCT VFR BLD AUTO: 40.4 % (ref 37.5–51)
HGB BLD-MCNC: 12.6 G/DL (ref 13–17.7)
IMM GRANULOCYTES # BLD AUTO: 0.02 10*3/MM3 (ref 0–0.05)
IMM GRANULOCYTES NFR BLD AUTO: 0.3 % (ref 0–0.5)
LYMPHOCYTES # BLD AUTO: 2.67 10*3/MM3 (ref 0.7–3.1)
LYMPHOCYTES NFR BLD AUTO: 35.6 % (ref 19.6–45.3)
MCH RBC QN AUTO: 28.8 PG (ref 26.6–33)
MCHC RBC AUTO-ENTMCNC: 31.2 G/DL (ref 31.5–35.7)
MCV RBC AUTO: 92.2 FL (ref 79–97)
MONOCYTES # BLD AUTO: 0.72 10*3/MM3 (ref 0.1–0.9)
MONOCYTES NFR BLD AUTO: 9.6 % (ref 5–12)
NEUTROPHILS NFR BLD AUTO: 3.76 10*3/MM3 (ref 1.7–7)
NEUTROPHILS NFR BLD AUTO: 50.1 % (ref 42.7–76)
PLATELET # BLD AUTO: 185 10*3/MM3 (ref 140–450)
PMV BLD AUTO: 10.4 FL (ref 6–12)
RBC # BLD AUTO: 4.38 10*6/MM3 (ref 4.14–5.8)
URATE SERPL-MCNC: 6.3 MG/DL (ref 3.4–7)
WBC NRBC COR # BLD: 7.5 10*3/MM3 (ref 3.4–10.8)

## 2022-08-08 PROCEDURE — 36415 COLL VENOUS BLD VENIPUNCTURE: CPT

## 2022-08-08 PROCEDURE — 73630 X-RAY EXAM OF FOOT: CPT

## 2022-08-08 PROCEDURE — 84550 ASSAY OF BLOOD/URIC ACID: CPT

## 2022-08-08 PROCEDURE — 99213 OFFICE O/P EST LOW 20 MIN: CPT

## 2022-08-08 PROCEDURE — 85025 COMPLETE CBC W/AUTO DIFF WBC: CPT

## 2022-08-08 NOTE — PROGRESS NOTES
"Chief Complaint  Toe Pain (Little toe on rt foot painful, swollen x 4 days )    Natanael Munson presents to [facility]  History of Present Illness    Patient presents today with complaints of toe pain to his fifth toe on his right foot.  He denies any injury or trauma.  He denies any redness but does note that he thinks it is a little swollen.  He does report he is able to move it without pain he is unable to describe the pain other than just saying it is \"just hurting\".  He does not state that the pain is very bad.  He does report that he wears tight socks the other day and thinks that might be the cause.  The pain started about 4 days ago.  He has not tried any medications for it.  He has no fever or chills.  He has no known wounds to his foot but does have a history of diabetes.    Objective   Vital Signs:  /45 (BP Location: Right arm, Patient Position: Sitting)   Pulse 67   Ht 172.7 cm (67.99\")   Wt 71.2 kg (157 lb)   BMI 23.88 kg/m²   Estimated body mass index is 23.88 kg/m² as calculated from the following:    Height as of this encounter: 172.7 cm (67.99\").    Weight as of this encounter: 71.2 kg (157 lb).    BMI is within normal parameters. No other follow-up for BMI required.      Physical Exam  Vitals and nursing note reviewed.   Constitutional:       General: He is not in acute distress.     Appearance: Normal appearance. He is not ill-appearing.   HENT:      Head: Normocephalic and atraumatic.   Cardiovascular:      Rate and Rhythm: Normal rate and regular rhythm.      Pulses: Normal pulses.           Dorsalis pedis pulses are 2+ on the right side.        Posterior tibial pulses are 2+ on the right side.   Pulmonary:      Effort: Pulmonary effort is normal.      Breath sounds: Normal breath sounds.   Musculoskeletal:      Right foot: Normal range of motion. No deformity.   Feet:      Right foot:      Skin integrity: Skin integrity normal. No skin " breakdown, erythema or warmth.      Toenail Condition: Right toenails are normal.      Comments: Right fifth toe: No pain with palpation.  No erythema, warmth, skin breakdown noted.  Range of motion intact.  No edema or deformity noted.  Skin:     General: Skin is warm and dry.   Neurological:      Mental Status: He is alert.                 Assessment and Plan   Diagnoses and all orders for this visit:    1. Pain of toe of right foot (Primary)  Assessment & Plan:  obtaining x-ray as noted.  Low suspicion for gout however will also obtain uric acid level.  We will also obtain CBC to rule out infectious process.     Orders:  -     XR Foot 3+ View Right; Future  -     Uric acid; Future  -     CBC w AUTO Differential; Future    Addendum: X-ray results showed minimal degenerative changes of the toes with no acute bony abnormality of the right foot.  CBC did show a hemoglobin of 12.6 and MCHC of 31.2 but this was stable with previous labs.  White blood cell count was not elevated.  Per patient's request, I did inform his daughter Sharon English of results.  Recommended ice, rest, Tylenol.  Uric acid level pending.  Continue with monitoring of foot for any wounds, redness, swelling or increased pain.  Patient to follow-up with PCP next week, can return to clinic sooner if needed.       Follow Up   Return if symptoms worsen or fail to improve, for Next scheduled follow up.  Patient was given instructions and counseling regarding his condition or for health maintenance advice. Please see specific information pulled into the AVS if appropriate.

## 2022-08-10 PROBLEM — M79.674 PAIN OF TOE OF RIGHT FOOT: Status: ACTIVE | Noted: 2022-08-10

## 2022-08-10 NOTE — ASSESSMENT & PLAN NOTE
obtaining x-ray as noted.  Low suspicion for gout however will also obtain uric acid level.  We will also obtain CBC to rule out infectious process.

## 2022-08-15 ENCOUNTER — TELEPHONE (OUTPATIENT)
Dept: FAMILY MEDICINE CLINIC | Age: 87
End: 2022-08-15

## 2022-08-15 ENCOUNTER — OFFICE VISIT (OUTPATIENT)
Dept: FAMILY MEDICINE CLINIC | Age: 87
End: 2022-08-15

## 2022-08-15 ENCOUNTER — HOSPITAL ENCOUNTER (OUTPATIENT)
Dept: GENERAL RADIOLOGY | Facility: HOSPITAL | Age: 87
Discharge: HOME | End: 2022-08-15

## 2022-08-15 ENCOUNTER — LAB (OUTPATIENT)
Dept: LAB | Facility: HOSPITAL | Age: 87
End: 2022-08-15

## 2022-08-15 VITALS
SYSTOLIC BLOOD PRESSURE: 128 MMHG | BODY MASS INDEX: 23.19 KG/M2 | OXYGEN SATURATION: 98 % | WEIGHT: 153 LBS | HEART RATE: 63 BPM | HEIGHT: 68 IN | DIASTOLIC BLOOD PRESSURE: 55 MMHG

## 2022-08-15 DIAGNOSIS — N18.31 STAGE 3A CHRONIC KIDNEY DISEASE: ICD-10-CM

## 2022-08-15 DIAGNOSIS — E11.22 TYPE 2 DIABETES MELLITUS WITH DIABETIC CHRONIC KIDNEY DISEASE, UNSPECIFIED CKD STAGE, UNSPECIFIED WHETHER LONG TERM INSULIN USE: ICD-10-CM

## 2022-08-15 DIAGNOSIS — E03.9 ACQUIRED HYPOTHYROIDISM: ICD-10-CM

## 2022-08-15 DIAGNOSIS — D12.3 ADENOMATOUS POLYP OF TRANSVERSE COLON: ICD-10-CM

## 2022-08-15 DIAGNOSIS — M54.2 NECK PAIN: ICD-10-CM

## 2022-08-15 DIAGNOSIS — Z79.01 ANTICOAGULATED: ICD-10-CM

## 2022-08-15 DIAGNOSIS — E78.2 MIXED HYPERLIPIDEMIA: ICD-10-CM

## 2022-08-15 DIAGNOSIS — H91.93 BILATERAL HEARING LOSS, UNSPECIFIED HEARING LOSS TYPE: ICD-10-CM

## 2022-08-15 DIAGNOSIS — D50.9 IRON DEFICIENCY ANEMIA, UNSPECIFIED IRON DEFICIENCY ANEMIA TYPE: ICD-10-CM

## 2022-08-15 DIAGNOSIS — I10 PRIMARY HYPERTENSION: Primary | ICD-10-CM

## 2022-08-15 DIAGNOSIS — D36.9 TUBULOVILLOUS ADENOMA: ICD-10-CM

## 2022-08-15 DIAGNOSIS — I48.0 PAROXYSMAL ATRIAL FIBRILLATION: ICD-10-CM

## 2022-08-15 DIAGNOSIS — Z23 NEED FOR VACCINATION: ICD-10-CM

## 2022-08-15 PROBLEM — E61.1 IRON DEFICIENCY: Status: RESOLVED | Noted: 2021-07-19 | Resolved: 2022-08-15

## 2022-08-15 LAB
ALBUMIN SERPL-MCNC: 4.3 G/DL (ref 3.5–5.2)
ALBUMIN/GLOB SERPL: 1.5 G/DL
ALP SERPL-CCNC: 108 U/L (ref 39–117)
ALT SERPL W P-5'-P-CCNC: 16 U/L (ref 1–41)
ANION GAP SERPL CALCULATED.3IONS-SCNC: 11.9 MMOL/L (ref 5–15)
AST SERPL-CCNC: 20 U/L (ref 1–40)
BASOPHILS # BLD AUTO: 0.06 10*3/MM3 (ref 0–0.2)
BASOPHILS NFR BLD AUTO: 0.8 % (ref 0–1.5)
BILIRUB SERPL-MCNC: 0.4 MG/DL (ref 0–1.2)
BUN SERPL-MCNC: 29 MG/DL (ref 8–23)
BUN/CREAT SERPL: 17.9 (ref 7–25)
CALCIUM SPEC-SCNC: 10 MG/DL (ref 8.6–10.5)
CHLORIDE SERPL-SCNC: 104 MMOL/L (ref 98–107)
CO2 SERPL-SCNC: 24.1 MMOL/L (ref 22–29)
CREAT SERPL-MCNC: 1.62 MG/DL (ref 0.76–1.27)
DEPRECATED RDW RBC AUTO: 49.4 FL (ref 37–54)
EGFRCR SERPLBLD CKD-EPI 2021: 40.8 ML/MIN/1.73
EOSINOPHIL # BLD AUTO: 0.31 10*3/MM3 (ref 0–0.4)
EOSINOPHIL NFR BLD AUTO: 4.1 % (ref 0.3–6.2)
ERYTHROCYTE [DISTWIDTH] IN BLOOD BY AUTOMATED COUNT: 14.4 % (ref 12.3–15.4)
GLOBULIN UR ELPH-MCNC: 2.8 GM/DL
GLUCOSE SERPL-MCNC: 123 MG/DL (ref 65–99)
HBA1C MFR BLD: 7.5 % (ref 4.8–5.6)
HCT VFR BLD AUTO: 42.4 % (ref 37.5–51)
HGB BLD-MCNC: 13.4 G/DL (ref 13–17.7)
IMM GRANULOCYTES # BLD AUTO: 0.03 10*3/MM3 (ref 0–0.05)
IMM GRANULOCYTES NFR BLD AUTO: 0.4 % (ref 0–0.5)
IRON 24H UR-MRATE: 44 MCG/DL (ref 59–158)
IRON SATN MFR SERPL: 10 % (ref 20–50)
LYMPHOCYTES # BLD AUTO: 2.15 10*3/MM3 (ref 0.7–3.1)
LYMPHOCYTES NFR BLD AUTO: 28.4 % (ref 19.6–45.3)
MCH RBC QN AUTO: 29.3 PG (ref 26.6–33)
MCHC RBC AUTO-ENTMCNC: 31.6 G/DL (ref 31.5–35.7)
MCV RBC AUTO: 92.6 FL (ref 79–97)
MONOCYTES # BLD AUTO: 0.63 10*3/MM3 (ref 0.1–0.9)
MONOCYTES NFR BLD AUTO: 8.3 % (ref 5–12)
NEUTROPHILS NFR BLD AUTO: 4.38 10*3/MM3 (ref 1.7–7)
NEUTROPHILS NFR BLD AUTO: 58 % (ref 42.7–76)
PLATELET # BLD AUTO: 185 10*3/MM3 (ref 140–450)
PMV BLD AUTO: 11.3 FL (ref 6–12)
POTASSIUM SERPL-SCNC: 5.1 MMOL/L (ref 3.5–5.2)
PROT SERPL-MCNC: 7.1 G/DL (ref 6–8.5)
RBC # BLD AUTO: 4.58 10*6/MM3 (ref 4.14–5.8)
RETICS # AUTO: 0.08 10*6/MM3 (ref 0.02–0.13)
RETICS/RBC NFR AUTO: 1.73 % (ref 0.7–1.9)
SODIUM SERPL-SCNC: 140 MMOL/L (ref 136–145)
TIBC SERPL-MCNC: 451 MCG/DL (ref 298–536)
TRANSFERRIN SERPL-MCNC: 303 MG/DL (ref 200–360)
WBC NRBC COR # BLD: 7.56 10*3/MM3 (ref 3.4–10.8)

## 2022-08-15 PROCEDURE — 84466 ASSAY OF TRANSFERRIN: CPT | Performed by: FAMILY MEDICINE

## 2022-08-15 PROCEDURE — 36415 COLL VENOUS BLD VENIPUNCTURE: CPT | Performed by: FAMILY MEDICINE

## 2022-08-15 PROCEDURE — 72050 X-RAY EXAM NECK SPINE 4/5VWS: CPT

## 2022-08-15 PROCEDURE — 85025 COMPLETE CBC W/AUTO DIFF WBC: CPT | Performed by: FAMILY MEDICINE

## 2022-08-15 PROCEDURE — 99215 OFFICE O/P EST HI 40 MIN: CPT | Performed by: FAMILY MEDICINE

## 2022-08-15 PROCEDURE — 80053 COMPREHEN METABOLIC PANEL: CPT | Performed by: FAMILY MEDICINE

## 2022-08-15 PROCEDURE — 83036 HEMOGLOBIN GLYCOSYLATED A1C: CPT | Performed by: FAMILY MEDICINE

## 2022-08-15 PROCEDURE — 83540 ASSAY OF IRON: CPT | Performed by: FAMILY MEDICINE

## 2022-08-15 PROCEDURE — 85045 AUTOMATED RETICULOCYTE COUNT: CPT | Performed by: FAMILY MEDICINE

## 2022-08-15 PROCEDURE — 91305 COVID-19 (PFIZER) 12+ YRS: CPT | Performed by: FAMILY MEDICINE

## 2022-08-15 PROCEDURE — 82728 ASSAY OF FERRITIN: CPT | Performed by: FAMILY MEDICINE

## 2022-08-15 PROCEDURE — 0054A COVID-19 (PFIZER) 12+ YRS: CPT | Performed by: FAMILY MEDICINE

## 2022-08-15 PROCEDURE — 84443 ASSAY THYROID STIM HORMONE: CPT | Performed by: FAMILY MEDICINE

## 2022-08-15 NOTE — TELEPHONE ENCOUNTER
I would not recommend repeat colonoscopy for routine surveillance purposes  secondary to age.  Thanks

## 2022-08-15 NOTE — PROGRESS NOTES
Chief Complaint  Hypertension (3 month ), Hypothyroidism, Hyperlipidemia, Diabetes, Atrial Fibrillation, Chronic Kidney Disease, and Anticoagulated    Subjective          Darci Munson presents to [facility]  History of Present Illness    Here with his daughter, Nya.    Was seen here 1 month ago with report of chest pain.   He was sent to the emergency room and work-up revealed some mild CHF but no evidence of acute coronary syndrome.  He does see Paulo Peterson with [facility].    Was seen about a week ago with pinky toe pain on right foot.  He says that is better now.    Had been experiencing weight loss and got down to 146 pounds on July 1 with coverage of weight up to 157 by August 8.  Dropped back a little bit again today, but still better than it was.    He has known iron deficiency anemia.  Underwent colonoscopy July.  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  Had a tubular adenoma in the cecum. He is uncertain on what the advice was for repeat scope.    He was noted to have gastroparesis on EGD but has been asymptomatic. Has hx of vagotomy in the past.   He did see gastroenterology and was offered a capsule endoscopy.  However since his anemia and weight had improved that he elected to hold off on pursuing that test.    Also has atrial fibrillation and is appropriately anticoagulated with Eliquis and also on amiodarone. Has had several thyroid medication dosage adjustments and his TSH still runs a little high.    He also reports having pain in his neck.  Says significantly uncomfortable and limits his range of motion as a result.  He is wondering what else could be done for him.  He might be willing to consider physical therapy  depending on what the x-rays show    Continues to do well post left knee replacement        Current Outpatient Medications   Medication Sig Dispense Refill   • amiodarone (PACERONE) 200 MG tablet Take 200 mg by  mouth Daily.     • amLODIPine (NORVASC) 10 MG tablet Take 10 mg by mouth Daily.     • apixaban (ELIQUIS) 5 MG tablet tablet Take 5 mg by mouth 2 (Two) Times a Day.     • aspirin 81 MG EC tablet Take 81 mg by mouth Daily.     • cholecalciferol (VITAMIN D3) 25 MCG (1000 UT) tablet Take 1,000 Units by mouth 2 (Two) Times a Day.     • empagliflozin (Jardiance) 25 MG tablet tablet Take 1 tablet by mouth Daily. PATIENT ASSISTANCE MEDICATION 90 tablet 3   • ferrous sulfate 325 (65 FE) MG tablet Take 325 mg by mouth 2 (Two) Times a Day.     • gabapentin (NEURONTIN) 100 MG capsule Take 1 capsule by mouth 2 (Two) Times a Day. 60 capsule 1   • glipizide (GLUCOTROL) 5 MG tablet Take 0.5 tablets by mouth 2 (Two) Times a Day Before Meals. 90 tablet 0   • Glucosamine 500 MG capsule Take 1,000 mg by mouth 2 (Two) Times a Day. OTC     • hydroCHLOROthiazide (HYDRODIURIL) 25 MG tablet Take 1 tablet by mouth Daily. Take 1/2 tab twice a day 90 tablet 1   • isosorbide mononitrate (IMDUR) 30 MG 24 hr tablet Take 30 mg by mouth Daily.     • latanoprost (XALATAN) 0.005 % ophthalmic solution INSTILL 1 DROP INTO BOTH EYES DAILY     • levothyroxine (Synthroid) 200 MCG tablet Take 1 tablet by mouth Daily. 90 tablet 1   • metFORMIN (GLUCOPHAGE) 500 MG tablet Take 1 tablet by mouth 2 (Two) Times a Day With Meals. 180 tablet 0   • multivitamin (THERAGRAN) tablet tablet Take 1 tablet by mouth Daily.     • nitroglycerin (NITROSTAT) 0.4 MG SL tablet nitroglycerin 0.4 mg sublingual tablet, sublingual place 1 tablet (0.4 mg) by buccal route at the first sign of an attack; no more than 3 tabs are recommended within a 15 minute period.   Active     • pantoprazole (PROTONIX) 40 MG EC tablet Take 40 mg by mouth Daily.     • sucralfate (CARAFATE) 1 g tablet Take 1 g by mouth 4 (Four) Times a Day.       No current facility-administered medications for this visit.       Review of Systems         Objective   Vital Signs:   /55 (BP Location: Left arm,  "Patient Position: Sitting, Cuff Size: Adult)   Pulse 63   Ht 172.7 cm (68\")   Wt 69.4 kg (153 lb)   SpO2 98%   BMI 23.26 kg/m²     Physical Exam  Vitals and nursing note reviewed.   Constitutional:       General: He is not in acute distress.     Appearance: Normal appearance.   HENT:      Right Ear: Tympanic membrane and ear canal normal.      Left Ear: Tympanic membrane and ear canal normal.      Ears:      Comments: He is very hard of hearing     Mouth/Throat:      Mouth: Mucous membranes are moist.      Pharynx: Oropharynx is clear. No oropharyngeal exudate.   Eyes:      General: No scleral icterus.     Conjunctiva/sclera: Conjunctivae normal.   Neck:      Vascular: No carotid bruit.      Comments: He does have some restricted range of motion for rotation bilaterally  Cardiovascular:      Rate and Rhythm: Normal rate and regular rhythm.      Heart sounds: No murmur heard.    No friction rub. No gallop.   Pulmonary:      Effort: No respiratory distress.      Breath sounds: No wheezing or rales.   Abdominal:      General: Bowel sounds are normal.      Palpations: Abdomen is soft. There is no mass.      Tenderness: There is no abdominal tenderness. There is no guarding or rebound.   Musculoskeletal:         General: No swelling.      Right lower leg: No edema.      Left lower leg: No edema.   Lymphadenopathy:      Cervical: No cervical adenopathy.   Skin:     Coloration: Skin is not jaundiced.      Findings: No lesion.   Neurological:      General: No focal deficit present.      Mental Status: He is alert and oriented to person, place, and time.   Psychiatric:         Mood and Affect: Mood normal.         Behavior: Behavior normal.         Thought Content: Thought content normal.         Judgment: Judgment normal.        [facility]    Result Review :                     Assessment and Plan    Diagnoses and all orders for this visit:    1. Primary hypertension (Primary)  Comments:  Blood pressure looks okay continue " current regimen  Orders:  -     CBC & Differential  -     Comprehensive Metabolic Panel    2. Mixed hyperlipidemia  Comments:  We will check lab predicate medications based on results  Orders:  -     Comprehensive Metabolic Panel    3. Acquired hypothyroidism  Comments:  Again check lab predicate medication change based on results.  May have to consider adjustment in his amiodarone if continues to be an issue  Orders:  -     TSH    4. Type 2 diabetes mellitus with diabetic chronic kidney disease, unspecified CKD stage, unspecified whether long term insulin use (HCC)  Comments:  Continue on current regimen for now.  Check labs and predicate medication change based on results  Orders:  -     Comprehensive Metabolic Panel  -     Hemoglobin A1c    5. Paroxysmal atrial fibrillation (HCC)  Comments:  Continue on current regimen for now.  Consider adjusting amiodarone if we can get thyroid under control    6. Anticoagulated    7. Stage 3a chronic kidney disease (HCC)  Comments:  Check lab for follow-up    8. Iron deficiency anemia, unspecified iron deficiency anemia type  Comments:  Get follow-up labs today.  Might consider pill endoscopy  Orders:  -     Iron Profile  -     Ferritin  -     CBC & Differential  -     Reticulocytes    9. Tubulovillous adenoma  Comments:  Will clarify with GI what recommendations are for repeat scope    10. Adenomatous polyp of transverse colon  Comments:  As above we will check with GI for recommendation on repeat scope    11. Bilateral hearing loss, unspecified hearing loss type  Comments:  His quality of life would be improved if can get his hearing in better shape    12. Need for vaccination  -     COVID-19 Vaccine (Pfizer) Gray Cap    13. Neck pain  Comments:  Check x-ray.  Consider physical therapy  Orders:  -     XR Spine Cervical Complete 4 or 5 View      I spent 44 minutes caring for Darci on this date of service. This time includes time spent by me in the following  activities:preparing for the visit, reviewing tests, obtaining and/or reviewing a separately obtained history, performing a medically appropriate examination and/or evaluation , counseling and educating the patient/family/caregiver, ordering medications, tests, or procedures and documenting information in the medical record  Follow Up   No follow-ups on file.  Patient was given instructions and counseling regarding his condition or for health maintenance advice. Please see specific information pulled into the AVS if appropriate.

## 2022-08-15 NOTE — TELEPHONE ENCOUNTER
Please clarify with GI when/If he is supposed to repeat colonoscopy given the pathology results of tubulovillus adenomas.     mas

## 2022-08-16 LAB
FERRITIN SERPL-MCNC: 77.6 NG/ML (ref 30–400)
TSH SERPL DL<=0.05 MIU/L-ACNC: 2.81 UIU/ML (ref 0.27–4.2)

## 2022-08-24 ENCOUNTER — PATIENT OUTREACH (OUTPATIENT)
Dept: CASE MANAGEMENT | Facility: OTHER | Age: 87
End: 2022-08-24

## 2022-08-24 DIAGNOSIS — E11.42 TYPE 2 DIABETES MELLITUS WITH POLYNEUROPATHY: Primary | ICD-10-CM

## 2022-08-24 NOTE — OUTREACH NOTE
AMBULATORY CASE MANAGEMENT NOTE    Name and Relationship of Patient/Support Person: Darci Munson - Self    Called and left message for Darci to call back.  His medications still appear that he is not filling all of them on time and not at the same pharmacy.    Will begin to transition him to [facility] for monitoring.  He has been stable, but not at goal.        MELY R  Ambulatory Case Management    8/24/2022, 14:42 EDT

## 2022-08-29 ENCOUNTER — PATIENT OUTREACH (OUTPATIENT)
Dept: CASE MANAGEMENT | Facility: OTHER | Age: 87
End: 2022-08-29

## 2022-08-29 ENCOUNTER — TELEPHONE (OUTPATIENT)
Dept: FAMILY MEDICINE CLINIC | Age: 87
End: 2022-08-29

## 2022-08-29 DIAGNOSIS — E11.42 TYPE 2 DIABETES MELLITUS WITH POLYNEUROPATHY: Primary | ICD-10-CM

## 2022-08-29 NOTE — TELEPHONE ENCOUNTER
Received refill request from [facility] on Omeprazole 40mg that was filled for #30 by a Dr. Marquise Mcintyre.  Do you want to refill?

## 2022-08-29 NOTE — OUTREACH NOTE
AMBULATORY CASE MANAGEMENT NOTE    Name and Relationship of Patient/Support Person: Darci Munson - Self    Darci called to check in.  He sates that he doesn't believe that the glipizide is enough medication.  He used to be on more that he thinks this is what is causing his A1c to be elevated.  Encouragement given that his A1c is not too elevated.  He could add in exercise and watch diet, that will help.  He admits that he doesn't eat as well as he should.    He asked about the results for his neck x-ray and interpretation given.  He wants to wait on the PT at this time but will call back if he changes his mind.     His care gaps are up to date, he has completed his colonoscopy and no follow up is needed.  Will move to [facility] and monitor for discharge.     MELY GAMBOA  Ambulatory Case Management    8/29/2022, 14:25 EDT

## 2022-08-30 DIAGNOSIS — E11.42 TYPE 2 DIABETES MELLITUS WITH POLYNEUROPATHY: ICD-10-CM

## 2022-08-30 RX ORDER — PANTOPRAZOLE SODIUM 40 MG/1
40 TABLET, DELAYED RELEASE ORAL DAILY
Qty: 90 TABLET | Refills: 0 | Status: SHIPPED | OUTPATIENT
Start: 2022-08-30 | End: 2023-02-17 | Stop reason: SDUPTHER

## 2022-09-10 DIAGNOSIS — E11.42 TYPE 2 DIABETES MELLITUS WITH POLYNEUROPATHY: ICD-10-CM

## 2022-09-13 RX ORDER — GLIPIZIDE 5 MG/1
TABLET ORAL
Qty: 34 TABLET | Refills: 0 | OUTPATIENT
Start: 2022-09-13

## 2022-09-13 RX ORDER — PANTOPRAZOLE SODIUM 40 MG/1
TABLET, DELAYED RELEASE ORAL
Qty: 90 TABLET | Refills: 0 | OUTPATIENT
Start: 2022-09-13

## 2022-09-13 NOTE — TELEPHONE ENCOUNTER
This does not appear to be on his current medication list.  Who have prescribed it for him before?  When was the last time it was filled?

## 2022-09-14 RX ORDER — CYCLOBENZAPRINE HCL 10 MG
TABLET ORAL
Qty: 90 TABLET | Refills: 0 | OUTPATIENT
Start: 2022-09-14

## 2022-09-14 NOTE — TELEPHONE ENCOUNTER
Jenn Álvarez filled on 12/1/21 #90 w/ 1RF, you stopped on 2/9/22, says therapy completed, Per pt daughter pt is not taking this, med denied

## 2022-09-20 ENCOUNTER — PATIENT OUTREACH (OUTPATIENT)
Dept: CASE MANAGEMENT | Facility: OTHER | Age: 87
End: 2022-09-20

## 2022-09-20 DIAGNOSIS — E11.42 TYPE 2 DIABETES MELLITUS WITH POLYNEUROPATHY: ICD-10-CM

## 2022-09-20 RX ORDER — GLIPIZIDE 5 MG/1
2.5 TABLET ORAL
Qty: 90 TABLET | Refills: 0 | Status: SHIPPED | OUTPATIENT
Start: 2022-09-20 | End: 2022-09-26

## 2022-09-20 NOTE — OUTREACH NOTE
AMBULATORY CASE MANAGEMENT NOTE    Name and Relationship of Patient/Support Person:  -     Attempted to follow up on medication compliance, he is still filling medications at 2 different pharmacies.  Discussion has been had with keeping all medications with [place] pharmacy for compliance.  Resent glipizide medication.     Education Documentation  No documentation found.        MELY GAMBOA  Ambulatory Case Management    9/20/2022, 10:36 EDT

## 2022-09-22 DIAGNOSIS — E11.42 TYPE 2 DIABETES MELLITUS WITH DIABETIC POLYNEUROPATHY, WITHOUT LONG-TERM CURRENT USE OF INSULIN: ICD-10-CM

## 2022-09-23 RX ORDER — GABAPENTIN 100 MG/1
CAPSULE ORAL
Qty: 60 CAPSULE | Refills: 0 | Status: SHIPPED | OUTPATIENT
Start: 2022-09-23 | End: 2022-11-04

## 2022-09-26 DIAGNOSIS — E11.42 TYPE 2 DIABETES MELLITUS WITH POLYNEUROPATHY: ICD-10-CM

## 2022-09-26 RX ORDER — GLIPIZIDE 5 MG/1
TABLET ORAL
Qty: 90 TABLET | Refills: 0 | Status: SHIPPED | OUTPATIENT
Start: 2022-09-26 | End: 2023-03-14

## 2022-09-27 ENCOUNTER — PATIENT OUTREACH (OUTPATIENT)
Dept: CASE MANAGEMENT | Facility: OTHER | Age: 87
End: 2022-09-27

## 2022-09-27 NOTE — OUTREACH NOTE
AMBULATORY CASE MANAGEMENT NOTE    Name and Relationship of Patient/Support Person: SusannahSharon MARIO - Emergency Contact    Called to confirm with Nya which pharmacy Darci is using.  He was using the one in [place] in which his grandkobe worked at and was helpful.  She has changed to [facility] and now his prescriptions will remain there.  Inquired about the HCTZ, she states he is cutting in half.  Not sure if taking 1/2 daily or twice daily, Nya thought it was just once daily.  Educated that we can send in the 12.5 mg tablet instead of the whole tab and her cutting.  She will let me know.  Called [facility] for printout for compliance.  Nya also states that his eliquis cost has doubled.  She can contact the [facility] for assistance , sounds like he is in the donut hole.        MELY GAMBOA  Ambulatory Case Management    9/27/2022, 09:26 EDT

## 2022-10-08 DIAGNOSIS — I10 PRIMARY HYPERTENSION: ICD-10-CM

## 2022-10-10 RX ORDER — HYDROCHLOROTHIAZIDE 25 MG/1
TABLET ORAL
Qty: 90 TABLET | Refills: 0 | Status: SHIPPED | OUTPATIENT
Start: 2022-10-10 | End: 2022-12-22

## 2022-10-11 ENCOUNTER — HOSPITAL ENCOUNTER (OUTPATIENT)
Dept: GENERAL RADIOLOGY | Facility: HOSPITAL | Age: 87
Discharge: HOME | End: 2022-10-11

## 2022-10-11 ENCOUNTER — HOSPITAL ENCOUNTER (OUTPATIENT)
Dept: CT IMAGING | Facility: HOSPITAL | Age: 87
Discharge: HOME | End: 2022-10-11

## 2022-10-11 ENCOUNTER — OFFICE VISIT (OUTPATIENT)
Dept: FAMILY MEDICINE CLINIC | Age: 87
End: 2022-10-11

## 2022-10-11 ENCOUNTER — LAB (OUTPATIENT)
Dept: LAB | Facility: HOSPITAL | Age: 87
End: 2022-10-11

## 2022-10-11 VITALS
HEART RATE: 110 BPM | TEMPERATURE: 98.1 F | HEIGHT: 68 IN | DIASTOLIC BLOOD PRESSURE: 66 MMHG | SYSTOLIC BLOOD PRESSURE: 138 MMHG | WEIGHT: 157.9 LBS | BODY MASS INDEX: 23.93 KG/M2

## 2022-10-11 DIAGNOSIS — M25.532 LEFT WRIST PAIN: ICD-10-CM

## 2022-10-11 DIAGNOSIS — M79.642 LEFT HAND PAIN: ICD-10-CM
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
## 2022-10-11 DIAGNOSIS — M25.522 LEFT ELBOW PAIN: ICD-10-CM

## 2022-10-11 DIAGNOSIS — R00.0 TACHYCARDIA: ICD-10-CM

## 2022-10-11 DIAGNOSIS — I48.92 ATRIAL FLUTTER, UNSPECIFIED TYPE: ICD-10-CM

## 2022-10-11 DIAGNOSIS — M25.532 LEFT WRIST PAIN: Primary | ICD-10-CM

## 2022-10-11 DIAGNOSIS — S51.012A SKIN TEAR OF LEFT ELBOW WITHOUT COMPLICATION, INITIAL ENCOUNTER: ICD-10-CM

## 2022-10-11 LAB
ALBUMIN SERPL-MCNC: 4.3 G/DL (ref 3.5–5.2)
ALBUMIN/GLOB SERPL: 1.3 G/DL
ALP SERPL-CCNC: 135 U/L (ref 39–117)
ALT SERPL W P-5'-P-CCNC: 22 U/L (ref 1–41)
ANION GAP SERPL CALCULATED.3IONS-SCNC: 11 MMOL/L (ref 5–15)
AST SERPL-CCNC: 25 U/L (ref 1–40)
BASOPHILS # BLD AUTO: 0.04 10*3/MM3 (ref 0–0.2)
BASOPHILS NFR BLD AUTO: 0.4 % (ref 0–1.5)
BILIRUB SERPL-MCNC: 0.3 MG/DL (ref 0–1.2)
BUN SERPL-MCNC: 27 MG/DL (ref 8–23)
BUN/CREAT SERPL: 20 (ref 7–25)
CALCIUM SPEC-SCNC: 10 MG/DL (ref 8.6–10.5)
CHLORIDE SERPL-SCNC: 102 MMOL/L (ref 98–107)
CO2 SERPL-SCNC: 25 MMOL/L (ref 22–29)
CREAT SERPL-MCNC: 1.35 MG/DL (ref 0.76–1.27)
DEPRECATED RDW RBC AUTO: 46.7 FL (ref 37–54)
EGFRCR SERPLBLD CKD-EPI 2021: 50.8 ML/MIN/1.73
EOSINOPHIL # BLD AUTO: 0.28 10*3/MM3 (ref 0–0.4)
EOSINOPHIL NFR BLD AUTO: 3.1 % (ref 0.3–6.2)
ERYTHROCYTE [DISTWIDTH] IN BLOOD BY AUTOMATED COUNT: 13.6 % (ref 12.3–15.4)
GLOBULIN UR ELPH-MCNC: 3.2 GM/DL
GLUCOSE SERPL-MCNC: 207 MG/DL (ref 65–99)
HCT VFR BLD AUTO: 43.7 % (ref 37.5–51)
HGB BLD-MCNC: 13.7 G/DL (ref 13–17.7)
IMM GRANULOCYTES # BLD AUTO: 0.01 10*3/MM3 (ref 0–0.05)
IMM GRANULOCYTES NFR BLD AUTO: 0.1 % (ref 0–0.5)
LYMPHOCYTES # BLD AUTO: 2.05 10*3/MM3 (ref 0.7–3.1)
LYMPHOCYTES NFR BLD AUTO: 23 % (ref 19.6–45.3)
MCH RBC QN AUTO: 29.1 PG (ref 26.6–33)
MCHC RBC AUTO-ENTMCNC: 31.4 G/DL (ref 31.5–35.7)
MCV RBC AUTO: 92.8 FL (ref 79–97)
MONOCYTES # BLD AUTO: 0.74 10*3/MM3 (ref 0.1–0.9)
MONOCYTES NFR BLD AUTO: 8.3 % (ref 5–12)
NEUTROPHILS NFR BLD AUTO: 5.79 10*3/MM3 (ref 1.7–7)
NEUTROPHILS NFR BLD AUTO: 65.1 % (ref 42.7–76)
PLATELET # BLD AUTO: 178 10*3/MM3 (ref 140–450)
PMV BLD AUTO: 10.7 FL (ref 6–12)
POTASSIUM SERPL-SCNC: 4.8 MMOL/L (ref 3.5–5.2)
PROT SERPL-MCNC: 7.5 G/DL (ref 6–8.5)
RBC # BLD AUTO: 4.71 10*6/MM3 (ref 4.14–5.8)
SODIUM SERPL-SCNC: 138 MMOL/L (ref 136–145)
TSH SERPL DL<=0.05 MIU/L-ACNC: 2.75 UIU/ML (ref 0.27–4.2)
WBC NRBC COR # BLD: 8.91 10*3/MM3 (ref 3.4–10.8)

## 2022-10-11 PROCEDURE — 99214 OFFICE O/P EST MOD 30 MIN: CPT

## 2022-10-11 PROCEDURE — 73070 X-RAY EXAM OF ELBOW: CPT

## 2022-10-11 PROCEDURE — 93000 ELECTROCARDIOGRAM COMPLETE: CPT | Performed by: FAMILY MEDICINE

## 2022-10-11 PROCEDURE — 70450 CT HEAD/BRAIN W/O DYE: CPT

## 2022-10-11 PROCEDURE — 73130 X-RAY EXAM OF HAND: CPT

## 2022-10-11 PROCEDURE — 36415 COLL VENOUS BLD VENIPUNCTURE: CPT

## 2022-10-11 PROCEDURE — 85025 COMPLETE CBC W/AUTO DIFF WBC: CPT

## 2022-10-11 PROCEDURE — 80053 COMPREHEN METABOLIC PANEL: CPT

## 2022-10-11 PROCEDURE — 73110 X-RAY EXAM OF WRIST: CPT

## 2022-10-11 PROCEDURE — 84443 ASSAY THYROID STIM HORMONE: CPT

## 2022-10-11 RX ORDER — CYCLOBENZAPRINE HCL 10 MG
10 TABLET ORAL 2 TIMES DAILY PRN
COMMUNITY
Start: 2022-08-24

## 2022-10-11 NOTE — PROGRESS NOTES
Procedure     ECG 12 Lead    Date/Time: 10/11/2022 9:33 AM  Performed by: Magi Morton MD  Authorized by: Naye Awad APRN   Comparison: compared with previous ECG from 7/19/2022  Rhythm: atrial flutter  Rate: tachycardic  BPM: 97  Conduction comments: 2nd degree AV block, 2:1  ST Segments: ST segments normal  T Waves: T waves normal  QRS axis: normal    Clinical impression: abnormal EKG  Comments: Mild tachycardia, atrial flutter in pt with known h/o atrial fib.  No ST elevation/depression or TWIs.  Will plan to forward EKG to his cardiologist Paulo Peterson.  Discussed with Naye MAYBERRY.  Thanks, LAVERNE

## 2022-10-11 NOTE — PROGRESS NOTES
"Subjective     Chief Complaint   Patient presents with   • Fall   • Hand Pain     Left hand     History of Present Illness  Patient is an 87-year-old male who presents today with complaints of left hand pain since Thursday.  He had a fall on Thursday.  He reports \"something caught my shoe and I fell on the porch.\"  He denies any dizziness, syncope, loss of consciousness or visual changes at that time.  He does have a scab to the left side of his forehead and notes that he did hit his head.  He denies headache or other neurological concerns.  He was able to get up easily from his fall.  He has been taking Tylenol for the left wrist and hand pain with no relief at this time.  He denies any numbness or tingling or loss of sensation to his hand.    He also notes a skin tear to his left elbow.  Denies pain or drainage from skin tear.  Denies bleeding from skin tear.    It is notable that patient is on Eliquis and aspirin.            Review of Systems   Constitutional: Negative for chills and fever.   Respiratory: Negative for shortness of breath and wheezing.    Cardiovascular: Negative for chest pain and palpitations.   Gastrointestinal: Positive for nausea (X1 yesterday). Negative for vomiting.   Musculoskeletal: Positive for arthralgias (Left wrist and hand). Negative for neck pain.   Skin: Positive for wound (Skin tear left elbow).   Neurological: Negative for dizziness, syncope, facial asymmetry, speech difficulty, weakness, light-headedness, numbness and headaches.       Current Outpatient Medications on File Prior to Visit   Medication Sig Dispense Refill   • amiodarone (PACERONE) 200 MG tablet Take 200 mg by mouth Daily.     • amLODIPine (NORVASC) 10 MG tablet Take 10 mg by mouth Daily.     • apixaban (ELIQUIS) 5 MG tablet tablet Take 5 mg by mouth 2 (Two) Times a Day.     • aspirin 81 MG EC tablet Take 81 mg by mouth Daily.     • cholecalciferol (VITAMIN D3) 25 MCG (1000 UT) tablet Take 1,000 Units by mouth 2 " "(Two) Times a Day.     • cyclobenzaprine (FLEXERIL) 10 MG tablet Take 1 tablet by mouth 2 (Two) Times a Day As Needed.     • empagliflozin (Jardiance) 25 MG tablet tablet Take 1 tablet by mouth Daily. PATIENT ASSISTANCE MEDICATION 90 tablet 3   • ferrous sulfate 325 (65 FE) MG tablet Take 325 mg by mouth 2 (Two) Times a Day.     • gabapentin (NEURONTIN) 100 MG capsule Take 1 capsule by mouth twice daily 60 capsule 0   • glipizide (GLUCOTROL) 5 MG tablet TAKE 1/2 (ONE-HALF) TABLET BY MOUTH TWICE DAILY BEFORE MEAL(S) 90 tablet 0   • Glucosamine 500 MG capsule Take 1,000 mg by mouth 2 (Two) Times a Day. OTC     • hydroCHLOROthiazide (HYDRODIURIL) 25 MG tablet Take 1/2 (one-half) tablet by mouth twice daily 90 tablet 0   • isosorbide mononitrate (IMDUR) 30 MG 24 hr tablet Take 30 mg by mouth Daily.     • latanoprost (XALATAN) 0.005 % ophthalmic solution INSTILL 1 DROP INTO BOTH EYES DAILY     • levothyroxine (Synthroid) 200 MCG tablet Take 1 tablet by mouth Daily. 90 tablet 1   • metFORMIN (GLUCOPHAGE) 500 MG tablet TAKE 1 TABLET BY MOUTH ONCE DAILY WITH BREAKFAST AND WITH SUPPER 180 tablet 0   • multivitamin (THERAGRAN) tablet tablet Take 1 tablet by mouth Daily.     • nitroglycerin (NITROSTAT) 0.4 MG SL tablet nitroglycerin 0.4 mg sublingual tablet, sublingual place 1 tablet (0.4 mg) by buccal route at the first sign of an attack; no more than 3 tabs are recommended within a 15 minute period.   Active     • pantoprazole (PROTONIX) 40 MG EC tablet Take 1 tablet by mouth Daily. 90 tablet 0   • sucralfate (CARAFATE) 1 g tablet Take 1 g by mouth 4 (Four) Times a Day.       No current facility-administered medications on file prior to visit.     /66   Pulse 110   Temp 98.1 °F (36.7 °C) (Oral)   Ht 172.7 cm (68\")   Wt 71.6 kg (157 lb 14.4 oz)   BMI 24.01 kg/m²     Objective     Physical Exam  Vitals and nursing note reviewed.   Constitutional:       General: He is not in acute distress.     Appearance: Normal " appearance. He is not ill-appearing.   HENT:      Head: Normocephalic and atraumatic.        Comments: Small, healing scab noted at identified area above.  No surrounding erythema, drainage or discharge.  Nontender to palpation.  No swelling noted.     Ears:      Comments: Hard of hearing  Eyes:      Extraocular Movements: Extraocular movements intact.      Conjunctiva/sclera: Conjunctivae normal.      Pupils: Pupils are equal, round, and reactive to light.   Cardiovascular:      Rate and Rhythm: Regular rhythm. Tachycardia present.      Heart sounds: No murmur heard.  Pulmonary:      Effort: Pulmonary effort is normal. No accessory muscle usage or respiratory distress.      Breath sounds: Normal breath sounds. No wheezing or rhonchi.   Abdominal:      Palpations: Abdomen is soft.   Musculoskeletal:      Left wrist: Tenderness (Generalized over entire wrist) and snuff box tenderness present. No deformity. Decreased range of motion (Due to pain). Normal pulse.      Left hand: Tenderness (Generalized) present. Normal strength. Normal sensation. Normal capillary refill.      Cervical back: Normal range of motion.      Right lower leg: No edema.      Left lower leg: No edema.   Skin:     General: Skin is warm and dry.      Comments: Skin tear to left elbow, see image below   Neurological:      General: No focal deficit present.      Mental Status: He is alert and oriented to person, place, and time.      Cranial Nerves: No facial asymmetry.      Sensory: Sensation is intact.      Coordination: Finger-Nose-Finger Test and Heel to Shin Test normal.      Gait: Gait normal.   Psychiatric:         Mood and Affect: Mood normal.         Behavior: Behavior normal.             Lab Results (last 24 hours)     Procedure Component Value Units Date/Time    CBC w AUTO Differential [[id number]]  (Abnormal) Collected: 10/11/22 1014    Specimen: Blood Updated: 10/11/22 1020    Narrative:      The following orders were created for panel  order CBC w AUTO Differential.  Procedure                               Abnormality         Status                     ---------                               -----------         ------                     CBC Auto Differential[[id number]]        Abnormal            Final result                 Please view results for these tests on the individual orders.    TSH [[id number]]  (Normal) Collected: 10/11/22 1014    Specimen: Blood Updated: 10/11/22 2337     TSH 2.750 uIU/mL     Comprehensive metabolic panel [[id number]]  (Abnormal) Collected: 10/11/22 1014    Specimen: Blood Updated: 10/11/22 2330     Glucose 207 mg/dL      BUN 27 mg/dL      Creatinine 1.35 mg/dL      Sodium 138 mmol/L      Potassium 4.8 mmol/L      Chloride 102 mmol/L      CO2 25.0 mmol/L      Calcium 10.0 mg/dL      Total Protein 7.5 g/dL      Albumin 4.30 g/dL      ALT (SGPT) 22 U/L      AST (SGOT) 25 U/L      Alkaline Phosphatase 135 U/L      Total Bilirubin 0.3 mg/dL      Globulin 3.2 gm/dL      A/G Ratio 1.3 g/dL      BUN/Creatinine Ratio 20.0     Anion Gap 11.0 mmol/L      eGFR 50.8 mL/min/1.73      Comment: National Kidney Foundation and American Society of Nephrology (ASN) Task Force recommended calculation based on the Chronic Kidney Disease Epidemiology Collaboration (CKD-EPI) equation refit without adjustment for race.       Narrative:      GFR Normal >60  Chronic Kidney Disease <60  Kidney Failure <15      CBC Auto Differential [[id number]]  (Abnormal) Collected: 10/11/22 1014    Specimen: Blood Updated: 10/11/22 1020     WBC 8.91 10*3/mm3      RBC 4.71 10*6/mm3      Hemoglobin 13.7 g/dL      Hematocrit 43.7 %      MCV 92.8 fL      MCH 29.1 pg      MCHC 31.4 g/dL      RDW 13.6 %      RDW-SD 46.7 fl      MPV 10.7 fL      Platelets 178 10*3/mm3      Neutrophil % 65.1 %      Lymphocyte % 23.0 %      Monocyte % 8.3 %      Eosinophil % 3.1 %      Basophil % 0.4 %      Immature Grans % 0.1 %      Neutrophils, Absolute 5.79 10*3/mm3      Lymphocytes,  Absolute 2.05 10*3/mm3      Monocytes, Absolute 0.74 10*3/mm3      Eosinophils, Absolute 0.28 10*3/mm3      Basophils, Absolute 0.04 10*3/mm3      Immature Grans, Absolute 0.01 10*3/mm3         XR Elbow 2 View Left (In Office)    Result Date: 10/11/2022  PROCEDURE: XR ELBOW 2 VW LEFT  COMPARISON: None  INDICATIONS: MEDIAL LEFT ELBOW PAIN S/P FALL X 5 DAYS AGO  FINDINGS:  There is osseous demineralization.  There is no positive fat pad.  There is no definite fracture.  There is no dislocation.        1. Osseous demineralization.      GUY SANCHEZ MD       Electronically Signed and Approved By: GUY SANCHEZ MD on 10/11/2022 at 11:08             XR Wrist 3+ View Left    Result Date: 10/11/2022  PROCEDURE: XR HAND 3+ VW LEFT, 10/11/2022, 10:45 XR WRIST 3+ VW LEFT, 10/11/2022, 10:46  COMPARISON: None  INDICATIONS: GENERAL LEFT HAND PAIN S/P FALL X 5 DAYS AGO  FINDINGS:  Left wrist:  There is osseous demineralization.  There is no fracture.  There is no radiopaque foreign body.  Left hand:  There is osseous demineralization.  There is no subluxation or dislocation.  There is an osseous body along the base of the distal phalanx of the 1st digit that could relate to ossicle in this area or possibly manifestation of degenerative change.        1. Osseous demineralization. 2. Small osseous body along the base of the distal phalanx of the 1st digit that may relate to ossicle or sequela of degenerative change.      GUY SANCHEZ MD       Electronically Signed and Approved By: GUY SANCHEZ MD on 10/11/2022 at 11:07             XR Hand 3+ View Left    Result Date: 10/11/2022  PROCEDURE: XR HAND 3+ VW LEFT, 10/11/2022, 10:45 XR WRIST 3+ VW LEFT, 10/11/2022, 10:46  COMPARISON: None  INDICATIONS: GENERAL LEFT HAND PAIN S/P FALL X 5 DAYS AGO  FINDINGS:  Left wrist:  There is osseous demineralization.  There is no fracture.  There is no radiopaque foreign body.  Left hand:  There is osseous demineralization.  There is no subluxation or  dislocation.  There is an osseous body along the base of the distal phalanx of the 1st digit that could relate to ossicle in this area or possibly manifestation of degenerative change.        1. Osseous demineralization. 2. Small osseous body along the base of the distal phalanx of the 1st digit that may relate to ossicle or sequela of degenerative change.      GUY SANCHEZ MD       Electronically Signed and Approved By: GUY SANCHEZ MD on 10/11/2022 at 11:07             CT Head Without Contrast    Result Date: 10/11/2022  PROCEDURE: CT HEAD WO CONTRAST  COMPARISON:  04/15/2019 INDICATIONS: Head trauma, minor (Age >= 65y)  PROTOCOL:   Standard imaging protocol performed    RADIATION:   DLP: 953.9 mGy*cm   MA and/or KV was adjusted to minimize radiation dose.     TECHNIQUE: After obtaining the patient's consent, CT images were obtained without non-ionic intravenous contrast material.  FINDINGS:  No skull fracture.  No fluid in the visualized paranasal sinuses/middle ear cavities.  Intracranially, no hemorrhage, edema, or mass effect.  Generalized atrophy with chronic microvascular ischemic white matter changes.  No abnormal extra-axial fluid collections       No acute abnormality     GAMALIEL MARTINZE MD       Electronically Signed and Approved By: GAMALIEL MARTINEZ MD on 10/11/2022 at 11:14                Diagnoses and all orders for this visit:    1. Left wrist pain (Primary)  -     Cancel: XR Wrist 3+ View Left; Future  -     XR Wrist 3+ View Left; Future    2. Left hand pain  -     Cancel: XR Hand 3+ View Left; Future  -     XR Hand 3+ View Left; Future    3. Left elbow pain  -     Cancel: XR Elbow 2 View Left (In Office)  -     XR Elbow 2 View Left (In Office)    4. Skin tear of left elbow without complication, initial encounter  Comments:  Cleansed with normal saline, Steri-Strips applied and mupirocin with dressing applied.  To keep clean and dry, home health to follow.  Orders:  -     mupirocin (BACTROBAN) 2 %  ointment; Apply 1 application topically to the appropriate area as directed 2 (Two) Times a Day for 14 days. Apply to affected area to left elbow BID  Dispense: 28 g; Refill: 0    5. Fall, initial encounter  Comments:  Will obtain stat CT of head given that patient is on Eliquis and aspirin  Orders:  -     CT Head Without Contrast; Future  -     Ambulatory Referral to Home Health    6. Tachycardia  Comments:  Will obtain EKG in office and checking labs  Orders:  -     ECG 12 Lead  -     CBC w AUTO Differential; Future  -     TSH; Future  -     Comprehensive metabolic panel; Future    7. Atrial flutter, unspecified type (HCC)  Comments:  Per EKG, discussed with on-call physician.  EKG to be sent to patient's cardiologist.      Case was discussed with patients PCP as well as on call physician Dr. Morton. X-rays negative for fracture to wrist, hand or elbow at this time, CT head negative for acute process.  All results discussed with patient.  Patient placed in thumb spica splint.  To return in 2 weeks for repeat x-ray.  Home health ordered for safety assessment, PT OT, wound care for skin tear.  Patient was requesting stronger pain medication for his wrist which I discussed with his PCP.  At this time we do not recommend.  Discussed that he can use Tylenol on a scheduled basis, ice and splinting and see how his pain does over the next few days.    Extensively discussed with patient need for close monitoring of symptoms and to proceed to ER for any worsening symptoms including change in mental status, headache, visual changes, chest pain, shortness of breath. Patient voiced understanding of all instructions and had no further questions at this time.     Nursing staff to contact patient's daughter Sharon Davalos and notify her of findings from assessment today and encourage family to keep a close eye on patient due to recent fall.      Return in about 2 weeks (around 10/25/2022), or if symptoms worsen or fail to  improve.         FOR FULL DISCHARGE INSTRUCTIONS/COMMENTS/HANDOUTS please see the AVS

## 2022-10-12 ENCOUNTER — PATIENT OUTREACH (OUTPATIENT)
Dept: CASE MANAGEMENT | Facility: OTHER | Age: 87
End: 2022-10-12

## 2022-10-12 NOTE — OUTREACH NOTE
AMBULATORY CASE MANAGEMENT NOTE    Name and Relationship of Patient/Support Person: Darci Munson - Self    Darci was in the office yesterday due to a fall.   Labs,imaging and home health referral made.  Changed appointment with PCP for 2 weeks as directed by APRN.  Mailed information and informed daughter.  Faxed office note and EKG to cardiology.      Called patient to follow up with him.    Reached out to Katrin at [facility] to see if still supplying Jardiance.      MELY GAMBOA  Ambulatory Case Management    10/12/2022, 09:56 EDT

## 2022-10-20 ENCOUNTER — PATIENT OUTREACH (OUTPATIENT)
Dept: CASE MANAGEMENT | Facility: OTHER | Age: 87
End: 2022-10-20

## 2022-10-20 NOTE — OUTREACH NOTE
AMBULATORY CASE MANAGEMENT NOTE    Name and Relationship of Patient/Support Person: Arpit, Darci - Self    Naye Awad requested that we reach out to patient and daughter since home health cannot see him ( due to not being homebound) to check on his arm/skin tear.    Spoke with patients daughter, she states that she has changed his dressing with neosporin and bandage.  It is looking much better and healing.  Attempted to contact patient to see if he wanted to come in tomorrow to have looked at again before his appointment on Monday with PCP.  Left voice message.       MELY GAMBOA  Ambulatory Case Management    10/20/2022, 13:28 EDT     Patient returned call, he declined appointment sooner than Monday with Dr. Mayer, states arm healed well.

## 2022-10-24 ENCOUNTER — OFFICE VISIT (OUTPATIENT)
Dept: FAMILY MEDICINE CLINIC | Age: 87
End: 2022-10-24

## 2022-10-24 VITALS
BODY MASS INDEX: 23.85 KG/M2 | DIASTOLIC BLOOD PRESSURE: 77 MMHG | HEIGHT: 68 IN | WEIGHT: 157.38 LBS | SYSTOLIC BLOOD PRESSURE: 145 MMHG | HEART RATE: 110 BPM | OXYGEN SATURATION: 94 %

## 2022-10-24 DIAGNOSIS — S51.012D SKIN TEAR OF LEFT ELBOW WITHOUT COMPLICATION, SUBSEQUENT ENCOUNTER: ICD-10-CM

## 2022-10-24 DIAGNOSIS — E78.2 MIXED HYPERLIPIDEMIA: ICD-10-CM

## 2022-10-24 DIAGNOSIS — Z79.01 ANTICOAGULATED: ICD-10-CM

## 2022-10-24 DIAGNOSIS — I48.0 PAROXYSMAL ATRIAL FIBRILLATION: Primary | ICD-10-CM

## 2022-10-24 DIAGNOSIS — Z91.81 HISTORY OF FALL: ICD-10-CM

## 2022-10-24 DIAGNOSIS — E03.9 ACQUIRED HYPOTHYROIDISM: ICD-10-CM

## 2022-10-24 DIAGNOSIS — I10 PRIMARY HYPERTENSION: ICD-10-CM

## 2022-10-24 DIAGNOSIS — E11.22 TYPE 2 DIABETES MELLITUS WITH DIABETIC CHRONIC KIDNEY DISEASE, UNSPECIFIED CKD STAGE, UNSPECIFIED WHETHER LONG TERM INSULIN USE: ICD-10-CM

## 2022-10-24 PROBLEM — S51.012A SKIN TEAR OF LEFT ELBOW WITHOUT COMPLICATION: Status: ACTIVE | Noted: 2022-10-24

## 2022-10-24 PROCEDURE — 99214 OFFICE O/P EST MOD 30 MIN: CPT | Performed by: FAMILY MEDICINE

## 2022-10-24 NOTE — PROGRESS NOTES
Chief Complaint  Hypertension (3 month follow up); Hyperlipidemia; Hypothyroidism; Diabetes; Paroxysmal atrial fibrillation; Stage 3a chronic kidney disease; Iron deficiency anemia, unspecified iron deficiency anemia ; Tubulovillous adenoma; Adenomatous polyp of transverse colon;  Bilateral hearing loss, unspecified hearing loss type; and Fall (Has sore on arm and shoulder from a fall x 3 weeks ago)    Natanael Munson presents to [facility]  History of Present Illness    Accompanied by his daughter Nya today    Was seen earlier this month following a fall where he sustained skin tear of the left elbow and bumped his head.  Did get a CT scan of the head which was unremarkable.  Also had a wrist injury and x-rays were unremarkable.  He is recovered pretty well from his fall.  His skin tears are pretty much healed up.  No fall since that time.  Says he still not quite as good as he used to be but in general he feels okay.  He uses a cane every once in a while.  We talked about the importance of fall precautions and planning ahead.    Reports that his blood sugars are still running a little bit higher than what he would like to see.  Says is not uncommon to get blood sugars in the 200 range.  Checks his blood sugar once or sometimes twice a day.          Current Outpatient Medications   Medication Sig Dispense Refill   • amiodarone (PACERONE) 200 MG tablet Take 200 mg by mouth Daily.     • amLODIPine (NORVASC) 10 MG tablet Take 10 mg by mouth Daily.     • apixaban (ELIQUIS) 5 MG tablet tablet Take 5 mg by mouth 2 (Two) Times a Day.     • aspirin 81 MG EC tablet Take 81 mg by mouth Daily.     • cholecalciferol (VITAMIN D3) 25 MCG (1000 UT) tablet Take 1,000 Units by mouth 2 (Two) Times a Day.     • cyclobenzaprine (FLEXERIL) 10 MG tablet Take 1 tablet by mouth 2 (Two) Times a Day As Needed.     • empagliflozin (Jardiance) 25 MG tablet tablet Take 1 tablet by mouth Daily.  "PATIENT ASSISTANCE MEDICATION 90 tablet 3   • ferrous sulfate 325 (65 FE) MG tablet Take 325 mg by mouth 2 (Two) Times a Day.     • gabapentin (NEURONTIN) 100 MG capsule Take 1 capsule by mouth twice daily 60 capsule 0   • glipizide (GLUCOTROL) 5 MG tablet TAKE 1/2 (ONE-HALF) TABLET BY MOUTH TWICE DAILY BEFORE MEAL(S) 90 tablet 0   • Glucosamine 500 MG capsule Take 1,000 mg by mouth 2 (Two) Times a Day. OTC     • hydroCHLOROthiazide (HYDRODIURIL) 25 MG tablet Take 1/2 (one-half) tablet by mouth twice daily 90 tablet 0   • isosorbide mononitrate (IMDUR) 30 MG 24 hr tablet Take 30 mg by mouth Daily.     • latanoprost (XALATAN) 0.005 % ophthalmic solution INSTILL 1 DROP INTO BOTH EYES DAILY     • levothyroxine (Synthroid) 200 MCG tablet Take 1 tablet by mouth Daily. 90 tablet 1   • metFORMIN (GLUCOPHAGE) 500 MG tablet TAKE 1 TABLET BY MOUTH ONCE DAILY WITH BREAKFAST AND WITH SUPPER 180 tablet 0   • multivitamin (THERAGRAN) tablet tablet Take 1 tablet by mouth Daily.     • mupirocin (BACTROBAN) 2 % ointment Apply 1 application topically to the appropriate area as directed 2 (Two) Times a Day for 14 days. Apply to affected area to left elbow BID 28 g 0   • nitroglycerin (NITROSTAT) 0.4 MG SL tablet nitroglycerin 0.4 mg sublingual tablet, sublingual place 1 tablet (0.4 mg) by buccal route at the first sign of an attack; no more than 3 tabs are recommended within a 15 minute period.   Active     • pantoprazole (PROTONIX) 40 MG EC tablet Take 1 tablet by mouth Daily. 90 tablet 0   • sucralfate (CARAFATE) 1 g tablet Take 1 g by mouth 4 (Four) Times a Day.       No current facility-administered medications for this visit.       Review of Systems         Objective   Vital Signs:   /77 (BP Location: Right arm, Patient Position: Sitting, Cuff Size: Adult)   Pulse 110   Ht 172.7 cm (67.99\")   Wt 71.4 kg (157 lb 6 oz)   SpO2 94%   BMI 23.93 kg/m²     Physical Exam   Pleasant gentleman in no acute distress  Eyes are " nonicteric  Heart is irregular with musical high-pitched systolic murmur 2/6  Lungs with good air movement no wheeze or crackle  The skin wounds left shoulder, left elbow are healing well  Neurologic without lateralizing focal deficit  Romberg is negative  Able to stand on the left leg with right leg raised for few seconds.  Not able to stand on the right leg with left leg raise.      Result Review :                     Assessment and Plan    Diagnoses and all orders for this visit:    1. Paroxysmal atrial fibrillation (HCC) (Primary)  Assessment & Plan:  He tolerates the A. fib well.  Continue on current regimen.  Follow-up with cardiology as directed.      2. Anticoagulated  Assessment & Plan:  Importance of fall precautions given his anticoagulation      3. Type 2 diabetes mellitus with diabetic chronic kidney disease, unspecified CKD stage, unspecified whether long term insulin use (HCC)  -     Hemoglobin A1c; Future  -     Comprehensive Metabolic Panel; Future  -     Microalbumin / Creatinine Urine Ratio - Urine, Clean Catch; Future    4. Primary hypertension  Assessment & Plan:  His blood pressure looks okay we will continue on current regimen    Orders:  -     Comprehensive Metabolic Panel; Future  -     CBC Auto Differential; Future    5. Mixed hyperlipidemia  Assessment & Plan:  Just had labs couple months ago.  Hold off on doing labs today but bring him back in about a month for labs and he will get his booster shot at that time as well    Orders:  -     Lipid Panel; Future  -     Comprehensive Metabolic Panel; Future    6. Acquired hypothyroidism  Assessment & Plan:  Stay on current regimen repeat lab work next month    Orders:  -     TSH; Future    7. Skin tear of left elbow without complication, subsequent encounter  Assessment & Plan:  He is really healed up quite well given his age and diabetes.      8. History of fall  Assessment & Plan:  Fall precautions discussed.  Encouraged him to use a cane when  he is on unlevel ground or if he is by himself        Follow Up   No follow-ups on file.  Patient was given instructions and counseling regarding his condition or for health maintenance advice. Please see specific information pulled into the AVS if appropriate.

## 2022-10-24 NOTE — ASSESSMENT & PLAN NOTE
He tolerates the A. fib well.  Continue on current regimen.  Follow-up with cardiology as directed.

## 2022-10-24 NOTE — ASSESSMENT & PLAN NOTE
Fall precautions discussed.  Encouraged him to use a cane when he is on unlevel ground or if he is by himself

## 2022-10-24 NOTE — ASSESSMENT & PLAN NOTE
Just had labs couple months ago.  Hold off on doing labs today but bring him back in about a month for labs and he will get his booster shot at that time as well

## 2022-11-01 DIAGNOSIS — E11.42 TYPE 2 DIABETES MELLITUS WITH DIABETIC POLYNEUROPATHY, WITHOUT LONG-TERM CURRENT USE OF INSULIN: ICD-10-CM

## 2022-11-04 ENCOUNTER — CLINICAL SUPPORT (OUTPATIENT)
Dept: FAMILY MEDICINE CLINIC | Age: 87
End: 2022-11-04

## 2022-11-04 DIAGNOSIS — Z79.899 HIGH RISK MEDICATION USE: Primary | ICD-10-CM

## 2022-11-04 PROCEDURE — 80305 DRUG TEST PRSMV DIR OPT OBS: CPT | Performed by: FAMILY MEDICINE

## 2022-11-04 RX ORDER — GABAPENTIN 100 MG/1
CAPSULE ORAL
Qty: 60 CAPSULE | Refills: 1 | Status: SHIPPED | OUTPATIENT
Start: 2022-11-04 | End: 2023-01-20

## 2022-12-01 DIAGNOSIS — E11.42 TYPE 2 DIABETES MELLITUS WITH POLYNEUROPATHY: ICD-10-CM

## 2022-12-05 ENCOUNTER — TELEPHONE (OUTPATIENT)
Dept: FAMILY MEDICINE CLINIC | Age: 87
End: 2022-12-05

## 2022-12-08 ENCOUNTER — IMMUNIZATION (OUTPATIENT)
Dept: FAMILY MEDICINE CLINIC | Age: 87
End: 2022-12-08

## 2022-12-08 DIAGNOSIS — Z23 NEED FOR VACCINATION: Primary | ICD-10-CM

## 2022-12-08 PROCEDURE — 91312 COVID-19 (PFIZER) BIVALENT BOOSTER 12+YRS: CPT | Performed by: FAMILY MEDICINE

## 2022-12-08 PROCEDURE — 0124A COVID-19 (PFIZER) BIVALENT BOOSTER 12+YRS: CPT | Performed by: FAMILY MEDICINE

## 2022-12-12 ENCOUNTER — LAB (OUTPATIENT)
Dept: LAB | Facility: HOSPITAL | Age: 87
End: 2022-12-12

## 2022-12-12 DIAGNOSIS — E78.2 MIXED HYPERLIPIDEMIA: ICD-10-CM

## 2022-12-12 DIAGNOSIS — E03.9 ACQUIRED HYPOTHYROIDISM: ICD-10-CM

## 2022-12-12 DIAGNOSIS — I10 PRIMARY HYPERTENSION: ICD-10-CM

## 2022-12-12 DIAGNOSIS — E11.22 TYPE 2 DIABETES MELLITUS WITH DIABETIC CHRONIC KIDNEY DISEASE, UNSPECIFIED CKD STAGE, UNSPECIFIED WHETHER LONG TERM INSULIN USE: ICD-10-CM

## 2022-12-12 LAB
ALBUMIN SERPL-MCNC: 4.3 G/DL (ref 3.5–5.2)
ALBUMIN UR-MCNC: 15.2 MG/DL
ALBUMIN/GLOB SERPL: 1.5 G/DL
ALP SERPL-CCNC: 141 U/L (ref 39–117)
ALT SERPL W P-5'-P-CCNC: 14 U/L (ref 1–41)
ANION GAP SERPL CALCULATED.3IONS-SCNC: 12 MMOL/L (ref 5–15)
AST SERPL-CCNC: 19 U/L (ref 1–40)
BASOPHILS # BLD AUTO: 0.04 10*3/MM3 (ref 0–0.2)
BASOPHILS NFR BLD AUTO: 0.8 % (ref 0–1.5)
BILIRUB SERPL-MCNC: 0.3 MG/DL (ref 0–1.2)
BUN SERPL-MCNC: 29 MG/DL (ref 8–23)
BUN/CREAT SERPL: 19.2 (ref 7–25)
CALCIUM SPEC-SCNC: 10 MG/DL (ref 8.6–10.5)
CHLORIDE SERPL-SCNC: 103 MMOL/L (ref 98–107)
CHOLEST SERPL-MCNC: 246 MG/DL (ref 0–200)
CO2 SERPL-SCNC: 26 MMOL/L (ref 22–29)
CREAT SERPL-MCNC: 1.51 MG/DL (ref 0.76–1.27)
CREAT UR-MCNC: 106.8 MG/DL
DEPRECATED RDW RBC AUTO: 44.4 FL (ref 37–54)
EGFRCR SERPLBLD CKD-EPI 2021: 44.4 ML/MIN/1.73
EOSINOPHIL # BLD AUTO: 0.27 10*3/MM3 (ref 0–0.4)
EOSINOPHIL NFR BLD AUTO: 5.1 % (ref 0.3–6.2)
ERYTHROCYTE [DISTWIDTH] IN BLOOD BY AUTOMATED COUNT: 13.1 % (ref 12.3–15.4)
GLOBULIN UR ELPH-MCNC: 2.9 GM/DL
GLUCOSE SERPL-MCNC: 212 MG/DL (ref 65–99)
HBA1C MFR BLD: 8.2 % (ref 4.8–5.6)
HCT VFR BLD AUTO: 45.1 % (ref 37.5–51)
HDLC SERPL-MCNC: 63 MG/DL (ref 40–60)
HGB BLD-MCNC: 14.5 G/DL (ref 13–17.7)
IMM GRANULOCYTES # BLD AUTO: 0.01 10*3/MM3 (ref 0–0.05)
IMM GRANULOCYTES NFR BLD AUTO: 0.2 % (ref 0–0.5)
LDLC SERPL CALC-MCNC: 127 MG/DL (ref 0–100)
LDLC/HDLC SERPL: 1.9 {RATIO}
LYMPHOCYTES # BLD AUTO: 2.17 10*3/MM3 (ref 0.7–3.1)
LYMPHOCYTES NFR BLD AUTO: 40.7 % (ref 19.6–45.3)
MCH RBC QN AUTO: 29.5 PG (ref 26.6–33)
MCHC RBC AUTO-ENTMCNC: 32.2 G/DL (ref 31.5–35.7)
MCV RBC AUTO: 91.9 FL (ref 79–97)
MICROALBUMIN/CREAT UR: 142.3 MG/G
MONOCYTES # BLD AUTO: 0.5 10*3/MM3 (ref 0.1–0.9)
MONOCYTES NFR BLD AUTO: 9.4 % (ref 5–12)
NEUTROPHILS NFR BLD AUTO: 2.34 10*3/MM3 (ref 1.7–7)
NEUTROPHILS NFR BLD AUTO: 43.8 % (ref 42.7–76)
PLATELET # BLD AUTO: 162 10*3/MM3 (ref 140–450)
PMV BLD AUTO: 10.9 FL (ref 6–12)
POTASSIUM SERPL-SCNC: 4.5 MMOL/L (ref 3.5–5.2)
PROT SERPL-MCNC: 7.2 G/DL (ref 6–8.5)
RBC # BLD AUTO: 4.91 10*6/MM3 (ref 4.14–5.8)
SODIUM SERPL-SCNC: 141 MMOL/L (ref 136–145)
TRIGL SERPL-MCNC: 317 MG/DL (ref 0–150)
TSH SERPL DL<=0.05 MIU/L-ACNC: 4.12 UIU/ML (ref 0.27–4.2)
VLDLC SERPL-MCNC: 56 MG/DL (ref 5–40)
WBC NRBC COR # BLD: 5.33 10*3/MM3 (ref 3.4–10.8)

## 2022-12-12 PROCEDURE — 85025 COMPLETE CBC W/AUTO DIFF WBC: CPT

## 2022-12-12 PROCEDURE — 84443 ASSAY THYROID STIM HORMONE: CPT

## 2022-12-12 PROCEDURE — 83036 HEMOGLOBIN GLYCOSYLATED A1C: CPT

## 2022-12-12 PROCEDURE — 80053 COMPREHEN METABOLIC PANEL: CPT

## 2022-12-12 PROCEDURE — 36415 COLL VENOUS BLD VENIPUNCTURE: CPT

## 2022-12-12 PROCEDURE — 80061 LIPID PANEL: CPT

## 2022-12-12 PROCEDURE — 82570 ASSAY OF URINE CREATININE: CPT

## 2022-12-12 PROCEDURE — 82043 UR ALBUMIN QUANTITATIVE: CPT

## 2022-12-12 NOTE — TELEPHONE ENCOUNTER
Spoke with pts sister today on 12/12/22 about TSH labs that were ordered pt stated they will have these completed before his apt in February./alysia

## 2022-12-15 DIAGNOSIS — E11.42 TYPE 2 DIABETES MELLITUS WITH POLYNEUROPATHY: ICD-10-CM

## 2022-12-22 DIAGNOSIS — I10 PRIMARY HYPERTENSION: ICD-10-CM

## 2022-12-22 RX ORDER — HYDROCHLOROTHIAZIDE 25 MG/1
TABLET ORAL
Qty: 90 TABLET | Refills: 0 | Status: SHIPPED | OUTPATIENT
Start: 2022-12-22

## 2023-01-05 ENCOUNTER — TELEPHONE (OUTPATIENT)
Dept: FAMILY MEDICINE CLINIC | Age: 88
End: 2023-01-05

## 2023-01-05 DIAGNOSIS — E03.9 ACQUIRED HYPOTHYROIDISM: ICD-10-CM

## 2023-01-05 RX ORDER — LEVOTHYROXINE SODIUM 0.2 MG/1
200 TABLET ORAL DAILY
Qty: 30 TABLET | Refills: 0 | Status: SHIPPED | OUTPATIENT
Start: 2023-01-05 | End: 2023-02-17 | Stop reason: SDUPTHER

## 2023-01-10 ENCOUNTER — OFFICE VISIT (OUTPATIENT)
Dept: FAMILY MEDICINE CLINIC | Age: 88
End: 2023-01-10
Payer: MEDICARE

## 2023-01-10 VITALS
BODY MASS INDEX: 24.28 KG/M2 | HEART RATE: 98 BPM | DIASTOLIC BLOOD PRESSURE: 84 MMHG | HEIGHT: 68 IN | OXYGEN SATURATION: 95 % | SYSTOLIC BLOOD PRESSURE: 154 MMHG | TEMPERATURE: 97.7 F | WEIGHT: 160.2 LBS

## 2023-01-10 DIAGNOSIS — E78.2 MIXED HYPERLIPIDEMIA: ICD-10-CM

## 2023-01-10 DIAGNOSIS — Z79.01 ANTICOAGULATED: ICD-10-CM

## 2023-01-10 DIAGNOSIS — E11.65 TYPE 2 DIABETES MELLITUS WITH HYPERGLYCEMIA, WITHOUT LONG-TERM CURRENT USE OF INSULIN: Primary | ICD-10-CM

## 2023-01-10 DIAGNOSIS — I48.0 PAROXYSMAL ATRIAL FIBRILLATION: ICD-10-CM

## 2023-01-10 DIAGNOSIS — I10 PRIMARY HYPERTENSION: ICD-10-CM

## 2023-01-10 DIAGNOSIS — N18.31 STAGE 3A CHRONIC KIDNEY DISEASE: ICD-10-CM

## 2023-01-10 DIAGNOSIS — E03.9 ACQUIRED HYPOTHYROIDISM: ICD-10-CM

## 2023-01-10 PROBLEM — M25.559 HIP PAIN: Status: RESOLVED | Noted: 2021-12-14 | Resolved: 2023-01-10

## 2023-01-10 PROBLEM — R19.5 OCCULT BLOOD IN STOOLS: Status: RESOLVED | Noted: 2022-01-04 | Resolved: 2023-01-10

## 2023-01-10 PROBLEM — Z91.89 AT RISK FOR NEGATIVE RESPONSE TO MEDICATION: Status: RESOLVED | Noted: 2018-11-27 | Resolved: 2023-01-10

## 2023-01-10 PROBLEM — K92.1 MELENA: Status: RESOLVED | Noted: 2021-11-05 | Resolved: 2023-01-10

## 2023-01-10 PROBLEM — R00.1 BRADYCARDIA: Status: RESOLVED | Noted: 2018-05-21 | Resolved: 2023-01-10

## 2023-01-10 PROCEDURE — 99214 OFFICE O/P EST MOD 30 MIN: CPT | Performed by: FAMILY MEDICINE

## 2023-01-10 RX ORDER — DULAGLUTIDE 0.75 MG/.5ML
0.75 INJECTION, SOLUTION SUBCUTANEOUS WEEKLY
Qty: 2 ML | Refills: 1 | Status: SHIPPED | OUTPATIENT
Start: 2023-01-10 | End: 2023-03-21

## 2023-01-10 RX ORDER — LISINOPRIL 10 MG/1
10 TABLET ORAL DAILY
Qty: 90 TABLET | Refills: 1 | Status: SHIPPED | OUTPATIENT
Start: 2023-01-10

## 2023-01-10 NOTE — PROGRESS NOTES
Chief Complaint  Hypothyroidism, Hypertension, Hyperlipidemia, and Diabetes (F/u discuss labs)    Subjective          Darci Munson presents to [facility]  History of Present Illness    BP up some.  He does not check his blood pressure at home.    Currently is taking metformin, glipizide, and Jardiance.  Pointed out to him that his most recent A1c December 12 was higher than we would like to see at 8.2.  Not shooting for tight blood sugar control but would like to have his A1c close to 7.5 if possible.  Discussed with him the possibility of adding GLP-1 agonist.  His son and daughter take shots for their DM.  He was not enthusiastic about giving himself a shot but did eventually agree.    In regards to his atrial fibrillation he is not aware of any palpitation issues.  He is not really sure what medications he takes as Nya takes care of his medication.    Current Outpatient Medications   Medication Sig Dispense Refill   • amiodarone (PACERONE) 200 MG tablet Take 200 mg by mouth Daily.     • amLODIPine (NORVASC) 10 MG tablet Take 10 mg by mouth Daily.     • apixaban (ELIQUIS) 5 MG tablet tablet Take 5 mg by mouth 2 (Two) Times a Day.     • aspirin 81 MG EC tablet Take 81 mg by mouth Daily.     • cholecalciferol (VITAMIN D3) 25 MCG (1000 UT) tablet Take 1,000 Units by mouth 2 (Two) Times a Day.     • cyclobenzaprine (FLEXERIL) 10 MG tablet Take 1 tablet by mouth 2 (Two) Times a Day As Needed.     • Dulaglutide (Trulicity) 0.75 MG/0.5ML solution pen-injector Inject 0.75 mg under the skin into the appropriate area as directed 1 (One) Time Per Week. 2 mL 1   • empagliflozin (Jardiance) 25 MG tablet tablet Take 1 tablet by mouth Daily. PATIENT ASSISTANCE MEDICATION 90 tablet 3   • ferrous sulfate 325 (65 FE) MG tablet Take 325 mg by mouth 2 (Two) Times a Day.     • gabapentin (NEURONTIN) 100 MG capsule Take 1 capsule by mouth twice daily 60 capsule 1   • glipizide (GLUCOTROL) 5 MG tablet  "TAKE 1/2 (ONE-HALF) TABLET BY MOUTH TWICE DAILY BEFORE MEAL(S) 90 tablet 0   • Glucosamine 500 MG capsule Take 1,000 mg by mouth 2 (Two) Times a Day. OTC     • hydroCHLOROthiazide (HYDRODIURIL) 25 MG tablet Take 1/2 (one-half) tablet by mouth twice daily 90 tablet 0   • isosorbide mononitrate (IMDUR) 30 MG 24 hr tablet Take 30 mg by mouth Daily.     • latanoprost (XALATAN) 0.005 % ophthalmic solution INSTILL 1 DROP INTO BOTH EYES DAILY     • levothyroxine (Synthroid) 200 MCG tablet Take 1 tablet by mouth Daily. 30 tablet 0   • lisinopril (PRINIVIL,ZESTRIL) 10 MG tablet Take 1 tablet by mouth Daily. 90 tablet 1   • metFORMIN (GLUCOPHAGE) 500 MG tablet TAKE 1 TABLET BY MOUTH ONCE DAILY WITH BREAKFAST AND WITH SUPPER 180 tablet 0   • multivitamin (THERAGRAN) tablet tablet Take 1 tablet by mouth Daily.     • nitroglycerin (NITROSTAT) 0.4 MG SL tablet nitroglycerin 0.4 mg sublingual tablet, sublingual place 1 tablet (0.4 mg) by buccal route at the first sign of an attack; no more than 3 tabs are recommended within a 15 minute period.   Active     • pantoprazole (PROTONIX) 40 MG EC tablet Take 1 tablet by mouth Daily. 90 tablet 0   • sucralfate (CARAFATE) 1 g tablet Take 1 g by mouth 4 (Four) Times a Day.       No current facility-administered medications for this visit.       Review of Systems         Objective   Vital Signs:   /84 (BP Location: Left arm, Patient Position: Sitting)   Pulse 98   Temp 97.7 °F (36.5 °C)   Ht 172.7 cm (67.99\")   Wt 72.7 kg (160 lb 3.2 oz)   SpO2 95%   BMI 24.36 kg/m²     Physical Exam  Constitutional:       Appearance: Normal appearance.   Neck:      Vascular: No carotid bruit.   Cardiovascular:      Rate and Rhythm: Normal rate. Rhythm irregular.      Heart sounds: Murmur heard.   Pulmonary:      Effort: No respiratory distress.      Breath sounds: No wheezing or rales.   Musculoskeletal:      Right lower leg: No edema.      Left lower leg: No edema.   Lymphadenopathy:      " Cervical: No cervical adenopathy.   Neurological:      General: No focal deficit present.      Mental Status: He is alert.   Psychiatric:         Attention and Perception: Attention normal.         Mood and Affect: Mood normal.         Speech: Speech normal.            Result Review :   The following data was reviewed by: Adrien Mayer MD on 01/10/2023:  TSH (12/12/2022 11:04)  Microalbumin / Creatinine Urine Ratio - Urine, Clean Catch (12/12/2022 11:04)  CBC Auto Differential (12/12/2022 11:04)  Comprehensive Metabolic Panel (12/12/2022 11:04)  Lipid Panel (12/12/2022 11:04)  Hemoglobin A1c (12/12/2022 11:04)                  Assessment and Plan    Diagnoses and all orders for this visit:    1. Type 2 diabetes mellitus with hyperglycemia, without long-term current use of insulin (HCC) (Primary)  Assessment & Plan:  We will add Trulicity to his regimen hopefully will be able to get blood sugars under better control.    Orders:  -     Dulaglutide (Trulicity) 0.75 MG/0.5ML solution pen-injector; Inject 0.75 mg under the skin into the appropriate area as directed 1 (One) Time Per Week.  Dispense: 2 mL; Refill: 1    2. Primary hypertension  Assessment & Plan:  When I had lisinopril get his blood pressure under better control and would also be good for renal protection    Orders:  -     lisinopril (PRINIVIL,ZESTRIL) 10 MG tablet; Take 1 tablet by mouth Daily.  Dispense: 90 tablet; Refill: 1    3. Mixed hyperlipidemia  Assessment & Plan:  Lipids could be better and probably needs to be on a statin.  However for now organ to focus on blood sugar and blood pressure control.  1 step at a time.      4. Acquired hypothyroidism  Assessment & Plan:  Last lab looked okay.  Check lab and predicate medication change based on result      5. Paroxysmal atrial fibrillation (HCC)  Assessment & Plan:  Rate is controlled.  He is anticoagulated.  Continue on current regimen and follow-up with cardiology as recommended      6. Stage  3a chronic kidney disease (HCC)  Assessment & Plan:  His renal function has remained rather stable.  We will add an ACE inhibitor.  Avoid nephrotoxins      7. Anticoagulated  Assessment & Plan:  Given his age and his creatinine I think he can probably decrease his Eliquis to 2.5 mg but defer that decision to cardiology.  Asked him to raise that question at his next follow-up visit with cardiology        Follow Up   No follow-ups on file.  Patient was given instructions and counseling regarding his condition or for health maintenance advice. Please see specific information pulled into the AVS if appropriate.

## 2023-01-10 NOTE — ASSESSMENT & PLAN NOTE
Given his age and his creatinine I think he can probably decrease his Eliquis to 2.5 mg but defer that decision to cardiology.  Asked him to raise that question at his next follow-up visit with cardiology   3

## 2023-01-14 PROBLEM — E11.65 TYPE 2 DIABETES MELLITUS WITH HYPERGLYCEMIA, WITHOUT LONG-TERM CURRENT USE OF INSULIN: Status: ACTIVE | Noted: 2017-05-30

## 2023-01-14 NOTE — ASSESSMENT & PLAN NOTE
Rate is controlled.  He is anticoagulated.  Continue on current regimen and follow-up with cardiology as recommended

## 2023-01-14 NOTE — ASSESSMENT & PLAN NOTE
We will add Trulicity to his regimen hopefully will be able to get blood sugars under better control.

## 2023-01-14 NOTE — ASSESSMENT & PLAN NOTE
Lipids could be better and probably needs to be on a statin.  However for now organ to focus on blood sugar and blood pressure control.  1 step at a time.

## 2023-01-14 NOTE — ASSESSMENT & PLAN NOTE
When I had lisinopril get his blood pressure under better control and would also be good for renal protection

## 2023-01-19 DIAGNOSIS — E11.42 TYPE 2 DIABETES MELLITUS WITH DIABETIC POLYNEUROPATHY, WITHOUT LONG-TERM CURRENT USE OF INSULIN: ICD-10-CM

## 2023-01-20 RX ORDER — GABAPENTIN 100 MG/1
CAPSULE ORAL
Qty: 60 CAPSULE | Refills: 0 | Status: SHIPPED | OUTPATIENT
Start: 2023-01-20 | End: 2023-02-27

## 2023-02-17 ENCOUNTER — PATIENT OUTREACH (OUTPATIENT)
Dept: CASE MANAGEMENT | Facility: OTHER | Age: 88
End: 2023-02-17
Payer: MEDICARE

## 2023-02-17 ENCOUNTER — OFFICE VISIT (OUTPATIENT)
Dept: FAMILY MEDICINE CLINIC | Age: 88
End: 2023-02-17
Payer: MEDICARE

## 2023-02-17 ENCOUNTER — TELEPHONE (OUTPATIENT)
Dept: FAMILY MEDICINE CLINIC | Age: 88
End: 2023-02-17

## 2023-02-17 VITALS
HEIGHT: 68 IN | HEART RATE: 73 BPM | BODY MASS INDEX: 24.67 KG/M2 | SYSTOLIC BLOOD PRESSURE: 139 MMHG | DIASTOLIC BLOOD PRESSURE: 63 MMHG | OXYGEN SATURATION: 92 % | TEMPERATURE: 98 F | WEIGHT: 162.8 LBS

## 2023-02-17 DIAGNOSIS — I48.0 PAROXYSMAL ATRIAL FIBRILLATION: ICD-10-CM

## 2023-02-17 DIAGNOSIS — E03.9 ACQUIRED HYPOTHYROIDISM: ICD-10-CM

## 2023-02-17 DIAGNOSIS — K29.70 GASTRITIS, PRESENCE OF BLEEDING UNSPECIFIED, UNSPECIFIED CHRONICITY, UNSPECIFIED GASTRITIS TYPE: ICD-10-CM

## 2023-02-17 DIAGNOSIS — Z00.00 MEDICARE ANNUAL WELLNESS VISIT, SUBSEQUENT: Primary | ICD-10-CM

## 2023-02-17 DIAGNOSIS — E78.2 MIXED HYPERLIPIDEMIA: ICD-10-CM

## 2023-02-17 DIAGNOSIS — E11.65 TYPE 2 DIABETES MELLITUS WITH HYPERGLYCEMIA, WITHOUT LONG-TERM CURRENT USE OF INSULIN: ICD-10-CM

## 2023-02-17 DIAGNOSIS — I10 PRIMARY HYPERTENSION: ICD-10-CM

## 2023-02-17 DIAGNOSIS — K31.84 GASTROPARESIS: ICD-10-CM

## 2023-02-17 PROBLEM — L97.529 ULCER OF LEFT FOOT: Status: ACTIVE | Noted: 2023-02-17

## 2023-02-17 PROCEDURE — 99214 OFFICE O/P EST MOD 30 MIN: CPT | Performed by: FAMILY MEDICINE

## 2023-02-17 PROCEDURE — 1170F FXNL STATUS ASSESSED: CPT | Performed by: FAMILY MEDICINE

## 2023-02-17 PROCEDURE — 1159F MED LIST DOCD IN RCRD: CPT | Performed by: FAMILY MEDICINE

## 2023-02-17 PROCEDURE — G0439 PPPS, SUBSEQ VISIT: HCPCS | Performed by: FAMILY MEDICINE

## 2023-02-17 RX ORDER — CEPHALEXIN 500 MG/1
1 CAPSULE ORAL EVERY 12 HOURS SCHEDULED
COMMUNITY
Start: 2022-12-05 | End: 2023-02-17

## 2023-02-17 RX ORDER — PANTOPRAZOLE SODIUM 40 MG/1
40 TABLET, DELAYED RELEASE ORAL DAILY
Qty: 90 TABLET | Refills: 0 | Status: SHIPPED | OUTPATIENT
Start: 2023-02-17

## 2023-02-17 RX ORDER — POLYMYXIN B SULFATE AND TRIMETHOPRIM 1; 10000 MG/ML; [USP'U]/ML
SOLUTION OPHTHALMIC
COMMUNITY
Start: 2022-12-05

## 2023-02-17 RX ORDER — LEVOTHYROXINE SODIUM 0.2 MG/1
200 TABLET ORAL DAILY
Qty: 90 TABLET | Refills: 0 | Status: SHIPPED | OUTPATIENT
Start: 2023-02-17

## 2023-02-17 NOTE — PROGRESS NOTES
The ABCs of the Annual Wellness Visit  Subsequent Medicare Wellness Visit    Subjective    Darci Munson is a 87 y.o. male who presents for a Subsequent Medicare Wellness Visit.    The following portions of the patient's history were reviewed and   updated as appropriate: allergies, current medications, past family history, past medical history, past social history, past surgical history and problem list.    Compared to one year ago, the patient feels his physical   health is the same.    Compared to one year ago, the patient feels his mental   health is the same.    Recent Hospitalizations:  He was not admitted to the hospital during the last year.       Current Medical Providers:  Patient Care Team:  Adrien Mayer MD as PCP - General (Family Medicine)  Mamta Maravilla, ROCKY as Ambulatory  (Population Health)  Cristal Montiel APRN as Nurse Practitioner (Urology)  Paulo Peterson APRN as Nurse Practitioner (Cardiology)  Jamal Rodriguez OD as Consulting Physician (Optometry)  Ruma Alegre MD as Consulting Physician (Dermatology)  Cleveland Atkins DO as Consulting Physician (Orthopedic Surgery)  Nikita Thakur MD as Consulting Physician (General Surgery)  Jennifer Mann MD as Consulting Physician (Gastroenterology)    Outpatient Medications Prior to Visit   Medication Sig Dispense Refill   • amiodarone (PACERONE) 200 MG tablet Take 200 mg by mouth Daily.     • amLODIPine (NORVASC) 10 MG tablet Take 10 mg by mouth Daily.     • apixaban (ELIQUIS) 5 MG tablet tablet Take 5 mg by mouth 2 (Two) Times a Day.     • cholecalciferol (VITAMIN D3) 25 MCG (1000 UT) tablet Take 1,000 Units by mouth 2 (Two) Times a Day.     • cyclobenzaprine (FLEXERIL) 10 MG tablet Take 1 tablet by mouth 2 (Two) Times a Day As Needed.     • Dulaglutide (Trulicity) 0.75 MG/0.5ML solution pen-injector Inject 0.75 mg under the skin into the appropriate area as directed 1 (One) Time Per Week. 2 mL 1   •  empagliflozin (Jardiance) 25 MG tablet tablet Take 1 tablet by mouth Daily. PATIENT ASSISTANCE MEDICATION 90 tablet 3   • ferrous sulfate 325 (65 FE) MG tablet Take 325 mg by mouth 2 (Two) Times a Day.     • gabapentin (NEURONTIN) 100 MG capsule Take 1 capsule by mouth twice daily 60 capsule 0   • glipizide (GLUCOTROL) 5 MG tablet TAKE 1/2 (ONE-HALF) TABLET BY MOUTH TWICE DAILY BEFORE MEAL(S) 90 tablet 0   • Glucosamine 500 MG capsule Take 1,000 mg by mouth 2 (Two) Times a Day. OTC     • hydroCHLOROthiazide (HYDRODIURIL) 25 MG tablet Take 1/2 (one-half) tablet by mouth twice daily 90 tablet 0   • isosorbide mononitrate (IMDUR) 30 MG 24 hr tablet Take 30 mg by mouth Daily.     • latanoprost (XALATAN) 0.005 % ophthalmic solution INSTILL 1 DROP INTO BOTH EYES DAILY     • lisinopril (PRINIVIL,ZESTRIL) 10 MG tablet Take 1 tablet by mouth Daily. 90 tablet 1   • metFORMIN (GLUCOPHAGE) 500 MG tablet TAKE 1 TABLET BY MOUTH ONCE DAILY WITH BREAKFAST AND WITH SUPPER 180 tablet 0   • multivitamin (THERAGRAN) tablet tablet Take 1 tablet by mouth Daily.     • nitroglycerin (NITROSTAT) 0.4 MG SL tablet nitroglycerin 0.4 mg sublingual tablet, sublingual place 1 tablet (0.4 mg) by buccal route at the first sign of an attack; no more than 3 tabs are recommended within a 15 minute period.   Active     • sucralfate (CARAFATE) 1 g tablet Take 1 g by mouth 4 (Four) Times a Day.     • trimethoprim-polymyxin b (POLYTRIM) 17735-7.1 UNIT/ML-% ophthalmic solution INSTILL 1 DROP INTO AFFECTED EYE(S) INTO EACH EYE THREE TIMES DAILY     • aspirin 81 MG EC tablet Take 81 mg by mouth Daily.     • cephalexin (KEFLEX) 500 MG capsule Take 1 capsule by mouth Every 12 (Twelve) Hours. (Patient not taking: Reported on 2/17/2023)     • levothyroxine (Synthroid) 200 MCG tablet Take 1 tablet by mouth Daily. 30 tablet 0   • pantoprazole (PROTONIX) 40 MG EC tablet Take 1 tablet by mouth Daily. 90 tablet 0     No facility-administered medications prior to  "visit.       No opioid medication identified on active medication list. I have reviewed chart for other potential  high risk medication/s and harmful drug interactions in the elderly.          Aspirin is on active medication list. Aspirin use is not indicated based on review of current medical condition/s. Risk of harm outweighs potential benefits. Patient instructed to discontinue this medication.  .      Patient Active Problem List   Diagnosis   • Type 2 diabetes mellitus with hyperglycemia, without long-term current use of insulin (HCC)   • Chronic kidney disease   • Hypothyroidism   • Hypertensive disorder   • Hyperlipidemia   • Paroxysmal atrial fibrillation (HCC)   • Hearing aid worn   • Coronary arteriosclerosis   • Gastroparesis   • Hearing loss   • Mitral valve regurgitation   • Peripheral neuropathy   • Prostate CA (HCC)   • Balanitis   • Primary insomnia   • Essential tremor   • Primary osteoarthritis of left knee   • Anticoagulated   • Vitamin D insufficiency   • Weight loss   • Iron deficiency anemia   • Medicare annual wellness visit, subsequent   • Thickened nails   • Skin cancer   • Adenomatous polyp of transverse colon   • Tubulovillous adenoma   • Skin tear of left elbow without complication   • History of fall   • Ulcer of left foot (HCC)   • Gastritis     Advance Care Planning  Advance Directive is on file.  ACP discussion was held with the patient during this visit. Patient has an advance directive in EMR which is still valid.      Objective    Vitals:    02/17/23 1435   BP: 139/63   BP Location: Right arm   Patient Position: Sitting   Pulse: 73   Temp: 98 °F (36.7 °C)   TempSrc: Oral   SpO2: 92%   Weight: 73.8 kg (162 lb 12.8 oz)   Height: 172.7 cm (67.99\")     Estimated body mass index is 24.76 kg/m² as calculated from the following:    Height as of this encounter: 172.7 cm (67.99\").    Weight as of this encounter: 73.8 kg (162 lb 12.8 oz).    BMI is within normal parameters. No other " follow-up for BMI required.      Does the patient have evidence of cognitive impairment? No    Lab Results   Component Value Date    TRIG 317 (H) 2022    HDL 63 (H) 2022     (H) 2022    VLDL 56 (H) 2022    HGBA1C 8.20 (H) 2022        HEALTH RISK ASSESSMENT    Smoking Status:  Social History     Tobacco Use   Smoking Status Former   • Packs/day: 1.00   • Years: 27.00   • Pack years: 27.00   • Types: Cigarettes   • Start date:    • Quit date:    • Years since quittin.1   Smokeless Tobacco Never     Alcohol Consumption:  Social History     Substance and Sexual Activity   Alcohol Use Never     Fall Risk Screen:    KATHRYN Fall Risk Assessment was completed, and patient is at HIGH risk for falls. Assessment completed on:2023    Depression Screening:  PHQ-2/PHQ-9 Depression Screening 2023   Little Interest or Pleasure in Doing Things 0-->not at all   Feeling Down, Depressed or Hopeless 0-->not at all   PHQ-9: Brief Depression Severity Measure Score 0       Health Habits and Functional and Cognitive Screening:  Functional & Cognitive Status 2023   Do you have difficulty preparing food and eating? No   Do you have difficulty bathing yourself, getting dressed or grooming yourself? No   Do you have difficulty using the toilet? No   Do you have difficulty moving around from place to place? No   Do you have trouble with steps or getting out of a bed or a chair? No   Current Diet Well Balanced Diet   Dental Exam Not up to date   Eye Exam Not up to date   Exercise (times per week) 0 times per week   Current Exercises Include Walking   Do you need help using the phone?  No   Are you deaf or do you have serious difficulty hearing?  Yes   Do you need help with transportation? No   Do you need help shopping? No   Do you need help preparing meals?  No   Do you need help with housework?  No   Do you need help with laundry? No   Do you need help taking your medications? No    Do you need help managing money? No   Do you ever drive or ride in a car without wearing a seat belt? No   Have you felt unusual stress, anger or loneliness in the last month? Yes   Who do you live with? Alone   If you need help, do you have trouble finding someone available to you? No   Have you been bothered in the last four weeks by sexual problems? No   Do you have difficulty concentrating, remembering or making decisions? Yes       Age-appropriate Screening Schedule:  Refer to the list below for future screening recommendations based on patient's age, sex and/or medical conditions. Orders for these recommended tests are listed in the plan section. The patient has been provided with a written plan.    Health Maintenance   Topic Date Due   • DIABETIC EYE EXAM  10/26/2022   • ZOSTER VACCINE (1 of 2) 04/04/2023 (Originally 6/29/1985)   • HEMOGLOBIN A1C  06/12/2023   • LIPID PANEL  12/12/2023   • URINE MICROALBUMIN  12/12/2023   • TDAP/TD VACCINES (2 - Tdap) 01/14/2030   • INFLUENZA VACCINE  Completed                CMS Preventative Services Quick Reference  Risk Factors Identified During Encounter  Hearing Problem: he wears hearing aids  The above risks/problems have been discussed with the patient.  Pertinent information has been shared with the patient in the After Visit Summary.  An After Visit Summary and PPPS were made available to the patient.    Follow Up:   Next Medicare Wellness visit to be scheduled in 1 year.       Additional E&M Note during same encounter follows:  Patient has multiple medical problems which are significant and separately identifiable that require additional work above and beyond the Medicare Wellness Visit.      Chief Complaint  Medicare Wellness-subsequent    Subjective        HPI  Darci Munson is also being seen today for other health issues as noted below    At his last visit he was doing initial prescription for Trulicity 0.75 mg weekly.  He says that his medication is having a  "little bit of effect on his stomach.  Feels a little bit of nausea some diarrhea.  He is also quite concerned that he is not can be able to afford the medication.  Has noted that the medication is helping his blood sugar this morning his blood sugar was 129    We also added lisinopril 10 mg daily to see if we get better control on his blood pressure.  He is tolerating the medication okay and blood pressure looks acceptable today    He does have atrial fibrillation and is anticoagulated on Eliquis.  He is over 80 at he has had creatinines greater than 1.5.  Therefore I think he needs to be on a lower dose of Eliquis.       Objective   Vital Signs:  /63 (BP Location: Right arm, Patient Position: Sitting)   Pulse 73   Temp 98 °F (36.7 °C) (Oral)   Ht 172.7 cm (67.99\")   Wt 73.8 kg (162 lb 12.8 oz)   SpO2 92%   BMI 24.76 kg/m²     Physical Exam   Left pinkie toe turned under w red spot/shallow ulcer and callus    Pleasant gentleman no acute distress  Color is good  Bilateral hearing aids but still hard of hearing  Tympanic membranes are clear  Oropharynx is clear  No cervical or supraclavicular adenopathy  Heart irregular with controlled response 2/6 systolic murmur  Lungs are bilaterally clear  Abdomen bowel sounds positive, soft, nontender, no mass, no organomegaly  Lower extremities with trace pedal edema  Neurologic without lateralizing gross focal deficit  Feet have good pulses dorsalis pedis posterior tibial bilaterally  Monofilament testing is okay bilaterally 5/5  Left pinky toe is turned under his fourth toe and he does have a red shallow ulcer helping on the lateral aspect.  There is also callus formation noted in the plantar MP joint region bilateral                     Assessment and Plan   Diagnoses and all orders for this visit:    1. Medicare annual wellness visit, subsequent (Primary)  Assessment & Plan:  We reviewed the preventive service recommendations and created an individualized " handout      2. Type 2 diabetes mellitus with hyperglycemia, without long-term current use of insulin (HCC)  Assessment & Plan:  Sounds like he has responded well to the Trulicity hopefully we can get him some financial assistance.  Continue on the current regimen for now.  He would benefit from diabetic shoes given the fact that his left pinky toe has deformity and early ulceration.      3. Primary hypertension  Assessment & Plan:  Blood pressure did improve on the new regimen.  Continue for now.      4. Mixed hyperlipidemia  Assessment & Plan:  His HDL still little elevated but his HDL looks great.  He is not interested in more medication.  Continue to follow.      5. Paroxysmal atrial fibrillation (HCC)  Assessment & Plan:  Currently asymptomatic.  Not aware of palpitations.  Rate is controlled.  He is anticoagulated.  I do think he needs to be on a lower dose of the Eliquis.  We will reach out to Paulo Peterson at the cardiology office      6. Acquired hypothyroidism  Comments:  Last lab look okay continue on current regimen  Assessment & Plan:  Last lab looked okay continue on current regimen    Orders:  -     levothyroxine (Synthroid) 200 MCG tablet; Take 1 tablet by mouth Daily.  Dispense: 90 tablet; Refill: 0    7. Gastritis, presence of bleeding unspecified, unspecified chronicity, unspecified gastritis type  Comments:  He may have gotten off the Protonix for a little while.  We will get that reinitiated  Orders:  -     pantoprazole (PROTONIX) 40 MG EC tablet; Take 1 tablet by mouth Daily.  Dispense: 90 tablet; Refill: 0    8. Gastroparesis  Comments:  If he continues to have GI symptoms consider promotility agent           Follow Up   No follow-ups on file.  Patient was given instructions and counseling regarding his condition or for health maintenance advice. Please see specific information pulled into the AVS if appropriate.

## 2023-02-17 NOTE — TELEPHONE ENCOUNTER
Mr. Munson says that the insurance company has paid for his Trulicity but they are not going to continue to do so.  Is not sure he can continue to afford the medication    Also I would like to discuss his Eliquis dosage with Paulo Peterson.  I think his dosage to be decreased to 2.5 mg twice daily.  See if we can get Paulo to concur and if not I would like to talk to him    mas

## 2023-02-17 NOTE — OUTREACH NOTE
AMBULATORY CASE MANAGEMENT NOTE    Name and Relationship of Patient/Support Person:  -     Darci came in today.  He is in need of assistance to help with his Trulicity - filled out patient assistance program paper and will have daughter provide me with financial information.  Filled out order for diabetic shoe order (PCP) .  Went through all meds and refill history, needs Prilosec.     Mamta R  Ambulatory Case Management    2/17/2023, 16:09 EST     AMBULATORY CASE MANAGEMENT NOTE    Name and Relationship of Patient/Support Person: Sharon Davalos - Emergency Contact    Spoke with daughter Nya, she is stating that he has run out of his stomach medication.  Informed sent Rx to [facility], they are stating that they have not received it. If she calls back and they cannot find, asked her to call me back.    Faxed office note for diabetic shoes.  Received information regarding annual income for injectable diabetes medication.     Education Documentation  No documentation found.        Mamta R  Ambulatory Case Management    2/20/2023, 13:39 EST

## 2023-02-19 NOTE — ASSESSMENT & PLAN NOTE
Sounds like he has responded well to the Trulicity hopefully we can get him some financial assistance.  Continue on the current regimen for now.  He would benefit from diabetic shoes given the fact that his left pinky toe has deformity and early ulceration.

## 2023-02-19 NOTE — ASSESSMENT & PLAN NOTE
Currently asymptomatic.  Not aware of palpitations.  Rate is controlled.  He is anticoagulated.  I do think he needs to be on a lower dose of the Eliquis.  We will reach out to Paulo Peterson at the cardiology office

## 2023-02-19 NOTE — ASSESSMENT & PLAN NOTE
His HDL still little elevated but his HDL looks great.  He is not interested in more medication.  Continue to follow.

## 2023-02-24 NOTE — TELEPHONE ENCOUNTER
[place] returned my call, states Paulo Peterson agreeable to decrease Eliquis to 2.5mg and sent in new Rx.  Since I have been asked to NOT change any Rx, I will pend the new dosage for you to sign off on order so we can have it listed correctly in the chart.

## 2023-02-27 DIAGNOSIS — E11.42 TYPE 2 DIABETES MELLITUS WITH DIABETIC POLYNEUROPATHY, WITHOUT LONG-TERM CURRENT USE OF INSULIN: ICD-10-CM

## 2023-02-27 RX ORDER — GABAPENTIN 100 MG/1
CAPSULE ORAL
Qty: 60 CAPSULE | Refills: 1 | Status: SHIPPED | OUTPATIENT
Start: 2023-02-27

## 2023-03-13 ENCOUNTER — PATIENT OUTREACH (OUTPATIENT)
Dept: CASE MANAGEMENT | Facility: OTHER | Age: 88
End: 2023-03-13
Payer: MEDICARE

## 2023-03-13 NOTE — OUTREACH NOTE
AMBULATORY CASE MANAGEMENT NOTE    Name and Relationship of Patient/Support Person: Sharon Davalos - Emergency Contact    Received a notification from Clarita Iraheta regarding the Trulicity.  He has been denied due to manufacture shortage and not accepting new applications.  Nya states that between his eliquis and trulicity, he will go into the donut hole quickly. She is to let me know when he pay the $600/oop expense or donut hole and I can reach out to Eliquis representative.      Education Documentation  No documentation found.        Mamta GAMBOA  Ambulatory Case Management    3/13/2023, 09:05 EDT

## 2023-03-14 DIAGNOSIS — E11.42 TYPE 2 DIABETES MELLITUS WITH POLYNEUROPATHY: ICD-10-CM

## 2023-03-14 RX ORDER — GLIPIZIDE 5 MG/1
TABLET ORAL
Qty: 90 TABLET | Refills: 0 | Status: SHIPPED | OUTPATIENT
Start: 2023-03-14

## 2023-03-20 DIAGNOSIS — E11.65 TYPE 2 DIABETES MELLITUS WITH HYPERGLYCEMIA, WITHOUT LONG-TERM CURRENT USE OF INSULIN: ICD-10-CM

## 2023-03-21 RX ORDER — DULAGLUTIDE 0.75 MG/.5ML
INJECTION, SOLUTION SUBCUTANEOUS
Qty: 4 ML | Refills: 0 | Status: SHIPPED | OUTPATIENT
Start: 2023-03-21

## 2023-04-10 ENCOUNTER — PATIENT OUTREACH (OUTPATIENT)
Dept: CASE MANAGEMENT | Facility: OTHER | Age: 88
End: 2023-04-10
Payer: MEDICARE

## 2023-04-10 NOTE — OUTREACH NOTE
AMBULATORY CASE MANAGEMENT NOTE    Name and Relationship of Patient/Support Person:  -     Chart review.  Requested copy of echo from cardiology.  Called to get latest office visit from Dr. Rodriguez. He is scheduled for next Wednesday, asked daughter Nya to help me help him remember.      She is reporting that he has diarrhea.  He had been prescribed Carafate at one time, but not taking.  Will ask PCP to prescribe.  Daughter thought the Trulicity was causing the diarrhea.     Education Documentation  No documentation found.        Mamta GAMBOA  Ambulatory Case Management    4/10/2023, 15:03 EDT

## 2023-04-13 DIAGNOSIS — E11.65 TYPE 2 DIABETES MELLITUS WITH HYPERGLYCEMIA, WITHOUT LONG-TERM CURRENT USE OF INSULIN: ICD-10-CM

## 2023-04-13 RX ORDER — DULAGLUTIDE 0.75 MG/.5ML
INJECTION, SOLUTION SUBCUTANEOUS
Qty: 4 ML | Refills: 0 | Status: SHIPPED | OUTPATIENT
Start: 2023-04-13

## 2023-04-13 NOTE — TELEPHONE ENCOUNTER
Rx Refill Note  Requested Prescriptions     Pending Prescriptions Disp Refills   • Trulicity 0.75 MG/0.5ML solution pen-injector [Pharmacy Med Name: Trulicity 0.75 MG/0.5ML Subcutaneous Solution Pen-injector] 4 mL 0     Sig: INJECT 0.75MG UNDER THE SKIN INTO THE APPROPRIATE AREA AS DIRECTED ONCE WEEKLY      Last office visit with prescribing clinician: 2/17/2023     Next office visit with prescribing clinician: 5/23/2023     {Rowena Smith LPN  04/13/23, 13:12 EDT

## 2023-05-19 DIAGNOSIS — E11.42 TYPE 2 DIABETES MELLITUS WITH DIABETIC POLYNEUROPATHY, WITHOUT LONG-TERM CURRENT USE OF INSULIN: ICD-10-CM

## 2023-05-19 RX ORDER — GABAPENTIN 100 MG/1
100 CAPSULE ORAL EVERY 12 HOURS SCHEDULED
Qty: 60 CAPSULE | Refills: 0 | Status: SHIPPED | OUTPATIENT
Start: 2023-05-19

## 2023-05-22 ENCOUNTER — PATIENT OUTREACH (OUTPATIENT)
Dept: CASE MANAGEMENT | Facility: OTHER | Age: 88
End: 2023-05-22
Payer: MEDICARE

## 2023-05-22 NOTE — OUTREACH NOTE
AMBULATORY CASE MANAGEMENT NOTE    Name and Relationship of Patient/Support Person: Sharon Davalos - Emergency Contact    Called to remind Nya/Darci of his upcoming appointment tomorrow.  She is going to provide an updated list of medications for the visit.  Chart reviewed - up to date.  Medications fill confirmed at pharmacy.     Education Documentation  No documentation found.        Mamta GAMBOA  Ambulatory Case Management    5/22/2023, 08:27 EDT

## 2023-05-23 ENCOUNTER — LAB (OUTPATIENT)
Dept: LAB | Facility: HOSPITAL | Age: 88
End: 2023-05-23
Payer: MEDICARE

## 2023-05-23 ENCOUNTER — OFFICE VISIT (OUTPATIENT)
Dept: FAMILY MEDICINE CLINIC | Age: 88
End: 2023-05-23
Payer: MEDICARE

## 2023-05-23 VITALS
WEIGHT: 160.4 LBS | DIASTOLIC BLOOD PRESSURE: 53 MMHG | SYSTOLIC BLOOD PRESSURE: 116 MMHG | TEMPERATURE: 97.4 F | BODY MASS INDEX: 24.31 KG/M2 | OXYGEN SATURATION: 98 % | HEIGHT: 68 IN | HEART RATE: 99 BPM

## 2023-05-23 DIAGNOSIS — K29.70 GASTRITIS, PRESENCE OF BLEEDING UNSPECIFIED, UNSPECIFIED CHRONICITY, UNSPECIFIED GASTRITIS TYPE: ICD-10-CM

## 2023-05-23 DIAGNOSIS — Z79.01 ANTICOAGULATED: ICD-10-CM

## 2023-05-23 DIAGNOSIS — E11.65 TYPE 2 DIABETES MELLITUS WITH HYPERGLYCEMIA, WITHOUT LONG-TERM CURRENT USE OF INSULIN: Primary | ICD-10-CM

## 2023-05-23 DIAGNOSIS — E78.2 MIXED HYPERLIPIDEMIA: ICD-10-CM

## 2023-05-23 DIAGNOSIS — R06.02 SHORTNESS OF BREATH: ICD-10-CM

## 2023-05-23 DIAGNOSIS — Z23 NEED FOR VACCINATION: ICD-10-CM

## 2023-05-23 DIAGNOSIS — E03.9 ACQUIRED HYPOTHYROIDISM: ICD-10-CM

## 2023-05-23 DIAGNOSIS — I10 PRIMARY HYPERTENSION: ICD-10-CM

## 2023-05-23 DIAGNOSIS — I48.0 PAROXYSMAL ATRIAL FIBRILLATION: ICD-10-CM

## 2023-05-23 LAB
ALBUMIN SERPL-MCNC: 4.4 G/DL (ref 3.5–5.2)
ALBUMIN/GLOB SERPL: 1.6 G/DL
ALP SERPL-CCNC: 119 U/L (ref 39–117)
ALT SERPL W P-5'-P-CCNC: 14 U/L (ref 1–41)
ANION GAP SERPL CALCULATED.3IONS-SCNC: 11.7 MMOL/L (ref 5–15)
AST SERPL-CCNC: 19 U/L (ref 1–40)
BASOPHILS # BLD AUTO: 0.04 10*3/MM3 (ref 0–0.2)
BASOPHILS NFR BLD AUTO: 0.6 % (ref 0–1.5)
BILIRUB SERPL-MCNC: 0.5 MG/DL (ref 0–1.2)
BUN SERPL-MCNC: 37 MG/DL (ref 8–23)
BUN/CREAT SERPL: 19.2 (ref 7–25)
CALCIUM SPEC-SCNC: 10.5 MG/DL (ref 8.6–10.5)
CHLORIDE SERPL-SCNC: 106 MMOL/L (ref 98–107)
CHOLEST SERPL-MCNC: 227 MG/DL (ref 0–200)
CO2 SERPL-SCNC: 23.3 MMOL/L (ref 22–29)
CREAT SERPL-MCNC: 1.93 MG/DL (ref 0.76–1.27)
DEPRECATED RDW RBC AUTO: 49.7 FL (ref 37–54)
EGFRCR SERPLBLD CKD-EPI 2021: 33.1 ML/MIN/1.73
EOSINOPHIL # BLD AUTO: 0.26 10*3/MM3 (ref 0–0.4)
EOSINOPHIL NFR BLD AUTO: 4 % (ref 0.3–6.2)
ERYTHROCYTE [DISTWIDTH] IN BLOOD BY AUTOMATED COUNT: 13.9 % (ref 12.3–15.4)
GLOBULIN UR ELPH-MCNC: 2.8 GM/DL
GLUCOSE SERPL-MCNC: 152 MG/DL (ref 65–99)
HBA1C MFR BLD: 7.2 % (ref 4.8–5.6)
HCT VFR BLD AUTO: 43 % (ref 37.5–51)
HDLC SERPL-MCNC: 63 MG/DL (ref 40–60)
HGB BLD-MCNC: 13.7 G/DL (ref 13–17.7)
IMM GRANULOCYTES # BLD AUTO: 0.01 10*3/MM3 (ref 0–0.05)
IMM GRANULOCYTES NFR BLD AUTO: 0.2 % (ref 0–0.5)
LDLC SERPL CALC-MCNC: 116 MG/DL (ref 0–100)
LDLC/HDLC SERPL: 1.71 {RATIO}
LYMPHOCYTES # BLD AUTO: 2.28 10*3/MM3 (ref 0.7–3.1)
LYMPHOCYTES NFR BLD AUTO: 35.2 % (ref 19.6–45.3)
MCH RBC QN AUTO: 30.7 PG (ref 26.6–33)
MCHC RBC AUTO-ENTMCNC: 31.9 G/DL (ref 31.5–35.7)
MCV RBC AUTO: 96.4 FL (ref 79–97)
MONOCYTES # BLD AUTO: 0.46 10*3/MM3 (ref 0.1–0.9)
MONOCYTES NFR BLD AUTO: 7.1 % (ref 5–12)
NEUTROPHILS NFR BLD AUTO: 3.42 10*3/MM3 (ref 1.7–7)
NEUTROPHILS NFR BLD AUTO: 52.9 % (ref 42.7–76)
NT-PROBNP SERPL-MCNC: 606 PG/ML (ref 0–1800)
PLATELET # BLD AUTO: 161 10*3/MM3 (ref 140–450)
PMV BLD AUTO: 11 FL (ref 6–12)
POTASSIUM SERPL-SCNC: 5.7 MMOL/L (ref 3.5–5.2)
PROT SERPL-MCNC: 7.2 G/DL (ref 6–8.5)
RBC # BLD AUTO: 4.46 10*6/MM3 (ref 4.14–5.8)
SODIUM SERPL-SCNC: 141 MMOL/L (ref 136–145)
TRIGL SERPL-MCNC: 282 MG/DL (ref 0–150)
TSH SERPL DL<=0.05 MIU/L-ACNC: 3.15 UIU/ML (ref 0.27–4.2)
VLDLC SERPL-MCNC: 48 MG/DL (ref 5–40)
WBC NRBC COR # BLD: 6.47 10*3/MM3 (ref 3.4–10.8)

## 2023-05-23 PROCEDURE — 83880 ASSAY OF NATRIURETIC PEPTIDE: CPT | Performed by: FAMILY MEDICINE

## 2023-05-23 PROCEDURE — 83036 HEMOGLOBIN GLYCOSYLATED A1C: CPT | Performed by: FAMILY MEDICINE

## 2023-05-23 PROCEDURE — 80061 LIPID PANEL: CPT | Performed by: FAMILY MEDICINE

## 2023-05-23 PROCEDURE — 84443 ASSAY THYROID STIM HORMONE: CPT | Performed by: FAMILY MEDICINE

## 2023-05-23 PROCEDURE — 80053 COMPREHEN METABOLIC PANEL: CPT | Performed by: FAMILY MEDICINE

## 2023-05-23 PROCEDURE — 85025 COMPLETE CBC W/AUTO DIFF WBC: CPT | Performed by: FAMILY MEDICINE

## 2023-05-23 PROCEDURE — 36415 COLL VENOUS BLD VENIPUNCTURE: CPT | Performed by: FAMILY MEDICINE

## 2023-05-23 NOTE — ASSESSMENT & PLAN NOTE
We will check labs and then decide on medication changes.  If hemoglobin A1c is still elevated might add Actos or consider long-acting insulin now that Medicare covers that better.

## 2023-05-23 NOTE — PROGRESS NOTES
Chief Complaint  Diabetes (3 month follow up ), Hypertension, and Hyperlipidemia    Subjective          Darci Munson presents to [facility]  History of Present Illness    Mr. Munson for the most part says he is doing pretty well.  He left his medication list that Nay had put together at home so there was a little bit of confusion about this medication.  Jennifer, his nurse navigator, was able to call Nya and clarify that he is still taking Jardiance, but when he went to  his Trulicity yesterday was can be $250 so he did not get the Trulicity.  He is wondering if there might be another more cost effective medication.  His hemoglobin A1c hovers around 8 so it would be better to get his sugar down a little bit.  He has an appointment with cardiology coming up relatively soon.  If his cardiac status is doing okay might consider adding Actos.  We will check a proBNP today.    Just saw his optometrist Dr. Rodriguez couple weeks ago for eye exam    He has hypertension blood pressure looks good today.  He is tolerating his medication.    Has atrial fibrillation but not aware of any palpitations.  He is treated with amiodarone and Eliquis.    Says that recently saw dermatology they did a biopsy on his right cheek he has not heard the results yet    Current Outpatient Medications   Medication Sig Dispense Refill   • amiodarone (PACERONE) 200 MG tablet Take 1 tablet by mouth Daily.     • amLODIPine (NORVASC) 10 MG tablet Take 1 tablet by mouth Daily.     • apixaban (ELIQUIS) 2.5 MG tablet tablet Take 1 tablet by mouth 2 (Two) Times a Day.     • cholecalciferol (VITAMIN D3) 25 MCG (1000 UT) tablet Take 1 tablet by mouth 2 (Two) Times a Day.     • cyclobenzaprine (FLEXERIL) 10 MG tablet Take 1 tablet by mouth 2 (Two) Times a Day As Needed.     • ferrous sulfate 325 (65 FE) MG tablet Take 1 tablet by mouth 2 (Two) Times a Day.     • gabapentin (NEURONTIN) 100 MG capsule Take 1 capsule by mouth  "twice daily 60 capsule 0   • glipizide (GLUCOTROL) 5 MG tablet TAKE 1/2 (ONE-HALF) TABLET BY MOUTH TWICE DAILY BEFORE MEAL(S) 90 tablet 0   • Glucosamine 500 MG capsule Take 2 capsules by mouth 2 (Two) Times a Day. OTC     • hydroCHLOROthiazide (HYDRODIURIL) 25 MG tablet Take 1/2 (one-half) tablet by mouth twice daily 90 tablet 0   • isosorbide mononitrate (IMDUR) 30 MG 24 hr tablet Take 1 tablet by mouth Daily.     • latanoprost (XALATAN) 0.005 % ophthalmic solution As Needed.     • levothyroxine (Synthroid) 200 MCG tablet Take 1 tablet by mouth Daily. 90 tablet 0   • lisinopril (PRINIVIL,ZESTRIL) 10 MG tablet Take 1 tablet by mouth Daily. 90 tablet 1   • metFORMIN (GLUCOPHAGE) 500 MG tablet TAKE 1 TABLET BY MOUTH ONCE DAILY WITH BREAKFAST AND WITH SUPPER 180 tablet 0   • multivitamin (THERAGRAN) tablet tablet Take 1 tablet by mouth Daily.     • nitroglycerin (NITROSTAT) 0.4 MG SL tablet nitroglycerin 0.4 mg sublingual tablet, sublingual place 1 tablet (0.4 mg) by buccal route at the first sign of an attack; no more than 3 tabs are recommended within a 15 minute period.   Active     • pantoprazole (PROTONIX) 40 MG EC tablet Take 1 tablet by mouth Daily. 90 tablet 0   • sucralfate (CARAFATE) 1 g tablet Take 1 tablet by mouth 4 times daily 360 tablet 0   • trimethoprim-polymyxin b (POLYTRIM) 59221-4.1 UNIT/ML-% ophthalmic solution INSTILL 1 DROP INTO AFFECTED EYE(S) INTO EACH EYE THREE TIMES DAILY     • empagliflozin (Jardiance) 25 MG tablet tablet Take 1 tablet by mouth Daily. PATIENT ASSISTANCE MEDICATION (Patient not taking: Reported on 5/23/2023) 90 tablet 3     No current facility-administered medications for this visit.       Review of Systems         Objective   Vital Signs:   /53 (BP Location: Right arm, Patient Position: Sitting, Cuff Size: Large Adult)   Pulse 99   Temp 97.4 °F (36.3 °C) (Oral)   Ht 172.7 cm (67.99\")   Wt 72.8 kg (160 lb 6.4 oz)   SpO2 98%   BMI 24.40 kg/m²     Physical Exam "   Very pleasant gentleman no acute distress  He is hard of hearing  Color is good  Hearing aids bilaterally  Has a scabbed area on the right cheek where he says recent biopsy was performed  No cervical or supraclavicular adenopathy  Heart regular rate and rhythm 2/6 systolic murmur  Lungs with fair air movement, no wheeze or crackles  Lower extremities without edema  Neurologic without gross lateralizing focal deficit      Result Review :                     Assessment and Plan    Diagnoses and all orders for this visit:    1. Type 2 diabetes mellitus with hyperglycemia, without long-term current use of insulin (Primary)  Assessment & Plan:  We will check labs and then decide on medication changes.  If hemoglobin A1c is still elevated might add Actos or consider long-acting insulin now that Medicare covers that better.    Orders:  -     Hemoglobin A1c    2. Primary hypertension  Assessment & Plan:  Blood pressure looks good continue on current regimen    Orders:  -     Comprehensive Metabolic Panel  -     CBC Auto Differential    3. Mixed hyperlipidemia  Assessment & Plan:  Check labs and predicate medication change based on result    Orders:  -     Lipid Panel  -     Comprehensive Metabolic Panel    4. Acquired hypothyroidism  Assessment & Plan:  Check lab predicate medication change based on result    Orders:  -     TSH Rfx On Abnormal To Free T4    5. Paroxysmal atrial fibrillation (HCC)  Assessment & Plan:  Rate is controlled, appropriately anticoagulated.  Follow-up with cardiology as recommended.  Has appointment in July 6. Anticoagulated  Assessment & Plan:  Has had no bleeding complications      7. Gastritis, presence of bleeding unspecified, unspecified chronicity, unspecified gastritis type  Comments:  Continue on the Protonix    8. Shortness of breath  -     BNP    9. Need for vaccination  -     COVID-19 (Pfizer) Bivalent 12+yrs      Follow Up   No follow-ups on file.  Patient was given instructions  and counseling regarding his condition or for health maintenance advice. Please see specific information pulled into the AVS if appropriate.

## 2023-05-23 NOTE — ASSESSMENT & PLAN NOTE
Rate is controlled, appropriately anticoagulated.  Follow-up with cardiology as recommended.  Has appointment in July

## 2023-05-24 ENCOUNTER — PATIENT OUTREACH (OUTPATIENT)
Dept: CASE MANAGEMENT | Facility: OTHER | Age: 88
End: 2023-05-24
Payer: MEDICARE

## 2023-05-24 DIAGNOSIS — I10 PRIMARY HYPERTENSION: Primary | ICD-10-CM

## 2023-05-24 NOTE — OUTREACH NOTE
AMBULATORY CASE MANAGEMENT NOTE    Name and Relationship of Patient/Support Person:  -     Spoke with daughter Nya regarding the lab results due to she is the one who plans/puts medications in planner for her dad and would be the one to remove the HCTZ.  Advised her about repeat labs in 2 weeks, outreach created for follow up.      Mamta GAMBOA  Ambulatory Case Management    5/24/2023, 09:31 EDT

## 2023-05-26 DIAGNOSIS — K29.70 GASTRITIS, PRESENCE OF BLEEDING UNSPECIFIED, UNSPECIFIED CHRONICITY, UNSPECIFIED GASTRITIS TYPE: ICD-10-CM

## 2023-05-26 RX ORDER — PANTOPRAZOLE SODIUM 40 MG/1
TABLET, DELAYED RELEASE ORAL
Qty: 90 TABLET | Refills: 0 | Status: SHIPPED | OUTPATIENT
Start: 2023-05-26

## 2023-06-07 ENCOUNTER — PATIENT OUTREACH (OUTPATIENT)
Dept: CASE MANAGEMENT | Facility: OTHER | Age: 88
End: 2023-06-07
Payer: MEDICARE

## 2023-06-07 NOTE — OUTREACH NOTE
AMBULATORY CASE MANAGEMENT NOTE    Name and Relationship of Patient/Support Person:  -     Called Darci and reminded him he is due for repeat labs. He reports that he will come in tomorrow. Outreach created for follow up    Education Documentation  No documentation found.        Mamta GAMBOA  Ambulatory Case Management    6/7/2023, 11:33 EDT

## 2023-06-08 ENCOUNTER — LAB (OUTPATIENT)
Dept: LAB | Facility: HOSPITAL | Age: 88
End: 2023-06-08
Payer: MEDICARE

## 2023-06-08 DIAGNOSIS — I10 PRIMARY HYPERTENSION: ICD-10-CM

## 2023-06-08 LAB
ANION GAP SERPL CALCULATED.3IONS-SCNC: 12 MMOL/L (ref 5–15)
BUN SERPL-MCNC: 31 MG/DL (ref 8–23)
BUN/CREAT SERPL: 20 (ref 7–25)
CALCIUM SPEC-SCNC: 9 MG/DL (ref 8.6–10.5)
CHLORIDE SERPL-SCNC: 106 MMOL/L (ref 98–107)
CO2 SERPL-SCNC: 21 MMOL/L (ref 22–29)
CREAT SERPL-MCNC: 1.55 MG/DL (ref 0.76–1.27)
EGFRCR SERPLBLD CKD-EPI 2021: 43.1 ML/MIN/1.73
GLUCOSE SERPL-MCNC: 300 MG/DL (ref 65–99)
POTASSIUM SERPL-SCNC: 5.5 MMOL/L (ref 3.5–5.2)
SODIUM SERPL-SCNC: 139 MMOL/L (ref 136–145)

## 2023-06-08 PROCEDURE — 80048 BASIC METABOLIC PNL TOTAL CA: CPT

## 2023-06-08 PROCEDURE — 36415 COLL VENOUS BLD VENIPUNCTURE: CPT

## 2023-06-09 ENCOUNTER — PATIENT OUTREACH (OUTPATIENT)
Dept: CASE MANAGEMENT | Facility: OTHER | Age: 88
End: 2023-06-09
Payer: MEDICARE

## 2023-06-09 NOTE — OUTREACH NOTE
AMBULATORY CASE MANAGEMENT NOTE    Name and Relationship of Patient/Support Person: Sharon Davalos - Emergency Contact    Following up with Darci / titi - discussed lab work and request is that he follow up with PCP.  Appointment available on Monday as that is Titi's day off.  Scheduled.     Mamta GAMBOA  Ambulatory Case Management    6/9/2023, 14:49 EDT

## 2023-06-12 ENCOUNTER — OFFICE VISIT (OUTPATIENT)
Dept: FAMILY MEDICINE CLINIC | Age: 88
End: 2023-06-12
Payer: MEDICARE

## 2023-06-12 VITALS
TEMPERATURE: 98 F | SYSTOLIC BLOOD PRESSURE: 123 MMHG | BODY MASS INDEX: 24.67 KG/M2 | WEIGHT: 162.8 LBS | HEIGHT: 68 IN | DIASTOLIC BLOOD PRESSURE: 69 MMHG | HEART RATE: 107 BPM | OXYGEN SATURATION: 97 %

## 2023-06-12 DIAGNOSIS — E11.65 TYPE 2 DIABETES MELLITUS WITH HYPERGLYCEMIA, WITHOUT LONG-TERM CURRENT USE OF INSULIN: ICD-10-CM

## 2023-06-12 DIAGNOSIS — I10 PRIMARY HYPERTENSION: ICD-10-CM

## 2023-06-12 DIAGNOSIS — I48.0 PAROXYSMAL ATRIAL FIBRILLATION: ICD-10-CM

## 2023-06-12 DIAGNOSIS — J02.9 SORE THROAT: Primary | ICD-10-CM

## 2023-06-12 LAB
EXPIRATION DATE: NORMAL
INTERNAL CONTROL: NORMAL
Lab: NORMAL
S PYO AG THROAT QL: NEGATIVE

## 2023-06-12 PROCEDURE — 99214 OFFICE O/P EST MOD 30 MIN: CPT | Performed by: FAMILY MEDICINE

## 2023-06-12 PROCEDURE — 87880 STREP A ASSAY W/OPTIC: CPT | Performed by: FAMILY MEDICINE

## 2023-06-12 PROCEDURE — 87081 CULTURE SCREEN ONLY: CPT | Performed by: FAMILY MEDICINE

## 2023-06-12 RX ORDER — INSULIN DETEMIR 100 [IU]/ML
5 INJECTION, SOLUTION SUBCUTANEOUS DAILY
Qty: 3 ML
Start: 2023-06-12

## 2023-06-12 NOTE — ASSESSMENT & PLAN NOTE
He has atrial fibrillation and is anticoagulated with Eliquis.  He will still be able to take the injections.

## 2023-06-12 NOTE — ASSESSMENT & PLAN NOTE
With him being off of Trulicity, blood sugars running high, and potassium being elevated this is opportune time to switch him over to insulin.  His daughter Nya uses Levemir and is familiar with administration.  We actually had some samples here today were able to give him and he was able to administer his first injection of 5 units into the abdomen.  Gave him a blood sugar log to keep track of his readings.  He has follow-up appointment here in August.  Notify us sooner if his sugars are either running too high or too low.  We will likely be able to come off of glipizide future visit

## 2023-06-12 NOTE — PROGRESS NOTES
Chief Complaint  Follow-up (Lab f/u. ) and Sore Throat    Subjective          Darci Munson presents to [facility]  History of Present Illness  Accompanied by his daughter, Nya.    He has been out of his Trulicity now for about 2 weeks.    Blood sugar this morning was close to 200.    Recent lab results have shown him to have some hyperkalemia in addition to a decline in renal function, and his hemoglobin A1c remains slightly elevated 7.2.    Also reporting a sore throat that started yesterday.  Says that it is already better today.  No fever.  No other respiratory type symptoms.    Current Outpatient Medications   Medication Sig Dispense Refill    amiodarone (PACERONE) 200 MG tablet Take 1 tablet by mouth Daily.      amLODIPine (NORVASC) 10 MG tablet Take 1 tablet by mouth Daily.      apixaban (ELIQUIS) 2.5 MG tablet tablet Take 1 tablet by mouth 2 (Two) Times a Day.      cholecalciferol (VITAMIN D3) 25 MCG (1000 UT) tablet Take 1 tablet by mouth 2 (Two) Times a Day.      cyclobenzaprine (FLEXERIL) 10 MG tablet Take 1 tablet by mouth 2 (Two) Times a Day As Needed.      empagliflozin (Jardiance) 25 MG tablet tablet Take 1 tablet by mouth Daily. PATIENT ASSISTANCE MEDICATION 90 tablet 3    ferrous sulfate 325 (65 FE) MG tablet Take 1 tablet by mouth 2 (Two) Times a Day.      gabapentin (NEURONTIN) 100 MG capsule Take 1 capsule by mouth twice daily 60 capsule 0    glipizide (GLUCOTROL) 5 MG tablet TAKE 1/2 (ONE-HALF) TABLET BY MOUTH TWICE DAILY BEFORE MEAL(S) 90 tablet 0    Glucosamine 500 MG capsule Take 2 capsules by mouth 2 (Two) Times a Day. OTC      isosorbide mononitrate (IMDUR) 30 MG 24 hr tablet Take 1 tablet by mouth Daily.      latanoprost (XALATAN) 0.005 % ophthalmic solution As Needed.      levothyroxine (Synthroid) 200 MCG tablet Take 1 tablet by mouth Daily. 90 tablet 0    lisinopril (PRINIVIL,ZESTRIL) 10 MG tablet Take 1 tablet by mouth Daily. 90 tablet 1    metFORMIN  "(GLUCOPHAGE) 500 MG tablet TAKE 1 TABLET BY MOUTH ONCE DAILY WITH BREAKFAST AND WITH SUPPER 180 tablet 0    multivitamin (THERAGRAN) tablet tablet Take 1 tablet by mouth Daily.      nitroglycerin (NITROSTAT) 0.4 MG SL tablet nitroglycerin 0.4 mg sublingual tablet, sublingual place 1 tablet (0.4 mg) by buccal route at the first sign of an attack; no more than 3 tabs are recommended within a 15 minute period.   Active      pantoprazole (PROTONIX) 40 MG EC tablet Take 1 tablet by mouth once daily 90 tablet 0    sucralfate (CARAFATE) 1 g tablet Take 1 tablet by mouth 4 times daily 360 tablet 0    trimethoprim-polymyxin b (POLYTRIM) 32057-4.1 UNIT/ML-% ophthalmic solution INSTILL 1 DROP INTO AFFECTED EYE(S) INTO EACH EYE THREE TIMES DAILY      insulin detemir (Levemir) 100 UNIT/ML injection Inject 5 Units under the skin into the appropriate area as directed Daily. 3 mL      No current facility-administered medications for this visit.       Review of Systems         Objective   Vital Signs:   /69 (BP Location: Right arm, Patient Position: Sitting)   Pulse 107   Temp 98 °F (36.7 °C) (Oral)   Ht 172.7 cm (67.99\")   Wt 73.8 kg (162 lb 12.8 oz)   SpO2 97%   BMI 24.76 kg/m²     Physical Exam   No acute distress  Wears hearing aids bilaterally, ear canals are clear, tympanic membranes are clear bilaterally  Oropharynx without exudate  No cervical or supraclavicular adenopathy  Heart is regular rate and rhythm 2/6 systolic murmur  Lungs are bilaterally clear      Result Review :                     Assessment and Plan    Diagnoses and all orders for this visit:    1. Sore throat (Primary)  Comments:  Rapid strep test is negative.  Will treat symptomatically.  Culture pending  Orders:  -     POCT rapid strep A  -     Beta Strep Culture, Throat - , Throat; Future  -     Beta Strep Culture, Throat - Swab, Throat    2. Type 2 diabetes mellitus with hyperglycemia, without long-term current use of insulin  Assessment & " Plan:  With him being off of Trulicity, blood sugars running high, and potassium being elevated this is opportune time to switch him over to insulin.  His daughter Nya uses Levemir and is familiar with administration.  We actually had some samples here today were able to give him and he was able to administer his first injection of 5 units into the abdomen.  Gave him a blood sugar log to keep track of his readings.  He has follow-up appointment here in August.  Notify us sooner if his sugars are either running too high or too low.  We will likely be able to come off of glipizide future visit      3. Primary hypertension  Assessment & Plan:  Blood pressure looks good continue on current regimen      4. Paroxysmal atrial fibrillation  Assessment & Plan:  He has atrial fibrillation and is anticoagulated with Eliquis.  He will still be able to take the injections.      Other orders  -     insulin detemir (Levemir) 100 UNIT/ML injection; Inject 5 Units under the skin into the appropriate area as directed Daily.  Dispense: 3 mL      I spent 30 minutes caring for Darci on this date of service. This time includes time spent by me in the following activities:preparing for the visit, reviewing tests, obtaining and/or reviewing a separately obtained history, performing a medically appropriate examination and/or evaluation , counseling and educating the patient/family/caregiver, ordering medications, tests, or procedures, and documenting information in the medical record  Follow Up   No follow-ups on file.  Patient was given instructions and counseling regarding his condition or for health maintenance advice. Please see specific information pulled into the AVS if appropriate.

## 2023-06-14 ENCOUNTER — PATIENT OUTREACH (OUTPATIENT)
Dept: CASE MANAGEMENT | Facility: OTHER | Age: 88
End: 2023-06-14
Payer: MEDICARE

## 2023-06-14 LAB — BACTERIA SPEC AEROBE CULT: NORMAL

## 2023-06-14 NOTE — OUTREACH NOTE
AMBULATORY CASE MANAGEMENT NOTE    Name and Relationship of Patient/Support Person: Darci Munson - Self    Darci came in.  Gave him a report of his recent labs. Informed that throat culture was negative.      He is self administering his insulin without difficulty.  He reports that his blood sugar this am was 130, but was high day before due to eating potato and corn for dinner.  This was only initiated a couple of days ago.     Mamta GAMBOA  Ambulatory Case Management    6/14/2023, 10:51 EDT

## 2023-07-25 ENCOUNTER — PATIENT OUTREACH (OUTPATIENT)
Dept: CASE MANAGEMENT | Facility: OTHER | Age: 88
End: 2023-07-25
Payer: MEDICARE

## 2023-07-31 DIAGNOSIS — E11.42 TYPE 2 DIABETES MELLITUS WITH DIABETIC POLYNEUROPATHY, WITHOUT LONG-TERM CURRENT USE OF INSULIN: ICD-10-CM

## 2023-07-31 RX ORDER — GABAPENTIN 100 MG/1
100 CAPSULE ORAL EVERY 12 HOURS SCHEDULED
Qty: 60 CAPSULE | Refills: 0 | Status: SHIPPED | OUTPATIENT
Start: 2023-07-31

## 2023-07-31 RX ORDER — SUCRALFATE 1 G/1
TABLET ORAL
Qty: 360 TABLET | Refills: 0 | Status: SHIPPED | OUTPATIENT
Start: 2023-07-31

## 2023-08-01 ENCOUNTER — PATIENT OUTREACH (OUTPATIENT)
Dept: CASE MANAGEMENT | Facility: OTHER | Age: 88
End: 2023-08-01
Payer: MEDICARE

## 2023-08-01 DIAGNOSIS — I25.10 CORONARY ARTERIOSCLEROSIS: Primary | ICD-10-CM

## 2023-08-08 ENCOUNTER — PATIENT OUTREACH (OUTPATIENT)
Dept: CASE MANAGEMENT | Facility: OTHER | Age: 88
End: 2023-08-08
Payer: MEDICARE

## 2023-08-08 RX ORDER — LISINOPRIL 5 MG/1
5 TABLET ORAL DAILY
Qty: 30 TABLET | Refills: 11 | COMMUNITY
Start: 2023-08-07 | End: 2024-08-06

## 2023-08-08 NOTE — OUTREACH NOTE
AMBULATORY CASE MANAGEMENT NOTE    Name and Relationship of Patient/Support Person:  -     Following up cardiology appointment.  Decreased lisinopril to 5mg due to hyperkalemia.  Dose adjusted in patient chart.     Education Documentation  No documentation found.        Mamta GAMBOA  Ambulatory Case Management    8/8/2023, 16:28 EDT

## 2023-08-21 ENCOUNTER — PATIENT OUTREACH (OUTPATIENT)
Dept: CASE MANAGEMENT | Facility: OTHER | Age: 88
End: 2023-08-21
Payer: MEDICARE

## 2023-08-21 NOTE — OUTREACH NOTE
AMBULATORY CASE MANAGEMENT NOTE    Name and Relationship of Patient/Support Person: Darci Munson - Self    Darci and his daughter came to the office to ask if provider would prescribe a sleeping pill for him.  Explained that any new medication the PCP likes to sit down to discuss the reasoning and pro/cons of meds.  He would need to make an appointment to be seen. Daughter is off work this week.  Explained there are openings if they want to schedule.  He actually missed an appointment last week.     Mamta GAMBOA  Ambulatory Case Management    8/21/2023, 13:04 EDT

## 2023-08-22 ENCOUNTER — LAB (OUTPATIENT)
Dept: LAB | Facility: HOSPITAL | Age: 88
End: 2023-08-22
Payer: MEDICARE

## 2023-08-22 ENCOUNTER — OFFICE VISIT (OUTPATIENT)
Dept: FAMILY MEDICINE CLINIC | Age: 88
End: 2023-08-22
Payer: MEDICARE

## 2023-08-22 VITALS
BODY MASS INDEX: 24.25 KG/M2 | WEIGHT: 160 LBS | HEART RATE: 97 BPM | DIASTOLIC BLOOD PRESSURE: 79 MMHG | SYSTOLIC BLOOD PRESSURE: 134 MMHG | TEMPERATURE: 98.2 F | HEIGHT: 68 IN

## 2023-08-22 DIAGNOSIS — E11.65 TYPE 2 DIABETES MELLITUS WITH HYPERGLYCEMIA, WITHOUT LONG-TERM CURRENT USE OF INSULIN: Primary | ICD-10-CM

## 2023-08-22 DIAGNOSIS — I10 PRIMARY HYPERTENSION: ICD-10-CM

## 2023-08-22 DIAGNOSIS — I48.0 PAROXYSMAL ATRIAL FIBRILLATION: ICD-10-CM

## 2023-08-22 DIAGNOSIS — F51.01 PRIMARY INSOMNIA: ICD-10-CM

## 2023-08-22 DIAGNOSIS — E55.9 VITAMIN D INSUFFICIENCY: ICD-10-CM

## 2023-08-22 DIAGNOSIS — E78.2 MIXED HYPERLIPIDEMIA: ICD-10-CM

## 2023-08-22 DIAGNOSIS — E03.9 ACQUIRED HYPOTHYROIDISM: ICD-10-CM

## 2023-08-22 PROBLEM — C44.90 SKIN CANCER: Status: RESOLVED | Noted: 2022-05-06 | Resolved: 2023-08-22

## 2023-08-22 PROBLEM — Z97.4 HEARING AID WORN: Status: RESOLVED | Noted: 2021-07-19 | Resolved: 2023-08-22

## 2023-08-22 PROBLEM — N48.1 BALANITIS: Status: RESOLVED | Noted: 2021-07-19 | Resolved: 2023-08-22

## 2023-08-22 PROBLEM — D12.3 ADENOMATOUS POLYP OF TRANSVERSE COLON: Status: RESOLVED | Noted: 2022-08-15 | Resolved: 2023-08-22

## 2023-08-22 PROBLEM — R06.02 SHORTNESS OF BREATH: Status: RESOLVED | Noted: 2023-05-23 | Resolved: 2023-08-22

## 2023-08-22 PROBLEM — R63.4 WEIGHT LOSS: Status: RESOLVED | Noted: 2022-01-04 | Resolved: 2023-08-22

## 2023-08-22 PROBLEM — G25.0 ESSENTIAL TREMOR: Status: RESOLVED | Noted: 2021-11-05 | Resolved: 2023-08-22

## 2023-08-22 PROBLEM — D36.9 TUBULOVILLOUS ADENOMA: Status: RESOLVED | Noted: 2022-08-15 | Resolved: 2023-08-22

## 2023-08-22 PROBLEM — D50.9 IRON DEFICIENCY ANEMIA: Status: RESOLVED | Noted: 2022-01-04 | Resolved: 2023-08-22

## 2023-08-22 PROBLEM — S51.012A SKIN TEAR OF LEFT ELBOW WITHOUT COMPLICATION: Status: RESOLVED | Noted: 2022-10-24 | Resolved: 2023-08-22

## 2023-08-22 PROBLEM — L60.2 THICKENED NAILS: Status: RESOLVED | Noted: 2022-02-09 | Resolved: 2023-08-22

## 2023-08-22 PROBLEM — K29.70 GASTRITIS: Status: RESOLVED | Noted: 2023-02-17 | Resolved: 2023-08-22

## 2023-08-22 PROBLEM — L97.529 ULCER OF LEFT FOOT: Status: RESOLVED | Noted: 2023-02-17 | Resolved: 2023-08-22

## 2023-08-22 PROBLEM — Z91.81 HISTORY OF FALL: Status: RESOLVED | Noted: 2022-10-24 | Resolved: 2023-08-22

## 2023-08-22 LAB
25(OH)D3 SERPL-MCNC: 46 NG/ML (ref 30–100)
ALBUMIN SERPL-MCNC: 4.7 G/DL (ref 3.5–5.2)
ALBUMIN/GLOB SERPL: 1.7 G/DL
ALP SERPL-CCNC: 136 U/L (ref 39–117)
ALT SERPL W P-5'-P-CCNC: 13 U/L (ref 1–41)
ANION GAP SERPL CALCULATED.3IONS-SCNC: 11.6 MMOL/L (ref 5–15)
AST SERPL-CCNC: 19 U/L (ref 1–40)
BASOPHILS # BLD AUTO: 0.04 10*3/MM3 (ref 0–0.2)
BASOPHILS NFR BLD AUTO: 0.6 % (ref 0–1.5)
BILIRUB SERPL-MCNC: 0.4 MG/DL (ref 0–1.2)
BUN SERPL-MCNC: 26 MG/DL (ref 8–23)
BUN/CREAT SERPL: 17.7 (ref 7–25)
CALCIUM SPEC-SCNC: 10.2 MG/DL (ref 8.6–10.5)
CHLORIDE SERPL-SCNC: 102 MMOL/L (ref 98–107)
CHOLEST SERPL-MCNC: 242 MG/DL (ref 0–200)
CO2 SERPL-SCNC: 24.4 MMOL/L (ref 22–29)
CREAT SERPL-MCNC: 1.47 MG/DL (ref 0.76–1.27)
DEPRECATED RDW RBC AUTO: 46.6 FL (ref 37–54)
EGFRCR SERPLBLD CKD-EPI 2021: 45.6 ML/MIN/1.73
EOSINOPHIL # BLD AUTO: 0.43 10*3/MM3 (ref 0–0.4)
EOSINOPHIL NFR BLD AUTO: 6.3 % (ref 0.3–6.2)
ERYTHROCYTE [DISTWIDTH] IN BLOOD BY AUTOMATED COUNT: 13.1 % (ref 12.3–15.4)
GLOBULIN UR ELPH-MCNC: 2.8 GM/DL
GLUCOSE SERPL-MCNC: 227 MG/DL (ref 65–99)
HBA1C MFR BLD: 7.4 % (ref 4.8–5.6)
HCT VFR BLD AUTO: 45.2 % (ref 37.5–51)
HDLC SERPL-MCNC: 67 MG/DL (ref 40–60)
HGB BLD-MCNC: 14.4 G/DL (ref 13–17.7)
IMM GRANULOCYTES # BLD AUTO: 0.02 10*3/MM3 (ref 0–0.05)
IMM GRANULOCYTES NFR BLD AUTO: 0.3 % (ref 0–0.5)
LDLC SERPL CALC-MCNC: 135 MG/DL (ref 0–100)
LDLC/HDLC SERPL: 1.93 {RATIO}
LYMPHOCYTES # BLD AUTO: 2.2 10*3/MM3 (ref 0.7–3.1)
LYMPHOCYTES NFR BLD AUTO: 32 % (ref 19.6–45.3)
MCH RBC QN AUTO: 30.3 PG (ref 26.6–33)
MCHC RBC AUTO-ENTMCNC: 31.9 G/DL (ref 31.5–35.7)
MCV RBC AUTO: 95.2 FL (ref 79–97)
MONOCYTES # BLD AUTO: 0.49 10*3/MM3 (ref 0.1–0.9)
MONOCYTES NFR BLD AUTO: 7.1 % (ref 5–12)
NEUTROPHILS NFR BLD AUTO: 3.69 10*3/MM3 (ref 1.7–7)
NEUTROPHILS NFR BLD AUTO: 53.7 % (ref 42.7–76)
PLATELET # BLD AUTO: 185 10*3/MM3 (ref 140–450)
PMV BLD AUTO: 11 FL (ref 6–12)
POTASSIUM SERPL-SCNC: 5.2 MMOL/L (ref 3.5–5.2)
PROT SERPL-MCNC: 7.5 G/DL (ref 6–8.5)
RBC # BLD AUTO: 4.75 10*6/MM3 (ref 4.14–5.8)
SODIUM SERPL-SCNC: 138 MMOL/L (ref 136–145)
TRIGL SERPL-MCNC: 229 MG/DL (ref 0–150)
TSH SERPL DL<=0.05 MIU/L-ACNC: 3.76 UIU/ML (ref 0.27–4.2)
VLDLC SERPL-MCNC: 40 MG/DL (ref 5–40)
WBC NRBC COR # BLD: 6.87 10*3/MM3 (ref 3.4–10.8)

## 2023-08-22 PROCEDURE — 82306 VITAMIN D 25 HYDROXY: CPT | Performed by: FAMILY MEDICINE

## 2023-08-22 PROCEDURE — 84443 ASSAY THYROID STIM HORMONE: CPT | Performed by: FAMILY MEDICINE

## 2023-08-22 PROCEDURE — 80061 LIPID PANEL: CPT | Performed by: FAMILY MEDICINE

## 2023-08-22 PROCEDURE — 80053 COMPREHEN METABOLIC PANEL: CPT | Performed by: FAMILY MEDICINE

## 2023-08-22 PROCEDURE — 85025 COMPLETE CBC W/AUTO DIFF WBC: CPT | Performed by: FAMILY MEDICINE

## 2023-08-22 PROCEDURE — 83036 HEMOGLOBIN GLYCOSYLATED A1C: CPT | Performed by: FAMILY MEDICINE

## 2023-08-22 PROCEDURE — 99214 OFFICE O/P EST MOD 30 MIN: CPT | Performed by: FAMILY MEDICINE

## 2023-08-22 PROCEDURE — 36415 COLL VENOUS BLD VENIPUNCTURE: CPT | Performed by: FAMILY MEDICINE

## 2023-08-22 NOTE — ASSESSMENT & PLAN NOTE
We discussed sleep hygiene.  Cannot go to bed until he feels sleepy.  Do not stay in bed more than 20 minutes without being asleep.  Asked Nya if she can go and stay with him 1 night to confirm his perception that he is not sleeping.  I suspect that he is falling asleep and just waking up without realization.  If indeed he is not sleeping we could consider adding on trazodone but I would prefer to avoid additional medications if he can.

## 2023-08-22 NOTE — PROGRESS NOTES
Chief Complaint  Hyperlipidemia, Hypothyroidism, Diabetes, and Insomnia    Subjective          Darci Munson presents to [facility]  History of Present Illness  Accompanied by his daughter, Nya    At his last visit in June he was started on Levemir in hopes of improving his blood sugars as well as driving his potassium level little bit lower.  He has been checking his blood sugars at home but forgot his log today.  He says that his sugars generally at home are between 150 and 200.  Rarely has some hypoglycemic events but he says he keeps sweets around to take care of that.  He has titrated up to 6 units of Levemir daily.  He says that his daughter had some rapid acting insulin and he has been using that if his blood sugars went up around 250 says that he would take maybe 5 units and that was occurring maybe once a week or so.    He has atrial fibrillation and is followed by cardiology.  He just saw Paulo Peterson on August 7 that note is reviewed.  Due to his recent hyperkalemia decreased his lisinopril to 5 mg daily.  He remains on amiodarone and Eliquis    He reports having trouble sleeping.  Says he might not go to bed until 1:00 and then feels like he does not fall asleep out of bed and 9 AM.  Says that he does not take naps through the day.        Current Outpatient Medications   Medication Sig Dispense Refill    amiodarone (PACERONE) 200 MG tablet Take 1 tablet by mouth Daily.      amLODIPine (NORVASC) 10 MG tablet Take 1 tablet by mouth Daily.      apixaban (ELIQUIS) 2.5 MG tablet tablet Take 1 tablet by mouth 2 (Two) Times a Day.      cholecalciferol (VITAMIN D3) 25 MCG (1000 UT) tablet Take 1 tablet by mouth 2 (Two) Times a Day.      cyclobenzaprine (FLEXERIL) 10 MG tablet Take 1 tablet by mouth 2 (Two) Times a Day As Needed.      empagliflozin (Jardiance) 25 MG tablet tablet Take 1 tablet by mouth Daily. PATIENT ASSISTANCE MEDICATION 90 tablet 3    ferrous sulfate 325 (65 FE)  MG tablet Take 1 tablet by mouth 2 (Two) Times a Day.      gabapentin (NEURONTIN) 100 MG capsule Take 1 capsule by mouth twice daily 60 capsule 0    glipizide (GLUCOTROL) 5 MG tablet TAKE 1/2 (ONE-HALF) TABLET BY MOUTH TWICE DAILY BEFORE MEAL(S) 90 tablet 0    Glucosamine 500 MG capsule Take 2 capsules by mouth 2 (Two) Times a Day. OTC      insulin detemir (Levemir) 100 UNIT/ML injection Inject 5 Units under the skin into the appropriate area as directed Daily. 3 mL     isosorbide mononitrate (IMDUR) 30 MG 24 hr tablet Take 1 tablet by mouth Daily.      latanoprost (XALATAN) 0.005 % ophthalmic solution As Needed.      levothyroxine (SYNTHROID, LEVOTHROID) 200 MCG tablet Take 1 tablet by mouth once daily 90 tablet 0    lisinopril (PRINIVIL,ZESTRIL) 5 MG tablet Take 1 tablet by mouth Daily. 30 tablet 11    metFORMIN (GLUCOPHAGE) 500 MG tablet TAKE 1 TABLET BY MOUTH ONCE DAILY WITH BREAKFAST AND WITH SUPPER 180 tablet 0    multivitamin (THERAGRAN) tablet tablet Take 1 tablet by mouth Daily.      nitroglycerin (NITROSTAT) 0.4 MG SL tablet nitroglycerin 0.4 mg sublingual tablet, sublingual place 1 tablet (0.4 mg) by buccal route at the first sign of an attack; no more than 3 tabs are recommended within a 15 minute period.   Active      pantoprazole (PROTONIX) 40 MG EC tablet Take 1 tablet by mouth once daily 90 tablet 0    sucralfate (CARAFATE) 1 g tablet Take 1 tablet by mouth 4 times daily 360 tablet 0    trimethoprim-polymyxin b (POLYTRIM) 53278-7.1 UNIT/ML-% ophthalmic solution INSTILL 1 DROP INTO AFFECTED EYE(S) INTO EACH EYE THREE TIMES DAILY      Zoster Vac Recomb Adjuvanted (Shingrix) 50 MCG/0.5ML reconstituted suspension Inject into the appropriate muscle as directed by prescriber. 1 each 1     No current facility-administered medications for this visit.       Review of Systems         Objective   Vital Signs:   /79 (BP Location: Left arm, Patient Position: Sitting)   Pulse 97   Temp 98.2 øF (36.8 øC)    "Ht 172.7 cm (67.99\")   Wt 72.6 kg (160 lb)   BMI 24.33 kg/mý     Physical Exam     No acute distress  Color is good  Hard of hearing- wears hearing aids  Heart is irregular but controlled rate  Lungs are clear  Extremities without edema    Result Review :                     Assessment and Plan    Diagnoses and all orders for this visit:    1. Type 2 diabetes mellitus with hyperglycemia, without long-term current use of insulin (Primary)  Assessment & Plan:  We will have him to increase his Levemir to 8 units daily.  Advised him not to use the rapid acting insulin as it would put him at risk of hypoglycemia.    Continue to monitor blood sugars on a regular basis.  Report the results back to me in 1 week    Orders:  -     Hemoglobin A1c    2. Mixed hyperlipidemia  Assessment & Plan:  Continue on current regimen.  Lab predicate medication change based on result    Orders:  -     Lipid Panel  -     Comprehensive Metabolic Panel    3. Paroxysmal atrial fibrillation  Assessment & Plan:  Asymptomatic.  Rate is controlled.  He remains on amiodarone and Eliquis.      4. Primary hypertension  Assessment & Plan:  Blood pressure looks great continue on current regimen    Orders:  -     Comprehensive Metabolic Panel  -     CBC Auto Differential    5. Primary insomnia  Assessment & Plan:  We discussed sleep hygiene.  Cannot go to bed until he feels sleepy.  Do not stay in bed more than 20 minutes without being asleep.  Asked Nya if she can go and stay with him 1 night to confirm his perception that he is not sleeping.  I suspect that he is falling asleep and just waking up without realization.  If indeed he is not sleeping we could consider adding on trazodone but I would prefer to avoid additional medications if he can.      6. Acquired hypothyroidism  Assessment & Plan:  Check lab predicate change based on result    Orders:  -     TSH Rfx On Abnormal To Free T4    7. Vitamin D insufficiency  -     Vitamin " D,25-Hydroxy        Follow Up   No follow-ups on file.  Patient was given instructions and counseling regarding his condition or for health maintenance advice. Please see specific information pulled into the AVS if appropriate.

## 2023-08-22 NOTE — ASSESSMENT & PLAN NOTE
We will have him to increase his Levemir to 8 units daily.  Advised him not to use the rapid acting insulin as it would put him at risk of hypoglycemia.    Continue to monitor blood sugars on a regular basis.  Report the results back to me in 1 week

## 2023-08-24 ENCOUNTER — TELEPHONE (OUTPATIENT)
Dept: FAMILY MEDICINE CLINIC | Age: 88
End: 2023-08-24

## 2023-08-24 ENCOUNTER — TELEMEDICINE (OUTPATIENT)
Dept: FAMILY MEDICINE CLINIC | Age: 88
End: 2023-08-24
Payer: MEDICARE

## 2023-08-24 ENCOUNTER — PATIENT OUTREACH (OUTPATIENT)
Dept: CASE MANAGEMENT | Facility: OTHER | Age: 88
End: 2023-08-24
Payer: MEDICARE

## 2023-08-24 DIAGNOSIS — U07.1 COVID-19 VIRUS INFECTION: ICD-10-CM

## 2023-08-24 DIAGNOSIS — U07.1 COVID-19 VIRUS INFECTION: Primary | ICD-10-CM

## 2023-08-24 PROCEDURE — 99212 OFFICE O/P EST SF 10 MIN: CPT | Performed by: FAMILY MEDICINE

## 2023-08-24 NOTE — TELEPHONE ENCOUNTER
Caller: Sharon Davalos    Relationship to patient: Emergency Contact    Best call back number: [phone]    Patient is needing: PATIENT'S DAUGHTER CALLED IN AND SAID [facility] DOES NOT HAVE  Molnupiravir (LAGEVRIO) 200 MG capsule AND SHE IS REQUESTING A CALL BACK TO SEE IF THE PHARMACY COULD BE SWITCHED TO     [id number] - [place], [place] - [address] AT [facility] [address] [facility] - [phone] [facility] [phone]  [phone]

## 2023-08-24 NOTE — PROGRESS NOTES
Chief Complaint  Covid-19 Home Monitoring Video Visit (Pt tested pos yesterday on a home test), Cough (Sx started Tuesday night ), Nasal Congestion, and Chills    Subjective          Darci Munson presents to [facility]  History of Present Illness  Telehealth visit with patient using audio only.  Patient gives permission with understanding of billing.  Patient is at their home and I am at the office.    Has had respiratory congestion/stopped up  Tested pos yesterday  No fever  No diarrhea  Nausea        Current Outpatient Medications   Medication Sig Dispense Refill    amiodarone (PACERONE) 200 MG tablet Take 1 tablet by mouth Daily.      amLODIPine (NORVASC) 10 MG tablet Take 1 tablet by mouth Daily.      apixaban (ELIQUIS) 2.5 MG tablet tablet Take 1 tablet by mouth 2 (Two) Times a Day.      cholecalciferol (VITAMIN D3) 25 MCG (1000 UT) tablet Take 1 tablet by mouth 2 (Two) Times a Day.      cyclobenzaprine (FLEXERIL) 10 MG tablet Take 1 tablet by mouth 2 (Two) Times a Day As Needed.      empagliflozin (Jardiance) 25 MG tablet tablet Take 1 tablet by mouth Daily. PATIENT ASSISTANCE MEDICATION 90 tablet 3    ferrous sulfate 325 (65 FE) MG tablet Take 1 tablet by mouth 2 (Two) Times a Day.      gabapentin (NEURONTIN) 100 MG capsule Take 1 capsule by mouth twice daily 60 capsule 0    glipizide (GLUCOTROL) 5 MG tablet TAKE 1/2 (ONE-HALF) TABLET BY MOUTH TWICE DAILY BEFORE MEAL(S) 90 tablet 0    Glucosamine 500 MG capsule Take 2 capsules by mouth 2 (Two) Times a Day. OTC      insulin detemir (Levemir) 100 UNIT/ML injection Inject 5 Units under the skin into the appropriate area as directed Daily. 3 mL     isosorbide mononitrate (IMDUR) 30 MG 24 hr tablet Take 1 tablet by mouth Daily.      latanoprost (XALATAN) 0.005 % ophthalmic solution As Needed.      levothyroxine (SYNTHROID, LEVOTHROID) 200 MCG tablet Take 1 tablet by mouth once daily 90 tablet 0    lisinopril (PRINIVIL,ZESTRIL) 5 MG  tablet Take 1 tablet by mouth Daily. 30 tablet 11    metFORMIN (GLUCOPHAGE) 500 MG tablet TAKE 1 TABLET BY MOUTH ONCE DAILY WITH BREAKFAST AND WITH SUPPER 180 tablet 0    multivitamin (THERAGRAN) tablet tablet Take 1 tablet by mouth Daily.      nitroglycerin (NITROSTAT) 0.4 MG SL tablet nitroglycerin 0.4 mg sublingual tablet, sublingual place 1 tablet (0.4 mg) by buccal route at the first sign of an attack; no more than 3 tabs are recommended within a 15 minute period.   Active      pantoprazole (PROTONIX) 40 MG EC tablet Take 1 tablet by mouth once daily 90 tablet 0    sucralfate (CARAFATE) 1 g tablet Take 1 tablet by mouth 4 times daily 360 tablet 0    trimethoprim-polymyxin b (POLYTRIM) 27236-9.1 UNIT/ML-% ophthalmic solution INSTILL 1 DROP INTO AFFECTED EYE(S) INTO EACH EYE THREE TIMES DAILY      Molnupiravir (LAGEVRIO) 200 MG capsule Take 4 capsules by mouth Every 12 (Twelve) Hours. 10 capsule 0     No current facility-administered medications for this visit.       Review of Systems         Objective   Vital Signs:   There were no vitals taken for this visit.    Physical Exam   Did not sound to be in distress  No cough during our conversation      Result Review :                     Assessment and Plan    Diagnoses and all orders for this visit:    1. COVID-19 virus infection (Primary)  Assessment & Plan:  Explained to him that symptoms can have a precipitous change with COVID.  If he is to develop significant shortness of breath, chest pain, turning blue around the lips needs to go to the emergency room.  We are available 24/7 to answer questions or concerns regarding other symptoms.  He is not a candidate for Paxlovid due to the amiodarone.  We will send him in a prescription for molnupiravir.  Explained to him medication is available under [facility].  He desires to move ahead with treatment    Orders:  -     Discontinue: Molnupiravir (LAGEVRIO) 200 MG capsule; Take 4 capsules by mouth Every 12 (Twelve) Hours.   Dispense: 10 capsule; Refill: 0        Follow Up   No follow-ups on file.  Patient was given instructions and counseling regarding his condition or for health maintenance advice. Please see specific information pulled into the AVS if appropriate.

## 2023-08-24 NOTE — OUTREACH NOTE
AMBULATORY CASE MANAGEMENT NOTE    Name and Relationship of Patient/Support Person: Sharon Davalos MARIO - Emergency Contact    Nya called to report that her dad began feeling bad Tuesday evening after his visit at the office.  Yesterday he called still not feeling well ( his stomach was bothering him, little cough, no fever) but he had gotten the shingles vaccine the day before, so they thought that may be the cause.  Got a covid test and he tested positive.  Today she says he just doesn't feel good , looks like he feels bad.      Education Documentation  No documentation found.        Mamta GAMBOA  Ambulatory Case Management    8/24/2023, 09:53 EDT

## 2023-09-04 PROBLEM — U07.1 COVID-19 VIRUS INFECTION: Status: ACTIVE | Noted: 2023-09-04

## 2023-09-04 NOTE — ASSESSMENT & PLAN NOTE
Explained to him that symptoms can have a precipitous change with COVID.  If he is to develop significant shortness of breath, chest pain, turning blue around the lips needs to go to the emergency room.  We are available 24/7 to answer questions or concerns regarding other symptoms.  He is not a candidate for Paxlovid due to the amiodarone.  We will send him in a prescription for molnupiravir.  Explained to him medication is available under [facility].  He desires to move ahead with treatment

## 2023-09-15 DIAGNOSIS — E11.65 TYPE 2 DIABETES MELLITUS WITH HYPERGLYCEMIA, WITHOUT LONG-TERM CURRENT USE OF INSULIN: Primary | ICD-10-CM

## 2023-09-15 DIAGNOSIS — E11.42 TYPE 2 DIABETES MELLITUS WITH DIABETIC POLYNEUROPATHY, WITHOUT LONG-TERM CURRENT USE OF INSULIN: ICD-10-CM

## 2023-09-15 DIAGNOSIS — K29.70 GASTRITIS, PRESENCE OF BLEEDING UNSPECIFIED, UNSPECIFIED CHRONICITY, UNSPECIFIED GASTRITIS TYPE: ICD-10-CM

## 2023-09-15 RX ORDER — GABAPENTIN 100 MG/1
100 CAPSULE ORAL EVERY 12 HOURS SCHEDULED
Qty: 60 CAPSULE | Refills: 1 | Status: SHIPPED | OUTPATIENT
Start: 2023-09-15

## 2023-09-15 RX ORDER — PEN NEEDLE, DIABETIC 30 GX3/16"
1 NEEDLE, DISPOSABLE MISCELLANEOUS DAILY
Qty: 100 EACH | Refills: 2 | Status: SHIPPED | OUTPATIENT
Start: 2023-09-15

## 2023-09-15 RX ORDER — PEN NEEDLE, DIABETIC 30 GX3/16"
1 NEEDLE, DISPOSABLE MISCELLANEOUS DAILY
COMMUNITY
End: 2023-09-15 | Stop reason: SDUPTHER

## 2023-09-15 RX ORDER — PANTOPRAZOLE SODIUM 40 MG/1
TABLET, DELAYED RELEASE ORAL
Qty: 90 TABLET | Refills: 0 | Status: SHIPPED | OUTPATIENT
Start: 2023-09-15

## 2023-10-09 ENCOUNTER — TELEPHONE (OUTPATIENT)
Dept: FAMILY MEDICINE CLINIC | Age: 88
End: 2023-10-09

## 2023-10-09 ENCOUNTER — CLINICAL SUPPORT (OUTPATIENT)
Dept: FAMILY MEDICINE CLINIC | Age: 88
End: 2023-10-09
Payer: MEDICARE

## 2023-10-09 ENCOUNTER — PATIENT OUTREACH (OUTPATIENT)
Dept: CASE MANAGEMENT | Facility: OTHER | Age: 88
End: 2023-10-09
Payer: MEDICARE

## 2023-10-09 DIAGNOSIS — Z23 NEED FOR INFLUENZA VACCINATION: Primary | ICD-10-CM

## 2023-10-09 PROCEDURE — G0008 ADMIN INFLUENZA VIRUS VAC: HCPCS | Performed by: FAMILY MEDICINE

## 2023-10-09 PROCEDURE — 90662 IIV NO PRSV INCREASED AG IM: CPT | Performed by: FAMILY MEDICINE

## 2023-10-09 NOTE — TELEPHONE ENCOUNTER
Caller: [facility]    Relationship: Other    Best call back number: [phone]    What medication are you requesting: GLUCOSE MONITOR AND SUPPLIES    If a prescription is needed, what is your preferred pharmacy and phone number:      [facility]  EMAIL: [email or web address]  FAX: [phone]

## 2023-10-13 ENCOUNTER — TELEPHONE (OUTPATIENT)
Dept: FAMILY MEDICINE CLINIC | Age: 88
End: 2023-10-13

## 2023-10-13 NOTE — TELEPHONE ENCOUNTER
Caller:  MED    Best call back number: [phone]     Who are you requesting to speak with (clinical staff, provider,  specific staff member): MEDICAL STAFF    Do you know the name of the person who called: ARELI    What was the call regarding: ARELI IS CHECKING TO SEE IF THE  DIABETIC SUPPLY FORM FOR A CONTINUOUS GLUCOSE MONITOR AND THE REQUEST FOR THE MOST RECENT OFFICE NOTES HAS BEEN RECEIVED. SHE IS FAXING OVER THE PAPERWORK AGAIN. PLEASE CALL TO ADVISE ON PAPERWORK RECEIVED.

## 2023-10-18 DIAGNOSIS — E11.42 TYPE 2 DIABETES MELLITUS WITH POLYNEUROPATHY: ICD-10-CM

## 2023-10-18 RX ORDER — GLIPIZIDE 5 MG/1
TABLET ORAL
Qty: 90 TABLET | Refills: 0 | Status: SHIPPED | OUTPATIENT
Start: 2023-10-18

## 2023-10-31 ENCOUNTER — TELEPHONE (OUTPATIENT)
Dept: FAMILY MEDICINE CLINIC | Age: 88
End: 2023-10-31

## 2023-10-31 NOTE — TELEPHONE ENCOUNTER
Caller: MATTHIAS AT [facility]    Relationship: Other    Best call back number: [phone]    What was the call regarding: MATTHIAS STATES [facility] NEEDS THE FAX FOR THE PATIENT GLUCOSE SUPPLIES THAT WAS RECEIVED ON 10/09/23 FILL OUT AND FAXED BACK.    FAX:  [phone]

## 2023-11-06 ENCOUNTER — OFFICE VISIT (OUTPATIENT)
Dept: FAMILY MEDICINE CLINIC | Age: 88
End: 2023-11-06
Payer: MEDICARE

## 2023-11-06 ENCOUNTER — TELEPHONE (OUTPATIENT)
Dept: FAMILY MEDICINE CLINIC | Age: 88
End: 2023-11-06

## 2023-11-06 ENCOUNTER — LAB (OUTPATIENT)
Dept: LAB | Facility: HOSPITAL | Age: 88
End: 2023-11-06
Payer: MEDICARE

## 2023-11-06 ENCOUNTER — TELEPHONE (OUTPATIENT)
Dept: GASTROENTEROLOGY | Facility: CLINIC | Age: 88
End: 2023-11-06
Payer: MEDICARE

## 2023-11-06 VITALS
TEMPERATURE: 97.3 F | HEIGHT: 68 IN | WEIGHT: 163.8 LBS | HEART RATE: 107 BPM | SYSTOLIC BLOOD PRESSURE: 109 MMHG | BODY MASS INDEX: 24.83 KG/M2 | DIASTOLIC BLOOD PRESSURE: 67 MMHG

## 2023-11-06 DIAGNOSIS — I48.0 PAROXYSMAL ATRIAL FIBRILLATION: ICD-10-CM

## 2023-11-06 DIAGNOSIS — T16.1XXA FOREIGN BODY OF RIGHT EAR, INITIAL ENCOUNTER: ICD-10-CM

## 2023-11-06 DIAGNOSIS — F51.01 PRIMARY INSOMNIA: ICD-10-CM

## 2023-11-06 DIAGNOSIS — E11.65 TYPE 2 DIABETES MELLITUS WITH HYPERGLYCEMIA, WITHOUT LONG-TERM CURRENT USE OF INSULIN: Primary | ICD-10-CM

## 2023-11-06 DIAGNOSIS — E03.9 ACQUIRED HYPOTHYROIDISM: ICD-10-CM

## 2023-11-06 DIAGNOSIS — Z79.01 ANTICOAGULATED: ICD-10-CM

## 2023-11-06 DIAGNOSIS — E78.2 MIXED HYPERLIPIDEMIA: ICD-10-CM

## 2023-11-06 DIAGNOSIS — I10 PRIMARY HYPERTENSION: ICD-10-CM

## 2023-11-06 LAB
ALBUMIN SERPL-MCNC: 4.2 G/DL (ref 3.5–5.2)
ALBUMIN/GLOB SERPL: 1.7 G/DL
ALP SERPL-CCNC: 124 U/L (ref 39–117)
ALT SERPL W P-5'-P-CCNC: 17 U/L (ref 1–41)
ANION GAP SERPL CALCULATED.3IONS-SCNC: 10.6 MMOL/L (ref 5–15)
AST SERPL-CCNC: 21 U/L (ref 1–40)
BASOPHILS # BLD AUTO: 0.04 10*3/MM3 (ref 0–0.2)
BASOPHILS NFR BLD AUTO: 0.8 % (ref 0–1.5)
BILIRUB SERPL-MCNC: 0.2 MG/DL (ref 0–1.2)
BUN SERPL-MCNC: 26 MG/DL (ref 8–23)
BUN/CREAT SERPL: 18.2 (ref 7–25)
CALCIUM SPEC-SCNC: 9.5 MG/DL (ref 8.6–10.5)
CHLORIDE SERPL-SCNC: 106 MMOL/L (ref 98–107)
CHOLEST SERPL-MCNC: 202 MG/DL (ref 0–200)
CO2 SERPL-SCNC: 22.4 MMOL/L (ref 22–29)
CREAT SERPL-MCNC: 1.43 MG/DL (ref 0.76–1.27)
DEPRECATED RDW RBC AUTO: 47.7 FL (ref 37–54)
EGFRCR SERPLBLD CKD-EPI 2021: 47.1 ML/MIN/1.73
EOSINOPHIL # BLD AUTO: 0.19 10*3/MM3 (ref 0–0.4)
EOSINOPHIL NFR BLD AUTO: 3.6 % (ref 0.3–6.2)
ERYTHROCYTE [DISTWIDTH] IN BLOOD BY AUTOMATED COUNT: 13.4 % (ref 12.3–15.4)
GLOBULIN UR ELPH-MCNC: 2.5 GM/DL
GLUCOSE SERPL-MCNC: 209 MG/DL (ref 65–99)
HBA1C MFR BLD: 7.2 % (ref 4.8–5.6)
HCT VFR BLD AUTO: 40.4 % (ref 37.5–51)
HDLC SERPL-MCNC: 56 MG/DL (ref 40–60)
HGB BLD-MCNC: 12.8 G/DL (ref 13–17.7)
IMM GRANULOCYTES # BLD AUTO: 0.02 10*3/MM3 (ref 0–0.05)
IMM GRANULOCYTES NFR BLD AUTO: 0.4 % (ref 0–0.5)
LDLC SERPL CALC-MCNC: 98 MG/DL (ref 0–100)
LDLC/HDLC SERPL: 1.58 {RATIO}
LYMPHOCYTES # BLD AUTO: 1.94 10*3/MM3 (ref 0.7–3.1)
LYMPHOCYTES NFR BLD AUTO: 36.9 % (ref 19.6–45.3)
MCH RBC QN AUTO: 30.4 PG (ref 26.6–33)
MCHC RBC AUTO-ENTMCNC: 31.7 G/DL (ref 31.5–35.7)
MCV RBC AUTO: 96 FL (ref 79–97)
MONOCYTES # BLD AUTO: 0.42 10*3/MM3 (ref 0.1–0.9)
MONOCYTES NFR BLD AUTO: 8 % (ref 5–12)
NEUTROPHILS NFR BLD AUTO: 2.65 10*3/MM3 (ref 1.7–7)
NEUTROPHILS NFR BLD AUTO: 50.3 % (ref 42.7–76)
PLATELET # BLD AUTO: 186 10*3/MM3 (ref 140–450)
PMV BLD AUTO: 10.2 FL (ref 6–12)
POTASSIUM SERPL-SCNC: 5 MMOL/L (ref 3.5–5.2)
PROT SERPL-MCNC: 6.7 G/DL (ref 6–8.5)
RBC # BLD AUTO: 4.21 10*6/MM3 (ref 4.14–5.8)
SODIUM SERPL-SCNC: 139 MMOL/L (ref 136–145)
TRIGL SERPL-MCNC: 288 MG/DL (ref 0–150)
TSH SERPL DL<=0.05 MIU/L-ACNC: 2.13 UIU/ML (ref 0.27–4.2)
VLDLC SERPL-MCNC: 48 MG/DL (ref 5–40)
WBC NRBC COR # BLD: 5.26 10*3/MM3 (ref 3.4–10.8)

## 2023-11-06 PROCEDURE — 80061 LIPID PANEL: CPT | Performed by: FAMILY MEDICINE

## 2023-11-06 PROCEDURE — 83036 HEMOGLOBIN GLYCOSYLATED A1C: CPT | Performed by: FAMILY MEDICINE

## 2023-11-06 PROCEDURE — 85025 COMPLETE CBC W/AUTO DIFF WBC: CPT | Performed by: FAMILY MEDICINE

## 2023-11-06 PROCEDURE — 36415 COLL VENOUS BLD VENIPUNCTURE: CPT | Performed by: FAMILY MEDICINE

## 2023-11-06 PROCEDURE — 80053 COMPREHEN METABOLIC PANEL: CPT | Performed by: FAMILY MEDICINE

## 2023-11-06 PROCEDURE — 84443 ASSAY THYROID STIM HORMONE: CPT | Performed by: FAMILY MEDICINE

## 2023-11-06 RX ORDER — TRAZODONE HYDROCHLORIDE 50 MG/1
50 TABLET ORAL NIGHTLY
Qty: 30 TABLET | Refills: 1 | Status: SHIPPED | OUTPATIENT
Start: 2023-11-06

## 2023-11-06 NOTE — TELEPHONE ENCOUNTER
Please contact Dr. Mann's office, gastroenterology, find out when she wants him to get repeat colonoscopy and EGD.  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  He is continuing to have some dyspepsia.    mas

## 2023-11-06 NOTE — ASSESSMENT & PLAN NOTE
Sleep 'Hygiene' recommendations - tips to help you sleep better:     1. Don’t go to bed unless you are sleepy. If you are not sleepy at bedtime, then do something else. Read a book, listen to soft music or browse through a magazine. Avoid TV, iPad, etc (devices emitting blue light). Find something relaxing, but not stimulating, to take your mind off of worries about sleep. This will relax your body and distract your mind.    2. If you are not asleep after 20 minutes, then get out of the bed. Find something else to do that will make you feel relaxed (again, avoid blue light emitting devices). If you can, do this in another room. Your bedroom should be where you go to sleep. It is not a place to go when you are bored. Once you feel sleepy again, go back to bed.     3. Begin rituals that help you relax each night before bed. This can include such things as a warm bath, light snack or a few minutes of reading.    4. Get up at the same time every morning. Do this even on weekends and holidays.     5. Get a full night’s sleep on a regular basis. Get enough sleep so that you feel well-rested nearly every day.     6. Avoid taking naps if you can. If you must take a nap, try to keep it short (less than one hour). Never take a nap after 3 p.m.     7. Keep a regular schedule. Regular times for meals, medications, chores, and other activities help keep the inner body clock running smoothly.    8. Don’t read, write, eat, watch TV, talk on the phone, or play cards in bed.    9. Do not have any caffeine within 6 hours of bedtime; for some, avoidance after lunch may be required (remember that chocolate contains some caffeine).     10. Do not have a beer, a glass of wine, or any other alcohol within 4 hours of your bedtime.     11. Do not have a cigarette or any other source of nicotine before bedtime.     12. Do not go to bed hungry, but don’t eat a big meal near bedtime either.    13. Avoid any strenouus exercise within six hours  of your bedtime. You should exercise on a regular basis, but do it earlier in the day. (Talk to your doctor before you begin an exercise program.)     14. Avoid sleeping pills, or use them cautiously. Most doctors do not prescribe sleeping pills for periods of more than three weeks. Do not drink alcohol while taking sleeping pills.     15. Try to get rid of or deal with things that make you worry. If you are unable to do this, then find a time during the day to get all of your worries out of your system. Your bed is a place to rest, not a place to worry.     16. Make your bedroom quiet, dark, and a little bit cool (ideally 65 degrees).

## 2023-11-06 NOTE — TELEPHONE ENCOUNTER
Madison from [facility] called and is asking when patient is due for egd/colon. They state pt is having dyspepsia and had a previous colon poylp. Please advise.   Last colonoscopy states that not recommended secondary to age.

## 2023-11-06 NOTE — PROGRESS NOTES
Chief Complaint  Diabetes (Discuss CGM), Hypertension, Hypothyroidism, and Nausea (X 2wks)    Subjective          Darci Munson presents to [facility]  History of Present Illness  Accompanied to the visit by his daughter.    BS up and down depending on how I eat    Highest about 200, not really running low but once down in 90's.  There was some mention about the possibility of continuous glucose monitor.  We discussed pros and cons and given the fact that his blood sugar is pretty well controlled and the complications of using the CGM after discussion decided not appropriate at this time.    Reports he has spells of nausea and uncomfortable feeling in his abdomen.  No vomiting or diarrhea.  No fever.  He thinks it might feel little better when he eats something.    States has trouble with sleep at night.  Usually goes to bed about 1:00 gets up at about 8 or 9.  No naps.        Current Outpatient Medications   Medication Sig Dispense Refill    amiodarone (PACERONE) 200 MG tablet Take 1 tablet by mouth Daily.      amLODIPine (NORVASC) 10 MG tablet Take 1 tablet by mouth Daily.      apixaban (ELIQUIS) 2.5 MG tablet tablet Take 1 tablet by mouth 2 (Two) Times a Day.      cholecalciferol (VITAMIN D3) 25 MCG (1000 UT) tablet Take 1 tablet by mouth 2 (Two) Times a Day.      empagliflozin (Jardiance) 25 MG tablet tablet Take 1 tablet by mouth Daily. PATIENT ASSISTANCE MEDICATION 90 tablet 3    ferrous sulfate 325 (65 FE) MG tablet Take 1 tablet by mouth 2 (Two) Times a Day.      gabapentin (NEURONTIN) 100 MG capsule Take 1 capsule by mouth twice daily 60 capsule 1    glipizide (GLUCOTROL) 5 MG tablet TAKE 1/2 (ONE-HALF) TABLET BY MOUTH TWICE DAILY BEFORE MEAL(S) 90 tablet 0    Glucosamine 500 MG capsule Take 2 capsules by mouth 2 (Two) Times a Day. OTC      insulin detemir (Levemir) 100 UNIT/ML injection Inject 5 Units under the skin into the appropriate area as directed Daily. 3 mL      "Insulin Pen Needle (Pen Needles) 32G X 4 MM misc Use 1 each Daily. Dx: E11.9 - use with Lantus 1 time daily. 100 each 2    isosorbide mononitrate (IMDUR) 30 MG 24 hr tablet Take 1 tablet by mouth Daily.      latanoprost (XALATAN) 0.005 % ophthalmic solution As Needed.      levothyroxine (SYNTHROID, LEVOTHROID) 200 MCG tablet Take 1 tablet by mouth once daily 90 tablet 0    lisinopril (PRINIVIL,ZESTRIL) 5 MG tablet Take 1 tablet by mouth Daily. 30 tablet 11    metFORMIN (GLUCOPHAGE) 500 MG tablet TAKE 1 TABLET BY MOUTH TWICE DAILY WITH BREAKFAST AND SUPPER 180 tablet 0    multivitamin (THERAGRAN) tablet tablet Take 1 tablet by mouth Daily.      nitroglycerin (NITROSTAT) 0.4 MG SL tablet nitroglycerin 0.4 mg sublingual tablet, sublingual place 1 tablet (0.4 mg) by buccal route at the first sign of an attack; no more than 3 tabs are recommended within a 15 minute period.   Active      pantoprazole (PROTONIX) 40 MG EC tablet Take 1 tablet by mouth once daily 90 tablet 0    sucralfate (CARAFATE) 1 g tablet Take 1 tablet by mouth 4 times daily 360 tablet 0    trimethoprim-polymyxin b (POLYTRIM) 22489-4.1 UNIT/ML-% ophthalmic solution INSTILL 1 DROP INTO AFFECTED EYE(S) INTO EACH EYE THREE TIMES DAILY      traZODone (DESYREL) 50 MG tablet Take 1 tablet by mouth Every Night. 30 tablet 1     No current facility-administered medications for this visit.       Review of Systems         Objective   Vital Signs:   /67 (BP Location: Left arm, Patient Position: Sitting)   Pulse 107   Temp 97.3 °F (36.3 °C) (Temporal)   Ht 172.7 cm (67.99\")   Wt 74.3 kg (163 lb 12.8 oz)   BMI 24.91 kg/m²     Physical Exam  Constitutional:       Appearance: Normal appearance.   Neck:      Vascular: No carotid bruit.   Cardiovascular:      Rate and Rhythm: Normal rate and regular rhythm.      Heart sounds: Normal heart sounds. No murmur heard.  Pulmonary:      Effort: No respiratory distress.      Breath sounds: No wheezing or rales. "   Musculoskeletal:      Right lower leg: No edema.      Left lower leg: No edema.   Lymphadenopathy:      Cervical: No cervical adenopathy.   Neurological:      General: No focal deficit present.      Mental Status: He is alert.   Psychiatric:         Attention and Perception: Attention normal.         Mood and Affect: Mood normal.         Speech: Speech normal.            Result Review :                     Assessment and Plan    Diagnoses and all orders for this visit:    1. Type 2 diabetes mellitus with hyperglycemia, without long-term current use of insulin (Primary)  Assessment & Plan:  He is actually doing a pretty good job with his diabetes currently.  Continue on current regimen.  Check labs predicate change based on results.  CGM not necessary at this time.  His daughter is a diabetic and does use a CGM and she agrees    Orders:  -     Hemoglobin A1c    2. Primary hypertension  Assessment & Plan:  Blood pressure looks okay continue on current regimen    Orders:  -     Comprehensive Metabolic Panel  -     CBC Auto Differential    3. Mixed hyperlipidemia  Assessment & Plan:  Tolerating current regimen.  Check labs predicate change based on results    Orders:  -     Lipid Panel  -     Comprehensive Metabolic Panel    4. Paroxysmal atrial fibrillation  Assessment & Plan:  Asymptomatic.  Rate controlled.  Anticoagulated.      5. Anticoagulated    6. Acquired hypothyroidism  Assessment & Plan:  Check lab predicate medication change based on results    Orders:  -     TSH Rfx On Abnormal To Free T4    7. Primary insomnia  Comments:  We will give him a trial of trazodone in addition to the recommendations for sleep hygiene  Assessment & Plan:  Sleep 'Hygiene' recommendations - tips to help you sleep better:     1. Don’t go to bed unless you are sleepy. If you are not sleepy at bedtime, then do something else. Read a book, listen to soft music or browse through a magazine. Avoid TV, iPad, etc (devices emitting blue  light). Find something relaxing, but not stimulating, to take your mind off of worries about sleep. This will relax your body and distract your mind.    2. If you are not asleep after 20 minutes, then get out of the bed. Find something else to do that will make you feel relaxed (again, avoid blue light emitting devices). If you can, do this in another room. Your bedroom should be where you go to sleep. It is not a place to go when you are bored. Once you feel sleepy again, go back to bed.     3. Begin rituals that help you relax each night before bed. This can include such things as a warm bath, light snack or a few minutes of reading.    4. Get up at the same time every morning. Do this even on weekends and holidays.     5. Get a full night’s sleep on a regular basis. Get enough sleep so that you feel well-rested nearly every day.     6. Avoid taking naps if you can. If you must take a nap, try to keep it short (less than one hour). Never take a nap after 3 p.m.     7. Keep a regular schedule. Regular times for meals, medications, chores, and other activities help keep the inner body clock running smoothly.    8. Don’t read, write, eat, watch TV, talk on the phone, or play cards in bed.    9. Do not have any caffeine within 6 hours of bedtime; for some, avoidance after lunch may be required (remember that chocolate contains some caffeine).     10. Do not have a beer, a glass of wine, or any other alcohol within 4 hours of your bedtime.     11. Do not have a cigarette or any other source of nicotine before bedtime.     12. Do not go to bed hungry, but don’t eat a big meal near bedtime either.    13. Avoid any strenouus exercise within six hours of your bedtime. You should exercise on a regular basis, but do it earlier in the day. (Talk to your doctor before you begin an exercise program.)     14. Avoid sleeping pills, or use them cautiously. Most doctors do not prescribe sleeping pills for periods of more than three  weeks. Do not drink alcohol while taking sleeping pills.     15. Try to get rid of or deal with things that make you worry. If you are unable to do this, then find a time during the day to get all of your worries out of your system. Your bed is a place to rest, not a place to worry.     16. Make your bedroom quiet, dark, and a little bit cool (ideally 65 degrees).      Orders:  -     traZODone (DESYREL) 50 MG tablet; Take 1 tablet by mouth Every Night.  Dispense: 30 tablet; Refill: 1    8. Foreign body of right ear, initial encounter  Assessment & Plan:  Used alligator forceps to retrieve the pledget of cotton from his right ear.  Then was also able to grab bolus of cerumen to bring out additionally.          Follow Up   No follow-ups on file.  Patient was given instructions and counseling regarding his condition or for health maintenance advice. Please see specific information pulled into the AVS if appropriate.

## 2023-11-06 NOTE — ASSESSMENT & PLAN NOTE
Used alligator forceps to retrieve the pledget of cotton from his right ear.  Then was also able to grab bolus of cerumen to bring out additionally.

## 2023-11-07 DIAGNOSIS — R74.8 ELEVATED ALKALINE PHOSPHATASE LEVEL: Primary | ICD-10-CM

## 2023-11-07 NOTE — TELEPHONE ENCOUNTER
Per Dr. Kyler Galvan MA, Dr Aly doesn't recommend repeat scopes due to pts age unless he were to develop bleeding in the rectum.  This is due to pts age.  They dont recommend it for pts over 80 years old.

## 2023-11-10 NOTE — ASSESSMENT & PLAN NOTE
He is actually doing a pretty good job with his diabetes currently.  Continue on current regimen.  Check labs predicate change based on results.  CGM not necessary at this time.  His daughter is a diabetic and does use a CGM and she agrees

## 2023-11-30 ENCOUNTER — HOSPITAL ENCOUNTER (OUTPATIENT)
Dept: ULTRASOUND IMAGING | Facility: HOSPITAL | Age: 88
Discharge: HOME | End: 2023-11-30
Admitting: FAMILY MEDICINE
Payer: MEDICARE

## 2023-11-30 DIAGNOSIS — R74.8 ELEVATED ALKALINE PHOSPHATASE LEVEL: ICD-10-CM

## 2023-11-30 PROCEDURE — 76705 ECHO EXAM OF ABDOMEN: CPT

## 2023-12-11 DIAGNOSIS — E03.9 ACQUIRED HYPOTHYROIDISM: ICD-10-CM

## 2023-12-11 RX ORDER — LEVOTHYROXINE SODIUM 0.2 MG/1
TABLET ORAL
Qty: 90 TABLET | Refills: 0 | Status: SHIPPED | OUTPATIENT
Start: 2023-12-11

## 2023-12-11 RX ORDER — SUCRALFATE 1 G/1
TABLET ORAL
Qty: 360 TABLET | Refills: 0 | Status: SHIPPED | OUTPATIENT
Start: 2023-12-11

## 2023-12-29 DIAGNOSIS — E11.42 TYPE 2 DIABETES MELLITUS WITH DIABETIC POLYNEUROPATHY, WITHOUT LONG-TERM CURRENT USE OF INSULIN: ICD-10-CM

## 2024-01-02 RX ORDER — GABAPENTIN 100 MG/1
100 CAPSULE ORAL EVERY 12 HOURS SCHEDULED
Qty: 60 CAPSULE | Refills: 0 | Status: SHIPPED | OUTPATIENT
Start: 2024-01-02

## 2024-01-09 DIAGNOSIS — K29.70 GASTRITIS, PRESENCE OF BLEEDING UNSPECIFIED, UNSPECIFIED CHRONICITY, UNSPECIFIED GASTRITIS TYPE: ICD-10-CM

## 2024-01-09 RX ORDER — PANTOPRAZOLE SODIUM 40 MG/1
TABLET, DELAYED RELEASE ORAL
Qty: 90 TABLET | Refills: 0 | Status: SHIPPED | OUTPATIENT
Start: 2024-01-09

## 2024-01-20 DIAGNOSIS — E11.42 TYPE 2 DIABETES MELLITUS WITH POLYNEUROPATHY: ICD-10-CM

## 2024-01-20 DIAGNOSIS — K29.70 GASTRITIS, PRESENCE OF BLEEDING UNSPECIFIED, UNSPECIFIED CHRONICITY, UNSPECIFIED GASTRITIS TYPE: ICD-10-CM

## 2024-01-20 DIAGNOSIS — F51.01 PRIMARY INSOMNIA: ICD-10-CM

## 2024-01-22 RX ORDER — GLIPIZIDE 5 MG/1
TABLET ORAL
Qty: 90 TABLET | Refills: 0 | Status: SHIPPED | OUTPATIENT
Start: 2024-01-22

## 2024-01-22 RX ORDER — TRAZODONE HYDROCHLORIDE 50 MG/1
50 TABLET ORAL NIGHTLY
Qty: 30 TABLET | Refills: 0 | Status: SHIPPED | OUTPATIENT
Start: 2024-01-22

## 2024-01-22 RX ORDER — PANTOPRAZOLE SODIUM 40 MG/1
TABLET, DELAYED RELEASE ORAL
Qty: 90 TABLET | Refills: 0 | OUTPATIENT
Start: 2024-01-22

## 2024-02-01 ENCOUNTER — OFFICE VISIT (OUTPATIENT)
Dept: FAMILY MEDICINE CLINIC | Age: 89
End: 2024-02-01
Payer: MEDICARE

## 2024-02-01 VITALS
DIASTOLIC BLOOD PRESSURE: 77 MMHG | TEMPERATURE: 98.2 F | WEIGHT: 163.2 LBS | BODY MASS INDEX: 24.74 KG/M2 | SYSTOLIC BLOOD PRESSURE: 141 MMHG | HEART RATE: 86 BPM | HEIGHT: 68 IN

## 2024-02-01 DIAGNOSIS — E78.2 MIXED HYPERLIPIDEMIA: ICD-10-CM

## 2024-02-01 DIAGNOSIS — E11.65 TYPE 2 DIABETES MELLITUS WITH HYPERGLYCEMIA, WITHOUT LONG-TERM CURRENT USE OF INSULIN: Primary | ICD-10-CM

## 2024-02-01 DIAGNOSIS — L60.2 THICKENED NAILS: ICD-10-CM

## 2024-02-01 DIAGNOSIS — Z79.01 ANTICOAGULATED: ICD-10-CM

## 2024-02-01 DIAGNOSIS — J06.9 VIRAL UPPER RESPIRATORY TRACT INFECTION: ICD-10-CM

## 2024-02-01 DIAGNOSIS — I48.0 PAROXYSMAL ATRIAL FIBRILLATION: ICD-10-CM

## 2024-02-01 DIAGNOSIS — Z79.899 HIGH RISK MEDICATION USE: ICD-10-CM

## 2024-02-01 DIAGNOSIS — M79.674 PAIN IN TOES OF BOTH FEET: ICD-10-CM

## 2024-02-01 DIAGNOSIS — E03.9 ACQUIRED HYPOTHYROIDISM: ICD-10-CM

## 2024-02-01 DIAGNOSIS — J06.9 UPPER RESPIRATORY TRACT INFECTION, UNSPECIFIED TYPE: ICD-10-CM

## 2024-02-01 DIAGNOSIS — H61.22 IMPACTED CERUMEN OF LEFT EAR: ICD-10-CM

## 2024-02-01 DIAGNOSIS — M79.675 PAIN IN TOES OF BOTH FEET: ICD-10-CM

## 2024-02-01 DIAGNOSIS — I10 PRIMARY HYPERTENSION: ICD-10-CM

## 2024-02-01 DIAGNOSIS — R10.13 DYSPEPSIA: ICD-10-CM

## 2024-02-01 LAB
AMPHET+METHAMPHET UR QL: NEGATIVE
AMPHETAMINES UR QL: NEGATIVE
BARBITURATES UR QL SCN: NEGATIVE
BENZODIAZ UR QL SCN: NEGATIVE
BUPRENORPHINE SERPL-MCNC: NEGATIVE NG/ML
CANNABINOIDS SERPL QL: NEGATIVE
COCAINE UR QL: NEGATIVE
EXPIRATION DATE: ABNORMAL
EXPIRATION DATE: NORMAL
EXPIRATION DATE: NORMAL
FLUAV AG UPPER RESP QL IA.RAPID: NOT DETECTED
FLUBV AG UPPER RESP QL IA.RAPID: NOT DETECTED
HBA1C MFR BLD: 9.2 % (ref 4.5–5.7)
INTERNAL CONTROL: NORMAL
Lab: ABNORMAL
Lab: NORMAL
Lab: NORMAL
MDMA UR QL SCN: NEGATIVE
METHADONE UR QL SCN: NEGATIVE
MORPHINE/OPIATES SCREEN, URINE: NEGATIVE
OXYCODONE UR QL SCN: NEGATIVE
PCP UR QL SCN: NEGATIVE
SARS-COV-2 AG UPPER RESP QL IA.RAPID: NOT DETECTED

## 2024-02-01 RX ORDER — FAMOTIDINE 20 MG/1
20 TABLET, FILM COATED ORAL 2 TIMES DAILY
Qty: 60 TABLET | Refills: 0 | Status: SHIPPED | OUTPATIENT
Start: 2024-02-01

## 2024-02-01 NOTE — ASSESSMENT & PLAN NOTE
His hemoglobin A1c is elevated at 9.2.  I will have him to increase the Levemir to 10 units.  Keep a log of first morning fasting blood sugars and provide us with a copy of that record after a couple weeks.  Cautioned him that he should not double up on his glipizide and that doing so is dangerous.

## 2024-02-01 NOTE — PROGRESS NOTES
"Chief Complaint  URI (Started 2 wks ago) and Ear Fullness    Subjective          Darci Munson presents to [facility]  History of Present Illness  Today reports that he has had a cough and some nasal congestion for a couple weeks.  Getting better. No feer or chills. No exposures that he is aware of.     He has diabetes and previous done fairly good job controlling his blood sugar.  BS up to 280 a couple times in past week or two. He has been giving 8 units Levemir and sometimes doubles up on Glipizide.  He was cautioned about the possible dangers of increasing the glipizide and requested that he contact the office anytime he wants to discuss possible change in medication.    Also has hypertension and hyperlipidemia.  Tolerating his medication.    Has paroxysmal atrial fibrillation and is currently anticoagulated.  He has not had any palpitations.  He remains on Eliquis 2.5 mg twice daily    Has been having some issues with sleep at night.  In addition to sleep hygiene recommendations at last visit we also started him on trazodone 50 mg nightly. He says hasn't helped much.     Reports having \"upset stomach\" with little bit of epigastric discomfort.  Says it ends up with some diarrhea usually.    Reports thickened toenails that he is not able to trim which causing pain in the toes.    Current Outpatient Medications on File Prior to Visit   Medication Sig Dispense Refill    amiodarone (PACERONE) 200 MG tablet Take 1 tablet by mouth Daily.      amLODIPine (NORVASC) 10 MG tablet Take 1 tablet by mouth Daily.      apixaban (ELIQUIS) 2.5 MG tablet tablet Take 1 tablet by mouth 2 (Two) Times a Day.      cholecalciferol (VITAMIN D3) 25 MCG (1000 UT) tablet Take 1 tablet by mouth 2 (Two) Times a Day.      empagliflozin (Jardiance) 25 MG tablet tablet Take 1 tablet by mouth Daily. PATIENT ASSISTANCE MEDICATION 90 tablet 3    ferrous sulfate 325 (65 FE) MG tablet Take 1 tablet by mouth 2 (Two) " Times a Day.      gabapentin (NEURONTIN) 100 MG capsule Take 1 capsule by mouth twice daily 60 capsule 0    glipizide (GLUCOTROL) 5 MG tablet TAKE 1/2 (ONE-HALF) TABLET BY MOUTH TWICE DAILY BEFORE MEAL(S) 90 tablet 0    Glucosamine 500 MG capsule Take 2 capsules by mouth 2 (Two) Times a Day. OTC      insulin detemir (Levemir) 100 UNIT/ML injection Inject 5 Units under the skin into the appropriate area as directed Daily. 3 mL     Insulin Pen Needle (Pen Needles) 32G X 4 MM misc Use 1 each Daily. Dx: E11.9 - use with Lantus 1 time daily. 100 each 2    isosorbide mononitrate (IMDUR) 30 MG 24 hr tablet Take 1 tablet by mouth Daily.      latanoprost (XALATAN) 0.005 % ophthalmic solution As Needed.      levothyroxine (SYNTHROID, LEVOTHROID) 200 MCG tablet Take 1 tablet by mouth once daily 90 tablet 0    lisinopril (PRINIVIL,ZESTRIL) 5 MG tablet Take 1 tablet by mouth Daily. 30 tablet 11    metFORMIN (GLUCOPHAGE) 500 MG tablet TAKE 1 TABLET BY MOUTH TWICE DAILY WITH BREAKFAST AND SUPPER 180 tablet 0    multivitamin (THERAGRAN) tablet tablet Take 1 tablet by mouth Daily.      nitroglycerin (NITROSTAT) 0.4 MG SL tablet nitroglycerin 0.4 mg sublingual tablet, sublingual place 1 tablet (0.4 mg) by buccal route at the first sign of an attack; no more than 3 tabs are recommended within a 15 minute period.   Active      pantoprazole (PROTONIX) 40 MG EC tablet Take 1 tablet by mouth once daily 90 tablet 0    sucralfate (CARAFATE) 1 g tablet Take 1 tablet by mouth 4 times daily 360 tablet 0    traZODone (DESYREL) 50 MG tablet TAKE 1 TABLET BY MOUTH ONCE DAILY AT NIGHT 30 tablet 0    trimethoprim-polymyxin b (POLYTRIM) 96615-3.1 UNIT/ML-% ophthalmic solution INSTILL 1 DROP INTO AFFECTED EYE(S) INTO EACH EYE THREE TIMES DAILY       No current facility-administered medications on file prior to visit.       Review of Systems         Objective   Vital Signs:   /77 (BP Location: Left arm, Patient Position: Sitting)   Pulse 86    "Temp 98.2 °F (36.8 °C) (Temporal)   Ht 172.7 cm (67.99\")   Wt 74 kg (163 lb 3.2 oz)   BMI 24.82 kg/m²     Physical Exam   No acute distress  Hearing aids bilaterally  Cerumen impaction left ear canal  Oral pharynx is clear  No cervical or supraclavicular adenopathy  Heart is irregular with controlled rate with 2/6 systolic murmur  Lungs clear  Abdomen bowel sounds positive, soft, minimal tenderness in the epigastric region, no rebound or guarding, no mass, no organomegaly  Lower extremities without edema  He does have thickened yellow dystrophic toenails with tenderness to compression of the toes    Result Review :   The following data was reviewed by: Adrien Mayer MD on 02/01/2024:    Lipid Panel (11/06/2023 14:27)  Comprehensive Metabolic Panel (11/06/2023 14:27)  CBC Auto Differential (11/06/2023 14:27)  TSH Rfx On Abnormal To Free T4 (11/06/2023 14:27)  POC Glycosylated Hemoglobin (Hb A1C) (02/01/2024 13:54)  POCT SARS-CoV-2 Antigen GELACIO + Flu (02/01/2024 13:18)  POC Medline 12 Panel Urine Drug Screen (02/01/2024 13:11)              Assessment and Plan    Diagnoses and all orders for this visit:    1. Type 2 diabetes mellitus with hyperglycemia, without long-term current use of insulin (Primary)  Assessment & Plan:  His hemoglobin A1c is elevated at 9.2.  I will have him to increase the Levemir to 10 units.  Keep a log of first morning fasting blood sugars and provide us with a copy of that record after a couple weeks.  Cautioned him that he should not double up on his glipizide and that doing so is dangerous.    Orders:  -     POC Glycosylated Hemoglobin (Hb A1C)    2. Primary hypertension  Assessment & Plan:  Blood pressure is borderline.  I will continue on current regimen for now.      3. Paroxysmal atrial fibrillation  Assessment & Plan:  Rate is controlled.  Not aware of any palpitations.  Anticoagulated.      4. Anticoagulated    5. Acquired hypothyroidism  Assessment & Plan:  Lab work from " November look good continue current regimen      6. Mixed hyperlipidemia  Assessment & Plan:  Labs from November look okay continue on current regimen      7. Upper respiratory tract infection, unspecified type  Comments:  Is negative for COVID and flu.  Will treat symptomaticly  Orders:  -     POCT SARS-CoV-2 Antigen GELACIO + Flu    8. High risk medication use  -     POC Medline 12 Panel Urine Drug Screen    9. Impacted cerumen of left ear  -     Ear Cerumen Removal    10. Dyspepsia  -     famotidine (Pepcid) 20 MG tablet; Take 1 tablet by mouth 2 (Two) Times a Day.  Dispense: 60 tablet; Refill: 0    11. Viral upper respiratory tract infection    12. Pain in toes of both feet  Assessment & Plan:  Does have dystrophic thickened yellowish toenails bilaterally worse on the great toes causing pain with compression as a result of the nails      13. Thickened nails  Assessment & Plan:   All 10 nails are trimmed in usual fashion patient tolerated well.          Follow Up   No follow-ups on file.  Patient was given instructions and counseling regarding his condition or for health maintenance advice. Please see specific information pulled into the AVS if appropriate.

## 2024-02-04 NOTE — ASSESSMENT & PLAN NOTE
Does have dystrophic thickened yellowish toenails bilaterally worse on the great toes causing pain with compression as a result of the nails

## 2024-02-19 ENCOUNTER — OFFICE VISIT (OUTPATIENT)
Dept: FAMILY MEDICINE CLINIC | Age: 89
End: 2024-02-19
Payer: MEDICARE

## 2024-02-19 ENCOUNTER — LAB (OUTPATIENT)
Dept: LAB | Facility: HOSPITAL | Age: 89
End: 2024-02-19
Payer: MEDICARE

## 2024-02-19 VITALS
TEMPERATURE: 98.3 F | WEIGHT: 165.4 LBS | BODY MASS INDEX: 25.07 KG/M2 | HEIGHT: 68 IN | SYSTOLIC BLOOD PRESSURE: 117 MMHG | HEART RATE: 86 BPM | DIASTOLIC BLOOD PRESSURE: 66 MMHG | OXYGEN SATURATION: 98 %

## 2024-02-19 DIAGNOSIS — I48.0 PAROXYSMAL ATRIAL FIBRILLATION: ICD-10-CM

## 2024-02-19 DIAGNOSIS — N18.31 STAGE 3A CHRONIC KIDNEY DISEASE: ICD-10-CM

## 2024-02-19 DIAGNOSIS — Z00.00 MEDICARE ANNUAL WELLNESS VISIT, SUBSEQUENT: Primary | ICD-10-CM

## 2024-02-19 DIAGNOSIS — Z12.5 SCREENING FOR PROSTATE CANCER: ICD-10-CM

## 2024-02-19 DIAGNOSIS — E11.65 TYPE 2 DIABETES MELLITUS WITH HYPERGLYCEMIA, WITHOUT LONG-TERM CURRENT USE OF INSULIN: ICD-10-CM

## 2024-02-19 DIAGNOSIS — Z79.01 ANTICOAGULATED: ICD-10-CM

## 2024-02-19 DIAGNOSIS — D50.9 IRON DEFICIENCY ANEMIA, UNSPECIFIED IRON DEFICIENCY ANEMIA TYPE: ICD-10-CM

## 2024-02-19 DIAGNOSIS — E03.9 ACQUIRED HYPOTHYROIDISM: ICD-10-CM

## 2024-02-19 DIAGNOSIS — Z85.46 HISTORY OF PROSTATE CANCER: ICD-10-CM

## 2024-02-19 DIAGNOSIS — E78.2 MIXED HYPERLIPIDEMIA: ICD-10-CM

## 2024-02-19 DIAGNOSIS — I10 PRIMARY HYPERTENSION: ICD-10-CM

## 2024-02-19 LAB
ALBUMIN SERPL-MCNC: 4.2 G/DL (ref 3.5–5.2)
ALBUMIN UR-MCNC: 4.6 MG/DL
ALBUMIN/GLOB SERPL: 1.5 G/DL
ALP SERPL-CCNC: 111 U/L (ref 39–117)
ALT SERPL W P-5'-P-CCNC: 15 U/L (ref 1–41)
ANION GAP SERPL CALCULATED.3IONS-SCNC: 10.6 MMOL/L (ref 5–15)
AST SERPL-CCNC: 17 U/L (ref 1–40)
BASOPHILS # BLD AUTO: 0.02 10*3/MM3 (ref 0–0.2)
BASOPHILS NFR BLD AUTO: 0.4 % (ref 0–1.5)
BILIRUB SERPL-MCNC: 0.3 MG/DL (ref 0–1.2)
BUN SERPL-MCNC: 27 MG/DL (ref 8–23)
BUN/CREAT SERPL: 15.3 (ref 7–25)
CALCIUM SPEC-SCNC: 9.5 MG/DL (ref 8.6–10.5)
CHLORIDE SERPL-SCNC: 108 MMOL/L (ref 98–107)
CHOLEST SERPL-MCNC: 214 MG/DL (ref 0–200)
CO2 SERPL-SCNC: 22.4 MMOL/L (ref 22–29)
CREAT SERPL-MCNC: 1.76 MG/DL (ref 0.76–1.27)
CREAT UR-MCNC: 49.2 MG/DL
DEPRECATED RDW RBC AUTO: 47.5 FL (ref 37–54)
EGFRCR SERPLBLD CKD-EPI 2021: 36.7 ML/MIN/1.73
EOSINOPHIL # BLD AUTO: 0.18 10*3/MM3 (ref 0–0.4)
EOSINOPHIL NFR BLD AUTO: 3.3 % (ref 0.3–6.2)
ERYTHROCYTE [DISTWIDTH] IN BLOOD BY AUTOMATED COUNT: 13.5 % (ref 12.3–15.4)
FERRITIN SERPL-MCNC: 96.1 NG/ML (ref 30–400)
GLOBULIN UR ELPH-MCNC: 2.8 GM/DL
GLUCOSE SERPL-MCNC: 171 MG/DL (ref 65–99)
HBA1C MFR BLD: 7.6 % (ref 4.8–5.6)
HCT VFR BLD AUTO: 41.7 % (ref 37.5–51)
HDLC SERPL-MCNC: 58 MG/DL (ref 40–60)
HGB BLD-MCNC: 13.2 G/DL (ref 13–17.7)
IMM GRANULOCYTES # BLD AUTO: 0.01 10*3/MM3 (ref 0–0.05)
IMM GRANULOCYTES NFR BLD AUTO: 0.2 % (ref 0–0.5)
IRON 24H UR-MRATE: 99 MCG/DL (ref 59–158)
IRON SATN MFR SERPL: 28 % (ref 20–50)
LDLC SERPL CALC-MCNC: 114 MG/DL (ref 0–100)
LDLC/HDLC SERPL: 1.86 {RATIO}
LYMPHOCYTES # BLD AUTO: 2.11 10*3/MM3 (ref 0.7–3.1)
LYMPHOCYTES NFR BLD AUTO: 39 % (ref 19.6–45.3)
MCH RBC QN AUTO: 30 PG (ref 26.6–33)
MCHC RBC AUTO-ENTMCNC: 31.7 G/DL (ref 31.5–35.7)
MCV RBC AUTO: 94.8 FL (ref 79–97)
MICROALBUMIN/CREAT UR: 93.5 MG/G (ref 0–29)
MONOCYTES # BLD AUTO: 0.45 10*3/MM3 (ref 0.1–0.9)
MONOCYTES NFR BLD AUTO: 8.3 % (ref 5–12)
NEUTROPHILS NFR BLD AUTO: 2.64 10*3/MM3 (ref 1.7–7)
NEUTROPHILS NFR BLD AUTO: 48.8 % (ref 42.7–76)
PLATELET # BLD AUTO: 163 10*3/MM3 (ref 140–450)
PMV BLD AUTO: 10.9 FL (ref 6–12)
POTASSIUM SERPL-SCNC: 5.1 MMOL/L (ref 3.5–5.2)
PROT SERPL-MCNC: 7 G/DL (ref 6–8.5)
PSA SERPL-MCNC: <0.014 NG/ML (ref 0–4)
RBC # BLD AUTO: 4.4 10*6/MM3 (ref 4.14–5.8)
SODIUM SERPL-SCNC: 141 MMOL/L (ref 136–145)
TIBC SERPL-MCNC: 353 MCG/DL (ref 298–536)
TRANSFERRIN SERPL-MCNC: 237 MG/DL (ref 200–360)
TRIGL SERPL-MCNC: 241 MG/DL (ref 0–150)
TSH SERPL DL<=0.05 MIU/L-ACNC: 2.32 UIU/ML (ref 0.27–4.2)
VLDLC SERPL-MCNC: 42 MG/DL (ref 5–40)
WBC NRBC COR # BLD AUTO: 5.41 10*3/MM3 (ref 3.4–10.8)

## 2024-02-19 PROCEDURE — 82043 UR ALBUMIN QUANTITATIVE: CPT | Performed by: FAMILY MEDICINE

## 2024-02-19 PROCEDURE — G0439 PPPS, SUBSEQ VISIT: HCPCS | Performed by: FAMILY MEDICINE

## 2024-02-19 PROCEDURE — 1170F FXNL STATUS ASSESSED: CPT | Performed by: FAMILY MEDICINE

## 2024-02-19 PROCEDURE — 84443 ASSAY THYROID STIM HORMONE: CPT | Performed by: FAMILY MEDICINE

## 2024-02-19 PROCEDURE — 80061 LIPID PANEL: CPT | Performed by: FAMILY MEDICINE

## 2024-02-19 PROCEDURE — 84466 ASSAY OF TRANSFERRIN: CPT | Performed by: FAMILY MEDICINE

## 2024-02-19 PROCEDURE — 36415 COLL VENOUS BLD VENIPUNCTURE: CPT | Performed by: FAMILY MEDICINE

## 2024-02-19 PROCEDURE — 99214 OFFICE O/P EST MOD 30 MIN: CPT | Performed by: FAMILY MEDICINE

## 2024-02-19 PROCEDURE — 82570 ASSAY OF URINE CREATININE: CPT | Performed by: FAMILY MEDICINE

## 2024-02-19 PROCEDURE — 80053 COMPREHEN METABOLIC PANEL: CPT | Performed by: FAMILY MEDICINE

## 2024-02-19 PROCEDURE — 83540 ASSAY OF IRON: CPT | Performed by: FAMILY MEDICINE

## 2024-02-19 PROCEDURE — 82728 ASSAY OF FERRITIN: CPT | Performed by: FAMILY MEDICINE

## 2024-02-19 PROCEDURE — 83036 HEMOGLOBIN GLYCOSYLATED A1C: CPT | Performed by: FAMILY MEDICINE

## 2024-02-19 PROCEDURE — G0103 PSA SCREENING: HCPCS | Performed by: FAMILY MEDICINE

## 2024-02-19 PROCEDURE — 85025 COMPLETE CBC W/AUTO DIFF WBC: CPT | Performed by: FAMILY MEDICINE

## 2024-02-19 NOTE — PROGRESS NOTES
The ABCs of the Annual Wellness Visit  Subsequent Medicare Wellness Visit    Subjective    Darci Munson is a 88 y.o. male who presents for a Subsequent Medicare Wellness Visit.    The following portions of the patient's history were reviewed and   updated as appropriate: allergies, current medications, past family history, past medical history, past social history, past surgical history, and problem list.    Compared to one year ago, the patient feels his physical   health is the same.    Compared to one year ago, the patient feels his mental   health is the same.    Recent Hospitalizations:  He was not admitted to the hospital during the last year.       Current Medical Providers:  Patient Care Team:  Adrien Mayer MD as PCP - General (Family Medicine)  Cristal Montiel APRN as Nurse Practitioner (Urology)  Paulo Peterson APRN as Nurse Practitioner (Cardiology)  Jamal Rodriguez OD as Consulting Physician (Optometry)  Ruma Alegre MD as Consulting Physician (Dermatology)  Nikita Thakur MD as Consulting Physician (General Surgery)  Jennifer Mann MD as Consulting Physician (Gastroenterology)    Outpatient Medications Prior to Visit   Medication Sig Dispense Refill    amiodarone (PACERONE) 200 MG tablet Take 1 tablet by mouth Daily.      amLODIPine (NORVASC) 10 MG tablet Take 1 tablet by mouth Daily.      apixaban (ELIQUIS) 2.5 MG tablet tablet Take 1 tablet by mouth 2 (Two) Times a Day.      cholecalciferol (VITAMIN D3) 25 MCG (1000 UT) tablet Take 1 tablet by mouth 2 (Two) Times a Day.      empagliflozin (Jardiance) 25 MG tablet tablet Take 1 tablet by mouth Daily. PATIENT ASSISTANCE MEDICATION 90 tablet 3    famotidine (Pepcid) 20 MG tablet Take 1 tablet by mouth 2 (Two) Times a Day. 60 tablet 0    ferrous sulfate 325 (65 FE) MG tablet Take 1 tablet by mouth 2 (Two) Times a Day.      gabapentin (NEURONTIN) 100 MG capsule Take 1 capsule by mouth twice daily 60 capsule 0     glipizide (GLUCOTROL) 5 MG tablet TAKE 1/2 (ONE-HALF) TABLET BY MOUTH TWICE DAILY BEFORE MEAL(S) 90 tablet 0    Glucosamine 500 MG capsule Take 2 capsules by mouth 2 (Two) Times a Day. OTC      insulin detemir (Levemir) 100 UNIT/ML injection Inject 5 Units under the skin into the appropriate area as directed Daily. 3 mL     Insulin Pen Needle (Pen Needles) 32G X 4 MM misc Use 1 each Daily. Dx: E11.9 - use with Lantus 1 time daily. 100 each 2    isosorbide mononitrate (IMDUR) 30 MG 24 hr tablet Take 1 tablet by mouth Daily.      latanoprost (XALATAN) 0.005 % ophthalmic solution As Needed.      levothyroxine (SYNTHROID, LEVOTHROID) 200 MCG tablet Take 1 tablet by mouth once daily 90 tablet 0    lisinopril (PRINIVIL,ZESTRIL) 5 MG tablet Take 1 tablet by mouth Daily. 30 tablet 11    metFORMIN (GLUCOPHAGE) 500 MG tablet TAKE 1 TABLET BY MOUTH TWICE DAILY WITH BREAKFAST AND SUPPER 180 tablet 0    multivitamin (THERAGRAN) tablet tablet Take 1 tablet by mouth Daily.      pantoprazole (PROTONIX) 40 MG EC tablet Take 1 tablet by mouth once daily 90 tablet 0    sucralfate (CARAFATE) 1 g tablet Take 1 tablet by mouth 4 times daily 360 tablet 0    traZODone (DESYREL) 50 MG tablet TAKE 1 TABLET BY MOUTH ONCE DAILY AT NIGHT 30 tablet 0    nitroglycerin (NITROSTAT) 0.4 MG SL tablet nitroglycerin 0.4 mg sublingual tablet, sublingual place 1 tablet (0.4 mg) by buccal route at the first sign of an attack; no more than 3 tabs are recommended within a 15 minute period.   Active (Patient not taking: Reported on 2/19/2024)      trimethoprim-polymyxin b (POLYTRIM) 84975-5.1 UNIT/ML-% ophthalmic solution INSTILL 1 DROP INTO AFFECTED EYE(S) INTO EACH EYE THREE TIMES DAILY (Patient not taking: Reported on 2/19/2024)       No facility-administered medications prior to visit.       No opioid medication identified on active medication list. I have reviewed chart for other potential  high risk medication/s and harmful drug interactions in the  "elderly.        Aspirin is not on active medication list.  Aspirin use is not indicated based on review of current medical condition/s. Risk of harm outweighs potential benefits.  .    Patient Active Problem List   Diagnosis    Type 2 diabetes mellitus with hyperglycemia, without long-term current use of insulin    Chronic kidney disease    Hypothyroidism    Hypertensive disorder    Hyperlipidemia    Paroxysmal atrial fibrillation    Dyspepsia    Coronary arteriosclerosis    Gastroparesis    Hearing loss    Mitral valve regurgitation    Peripheral neuropathy    Prostate CA    Primary insomnia    Primary osteoarthritis of left knee    Anticoagulated    Vitamin D insufficiency    Iron deficiency anemia    Medicare annual wellness visit, subsequent    Thickened nails    COVID-19 virus infection    Foreign body of right ear    Pain in toes of both feet    Viral upper respiratory tract infection    History of prostate cancer     Advance Care Planning   Advance Care Planning     Advance Directive is on file.  ACP discussion was held with the patient during this visit. Patient has an advance directive in EMR which is still valid.      Objective    Vitals:    02/19/24 0924   BP: 117/66   BP Location: Left arm   Patient Position: Sitting   Cuff Size: Adult   Pulse: 86   Temp: 98.3 °F (36.8 °C)   TempSrc: Temporal   SpO2: 98%  Comment: room air   Weight: 75 kg (165 lb 6.4 oz)   Height: 172.7 cm (67.99\")     Estimated body mass index is 25.16 kg/m² as calculated from the following:    Height as of this encounter: 172.7 cm (67.99\").    Weight as of this encounter: 75 kg (165 lb 6.4 oz).    BMI is within normal parameters. No other follow-up for BMI required.      Does the patient have evidence of cognitive impairment? No    Lab Results   Component Value Date    HGBA1C 9.2 (A) 02/01/2024        HEALTH RISK ASSESSMENT    Smoking Status:  Social History     Tobacco Use   Smoking Status Former    Packs/day: 1.00    Years: 27.00    " Additional pack years: 0.00    Total pack years: 27.00    Types: Cigarettes    Start date:     Quit date: 1970    Years since quittin.1    Passive exposure: Never   Smokeless Tobacco Never     Alcohol Consumption:  Social History     Substance and Sexual Activity   Alcohol Use Never     Fall Risk Screen:    KATHRYN Fall Risk Assessment was completed, and patient is at LOW risk for falls.Assessment completed on:2024    Depression Screenin/19/2024     9:28 AM   PHQ-2/PHQ-9 Depression Screening   Little Interest or Pleasure in Doing Things 0-->not at all   Feeling Down, Depressed or Hopeless 1-->several days   PHQ-9: Brief Depression Severity Measure Score 1       Health Habits and Functional and Cognitive Screenin/19/2024     9:29 AM   Functional & Cognitive Status   Do you have difficulty preparing food and eating? No   Do you have difficulty bathing yourself, getting dressed or grooming yourself? No   Do you have difficulty using the toilet? No   Do you have difficulty moving around from place to place? No   Do you have trouble with steps or getting out of a bed or a chair? No   Current Diet Well Balanced Diet   Dental Exam Up to date   Eye Exam Up to date   Exercise (times per week) 0 times per week   Current Exercises Include No Regular Exercise   Do you need help using the phone?  No   Are you deaf or do you have serious difficulty hearing?  Yes   Do you need help to go to places out of walking distance? No   Do you need help shopping? No   Do you need help preparing meals?  No   Do you need help with housework?  No   Do you need help with laundry? No   Do you need help taking your medications? No   Do you need help managing money? No   Do you ever drive or ride in a car without wearing a seat belt? Yes   Have you felt unusual stress, anger or loneliness in the last month? Yes   Who do you live with? Alone   If you need help, do you have trouble finding someone available to you? No    Have you been bothered in the last four weeks by sexual problems? No   Do you have difficulty concentrating, remembering or making decisions? Yes       Age-appropriate Screening Schedule:  Refer to the list below for future screening recommendations based on patient's age, sex and/or medical conditions. Orders for these recommended tests are listed in the plan section. The patient has been provided with a written plan.    Health Maintenance   Topic Date Due    RSV Vaccine - Adults (1 - 1-dose 60+ series) Never done    BMI FOLLOWUP  12/01/2022    COVID-19 Vaccine (7 - 2023-24 season) 09/01/2023    URINE MICROALBUMIN  12/12/2023    ANNUAL WELLNESS VISIT  02/17/2024    DIABETIC EYE EXAM  04/19/2024    HEMOGLOBIN A1C  08/01/2024    LIPID PANEL  11/06/2024    TDAP/TD VACCINES (2 - Tdap) 01/14/2030    INFLUENZA VACCINE  Completed    Pneumococcal Vaccine 65+  Completed    ZOSTER VACCINE  Completed                  CMS Preventative Services Quick Reference  Risk Factors Identified During Encounter  Fall Risk-High or Moderate: Discussed Fall Prevention in the home  The above risks/problems have been discussed with the patient.  Pertinent information has been shared with the patient in the After Visit Summary.  An After Visit Summary and PPPS were made available to the patient.    Follow Up:   Next Medicare Wellness visit to be scheduled in 1 year.       Additional E&M Note during same encounter follows:  Patient has multiple medical problems which are significant and separately identifiable that require additional work above and beyond the Medicare Wellness Visit.      Chief Complaint  Medicare Wellness-subsequent    Subjective        HPI  Darci Munson is also being seen today for other health issues as noted below      Has diabetes recently seen in caution not to double up on his glipizide.  Did increase his Levemir to 10 units.  Brings in a log of blood sugars with a.m. readings between 103 up to 179.     Also has  "hypertension hyperlipidemia.    Paroxysmal atrial fibrillation anticoagulated with Eliquis 2.5 mg twice daily.    Has been having some trouble with sleep was started on trazodone.    He has had surgery for prostate cancer in the past.  Says that PSA levels remained elevated so he also took radiation treatment.  It has been a while since she has had a follow-up PSA.    Objective   Vital Signs:  /66 (BP Location: Left arm, Patient Position: Sitting, Cuff Size: Adult)   Pulse 86   Temp 98.3 °F (36.8 °C) (Temporal)   Ht 172.7 cm (67.99\")   Wt 75 kg (165 lb 6.4 oz)   SpO2 98% Comment: room air  BMI 25.16 kg/m²     Physical Exam     Pleasant gentleman no distress  Color is good  Hearing aids bilaterally  Tympanic membranes clear  Heart is regular rhythm with occasional ectopy does have 2/6 systolic murmur  Lungs with good air movement no wheeze or crackles  Abdomen bowel sounds positive, soft, nontender, no mass, no organomegaly  Lower extremities without edema  Neurologic without gross lateralizing focal deficit                       Assessment and Plan   Diagnoses and all orders for this visit:    1. Medicare annual wellness visit, subsequent (Primary)  Assessment & Plan:  We reviewed the preventive service recommendations and created an individualized handout.  He does stay by himself much of the day and says no one calls him on a regular basis.  Emphasized the importance of him getting some kind of lifeline type device.  Informed him about the mobi emergency alert device available at [employer].      2. Type 2 diabetes mellitus with hyperglycemia, without long-term current use of insulin  Assessment & Plan:  He has done well with the increased dose of Levemir.  His blood sugar log is acceptable.  Will check hemoglobin A1c and predicate further changes based on those results     Orders:  -     Hemoglobin A1c  -     Microalbumin / Creatinine Urine Ratio - Urine, Clean Catch    3. Primary " hypertension  Assessment & Plan:  Blood pressure looks great continue current regimen     Orders:  -     Comprehensive Metabolic Panel  -     CBC Auto Differential    4. Mixed hyperlipidemia  Assessment & Plan:  Check lab predicate change based on result     Orders:  -     Lipid Panel    5. Acquired hypothyroidism  -     TSH Rfx On Abnormal To Free T4    6. Paroxysmal atrial fibrillation  Assessment & Plan:  He is not aware of any palpitations.  Anticoagulated.      7. Anticoagulated    8. Stage 3a chronic kidney disease  Assessment & Plan:  Follow-up labs today       9. History of prostate cancer  Assessment & Plan:  Will get a follow-up PSA level      10. Screening for prostate cancer  -     PSA Screen    11. Iron deficiency anemia, unspecified iron deficiency anemia type  -     Iron Profile  -     Ferritin             Follow Up   No follow-ups on file.  Patient was given instructions and counseling regarding his condition or for health maintenance advice. Please see specific information pulled into the AVS if appropriate.

## 2024-02-19 NOTE — ASSESSMENT & PLAN NOTE
He has done well with the increased dose of Levemir.  His blood sugar log is acceptable.  Will check hemoglobin A1c and predicate further changes based on those results

## 2024-02-19 NOTE — ASSESSMENT & PLAN NOTE
We reviewed the preventive service recommendations and created an individualized handout.  He does stay by himself much of the day and says no one calls him on a regular basis.  Emphasized the importance of him getting some kind of lifeline type device.  Informed him about the mobi emergency alert device available at [facility].

## 2024-02-26 DIAGNOSIS — E11.42 TYPE 2 DIABETES MELLITUS WITH DIABETIC POLYNEUROPATHY, WITHOUT LONG-TERM CURRENT USE OF INSULIN: ICD-10-CM

## 2024-02-26 DIAGNOSIS — E11.42 TYPE 2 DIABETES MELLITUS WITH POLYNEUROPATHY: ICD-10-CM

## 2024-02-27 RX ORDER — GABAPENTIN 100 MG/1
100 CAPSULE ORAL EVERY 12 HOURS SCHEDULED
Qty: 60 CAPSULE | Refills: 0 | Status: SHIPPED | OUTPATIENT
Start: 2024-02-27

## 2024-04-19 ENCOUNTER — OFFICE VISIT (OUTPATIENT)
Dept: FAMILY MEDICINE CLINIC | Age: 89
End: 2024-04-19
Payer: MEDICARE

## 2024-04-19 ENCOUNTER — LAB (OUTPATIENT)
Dept: LAB | Facility: HOSPITAL | Age: 89
End: 2024-04-19
Payer: MEDICARE

## 2024-04-19 VITALS
HEIGHT: 68 IN | WEIGHT: 162 LBS | OXYGEN SATURATION: 93 % | DIASTOLIC BLOOD PRESSURE: 61 MMHG | BODY MASS INDEX: 24.55 KG/M2 | HEART RATE: 80 BPM | SYSTOLIC BLOOD PRESSURE: 99 MMHG

## 2024-04-19 DIAGNOSIS — E11.65 TYPE 2 DIABETES MELLITUS WITH HYPERGLYCEMIA, WITHOUT LONG-TERM CURRENT USE OF INSULIN: Primary | ICD-10-CM

## 2024-04-19 DIAGNOSIS — I10 PRIMARY HYPERTENSION: ICD-10-CM

## 2024-04-19 DIAGNOSIS — L60.2 THICKENED NAILS: ICD-10-CM

## 2024-04-19 DIAGNOSIS — M79.675 PAIN IN TOES OF BOTH FEET: ICD-10-CM

## 2024-04-19 DIAGNOSIS — Z79.01 ANTICOAGULATED: ICD-10-CM

## 2024-04-19 DIAGNOSIS — I48.0 PAROXYSMAL ATRIAL FIBRILLATION: ICD-10-CM

## 2024-04-19 DIAGNOSIS — N18.32 STAGE 3B CHRONIC KIDNEY DISEASE: ICD-10-CM

## 2024-04-19 DIAGNOSIS — E78.2 MIXED HYPERLIPIDEMIA: ICD-10-CM

## 2024-04-19 DIAGNOSIS — M79.674 PAIN IN TOES OF BOTH FEET: ICD-10-CM

## 2024-04-19 DIAGNOSIS — E03.9 ACQUIRED HYPOTHYROIDISM: ICD-10-CM

## 2024-04-19 PROCEDURE — 80048 BASIC METABOLIC PNL TOTAL CA: CPT | Performed by: FAMILY MEDICINE

## 2024-04-19 PROCEDURE — 36415 COLL VENOUS BLD VENIPUNCTURE: CPT | Performed by: FAMILY MEDICINE

## 2024-04-19 RX ORDER — PREDNISOLONE ACETATE 10 MG/ML
SUSPENSION/ DROPS OPHTHALMIC
COMMUNITY
Start: 2024-02-27 | End: 2024-04-19 | Stop reason: SINTOL

## 2024-04-19 RX ORDER — DULAGLUTIDE 0.75 MG/.5ML
INJECTION, SOLUTION SUBCUTANEOUS
COMMUNITY
Start: 2024-03-18

## 2024-04-19 NOTE — PROGRESS NOTES
Chief Complaint  Diabetes (Diabetic shoes form )    Subjective          Darci Munson presents to [facility]  History of Present Illness  Patient is accompanied today by his daughter, Nya    Reports that his diabetes is doing pretty good blood sugars usually running 140s to 150s.  This morning however his blood sugar was 180s.  He had been off of Trulicity lately due to some GI disturbance and cost of the medication.  Says that he recently did get the medicine again however.    Checks his blood pressure occasionally at home and usually good in the 120 range.  Does not have issues with lightheadedness or dizziness.    Has atrial fibrillation but not aware of any palpitations.  Follows with cardiology.    He did say that his nails are causing his toes to have discomfort again.    We reviewed the review the decline in his renal function.      Current Outpatient Medications on File Prior to Visit   Medication Sig Dispense Refill    amiodarone (PACERONE) 200 MG tablet Take 1 tablet by mouth Daily.      amLODIPine (NORVASC) 10 MG tablet Take 1 tablet by mouth Daily.      apixaban (ELIQUIS) 2.5 MG tablet tablet Take 1 tablet by mouth 2 (Two) Times a Day.      cholecalciferol (VITAMIN D3) 25 MCG (1000 UT) tablet Take 1 tablet by mouth 2 (Two) Times a Day.      empagliflozin (Jardiance) 25 MG tablet tablet Take 1 tablet by mouth Daily. PATIENT ASSISTANCE MEDICATION 90 tablet 3    famotidine (Pepcid) 20 MG tablet Take 1 tablet by mouth 2 (Two) Times a Day. 60 tablet 0    ferrous sulfate 325 (65 FE) MG tablet Take 1 tablet by mouth 2 (Two) Times a Day.      gabapentin (NEURONTIN) 100 MG capsule Take 1 capsule by mouth twice daily 60 capsule 0    glipizide (GLUCOTROL) 5 MG tablet TAKE 1/2 (ONE-HALF) TABLET BY MOUTH TWICE DAILY BEFORE MEAL(S) 90 tablet 0    Glucosamine 500 MG capsule Take 2 capsules by mouth 2 (Two) Times a Day. OTC      insulin detemir (Levemir) 100 UNIT/ML injection Inject 5  "Units under the skin into the appropriate area as directed Daily. 3 mL     Insulin Pen Needle (Pen Needles) 32G X 4 MM misc Use 1 each Daily. Dx: E11.9 - use with Lantus 1 time daily. 100 each 2    isosorbide mononitrate (IMDUR) 30 MG 24 hr tablet Take 1 tablet by mouth Daily.      latanoprost (XALATAN) 0.005 % ophthalmic solution As Needed.      levothyroxine (SYNTHROID, LEVOTHROID) 200 MCG tablet Take 1 tablet by mouth once daily 90 tablet 0    lisinopril (PRINIVIL,ZESTRIL) 5 MG tablet Take 1 tablet by mouth Daily. 30 tablet 11    metFORMIN (GLUCOPHAGE) 500 MG tablet TAKE 1 TABLET BY MOUTH TWICE DAILY WITH BREAKFAST AND WITH SUPPER 180 tablet 0    multivitamin (THERAGRAN) tablet tablet Take 1 tablet by mouth Daily.      nitroglycerin (NITROSTAT) 0.4 MG SL tablet       pantoprazole (PROTONIX) 40 MG EC tablet Take 1 tablet by mouth once daily 90 tablet 0    prednisoLONE acetate (PRED FORTE) 1 % ophthalmic suspension INSTILL 1 DROP INTO RIGHT EYE 4 TIMES DAILY      sucralfate (CARAFATE) 1 g tablet Take 1 tablet by mouth 4 times daily 360 tablet 0    traZODone (DESYREL) 50 MG tablet TAKE 1 TABLET BY MOUTH ONCE DAILY AT NIGHT 30 tablet 0    trimethoprim-polymyxin b (POLYTRIM) 73538-7.1 UNIT/ML-% ophthalmic solution       Trulicity 0.75 MG/0.5ML solution pen-injector INJECT 0.75 MG UNDER THE SKIN INTO THE APPROPRIATE AREA ONCE WEEKLY AS DIRECTED (Patient not taking: Reported on 4/19/2024)       No current facility-administered medications on file prior to visit.       Review of Systems         Objective   Vital Signs:   BP 99/61 (BP Location: Left arm, Patient Position: Sitting, Cuff Size: Adult)   Pulse 80   Ht 172.7 cm (67.99\")   Wt 73.5 kg (162 lb)   SpO2 93%   BMI 24.64 kg/m²     Physical Exam     Pleasant gentleman no distress  Bilateral hearing aids  Eyes are nonicteric  Tympanic membranes clear  Oropharynx clear  No cervical or supraclavicular adenopathy  Heart is irregular with controlled rate, Musical " murmur  Lungs good air movement without wheeze or crackles  Lower extremities without edema  Dorsalis pedis posterior tibial pulses are good bilaterally  Monofilament testing good 5/5 bilaterally  Second digit hammertoe bilateral, was red spot lateral pinkie toe bilat  Second digit right toe somewhat bulbous with some bruising at the DIP where he had stubbed his toe recently.      Result Review :                     Assessment and Plan    Diagnoses and all orders for this visit:    1. Type 2 diabetes mellitus with hyperglycemia, without long-term current use of insulin (Primary)  Assessment & Plan:  His blood is a been doing okay and I am satisfied with his hemoglobin A1c.  He says he is going to restart Trulicity or check with the pharmacy and find out if on the other GLP-1 medications will be less expensive for him.      2. Primary hypertension  Assessment & Plan:  Ask him to follow blood pressure and provide us with a log of readings       3. Acquired hypothyroidism  Comments:  Last labs look okay continue current regimen    4. Mixed hyperlipidemia  Assessment & Plan:  Reviewed most recent labs continue on current regimen       5. Paroxysmal atrial fibrillation  Assessment & Plan:  Currently is asymptomatic.  Anticoagulated.  Continue to follow with cardiology.      6. Stage 3b chronic kidney disease  Assessment & Plan:  It follow-up BMP today.  Depending on results may need referral to nephrology.      Orders:  -     Basic Metabolic Panel    7. Pain in toes of both feet  Comments:  Trim nails for relief    8. Thickened nails  Comments:  All 10 toenails were individually trimmed.  Tolerated procedure without complication    9. Anticoagulated        Follow Up   No follow-ups on file.  Patient was given instructions and counseling regarding his condition or for health maintenance advice. Please see specific information pulled into the AVS if appropriate.

## 2024-04-19 NOTE — ASSESSMENT & PLAN NOTE
His blood is a been doing okay and I am satisfied with his hemoglobin A1c.  He says he is going to restart Trulicity or check with the pharmacy and find out if on the other GLP-1 medications will be less expensive for him.

## 2024-04-20 LAB
ANION GAP SERPL CALCULATED.3IONS-SCNC: 12.5 MMOL/L (ref 5–15)
BUN SERPL-MCNC: 27 MG/DL (ref 8–23)
BUN/CREAT SERPL: 15 (ref 7–25)
CALCIUM SPEC-SCNC: 9.8 MG/DL (ref 8.6–10.5)
CHLORIDE SERPL-SCNC: 106 MMOL/L (ref 98–107)
CO2 SERPL-SCNC: 22.5 MMOL/L (ref 22–29)
CREAT SERPL-MCNC: 1.8 MG/DL (ref 0.76–1.27)
EGFRCR SERPLBLD CKD-EPI 2021: 35.8 ML/MIN/1.73
GLUCOSE SERPL-MCNC: 116 MG/DL (ref 65–99)
POTASSIUM SERPL-SCNC: 5.3 MMOL/L (ref 3.5–5.2)
SODIUM SERPL-SCNC: 141 MMOL/L (ref 136–145)

## 2024-04-27 DIAGNOSIS — K29.70 GASTRITIS, PRESENCE OF BLEEDING UNSPECIFIED, UNSPECIFIED CHRONICITY, UNSPECIFIED GASTRITIS TYPE: ICD-10-CM

## 2024-04-27 DIAGNOSIS — F51.01 PRIMARY INSOMNIA: ICD-10-CM

## 2024-04-29 RX ORDER — TRAZODONE HYDROCHLORIDE 50 MG/1
50 TABLET ORAL NIGHTLY
Qty: 90 TABLET | Refills: 0 | Status: SHIPPED | OUTPATIENT
Start: 2024-04-29

## 2024-04-29 RX ORDER — PANTOPRAZOLE SODIUM 40 MG/1
TABLET, DELAYED RELEASE ORAL
Qty: 90 TABLET | Refills: 0 | Status: SHIPPED | OUTPATIENT
Start: 2024-04-29

## 2024-04-30 DIAGNOSIS — E11.42 TYPE 2 DIABETES MELLITUS WITH POLYNEUROPATHY: ICD-10-CM

## 2024-04-30 DIAGNOSIS — E03.9 ACQUIRED HYPOTHYROIDISM: ICD-10-CM

## 2024-04-30 RX ORDER — GLIPIZIDE 5 MG/1
TABLET ORAL
Qty: 90 TABLET | Refills: 1 | Status: SHIPPED | OUTPATIENT
Start: 2024-04-30

## 2024-04-30 RX ORDER — LEVOTHYROXINE SODIUM 0.2 MG/1
TABLET ORAL
Qty: 90 TABLET | Refills: 1 | Status: SHIPPED | OUTPATIENT
Start: 2024-04-30

## 2024-05-02 ENCOUNTER — TELEPHONE (OUTPATIENT)
Dept: FAMILY MEDICINE CLINIC | Age: 89
End: 2024-05-02
Payer: MEDICARE

## 2024-05-02 DIAGNOSIS — E11.42 TYPE 2 DIABETES MELLITUS WITH DIABETIC POLYNEUROPATHY, WITHOUT LONG-TERM CURRENT USE OF INSULIN: ICD-10-CM

## 2024-05-02 RX ORDER — GABAPENTIN 100 MG/1
100 CAPSULE ORAL EVERY 12 HOURS SCHEDULED
Qty: 60 CAPSULE | Refills: 2 | Status: SHIPPED | OUTPATIENT
Start: 2024-05-02

## 2024-05-02 NOTE — TELEPHONE ENCOUNTER
Caller: [employer]    Relationship:     Best call back number: [phone], OPT 3     What is the best time to reach you: ANYTIME     Who are you requesting to speak with (clinical staff, provider,  specific staff member): CLINICAL       What was the call regarding: [employer] [employer] IS CALLING TO CONFIRM IF A FAX WAS RECEIVED, FOR PATIENT MEDICAL SUPPLIES.

## 2024-05-13 ENCOUNTER — TELEPHONE (OUTPATIENT)
Dept: FAMILY MEDICINE CLINIC | Age: 89
End: 2024-05-13
Payer: MEDICARE

## 2024-05-13 NOTE — TELEPHONE ENCOUNTER
Caller: [employer]    Relationship: [employer]    Best call back number: [phone] OPTION 3    What is the best time to reach you: ANYTIME 8:30-5 EASTERN TIME    Who are you requesting to speak with (clinical staff, provider,  specific staff member):     Do you know the name of the person who called: ANETA    What was the call regarding: ANETA CALLED TO SEE IF YOU HAVE RECEIVED FAXES FOR THIS PATIENT TO HAVE A GLUCOSE MONITOR. THREE FAXES WERE SENT NOT SHOWING ON THE CHART 04/12/2024 AND 05/02/2024 AND 05/10/2024. PLEASE CALL HER BACK WITH AN UPDATE IF THESE FAXES HAVE BEEN RECEIVED OR NOT AND STATUS. THEIR FAX NUMBER [phone]. THIS IS DIRECT FAX TO [facility] AND [facility].    Is it okay if the provider responds through [employer]: N/A

## 2024-05-20 DIAGNOSIS — K29.70 GASTRITIS, PRESENCE OF BLEEDING UNSPECIFIED, UNSPECIFIED CHRONICITY, UNSPECIFIED GASTRITIS TYPE: ICD-10-CM

## 2024-05-20 RX ORDER — SUCRALFATE 1 G/1
TABLET ORAL
Qty: 360 TABLET | Refills: 0 | Status: SHIPPED | OUTPATIENT
Start: 2024-05-20

## 2024-05-20 RX ORDER — PANTOPRAZOLE SODIUM 40 MG/1
TABLET, DELAYED RELEASE ORAL
Qty: 90 TABLET | Refills: 0 | OUTPATIENT
Start: 2024-05-20

## 2024-05-30 ENCOUNTER — LAB (OUTPATIENT)
Dept: LAB | Facility: HOSPITAL | Age: 89
End: 2024-05-30
Payer: MEDICARE

## 2024-05-30 ENCOUNTER — HOSPITAL ENCOUNTER (OUTPATIENT)
Dept: GENERAL RADIOLOGY | Facility: HOSPITAL | Age: 89
Discharge: HOME | End: 2024-05-30
Payer: MEDICARE

## 2024-05-30 ENCOUNTER — OFFICE VISIT (OUTPATIENT)
Dept: FAMILY MEDICINE CLINIC | Age: 89
End: 2024-05-30
Payer: MEDICARE

## 2024-05-30 VITALS
OXYGEN SATURATION: 95 % | WEIGHT: 162.8 LBS | HEIGHT: 68 IN | BODY MASS INDEX: 24.67 KG/M2 | TEMPERATURE: 98.5 F | DIASTOLIC BLOOD PRESSURE: 60 MMHG | SYSTOLIC BLOOD PRESSURE: 96 MMHG | HEART RATE: 110 BPM

## 2024-05-30 DIAGNOSIS — R15.2 FECAL URGENCY: ICD-10-CM

## 2024-05-30 DIAGNOSIS — G89.29 CHRONIC BILATERAL LOW BACK PAIN WITHOUT SCIATICA: Primary | ICD-10-CM

## 2024-05-30 DIAGNOSIS — E83.51 HYPOCALCEMIA: ICD-10-CM

## 2024-05-30 DIAGNOSIS — S41.101D WOUND OF RIGHT UPPER EXTREMITY, SUBSEQUENT ENCOUNTER: ICD-10-CM

## 2024-05-30 DIAGNOSIS — M54.50 CHRONIC BILATERAL LOW BACK PAIN WITHOUT SCIATICA: Primary | ICD-10-CM

## 2024-05-30 DIAGNOSIS — E11.65 TYPE 2 DIABETES MELLITUS WITH HYPERGLYCEMIA, WITHOUT LONG-TERM CURRENT USE OF INSULIN: ICD-10-CM

## 2024-05-30 DIAGNOSIS — G89.29 CHRONIC BILATERAL LOW BACK PAIN WITHOUT SCIATICA: ICD-10-CM

## 2024-05-30 DIAGNOSIS — M54.50 CHRONIC BILATERAL LOW BACK PAIN WITHOUT SCIATICA: ICD-10-CM

## 2024-05-30 PROBLEM — S41.101A WOUND OF RIGHT UPPER EXTREMITY: Status: ACTIVE | Noted: 2024-05-30

## 2024-05-30 LAB
ANION GAP SERPL CALCULATED.3IONS-SCNC: 12.4 MMOL/L (ref 5–15)
BUN SERPL-MCNC: 24 MG/DL (ref 8–23)
BUN/CREAT SERPL: 15.4 (ref 7–25)
CALCIUM SPEC-SCNC: 9.9 MG/DL (ref 8.6–10.5)
CHLORIDE SERPL-SCNC: 107 MMOL/L (ref 98–107)
CO2 SERPL-SCNC: 22.6 MMOL/L (ref 22–29)
CREAT SERPL-MCNC: 1.56 MG/DL (ref 0.76–1.27)
EGFRCR SERPLBLD CKD-EPI 2021: 42.5 ML/MIN/1.73
GLUCOSE SERPL-MCNC: 143 MG/DL (ref 65–99)
HBA1C MFR BLD: 7 % (ref 4.8–5.6)
POTASSIUM SERPL-SCNC: 5 MMOL/L (ref 3.5–5.2)
PTH-INTACT SERPL-MCNC: 52.1 PG/ML (ref 15–65)
SODIUM SERPL-SCNC: 142 MMOL/L (ref 136–145)

## 2024-05-30 PROCEDURE — 72100 X-RAY EXAM L-S SPINE 2/3 VWS: CPT

## 2024-05-30 PROCEDURE — 80048 BASIC METABOLIC PNL TOTAL CA: CPT

## 2024-05-30 PROCEDURE — 36415 COLL VENOUS BLD VENIPUNCTURE: CPT

## 2024-05-30 PROCEDURE — 74018 RADEX ABDOMEN 1 VIEW: CPT

## 2024-05-30 PROCEDURE — 83970 ASSAY OF PARATHORMONE: CPT

## 2024-05-30 PROCEDURE — 83036 HEMOGLOBIN GLYCOSYLATED A1C: CPT

## 2024-05-30 PROCEDURE — 99214 OFFICE O/P EST MOD 30 MIN: CPT | Performed by: NURSE PRACTITIONER

## 2024-05-30 PROCEDURE — 1126F AMNT PAIN NOTED NONE PRSNT: CPT | Performed by: NURSE PRACTITIONER

## 2024-05-30 RX ORDER — CLOTRIMAZOLE 1 %
1 CREAM (GRAM) TOPICAL 2 TIMES DAILY
COMMUNITY
Start: 2024-05-27

## 2024-05-30 NOTE — PROGRESS NOTES
"Chief Complaint  Laceration (Fall on Tuesday, Skin tear of RT arm. /[facility] ) and Diarrhea (Uncontrollable BM's , currently taking carafate BID )    Subjective          Darci Munson presents to [facility]     Patient is 88-year-old male who is here today to follow-up on a recent emergency room visit on May 27.  He was working on fencing and tripped.  Fell hit his forehead slightly hyperextended his neck and received a cut to the right lower arm.  His last tetanus shot was 4 years ago.  He has applied antibiotic ointment to the wound of his arm and some mild stiff neck has improved.  He had CT imaging of the head and neck [facility] that was negative.  X-ray of the right lower arm was negative.  Calcium level mildly low at 8.1.  He was given a cream for balanitis and was advised to talk over some low back pain for 1 year with primary care.  Patient denies any injury related to the low back pain but he is concerned it may be contributing to some fecal urgency at times.  Formed bowel movements do not involve urgency but he occasionally has some loose stools that involve urgency.  He denies any numbness or tingling or pain radiating into his legs or arms.     Objective   Vital Signs:   Vitals:    05/30/24 1316   BP: 96/60   BP Location: Left arm   Patient Position: Sitting   Cuff Size: Adult   Pulse: 110   Temp: 98.5 °F (36.9 °C)   TempSrc: Temporal   SpO2: 95%   Weight: 73.8 kg (162 lb 12.8 oz)   Height: 172.7 cm (67.99\")       Wt Readings from Last 3 Encounters:   06/03/24 72.9 kg (160 lb 12.8 oz)   05/30/24 73.8 kg (162 lb 12.8 oz)   04/19/24 73.5 kg (162 lb)      BP Readings from Last 3 Encounters:   06/03/24 (!) 89/60   05/30/24 96/60   04/19/24 99/61       Body mass index is 24.76 kg/m².    BMI is within normal parameters. No other follow-up for BMI required.       Physical Exam  Vitals reviewed.   Constitutional:       General: He is not in acute distress.     " Appearance: Normal appearance. He is well-developed.   HENT:      Head:     Cardiovascular:      Rate and Rhythm: Normal rate and regular rhythm.      Heart sounds: Normal heart sounds.   Pulmonary:      Effort: Pulmonary effort is normal.      Breath sounds: Normal breath sounds.   Musculoskeletal:        Arms:       Right lower leg: No edema.      Left lower leg: No edema.   Skin:     General: Skin is warm and dry.   Neurological:      General: No focal deficit present.      Mental Status: He is alert.   Psychiatric:         Attention and Perception: Attention normal.         Mood and Affect: Mood and affect normal.         Behavior: Behavior normal.           Current Outpatient Medications:     amiodarone (PACERONE) 200 MG tablet, Take 1 tablet by mouth Daily., Disp: , Rfl:     cholecalciferol (VITAMIN D3) 25 MCG (1000 UT) tablet, Take 1 tablet by mouth 2 (Two) Times a Day., Disp: , Rfl:     empagliflozin (Jardiance) 25 MG tablet tablet, Take 1 tablet by mouth Daily. PATIENT ASSISTANCE MEDICATION, Disp: 90 tablet, Rfl: 3    ferrous sulfate 325 (65 FE) MG tablet, Take 1 tablet by mouth 2 (Two) Times a Day., Disp: , Rfl:     gabapentin (NEURONTIN) 100 MG capsule, Take 1 capsule by mouth twice daily, Disp: 60 capsule, Rfl: 2    Glucosamine 500 MG capsule, Take 2 capsules by mouth 2 (Two) Times a Day. OTC, Disp: , Rfl:     insulin detemir (Levemir) 100 UNIT/ML injection, Inject 5 Units under the skin into the appropriate area as directed Daily., Disp: 3 mL, Rfl:     Insulin Pen Needle (Pen Needles) 32G X 4 MM misc, Use 1 each Daily. Dx: E11.9 - use with Lantus 1 time daily., Disp: 100 each, Rfl: 2    isosorbide mononitrate (IMDUR) 30 MG 24 hr tablet, Take 1 tablet by mouth Daily., Disp: , Rfl:     latanoprost (XALATAN) 0.005 % ophthalmic solution, As Needed., Disp: , Rfl:     levothyroxine (SYNTHROID, LEVOTHROID) 200 MCG tablet, Take 1 tablet by mouth once daily, Disp: 90 tablet, Rfl: 1    lisinopril  (PRINIVIL,ZESTRIL) 5 MG tablet, Take 1 tablet by mouth Daily., Disp: 30 tablet, Rfl: 11    metFORMIN (GLUCOPHAGE) 500 MG tablet, TAKE 1 TABLET BY MOUTH TWICE DAILY WITH BREAKFAST AND WITH SUPPER, Disp: 180 tablet, Rfl: 0    multivitamin (THERAGRAN) tablet tablet, Take 1 tablet by mouth Daily., Disp: , Rfl:     nitroglycerin (NITROSTAT) 0.4 MG SL tablet, , Disp: , Rfl:     pantoprazole (PROTONIX) 40 MG EC tablet, Take 1 tablet by mouth once daily, Disp: 90 tablet, Rfl: 0    sucralfate (CARAFATE) 1 g tablet, Take 1 tablet by mouth 4 times daily, Disp: 360 tablet, Rfl: 0    traZODone (DESYREL) 50 MG tablet, TAKE 1 TABLET BY MOUTH ONCE DAILY AT NIGHT, Disp: 90 tablet, Rfl: 0    amLODIPine (NORVASC) 10 MG tablet, Take 1 tablet by mouth Daily. (Patient not taking: Reported on 5/30/2024), Disp: , Rfl:     apixaban (ELIQUIS) 2.5 MG tablet tablet, Take 1 tablet by mouth 2 (Two) Times a Day. (Patient not taking: Reported on 5/30/2024), Disp: , Rfl:     cephalexin (Keflex) 500 MG capsule, Take 1 capsule by mouth 2 (Two) Times a Day for 7 days., Disp: 14 capsule, Rfl: 0    clotrimazole (LOTRIMIN) 1 % cream, Apply 1 Application topically to the appropriate area as directed 2 (Two) Times a Day. (Patient not taking: Reported on 5/30/2024), Disp: , Rfl:     glipizide (GLUCOTROL) 5 MG tablet, TAKE 1/2 (ONE-HALF) TABLET BY MOUTH TWICE DAILY BEFORE MEAL(S) (Patient not taking: Reported on 5/30/2024), Disp: 90 tablet, Rfl: 1    mupirocin (BACTROBAN) 2 % ointment, Apply 1 Application topically to the appropriate area as directed 3 (Three) Times a Day for 7 days., Disp: 21 g, Rfl: 0   Past Medical History:   Diagnosis Date    Anemia, unspecified     Atherosclerotic heart disease of native coronary artery without angina pectoris     CAD (coronary artery disease)     CKD (chronic kidney disease), stage III     Essential (primary) hypertension     Gastric ulcer, unspecified as acute or chronic, without hemorrhage or perforation      Gastroparesis     GERD without esophagitis     Glaucoma     Hereditary and idiopathic neuropathy, unspecified     History of falling     HLD (hyperlipidemia)     HTN (hypertension)     Hypotension, unspecified     Hypothyroidism     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    Irritable bowel syndrome without diarrhea     Laceration without foreign body of right forearm, initial encounter     Low back pain     Malignant neoplasm of prostate     Mixed hyperlipidemia     OA (osteoarthritis)     Other specified disorders of the skin and subcutaneous tissue     Pain in left foot     Peripheral vascular disease, unspecified     Phimosis     Presence of unspecified artificial knee joint     Primary insomnia     Primary osteoarthritis of both knees     Prostate cancer     Sensorineural hearing loss (SNHL) of both ears     Sigmoid diverticulosis     severe sigmoid and descending colon diverticulosis and 3+ gastritis    Sleep related leg cramps     Type 2 diabetes mellitus with diabetic polyneuropathy     and gastroparesis    Unspecified atrial fibrillation     Unspecified dementia without behavioral disturbance     Unspecified hearing loss, bilateral      Allergies   Allergen Reactions    Meloxicam Irritability    Nsaids Hives               Result Review :     Common labs          2/19/2024    10:20 4/19/2024    15:51 5/30/2024    15:01   Common Labs   Glucose 171  116  143    BUN 27  27  24    Creatinine 1.76  1.80  1.56    Sodium 141  141  142    Potassium 5.1  5.3  5.0    Chloride 108  106  107    Calcium 9.5  9.8  9.9    Albumin 4.2      Total Bilirubin 0.3      Alkaline Phosphatase 111      AST (SGOT) 17      ALT (SGPT) 15      WBC 5.41      Hemoglobin 13.2      Hematocrit 41.7      Platelets 163      Total Cholesterol 214      Triglycerides 241      HDL Cholesterol 58      LDL Cholesterol  114      Hemoglobin A1C 7.60   7.00    Microalbumin, Urine 4.6      PSA <0.014           XR forearm 2 views right    Result Date: 5/28/2024  No  acute bony abnormality. Images reviewed, interpreted, and dictated by Adria Kiran DO    CT Head Without Contrast    Result Date: 2024  No acute intracranial hemorrhage or large acute cortical infarct. No acute fracture or malalignment of the cervical spine. Images personally reviewed, interpreted and dictated by NELSON Wilcox M.D.    CT cervical spine without contrast    Result Date: 2024  No acute intracranial hemorrhage or large acute cortical infarct. No acute fracture or malalignment of the cervical spine. Images personally reviewed, interpreted and dictated by NELSON Wilcox M.D.             Social History     Tobacco Use   Smoking Status Former    Current packs/day: 0.00    Average packs/day: 1 pack/day for 27.0 years (27.0 ttl pk-yrs)    Types: Cigarettes    Start date:     Quit date:     Years since quittin.4    Passive exposure: Never   Smokeless Tobacco Never           Assessment and Plan    Diagnoses and all orders for this visit:    1. Chronic bilateral low back pain without sciatica (Primary)  Assessment & Plan:  Further treatment recommendations pending x-ray results.  Consider physical therapy and MRI if not improving.  Patient denies any symptoms of sciatica or nerve related symptoms currently.    Orders:  -     XR Spine Lumbar 2 or 3 View; Future    2. Fecal urgency  Assessment & Plan:  Checking x-ray to rule out obstruction or constipation.  Further treatment recommendations pending x-ray results.    Orders:  -     XR Abdomen KUB; Future    3. Wound of right upper extremity, subsequent encounter  Assessment & Plan:  Area cleaned with peroxide and sterile water and pressure dressing applied with some antibiotic ointment.  Keep area clean and dry and apply mupirocin sparingly.  Report any symptoms of infection if they were to occur.    Orders:  -     mupirocin (BACTROBAN) 2 % ointment; Apply 1 Application topically to the appropriate area as directed 3 (Three) Times a Day  for 7 days.  Dispense: 21 g; Refill: 0    4. Hypocalcemia  -     Basic metabolic panel; Future  -     PTH, Intact; Future    5. Type 2 diabetes mellitus with hyperglycemia, without long-term current use of insulin  -     Hemoglobin A1c; Future        Follow Up    No follow-ups on file.  Patient was given instructions and counseling regarding his condition or for health maintenance advice. Please see specific information pulled into the AVS if appropriate.

## 2024-06-03 ENCOUNTER — OFFICE VISIT (OUTPATIENT)
Dept: FAMILY MEDICINE CLINIC | Age: 89
End: 2024-06-03
Payer: MEDICARE

## 2024-06-03 VITALS
TEMPERATURE: 97.8 F | HEART RATE: 83 BPM | WEIGHT: 160.8 LBS | HEIGHT: 68 IN | DIASTOLIC BLOOD PRESSURE: 60 MMHG | SYSTOLIC BLOOD PRESSURE: 89 MMHG | BODY MASS INDEX: 24.37 KG/M2 | OXYGEN SATURATION: 96 %

## 2024-06-03 DIAGNOSIS — S41.101D WOUND OF RIGHT UPPER EXTREMITY, SUBSEQUENT ENCOUNTER: Primary | ICD-10-CM

## 2024-06-03 PROCEDURE — 1160F RVW MEDS BY RX/DR IN RCRD: CPT | Performed by: NURSE PRACTITIONER

## 2024-06-03 PROCEDURE — 99213 OFFICE O/P EST LOW 20 MIN: CPT | Performed by: NURSE PRACTITIONER

## 2024-06-03 PROCEDURE — 1126F AMNT PAIN NOTED NONE PRSNT: CPT | Performed by: NURSE PRACTITIONER

## 2024-06-03 PROCEDURE — 1159F MED LIST DOCD IN RCRD: CPT | Performed by: NURSE PRACTITIONER

## 2024-06-03 RX ORDER — CEPHALEXIN 500 MG/1
500 CAPSULE ORAL 2 TIMES DAILY
Qty: 14 CAPSULE | Refills: 0 | Status: SHIPPED | OUTPATIENT
Start: 2024-06-03 | End: 2024-06-10

## 2024-06-03 NOTE — PROGRESS NOTES
"Chief Complaint  Arm Injury (Pt states that he fell last weekend and scraped his right arm )    Natanael Munson presents to [facility]  Wound Infection  Episode onset: 5/27/2024. The problem has been gradually worsening. Pertinent negatives include no chills, fever, numbness or weakness. Treatments tried: Topical Rx. The treatment provided no relief.   Pt reports tripping over metal fence and acquiring a laceration on Right Forearm. Present to ED on day of incident and recently saw Bonifacio MAYBERRY on 5/30/2024 for evaluation. Pt lives home alone and reports daughter changed his dressing on 6/2/2024 evening.    Objective   Vital Signs:  BP (!) 89/60 (BP Location: Left arm, Patient Position: Sitting, Cuff Size: Adult)   Pulse 83   Temp 97.8 °F (36.6 °C) (Temporal)   Ht 172.7 cm (67.99\")   Wt 72.9 kg (160 lb 12.8 oz)   SpO2 96% Comment: room air  BMI 24.46 kg/m²   Estimated body mass index is 24.46 kg/m² as calculated from the following:    Height as of this encounter: 172.7 cm (67.99\").    Weight as of this encounter: 72.9 kg (160 lb 12.8 oz).       BMI is within normal parameters. No other follow-up for BMI required.      Physical Exam  Vitals reviewed. Exam conducted with a chaperone present (seen with NP student Ilene Weaver).   Constitutional:       Appearance: Normal appearance.   HENT:      Mouth/Throat:      Mouth: Mucous membranes are dry.   Cardiovascular:      Heart sounds: Murmur heard.   Pulmonary:      Effort: Pulmonary effort is normal.      Breath sounds: Normal breath sounds.   Musculoskeletal:         General: Normal range of motion.      Comments: Denies tingling or numbness of right arm and hand. Full ROM noted. Serosanguineous and purulent drainage present.    Skin:         Neurological:      General: No focal deficit present.      Mental Status: He is alert.   Psychiatric:         Behavior: Behavior is cooperative.     R Forearm clensed with " [address]  [address], [address]  [place], [address] [place] [address] Street  Phone: [phone]  Fax: [phone]  PROGRESS NOTE  Patient: Benjamin Couch                MRN: [id number]       SSN: [id number]  YOB: 1968        AGE: 50 y.o. SEX: female  Body mass index is 34.61 kg/(m^2). PCP: Rubin Liao MD  06/21/17    Chief Complaint   Patient presents with    Neck Pain     FOLLOW UP       HISTORY OF PRESENT ILLNESS:  Benjamin Couch is a 50 y.o.  female with history of neck pain, arm pain and numbness of the arm(s) on the right who had ACDF C4-6 surgery 4 MONTHS ago. Her arm pain has completely resolved, she now just reports some neck stiffness and muscle spasms with throbbing and no radiation of pain Pain is worse with looking up, looking down and affects sleep. Pain is better with massage, relaxation, pain medication and heating pad. Her neck pain, arm pain, shooting pains in the arm(s) and numbness of the arm(s) has significantly improved since surgery. States she has been using Flexeril PRN QHS with moderate, complete. Patient denies any bladder/bowel dysfunction, new onset weakness, saddle paresthesia, new onset radiculopathy or nerve pain, or othelr neurological deficits. Reports that she is swallowing without difficulty. Has been prescribed Flexeril at this visit. Pt works as a  and has been back at work. Patient desires to proceed with evaluation of neck pain    ASSESSMENT   Naina Bello was seen today for neck pain. Diagnoses and all orders for this visit:    S/P cervical spinal fusion  -     [62871] C Spine 2-3V    Cervical spinal stenosis  -     [79158] C Spine 2-3V    Other orders  -     cyclobenzaprine (FLEXERIL) 10 mg tablet; Take 1 Tab by mouth nightly as needed for Muscle Spasm(s). IMPRESSION AND PLAN:.     1) Pt was given information on neck exercises.   2) Reviewed activity, Neck exercise HEP  3) Refill Flexeril-rays  4) Ms. Jerriac Dc has a reminder for a \"due or due soon\" health maintenance. I have asked that she contact her primary care provider, Ellyn Washington MD, for follow-up on this health maintenance. 5) We have informed the patient to notify us for immediate appointment if he has any worsening neurogical symptoms or if an emergency situation presents, then call 911  6)  demonstrated consistency with prescribing. 7) Pt will follow-up in 2-3 months for another set of x-rays  8) She will f/o with her PCP regarding Chantix and trying to quit smoking. SUBJECTIVE    Smoking Status Current Everyday smoker    Pain Scale: 0 - No pain/10    Pain Assessment  6/21/2017   Location of Pain Back   Pain Location Comment -   Location Modifiers -   Severity of Pain 0   Quality of Pain -   Duration of Pain -   Frequency of Pain -   Aggravating Factors -   Aggravating Factors Comment -   Limiting Behavior -   Relieving Factors -   Relieving Factors Comment -   Result of Injury -           REVIEW OF SYSTEMS  Constitutional: Negative for fever, chills, or weight change. Respiratory: Negative for cough or shortness of breath. Cardiovascular: Negative for chest pain or palpitations. Gastrointestinal: Negative for incontinence, acid reflux, change in bowel habits, or constipation. Genitourinary: Negative for incontinence, dysuria and flank pain. Musculoskeletal: Positive for neck pain. See HPI. Skin: Negative for rash. Neurological:No radiculopathy, Negative. See HPI. Endo/Heme/Allergies: Negative. Psychiatric/Behavioral: Negative. PHYSICAL EXAMINATION  Visit Vitals    /79    Pulse 89    Temp 98.1 °F (36.7 °C) (Oral)    Resp 18    Wt 189 lb 3.2 oz (85.8 kg)    SpO2 98%    BMI 34.61 kg/m2         Accompanied by self. Constitutional:  Well developed, well nourished, in no acute distress. Psychiatric: Affect and mood are appropriate.    Integumentary: No rashes or abrasions noted Normal Saline. Vaseline Gauze with nonadherant dressing placed over laceration. Wrapped with Kerlix and Coban.   Result Review :                     Assessment and Plan     Diagnoses and all orders for this visit:    1. Wound of right upper extremity, subsequent encounter (Primary)  Comments:  Keflex Rx. Pt to contact office for any new or worsening symtptoms. Dressing change everyother day or when saturation is present. F/u apt in one week.  Orders:  -     cephalexin (Keflex) 500 MG capsule; Take 1 capsule by mouth 2 (Two) Times a Day for 7 days.  Dispense: 14 capsule; Refill: 0             Follow Up     Return if symptoms worsen or fail to improve.  Patient was given instructions and counseling regarding his condition or for health maintenance advice. Please see specific information pulled into the AVS if appropriate.          on exposed areas. Wound: Anterior neck wound is completely healed well, no drainage, no erythema, no hernia, no seroma, no swelling, no dehiscence, incision well approximated. Cardiovascular/Peripheral Vascular: +2 radial & pedal pulses. No peripheral edema is noted. Lymphatic:  No evidence of lymphedema. No cervical lymphadenopathy. SPINE/MUSCULOSKELETAL EXAM    Cervical spine:      Neck is midline. Normal muscle tone. No focal atrophy is noted. Shoulder ROM intact. Neck ROM intact with flexion, extension, turning right, turning left. Tenderness to palpation posterior neck and trapezius. Negative Boyle's sign. Sensation grossly intact to light touch. MOTOR:     Biceps Triceps Deltoids Wrist Ext Wrist Flex Hand Intrin   Right 5/5 5/5 5/5 5/5 5/5 5/5   Left 5/5 5/5 5/5 5/5 5/5 5/5           normal straight walking, normal turn around and normal tandem walk    Ambulation without assistive device. FWB. IMAGING: x-rays obtained and will be read by Dr. Meryl Novak   Past Medical History:   Diagnosis Date    Psychiatric disorder     depression       Past Surgical History:   Procedure Laterality Date    HX GYN      hysterectomy    HX GYN      c/s   . MEDICATIONS      Current Outpatient Prescriptions   Medication Sig Dispense Refill    cyclobenzaprine (FLEXERIL) 10 mg tablet Take 1 Tab by mouth nightly as needed for Muscle Spasm(s). 30 Tab 2    fexofenadine-pseudoephedrine (ALLEGRA-D 12 HOUR)  mg Tb12 Take 1 Tab by mouth every twelve (12) hours.  melatonin tab tablet Take 10 mg by mouth nightly.  buPROPion SR (WELLBUTRIN SR) 150 mg SR tablet Take 150 mg by mouth two (2) times a day.       doxycycline (VIBRA-TABS) 100 mg tablet TAKE 1 TABLET BY MOUTH TWICE A DAY  0    acetaminophen-codeine (TYLENOL #3) 300-30 mg per tablet TAKE 1 TABLET BY MOUTH EVERY 3-4 HOURS AS NEEDED FOR PAIN  0    amoxicillin 500 mg tab TAKE 2 TABLETS BY MOUTH AT BEDTIME TONIGHT,THEN TAKE 1 TABLET BY MOUTH EVERY 6 HOURS UNTIL GONE  0    CHANTIX STARTING MONTH BOX 0.5 mg (11)- 1 mg (42) DsPk AS DIRECTED ORALLY  0    HYDROcodone-acetaminophen (NORCO) 5-325 mg per tablet Take 1 Tab by mouth every eight (8) hours as needed for Pain. Max Daily Amount: 3 Tabs. 90 Tab 0    methylPREDNISolone (MEDROL DOSEPACK) 4 mg tablet Per dose pack instructions 21 Tab 0    oxyCODONE-acetaminophen (PERCOCET 10)  mg per tablet Take 1 Tab by mouth every six (6) hours as needed. Max Daily Amount: 4 Tabs. (Patient not taking: Reported on 3/9/2017) 40 Tab 0    varenicline (CHANTIX CONTINUING MONTH YVONNE) 1 mg tablet Take 1 mg by mouth two (2) times daily (after meals).  HYDROcodone-acetaminophen (NORCO) 5-325 mg per tablet       gabapentin (NEURONTIN) 600 mg tablet Take 1 Tab by mouth four (4) times daily. 120 Tab 1        ALLERGIES  No Known Allergies       SOCIAL HISTORY    Social History     Social History    Marital status:      Spouse name: N/A    Number of children: N/A    Years of education: N/A     Occupational History    Not on file.      Social History Main Topics    Smoking status: Current Every Day Smoker     Packs/day: 0.50    Smokeless tobacco: Never Used    Alcohol use Yes      Comment: socially    Drug use: Not on file    Sexual activity: Not on file     Other Topics Concern    Not on file     Social History Narrative       FAMILY HISTORY    Family History   Problem Relation Age of Onset    No Known Problems Mother     No Known Problems Father     No Known Problems Sister     No Known Problems Brother          Mary Grace Blanton NP

## 2024-06-03 NOTE — PROGRESS NOTES
Please call patient and advise that he is constipated which could contribute to fecal urgency.  I recommend miralax 1 capful daily until he has consistently loose stools then stop and use miralax prn.  He could also take colace prn as a stool softener.

## 2024-06-03 NOTE — PROGRESS NOTES
Calcium level and parathyroid hormone levels are normal.  Kidney function stable to slightly improved.  Diabetes control is improving.

## 2024-06-06 NOTE — ASSESSMENT & PLAN NOTE
Area cleaned with peroxide and sterile water and pressure dressing applied with some antibiotic ointment.  Keep area clean and dry and apply mupirocin sparingly.  Report any symptoms of infection if they were to occur.

## 2024-06-06 NOTE — ASSESSMENT & PLAN NOTE
Checking x-ray to rule out obstruction or constipation.  Further treatment recommendations pending x-ray results.

## 2024-06-06 NOTE — ASSESSMENT & PLAN NOTE
Further treatment recommendations pending x-ray results.  Consider physical therapy and MRI if not improving.  Patient denies any symptoms of sciatica or nerve related symptoms currently.

## 2024-06-10 ENCOUNTER — TELEPHONE (OUTPATIENT)
Dept: FAMILY MEDICINE CLINIC | Age: 89
End: 2024-06-10
Payer: MEDICARE

## 2024-06-10 ENCOUNTER — OFFICE VISIT (OUTPATIENT)
Dept: FAMILY MEDICINE CLINIC | Age: 89
End: 2024-06-10
Payer: MEDICARE

## 2024-06-10 VITALS
DIASTOLIC BLOOD PRESSURE: 65 MMHG | HEIGHT: 68 IN | SYSTOLIC BLOOD PRESSURE: 118 MMHG | HEART RATE: 77 BPM | BODY MASS INDEX: 24.28 KG/M2 | OXYGEN SATURATION: 100 % | TEMPERATURE: 97.7 F | WEIGHT: 160.2 LBS

## 2024-06-10 DIAGNOSIS — S41.101D WOUND OF RIGHT UPPER EXTREMITY, SUBSEQUENT ENCOUNTER: Primary | ICD-10-CM

## 2024-06-10 PROCEDURE — 1126F AMNT PAIN NOTED NONE PRSNT: CPT | Performed by: NURSE PRACTITIONER

## 2024-06-10 PROCEDURE — 1160F RVW MEDS BY RX/DR IN RCRD: CPT | Performed by: NURSE PRACTITIONER

## 2024-06-10 PROCEDURE — 1159F MED LIST DOCD IN RCRD: CPT | Performed by: NURSE PRACTITIONER

## 2024-06-10 PROCEDURE — 99212 OFFICE O/P EST SF 10 MIN: CPT | Performed by: NURSE PRACTITIONER

## 2024-06-10 NOTE — TELEPHONE ENCOUNTER
Daughter states that when pt saw LJ last week, she had some questions about some of his meds.  Jennifer do you remember what you were wanting to know?

## 2024-06-10 NOTE — TELEPHONE ENCOUNTER
Caller: Felicia Davalos    Relationship: Emergency Contact    Best call back number:     [phone]       What is the best time to reach you: ANY    Who are you requesting to speak with (clinical staff, provider,  specific staff member): SHAWANDA DIMAS    Do you know the name of the person who called: FELICIA BLEDSOE    What was the call regarding: STATES THAT SHAWANDA DIMAS HAD SOME QUESTIONS REGARDING MEDICATION THAT PATIENT WAS ON AND WAS CALLING TO DISCUSS.     Is it okay if the provider responds through MyChart: CALL

## 2024-06-10 NOTE — TELEPHONE ENCOUNTER
Caller: Sharon Davalos MARIO    Relationship: Emergency Contact    Best call back number:     [phone]      Caller requesting test results: POA    What test was performed: X-RAY    When was the test performed: 5.30.2024    Where was the test performed: [facility]    Additional notes: PLEASE ADVISE RESULTS FROM X-RAY

## 2024-06-10 NOTE — PROGRESS NOTES
"Chief Complaint  Wound Check    Natanael Munson presents to [facility]  Wound Check  He was originally treated more than 14 days ago. Previous treatment included wound cleansing or irrigation. There has been clear discharge from the wound. The redness has improved. The swelling has improved. The pain has improved.       Objective   Vital Signs:  /65 (BP Location: Left arm, Patient Position: Sitting, Cuff Size: Adult)   Pulse 77   Temp 97.7 °F (36.5 °C) (Oral)   Ht 172.7 cm (67.99\")   Wt 72.7 kg (160 lb 3.2 oz)   SpO2 100% Comment: room air  BMI 24.37 kg/m²   Estimated body mass index is 24.37 kg/m² as calculated from the following:    Height as of this encounter: 172.7 cm (67.99\").    Weight as of this encounter: 72.7 kg (160 lb 3.2 oz).       BMI is within normal parameters. No other follow-up for BMI required.      Physical Exam  HENT:      Head: Normocephalic.   Cardiovascular:      Rate and Rhythm: Normal rate.   Pulmonary:      Effort: Pulmonary effort is normal.   Skin:     General: Skin is warm and dry.      Findings: Wound present.          Neurological:      Mental Status: He is alert and oriented to person, place, and time.   Psychiatric:         Mood and Affect: Mood normal.        Result Review :                     Assessment and Plan     Diagnoses and all orders for this visit:    1. Wound of right upper extremity, subsequent encounter (Primary)  Comments:  healing well, finish antibiotic, monitor for signs of infection, F/U as needed             Follow Up     Return in 8 days (on 6/18/2024), or if symptoms worsen or fail to improve, for Next scheduled follow up with PCP.  Patient was given instructions and counseling regarding his condition or for health maintenance advice. Please see specific information pulled into the AVS if appropriate.         "

## 2024-06-10 NOTE — TELEPHONE ENCOUNTER
Spoke to daughter, she was calling re: results from last visit and they received them today in the mail.

## 2024-06-11 NOTE — TELEPHONE ENCOUNTER
I do not recall having questions about medication when I saw him may 30 other than verifying medication list.   He last saw jesus manuel ball on [address].  Will share message with jesus manuel ball.

## 2024-06-18 ENCOUNTER — OFFICE VISIT (OUTPATIENT)
Dept: FAMILY MEDICINE CLINIC | Age: 89
End: 2024-06-18
Payer: MEDICARE

## 2024-06-18 VITALS
HEIGHT: 68 IN | BODY MASS INDEX: 24.13 KG/M2 | SYSTOLIC BLOOD PRESSURE: 115 MMHG | WEIGHT: 159.2 LBS | DIASTOLIC BLOOD PRESSURE: 60 MMHG | TEMPERATURE: 98.5 F | HEART RATE: 83 BPM

## 2024-06-18 DIAGNOSIS — S41.101D WOUND OF RIGHT UPPER EXTREMITY, SUBSEQUENT ENCOUNTER: Primary | ICD-10-CM

## 2024-06-18 DIAGNOSIS — I10 PRIMARY HYPERTENSION: ICD-10-CM

## 2024-06-18 PROCEDURE — 99212 OFFICE O/P EST SF 10 MIN: CPT | Performed by: FAMILY MEDICINE

## 2024-06-18 PROCEDURE — 1126F AMNT PAIN NOTED NONE PRSNT: CPT | Performed by: FAMILY MEDICINE

## 2024-06-18 PROCEDURE — G2211 COMPLEX E/M VISIT ADD ON: HCPCS | Performed by: FAMILY MEDICINE

## 2024-06-18 NOTE — ASSESSMENT & PLAN NOTE
Despite the extra stress of the wound his blood pressure continues to look okay continue current regimen

## 2024-06-18 NOTE — ASSESSMENT & PLAN NOTE
Wound was cleaned.  Dressing changed.  Wound was all but totally healed.  Will put a bandage on again today after another day or 2 I think it is good to be healed enough that we will not need any more dressings.  No need to schedule return visit unless begins to look worse.

## 2024-06-18 NOTE — PROGRESS NOTES
Chief Complaint  Wound of right upper extremity, subsequent encounter (1wk f/u)    Natanael Munson presents to [facility]  History of Present Illness    Current Outpatient Medications on File Prior to Visit   Medication Sig Dispense Refill    amiodarone (PACERONE) 200 MG tablet Take 1 tablet by mouth Daily.      amLODIPine (NORVASC) 10 MG tablet Take 1 tablet by mouth Daily.      apixaban (ELIQUIS) 2.5 MG tablet tablet Take 1 tablet by mouth 2 (Two) Times a Day.      cholecalciferol (VITAMIN D3) 25 MCG (1000 UT) tablet Take 1 tablet by mouth 2 (Two) Times a Day.      clotrimazole (LOTRIMIN) 1 % cream Apply 1 Application topically to the appropriate area as directed 2 (Two) Times a Day.      empagliflozin (Jardiance) 25 MG tablet tablet Take 1 tablet by mouth Daily. PATIENT ASSISTANCE MEDICATION 90 tablet 3    ferrous sulfate 325 (65 FE) MG tablet Take 1 tablet by mouth 2 (Two) Times a Day.      gabapentin (NEURONTIN) 100 MG capsule Take 1 capsule by mouth twice daily 60 capsule 2    glipizide (GLUCOTROL) 5 MG tablet TAKE 1/2 (ONE-HALF) TABLET BY MOUTH TWICE DAILY BEFORE MEAL(S) 90 tablet 1    Glucosamine 500 MG capsule Take 2 capsules by mouth 2 (Two) Times a Day. OTC      insulin detemir (Levemir) 100 UNIT/ML injection Inject 5 Units under the skin into the appropriate area as directed Daily. 3 mL     Insulin Pen Needle (Pen Needles) 32G X 4 MM misc Use 1 each Daily. Dx: E11.9 - use with Lantus 1 time daily. 100 each 2    isosorbide mononitrate (IMDUR) 30 MG 24 hr tablet Take 1 tablet by mouth Daily.      latanoprost (XALATAN) 0.005 % ophthalmic solution As Needed.      levothyroxine (SYNTHROID, LEVOTHROID) 200 MCG tablet Take 1 tablet by mouth once daily 90 tablet 1    lisinopril (PRINIVIL,ZESTRIL) 5 MG tablet Take 2 tablets by mouth Daily. 30 tablet 11    metFORMIN (GLUCOPHAGE) 500 MG tablet TAKE 1 TABLET BY MOUTH TWICE DAILY WITH BREAKFAST AND WITH SUPPER 180  "tablet 0    multivitamin (THERAGRAN) tablet tablet Take 1 tablet by mouth Daily.      nitroglycerin (NITROSTAT) 0.4 MG SL tablet       pantoprazole (PROTONIX) 40 MG EC tablet Take 1 tablet by mouth once daily 90 tablet 0    sucralfate (CARAFATE) 1 g tablet Take 1 tablet by mouth 4 times daily 360 tablet 0    traZODone (DESYREL) 50 MG tablet TAKE 1 TABLET BY MOUTH ONCE DAILY AT NIGHT 90 tablet 0     No current facility-administered medications on file prior to visit.       Review of Systems         Objective   Vital Signs:   /60 (BP Location: Left arm, Patient Position: Sitting)   Pulse 83   Temp 98.5 °F (36.9 °C) (Temporal)   Ht 172.7 cm (67.99\")   Wt 72.2 kg (159 lb 3.2 oz)   BMI 24.21 kg/m²     Physical Exam     Wound on right forearm with just about a centimeter tissue that still little serous drainage present.  Otherwise well-healed.  No evidence of infection.    Result Review :                     Assessment and Plan    Diagnoses and all orders for this visit:    1. Wound of right upper extremity, subsequent encounter (Primary)  Assessment & Plan:  Wound was cleaned.  Dressing changed.  Wound was all but totally healed.  Will put a bandage on again today after another day or 2 I think it is good to be healed enough that we will not need any more dressings.  No need to schedule return visit unless begins to look worse.      2. Primary hypertension  Assessment & Plan:  Despite the extra stress of the wound his blood pressure continues to look okay continue current regimen           Follow Up   No follow-ups on file.  Patient was given instructions and counseling regarding his condition or for health maintenance advice. Please see specific information pulled into the AVS if appropriate.          "

## 2024-07-26 DIAGNOSIS — E11.42 TYPE 2 DIABETES MELLITUS WITH POLYNEUROPATHY: ICD-10-CM

## 2024-07-26 DIAGNOSIS — R10.13 DYSPEPSIA: ICD-10-CM

## 2024-07-26 RX ORDER — FAMOTIDINE 20 MG/1
20 TABLET, FILM COATED ORAL 2 TIMES DAILY
Qty: 60 TABLET | Refills: 0 | OUTPATIENT
Start: 2024-07-26

## 2024-08-07 ENCOUNTER — TELEPHONE (OUTPATIENT)
Dept: FAMILY MEDICINE CLINIC | Age: 89
End: 2024-08-07
Payer: MEDICARE

## 2024-08-07 NOTE — TELEPHONE ENCOUNTER
Caller: [employer]    Relationship: PHARMACY    Best call back number: [phone]    What is the best time to reach you: ANY    Who are you requesting to speak with (clinical staff, provider,  specific staff member): CLINICAL    What was the call regarding: CALLER IS REQUESTING CONFIRMATION OF THE FAX SENT ON 07.31.24 REGARDING THE PATIENT'S GLUCOSE PRESCRIPTION

## 2024-08-14 NOTE — TELEPHONE ENCOUNTER
Caller: [employer]      Best call back number: [phone]    What was the call regarding:  MED STATES THEY WOULD LIKE AN UPDATE ON THIS REQUEST WHEN POSSIBLE.

## 2024-08-15 NOTE — TELEPHONE ENCOUNTER
Left message that they are faxing a form for a continuous glucose monitor.  They need to fax diabetes supply order.

## 2024-08-16 DIAGNOSIS — F51.01 PRIMARY INSOMNIA: ICD-10-CM

## 2024-08-16 RX ORDER — TRAZODONE HYDROCHLORIDE 50 MG/1
50 TABLET ORAL NIGHTLY
Qty: 90 TABLET | Refills: 0 | Status: SHIPPED | OUTPATIENT
Start: 2024-08-16

## 2024-08-20 ENCOUNTER — TELEPHONE (OUTPATIENT)
Dept: FAMILY MEDICINE CLINIC | Age: 89
End: 2024-08-20

## 2024-08-20 ENCOUNTER — OFFICE VISIT (OUTPATIENT)
Dept: FAMILY MEDICINE CLINIC | Age: 89
End: 2024-08-20
Payer: MEDICARE

## 2024-08-20 VITALS
SYSTOLIC BLOOD PRESSURE: 126 MMHG | HEIGHT: 68 IN | BODY MASS INDEX: 24.07 KG/M2 | TEMPERATURE: 97.6 F | WEIGHT: 158.8 LBS | DIASTOLIC BLOOD PRESSURE: 78 MMHG | OXYGEN SATURATION: 94 % | HEART RATE: 68 BPM

## 2024-08-20 DIAGNOSIS — E11.65 TYPE 2 DIABETES MELLITUS WITH HYPERGLYCEMIA, WITHOUT LONG-TERM CURRENT USE OF INSULIN: ICD-10-CM

## 2024-08-20 DIAGNOSIS — E03.9 ACQUIRED HYPOTHYROIDISM: ICD-10-CM

## 2024-08-20 DIAGNOSIS — Z79.01 ANTICOAGULATED: ICD-10-CM

## 2024-08-20 DIAGNOSIS — M79.675 PAIN IN TOES OF BOTH FEET: ICD-10-CM

## 2024-08-20 DIAGNOSIS — E78.2 MIXED HYPERLIPIDEMIA: ICD-10-CM

## 2024-08-20 DIAGNOSIS — I48.0 PAROXYSMAL ATRIAL FIBRILLATION: ICD-10-CM

## 2024-08-20 DIAGNOSIS — L60.2 THICKENED NAILS: ICD-10-CM

## 2024-08-20 DIAGNOSIS — I10 PRIMARY HYPERTENSION: Primary | ICD-10-CM

## 2024-08-20 DIAGNOSIS — N18.32 STAGE 3B CHRONIC KIDNEY DISEASE: ICD-10-CM

## 2024-08-20 DIAGNOSIS — F51.01 PRIMARY INSOMNIA: ICD-10-CM

## 2024-08-20 DIAGNOSIS — M79.674 PAIN IN TOES OF BOTH FEET: ICD-10-CM

## 2024-08-20 PROBLEM — S41.101A WOUND OF RIGHT UPPER EXTREMITY: Status: RESOLVED | Noted: 2024-05-30 | Resolved: 2024-08-20

## 2024-08-20 PROBLEM — T16.1XXA FOREIGN BODY OF RIGHT EAR: Status: RESOLVED | Noted: 2023-11-06 | Resolved: 2024-08-20

## 2024-08-20 PROBLEM — J06.9 VIRAL UPPER RESPIRATORY TRACT INFECTION: Status: RESOLVED | Noted: 2024-02-01 | Resolved: 2024-08-20

## 2024-08-20 PROBLEM — U07.1 COVID-19 VIRUS INFECTION: Status: RESOLVED | Noted: 2023-09-04 | Resolved: 2024-08-20

## 2024-08-20 PROBLEM — E83.51 HYPOCALCEMIA: Status: RESOLVED | Noted: 2024-05-30 | Resolved: 2024-08-20

## 2024-08-20 PROBLEM — R15.2 FECAL URGENCY: Status: RESOLVED | Noted: 2024-05-30 | Resolved: 2024-08-20

## 2024-08-20 RX ORDER — TOBRAMYCIN AND DEXAMETHASONE 3; 1 MG/ML; MG/ML
1 SUSPENSION/ DROPS OPHTHALMIC
COMMUNITY
Start: 2024-08-05

## 2024-08-20 RX ORDER — TRAZODONE HYDROCHLORIDE 50 MG/1
50 TABLET ORAL NIGHTLY
Qty: 90 TABLET | Refills: 0 | Status: SHIPPED | OUTPATIENT
Start: 2024-08-20 | End: 2024-08-20 | Stop reason: SDUPTHER

## 2024-08-20 RX ORDER — TRAZODONE HYDROCHLORIDE 100 MG/1
100 TABLET ORAL NIGHTLY
Qty: 90 TABLET | Refills: 0 | Status: SHIPPED | OUTPATIENT
Start: 2024-08-20

## 2024-08-20 NOTE — ASSESSMENT & PLAN NOTE
I outlined his redness on the right foot.  If worsens he should give us a call and we will consider an antibiotic for possible cellulitis.  I am going to go ahead and trim his toenails believe that will help with some of the pain too

## 2024-08-20 NOTE — PROGRESS NOTES
Chief Complaint  Hypertension, Hypothyroidism, and Diabetes    Subjective          Darci Munson presents to [facility]  History of Present Illness  Skin tear on right forearm totally resolved now.  Has no problems.      Last night he noted some swelling on the dorsal aspect of the right foot also reports some discomfort in his feet bilaterally around the toes due to overgrowth of toenails.    Still reporting trouble sleeping at night.  Sometimes will take 2 or 3 of his current 50 mg trazodone tablets.    For his diabetes he is tolerating his medications.  Blood sugars are doing okay.    Current Outpatient Medications on File Prior to Visit   Medication Sig Dispense Refill    amiodarone (PACERONE) 200 MG tablet Take 1 tablet by mouth Daily.      amLODIPine (NORVASC) 10 MG tablet Take 1 tablet by mouth Daily.      apixaban (ELIQUIS) 2.5 MG tablet tablet Take 1 tablet by mouth 2 (Two) Times a Day.      cholecalciferol (VITAMIN D3) 25 MCG (1000 UT) tablet Take 1 tablet by mouth 2 (Two) Times a Day.      clotrimazole (LOTRIMIN) 1 % cream Apply 1 Application topically to the appropriate area as directed 2 (Two) Times a Day.      empagliflozin (Jardiance) 25 MG tablet tablet Take 1 tablet by mouth Daily. PATIENT ASSISTANCE MEDICATION 90 tablet 3    ferrous sulfate 325 (65 FE) MG tablet Take 1 tablet by mouth 2 (Two) Times a Day.      gabapentin (NEURONTIN) 100 MG capsule Take 1 capsule by mouth twice daily 60 capsule 2    Glucosamine 500 MG capsule Take 2 capsules by mouth 2 (Two) Times a Day. OTC      insulin detemir (Levemir) 100 UNIT/ML injection Inject 5 Units under the skin into the appropriate area as directed Daily. 3 mL     Insulin Pen Needle (Pen Needles) 32G X 4 MM misc Use 1 each Daily. Dx: E11.9 - use with Lantus 1 time daily. 100 each 2    isosorbide mononitrate (IMDUR) 30 MG 24 hr tablet Take 1 tablet by mouth Daily.      latanoprost (XALATAN) 0.005 % ophthalmic solution As  "Needed.      levothyroxine (SYNTHROID, LEVOTHROID) 200 MCG tablet Take 1 tablet by mouth once daily 90 tablet 1    lisinopril (PRINIVIL,ZESTRIL) 5 MG tablet Take 2 tablets by mouth Daily. 30 tablet 11    metFORMIN (GLUCOPHAGE) 500 MG tablet TAKE 1 TABLET BY MOUTH TWICE DAILY WITH BREAKFAST AND WITH SUPPER 180 tablet 0    multivitamin (THERAGRAN) tablet tablet Take 1 tablet by mouth Daily.      nitroglycerin (NITROSTAT) 0.4 MG SL tablet       pantoprazole (PROTONIX) 40 MG EC tablet Take 1 tablet by mouth once daily 90 tablet 0    sucralfate (CARAFATE) 1 g tablet Take 1 tablet by mouth 4 times daily (Patient taking differently: Take  by mouth 2 (Two) Times a Day. Patient takes 2 tabs in AM and 2 tabs in PM) 360 tablet 0    tobramycin-dexAMETHasone (TOBRADEX) 0.3-0.1 % ophthalmic suspension 1 drop.       No current facility-administered medications on file prior to visit.       Review of Systems         Objective   Vital Signs:   /78 (BP Location: Left arm, Patient Position: Sitting)   Pulse 68   Temp 97.6 °F (36.4 °C) (Oral)   Ht 172.7 cm (67.99\")   Wt 72 kg (158 lb 12.8 oz)   SpO2 94%   BMI 24.15 kg/m²     Physical Exam  Constitutional:       Appearance: Normal appearance.   Neck:      Vascular: No carotid bruit.   Cardiovascular:      Rate and Rhythm: Normal rate and regular rhythm.      Heart sounds: Normal heart sounds. No murmur heard.  Pulmonary:      Effort: No respiratory distress.      Breath sounds: No wheezing or rales.   Musculoskeletal:      Comments: Did have a little bit of puffiness and redness associated with the dorsum of right foot (see photo)   Lymphadenopathy:      Cervical: No cervical adenopathy.   Neurological:      General: No focal deficit present.      Mental Status: He is alert.   Psychiatric:         Attention and Perception: Attention normal.         Mood and Affect: Mood normal.         Speech: Speech normal.            Result Review :                     Assessment and Plan  "   Diagnoses and all orders for this visit:    1. Primary hypertension (Primary)  Assessment & Plan:  Blood pressure doing okay continue on current regimen     Orders:  -     Comprehensive Metabolic Panel  -     CBC Auto Differential    2. Type 2 diabetes mellitus with hyperglycemia, without long-term current use of insulin  Assessment & Plan:  Have him to stop the glipizide since his last hemoglobin A1c looks good in order to lower the risk of hypoglycemia     Orders:  -     Hemoglobin A1c    3. Acquired hypothyroidism  Assessment & Plan:  Most recent labs looked okay continue on current dosage    Orders:  -     TSH Rfx On Abnormal To Free T4    4. Paroxysmal atrial fibrillation  Assessment & Plan:  No palpitations.  Otherwise asymptomatic.  Currently on amiodarone and Eliquis.  Continue current regimen .      5. Anticoagulated    6. Stage 3b chronic kidney disease  Assessment & Plan:  Last labs were stable.  Continue current regimen.  Avoid nephrotoxins.  Lab orders placed       7. Pain in toes of both feet  Assessment & Plan:  I outlined his redness on the right foot.  If worsens he should give us a call and we will consider an antibiotic for possible cellulitis.  I am going to go ahead and trim his toenails believe that will help with some of the pain too      8. Thickened nails  Assessment & Plan:  Trimmed all 10 nails.  Patient tolerated well.  Hopefully will get some improvement in his symptoms of discomfort.      9. Primary insomnia  Comments:  We will give him a trial of trazodone in addition to the recommendations for sleep hygiene  Assessment & Plan:  Will increase the trazodone to 100 mg nightly    Orders:  -     Discontinue: traZODone (DESYREL) 50 MG tablet; Take 1 tablet by mouth Every Night.  Dispense: 90 tablet; Refill: 0  -     traZODone (DESYREL) 100 MG tablet; Take 1 tablet by mouth Every Night.  Dispense: 90 tablet; Refill: 0    10. Mixed hyperlipidemia  -     Lipid Panel        Follow Up   No  follow-ups on file.  Patient was given instructions and counseling regarding his condition or for health maintenance advice. Please see specific information pulled into the AVS if appropriate.

## 2024-08-20 NOTE — ASSESSMENT & PLAN NOTE
Have him to stop the glipizide since his last hemoglobin A1c looks good in order to lower the risk of hypoglycemia

## 2024-08-25 NOTE — ASSESSMENT & PLAN NOTE
Trimmed all 10 nails.  Patient tolerated well.  Hopefully will get some improvement in his symptoms of discomfort.

## 2024-08-25 NOTE — ASSESSMENT & PLAN NOTE
No palpitations.  Otherwise asymptomatic.  Currently on amiodarone and Eliquis.  Continue current regimen .

## 2024-09-08 ENCOUNTER — READMISSION MANAGEMENT (OUTPATIENT)
Dept: CALL CENTER | Facility: HOSPITAL | Age: 89
End: 2024-09-08
Payer: MEDICARE

## 2024-09-08 NOTE — OUTREACH NOTE
Prep Survey      Flowsheet Row Responses   [facility] facility patient discharged from? Non-  [[facility]]   Is LACE score < 7 ? Non- Discharge   Eligibility Not Eligible   What are the reasons patient is not eligible? [facility]  [[facility]NF]   Does the patient have one of the following disease processes/diagnoses(primary or secondary)? General Surgery   Prep survey completed? Yes            Gisela GAMBOA - Registered Nurse

## 2024-10-10 ENCOUNTER — HOSPITAL ENCOUNTER (INPATIENT)
Facility: HOSPITAL | Age: 89
LOS: 5 days | End: 2024-10-15
Attending: EMERGENCY MEDICINE | Admitting: FAMILY MEDICINE
Payer: MEDICARE

## 2024-10-10 ENCOUNTER — APPOINTMENT (OUTPATIENT)
Dept: CT IMAGING | Facility: HOSPITAL | Age: 89
End: 2024-10-10
Payer: MEDICARE

## 2024-10-10 DIAGNOSIS — R26.2 DIFFICULTY IN WALKING: ICD-10-CM

## 2024-10-10 DIAGNOSIS — N17.9 ACUTE RENAL FAILURE, UNSPECIFIED ACUTE RENAL FAILURE TYPE: Primary | ICD-10-CM

## 2024-10-10 LAB
ALBUMIN SERPL-MCNC: 3.7 G/DL (ref 3.5–5.2)
ALBUMIN/GLOB SERPL: 1 G/DL
ALP SERPL-CCNC: 175 U/L (ref 39–117)
ALT SERPL W P-5'-P-CCNC: 19 U/L (ref 1–41)
ANION GAP SERPL CALCULATED.3IONS-SCNC: 16.4 MMOL/L (ref 5–15)
AST SERPL-CCNC: 23 U/L (ref 1–40)
BASOPHILS # BLD AUTO: 0.03 10*3/MM3 (ref 0–0.2)
BASOPHILS NFR BLD AUTO: 0.1 % (ref 0–1.5)
BILIRUB SERPL-MCNC: 0.5 MG/DL (ref 0–1.2)
BUN SERPL-MCNC: 56 MG/DL (ref 8–23)
BUN/CREAT SERPL: 27.6 (ref 7–25)
CALCIUM SPEC-SCNC: 9.9 MG/DL (ref 8.6–10.5)
CHLORIDE SERPL-SCNC: 104 MMOL/L (ref 98–107)
CO2 SERPL-SCNC: 12.6 MMOL/L (ref 22–29)
CREAT SERPL-MCNC: 2.03 MG/DL (ref 0.76–1.27)
D-LACTATE SERPL-SCNC: 3.3 MMOL/L (ref 0.5–2)
DEPRECATED RDW RBC AUTO: 44.5 FL (ref 37–54)
EGFRCR SERPLBLD CKD-EPI 2021: 30.8 ML/MIN/1.73
EOSINOPHIL # BLD AUTO: 0 10*3/MM3 (ref 0–0.4)
EOSINOPHIL NFR BLD AUTO: 0 % (ref 0.3–6.2)
ERYTHROCYTE [DISTWIDTH] IN BLOOD BY AUTOMATED COUNT: 13.2 % (ref 12.3–15.4)
GLOBULIN UR ELPH-MCNC: 3.6 GM/DL
GLUCOSE SERPL-MCNC: 352 MG/DL (ref 65–99)
HCT VFR BLD AUTO: 42.9 % (ref 37.5–51)
HGB BLD-MCNC: 13.6 G/DL (ref 13–17.7)
HOLD SPECIMEN: NORMAL
HOLD SPECIMEN: NORMAL
IMM GRANULOCYTES # BLD AUTO: 0.1 10*3/MM3 (ref 0–0.05)
IMM GRANULOCYTES NFR BLD AUTO: 0.5 % (ref 0–0.5)
LIPASE SERPL-CCNC: 40 U/L (ref 13–60)
LYMPHOCYTES # BLD AUTO: 1.59 10*3/MM3 (ref 0.7–3.1)
LYMPHOCYTES NFR BLD AUTO: 7.9 % (ref 19.6–45.3)
MCH RBC QN AUTO: 29.1 PG (ref 26.6–33)
MCHC RBC AUTO-ENTMCNC: 31.7 G/DL (ref 31.5–35.7)
MCV RBC AUTO: 91.9 FL (ref 79–97)
MONOCYTES # BLD AUTO: 0.72 10*3/MM3 (ref 0.1–0.9)
MONOCYTES NFR BLD AUTO: 3.6 % (ref 5–12)
NEUTROPHILS NFR BLD AUTO: 17.59 10*3/MM3 (ref 1.7–7)
NEUTROPHILS NFR BLD AUTO: 87.9 % (ref 42.7–76)
NRBC BLD AUTO-RTO: 0 /100 WBC (ref 0–0.2)
PLATELET # BLD AUTO: 201 10*3/MM3 (ref 140–450)
PMV BLD AUTO: 11.1 FL (ref 6–12)
POTASSIUM SERPL-SCNC: 6 MMOL/L (ref 3.5–5.2)
PROT SERPL-MCNC: 7.3 G/DL (ref 6–8.5)
RBC # BLD AUTO: 4.67 10*6/MM3 (ref 4.14–5.8)
SODIUM SERPL-SCNC: 133 MMOL/L (ref 136–145)
WBC NRBC COR # BLD AUTO: 20.03 10*3/MM3 (ref 3.4–10.8)
WHOLE BLOOD HOLD COAG: NORMAL
WHOLE BLOOD HOLD SPECIMEN: NORMAL

## 2024-10-10 PROCEDURE — 63710000001 INSULIN REGULAR HUMAN PER 5 UNITS: Performed by: EMERGENCY MEDICINE

## 2024-10-10 PROCEDURE — 99291 CRITICAL CARE FIRST HOUR: CPT

## 2024-10-10 PROCEDURE — 25010000002 CALCIUM GLUCONATE-NACL 1-0.675 GM/50ML-% SOLUTION: Performed by: EMERGENCY MEDICINE

## 2024-10-10 PROCEDURE — 0 DEXTROSE 5 % SOLUTION: Performed by: STUDENT IN AN ORGANIZED HEALTH CARE EDUCATION/TRAINING PROGRAM

## 2024-10-10 PROCEDURE — 36415 COLL VENOUS BLD VENIPUNCTURE: CPT

## 2024-10-10 PROCEDURE — 25810000003 SODIUM CHLORIDE 0.9 % SOLUTION: Performed by: EMERGENCY MEDICINE

## 2024-10-10 PROCEDURE — 94799 UNLISTED PULMONARY SVC/PX: CPT

## 2024-10-10 PROCEDURE — 80053 COMPREHEN METABOLIC PANEL: CPT | Performed by: EMERGENCY MEDICINE

## 2024-10-10 PROCEDURE — 83605 ASSAY OF LACTIC ACID: CPT | Performed by: EMERGENCY MEDICINE

## 2024-10-10 PROCEDURE — 85025 COMPLETE CBC W/AUTO DIFF WBC: CPT | Performed by: EMERGENCY MEDICINE

## 2024-10-10 PROCEDURE — 82009 KETONE BODYS QUAL: CPT | Performed by: FAMILY MEDICINE

## 2024-10-10 PROCEDURE — 74176 CT ABD & PELVIS W/O CONTRAST: CPT

## 2024-10-10 PROCEDURE — 83690 ASSAY OF LIPASE: CPT | Performed by: EMERGENCY MEDICINE

## 2024-10-10 PROCEDURE — 94640 AIRWAY INHALATION TREATMENT: CPT

## 2024-10-10 RX ORDER — SODIUM CHLORIDE 0.9 % (FLUSH) 0.9 %
10 SYRINGE (ML) INJECTION AS NEEDED
Status: DISCONTINUED | OUTPATIENT
Start: 2024-10-10 | End: 2024-10-15 | Stop reason: HOSPADM

## 2024-10-10 RX ORDER — ALBUTEROL SULFATE 5 MG/ML
10 SOLUTION RESPIRATORY (INHALATION) ONCE
Status: COMPLETED | OUTPATIENT
Start: 2024-10-10 | End: 2024-10-10

## 2024-10-10 RX ORDER — CALCIUM GLUCONATE 20 MG/ML
1000 INJECTION, SOLUTION INTRAVENOUS ONCE
Status: COMPLETED | OUTPATIENT
Start: 2024-10-10 | End: 2024-10-10

## 2024-10-10 RX ADMIN — SODIUM BICARBONATE 150 MEQ: 84 INJECTION INTRAVENOUS at 22:02

## 2024-10-10 RX ADMIN — SODIUM CHLORIDE 1000 ML: 9 INJECTION, SOLUTION INTRAVENOUS at 21:47

## 2024-10-10 RX ADMIN — INSULIN HUMAN 5 UNITS: 100 INJECTION, SOLUTION PARENTERAL at 21:43

## 2024-10-10 RX ADMIN — SODIUM BICARBONATE 50 MEQ: 84 INJECTION INTRAVENOUS at 21:54

## 2024-10-10 RX ADMIN — SODIUM ZIRCONIUM CYCLOSILICATE 10 G: 10 POWDER, FOR SUSPENSION ORAL at 21:58

## 2024-10-10 RX ADMIN — CALCIUM GLUCONATE 1000 MG: 20 INJECTION, SOLUTION INTRAVENOUS at 21:48

## 2024-10-10 RX ADMIN — ALBUTEROL SULFATE 10 MG: 2.5 SOLUTION RESPIRATORY (INHALATION) at 22:24

## 2024-10-11 PROBLEM — R10.9 ABDOMINAL PAIN: Status: ACTIVE | Noted: 2024-10-11

## 2024-10-11 PROBLEM — R11.2 INTRACTABLE NAUSEA AND VOMITING: Status: ACTIVE | Noted: 2024-10-11

## 2024-10-11 LAB
ACETONE BLD QL: NEGATIVE
ANION GAP SERPL CALCULATED.3IONS-SCNC: 13 MMOL/L (ref 5–15)
ANION GAP SERPL CALCULATED.3IONS-SCNC: 17.9 MMOL/L (ref 5–15)
ARTERIAL PATENCY WRIST A: POSITIVE
ATMOSPHERIC PRESS: 746.8 MMHG
BACTERIA UR QL AUTO: ABNORMAL /HPF
BASE EXCESS BLDA CALC-SCNC: -3.8 MMOL/L (ref -2–2)
BDY SITE: ABNORMAL
BILIRUB UR QL STRIP: NEGATIVE
BUN SERPL-MCNC: 48 MG/DL (ref 8–23)
BUN SERPL-MCNC: 50 MG/DL (ref 8–23)
BUN/CREAT SERPL: 27.3 (ref 7–25)
BUN/CREAT SERPL: 27.6 (ref 7–25)
CALCIUM SPEC-SCNC: 9.3 MG/DL (ref 8.6–10.5)
CALCIUM SPEC-SCNC: 9.5 MG/DL (ref 8.6–10.5)
CHLORIDE SERPL-SCNC: 102 MMOL/L (ref 98–107)
CHLORIDE SERPL-SCNC: 104 MMOL/L (ref 98–107)
CLARITY UR: CLEAR
CO2 SERPL-SCNC: 16.1 MMOL/L (ref 22–29)
CO2 SERPL-SCNC: 21 MMOL/L (ref 22–29)
COLOR UR: YELLOW
CREAT SERPL-MCNC: 1.74 MG/DL (ref 0.76–1.27)
CREAT SERPL-MCNC: 1.83 MG/DL (ref 0.76–1.27)
D-LACTATE SERPL-SCNC: 1.7 MMOL/L (ref 0.5–2)
D-LACTATE SERPL-SCNC: 3.1 MMOL/L (ref 0.5–2)
D-LACTATE SERPL-SCNC: 3.9 MMOL/L (ref 0.5–2)
D-LACTATE SERPL-SCNC: 5.7 MMOL/L (ref 0.5–2)
EGFRCR SERPLBLD CKD-EPI 2021: 34.8 ML/MIN/1.73
EGFRCR SERPLBLD CKD-EPI 2021: 37 ML/MIN/1.73
GLUCOSE BLDC GLUCOMTR-MCNC: 119 MG/DL (ref 70–99)
GLUCOSE BLDC GLUCOMTR-MCNC: 128 MG/DL (ref 70–99)
GLUCOSE BLDC GLUCOMTR-MCNC: 189 MG/DL (ref 70–99)
GLUCOSE BLDC GLUCOMTR-MCNC: 243 MG/DL (ref 70–99)
GLUCOSE BLDC GLUCOMTR-MCNC: 315 MG/DL (ref 70–99)
GLUCOSE BLDC GLUCOMTR-MCNC: 97 MG/DL (ref 70–99)
GLUCOSE SERPL-MCNC: 290 MG/DL (ref 65–99)
GLUCOSE SERPL-MCNC: 354 MG/DL (ref 65–99)
GLUCOSE UR STRIP-MCNC: ABNORMAL MG/DL
HCO3 BLDA-SCNC: 19.3 MMOL/L (ref 22–26)
HCT VFR BLD CALC: 31 % (ref 38–51)
HEMODILUTION: NO
HGB BLDA-MCNC: 10.5 G/DL (ref 12–18)
HGB UR QL STRIP.AUTO: NEGATIVE
HYALINE CASTS UR QL AUTO: ABNORMAL /LPF
INHALED O2 CONCENTRATION: 21 %
KETONES UR QL STRIP: NEGATIVE
LEUKOCYTE ESTERASE UR QL STRIP.AUTO: ABNORMAL
MODALITY: ABNORMAL
NITRITE UR QL STRIP: NEGATIVE
PCO2 BLDA: 28.4 MM HG (ref 35–45)
PH BLDA: 7.44 PH UNITS (ref 7.35–7.45)
PH UR STRIP.AUTO: 5.5 [PH] (ref 5–8)
PO2 BLD: 365 MM[HG] (ref 0–500)
PO2 BLDA: 76.6 MM HG (ref 80–100)
POTASSIUM SERPL-SCNC: 5.1 MMOL/L (ref 3.5–5.2)
POTASSIUM SERPL-SCNC: 5.3 MMOL/L (ref 3.5–5.2)
PROT UR QL STRIP: ABNORMAL
QT INTERVAL: 435 MS
QT INTERVAL: 446 MS
QTC INTERVAL: 524 MS
QTC INTERVAL: 561 MS
RBC # UR STRIP: ABNORMAL /HPF
REF LAB TEST METHOD: ABNORMAL
SAO2 % BLDCOA: 96 % (ref 95–99)
SODIUM SERPL-SCNC: 136 MMOL/L (ref 136–145)
SODIUM SERPL-SCNC: 138 MMOL/L (ref 136–145)
SP GR UR STRIP: 1.02 (ref 1–1.03)
SQUAMOUS #/AREA URNS HPF: ABNORMAL /HPF
UROBILINOGEN UR QL STRIP: ABNORMAL
WBC # UR STRIP: ABNORMAL /HPF
WBC CLUMPS # UR AUTO: ABNORMAL /HPF
WHOLE BLOOD HOLD SPECIMEN: NORMAL

## 2024-10-11 PROCEDURE — 83605 ASSAY OF LACTIC ACID: CPT | Performed by: EMERGENCY MEDICINE

## 2024-10-11 PROCEDURE — 63710000001 INSULIN GLARGINE PER 5 UNITS: Performed by: FAMILY MEDICINE

## 2024-10-11 PROCEDURE — 25010000002 ONDANSETRON PER 1 MG: Performed by: FAMILY MEDICINE

## 2024-10-11 PROCEDURE — 25010000002 METOCLOPRAMIDE PER 10 MG: Performed by: FAMILY MEDICINE

## 2024-10-11 PROCEDURE — 97165 OT EVAL LOW COMPLEX 30 MIN: CPT

## 2024-10-11 PROCEDURE — 36600 WITHDRAWAL OF ARTERIAL BLOOD: CPT | Performed by: FAMILY MEDICINE

## 2024-10-11 PROCEDURE — 81001 URINALYSIS AUTO W/SCOPE: CPT | Performed by: STUDENT IN AN ORGANIZED HEALTH CARE EDUCATION/TRAINING PROGRAM

## 2024-10-11 PROCEDURE — 82948 REAGENT STRIP/BLOOD GLUCOSE: CPT

## 2024-10-11 PROCEDURE — 25810000003 LACTATED RINGERS PER 1000 ML: Performed by: FAMILY MEDICINE

## 2024-10-11 PROCEDURE — 87040 BLOOD CULTURE FOR BACTERIA: CPT | Performed by: FAMILY MEDICINE

## 2024-10-11 PROCEDURE — 82948 REAGENT STRIP/BLOOD GLUCOSE: CPT | Performed by: EMERGENCY MEDICINE

## 2024-10-11 PROCEDURE — 25010000002 CEFTRIAXONE PER 250 MG: Performed by: FAMILY MEDICINE

## 2024-10-11 PROCEDURE — 93005 ELECTROCARDIOGRAM TRACING: CPT | Performed by: FAMILY MEDICINE

## 2024-10-11 PROCEDURE — 99223 1ST HOSP IP/OBS HIGH 75: CPT | Performed by: FAMILY MEDICINE

## 2024-10-11 PROCEDURE — 36415 COLL VENOUS BLD VENIPUNCTURE: CPT | Performed by: FAMILY MEDICINE

## 2024-10-11 PROCEDURE — 82803 BLOOD GASES ANY COMBINATION: CPT | Performed by: FAMILY MEDICINE

## 2024-10-11 PROCEDURE — 80048 BASIC METABOLIC PNL TOTAL CA: CPT | Performed by: FAMILY MEDICINE

## 2024-10-11 PROCEDURE — 82948 REAGENT STRIP/BLOOD GLUCOSE: CPT | Performed by: FAMILY MEDICINE

## 2024-10-11 RX ORDER — DEXTROSE MONOHYDRATE 25 G/50ML
25 INJECTION, SOLUTION INTRAVENOUS
Status: DISCONTINUED | OUTPATIENT
Start: 2024-10-11 | End: 2024-10-15 | Stop reason: HOSPADM

## 2024-10-11 RX ORDER — LEVOTHYROXINE SODIUM 100 UG/1
200 TABLET ORAL
Status: DISCONTINUED | OUTPATIENT
Start: 2024-10-11 | End: 2024-10-15 | Stop reason: HOSPADM

## 2024-10-11 RX ORDER — INSULIN LISPRO 100 [IU]/ML
2-9 INJECTION, SOLUTION INTRAVENOUS; SUBCUTANEOUS
Status: DISCONTINUED | OUTPATIENT
Start: 2024-10-11 | End: 2024-10-15 | Stop reason: HOSPADM

## 2024-10-11 RX ORDER — SACCHAROMYCES BOULARDII 250 MG
250 CAPSULE ORAL 2 TIMES DAILY
Status: DISCONTINUED | OUTPATIENT
Start: 2024-10-11 | End: 2024-10-15 | Stop reason: HOSPADM

## 2024-10-11 RX ORDER — ONDANSETRON 2 MG/ML
4 INJECTION INTRAMUSCULAR; INTRAVENOUS EVERY 6 HOURS PRN
Status: DISCONTINUED | OUTPATIENT
Start: 2024-10-11 | End: 2024-10-15 | Stop reason: HOSPADM

## 2024-10-11 RX ORDER — AMIODARONE HYDROCHLORIDE 200 MG/1
200 TABLET ORAL DAILY
Status: DISCONTINUED | OUTPATIENT
Start: 2024-10-11 | End: 2024-10-15 | Stop reason: HOSPADM

## 2024-10-11 RX ORDER — CALCIUM CARBONATE 500 MG/1
2 TABLET, CHEWABLE ORAL EVERY 4 HOURS PRN
Status: ON HOLD | COMMUNITY

## 2024-10-11 RX ORDER — NICOTINE POLACRILEX 4 MG
15 LOZENGE BUCCAL
Status: DISCONTINUED | OUTPATIENT
Start: 2024-10-11 | End: 2024-10-15 | Stop reason: HOSPADM

## 2024-10-11 RX ORDER — HYDROCODONE BITARTRATE AND ACETAMINOPHEN 5; 325 MG/1; MG/1
1 TABLET ORAL EVERY 4 HOURS PRN
Status: ON HOLD | COMMUNITY

## 2024-10-11 RX ORDER — SODIUM CHLORIDE 0.9 % (FLUSH) 0.9 %
10 SYRINGE (ML) INJECTION AS NEEDED
Status: DISCONTINUED | OUTPATIENT
Start: 2024-10-11 | End: 2024-10-15 | Stop reason: HOSPADM

## 2024-10-11 RX ORDER — METOCLOPRAMIDE HYDROCHLORIDE 5 MG/ML
5 INJECTION INTRAMUSCULAR; INTRAVENOUS EVERY 8 HOURS
Status: COMPLETED | OUTPATIENT
Start: 2024-10-11 | End: 2024-10-11

## 2024-10-11 RX ORDER — NYSTATIN 100000 [USP'U]/G
1 POWDER TOPICAL 2 TIMES DAILY
Status: ON HOLD | COMMUNITY

## 2024-10-11 RX ORDER — IBUPROFEN 600 MG/1
1 TABLET ORAL
Status: DISCONTINUED | OUTPATIENT
Start: 2024-10-11 | End: 2024-10-15 | Stop reason: HOSPADM

## 2024-10-11 RX ORDER — ONDANSETRON 4 MG/1
4 TABLET, ORALLY DISINTEGRATING ORAL EVERY 8 HOURS PRN
Status: ON HOLD | COMMUNITY

## 2024-10-11 RX ORDER — LOPERAMIDE HCL 2 MG
2 CAPSULE ORAL AS NEEDED
Status: ON HOLD | COMMUNITY

## 2024-10-11 RX ORDER — LATANOPROST 50 UG/ML
1 SOLUTION/ DROPS OPHTHALMIC NIGHTLY
Status: DISCONTINUED | OUTPATIENT
Start: 2024-10-11 | End: 2024-10-15 | Stop reason: HOSPADM

## 2024-10-11 RX ORDER — BISACODYL 10 MG
10 SUPPOSITORY, RECTAL RECTAL DAILY PRN
Status: DISCONTINUED | OUTPATIENT
Start: 2024-10-11 | End: 2024-10-15 | Stop reason: HOSPADM

## 2024-10-11 RX ORDER — POLYETHYLENE GLYCOL 3350 17 G/17G
17 POWDER, FOR SOLUTION ORAL DAILY PRN
Status: DISCONTINUED | OUTPATIENT
Start: 2024-10-11 | End: 2024-10-15 | Stop reason: HOSPADM

## 2024-10-11 RX ORDER — SODIUM CHLORIDE 0.9 % (FLUSH) 0.9 %
10 SYRINGE (ML) INJECTION EVERY 12 HOURS SCHEDULED
Status: DISCONTINUED | OUTPATIENT
Start: 2024-10-11 | End: 2024-10-15 | Stop reason: HOSPADM

## 2024-10-11 RX ORDER — CARBOXYMETHYLCELLULOSE SODIUM 5 MG/ML
1 SOLUTION/ DROPS OPHTHALMIC 3 TIMES DAILY PRN
Status: ON HOLD | COMMUNITY

## 2024-10-11 RX ORDER — BISACODYL 5 MG/1
5 TABLET, DELAYED RELEASE ORAL DAILY PRN
Status: DISCONTINUED | OUTPATIENT
Start: 2024-10-11 | End: 2024-10-15 | Stop reason: HOSPADM

## 2024-10-11 RX ORDER — AMOXICILLIN 250 MG
2 CAPSULE ORAL 2 TIMES DAILY PRN
Status: DISCONTINUED | OUTPATIENT
Start: 2024-10-11 | End: 2024-10-15 | Stop reason: HOSPADM

## 2024-10-11 RX ORDER — SACCHAROMYCES BOULARDII 250 MG
250 CAPSULE ORAL 2 TIMES DAILY
Status: ON HOLD | COMMUNITY

## 2024-10-11 RX ORDER — GABAPENTIN 100 MG/1
100 CAPSULE ORAL 2 TIMES DAILY
Status: DISCONTINUED | OUTPATIENT
Start: 2024-10-11 | End: 2024-10-15 | Stop reason: HOSPADM

## 2024-10-11 RX ORDER — METRONIDAZOLE 500 MG/1
500 TABLET ORAL EVERY 8 HOURS SCHEDULED
Status: DISCONTINUED | OUTPATIENT
Start: 2024-10-11 | End: 2024-10-15 | Stop reason: HOSPADM

## 2024-10-11 RX ORDER — SODIUM CHLORIDE, SODIUM LACTATE, POTASSIUM CHLORIDE, CALCIUM CHLORIDE 600; 310; 30; 20 MG/100ML; MG/100ML; MG/100ML; MG/100ML
125 INJECTION, SOLUTION INTRAVENOUS CONTINUOUS
Status: ACTIVE | OUTPATIENT
Start: 2024-10-11 | End: 2024-10-11

## 2024-10-11 RX ORDER — INSULIN LISPRO 100 [IU]/ML
6 INJECTION, SOLUTION INTRAVENOUS; SUBCUTANEOUS
Status: ON HOLD | COMMUNITY

## 2024-10-11 RX ADMIN — METRONIDAZOLE 500 MG: 500 TABLET ORAL at 14:04

## 2024-10-11 RX ADMIN — Medication 250 MG: at 21:53

## 2024-10-11 RX ADMIN — LEVOTHYROXINE SODIUM 200 MCG: 0.1 TABLET ORAL at 15:37

## 2024-10-11 RX ADMIN — SODIUM ZIRCONIUM CYCLOSILICATE 10 G: 10 POWDER, FOR SUSPENSION ORAL at 14:04

## 2024-10-11 RX ADMIN — APIXABAN 2.5 MG: 2.5 TABLET, FILM COATED ORAL at 21:53

## 2024-10-11 RX ADMIN — Medication 250 MG: at 08:42

## 2024-10-11 RX ADMIN — SODIUM CHLORIDE 2000 MG: 9 INJECTION, SOLUTION INTRAMUSCULAR; INTRAVENOUS; SUBCUTANEOUS at 06:30

## 2024-10-11 RX ADMIN — METRONIDAZOLE 500 MG: 500 TABLET ORAL at 06:31

## 2024-10-11 RX ADMIN — METOCLOPRAMIDE 5 MG: 5 INJECTION, SOLUTION INTRAMUSCULAR; INTRAVENOUS at 06:29

## 2024-10-11 RX ADMIN — GABAPENTIN 100 MG: 100 CAPSULE ORAL at 15:37

## 2024-10-11 RX ADMIN — INSULIN GLARGINE 10 UNITS: 100 INJECTION, SOLUTION SUBCUTANEOUS at 06:30

## 2024-10-11 RX ADMIN — INSULIN GLARGINE 15 UNITS: 100 INJECTION, SOLUTION SUBCUTANEOUS at 21:53

## 2024-10-11 RX ADMIN — METOPROLOL TARTRATE 5 MG: 1 INJECTION, SOLUTION INTRAVENOUS at 06:31

## 2024-10-11 RX ADMIN — INSULIN GLARGINE 10 UNITS: 100 INJECTION, SOLUTION SUBCUTANEOUS at 02:59

## 2024-10-11 RX ADMIN — AMIODARONE HYDROCHLORIDE 200 MG: 200 TABLET ORAL at 14:04

## 2024-10-11 RX ADMIN — APIXABAN 2.5 MG: 2.5 TABLET, FILM COATED ORAL at 08:42

## 2024-10-11 RX ADMIN — METOCLOPRAMIDE 5 MG: 5 INJECTION, SOLUTION INTRAMUSCULAR; INTRAVENOUS at 14:03

## 2024-10-11 RX ADMIN — SODIUM CHLORIDE, POTASSIUM CHLORIDE, SODIUM LACTATE AND CALCIUM CHLORIDE 100 ML/HR: 600; 310; 30; 20 INJECTION, SOLUTION INTRAVENOUS at 02:59

## 2024-10-11 RX ADMIN — ONDANSETRON 4 MG: 2 INJECTION INTRAMUSCULAR; INTRAVENOUS at 03:28

## 2024-10-11 RX ADMIN — SODIUM CHLORIDE, POTASSIUM CHLORIDE, SODIUM LACTATE AND CALCIUM CHLORIDE 125 ML/HR: 600; 310; 30; 20 INJECTION, SOLUTION INTRAVENOUS at 13:49

## 2024-10-11 RX ADMIN — METRONIDAZOLE 500 MG: 500 TABLET ORAL at 21:53

## 2024-10-11 RX ADMIN — Medication 10 ML: at 21:53

## 2024-10-11 RX ADMIN — Medication 10 ML: at 08:43

## 2024-10-11 RX ADMIN — GABAPENTIN 100 MG: 100 CAPSULE ORAL at 22:01

## 2024-10-11 RX ADMIN — METOCLOPRAMIDE 5 MG: 5 INJECTION, SOLUTION INTRAMUSCULAR; INTRAVENOUS at 21:53

## 2024-10-11 RX ADMIN — LATANOPROST 1 DROP: 50 SOLUTION OPHTHALMIC at 22:01

## 2024-10-11 NOTE — PROGRESS NOTES
[facility]   Hospitalist Progress Note  Date: 10/11/2024  Patient Name: Darci Munson  : 1935  MRN: [id number]  Date of admission: 10/10/2024      Subjective   Subjective     Chief complaint: Nausea and vomiting    Summary:  89-year-old male with history of gastroparesis, CAD, diabetes, hypothyroidism, hypertension, CKD stage IIIb, mitral valve regurgitation, glaucoma, dyslipidemia, hospitalized on 10/11/2024 for chief complaint of nausea and vomiting with diarrhea, with ITA and hyperkalemia, in addition to lactic acidosis of clinical significance with CT imaging showing acute diverticulitis with diverticulosis, placed on antibiotics to cover GI tract, nephrology consulted.    Interval follow-up: Reviewed hospitalization plan, all questions answered.    Review of systems:  All systems reviewed and negative for weakness, fatigue, nausea, anxiety    Objective   Objective     Vitals:   Temp:  [97.5 °F (36.4 °C)-99.1 °F (37.3 °C)] 99.1 °F (37.3 °C)  Heart Rate:  [81-97] 95  Resp:  [18] 18  BP: ()/(41-68) 116/47  Physical Exam               Constitutional: Awake, alert              Eyes: PERRLA, sclerae anicteric, no conjunctival injection              HENT: NCAT, mucous membranes dry              Neck: Supple, full range of motion              Respiratory: Clear to auscultation bilaterally, nonlabored respirations               Cardiovascular: RRR, no murmurs, rubs, or gallops, palpable pedal pulses bilaterally              Gastrointestinal: Abdominal tenderness, positive bowel sounds, soft, nondistended              Musculoskeletal: No bilateral ankle edema, no clubbing or cyanosis to extremities              Psychiatric: Appropriate affect, cooperative              Neurologic: Oriented x 3, strength symmetric in all extremities, Cranial Nerves grossly intact to confrontation, speech clear              Skin: No rashes       Result Review    Result Review:  I have personally reviewed the pertinent  results from the past 24 hours to 10/11/2024 12:39 EDT and agree with these findings:  [x]  Laboratory   CBC          11/6/2023    14:27 2/19/2024    10:20 10/10/2024    20:38   CBC   WBC 5.26  5.41  20.03    RBC 4.21  4.40  4.67    Hemoglobin 12.8  13.2  13.6    Hematocrit 40.4  41.7  42.9    MCV 96.0  94.8  91.9    MCH 30.4  30.0  29.1    MCHC 31.7  31.7  31.7    RDW 13.4  13.5  13.2    Platelets 186  163  201      BMP          5/30/2024    15:01 10/10/2024    20:38 10/11/2024    02:48 10/11/2024    05:44   BMP   BUN 24  56  50  48    Creatinine 1.56  2.03  1.83  1.74    Sodium 142  133  136  138    Potassium 5.0  6.0  5.1  5.3    Chloride 107  104  102  104    CO2 22.6  12.6  16.1  21.0    Calcium 9.9  9.9  9.3  9.5      LIVER FUNCTION TESTS:      Lab 10/10/24  2038   TOTAL PROTEIN 7.3   ALBUMIN 3.7   GLOBULIN 3.6   ALT (SGPT) 19   AST (SGOT) 23   BILIRUBIN 0.5   ALK PHOS 175*   LIPASE 40       [x]  Microbiology   Microbiology Results (last 10 days)       ** No results found for the last 240 hours. **              [x]  Radiology CT Abdomen Pelvis Without Contrast    Result Date: 10/10/2024  Impression: CT scan of the abdomen and pelvis without contrast demonstrating tiny noncalcified nodules in the left lower lobe. Follow-up CT scan of the chest is recommended in 6 months. Post prostatectomy. Moderate diverticulosis of the descending colon and sigmoid colon. Ill-defined increased density in fat posterior to the proximal descending colon may represent acute diverticulitis or scarring from prior episodes of diverticulitis. This could also represent an underlying mural abnormality of the colon. Electronically Signed: Armaan Garcia MD  10/10/2024 9:45 PM EDT  Workstation ID: [id number]       [x]  EKG/Telemetry   ECG 12 Lead Tachycardia   Preliminary Result   HEART RATE=95  bpm   RR Qhxupdvp=166  ms   OH Interval=  ms   P Horizontal Axis=  deg   P Front Axis=  deg   QRSD Interval=98  ms   QT Wybbbptz=593  ms   HSyC=593   ms   QRS Axis=15  deg   T Wave Axis=134  deg   - ABNORMAL ECG -   Atrial flutter with predominant 2:1 AV block   Low voltage, extremity leads   Nonspecific repol abnormality, inferior leads   Prolonged QT interval   Date and Time of Study:2024-10-11 05:42:20      ECG 12 Lead QT Measurement   Preliminary Result   HEART RATE=87  bpm   RR Xtnrlmem=338  ms   WI Interval=  ms   P Horizontal Axis=  deg   P Front Axis=  deg   QRSD Interval=92  ms   QT Wpfohryw=060  ms   HZaY=095  ms   QRS Axis=25  deg   T Wave Axis=65  deg   - ABNORMAL ECG -   Atrial flutter   Borderline low voltage, extremity leads   Minimal ST depression, anterolateral leads   Prolonged QT interval   Date and Time of Study:2024-10-11 04:56:03          []  Cardiology/Vascular   []  Pathology  [x]  Old records  []  Other:    Assessment & Plan   Assessment / Plan     Assessment/Plan:     Assessment:  ITA with CKD stage IIIb  Hyperkalemia  Volume depletion  Dehydration  Acute diverticulitis  Atrial fibrillation/flutter chronic  Diverticulosis  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 difficile  Lantus 15 units nightly with insulin sign scale coverage; hold if he is not eating  Nephrology consulted discussed with Dr. Yanez, recommendations appreciated  A.m. labs  Full code  PT/OT  DVT prophylax with Eliquis  Clinical course will dictate further management  Discussed with nurse at the bedside    VTE Prophylaxis:  Pharmacologic VTE prophylaxis orders are present.        CODE STATUS:   Level Of Support Discussed With: Patient  Code Status  (Patient has no pulse and is not breathing): CPR (Attempt to Resuscitate)  Medical Interventions (Patient has pulse or is breathing): Full Support        Electronically signed by Jose Armando Bryson MD, 10/11/2024, 12:39 EDT.    Portions of this documentation were transcribed electronically from a voice recognition software.  I confirm all data accurately represents the service(s) I performed at today's visit.

## 2024-10-11 NOTE — H&P
[facility]   HISTORY AND PHYSICAL    Patient Name: Darci Munson  : 1935  MRN: [id number]  Primary Care Physician:  Adrien Mayer MD  Date of admission: 10/10/2024    Subjective   Subjective     Chief Complaint: Nausea and vomiting    HPI:    Darci Munson is a 89 y.o. male past medical history of diabetes, hypertension, CAD, CKD, hypothyroidism, gastroparesis, and GERD presenting to the ED from rehab for evaluation of tractable nausea and vomiting.  Patient was at rehab after hip replacement procedure and over the last few days patient has been having intractable nausea and vomiting along with lower abdominal pain, lethargy and generalized weakness.  Due to the persistent symptoms patient was brought to the ED for further evaluation.  In the ED patient's vitals were all within normal limits on arrival.  In the ED patient labs showed that he had significant leukocytosis, lactic acidosis, hyperglycemia, and acute renal failure.  CT of the abdomen showed findings suspicious of acute diverticulitis of the descending colon.  When seen patient was having some lower abdominal pain but stated that his nausea had improved with meds.  Denied any recent fevers, headaches, focal weakness, chest pain, palpitation, shortness of breath, cough, constipation, dysuria, hematuria, hematochezia, melena, or anxiety.  Patient admitted for further evaluation and treatment.    Review of Systems   All systems were reviewed and negative except for: As per HPI    Personal History     Past Medical History:   Diagnosis Date    Anemia, unspecified     Atherosclerotic heart disease of native coronary artery without angina pectoris     CAD (coronary artery disease)     CKD (chronic kidney disease), stage III     Essential (primary) hypertension     Gastric ulcer, unspecified as acute or chronic, without hemorrhage or perforation     Gastroparesis     GERD without esophagitis     Glaucoma     Hereditary and idiopathic neuropathy,  unspecified     History of falling     HLD (hyperlipidemia)     HTN (hypertension)     Hypotension, unspecified     Hypothyroidism     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    Irritable bowel syndrome without diarrhea     Laceration without foreign body of right forearm, initial encounter     Low back pain     Malignant neoplasm of prostate     Mixed hyperlipidemia     OA (osteoarthritis)     Other specified disorders of the skin and subcutaneous tissue     Pain in left foot     Peripheral vascular disease, unspecified     Phimosis     Presence of unspecified artificial knee joint     Primary insomnia     Primary osteoarthritis of both knees     Prostate cancer     Sensorineural hearing loss (SNHL) of both ears     Sigmoid diverticulosis     severe sigmoid and descending colon diverticulosis and 3+ gastritis    Sleep related leg cramps     Type 2 diabetes mellitus with diabetic polyneuropathy     and gastroparesis    Unspecified atrial fibrillation     Unspecified dementia without behavioral disturbance     Unspecified hearing loss, bilateral        Past Surgical History:   Procedure Laterality Date    CATARACT EXTRACTION Bilateral 2014    COLONOSCOPY      COLONOSCOPY N/A 7/1/2022    Procedure: COLONOSCOPY WITH POLYPECTOMIES, HOT SNARE;  Surgeon: Jennifer Mann MD;  Location: [facility] ENDOSCOPY;  Service: Gastroenterology;  Laterality: N/A;  DIVERTICULOSIS, COLON POLYPS, HEMORRHOIDS    ENDOSCOPY N/A 7/1/2022    Procedure: ESOPHAGOGASTRODUODENOSCOPY WITH BX, POLYPECTOMY;  Surgeon: Jennifer Mann MD;  Location: [facility] ENDOSCOPY;  Service: Gastroenterology;  Laterality: N/A;  GASTRIC POLYP, GASTRITIS, HIATAL HERNIA    HAND SURGERY Right     JOINT REPLACEMENT      KNEE ARTHROSCOPY Right 03/14/2017    PROSTATECTOMY      REPLACEMENT TOTAL KNEE  03/2017    UPPER GASTROINTESTINAL ENDOSCOPY         Family History: family history includes Diabetes type II in his mother; Lung cancer in his father. Otherwise pertinent FHx  was reviewed and not pertinent to current issue.    Social History:  reports that he quit smoking about 54 years ago. His smoking use included cigarettes. He started smoking about 81 years ago. He has a 27 pack-year smoking history. He has never been exposed to tobacco smoke. He has never used smokeless tobacco. He reports that he does not drink alcohol and does not use drugs.    Home Medications:  Glucosamine, Pen Needles, amLODIPine, amiodarone, apixaban, cholecalciferol, clotrimazole, empagliflozin, ferrous sulfate, gabapentin, insulin detemir, isosorbide mononitrate, latanoprost, levothyroxine, lisinopril, metFORMIN, multivitamin, nitroglycerin, pantoprazole, sucralfate, tobramycin-dexAMETHasone, and traZODone      Allergies:  Allergies   Allergen Reactions    Meloxicam Irritability    Nsaids Hives       Objective   Objective     Vitals:   Temp:  [97.5 °F (36.4 °C)-98.2 °F (36.8 °C)] 98.2 °F (36.8 °C)  Heart Rate:  [81-97] 91  Resp:  [18] 18  BP: (102-137)/(41-68) 122/46  Physical Exam    Constitutional: Awake, alert   Eyes: PERRLA, sclerae anicteric, no conjunctival injection   HENT: NCAT, mucous membranes moist   Neck: Supple, no thyromegaly, no lymphadenopathy, trachea midline   Respiratory: Clear to auscultation bilaterally, nonlabored respirations    Cardiovascular: RRR, no murmurs, rubs, or gallops, palpable pedal pulses bilaterally   Gastrointestinal: Abdominal tenderness, positive bowel sounds, soft, nondistended   Musculoskeletal: No bilateral ankle edema, no clubbing or cyanosis to extremities   Psychiatric: Appropriate affect, cooperative   Neurologic: Oriented x 3, strength symmetric in all extremities, Cranial Nerves grossly intact to confrontation, speech clear   Skin: No rashes     Result Review    Result Review:  I have personally reviewed the results from the time of this admission to 10/11/2024 04:05 EDT and agree with these findings:  [x]  Laboratory list / accordion  []  Microbiology  [x]   Radiology  [x]  EKG/Telemetry   []  Cardiology/Vascular   []  Pathology  []  Old records  []  Other:  Most notable findings include: Leukocytosis, acute renal failure, hyperkalemia, lactic acidosis, CT abdomen with possible diverticulitis      Assessment & Plan   Assessment / Plan     Brief Patient Summary:  Darci Munson is a 89 y.o. male past medical history of diabetes, hypertension, CAD, CKD, hypothyroidism, gastroparesis, and GERD presenting to the ED from rehab for evaluation of tractable nausea and vomiting.    Active Hospital Problems:  Active Hospital Problems    Diagnosis     **Acute renal failure     Intractable nausea and vomiting     Abdominal pain     Stage 3b chronic kidney disease     Prostate CA     Hypothyroidism     Type 2 diabetes mellitus with hyperglycemia, without long-term current use of insulin     Coronary arteriosclerosis     Hypertensive disorder     Gastroparesis      Plan:     Acute renal failure  -Admit to telemetry  -Secondary to GI losses/dehydration  -Lactic acidosis and leukocytosis noted  -Superimposed infection likely  -IVF  -Avoid nephrotoxic drugs  -Nephrology consulted  -Supportive care    Intractable nausea and vomiting  -Patient with known history of gastroparesis  -Reglan 3 times daily  -IVF  -Zofran as needed  -EKG  -Will follow along    Abdominal pain  -CT abdomen reviewed.  Possible diverticulitis  -UA ordered and pending  -Empiric antibiotics.  De-escalate as needed  -Blood cultures  -Pain meds as needed  -Internal medicine as needed    Diabetes  -Insulin sliding scale  -Levemir at bedtime  -Titrate as needed    HTN  -Currently well controlled  -PRN BP meds  -Resume home meds when available  -Titrate if needed    Hx CKD  Hx prostate cancer    GI ppx  DVT ppx      VTE Prophylaxis:  Pharmacologic VTE prophylaxis orders are present.        CODE STATUS:    Level Of Support Discussed With: Patient  Code Status (Patient has no pulse and is not breathing): CPR (Attempt to  Resuscitate)  Medical Interventions (Patient has pulse or is breathing): Full Support    Admission Status:  I believe this patient meets inpatient status.      Electronically signed by Waylon Matta MD, 10/11/24, 4:05 AM EDT.

## 2024-10-11 NOTE — PLAN OF CARE
Goal Outcome Evaluation:      Vitals stable during shift. Blood pressure can run soft but pt is asymptomatic. Urine collected during shift for UA. Pt placed in isolation for cdiff rule-out. Has not been able to produce a bowel movement this shift. Pt is not have any nausea or vomiting during shift but is scared to eat much because he does not want to feel nauseous or start vomiting. Blood sugar monitored. No SSI given. No complaints at this time. Call light within reach. Continue care plan.

## 2024-10-11 NOTE — THERAPY EVALUATION
Patient Name: Darci Munson  : 1935    MRN: [id number]                              Today's Date: 10/11/2024       Admit Date: 10/10/2024    Visit Dx:     ICD-10-CM ICD-9-CM   1. Acute renal failure, unspecified acute renal failure type  N17.9 584.9     Patient Active Problem List   Diagnosis    Type 2 diabetes mellitus with hyperglycemia, without long-term current use of insulin    Chronic kidney disease    Hypothyroidism    Hypertensive disorder    Hyperlipidemia    Paroxysmal atrial fibrillation    Dyspepsia    Coronary arteriosclerosis    Gastroparesis    Hearing loss    Mitral valve regurgitation    Peripheral neuropathy    Prostate CA    Primary insomnia    Primary osteoarthritis of left knee    Anticoagulated    Vitamin D insufficiency    Iron deficiency anemia    Medicare annual wellness visit, subsequent    Thickened nails    Pain in toes of both feet    History of prostate cancer    Stage 3b chronic kidney disease    Chronic bilateral low back pain without sciatica    Acute renal failure    Intractable nausea and vomiting    Abdominal pain     Past Medical History:   Diagnosis Date    Anemia, unspecified     Atherosclerotic heart disease of native coronary artery without angina pectoris     CAD (coronary artery disease)     CKD (chronic kidney disease), stage III     Essential (primary) hypertension     Gastric ulcer, unspecified as acute or chronic, without hemorrhage or perforation     Gastroparesis     GERD without esophagitis     Glaucoma     Hereditary and idiopathic neuropathy, unspecified     History of falling     HLD (hyperlipidemia)     HTN (hypertension)     Hypotension, unspecified     Hypothyroidism     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    Irritable bowel syndrome without diarrhea     Laceration without foreign body of right forearm, initial encounter     Low back pain     Malignant neoplasm of prostate     Mixed hyperlipidemia     OA (osteoarthritis)     Other specified disorders of the skin  and subcutaneous tissue     Pain in left foot     Peripheral vascular disease, unspecified     Phimosis     Presence of unspecified artificial knee joint     Primary insomnia     Primary osteoarthritis of both knees     Prostate cancer     Sensorineural hearing loss (SNHL) of both ears     Sigmoid diverticulosis     severe sigmoid and descending colon diverticulosis and 3+ gastritis    Sleep related leg cramps     Type 2 diabetes mellitus with diabetic polyneuropathy     and gastroparesis    Unspecified atrial fibrillation     Unspecified dementia without behavioral disturbance     Unspecified hearing loss, bilateral      Past Surgical History:   Procedure Laterality Date    CATARACT EXTRACTION Bilateral 2014    COLONOSCOPY      COLONOSCOPY N/A 7/1/2022    Procedure: COLONOSCOPY WITH POLYPECTOMIES, HOT SNARE;  Surgeon: Jennifer Mann MD;  Location: [facility];  Service: Gastroenterology;  Laterality: N/A;  DIVERTICULOSIS, COLON POLYPS, HEMORRHOIDS    ENDOSCOPY N/A 7/1/2022    Procedure: ESOPHAGOGASTRODUODENOSCOPY WITH BX, POLYPECTOMY;  Surgeon: Jennifer Mann MD;  Location: [facility] [place] ENDOSCOPY;  Service: Gastroenterology;  Laterality: N/A;  GASTRIC POLYP, GASTRITIS, HIATAL HERNIA    HAND SURGERY Right     JOINT REPLACEMENT      KNEE ARTHROSCOPY Right 03/14/2017    PROSTATECTOMY      REPLACEMENT TOTAL KNEE  03/2017    UPPER GASTROINTESTINAL ENDOSCOPY        General Information       Row Name 10/11/24 0956          OT Time and Intention    Document Type evaluation  -PG     Mode of Treatment individual therapy;occupational therapy  -PG       Row Name 10/11/24 0956          General Information    Patient Profile Reviewed yes  Patient has been at a subacute nursing and rehab center since his fall on 9/4 for right hip fracture/ORIF.  Assistance needed with self-care and has been walking with a rolling walker  -PG     Prior Level of Function max assist:;ADL's  -PG     Existing Precautions/Restrictions  fall  -PG     Barriers to Rehab none identified  -PG       Row Name 10/11/24 0956          Occupational Profile    Reason for Services/Referral (Occupational Profile) Patient is a 89-year-old male admitted for acute renal failure with recent right hip fracture/ORIF on 9/4.  Patient has been receiving OT PT services in a subacute nursing and rehab center.  Patient being evaluated by Occupational Therapy due to recent decline in ADL function.  -PG       Row Name 10/11/24 0956          Living Environment    People in Home facility resident  -PG       Row Name 10/11/24 0956          Cognition    Orientation Status (Cognition) oriented x 3  -PG       Row Name 10/11/24 0956          Safety Issues, Functional Mobility    Impairments Affecting Function (Mobility) balance;endurance/activity tolerance;strength;shortness of breath  -PG               User Key  (r) = Recorded By, (t) = Taken By, (c) = Cosigned By      Initials Name Provider Type    PG Los Eubanks OT Occupational Therapist                     Mobility/ADL's       Row Name 10/11/24 0958          Transfers    Transfers bed-chair transfer  -PG       Row Name 10/11/24 0958          Bed-Chair Transfer    Bed-Chair [place] (Transfers) contact guard;minimum assist (75% patient effort);verbal cues  -PG     Assistive Device (Bed-Chair Transfers) walker, front-wheeled  -PG       Row Name 10/11/24 0958          Activities of Daily Living    BADL Assessment/Intervention bathing;upper body dressing;lower body dressing;toileting;grooming  -PG       Row Name 10/11/24 0958          Bathing Assessment/Intervention    [place] Level (Bathing) bathing skills;moderate assist (50% patient effort)  -PG       Row Name 10/11/24 0958          Upper Body Dressing Assessment/Training    [place] Level (Upper Body Dressing) upper body dressing skills;set up  -PG       Row Name 10/11/24 0958          Lower Body Dressing Assessment/Training    [place] Level (Lower Body  Dressing) lower body dressing skills;maximum assist (25% patient effort)  -PG       Row Name 10/11/24 0958          Toileting Assessment/Training    [place] Level (Toileting) toileting skills;maximum assist (25% patient effort)  -PG       Row Name 10/11/24 0958          Grooming Assessment/Training    [place] Level (Grooming) grooming skills;set up  -PG               User Key  (r) = Recorded By, (t) = Taken By, (c) = Cosigned By      Initials Name Provider Type    PG Los Eubanks OT Occupational Therapist                   Obj/Interventions       [facility] Name 10/11/24 1000          Sensory Assessment (Somatosensory)    Sensory Assessment (Somatosensory) unable/difficult to assess  -PG       Row Name 10/11/24 1000          Vision Assessment/Intervention    Visual Impairment/Limitations unable/difficult to assess  -PG       Row Name 10/11/24 1000          Range of Motion Comprehensive    General Range of Motion no range of motion deficits identified  -PG       Row Name 10/11/24 1000          Strength Comprehensive (MMT)    Comment, General Manual Muscle Testing (MMT) Assessment 4/5 BUE  -PG       [facility] Name 10/11/24 1000          Motor Skills    Motor Skills coordination;functional endurance  -PG     Coordination WFL  -PG     Functional Endurance Fair minus  -PG               User Key  (r) = Recorded By, (t) = Taken By, (c) = Cosigned By      Initials Name Provider Type    PG Los Eubanks OT Occupational Therapist                   Goals/Plan       Row Name 10/11/24 1001          Transfer Goal 1 (OT)    Activity/Assistive Device (Transfer Goal 1, OT) transfers, all  -PG     [place] Level/Cues Needed (Transfer Goal 1, OT) modified independence  -PG     Time Frame (Transfer Goal 1, OT) long term goal (LTG);10 days  -PG       Row Name 10/11/24 1001          Bathing Goal 1 (OT)    Activity/Device (Bathing Goal 1, OT) bathing skills, all  -PG     [place] Level/Cues Needed (Bathing Goal 1, OT) modified  independence  -PG     Time Frame (Bathing Goal 1, OT) long term goal (LTG);10 days  -PG       Row Name 10/11/24 1001          Dressing Goal 1 (OT)    Activity/Device (Dressing Goal 1, OT) dressing skills, all  -PG     [place]/Cues Needed (Dressing Goal 1, OT) modified independence  -PG     Time Frame (Dressing Goal 1, OT) long term goal (LTG);10 days  -PG       Row Name 10/11/24 1001          Toileting Goal 1 (OT)    Activity/Device (Toileting Goal 1, OT) toileting skills, all  -PG     [place] Level/Cues Needed (Toileting Goal 1, OT) modified independence  -PG     Time Frame (Toileting Goal 1, OT) long term goal (LTG);10 days  -PG       Row Name 10/11/24 1001          Grooming Goal 1 (OT)    Activity/Device (Grooming Goal 1, OT) grooming skills, all  -PG     [place] (Grooming Goal 1, OT) modified independence  -PG     Time Frame (Grooming Goal 1, OT) long term goal (LTG);10 days  -PG       Row Name 10/11/24 1001          Problem Specific Goal 1 (OT)    Problem Specific Goal 1 (OT) Patient will improve activity tolerance to fair plus to support independence and engagement with ADL activities  -PG     Time Frame (Problem Specific Goal 1, OT) long term goal (LTG);10 days  -PG       Row Name 10/11/24 1001          Therapy Assessment/Plan (OT)    Planned Therapy Interventions (OT) activity tolerance training;BADL retraining;strengthening exercise;transfer/mobility retraining;patient/caregiver education/training;occupation/activity based interventions  -PG               User Key  (r) = Recorded By, (t) = Taken By, (c) = Cosigned By      Initials Name Provider Type    PG Los Eubanks OT Occupational Therapist                   Clinical Impression       Row Name 10/11/24 1001          Pain Assessment    Pretreatment Pain Rating 0/10 - no pain  -PG     Posttreatment Pain Rating 0/10 - no pain  -PG       Row Name 10/11/24 1001          Plan of Care Review    Plan of Care Reviewed With patient  -PG     Outcome  Evaluation Patient presents with limitations affecting strength, activity tolerance, and balance impacting patient's ability to return home safely and independently.  The skills of a therapist will be required to safely and effectively implement the following treatment plan to restore maximal level of function  -PG       Row Name 10/11/24 1001          Therapy Assessment/Plan (OT)    Patient/Family Therapy Goal Statement (OT) Get stronger and eventually return home  -PG     Rehab Potential (OT) good, to achieve stated therapy goals  -PG     Criteria for Skilled Therapeutic Interventions Met (OT) yes;meets criteria;skilled treatment is necessary  -PG     Therapy Frequency (OT) 5 times/wk  -PG       Row Name 10/11/24 1001          Therapy Plan Review/Discharge Plan (OT)    Anticipated Discharge Disposition (OT) sub acute care setting  -PG               User Key  (r) = Recorded By, (t) = Taken By, (c) = Cosigned By      Initials Name Provider Type    PG Los Eubanks, OT Occupational Therapist                   Outcome Measures       Row Name 10/11/24 1002          How much help from another is currently needed...    Putting on and taking off regular lower body clothing? 2  -PG     Bathing (including washing, rinsing, and drying) 2  -PG     Toileting (which includes using toilet bed pan or urinal) 2  -PG     Putting on and taking off regular upper body clothing 3  -PG     Taking care of personal grooming (such as brushing teeth) 4  -PG     Eating meals 4  -PG     AM-PAC 6 Clicks Score (OT) 17  -PG       Row Name 10/11/24 0825 10/11/24 0303       How much help from another person do you currently need...    Turning from your back to your side while in flat bed without using bedrails? 4  -DS 4  -YUMIKO    Moving from lying on back to sitting on the side of a flat bed without bedrails? 4  -DS 4  -YUMIKO    Moving to and from a bed to a chair (including a wheelchair)? 2  -DS 2  -YUMIKO    Standing up from a chair using your arms  (e.g., wheelchair, bedside chair)? 3  -DS 3  -YUMIKO    Climbing 3-5 steps with a railing? 2  -DS 2  -YUMIKO    To walk in hospital room? 2  -DS 2  -YUMIKO    AM-PAC 6 Clicks Score (PT) 17  -DS 17  -YUMIKO    Highest Level of Mobility Goal 5 --> Static standing  -DS 5 --> Static standing  -YUMIKO      Row Name 10/11/24 1002          Functional Assessment    Outcome Measure Options AM-PAC 6 Clicks Daily Activity (OT);Optimal Instrument  -PG       Row Name 10/11/24 1002          Optimal Instrument    Optimal Instrument Optimal - 3  -PG     Bending/Stooping 3  -PG     Standing 2  -PG     Reaching 2  -PG     From the list, choose the 3 activities you would most like to be able to do without any difficulty Bending/stooping;Standing;Reaching  -PG     Total Score Optimal - 3 7  -PG               User Key  (r) = Recorded By, (t) = Taken By, (c) = Cosigned By      Initials Name Provider Type    Jimbo Islas, RN Registered Nurse    Los Raphael OT Occupational Therapist    Harpal Zarco, RN Registered Nurse                    Occupational Therapy Education       Title: PT OT SLP Therapies (Done)       Topic: Occupational Therapy (Done)       Point: ADL training (Done)       Description:   Instruct learner(s) on proper safety adaptation and remediation techniques during self care or transfers.   Instruct in proper use of assistive devices.                  Learning Progress Summary             Patient Acceptance, E,D, DU by PG at 10/11/2024 1005                         Point: Home exercise program (Done)       Description:   Instruct learner(s) on appropriate technique for monitoring, assisting and/or progressing therapeutic exercises/activities.                  Learning Progress Summary             Patient Acceptance, E,D, DU by PG at 10/11/2024 1005                         Point: Precautions (Done)       Description:   Instruct learner(s) on prescribed precautions during self-care and functional transfers.                   Learning Progress Summary             Patient Acceptance, E,D, DU by PG at 10/11/2024 1005                         Point: Body mechanics (Done)       Description:   Instruct learner(s) on proper positioning and spine alignment during self-care, functional mobility activities and/or exercises.                  Learning Progress Summary             Patient Acceptance, E,D, DU by PG at 10/11/2024 1005                                         User Key       Initials Effective Dates Name Provider Type Discipline    PG 06/16/21 -  Los Eubanks OT Occupational Therapist OT                  OT Recommendation and Plan  Planned Therapy Interventions (OT): activity tolerance training, BADL retraining, strengthening exercise, transfer/mobility retraining, patient/caregiver education/training, occupation/activity based interventions  Therapy Frequency (OT): 5 times/wk  Plan of Care Review  Plan of Care Reviewed With: patient  Outcome Evaluation: Patient presents with limitations affecting strength, activity tolerance, and balance impacting patient's ability to return home safely and independently.  The skills of a therapist will be required to safely and effectively implement the following treatment plan to restore maximal level of function     Time Calculation:   Evaluation Complexity (OT)  Review Occupational Profile/Medical/Therapy History Complexity: brief/low complexity  Assessment, Occupational Performance/Identification of Deficit Complexity: 3-5 performance deficits  Clinical Decision Making Complexity (OT): problem focused assessment/low complexity  Overall Complexity of Evaluation (OT): low complexity     Time Calculation- OT       Row Name 10/11/24 1005             Time Calculation- OT    OT Received On 10/11/24  -PG      OT Goal Re-Cert Due Date 10/20/24  -PG         Untimed Charges    OT Eval/Re-eval Minutes 35  -PG         Total Minutes    Untimed Charges Total Minutes 35  -PG       Total Minutes 35  -PG                 User Key  (r) = Recorded By, (t) = Taken By, (c) = Cosigned By      Initials Name Provider Type    PG Los Eubanks OT Occupational Therapist                  Therapy Charges for Today       Code Description Service Date Service Provider Modifiers Qty    [id number]  OT EVAL LOW COMPLEXITY 3 10/11/2024 Los Eubanks OT GO 1                 Los Eubanks OT  10/11/2024

## 2024-10-11 NOTE — ED PROVIDER NOTES
Time: 9:58 PM EDT  Date of encounter:  10/10/2024  Independent Historian/Clinical History and Information was obtained by:   Patient and Family    History is limited by: N/A    Chief Complaint: Diarrhea      History of Present Illness:  Patient is a 89 y.o. year old male who presents to the emergency department for evaluation of diarrhea and vomiting that is gotten worse over the past several days.  Patient denies fever and chills.  Patient has no chest pain.  Patient does report stomach upset.  Patient has no cough or hemoptysis.      Patient Care Team  Primary Care Provider: Adrien Mayer MD    Past Medical History:     Allergies   Allergen Reactions    Meloxicam Irritability    Nsaids Hives     Past Medical History:   Diagnosis Date    Anemia, unspecified     Atherosclerotic heart disease of native coronary artery without angina pectoris     CAD (coronary artery disease)     CKD (chronic kidney disease), stage III     Essential (primary) hypertension     Gastric ulcer, unspecified as acute or chronic, without hemorrhage or perforation     Gastroparesis     GERD without esophagitis     Glaucoma     Hereditary and idiopathic neuropathy, unspecified     History of falling     HLD (hyperlipidemia)     HTN (hypertension)     Hypotension, unspecified     Hypothyroidism     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    Irritable bowel syndrome without diarrhea     Laceration without foreign body of right forearm, initial encounter     Low back pain     Malignant neoplasm of prostate     Mixed hyperlipidemia     OA (osteoarthritis)     Other specified disorders of the skin and subcutaneous tissue     Pain in left foot     Peripheral vascular disease, unspecified     Phimosis     Presence of unspecified artificial knee joint     Primary insomnia     Primary osteoarthritis of both knees     Prostate cancer     Sensorineural hearing loss (SNHL) of both ears     Sigmoid diverticulosis     severe sigmoid and descending colon  diverticulosis and 3+ gastritis    Sleep related leg cramps     Type 2 diabetes mellitus with diabetic polyneuropathy     and gastroparesis    Unspecified atrial fibrillation     Unspecified dementia without behavioral disturbance     Unspecified hearing loss, bilateral      Past Surgical History:   Procedure Laterality Date    CATARACT EXTRACTION Bilateral 2014    COLONOSCOPY      COLONOSCOPY N/A 7/1/2022    Procedure: COLONOSCOPY WITH POLYPECTOMIES, HOT SNARE;  Surgeon: Jennifer Mann MD;  Location: [facility] ENDOSCOPY;  Service: Gastroenterology;  Laterality: N/A;  DIVERTICULOSIS, COLON POLYPS, HEMORRHOIDS    ENDOSCOPY N/A 7/1/2022    Procedure: ESOPHAGOGASTRODUODENOSCOPY WITH BX, POLYPECTOMY;  Surgeon: Jennifer Mann MD;  Location: [facility] ENDOSCOPY;  Service: Gastroenterology;  Laterality: N/A;  GASTRIC POLYP, GASTRITIS, HIATAL HERNIA    HAND SURGERY Right     JOINT REPLACEMENT      KNEE ARTHROSCOPY Right 03/14/2017    PROSTATECTOMY      REPLACEMENT TOTAL KNEE  03/2017    UPPER GASTROINTESTINAL ENDOSCOPY       Family History   Problem Relation Age of Onset    Diabetes type II Mother     Lung cancer Father        Home Medications:  Prior to Admission medications    Medication Sig Start Date End Date Taking? Authorizing Provider   amiodarone (PACERONE) 200 MG tablet Take 1 tablet by mouth Daily. 3/9/21   Paulo Peterson APRN   amLODIPine (NORVASC) 10 MG tablet Take 1 tablet by mouth Daily. 6/19/21   Paulo Peterson APRN   apixaban (ELIQUIS) 2.5 MG tablet tablet Take 1 tablet by mouth 2 (Two) Times a Day.    Paulo Peterson APRN   cholecalciferol (VITAMIN D3) 25 MCG (1000 UT) tablet Take 1 tablet by mouth 2 (Two) Times a Day.    ProviderAmelia MD   clotrimazole (LOTRIMIN) 1 % cream Apply 1 Application topically to the appropriate area as directed 2 (Two) Times a Day. 5/27/24   ProviderAmelia MD   empagliflozin (Jardiance) 25 MG tablet tablet Take 1 tablet by mouth Daily. PATIENT  ASSISTANCE MEDICATION 2/15/22   Adrien Mayer MD   ferrous sulfate 325 (65 FE) MG tablet Take 1 tablet by mouth 2 (Two) Times a Day.    Amelia Smith MD   gabapentin (NEURONTIN) 100 MG capsule Take 1 capsule by mouth twice daily 5/2/24   Adrien Mayer MD   Glucosamine 500 MG capsule Take 2 capsules by mouth 2 (Two) Times a Day. OTC    Amelia Smith MD   insulin detemir (Levemir) 100 UNIT/ML injection Inject 5 Units under the skin into the appropriate area as directed Daily. 6/12/23   Adrien Mayer MD   Insulin Pen Needle (Pen Needles) 32G X 4 MM misc Use 1 each Daily. Dx: E11.9 - use with Lantus 1 time daily. 9/15/23   Adrien Mayer MD   isosorbide mononitrate (IMDUR) 30 MG 24 hr tablet Take 1 tablet by mouth Daily. 2/1/21   Paulo Peterson APRN   latanoprost (XALATAN) 0.005 % ophthalmic solution As Needed. 4/15/21   Amelia Smith MD   levothyroxine (SYNTHROID, LEVOTHROID) 200 MCG tablet Take 1 tablet by mouth once daily 4/30/24   Adrien Mayer MD   lisinopril (PRINIVIL,ZESTRIL) 5 MG tablet Take 2 tablets by mouth Daily. 8/7/23 8/20/24  Paulo Peterson APRN   metFORMIN (GLUCOPHAGE) 500 MG tablet TAKE 1 TABLET BY MOUTH TWICE DAILY WITH BREAKFAST AND WITH SUPPER 7/26/24   Adrien Mayer MD   multivitamin (THERAGRAN) tablet tablet Take 1 tablet by mouth Daily.    Amelia Smith MD   nitroglycerin (NITROSTAT) 0.4 MG SL tablet     Amelia Smith MD   pantoprazole (PROTONIX) 40 MG EC tablet Take 1 tablet by mouth once daily 4/29/24   Adrien Mayer MD   sucralfate (CARAFATE) 1 g tablet Take 1 tablet by mouth 4 times daily  Patient taking differently: Take  by mouth 2 (Two) Times a Day. Patient takes 2 tabs in AM and 2 tabs in PM 5/20/24   Adrien Mayer MD   tobramycin-dexAMETHasone (TOBRADEX) 0.3-0.1 % ophthalmic suspension 1 drop. 8/5/24   Provider, MD Amelia   traZODone (DESYREL) 100 MG tablet Take 1 tablet by  "mouth Every Night. 24   Adrien Mayer MD        Social History:   Social History     Tobacco Use    Smoking status: Former     Current packs/day: 0.00     Average packs/day: 1 pack/day for 27.0 years (27.0 ttl pk-yrs)     Types: Cigarettes     Start date:      Quit date: 1970     Years since quittin.8     Passive exposure: Never    Smokeless tobacco: Never   Vaping Use    Vaping status: Never Used   Substance Use Topics    Alcohol use: Never    Drug use: Never         Review of Systems:  Review of Systems   Constitutional:  Negative for chills and fever.   HENT:  Negative for congestion, rhinorrhea and sore throat.    Eyes:  Negative for pain and visual disturbance.   Respiratory:  Negative for apnea, cough, chest tightness and shortness of breath.    Cardiovascular:  Negative for chest pain and palpitations.   Gastrointestinal:  Positive for diarrhea, nausea and vomiting. Negative for abdominal pain.   Genitourinary:  Negative for difficulty urinating and dysuria.   Musculoskeletal:  Negative for joint swelling and myalgias.   Skin:  Negative for color change.   Neurological:  Negative for seizures and headaches.   Psychiatric/Behavioral: Negative.     All other systems reviewed and are negative.       Physical Exam:  /58   Pulse 81   Temp 97.5 °F (36.4 °C) (Oral)   Resp 18   Ht 170.2 cm (67\")   Wt 68.9 kg (152 lb)   SpO2 99%   BMI 23.81 kg/m²     Physical Exam  Vitals and nursing note reviewed.   Constitutional:       General: He is not in acute distress.     Appearance: Normal appearance. He is not toxic-appearing.   HENT:      Head: Normocephalic and atraumatic.      Jaw: There is normal jaw occlusion.   Eyes:      General: Lids are normal.      Extraocular Movements: Extraocular movements intact.      Conjunctiva/sclera: Conjunctivae normal.      Pupils: Pupils are equal, round, and reactive to light.   Cardiovascular:      Rate and Rhythm: Normal rate and regular rhythm.      " Pulses: Normal pulses.      Heart sounds: Normal heart sounds.   Pulmonary:      Effort: Pulmonary effort is normal. No respiratory distress.      Breath sounds: Normal breath sounds. No wheezing or rhonchi.   Abdominal:      General: Abdomen is flat.      Palpations: Abdomen is soft.      Tenderness: There is no abdominal tenderness. There is no guarding or rebound.   Musculoskeletal:         General: Normal range of motion.      Cervical back: Normal range of motion and neck supple.      Right lower leg: No edema.      Left lower leg: No edema.   Skin:     General: Skin is warm and dry.   Neurological:      Mental Status: He is alert and oriented to person, place, and time. Mental status is at baseline.   Psychiatric:         Mood and Affect: Mood normal.                  Procedures:  Procedures      Medical Decision Making:      Comorbidities that affect care:    Diabetes    External Notes reviewed:    Hospital Discharge Summary: Patient was last admitted for atrial fibrillation      The following orders were placed and all results were independently analyzed by me:  Orders Placed This Encounter   Procedures    CT Abdomen Pelvis Without Contrast    [place] Draw    Comprehensive Metabolic Panel    Lipase    Urinalysis With Microscopic If Indicated (No Culture) - Urine, Clean Catch    Lactic Acid, Plasma    CBC Auto Differential    STAT Lactic Acid, Reflex    NPO Diet NPO Type: Strict NPO    Undress & Gown    Inpatient Nephrology Consult    Inpatient Hospitalist Consult    Inpatient Hospitalist Consult    POC Glucose Q1H    Insert Peripheral IV    CBC & Differential    Green Top (Gel)    Lavender Top    Gold Top - SST    Light Blue Top       Medications Given in the Emergency Department:  Medications   sodium chloride 0.9 % flush 10 mL (has no administration in time range)   sodium chloride 0.9 % bolus 1,000 mL (1,000 mL Intravenous New Bag 10/10/24 1049)   sodium bicarbonate 150 mEq/1000 mL D5W infusion (150 mEq  Intravenous New Bag 10/10/24 2202)   albuterol (PROVENTIL) nebulizer solution 0.5% 2.5 mg/0.5mL (has no administration in time range)   calcium gluconate 1000 Mg/50ml 0.675% NaCl IV SOLN (1,000 mg Intravenous New Bag 10/10/24 2148)   insulin regular (humuLIN R,novoLIN R) injection 5 Units (5 Units Intravenous Given 10/10/24 2143)   sodium bicarbonate injection 8.4% 50 mEq (50 mEq Intravenous Given 10/10/24 2154)   sodium zirconium cyclosilicate (LOKELMA) packet 10 g (10 g Oral Given 10/10/24 2158)        ED Course:         Labs:    Lab Results (last 24 hours)       Procedure Component Value Units Date/Time    CBC & Differential [[id number]]  (Abnormal) Collected: 10/10/24 2038    Specimen: Blood Updated: 10/10/24 2043    Narrative:      The following orders were created for panel order CBC & Differential.  Procedure                               Abnormality         Status                     ---------                               -----------         ------                     CBC Auto Differential[[id number]]        Abnormal            Final result                 Please view results for these tests on the individual orders.    Comprehensive Metabolic Panel [[id number]]  (Abnormal) Collected: 10/10/24 2038    Specimen: Blood Updated: 10/10/24 2105     Glucose 352 mg/dL      BUN 56 mg/dL      Creatinine 2.03 mg/dL      Sodium 133 mmol/L      Potassium 6.0 mmol/L      Comment: Slight hemolysis detected by analyzer. Result may be falsely elevated.        Chloride 104 mmol/L      CO2 12.6 mmol/L      Calcium 9.9 mg/dL      Total Protein 7.3 g/dL      Albumin 3.7 g/dL      ALT (SGPT) 19 U/L      AST (SGOT) 23 U/L      Comment: Slight hemolysis detected by analyzer. Result may be falsely elevated.        Alkaline Phosphatase 175 U/L      Total Bilirubin 0.5 mg/dL      Globulin 3.6 gm/dL      A/G Ratio 1.0 g/dL      BUN/Creatinine Ratio 27.6     Anion Gap 16.4 mmol/L      eGFR 30.8 mL/min/1.73     Narrative:      GFR Normal  >60  Chronic Kidney Disease <60  Kidney Failure <15    The GFR formula is only valid for adults with stable renal function between ages 18 and 70.    Lipase [[id number]]  (Normal) Collected: 10/10/24 2038    Specimen: Blood Updated: 10/10/24 2101     Lipase 40 U/L     Lactic Acid, Plasma [[id number]]  (Abnormal) Collected: 10/10/24 2038    Specimen: Blood Updated: 10/10/24 2118     Lactate 3.3 mmol/L     CBC Auto Differential [[id number]]  (Abnormal) Collected: 10/10/24 2038    Specimen: Blood Updated: 10/10/24 2043     WBC 20.03 10*3/mm3      RBC 4.67 10*6/mm3      Hemoglobin 13.6 g/dL      Hematocrit 42.9 %      MCV 91.9 fL      MCH 29.1 pg      MCHC 31.7 g/dL      RDW 13.2 %      RDW-SD 44.5 fl      MPV 11.1 fL      Platelets 201 10*3/mm3      Neutrophil % 87.9 %      Lymphocyte % 7.9 %      Monocyte % 3.6 %      Eosinophil % 0.0 %      Basophil % 0.1 %      Immature Grans % 0.5 %      Neutrophils, Absolute 17.59 10*3/mm3      Lymphocytes, Absolute 1.59 10*3/mm3      Monocytes, Absolute 0.72 10*3/mm3      Eosinophils, Absolute 0.00 10*3/mm3      Basophils, Absolute 0.03 10*3/mm3      Immature Grans, Absolute 0.10 10*3/mm3      nRBC 0.0 /100 WBC              Imaging:    CT Abdomen Pelvis Without Contrast    Result Date: 10/10/2024  CT ABDOMEN PELVIS WO CONTRAST Date of Exam: 10/10/2024 9:16 PM EDT Indication: Flank pain, kidney stone suspected Abdominal pain flank pain. Comparison: KUB 5/30/2024 Technique: Axial CT images were obtained of the abdomen and pelvis without the administration of contrast. Reconstructed coronal and sagittal images were also obtained. Automated exposure control and iterative construction methods were used. Findings: Reticulation at the pulmonary periphery with septal thickening is consistent with chronic interstitial lung disease. 3 mm noncalcified nodules are seen in the left lower lobe on series 204 image 21 and 20. Follow-up CT scan of the chest is recommended in  6 months. Extensive  coronary artery calcifications are evident. The liver is of normal size. Scattered tiny calcifications are consistent with old granulomatous disease, and are of doubtful significance. The gallbladder is not abnormally distended. No pancreatic or adrenal mass is evident. The spleen is of normal size. Vascular calcifications are seen in the kidneys. No ureteral stones are seen. There is no evidence of hydronephrosis. The urinary bladder is not abnormally distended. Post prostatectomy. The stomach is not abnormally distended. Bowel loops are of normal caliber. The appendix is normal. Moderate diverticulosis of the descending colon and sigmoid colon is evident. Ill-defined increased density in fat posterior to the proximal descending colon on series 201 image 104 may represent acute diverticulitis or scarring from prior episodes of diverticulitis. This could also represent a mural mass of the colon. Degenerative changes are seen in the lower thoracic and lumbar spine.     Impression: CT scan of the abdomen and pelvis without contrast demonstrating tiny noncalcified nodules in the left lower lobe. Follow-up CT scan of the chest is recommended in 6 months. Post prostatectomy. Moderate diverticulosis of the descending colon and sigmoid colon. Ill-defined increased density in fat posterior to the proximal descending colon may represent acute diverticulitis or scarring from prior episodes of diverticulitis. This could also represent an underlying mural abnormality of the colon. Electronically Signed: Armaan Garcia MD  10/10/2024 9:45 PM EDT  Workstation ID: [id number]       Differential Diagnosis and Discussion:    Diarrhea: Differential diagnosis includes but is not limited to malabsorption syndrome, bacterial infection, carcinoid syndrome, pancreatic hypersecretion, viral infection, celiac sprue, Crohn's disease, ulcerative colitis, ischemic colitis, colitis, hypermotility, and irritable bowel syndrome.    All labs were  reviewed and interpreted by me.  CT scan radiology impression was interpreted by me.    MDM     Amount and/or Complexity of Data Reviewed  Decide to obtain previous medical records or to obtain history from someone other than the patient: yes       The patient´s CBC that was reviewed and interpreted by me shows no abnormalities of critical concern. Of note, there is no anemia requiring a blood transfusion and the platelet count is acceptable.  CMP shows a elevated BUN and creatinine and elevated potassium.      Patient was placed on the cardiac monitor after being given albuterol, insulin, bicarb, calcium for hyperkalemia.  They were monitored for ventricular ectopy, arrhythmia, tachycardia, hypoxia, and changes in blood pressure.  Patient was rechecked several times throughout their stay for mental status decline and for reassessment of worsening changes in vital signs.     Total Critical Care time of 45 minutes. Total critical care time documented does not include time spent on separately billed procedures for services of nurses or physician assistants. I personally saw and examined the patient. I have reviewed all diagnostic interpretations and treatment plans as written. I was present for the key portions of any procedures performed and the inclusive time noted in any critical care statement. Critical care time includes patient management by me, time spent at the patients bedside,  time to review lab and imaging results, discussing patient care, documentation in the medical record, and time spent with family or caregiver.          Patient Care Considerations:    None      Consultants/Shared Management Plan:    Case was discussed with the hospitalist who agrees with admission.    Social Determinants of Health:    Patient is independent, reliable, and has access to care.       Disposition and Care Coordination:    Admit:   Through independent evaluation of the patient's history, physical, and imperical data, the  patient meets criteria for inpatient admission to the hospital.        Final diagnoses:   Acute renal failure, unspecified acute renal failure type        ED Disposition       ED Disposition   Intended Admit    Condition   --    Comment   --               This medical record created using voice recognition software.             Austyn Catalan MD  10/10/24 [id number]

## 2024-10-11 NOTE — CONSULTS
[facility]   Consult Note    Patient Name: Darci Munson  : 1935  MRN: [id number]  Primary Care Physician:  Adrien Mayer MD  Referring Physician: No ref. provider found  Date of admission: 10/10/2024    Subjective   Subjective     Reason for Consult/ Chief Complaint: ITA    HPI:  Darci Munson is a 89 y.o. male 89-year-old male with past medical history of hypertension, coronary artery disease, diabetes, hypothyroidism, gastroparesis, GERD, CKD stage IIIb with baseline creatinine 1.4-1.7 who presented to the hospital due to nausea and vomiting.  Patient was in rehab after hip replacement over the last few days and he started having abdominal pain generalized weakness associated with nausea and vomiting.  Due to the persistence of his symptoms patient presented to the ER.    Patient's blood pressures and vitals have been stable his blood pressures on the morning of 10/11 are soft.  He had CT abdomen pelvis without contrast which showed nodules in the left lower lobe and some diverticulosis.  His lactate is mildly elevated at 3-5.  His white count is elevated at 20.  His chemistry initially showed a creatinine of 2.0 consistent with an ITA.  Patient admitted for further management of symptoms.  Nephrology has been consulted for management of ITA    Review of Systems  All review of systems negative except as given below.    Personal History     Past Medical History:   Diagnosis Date    Anemia, unspecified     Atherosclerotic heart disease of native coronary artery without angina pectoris     CAD (coronary artery disease)     CKD (chronic kidney disease), stage III     Essential (primary) hypertension     Gastric ulcer, unspecified as acute or chronic, without hemorrhage or perforation     Gastroparesis     GERD without esophagitis     Glaucoma     Hereditary and idiopathic neuropathy, unspecified     History of falling     HLD (hyperlipidemia)     HTN (hypertension)     Hypotension, unspecified      Hypothyroidism     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    Irritable bowel syndrome without diarrhea     Laceration without foreign body of right forearm, initial encounter     Low back pain     Malignant neoplasm of prostate     Mixed hyperlipidemia     OA (osteoarthritis)     Other specified disorders of the skin and subcutaneous tissue     Pain in left foot     Peripheral vascular disease, unspecified     Phimosis     Presence of unspecified artificial knee joint     Primary insomnia     Primary osteoarthritis of both knees     Prostate cancer     Sensorineural hearing loss (SNHL) of both ears     Sigmoid diverticulosis     severe sigmoid and descending colon diverticulosis and 3+ gastritis    Sleep related leg cramps     Type 2 diabetes mellitus with diabetic polyneuropathy     and gastroparesis    Unspecified atrial fibrillation     Unspecified dementia without behavioral disturbance     Unspecified hearing loss, bilateral        Past Surgical History:   Procedure Laterality Date    CATARACT EXTRACTION Bilateral 2014    COLONOSCOPY      COLONOSCOPY N/A 7/1/2022    Procedure: COLONOSCOPY WITH POLYPECTOMIES, HOT SNARE;  Surgeon: Jennifer Mann MD;  Location: [facility] ENDOSCOPY;  Service: Gastroenterology;  Laterality: N/A;  DIVERTICULOSIS, COLON POLYPS, HEMORRHOIDS    ENDOSCOPY N/A 7/1/2022    Procedure: ESOPHAGOGASTRODUODENOSCOPY WITH BX, POLYPECTOMY;  Surgeon: Jennifer Mann MD;  Location: [facility] ENDOSCOPY;  Service: Gastroenterology;  Laterality: N/A;  GASTRIC POLYP, GASTRITIS, HIATAL HERNIA    HAND SURGERY Right     JOINT REPLACEMENT      KNEE ARTHROSCOPY Right 03/14/2017    PROSTATECTOMY      REPLACEMENT TOTAL KNEE  03/2017    UPPER GASTROINTESTINAL ENDOSCOPY         Family History: family history includes Diabetes type II in his mother; Lung cancer in his father. Otherwise pertinent FHx was reviewed and not pertinent to current issue.    Social History:  reports that he quit smoking about 54 years  ago. His smoking use included cigarettes. He started smoking about 81 years ago. He has a 27 pack-year smoking history. He has never been exposed to tobacco smoke. He has never used smokeless tobacco. He reports that he does not drink alcohol and does not use drugs.    Home Medications:  Glucosamine, Pen Needles, amLODIPine, amiodarone, apixaban, cholecalciferol, clotrimazole, empagliflozin, ferrous sulfate, gabapentin, insulin detemir, isosorbide mononitrate, latanoprost, levothyroxine, lisinopril, metFORMIN, multivitamin, nitroglycerin, pantoprazole, sucralfate, tobramycin-dexAMETHasone, and traZODone    Allergies:  Allergies   Allergen Reactions    Meloxicam Irritability    Nsaids Hives       Objective    Objective     Vitals:   Temp:  [97.5 °F (36.4 °C)-98.8 °F (37.1 °C)] 98.8 °F (37.1 °C)  Heart Rate:  [81-97] 96  Resp:  [18] 18  BP: ()/(41-68) 90/42    Physical Exam:             Constitutional:         Awake, alert responsive, conversant, no obvious distress   Eyes:                       PERRLA, sclerae anicteric, no conjunctival injection   HEENT:                   Moist mucous membranes, no nasal or eye discharge, no throat congestion   Neck:                      Supple, no thyromegaly, no lymphadenopathy, trachea midline, no elevated JVD   Respiratory:           Clear to auscultation bilaterally, nonlabored respirations    Cardiovascular:     RRR, no murmurs, rubs, or gallops, palpable pedal pulses bilaterally, No bilateral ankle edema   Gastrointestinal:   Positive bowel sounds, soft, nontender, non-distended, no organomegaly   Musculoskeletal:  No clubbing or cyanosis to extremities, muscle wasting, joint swelling, muscle weakness   Psychiatric:              Appropriate affect, cooperative   Neurologic:            Awake alert, oriented x 3, strength symmetric in all extremities, Cranial Nerves grossly intact to confrontation, speech clear   Skin:                      No rashes, bruising, skin  ulcers, petechiae or ecchymosis    Result Review    Result Review:  I have personally reviewed the results from the time of this admission to 10/11/2024 07:31 EDT and agree with these findings:  []  Laboratory  []  Microbiology  []  Radiology  []  EKG/Telemetry   []  Cardiology/Vascular   []  Pathology  []  Old records  []  Other:    Results from last 7 days   Lab Units 10/10/24  2038   WBC 10*3/mm3 20.03*   HEMOGLOBIN g/dL 13.6   PLATELETS 10*3/mm3 201     Results from last 7 days   Lab Units 10/11/24  0544 10/11/24  0248 10/10/24  2038   SODIUM mmol/L 138 136 133*   POTASSIUM mmol/L 5.3* 5.1 6.0*   CHLORIDE mmol/L 104 102 104   CO2 mmol/L 21.0* 16.1* 12.6*   ANION GAP mmol/L 13.0 17.9* 16.4*   BUN mg/dL 48* 50* 56*   CREATININE mg/dL 1.74* 1.83* 2.03*   GLUCOSE mg/dL 290* 354* 352*       Assessment & Plan   Assessment / Plan     Active Hospital Problems:  Active Hospital Problems    Diagnosis     **Acute renal failure     Intractable nausea and vomiting     Abdominal pain     Stage 3b chronic kidney disease     Prostate CA     Hypothyroidism     Type 2 diabetes mellitus with hyperglycemia, without long-term current use of insulin     Coronary arteriosclerosis     Hypertensive disorder     Gastroparesis      89-year-old male with past medical history of hypertension, coronary artery disease, diabetes, hypothyroidism, gastroparesis, GERD, CKD stage IIIb with baseline creatinine 1.4-1.7 who presented to the hospital due to nausea and vomiting in background of recent hip replacement noted to be septic with white count of 20 elevated lactate and mild ITA with creatinine rise to 2.0 likely in the setting of hemodynamic changes and relative hypotension improved with IV fluids.  CT abdomen pelvis largely unremarkable except for some diverticulosis    Plan:   Continue with LR  Discontinue bicarb drip  Lokelma x 1 dose  Patient empirically on ceftriaxone and metronidazole  Supportive treatment for nausea and vomiting thank  you for involving me in the care of the patient.  Will continue to follow along

## 2024-10-11 NOTE — PLAN OF CARE
Goal Outcome Evaluation:  Plan of Care Reviewed With: patient           Outcome Evaluation: Patient presents with limitations affecting strength, activity tolerance, and balance impacting patient's ability to return home safely and independently.  The skills of a therapist will be required to safely and effectively implement the following treatment plan to restore maximal level of function      Anticipated Discharge Disposition (OT): sub acute care setting

## 2024-10-11 NOTE — PLAN OF CARE
Goal Outcome Evaluation:  Plan of Care Reviewed With: patient        Progress: no change  Outcome Evaluation: Pt arrived from ED from [facility] after having broken right hip 6-7 weeks ago. Pt c/o NVD the last few days. WBC, K+, blood sugar elevated. PT states he has not actually vomited because he has not eaten and he is scared it will cause him to throw up. Reports abdominal fullness/pain

## 2024-10-11 NOTE — SIGNIFICANT NOTE
10/11/24 1100   Physical Therapy Time and Intention   Session Not Performed patient/family declined evaluation  (States he is feeling bad after working with OT, will try tomorrow)

## 2024-10-12 LAB
ALBUMIN SERPL-MCNC: 2.8 G/DL (ref 3.5–5.2)
ALP SERPL-CCNC: 109 U/L (ref 39–117)
ALT SERPL W P-5'-P-CCNC: 12 U/L (ref 1–41)
ANION GAP SERPL CALCULATED.3IONS-SCNC: 8.5 MMOL/L (ref 5–15)
ANISOCYTOSIS BLD QL: NORMAL
AST SERPL-CCNC: 13 U/L (ref 1–40)
BASOPHILS # BLD AUTO: 0.02 10*3/MM3 (ref 0–0.2)
BASOPHILS NFR BLD AUTO: 0.3 % (ref 0–1.5)
BILIRUB CONJ SERPL-MCNC: 0.2 MG/DL (ref 0–0.3)
BILIRUB INDIRECT SERPL-MCNC: 0.3 MG/DL
BILIRUB SERPL-MCNC: 0.5 MG/DL (ref 0–1.2)
BUN SERPL-MCNC: 28 MG/DL (ref 8–23)
BUN/CREAT SERPL: 22.2 (ref 7–25)
CALCIUM SPEC-SCNC: 9 MG/DL (ref 8.6–10.5)
CHLORIDE SERPL-SCNC: 106 MMOL/L (ref 98–107)
CO2 SERPL-SCNC: 23.5 MMOL/L (ref 22–29)
CREAT SERPL-MCNC: 1.26 MG/DL (ref 0.76–1.27)
DEPRECATED RDW RBC AUTO: 46.5 FL (ref 37–54)
EGFRCR SERPLBLD CKD-EPI 2021: 54.5 ML/MIN/1.73
EOSINOPHIL # BLD AUTO: 0.11 10*3/MM3 (ref 0–0.4)
EOSINOPHIL NFR BLD AUTO: 1.6 % (ref 0.3–6.2)
ERYTHROCYTE [DISTWIDTH] IN BLOOD BY AUTOMATED COUNT: 13.7 % (ref 12.3–15.4)
GLUCOSE BLDC GLUCOMTR-MCNC: 86 MG/DL (ref 70–99)
GLUCOSE BLDC GLUCOMTR-MCNC: 88 MG/DL (ref 70–99)
GLUCOSE BLDC GLUCOMTR-MCNC: 88 MG/DL (ref 70–99)
GLUCOSE BLDC GLUCOMTR-MCNC: 89 MG/DL (ref 70–99)
GLUCOSE SERPL-MCNC: 85 MG/DL (ref 65–99)
HCT VFR BLD AUTO: 29.8 % (ref 37.5–51)
HGB BLD-MCNC: 9.6 G/DL (ref 13–17.7)
IMM GRANULOCYTES # BLD AUTO: 0.02 10*3/MM3 (ref 0–0.05)
IMM GRANULOCYTES NFR BLD AUTO: 0.3 % (ref 0–0.5)
LYMPHOCYTES # BLD AUTO: 1.57 10*3/MM3 (ref 0.7–3.1)
LYMPHOCYTES NFR BLD AUTO: 22.5 % (ref 19.6–45.3)
MAGNESIUM SERPL-MCNC: 1.6 MG/DL (ref 1.6–2.4)
MCH RBC QN AUTO: 29.6 PG (ref 26.6–33)
MCHC RBC AUTO-ENTMCNC: 32.2 G/DL (ref 31.5–35.7)
MCV RBC AUTO: 92 FL (ref 79–97)
MONOCYTES # BLD AUTO: 0.42 10*3/MM3 (ref 0.1–0.9)
MONOCYTES NFR BLD AUTO: 6 % (ref 5–12)
NEUTROPHILS NFR BLD AUTO: 4.85 10*3/MM3 (ref 1.7–7)
NEUTROPHILS NFR BLD AUTO: 69.3 % (ref 42.7–76)
NRBC BLD AUTO-RTO: 0 /100 WBC (ref 0–0.2)
PHOSPHATE SERPL-MCNC: 2.7 MG/DL (ref 2.5–4.5)
PLATELET # BLD AUTO: 126 10*3/MM3 (ref 140–450)
PMV BLD AUTO: 11 FL (ref 6–12)
POLYCHROMASIA BLD QL SMEAR: NORMAL
POTASSIUM SERPL-SCNC: 4.1 MMOL/L (ref 3.5–5.2)
PROT SERPL-MCNC: 5.4 G/DL (ref 6–8.5)
RBC # BLD AUTO: 3.24 10*6/MM3 (ref 4.14–5.8)
SMALL PLATELETS BLD QL SMEAR: NORMAL
SODIUM SERPL-SCNC: 138 MMOL/L (ref 136–145)
WBC MORPH BLD: NORMAL
WBC NRBC COR # BLD AUTO: 6.99 10*3/MM3 (ref 3.4–10.8)

## 2024-10-12 PROCEDURE — 83735 ASSAY OF MAGNESIUM: CPT | Performed by: FAMILY MEDICINE

## 2024-10-12 PROCEDURE — 85025 COMPLETE CBC W/AUTO DIFF WBC: CPT | Performed by: FAMILY MEDICINE

## 2024-10-12 PROCEDURE — 99232 SBSQ HOSP IP/OBS MODERATE 35: CPT | Performed by: FAMILY MEDICINE

## 2024-10-12 PROCEDURE — 85007 BL SMEAR W/DIFF WBC COUNT: CPT | Performed by: FAMILY MEDICINE

## 2024-10-12 PROCEDURE — 82948 REAGENT STRIP/BLOOD GLUCOSE: CPT

## 2024-10-12 PROCEDURE — 82948 REAGENT STRIP/BLOOD GLUCOSE: CPT | Performed by: FAMILY MEDICINE

## 2024-10-12 PROCEDURE — 25010000002 ONDANSETRON PER 1 MG: Performed by: FAMILY MEDICINE

## 2024-10-12 PROCEDURE — 80076 HEPATIC FUNCTION PANEL: CPT | Performed by: FAMILY MEDICINE

## 2024-10-12 PROCEDURE — 84100 ASSAY OF PHOSPHORUS: CPT | Performed by: FAMILY MEDICINE

## 2024-10-12 PROCEDURE — 80048 BASIC METABOLIC PNL TOTAL CA: CPT | Performed by: FAMILY MEDICINE

## 2024-10-12 PROCEDURE — 25010000002 CEFTRIAXONE PER 250 MG: Performed by: FAMILY MEDICINE

## 2024-10-12 PROCEDURE — 63710000001 INSULIN GLARGINE PER 5 UNITS: Performed by: FAMILY MEDICINE

## 2024-10-12 RX ORDER — MIDODRINE HYDROCHLORIDE 5 MG/1
5 TABLET ORAL
Status: DISCONTINUED | OUTPATIENT
Start: 2024-10-12 | End: 2024-10-15 | Stop reason: HOSPADM

## 2024-10-12 RX ADMIN — ONDANSETRON 4 MG: 2 INJECTION INTRAMUSCULAR; INTRAVENOUS at 11:00

## 2024-10-12 RX ADMIN — LATANOPROST 1 DROP: 50 SOLUTION OPHTHALMIC at 21:52

## 2024-10-12 RX ADMIN — LEVOTHYROXINE SODIUM 200 MCG: 0.1 TABLET ORAL at 08:03

## 2024-10-12 RX ADMIN — MIDODRINE HYDROCHLORIDE 5 MG: 5 TABLET ORAL at 09:29

## 2024-10-12 RX ADMIN — METRONIDAZOLE 500 MG: 500 TABLET ORAL at 05:48

## 2024-10-12 RX ADMIN — AMIODARONE HYDROCHLORIDE 200 MG: 200 TABLET ORAL at 08:03

## 2024-10-12 RX ADMIN — METRONIDAZOLE 500 MG: 500 TABLET ORAL at 21:50

## 2024-10-12 RX ADMIN — GABAPENTIN 100 MG: 100 CAPSULE ORAL at 08:03

## 2024-10-12 RX ADMIN — APIXABAN 2.5 MG: 2.5 TABLET, FILM COATED ORAL at 21:50

## 2024-10-12 RX ADMIN — Medication 250 MG: at 08:03

## 2024-10-12 RX ADMIN — Medication 250 MG: at 21:50

## 2024-10-12 RX ADMIN — MIDODRINE HYDROCHLORIDE 5 MG: 5 TABLET ORAL at 17:38

## 2024-10-12 RX ADMIN — APIXABAN 2.5 MG: 2.5 TABLET, FILM COATED ORAL at 08:03

## 2024-10-12 RX ADMIN — Medication 10 ML: at 21:51

## 2024-10-12 RX ADMIN — GABAPENTIN 100 MG: 100 CAPSULE ORAL at 21:50

## 2024-10-12 RX ADMIN — INSULIN GLARGINE 15 UNITS: 100 INJECTION, SOLUTION SUBCUTANEOUS at 21:51

## 2024-10-12 RX ADMIN — Medication 10 ML: at 08:06

## 2024-10-12 RX ADMIN — MIDODRINE HYDROCHLORIDE 5 MG: 5 TABLET ORAL at 12:03

## 2024-10-12 RX ADMIN — METRONIDAZOLE 500 MG: 500 TABLET ORAL at 13:11

## 2024-10-12 RX ADMIN — SODIUM CHLORIDE 2000 MG: 9 INJECTION, SOLUTION INTRAMUSCULAR; INTRAVENOUS; SUBCUTANEOUS at 05:48

## 2024-10-12 NOTE — PROGRESS NOTES
[facility]     Progress Note    Patient Name: Darci Munson  : 1935  MRN: [id number]  Primary Care Physician:  Adrien Mayer MD  Date of admission: 10/10/2024    Subjective   Patient's renal function continues to improve  No major events overnight blood pressures continue to be soft      Scheduled Meds:amiodarone, 200 mg, Oral, Daily  apixaban, 2.5 mg, Oral, BID  cefTRIAXone, 2,000 mg, Intravenous, Q24H  gabapentin, 100 mg, Oral, BID  insulin glargine, 15 Units, Subcutaneous, Nightly  insulin lispro, 2-9 Units, Subcutaneous, 4x Daily AC & at Bedtime  latanoprost, 1 drop, Both Eyes, Nightly  levothyroxine, 200 mcg, Oral, QAM AC  metroNIDAZOLE, 500 mg, Oral, Q8H  saccharomyces boulardii, 250 mg, Oral, BID  sodium chloride, 10 mL, Intravenous, Q12H      Continuous Infusions:   PRN Meds:.  senna-docusate sodium **AND** polyethylene glycol **AND** bisacodyl **AND** bisacodyl    dextrose    dextrose    glucagon (human recombinant)    ondansetron    sodium chloride    sodium chloride       Review of Systems  Constitutional:        Weakness tiredness fatigue  Eyes:                       No blurry vision, eye discharge, eye irritation, eye pain  HEENT:                   No acute hair loss, earache and discharge, nasal congestion or discharge, sore throat, postnasal drip  Respiratory:           No shortness of breath coughing sputum production wheezing hemoptysis pleuritic chest pain  Cardiovascular:     No chest pain, orthopnea, PND, dizziness, palpitation, lower extremity edema  Gastrointestinal:   No nausea vomiting diarrhea abdominal pain constipation  Genitourinary:       No urinary incontinence, hesitancy, frequency, urgency, dysuria  Hematologic:         No bruising, bleeding, pallor, lymphadenopathy  Endocrine:            No coldness, hot flashes, polyuria, abnormal hair growth  Musculoskeletal:    No body pains, aches, arthritic pains, muscle pain ,muscle wasting  Psychiatric:          No low or  high mood, anxiety, hallucinations, delusions  Skin.                      No rash, ulcers, bruising, itching  Neurological:        No confusion, headache, focal weakness, numbness, dysphasia    Objective   Objective     Vitals:   Temp:  [98 °F (36.7 °C)-99.1 °F (37.3 °C)] 98 °F (36.7 °C)  Heart Rate:  [] 84  Resp:  [18] 18  BP: ()/(43-56) 94/53  Physical Exam    Constitutional: Awake, alert responsive, conversant, no obvious distress              Psychiatric:  Appropriate affect, cooperative   Neurologic:  Awake alert ,oriented x 3, strength symmetric in all extremities, Cranial Nerves grossly intact to confrontation, speech clear   Eyes:   PERRLA, sclerae anicteric, no conjunctival injection   HEENT:  Moist mucous membranes, no nasal or eye discharge, no throat congestion   Neck:   Supple, no thyromegaly, no lymphadenopathy, trachea midline, no elevated JVD   Respiratory:  Clear to auscultation bilaterally, nonlabored respirations    Cardiovascular: RRR, no murmurs, rubs, or gallops, palpable pedal pulses bilaterally, No bilateral ankle edema   Gastrointestinal: Positive bowel sounds, soft, nontender, nondistended, no organomegaly   Musculoskeletal:  No clubbing or cyanosis to extremities,muscle wasting, joint swelling, muscle weakness             Skin:                      No rashes, bruising, skin ulcers, petechiae or ecchymosis    Result Review    Result Review:  I have personally reviewed the results from the time of this admission to 10/12/2024 08:13 EDT and agree with these findings:  []  Laboratory  []  Microbiology  []  Radiology  []  EKG/Telemetry   []  Cardiology/Vascular   []  Pathology  []  Old records  []  Other:    Assessment & Plan   Assessment / Plan       Active Hospital Problems:  Active Hospital Problems    Diagnosis     **Acute renal failure     Intractable nausea and vomiting     Abdominal pain     Stage 3b chronic kidney disease     Prostate CA     Hypothyroidism     Type 2  diabetes mellitus with hyperglycemia, without long-term current use of insulin     Coronary arteriosclerosis     Hypertensive disorder     Gastroparesis      89-year-old male with past medical history of hypertension, coronary artery disease, diabetes, hypothyroidism, gastroparesis, GERD, CKD stage IIIb with baseline creatinine 1.4-1.7 who presented to the hospital due to nausea and vomiting in background of recent hip replacement noted to be septic with white count of 20 elevated lactate and mild ITA with creatinine rise to 2.0 likely in the setting of hemodynamic changes and relative hypotension improved with IV fluids.  CT abdomen pelvis largely unremarkable except for some diverticulosis and symptoms though appear to be consistent with diverticulitis.  With IV fluids renal function is back to baseline     Plan:   Patient empirically on ceftriaxone and metronidazole  Supportive treatment for nausea and vomiting   Midodrine 5 mg 3 times daily    thank you for involving me in the care of the patient.  Will continue to follow along    Electronically signed by Chester Yanez MD, 10/12/24, 8:13 AM EDT.

## 2024-10-12 NOTE — PLAN OF CARE
Goal Outcome Evaluation:  Plan of Care Reviewed With: patient        Progress: improving  Outcome Evaluation: Pt c/o nausea this morning but it resolved after receiving Zofran.  We are still waiting to collect a stool sample.  Bottom is still red so a place a Mepilex.  Will continue to monitor

## 2024-10-12 NOTE — PLAN OF CARE
Goal Outcome Evaluation:            Resolve for EPIC upgrade                                  Problem: Adult Inpatient Plan of Care  Goal: Plan of Care Review  Outcome: Resolved for upgrade, new template will be applied  Goal: Patient-Specific Goal (Individualized)  Outcome: Resolved for upgrade, new template will be applied  Goal: Absence of Hospital-Acquired Illness or Injury  Outcome: Resolved for upgrade, new template will be applied  Goal: Optimal Comfort and Wellbeing  Outcome: Resolved for upgrade, new template will be applied  Goal: Readiness for Transition of Care  Outcome: Resolved for upgrade, new template will be applied     Problem: Pain Acute  Goal: Acceptable Pain Control and Functional Ability  Outcome: Resolved for upgrade, new template will be applied     Problem: UTI (Urinary Tract Infection)  Goal: Improved Infection Symptoms  Outcome: Resolved for upgrade, new template will be applied     Problem: Fall Injury Risk  Goal: Absence of Fall and Fall-Related Injury  Outcome: Resolved for upgrade, new template will be applied     Problem: Skin Injury Risk Increased  Goal: Skin Health and Integrity  Outcome: Resolved for upgrade, new template will be applied     Problem: Fluid and Electrolyte Imbalance (Acute Kidney Injury/Impairment)  Goal: Fluid and Electrolyte Balance  Outcome: Resolved for upgrade, new template will be applied     Problem: Oral Intake Inadequate (Acute Kidney Injury/Impairment)  Goal: Optimal Nutrition Intake  Outcome: Resolved for upgrade, new template will be applied     Problem: Renal Function Impairment (Acute Kidney Injury/Impairment)  Goal: Effective Renal Function  Outcome: Resolved for upgrade, new template will be applied     Problem: Diabetes Comorbidity  Goal: Blood Glucose Level Within Targeted Range  Outcome: Resolved for upgrade, new template will be applied     Problem: Hypertension Comorbidity  Goal: Blood Pressure in Desired Range  Outcome: Resolved for upgrade,  new template will be applied     Problem: Pain Chronic (Persistent) (Comorbidity Management)  Goal: Acceptable Pain Control and Functional Ability  Outcome: Resolved for upgrade, new template will be applied

## 2024-10-12 NOTE — PLAN OF CARE
Problem: Adult Inpatient Plan of Care  Goal: Plan of Care Review  Recent Flowsheet Documentation  Taken 10/12/2024 0631 by Guerda Quevedo LPN  Progress: no change  Plan of Care Reviewed With: patient  Goal: Absence of Hospital-Acquired Illness or Injury  Intervention: Identify and Manage Fall Risk  Recent Flowsheet Documentation  Taken 10/12/2024 0503 by Guerda Quevedo LPN  Safety Promotion/Fall Prevention: safety round/check completed  Taken 10/12/2024 0307 by Guerda Quevedo LPN  Safety Promotion/Fall Prevention: safety round/check completed  Taken 10/12/2024 0100 by Guerda Quevedo LPN  Safety Promotion/Fall Prevention: safety round/check completed  Taken 10/11/2024 2310 by Guerda Quevedo LPN  Safety Promotion/Fall Prevention: safety round/check completed  Taken 10/11/2024 2122 by Guerda Quevedo LPN  Safety Promotion/Fall Prevention: safety round/check completed  Taken 10/11/2024 1942 by Guerda Quevedo LPN  Safety Promotion/Fall Prevention: safety round/check completed  Intervention: Prevent Skin Injury  Recent Flowsheet Documentation  Taken 10/11/2024 2337 by Guerda Quevedo LPN  Skin Protection:   adhesive use limited   incontinence pads utilized   transparent dressing maintained  Goal: Optimal Comfort and Wellbeing  Intervention: Provide Person-Centered Care  Recent Flowsheet Documentation  Taken 10/11/2024 2337 by Guerda Quevedo LPN  Trust Relationship/Rapport:   care explained   choices provided   questions answered   reassurance provided   thoughts/feelings acknowledged   Goal Outcome Evaluation:  Plan of Care Reviewed With: patient        Progress: no change     Vitals stable. No complaints of pain this shift. Still needing to collect fecal sample. Bedtime glucose 119. Will continue to monitor progress.

## 2024-10-12 NOTE — PROGRESS NOTES
[facility]   Hospitalist Progress Note  Date: 10/12/2024  Patient Name: Darci Munson  : 1935  MRN: [id number]  Date of admission: 10/10/2024      Subjective   Subjective     Chief complaint: Nausea and vomiting    Summary:  89-year-old male with history of gastroparesis, CAD, diabetes, hypothyroidism, hypertension, CKD stage IIIb, mitral valve regurgitation, glaucoma, dyslipidemia, hospitalized on 10/11/2024 for chief complaint of nausea and vomiting with diarrhea, with ITA and hyperkalemia, in addition to lactic acidosis of clinical significance with CT imaging showing acute diverticulitis with diverticulosis, placed on antibiotics to cover GI tract, nephrology consulted.  Renal function improving, diarrhea is subsiding.  Enteric panel is pending.    Interval follow-up: Seen and examined at this morning, no acute distress, no acute major overnight events, diarrhea subsiding, has crampy abdominal pain involving the lower portions of his abdomen, 4-6 out of 10, nonradiating, no aggravating or alleviating factors.  White blood cell count 6000, hemoglobin 9.6, creatinine 1.26, BUN 28, potassium 4.1.  He is tolerating oral intake and his blood pressures have improved.    Review of systems:  All systems reviewed and negative for weakness, fatigue, abdominal cramping    Objective   Objective     Vitals:   Temp:  [98 °F (36.7 °C)-99.1 °F (37.3 °C)] 98.8 °F (37.1 °C)  Heart Rate:  [] 86  Resp:  [18] 18  BP: ()/(43-56) 127/52  Physical Exam                 Constitutional: Awake, alert              Eyes: PERRLA, sclerae anicteric, no conjunctival injection              HENT: NCAT, mucous membranes moist              Neck: Supple, full range of motion              Respiratory: Clear to auscultation bilaterally, nonlabored respirations               Cardiovascular: RRR, no murmurs, rubs, or gallops, palpable pedal pulses bilaterally              Gastrointestinal: No abdominal tenderness, positive bowel  sounds, soft, nondistended              Musculoskeletal: No bilateral ankle edema, no clubbing or cyanosis to extremities              Psychiatric: Appropriate affect, cooperative              Neurologic: Oriented x 3, strength symmetric in all extremities, Cranial Nerves grossly intact to confrontation, speech clear              Skin: No rashes     Result Review    Result Review:  I have personally reviewed the pertinent results from the past 24 hours to 10/12/2024 09:29 EDT and agree with these findings:  [x]  Laboratory   CBC          2/19/2024    10:20 10/10/2024    20:38 10/12/2024    05:35   CBC   WBC 5.41  20.03  6.99    RBC 4.40  4.67  3.24    Hemoglobin 13.2  13.6  9.6    Hematocrit 41.7  42.9  29.8    MCV 94.8  91.9  92.0    MCH 30.0  29.1  29.6    MCHC 31.7  31.7  32.2    RDW 13.5  13.2  13.7    Platelets 163  201  126      BMP          10/10/2024    20:38 10/11/2024    02:48 10/11/2024    05:44 10/12/2024    05:35   BMP   BUN 56  50  48  28    Creatinine 2.03  1.83  1.74  1.26    Sodium 133  136  138  138    Potassium 6.0  5.1  5.3  4.1    Chloride 104  102  104  106    CO2 12.6  16.1  21.0  23.5    Calcium 9.9  9.3  9.5  9.0      LIVER FUNCTION TESTS:      Lab 10/12/24  0535 10/10/24  2038   TOTAL PROTEIN 5.4* 7.3   ALBUMIN 2.8* 3.7   GLOBULIN  --  3.6   ALT (SGPT) 12 19   AST (SGOT) 13 23   BILIRUBIN 0.5 0.5   INDIRECT BILIRUBIN 0.3  --    BILIRUBIN DIRECT 0.2  --    ALK PHOS 109 175*   LIPASE  --  40       [x]  Microbiology   Microbiology Results (last 10 days)       Procedure Component Value - Date/Time    Blood Culture - Blood, Arm, Right [[id number]]  (Normal) Collected: 10/11/24 0544    Lab Status: Preliminary result Specimen: Blood from Arm, Right Updated: 10/12/24 0600     Blood Culture No growth at 24 hours    Blood Culture - Blood, Hand, Right [[id number]]  (Normal) Collected: 10/11/24 0544    Lab Status: Preliminary result Specimen: Blood from Hand, Right Updated: 10/12/24 0600     Blood  Culture No growth at 24 hours              [x]  Radiology CT Abdomen Pelvis Without Contrast    Result Date: 10/10/2024  Impression: CT scan of the abdomen and pelvis without contrast demonstrating tiny noncalcified nodules in the left lower lobe. Follow-up CT scan of the chest is recommended in 6 months. Post prostatectomy. Moderate diverticulosis of the descending colon and sigmoid colon. Ill-defined increased density in fat posterior to the proximal descending colon may represent acute diverticulitis or scarring from prior episodes of diverticulitis. This could also represent an underlying mural abnormality of the colon. Electronically Signed: Armaan Garcia MD  10/10/2024 9:45 PM EDT  Workstation ID: [id number]       [x]  EKG/Telemetry   ECG 12 Lead Tachycardia   Preliminary Result   HEART RATE=95  bpm   RR Iojnteye=451  ms   TX Interval=  ms   P Horizontal Axis=  deg   P Front Axis=  deg   QRSD Interval=98  ms   QT Nymcyxza=634  ms   YUsE=085  ms   QRS Axis=15  deg   T Wave Axis=134  deg   - ABNORMAL ECG -   Atrial flutter with predominant 2:1 AV block   Low voltage, extremity leads   Nonspecific repol abnormality, inferior leads   Prolonged QT interval   Date and Time of Study:2024-10-11 05:42:20      ECG 12 Lead QT Measurement   Preliminary Result   HEART RATE=87  bpm   RR Qnokyjpy=532  ms   TX Interval=  ms   P Horizontal Axis=  deg   P Front Axis=  deg   QRSD Interval=92  ms   QT Kuzaomlo=430  ms   XRdA=607  ms   QRS Axis=25  deg   T Wave Axis=65  deg   - ABNORMAL ECG -   Atrial flutter   Borderline low voltage, extremity leads   Minimal ST depression, anterolateral leads   Prolonged QT interval   Date and Time of Study:2024-10-11 04:56:03          []  Cardiology/Vascular   []  Pathology  [x]  Old records  []  Other:    Assessment & Plan   Assessment / Plan     Assessment/Plan:     Assessment:  ITA with CKD stage IIIb  Hyperkalemia  Volume depletion  Dehydration  Acute diverticulitis  Atrial  fibrillation/flutter chronic  Diverticulosis  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 difficile  Lantus 15 units nightly with insulin sign scale coverage; hold if he is not eating  Nephrology consulted discussed with Dr. Yanez, recommendations appreciated  Continue midodrine 5 mg 3 times daily  Continue Neurontin to 200 mg twice a day  A.m. labs  Full code  PT/OT  DVT prophylax with Eliquis  Clinical course will dictate further management  Discussed with nurse at the bedside      VTE Prophylaxis:  Pharmacologic VTE prophylaxis orders are present.        CODE STATUS:   Level Of Support Discussed With: Patient  Code Status (Patient has no pulse and is not breathing): CPR (Attempt to Resuscitate)  Medical Interventions (Patient has pulse or is breathing): Full Support        Electronically signed by Jose Armando Bryson MD, 10/12/2024, 09:29 EDT.    Portions of this documentation were transcribed electronically from a voice recognition software.  I confirm all data accurately represents the service(s) I performed at today's visit.

## 2024-10-13 LAB
ALBUMIN SERPL-MCNC: 2.7 G/DL (ref 3.5–5.2)
ALP SERPL-CCNC: 100 U/L (ref 39–117)
ALT SERPL W P-5'-P-CCNC: 8 U/L (ref 1–41)
ANION GAP SERPL CALCULATED.3IONS-SCNC: 8.3 MMOL/L (ref 5–15)
AST SERPL-CCNC: 12 U/L (ref 1–40)
BASOPHILS # BLD AUTO: 0.03 10*3/MM3 (ref 0–0.2)
BASOPHILS NFR BLD AUTO: 0.6 % (ref 0–1.5)
BILIRUB CONJ SERPL-MCNC: 0.2 MG/DL (ref 0–0.3)
BILIRUB INDIRECT SERPL-MCNC: 0.3 MG/DL
BILIRUB SERPL-MCNC: 0.5 MG/DL (ref 0–1.2)
BUN SERPL-MCNC: 20 MG/DL (ref 8–23)
BUN/CREAT SERPL: 17.1 (ref 7–25)
CALCIUM SPEC-SCNC: 9.1 MG/DL (ref 8.6–10.5)
CHLORIDE SERPL-SCNC: 106 MMOL/L (ref 98–107)
CO2 SERPL-SCNC: 24.7 MMOL/L (ref 22–29)
CREAT SERPL-MCNC: 1.17 MG/DL (ref 0.76–1.27)
DEPRECATED RDW RBC AUTO: 46.2 FL (ref 37–54)
EGFRCR SERPLBLD CKD-EPI 2021: 59.6 ML/MIN/1.73
EOSINOPHIL # BLD AUTO: 0.18 10*3/MM3 (ref 0–0.4)
EOSINOPHIL NFR BLD AUTO: 3.6 % (ref 0.3–6.2)
ERYTHROCYTE [DISTWIDTH] IN BLOOD BY AUTOMATED COUNT: 13.5 % (ref 12.3–15.4)
GLUCOSE BLDC GLUCOMTR-MCNC: 110 MG/DL (ref 70–99)
GLUCOSE BLDC GLUCOMTR-MCNC: 80 MG/DL (ref 70–99)
GLUCOSE BLDC GLUCOMTR-MCNC: 83 MG/DL (ref 70–99)
GLUCOSE BLDC GLUCOMTR-MCNC: 93 MG/DL (ref 70–99)
GLUCOSE SERPL-MCNC: 75 MG/DL (ref 65–99)
HCT VFR BLD AUTO: 30.1 % (ref 37.5–51)
HGB BLD-MCNC: 9.6 G/DL (ref 13–17.7)
IMM GRANULOCYTES # BLD AUTO: 0.02 10*3/MM3 (ref 0–0.05)
IMM GRANULOCYTES NFR BLD AUTO: 0.4 % (ref 0–0.5)
LYMPHOCYTES # BLD AUTO: 1.28 10*3/MM3 (ref 0.7–3.1)
LYMPHOCYTES NFR BLD AUTO: 25.3 % (ref 19.6–45.3)
MAGNESIUM SERPL-MCNC: 1.6 MG/DL (ref 1.6–2.4)
MCH RBC QN AUTO: 29.5 PG (ref 26.6–33)
MCHC RBC AUTO-ENTMCNC: 31.9 G/DL (ref 31.5–35.7)
MCV RBC AUTO: 92.6 FL (ref 79–97)
MONOCYTES # BLD AUTO: 0.31 10*3/MM3 (ref 0.1–0.9)
MONOCYTES NFR BLD AUTO: 6.1 % (ref 5–12)
NEUTROPHILS NFR BLD AUTO: 3.24 10*3/MM3 (ref 1.7–7)
NEUTROPHILS NFR BLD AUTO: 64 % (ref 42.7–76)
NRBC BLD AUTO-RTO: 0 /100 WBC (ref 0–0.2)
PHOSPHATE SERPL-MCNC: 3 MG/DL (ref 2.5–4.5)
PLATELET # BLD AUTO: 130 10*3/MM3 (ref 140–450)
PMV BLD AUTO: 11.1 FL (ref 6–12)
POTASSIUM SERPL-SCNC: 3.9 MMOL/L (ref 3.5–5.2)
PROT SERPL-MCNC: 5.4 G/DL (ref 6–8.5)
RBC # BLD AUTO: 3.25 10*6/MM3 (ref 4.14–5.8)
SODIUM SERPL-SCNC: 139 MMOL/L (ref 136–145)
WBC NRBC COR # BLD AUTO: 5.06 10*3/MM3 (ref 3.4–10.8)

## 2024-10-13 PROCEDURE — 82948 REAGENT STRIP/BLOOD GLUCOSE: CPT

## 2024-10-13 PROCEDURE — 80048 BASIC METABOLIC PNL TOTAL CA: CPT | Performed by: FAMILY MEDICINE

## 2024-10-13 PROCEDURE — 85025 COMPLETE CBC W/AUTO DIFF WBC: CPT | Performed by: FAMILY MEDICINE

## 2024-10-13 PROCEDURE — 63710000001 INSULIN GLARGINE PER 5 UNITS: Performed by: FAMILY MEDICINE

## 2024-10-13 PROCEDURE — 80076 HEPATIC FUNCTION PANEL: CPT | Performed by: FAMILY MEDICINE

## 2024-10-13 PROCEDURE — 99232 SBSQ HOSP IP/OBS MODERATE 35: CPT | Performed by: FAMILY MEDICINE

## 2024-10-13 PROCEDURE — 25010000002 CEFTRIAXONE PER 250 MG: Performed by: FAMILY MEDICINE

## 2024-10-13 PROCEDURE — 83735 ASSAY OF MAGNESIUM: CPT | Performed by: FAMILY MEDICINE

## 2024-10-13 PROCEDURE — 84100 ASSAY OF PHOSPHORUS: CPT | Performed by: FAMILY MEDICINE

## 2024-10-13 RX ORDER — MAG HYDROX/ALUMINUM HYD/SIMETH 400-400-40
1 SUSPENSION, ORAL (FINAL DOSE FORM) ORAL ONCE
Status: COMPLETED | OUTPATIENT
Start: 2024-10-13 | End: 2024-10-13

## 2024-10-13 RX ADMIN — AMIODARONE HYDROCHLORIDE 200 MG: 200 TABLET ORAL at 09:09

## 2024-10-13 RX ADMIN — Medication 10 ML: at 09:10

## 2024-10-13 RX ADMIN — GABAPENTIN 100 MG: 100 CAPSULE ORAL at 21:16

## 2024-10-13 RX ADMIN — GLYCERIN 1 SUPPOSITORY: 2 SUPPOSITORY RECTAL at 12:17

## 2024-10-13 RX ADMIN — Medication 10 ML: at 21:19

## 2024-10-13 RX ADMIN — LATANOPROST 1 DROP: 50 SOLUTION OPHTHALMIC at 21:19

## 2024-10-13 RX ADMIN — MIDODRINE HYDROCHLORIDE 5 MG: 5 TABLET ORAL at 12:17

## 2024-10-13 RX ADMIN — SODIUM CHLORIDE 2000 MG: 9 INJECTION, SOLUTION INTRAMUSCULAR; INTRAVENOUS; SUBCUTANEOUS at 06:16

## 2024-10-13 RX ADMIN — LEVOTHYROXINE SODIUM 200 MCG: 0.1 TABLET ORAL at 09:09

## 2024-10-13 RX ADMIN — APIXABAN 2.5 MG: 2.5 TABLET, FILM COATED ORAL at 21:16

## 2024-10-13 RX ADMIN — METRONIDAZOLE 500 MG: 500 TABLET ORAL at 06:16

## 2024-10-13 RX ADMIN — GABAPENTIN 100 MG: 100 CAPSULE ORAL at 09:09

## 2024-10-13 RX ADMIN — MIDODRINE HYDROCHLORIDE 5 MG: 5 TABLET ORAL at 09:09

## 2024-10-13 RX ADMIN — Medication 250 MG: at 21:16

## 2024-10-13 RX ADMIN — APIXABAN 2.5 MG: 2.5 TABLET, FILM COATED ORAL at 09:09

## 2024-10-13 RX ADMIN — INSULIN GLARGINE 15 UNITS: 100 INJECTION, SOLUTION SUBCUTANEOUS at 21:16

## 2024-10-13 RX ADMIN — METRONIDAZOLE 500 MG: 500 TABLET ORAL at 21:16

## 2024-10-13 RX ADMIN — Medication 250 MG: at 09:09

## 2024-10-13 RX ADMIN — MIDODRINE HYDROCHLORIDE 5 MG: 5 TABLET ORAL at 16:52

## 2024-10-13 RX ADMIN — METRONIDAZOLE 500 MG: 500 TABLET ORAL at 13:29

## 2024-10-13 NOTE — PLAN OF CARE
Goal Outcome Evaluation:  Plan of Care Reviewed With: patient        Progress: improving  Outcome Evaluation: VSS.  Pt was free from nausea but still did report some tenderness in abdomen.  attempted to get stool sample but suppository was unsuccessfull.  will continue to monitor.

## 2024-10-13 NOTE — PLAN OF CARE
Problem: Adult Inpatient Plan of Care  Goal: Plan of Care Review  Recent Flowsheet Documentation  Taken 10/13/2024 0704 by Guerda Quevedo LPN  Progress: improving  Plan of Care Reviewed With: patient  Goal: Absence of Hospital-Acquired Illness or Injury  Intervention: Identify and Manage Fall Risk  Recent Flowsheet Documentation  Taken 10/13/2024 0541 by Guerda Quevedo LPN  Safety Promotion/Fall Prevention: safety round/check completed  Taken 10/13/2024 0318 by Guerda Quevedo LPN  Safety Promotion/Fall Prevention: safety round/check completed  Taken 10/13/2024 0106 by Guerda Quevedo LPN  Safety Promotion/Fall Prevention: safety round/check completed  Taken 10/12/2024 2323 by Guerda Quevedo LPN  Safety Promotion/Fall Prevention: safety round/check completed  Taken 10/12/2024 2111 by Guerda Quevedo LPN  Safety Promotion/Fall Prevention: safety round/check completed  Taken 10/12/2024 1955 by Guerda Quevedo LPN  Safety Promotion/Fall Prevention: safety round/check completed  Intervention: Prevent Skin Injury  Recent Flowsheet Documentation  Taken 10/13/2024 0002 by Guerda Quevedo LPN  Skin Protection:   incontinence pads utilized   skin sealant/moisture barrier applied   transparent dressing maintained  Intervention: Prevent Infection  Recent Flowsheet Documentation  Taken 10/13/2024 0002 by Guerda Quevedo LPN  Infection Prevention:   equipment surfaces disinfected   hand hygiene promoted   personal protective equipment utilized   single patient room provided   visitors restricted/screened  Goal: Optimal Comfort and Wellbeing  Intervention: Provide Person-Centered Care  Recent Flowsheet Documentation  Taken 10/13/2024 0002 by Guerda Quevedo LPN  Trust Relationship/Rapport:   care explained   choices provided   questions answered   reassurance provided   thoughts/feelings acknowledged   Goal Outcome Evaluation:  Plan of Care Reviewed With: patient        Progress: improving     Vitals stable. No complaints  this shift. Still needing to collect stool sample. Will continue plan of care.

## 2024-10-13 NOTE — PROGRESS NOTES
[facility]     Progress Note    Patient Name: Darci Munson  : 1935  MRN: [id number]  Primary Care Physician:  Adrien Mayer MD  Date of admission: 10/10/2024    Subjective   Patient's renal function at baseline  No major acute events overnight    Scheduled Meds:amiodarone, 200 mg, Oral, Daily  apixaban, 2.5 mg, Oral, BID  cefTRIAXone, 2,000 mg, Intravenous, Q24H  gabapentin, 100 mg, Oral, BID  glycerin adult, 1 suppository, Rectal, Once  insulin glargine, 15 Units, Subcutaneous, Nightly  insulin lispro, 2-9 Units, Subcutaneous, 4x Daily AC & at Bedtime  latanoprost, 1 drop, Both Eyes, Nightly  levothyroxine, 200 mcg, Oral, QAM AC  metroNIDAZOLE, 500 mg, Oral, Q8H  midodrine, 5 mg, Oral, TID AC  saccharomyces boulardii, 250 mg, Oral, BID  sodium chloride, 10 mL, Intravenous, Q12H      Continuous Infusions:   PRN Meds:.  senna-docusate sodium **AND** polyethylene glycol **AND** bisacodyl **AND** bisacodyl    dextrose    dextrose    glucagon (human recombinant)    ondansetron    sodium chloride    sodium chloride       Review of Systems  Constitutional:        Weakness tiredness fatigue  Eyes:                       No blurry vision, eye discharge, eye irritation, eye pain  HEENT:                   No acute hair loss, earache and discharge, nasal congestion or discharge, sore throat, postnasal drip  Respiratory:           No shortness of breath coughing sputum production wheezing hemoptysis pleuritic chest pain  Cardiovascular:     No chest pain, orthopnea, PND, dizziness, palpitation, lower extremity edema  Gastrointestinal:   No nausea vomiting diarrhea abdominal pain constipation  Genitourinary:       No urinary incontinence, hesitancy, frequency, urgency, dysuria  Hematologic:         No bruising, bleeding, pallor, lymphadenopathy  Endocrine:            No coldness, hot flashes, polyuria, abnormal hair growth  Musculoskeletal:    No body pains, aches, arthritic pains, muscle pain ,muscle  wasting  Psychiatric:          No low or high mood, anxiety, hallucinations, delusions  Skin.                      No rash, ulcers, bruising, itching  Neurological:        No confusion, headache, focal weakness, numbness, dysphasia    Objective   Objective     Vitals:   Temp:  [98.1 °F (36.7 °C)-98.8 °F (37.1 °C)] 98.1 °F (36.7 °C)  Heart Rate:  [68-81] 76  Resp:  [16-18] 16  BP: ()/(40-65) 118/65  Physical Exam    Constitutional: Awake, alert responsive, conversant, no obvious distress              Psychiatric:  Appropriate affect, cooperative   Neurologic:  Awake alert ,oriented x 3, strength symmetric in all extremities, Cranial Nerves grossly intact to confrontation, speech clear   Eyes:   PERRLA, sclerae anicteric, no conjunctival injection   HEENT:  Moist mucous membranes, no nasal or eye discharge, no throat congestion   Neck:   Supple, no thyromegaly, no lymphadenopathy, trachea midline, no elevated JVD   Respiratory:  Clear to auscultation bilaterally, nonlabored respirations    Cardiovascular: RRR, no murmurs, rubs, or gallops, palpable pedal pulses bilaterally, No bilateral ankle edema   Gastrointestinal: Positive bowel sounds, soft, nontender, nondistended, no organomegaly   Musculoskeletal:  No clubbing or cyanosis to extremities,muscle wasting, joint swelling, muscle weakness             Skin:                      No rashes, bruising, skin ulcers, petechiae or ecchymosis    Result Review    Result Review:  I have personally reviewed the results from the time of this admission to 10/13/2024 09:02 EDT and agree with these findings:  []  Laboratory  []  Microbiology  []  Radiology  []  EKG/Telemetry   []  Cardiology/Vascular   []  Pathology  []  Old records  []  Other:    Assessment & Plan   Assessment / Plan       Active Hospital Problems:  Active Hospital Problems    Diagnosis     **Acute renal failure     Intractable nausea and vomiting     Abdominal pain     Stage 3b chronic kidney disease      Prostate CA     Hypothyroidism     Type 2 diabetes mellitus with hyperglycemia, without long-term current use of insulin     Coronary arteriosclerosis     Hypertensive disorder     Gastroparesis      89-year-old male with past medical history of hypertension, coronary artery disease, diabetes, hypothyroidism, gastroparesis, GERD, CKD stage IIIb with baseline creatinine 1.4-1.7 who presented to the hospital due to nausea and vomiting in background of recent hip replacement noted to be septic with white count of 20 elevated lactate and mild ITA with creatinine rise to 2.0 likely in the setting of hemodynamic changes and relative hypotension improved with IV fluids.  CT abdomen pelvis largely unremarkable except for some diverticulosis and symptoms though appear to be consistent with diverticulitis.  With IV fluids renal function is back to baseline     Plan:   Patient empirically on ceftriaxone and metronidazole  Supportive treatment for nausea and vomiting   Midodrine 5 mg 3 times daily    thank you for involving me in the care of the patient.  Will continue to follow along

## 2024-10-13 NOTE — PROGRESS NOTES
[facility]   Hospitalist Progress Note  Date: 10/13/2024  Patient Name: Darci Munson  : 1935  MRN: [id number]  Date of admission: 10/10/2024      Subjective   Subjective     Chief complaint: Nausea and vomiting    Summary:  89-year-old male with history of gastroparesis, CAD, diabetes, hypothyroidism, hypertension, CKD stage IIIb, mitral valve regurgitation, glaucoma, dyslipidemia, hospitalized on 10/11/2024 for chief complaint of nausea and vomiting with diarrhea, with ITA and hyperkalemia, in addition to lactic acidosis of clinical significance with CT imaging showing acute diverticulitis with diverticulosis, placed on antibiotics to cover GI tract, nephrology consulted.  Renal function improving, diarrhea is subsiding.  Enteric panel is pending.    Interval follow-up: Seen and examined at this morning, no acute distress, no acute major overnight events, reports having crampy abdominal pain, no bowel movement over the past 24 hours, requests something to help move his bowels.  Diarrhea has essentially resolved at this point, creatinine 1.17, BUN 20, potassium 3.9.  No fevers, chills, sweats.  Without diarrhea, enteric stool panels were not sent off.    Review of systems:  All systems reviewed and negative for weakness, fatigue, abdominal cramping    Objective   Objective     Vitals:   Temp:  [98.1 °F (36.7 °C)-98.8 °F (37.1 °C)] 98.1 °F (36.7 °C)  Heart Rate:  [68-81] 76  Resp:  [16-18] 16  BP: ()/(40-65) 118/65  Physical Exam                 Constitutional: Awake, alert, no acute distress              Eyes: PERRLA, sclerae anicteric, no conjunctival injection              HENT: NCAT, mucous membranes moist              Neck: Supple, full range of motion              Respiratory: Clear to auscultation bilaterally, nonlabored respirations               Cardiovascular: RRR, no murmurs, rubs, or gallops, palpable pedal pulses bilaterally              Gastrointestinal: No abdominal tenderness,  positive bowel sounds, soft, nondistended              Musculoskeletal: No bilateral ankle edema, no clubbing or cyanosis to extremities              Psychiatric: Appropriate affect, cooperative              Neurologic: Oriented x 3, strength symmetric in all extremities, Cranial Nerves grossly intact to confrontation, speech clear              Skin: No rashes       Result Review    Result Review:  I have personally reviewed the pertinent results from the past 24 hours to 10/13/2024 09:36 EDT and agree with these findings:  [x]  Laboratory   CBC          10/10/2024    20:38 10/12/2024    05:35 10/13/2024    06:05   CBC   WBC 20.03  6.99  5.06    RBC 4.67  3.24  3.25    Hemoglobin 13.6  9.6  9.6    Hematocrit 42.9  29.8  30.1    MCV 91.9  92.0  92.6    MCH 29.1  29.6  29.5    MCHC 31.7  32.2  31.9    RDW 13.2  13.7  13.5    Platelets 201  126  130      BMP          10/11/2024    02:48 10/11/2024    05:44 10/12/2024    05:35 10/13/2024    06:05   BMP   BUN 50  48  28  20    Creatinine 1.83  1.74  1.26  1.17    Sodium 136  138  138  139    Potassium 5.1  5.3  4.1  3.9    Chloride 102  104  106  106    CO2 16.1  21.0  23.5  24.7    Calcium 9.3  9.5  9.0  9.1      LIVER FUNCTION TESTS:      Lab 10/13/24  0605 10/12/24  0535 10/10/24  2038   TOTAL PROTEIN 5.4* 5.4* 7.3   ALBUMIN 2.7* 2.8* 3.7   GLOBULIN  --   --  3.6   ALT (SGPT) 8 12 19   AST (SGOT) 12 13 23   BILIRUBIN 0.5 0.5 0.5   INDIRECT BILIRUBIN 0.3 0.3  --    BILIRUBIN DIRECT 0.2 0.2  --    ALK PHOS 100 109 175*   LIPASE  --   --  40       [x]  Microbiology   Microbiology Results (last 10 days)       Procedure Component Value - Date/Time    Blood Culture - Blood, Arm, Right [[id number]]  (Normal) Collected: 10/11/24 0544    Lab Status: Preliminary result Specimen: Blood from Arm, Right Updated: 10/13/24 0600     Blood Culture No growth at 2 days    Blood Culture - Blood, Hand, Right [[id number]]  (Normal) Collected: 10/11/24 0544    Lab Status: Preliminary result  Specimen: Blood from Hand, Right Updated: 10/13/24 0600     Blood Culture No growth at 2 days              [x]  Radiology CT Abdomen Pelvis Without Contrast    Result Date: 10/10/2024  Impression: CT scan of the abdomen and pelvis without contrast demonstrating tiny noncalcified nodules in the left lower lobe. Follow-up CT scan of the chest is recommended in 6 months. Post prostatectomy. Moderate diverticulosis of the descending colon and sigmoid colon. Ill-defined increased density in fat posterior to the proximal descending colon may represent acute diverticulitis or scarring from prior episodes of diverticulitis. This could also represent an underlying mural abnormality of the colon. Electronically Signed: Armaan Garcia MD  10/10/2024 9:45 PM EDT  Workstation ID: [id number]       [x]  EKG/Telemetry   ECG 12 Lead Tachycardia   Preliminary Result   HEART RATE=95  bpm   RR Vlnkfgwl=553  ms   MI Interval=  ms   P Horizontal Axis=  deg   P Front Axis=  deg   QRSD Interval=98  ms   QT Zsiiijbm=218  ms   ICgY=317  ms   QRS Axis=15  deg   T Wave Axis=134  deg   - ABNORMAL ECG -   Atrial flutter with predominant 2:1 AV block   Low voltage, extremity leads   Nonspecific repol abnormality, inferior leads   Prolonged QT interval   Date and Time of Study:2024-10-11 05:42:20      ECG 12 Lead QT Measurement   Preliminary Result   HEART RATE=87  bpm   RR Sruzlanu=366  ms   MI Interval=  ms   P Horizontal Axis=  deg   P Front Axis=  deg   QRSD Interval=92  ms   QT Jspeoptq=933  ms   HSyT=519  ms   QRS Axis=25  deg   T Wave Axis=65  deg   - ABNORMAL ECG -   Atrial flutter   Borderline low voltage, extremity leads   Minimal ST depression, anterolateral leads   Prolonged QT interval   Date and Time of Study:2024-10-11 04:56:03          []  Cardiology/Vascular   []  Pathology  [x]  Old records  []  Other:    Assessment & Plan   Assessment / Plan     Assessment/Plan:     Assessment:  ITA with CKD stage IIIb  Hyperkalemia  Volume  depletion  Dehydration  Acute diverticulitis  Atrial fibrillation/flutter chronic  Diverticulosis  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 Yanez, recommendations appreciated  Continue midodrine 5 mg 3 times daily  Continue Neurontin to 200 mg twice a day  A.m. labs  Full code  PT/OT  DVT prophylax with Eliquis  Clinical course will dictate further management  Discussed with nurse at the bedside      VTE Prophylaxis:  Pharmacologic VTE prophylaxis orders are present.        CODE STATUS:   Level Of Support Discussed With: Patient  Code Status (Patient has no pulse and is not breathing): CPR (Attempt to Resuscitate)  Medical Interventions (Patient has pulse or is breathing): Full Support        Electronically signed by Jose Armando Bryson MD, 10/13/2024, 09:36 EDT.    Portions of this documentation were transcribed electronically from a voice recognition software.  I confirm all data accurately represents the service(s) I performed at today's visit.

## 2024-10-14 ENCOUNTER — TELEPHONE (OUTPATIENT)
Dept: FAMILY MEDICINE CLINIC | Age: 89
End: 2024-10-14
Payer: MEDICARE

## 2024-10-14 LAB
ALBUMIN SERPL-MCNC: 2.8 G/DL (ref 3.5–5.2)
ALP SERPL-CCNC: 105 U/L (ref 39–117)
ALT SERPL W P-5'-P-CCNC: 8 U/L (ref 1–41)
ANION GAP SERPL CALCULATED.3IONS-SCNC: 8.3 MMOL/L (ref 5–15)
AST SERPL-CCNC: 13 U/L (ref 1–40)
BASOPHILS # BLD AUTO: 0.02 10*3/MM3 (ref 0–0.2)
BASOPHILS NFR BLD AUTO: 0.4 % (ref 0–1.5)
BILIRUB CONJ SERPL-MCNC: 0.1 MG/DL (ref 0–0.3)
BILIRUB INDIRECT SERPL-MCNC: 0.3 MG/DL
BILIRUB SERPL-MCNC: 0.4 MG/DL (ref 0–1.2)
BILIRUB UR QL STRIP: NEGATIVE
BUN SERPL-MCNC: 17 MG/DL (ref 8–23)
BUN/CREAT SERPL: 14.3 (ref 7–25)
CALCIUM SPEC-SCNC: 9.1 MG/DL (ref 8.6–10.5)
CHLORIDE SERPL-SCNC: 106 MMOL/L (ref 98–107)
CLARITY UR: CLEAR
CO2 SERPL-SCNC: 25.7 MMOL/L (ref 22–29)
COLOR UR: YELLOW
CREAT SERPL-MCNC: 1.19 MG/DL (ref 0.76–1.27)
DEPRECATED RDW RBC AUTO: 45.5 FL (ref 37–54)
EGFRCR SERPLBLD CKD-EPI 2021: 58.4 ML/MIN/1.73
EOSINOPHIL # BLD AUTO: 0.22 10*3/MM3 (ref 0–0.4)
EOSINOPHIL NFR BLD AUTO: 4.5 % (ref 0.3–6.2)
ERYTHROCYTE [DISTWIDTH] IN BLOOD BY AUTOMATED COUNT: 13.3 % (ref 12.3–15.4)
FLUAV SUBTYP SPEC NAA+PROBE: NOT DETECTED
FLUBV RNA ISLT QL NAA+PROBE: NOT DETECTED
GLUCOSE BLDC GLUCOMTR-MCNC: 114 MG/DL (ref 70–99)
GLUCOSE BLDC GLUCOMTR-MCNC: 128 MG/DL (ref 70–99)
GLUCOSE BLDC GLUCOMTR-MCNC: 157 MG/DL (ref 70–99)
GLUCOSE BLDC GLUCOMTR-MCNC: 159 MG/DL (ref 70–99)
GLUCOSE SERPL-MCNC: 124 MG/DL (ref 65–99)
GLUCOSE UR STRIP-MCNC: ABNORMAL MG/DL
HCT VFR BLD AUTO: 32 % (ref 37.5–51)
HGB BLD-MCNC: 10.2 G/DL (ref 13–17.7)
HGB UR QL STRIP.AUTO: NEGATIVE
IMM GRANULOCYTES # BLD AUTO: 0.02 10*3/MM3 (ref 0–0.05)
IMM GRANULOCYTES NFR BLD AUTO: 0.4 % (ref 0–0.5)
KETONES UR QL STRIP: ABNORMAL
LEUKOCYTE ESTERASE UR QL STRIP.AUTO: NEGATIVE
LYMPHOCYTES # BLD AUTO: 1.39 10*3/MM3 (ref 0.7–3.1)
LYMPHOCYTES NFR BLD AUTO: 28.1 % (ref 19.6–45.3)
MAGNESIUM SERPL-MCNC: 1.6 MG/DL (ref 1.6–2.4)
MCH RBC QN AUTO: 29.6 PG (ref 26.6–33)
MCHC RBC AUTO-ENTMCNC: 31.9 G/DL (ref 31.5–35.7)
MCV RBC AUTO: 92.8 FL (ref 79–97)
MONOCYTES # BLD AUTO: 0.37 10*3/MM3 (ref 0.1–0.9)
MONOCYTES NFR BLD AUTO: 7.5 % (ref 5–12)
NEUTROPHILS NFR BLD AUTO: 2.92 10*3/MM3 (ref 1.7–7)
NEUTROPHILS NFR BLD AUTO: 59.1 % (ref 42.7–76)
NITRITE UR QL STRIP: NEGATIVE
NRBC BLD AUTO-RTO: 0 /100 WBC (ref 0–0.2)
PH UR STRIP.AUTO: 6 [PH] (ref 5–8)
PHOSPHATE SERPL-MCNC: 3.4 MG/DL (ref 2.5–4.5)
PLATELET # BLD AUTO: 141 10*3/MM3 (ref 140–450)
PMV BLD AUTO: 10.9 FL (ref 6–12)
POTASSIUM SERPL-SCNC: 4.1 MMOL/L (ref 3.5–5.2)
PROT SERPL-MCNC: 5.6 G/DL (ref 6–8.5)
PROT UR QL STRIP: ABNORMAL
RBC # BLD AUTO: 3.45 10*6/MM3 (ref 4.14–5.8)
RSV RNA NPH QL NAA+NON-PROBE: NOT DETECTED
SARS-COV-2 RNA RESP QL NAA+PROBE: NOT DETECTED
SODIUM SERPL-SCNC: 140 MMOL/L (ref 136–145)
SP GR UR STRIP: 1.01 (ref 1–1.03)
UROBILINOGEN UR QL STRIP: ABNORMAL
WBC NRBC COR # BLD AUTO: 4.94 10*3/MM3 (ref 3.4–10.8)

## 2024-10-14 PROCEDURE — 63710000001 INSULIN LISPRO (HUMAN) PER 5 UNITS: Performed by: FAMILY MEDICINE

## 2024-10-14 PROCEDURE — 25010000002 CEFTRIAXONE PER 250 MG: Performed by: FAMILY MEDICINE

## 2024-10-14 PROCEDURE — 80048 BASIC METABOLIC PNL TOTAL CA: CPT | Performed by: FAMILY MEDICINE

## 2024-10-14 PROCEDURE — 25010000002 ONDANSETRON PER 1 MG: Performed by: FAMILY MEDICINE

## 2024-10-14 PROCEDURE — 82948 REAGENT STRIP/BLOOD GLUCOSE: CPT | Performed by: FAMILY MEDICINE

## 2024-10-14 PROCEDURE — 99232 SBSQ HOSP IP/OBS MODERATE 35: CPT | Performed by: FAMILY MEDICINE

## 2024-10-14 PROCEDURE — 83735 ASSAY OF MAGNESIUM: CPT | Performed by: FAMILY MEDICINE

## 2024-10-14 PROCEDURE — 85025 COMPLETE CBC W/AUTO DIFF WBC: CPT | Performed by: FAMILY MEDICINE

## 2024-10-14 PROCEDURE — 82948 REAGENT STRIP/BLOOD GLUCOSE: CPT

## 2024-10-14 PROCEDURE — 81003 URINALYSIS AUTO W/O SCOPE: CPT | Performed by: EMERGENCY MEDICINE

## 2024-10-14 PROCEDURE — 63710000001 INSULIN GLARGINE PER 5 UNITS: Performed by: FAMILY MEDICINE

## 2024-10-14 PROCEDURE — 97161 PT EVAL LOW COMPLEX 20 MIN: CPT

## 2024-10-14 PROCEDURE — 80076 HEPATIC FUNCTION PANEL: CPT | Performed by: FAMILY MEDICINE

## 2024-10-14 PROCEDURE — 87637 SARSCOV2&INF A&B&RSV AMP PRB: CPT | Performed by: FAMILY MEDICINE

## 2024-10-14 PROCEDURE — 84100 ASSAY OF PHOSPHORUS: CPT | Performed by: FAMILY MEDICINE

## 2024-10-14 PROCEDURE — 97110 THERAPEUTIC EXERCISES: CPT

## 2024-10-14 RX ORDER — MAG HYDROX/ALUMINUM HYD/SIMETH 400-400-40
1 SUSPENSION, ORAL (FINAL DOSE FORM) ORAL ONCE
Status: COMPLETED | OUTPATIENT
Start: 2024-10-14 | End: 2024-10-14

## 2024-10-14 RX ADMIN — MIDODRINE HYDROCHLORIDE 5 MG: 5 TABLET ORAL at 12:00

## 2024-10-14 RX ADMIN — SODIUM CHLORIDE 2000 MG: 9 INJECTION, SOLUTION INTRAMUSCULAR; INTRAVENOUS; SUBCUTANEOUS at 05:43

## 2024-10-14 RX ADMIN — INSULIN LISPRO 2 UNITS: 100 INJECTION, SOLUTION INTRAVENOUS; SUBCUTANEOUS at 17:47

## 2024-10-14 RX ADMIN — LEVOTHYROXINE SODIUM 200 MCG: 0.1 TABLET ORAL at 10:17

## 2024-10-14 RX ADMIN — LATANOPROST 1 DROP: 50 SOLUTION OPHTHALMIC at 21:32

## 2024-10-14 RX ADMIN — METRONIDAZOLE 500 MG: 500 TABLET ORAL at 15:50

## 2024-10-14 RX ADMIN — METRONIDAZOLE 500 MG: 500 TABLET ORAL at 05:43

## 2024-10-14 RX ADMIN — ONDANSETRON 4 MG: 2 INJECTION INTRAMUSCULAR; INTRAVENOUS at 12:02

## 2024-10-14 RX ADMIN — INSULIN GLARGINE 15 UNITS: 100 INJECTION, SOLUTION SUBCUTANEOUS at 21:31

## 2024-10-14 RX ADMIN — APIXABAN 5 MG: 5 TABLET, FILM COATED ORAL at 21:31

## 2024-10-14 RX ADMIN — GABAPENTIN 100 MG: 100 CAPSULE ORAL at 21:31

## 2024-10-14 RX ADMIN — Medication 10 ML: at 21:31

## 2024-10-14 RX ADMIN — APIXABAN 2.5 MG: 2.5 TABLET, FILM COATED ORAL at 10:17

## 2024-10-14 RX ADMIN — INSULIN LISPRO 2 UNITS: 100 INJECTION, SOLUTION INTRAVENOUS; SUBCUTANEOUS at 21:31

## 2024-10-14 RX ADMIN — Medication 250 MG: at 10:17

## 2024-10-14 RX ADMIN — Medication 250 MG: at 21:31

## 2024-10-14 RX ADMIN — METRONIDAZOLE 500 MG: 500 TABLET ORAL at 21:31

## 2024-10-14 RX ADMIN — Medication 10 ML: at 10:17

## 2024-10-14 RX ADMIN — MIDODRINE HYDROCHLORIDE 5 MG: 5 TABLET ORAL at 17:47

## 2024-10-14 RX ADMIN — MIDODRINE HYDROCHLORIDE 5 MG: 5 TABLET ORAL at 10:17

## 2024-10-14 RX ADMIN — AMIODARONE HYDROCHLORIDE 200 MG: 200 TABLET ORAL at 10:17

## 2024-10-14 RX ADMIN — GABAPENTIN 100 MG: 100 CAPSULE ORAL at 10:17

## 2024-10-14 RX ADMIN — GLYCERIN 1 SUPPOSITORY: 2 SUPPOSITORY RECTAL at 10:16

## 2024-10-14 NOTE — PROGRESS NOTES
[facility]     Progress Note    Patient Name: Darci Munson  : 1935  MRN: [id number]  Primary Care Physician:  Adrien Mayer MD  Date of admission: 10/10/2024    Subjective patient is fully awake alert responsive interactive not in any acute distress  He has no diarrhea in fact he is little bit constipated  Very unlikely patient has C. difficile colitis based on he had no BM for 3 days    Review of Systems  All review of systems are negative except as mentioned in subjective complaints.    Objective   Objective     Vitals:   Temp:  [97.3 °F (36.3 °C)-98.5 °F (36.9 °C)] 97.3 °F (36.3 °C)  Heart Rate:  [63-78] 68  Resp:  [16-18] 18  BP: (103-125)/(55-62) 106/55  Physical Exam    Constitutional: Awake, alert responsive, conversant, no obvious distress              Psychiatric:  Appropriate affect, cooperative   Neurologic:  Awake alert ,oriented x 3, strength symmetric in all extremities, Cranial Nerves grossly intact to confrontation, speech clear   Eyes:   PERRLA, sclerae anicteric, no conjunctival injection   HEENT:  Moist mucous membranes, no nasal or eye discharge, no throat congestion   Neck:   Supple, no thyromegaly, no lymphadenopathy, trachea midline, no elevated JVD   Respiratory:  Clear to auscultation bilaterally, nonlabored respirations    Cardiovascular: RRR, no murmurs, rubs, or gallops, palpable pedal pulses bilaterally, No bilateral ankle edema   Gastrointestinal: Positive bowel sounds, soft, nontender, nondistended, no organomegaly   Musculoskeletal:  No clubbing or cyanosis to extremities,muscle wasting, joint swelling, muscle weakness             Skin:                      No rashes, bruising, skin ulcers, petechiae or ecchymosis    Result Review    Result Review:  I have personally reviewed the results from the time of this admission to 10/14/2024 08:13 EDT and agree with these findings:  []  Laboratory  []  Microbiology  []  Radiology  []  EKG/Telemetry   []  Cardiology/Vascular    []  Pathology  []  Old records  []  Other:    Results from last 7 days   Lab Units 10/14/24  0537 10/13/24  0605 10/12/24  0535 10/10/24  2038   WBC 10*3/mm3 4.94 5.06 6.99 20.03*   HEMOGLOBIN g/dL 10.2* 9.6* 9.6* 13.6   PLATELETS 10*3/mm3 141 130* 126* 201     Results from last 7 days   Lab Units 10/14/24  0537 10/13/24  0605 10/12/24  0535 10/11/24  0544 10/11/24  0248 10/10/24  2038   SODIUM mmol/L 140 139 138 138 136 133*   POTASSIUM mmol/L 4.1 3.9 4.1 5.3* 5.1 6.0*   CHLORIDE mmol/L 106 106 106 104 102 104   CO2 mmol/L 25.7 24.7 23.5 21.0* 16.1* 12.6*   ANION GAP mmol/L 8.3 8.3 8.5 13.0 17.9* 16.4*   BUN mg/dL 17 20 28* 48* 50* 56*   CREATININE mg/dL 1.19 1.17 1.26 1.74* 1.83* 2.03*   GLUCOSE mg/dL 124* 75 85 290* 354* 352*       Assessment & Plan   Assessment / Plan       Active Hospital Problems:    Active Hospital Problems    Diagnosis  POA    **Acute renal failure [N17.9]  Yes    Intractable nausea and vomiting [R11.2]  Unknown    Abdominal pain [R10.9]  Unknown    Stage 3b chronic kidney disease [N18.32]  Yes    Prostate CA [C61]  Yes    Hypothyroidism [E03.9]  Yes    Type 2 diabetes mellitus with hyperglycemia, without long-term current use of insulin [E11.65]  Yes     Description: (PL)      Coronary arteriosclerosis [I25.10]  Yes    Hypertensive disorder [I10]  Yes     Description: (PL)      Gastroparesis [K31.84]  Yes       Plan:   Very unlikely patient has C. difficile colitis in the presence of relative constipation and then diarrhea.  Should DC isolation       Electronically signed by Lilly Macias MD, 10/14/24, 8:13 AM EDT.

## 2024-10-14 NOTE — TELEPHONE ENCOUNTER
Caller: Sharon Davalos    Relationship: Emergency Contact    Best call back number: [phone]        Additional notes:     THE PATIENT'S DAUGHTER SAID THAT THE PATIENT IS IN [facility] DUE TO HIM FALLING AND BREAKING HIS HIP. SHE SAID THE PATIENT WILL BE STAYING AT HER RESIDENCE WHEN HE LEAVES THE HOSPITAL AND WILL BE NEEDING A HOSPITAL BED AND LIFT CHAIR. SHE IS WANTING PCP STILES TO REQUEST THIS       SHE SAID HE WILL BE DISCHARGED THIS WEEK.     SHE IS WANTING TO KNOW IF THIS CAN BE DONE.       PLEASE CALL AND ADVISE

## 2024-10-14 NOTE — PROGRESS NOTES
[facility]   Hospitalist Progress Note  Date: 10/14/2024  Patient Name: Darci Munson  : 1935  MRN: [id number]  Date of admission: 10/10/2024      Subjective   Subjective   Chief complaint: Nausea and vomiting    Summary:  89-year-old male with history of gastroparesis, CAD, diabetes, hypothyroidism, hypertension, CKD stage IIIb, mitral valve regurgitation, glaucoma, dyslipidemia, hospitalized on 10/11/2024 for chief complaint of nausea and vomiting with diarrhea, with ITA and hyperkalemia, in addition to lactic acidosis of clinical significance with CT imaging showing acute diverticulitis with diverticulosis, placed on antibiotics to cover GI tract, nephrology consulted.  Renal function improving, diarrhea is subsiding.  Enteric panel is pending.    Interval follow-up: Seen and examined at this morning, no acute distress, no acute major overnight events, less crampy abdominal pain, tolerating oral intake, no chest pain or palpitations.  Remains weak and fatigued.  Creatinine down to 1.19, BUN 17, potassium 4.1, white blood cell count 4000.  Feels constipated.  Glycerin suppository did not work yesterday.  Will retry today.  Is weak and fatigued, has difficulty ambulating, appropriate for subacute rehab placement given recent falls prior to hospitalization.  Working towards rehab placement    Review of systems:  All systems reviewed and negative for weakness, fatigue      Objective   Objective     Vitals:   Temp:  [97.3 °F (36.3 °C)-98.5 °F (36.9 °C)] 97.3 °F (36.3 °C)  Heart Rate:  [63-78] 68  Resp:  [16-18] 18  BP: (103-125)/(55-62) 106/55  Physical Exam               Constitutional: Awake, alert, no acute distress laying in bed working with therapy              Eyes: PERRLA, sclerae anicteric, no conjunctival injection              HENT: NCAT, mucous membranes moist              Neck: Supple, full range of motion              Respiratory: Clear to auscultation bilaterally, nonlabored respirations                Cardiovascular: RRR, no murmurs, rubs, or gallops, palpable pedal pulses bilaterally              Gastrointestinal: No abdominal tenderness, positive bowel sounds, soft, nondistended              Musculoskeletal: No bilateral ankle edema, no clubbing or cyanosis to extremities              Psychiatric: Appropriate affect, cooperative              Neurologic: Oriented x 3, strength symmetric in all extremities, Cranial Nerves grossly intact to confrontation, speech clear              Skin: No rashes     Result Review    Result Review:  I have personally reviewed the pertinent results from the past 24 hours to 10/14/2024 09:43 EDT and agree with these findings:  [x]  Laboratory   CBC          10/12/2024    05:35 10/13/2024    06:05 10/14/2024    05:37   CBC   WBC 6.99  5.06  4.94    RBC 3.24  3.25  3.45    Hemoglobin 9.6  9.6  10.2    Hematocrit 29.8  30.1  32.0    MCV 92.0  92.6  92.8    MCH 29.6  29.5  29.6    MCHC 32.2  31.9  31.9    RDW 13.7  13.5  13.3    Platelets 126  130  141      BMP          10/12/2024    05:35 10/13/2024    06:05 10/14/2024    05:37   BMP   BUN 28  20  17    Creatinine 1.26  1.17  1.19    Sodium 138  139  140    Potassium 4.1  3.9  4.1    Chloride 106  106  106    CO2 23.5  24.7  25.7    Calcium 9.0  9.1  9.1      LIVER FUNCTION TESTS:      Lab 10/14/24  0537 10/13/24  0605 10/12/24  0535 10/10/24  2038   TOTAL PROTEIN 5.6* 5.4* 5.4* 7.3   ALBUMIN 2.8* 2.7* 2.8* 3.7   GLOBULIN  --   --   --  3.6   ALT (SGPT) 8 8 12 19   AST (SGOT) 13 12 13 23   BILIRUBIN 0.4 0.5 0.5 0.5   INDIRECT BILIRUBIN 0.3 0.3 0.3  --    BILIRUBIN DIRECT 0.1 0.2 0.2  --    ALK PHOS 105 100 109 175*   LIPASE  --   --   --  40       [x]  Microbiology   Microbiology Results (last 10 days)       Procedure Component Value - Date/Time    Blood Culture - Blood, Arm, Right [[id number]]  (Normal) Collected: 10/11/24 0513    Lab Status: Preliminary result Specimen: Blood from Arm, Right Updated: 10/14/24 0600     Blood  Culture No growth at 3 days    Blood Culture - Blood, Hand, Right [[id number]]  (Normal) Collected: 10/11/24 0544    Lab Status: Preliminary result Specimen: Blood from Hand, Right Updated: 10/14/24 0600     Blood Culture No growth at 3 days              [x]  Radiology CT Abdomen Pelvis Without Contrast    Result Date: 10/10/2024  Impression: CT scan of the abdomen and pelvis without contrast demonstrating tiny noncalcified nodules in the left lower lobe. Follow-up CT scan of the chest is recommended in 6 months. Post prostatectomy. Moderate diverticulosis of the descending colon and sigmoid colon. Ill-defined increased density in fat posterior to the proximal descending colon may represent acute diverticulitis or scarring from prior episodes of diverticulitis. This could also represent an underlying mural abnormality of the colon. Electronically Signed: Armaan Garcia MD  10/10/2024 9:45 PM EDT  Workstation ID: [id number]       [x]  EKG/Telemetry   ECG 12 Lead Tachycardia   Preliminary Result   HEART RATE=95  bpm   RR Qxcwktgf=559  ms   HI Interval=  ms   P Horizontal Axis=  deg   P Front Axis=  deg   QRSD Interval=98  ms   QT Uqxkiaau=943  ms   WSqS=530  ms   QRS Axis=15  deg   T Wave Axis=134  deg   - ABNORMAL ECG -   Atrial flutter with predominant 2:1 AV block   Low voltage, extremity leads   Nonspecific repol abnormality, inferior leads   Prolonged QT interval   Date and Time of Study:2024-10-11 05:42:20      ECG 12 Lead QT Measurement   Preliminary Result   HEART RATE=87  bpm   RR Xlvkbshd=258  ms   HI Interval=  ms   P Horizontal Axis=  deg   P Front Axis=  deg   QRSD Interval=92  ms   QT Hkzhahea=999  ms   ZWoB=565  ms   QRS Axis=25  deg   T Wave Axis=65  deg   - ABNORMAL ECG -   Atrial flutter   Borderline low voltage, extremity leads   Minimal ST depression, anterolateral leads   Prolonged QT interval   Date and Time of Study:2024-10-11 04:56:03          []  Cardiology/Vascular   []  Pathology  [x]  Old  records  []  Other:    Assessment & Plan   Assessment / Plan     Assessment/Plan:     Assessment:  ITA with CKD stage IIIb  Hyperkalemia  Volume depletion  Dehydration  Acute diverticulitis  Atrial fibrillation/flutter chronic  Diverticulosis  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 Macias, recommendations appreciated  Continue midodrine 5 mg 3 times daily  Continue Neurontin to 200 mg twice a day  A.m. labs  Full code  PT/OT  Discussed with  rehab placement  DVT prophylax with Eliquis  Clinical course will dictate further management  Discussed with nurse at the bedside      VTE Prophylaxis:  Pharmacologic VTE prophylaxis orders are present.        CODE STATUS:   Level Of Support Discussed With: Patient  Code Status (Patient has no pulse and is not breathing): CPR (Attempt to Resuscitate)  Medical Interventions (Patient has pulse or is breathing): Full Support        Electronically signed by Jose Armando Bryson MD, 10/14/2024, 09:43 EDT.    Portions of this documentation were transcribed electronically from a voice recognition software.  I confirm all data accurately represents the service(s) I performed at today's visit.

## 2024-10-14 NOTE — PLAN OF CARE
Goal Outcome Evaluation:  Plan of Care Reviewed With: (P) patient           Outcome Evaluation: (P) Pt presents with deficits related to BLE strength, dyanmic balance, and endurance. Inpatient PT services are indicated at this time. Recommend SNF placement upon hospital discharge. Pt would be a great candidate for our SNF.    Anticipated Discharge Disposition (PT): (P) skilled nursing facility

## 2024-10-14 NOTE — THERAPY EVALUATION
Acute Care - Physical Therapy Initial Evaluation  RAKEL Alcaraz     Patient Name: Darci Munson  : 1935  MRN: [id number]  Today's Date: 10/14/2024      Visit Dx:     ICD-10-CM ICD-9-CM   1. Acute renal failure, unspecified acute renal failure type  N17.9 584.9   2. Difficulty in walking  R26.2 719.7     Patient Active Problem List   Diagnosis    Type 2 diabetes mellitus with hyperglycemia, without long-term current use of insulin    Chronic kidney disease    Hypothyroidism    Hypertensive disorder    Hyperlipidemia    Paroxysmal atrial fibrillation    Dyspepsia    Coronary arteriosclerosis    Gastroparesis    Hearing loss    Mitral valve regurgitation    Peripheral neuropathy    Prostate CA    Primary insomnia    Primary osteoarthritis of left knee    Anticoagulated    Vitamin D insufficiency    Iron deficiency anemia    Medicare annual wellness visit, subsequent    Thickened nails    Pain in toes of both feet    History of prostate cancer    Stage 3b chronic kidney disease    Chronic bilateral low back pain without sciatica    Acute renal failure    Intractable nausea and vomiting    Abdominal pain     Past Medical History:   Diagnosis Date    Anemia, unspecified     Atherosclerotic heart disease of native coronary artery without angina pectoris     CAD (coronary artery disease)     CKD (chronic kidney disease), stage III     Essential (primary) hypertension     Gastric ulcer, unspecified as acute or chronic, without hemorrhage or perforation     Gastroparesis     GERD without esophagitis     Glaucoma     Hereditary and idiopathic neuropathy, unspecified     History of falling     HLD (hyperlipidemia)     HTN (hypertension)     Hypotension, unspecified     Hypothyroidism     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    Irritable bowel syndrome without diarrhea     Laceration without foreign body of right forearm, initial encounter     Low back pain     Malignant neoplasm of prostate     Mixed hyperlipidemia     OA  (osteoarthritis)     Other specified disorders of the skin and subcutaneous tissue     Pain in left foot     Peripheral vascular disease, unspecified     Phimosis     Presence of unspecified artificial knee joint     Primary insomnia     Primary osteoarthritis of both knees     Prostate cancer     Sensorineural hearing loss (SNHL) of both ears     Sigmoid diverticulosis     severe sigmoid and descending colon diverticulosis and 3+ gastritis    Sleep related leg cramps     Type 2 diabetes mellitus with diabetic polyneuropathy     and gastroparesis    Unspecified atrial fibrillation     Unspecified dementia without behavioral disturbance     Unspecified hearing loss, bilateral      Past Surgical History:   Procedure Laterality Date    CATARACT EXTRACTION Bilateral 2014    COLONOSCOPY      COLONOSCOPY N/A 7/1/2022    Procedure: COLONOSCOPY WITH POLYPECTOMIES, HOT SNARE;  Surgeon: Jennifer Mann MD;  Location: [facility] ENDOSCOPY;  Service: Gastroenterology;  Laterality: N/A;  DIVERTICULOSIS, COLON POLYPS, HEMORRHOIDS    ENDOSCOPY N/A 7/1/2022    Procedure: ESOPHAGOGASTRODUODENOSCOPY WITH BX, POLYPECTOMY;  Surgeon: Jennifer Mann MD;  Location: [facility] ENDOSCOPY;  Service: Gastroenterology;  Laterality: N/A;  GASTRIC POLYP, GASTRITIS, HIATAL HERNIA    HAND SURGERY Right     JOINT REPLACEMENT      KNEE ARTHROSCOPY Right 03/14/2017    PROSTATECTOMY      REPLACEMENT TOTAL KNEE  03/2017    UPPER GASTROINTESTINAL ENDOSCOPY       PT Assessment (Last 12 Hours)       PT Evaluation and Treatment       Row Name 10/14/24 1985          Physical Therapy Time and Intention    Subjective Information complains of;weakness (P)   -EW     Document Type evaluation (P)   -EW     Mode of Treatment individual therapy;physical therapy (P)   -EW     Patient Effort good (P)   -EW       Row Name 10/14/24 3656          General Information    Patient Profile Reviewed yes (P)   -EW     Patient Observations alert;cooperative;agree to  therapy (P)   -EW     Prior Level of Function independent:;gait;transfer;bed mobility;ADL's (P)   -EW     Existing Precautions/Restrictions fall (P)   -EW     Barriers to Rehab none identified (P)   -EW       Row Name 10/14/24 1055          Living Environment    Current Living Arrangements home (P)   -EW     People in Home alone (P)   -EW     Primary Care Provided by self (P)   -EW       Row Name 10/14/24 1055          Home Use of Assistive/Adaptive Equipment    Equipment Currently Used at Home none (P)   -EW       Row Name 10/14/24 1055          Range of Motion (ROM)    Range of Motion bilateral lower extremities;ROM is WFL (P)   -EW       Row Name 10/14/24 1055          Strength (Manual Muscle Testing)    Strength (Manual Muscle Testing) bilateral lower extremities;other (see comments) (P)   4-/5  -EW       Row Name 10/14/24 1055          Bed Mobility    Bed Mobility supine-sit (P)   -EW     Supine-Sit Buda (Bed Mobility) minimum assist (75% patient effort) (P)   -EW     Assistive Device (Bed Mobility) bed rails;head of bed elevated (P)   -EW       Row Name 10/14/24 1055          Transfers    Transfers sit-stand transfer (P)   -EW       Row Name 10/14/24 1055          Sit-Stand Transfer    Sit-Stand Buda (Transfers) contact guard (P)   -EW     Assistive Device (Sit-Stand Transfers) walker, front-wheeled (P)   -EW       Row Name 10/14/24 1055          Gait/Stairs (Locomotion)    Gait/Stairs Locomotion gait/ambulation assistive device (P)   -EW     Buda Level (Gait) contact guard (P)   -EW     Assistive Device (Gait) walker, front-wheeled (P)   -EW     Patient was able to Ambulate yes (P)   -EW     Distance in Feet (Gait) 100 (P)   -EW     Pattern (Gait) step-through (P)   -EW     Deviations/Abnormal Patterns (Gait) gait speed decreased;connie decreased (P)   -EW     Comment, (Gait/Stairs) Pt was able to ambulate ~100ft but required a seated rest break around ~50ft with complaints he felt  his legs were getting weak and little SOA (P)   -EW       Row Name 10/14/24 1055          Safety Issues/Impairments Affecting Functional Mobility    Impairments Affecting Function (Mobility) balance;endurance/activity tolerance;strength;shortness of breath (P)   -EW       Row Name 10/14/24 1055          Balance    Balance Assessment standing dynamic balance (P)   -EW     Dynamic Standing Balance contact guard (P)   -EW     Position/Device Used, Standing Balance supported;walker, front-wheeled (P)   -EW       Row Name             Wound 10/11/24 0332 gluteal    Wound - Properties Group Placement Date: 10/11/24  -YUMIKO Placement Time: 0332  -YUMIKO Location: gluteal  -YUMIKO    Retired Wound - Properties Group Placement Date: 10/11/24  -YUMIKO Placement Time: 0332 -JO Location: gluteal  -YUMIKO    Retired Wound - Properties Group Placement Date: 10/11/24  -YUMIKO Placement Time: 0332 -JO Location: gluteal  -YUMIKO    Retired Wound - Properties Group Date first assessed: 10/11/24  -YUMIKO Time first assessed: 0332 -JO Location: gluteal  -YUMIKO      Row Name 10/14/24 1055          Plan of Care Review    Plan of Care Reviewed With patient (P)   -EW     Outcome Evaluation Pt presents with deficits related to BLE strength, dyanmic balance, and endurance. Inpatient PT services are indicated at this time. Recommend SNF placement upon hospital discharge. Pt would be a great candidate for our SNF. (P)   -EW       Row Name 10/14/24 1055          Positioning and Restraints    Pre-Treatment Position in bed (P)   -EW     Post Treatment Position bed (P)   -EW     In Bed supine;encouraged to call for assist;exit alarm on;call light within reach;with family/caregiver (P)   -EW       Row Name 10/14/24 1052          Therapy Assessment/Plan (PT)    Rehab Potential (PT) good (P)   -EW     Criteria for Skilled Interventions Met (PT) yes;skilled treatment is necessary (P)   -EW     Therapy Frequency (PT) daily (P)   -EW     Predicted Duration of Therapy Intervention (PT)  10 days (P)   -EW     Problem List (PT) problems related to;balance;cognition;coordination;mobility;strength (P)   -EW     Activity Limitations Related to Problem List (PT) unable to ambulate safely;unable to transfer safely (P)   -EW       Row Name 10/14/24 1052          PT Evaluation Complexity    History, PT Evaluation Complexity no personal factors and/or comorbidities (P)   -EW     Examination of Body Systems (PT Eval Complexity) total of 4 or more elements (P)   -EW     Clinical Presentation (PT Evaluation Complexity) stable (P)   -EW     Clinical Decision Making (PT Evaluation Complexity) low complexity (P)   -EW     Overall Complexity (PT Evaluation Complexity) low complexity (P)   -EW       Row Name 10/14/24 1052          Therapy Plan Review/Discharge Plan (PT)    Therapy Plan Review (PT) evaluation/treatment results reviewed;patient (P)   -EW       Row Name 10/14/24 1057          Physical Therapy Goals    Bed Mobility Goal Selection (PT) bed mobility, PT goal 1 (P)   -EW     Transfer Goal Selection (PT) transfer, PT goal 1 (P)   -EW     Gait Training Goal Selection (PT) gait training, PT goal 1 (P)   -EW       Row Name 10/14/24 105          Bed Mobility Goal 1 (PT)    Activity/Assistive Device (Bed Mobility Goal 1, PT) bed mobility activities, all (P)   -EW     [place] Level/Cues Needed (Bed Mobility Goal 1, PT) independent (P)   -EW     Time Frame (Bed Mobility Goal 1, PT) 10 days (P)   -EW     Progress/Outcomes (Bed Mobility Goal 1, PT) new goal (P)   -EW       Row Name 10/14/24 105          Transfer Goal 1 (PT)    Activity/Assistive Device (Transfer Goal 1, PT) sit-to-stand/stand-to-sit (P)   -EW     [place] Level/Cues Needed (Transfer Goal 1, PT) independent (P)   -EW     Time Frame (Transfer Goal 1, PT) 10 days (P)   -EW       Row Name 10/14/24 1050          Gait Training Goal 1 (PT)    Activity/Assistive Device (Gait Training Goal 1, PT) gait (walking locomotion);assistive device use (P)    -EW     S[place] Level (Gait Training Goal 1, PT) modified independence (P)   -EW     Distance (Gait Training Goal 1, PT) 350 (P)   -EW     Time Frame (Gait Training Goal 1, PT) 10 days (P)   -EW     Progress/Outcome (Gait Training Goal 1, PT) new goal (P)   -EW               User Key  (r) = Recorded By, (t) = Taken By, (c) = Cosigned By      Initials Name Provider Type    Jimbo Islas, RN Registered Nurse    Miranda Tinajero, PT Student PT Student                    Physical Therapy Education       Title: PT OT SLP Therapies (In Progress)       Topic: Physical Therapy (Not Started)       Point: Mobility training (Not Started)       Learner Progress:  Not documented in this visit.              Point: Home exercise program (Not Started)       Learner Progress:  Not documented in this visit.              Point: Body mechanics (Not Started)       Learner Progress:  Not documented in this visit.              Point: Precautions (Not Started)       Learner Progress:  Not documented in this visit.                                  PT Recommendation and Plan  Anticipated Discharge Disposition (PT): (P) skilled nursing facility  Planned Therapy Interventions (PT): (P) balance training, bed mobility training, gait training, home exercise program, patient/family education, ROM (range of motion), strengthening, transfer training  Therapy Frequency (PT): (P) daily  Plan of Care Reviewed With: (P) patient  Outcome Evaluation: (P) Pt presents with deficits related to BLE strength, dyanmic balance, and endurance. Inpatient PT services are indicated at this time. Recommend SNF placement upon hospital discharge. Pt would be a great candidate for our SNF.   Outcome Measures       Row Name 10/14/24 1111             How much help from another person do you currently need...    Turning from your back to your side while in flat bed without using bedrails? 4 (P)   -EW      Moving from lying on back to sitting on the side of a  flat bed without bedrails? 4 (P)   -EW      Moving to and from a bed to a chair (including a wheelchair)? 3 (P)   -EW      Standing up from a chair using your arms (e.g., wheelchair, bedside chair)? 3 (P)   -EW      Climbing 3-5 steps with a railing? 2 (P)   -EW      To walk in hospital room? 3 (P)   -EW      AM-PAC 6 Clicks Score (PT) 19 (P)   -EW                User Key  (r) = Recorded By, (t) = Taken By, (c) = Cosigned By      Initials Name Provider Type    EW Miranda Stauffer, PT Student PT Student                     Time Calculation:    PT Charges       Row Name 10/14/24 1112             Time Calculation    PT Received On 10/14/24 (P)   -EW      PT Goal Re-Cert Due Date 10/23/24 (P)   -EW         Untimed Charges    PT Eval/Re-eval Minutes 25 (P)   -EW         Total Minutes    Untimed Charges Total Minutes 25 (P)   -EW       Total Minutes 25 (P)   -EW                User Key  (r) = Recorded By, (t) = Taken By, (c) = Cosigned By      Initials Name Provider Type    EW Miranda Stauffer, PT Student PT Student                  Therapy Charges for Today       Code Description Service Date Service Provider Modifiers Qty    71093141666 HC PT EVAL LOW COMPLEXITY 2 10/14/2024 Miranda Stauffer, PT Student GP 1            PT G-Codes  Outcome Measure Options: AM-PAC 6 Clicks Daily Activity (OT), Optimal Instrument  AM-PAC 6 Clicks Score (PT): (P) 19  AM-PAC 6 Clicks Score (OT): 17    Miranda Stauffer PT Student  10/14/2024

## 2024-10-14 NOTE — PLAN OF CARE
Goal Outcome Evaluation:              Outcome Evaluation: Pt is alert and oriented. Wound care. Sent urine sample to lab. Pt complains of lack of appetite, given zofran for nausea. Sat in chair during day with family at bedside. Spore isolation was discontinued. Continue plan of care.

## 2024-10-14 NOTE — THERAPY TREATMENT NOTE
Patient Name: Darci Munson  : 1935    MRN: [id number]                              Today's Date: 10/14/2024       Admit Date: 10/10/2024    Visit Dx:     ICD-10-CM ICD-9-CM   1. Acute renal failure, unspecified acute renal failure type  N17.9 584.9   2. Difficulty in walking  R26.2 719.7     Patient Active Problem List   Diagnosis    Type 2 diabetes mellitus with hyperglycemia, without long-term current use of insulin    Chronic kidney disease    Hypothyroidism    Hypertensive disorder    Hyperlipidemia    Paroxysmal atrial fibrillation    Dyspepsia    Coronary arteriosclerosis    Gastroparesis    Hearing loss    Mitral valve regurgitation    Peripheral neuropathy    Prostate CA    Primary insomnia    Primary osteoarthritis of left knee    Anticoagulated    Vitamin D insufficiency    Iron deficiency anemia    Medicare annual wellness visit, subsequent    Thickened nails    Pain in toes of both feet    History of prostate cancer    Stage 3b chronic kidney disease    Chronic bilateral low back pain without sciatica    Acute renal failure    Intractable nausea and vomiting    Abdominal pain     Past Medical History:   Diagnosis Date    Anemia, unspecified     Atherosclerotic heart disease of native coronary artery without angina pectoris     CAD (coronary artery disease)     CKD (chronic kidney disease), stage III     Essential (primary) hypertension     Gastric ulcer, unspecified as acute or chronic, without hemorrhage or perforation     Gastroparesis     GERD without esophagitis     Glaucoma     Hereditary and idiopathic neuropathy, unspecified     History of falling     HLD (hyperlipidemia)     HTN (hypertension)     Hypotension, unspecified     Hypothyroidism     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    Irritable bowel syndrome without diarrhea     Laceration without foreign body of right forearm, initial encounter     Low back pain     Malignant neoplasm of prostate     Mixed hyperlipidemia     OA (osteoarthritis)      Other specified disorders of the skin and subcutaneous tissue     Pain in left foot     Peripheral vascular disease, unspecified     Phimosis     Presence of unspecified artificial knee joint     Primary insomnia     Primary osteoarthritis of both knees     Prostate cancer     Sensorineural hearing loss (SNHL) of both ears     Sigmoid diverticulosis     severe sigmoid and descending colon diverticulosis and 3+ gastritis    Sleep related leg cramps     Type 2 diabetes mellitus with diabetic polyneuropathy     and gastroparesis    Unspecified atrial fibrillation     Unspecified dementia without behavioral disturbance     Unspecified hearing loss, bilateral      Past Surgical History:   Procedure Laterality Date    CATARACT EXTRACTION Bilateral 2014    COLONOSCOPY      COLONOSCOPY N/A 7/1/2022    Procedure: COLONOSCOPY WITH POLYPECTOMIES, HOT SNARE;  Surgeon: Jennifer Mann MD;  Location: [facility] ENDOSCOPY;  Service: Gastroenterology;  Laterality: N/A;  DIVERTICULOSIS, COLON POLYPS, HEMORRHOIDS    ENDOSCOPY N/A 7/1/2022    Procedure: ESOPHAGOGASTRODUODENOSCOPY WITH BX, POLYPECTOMY;  Surgeon: Jennifer Mann MD;  Location: [facility] ENDOSCOPY;  Service: Gastroenterology;  Laterality: N/A;  GASTRIC POLYP, GASTRITIS, HIATAL HERNIA    HAND SURGERY Right     JOINT REPLACEMENT      KNEE ARTHROSCOPY Right 03/14/2017    PROSTATECTOMY      REPLACEMENT TOTAL KNEE  03/2017    UPPER GASTROINTESTINAL ENDOSCOPY        General Information       Row Name 10/14/24 1308          OT Time and Intention    Document Type therapy note (daily note)  -PG     Mode of Treatment individual therapy;occupational therapy  -PG               User Key  (r) = Recorded By, (t) = Taken By, (c) = Cosigned By      Initials Name Provider Type    PG [place] OT Occupational Therapist                     Mobility/ADL's    No documentation.                  Obj/Interventions       Row Name 10/14/24 1308          Shoulder (Therapeutic  Exercise)    Shoulder (Therapeutic Exercise) strengthening exercise  -PG     Shoulder Strengthening (Therapeutic Exercise) resistance band;yellow;15 repititions  -PG       Row Name 10/14/24 1308          Elbow/Forearm (Therapeutic Exercise)    Elbow/Forearm (Therapeutic Exercise) strengthening exercise  -PG     Elbow/Forearm Strengthening (Therapeutic Exercise) resistance band;15 repititions;yellow  -PG       Row Name 10/14/24 1308          Motor Skills    Therapeutic Exercise shoulder;elbow/forearm  -PG               User Key  (r) = Recorded By, (t) = Taken By, (c) = Cosigned By      Initials Name Provider Type    PG [place], LOUIE Occupational Therapist                   Goals/Plan    No documentation.                  Clinical Impression       Row Name 10/14/24 1309          Plan of Care Review    Plan of Care Reviewed With patient  -PG     Progress no change  -PG               User Key  (r) = Recorded By, (t) = Taken By, (c) = Cosigned By      Initials Name Provider Type    PG [place], LOUIE Occupational Therapist                   Outcome Measures       Row Name 10/14/24 1309          How much help from another is currently needed...    Putting on and taking off regular lower body clothing? 2  -PG     Bathing (including washing, rinsing, and drying) 3  -PG     Toileting (which includes using toilet bed pan or urinal) 3  -PG     Putting on and taking off regular upper body clothing 3  -PG     Taking care of personal grooming (such as brushing teeth) 4  -PG     Eating meals 4  -PG     AM-PAC 6 Clicks Score (OT) 19  -PG       Row Name 10/14/24 1111 10/14/24 1017       How much help from another person do you currently need...    Turning from your back to your side while in flat bed without using bedrails? 4  -CHRISTA (r) EW (t) CHRISTA (c) 4  -AN    Moving from lying on back to sitting on the side of a flat bed without bedrails? 4  -CHRISTA (r) EW (t) CHRISTA (c) 4  -AN    Moving to and from a bed to a chair (including a  wheelchair)? 3  -CHRISTA (r) EW (t) CHRISTA (c) 2  -AN    Standing up from a chair using your arms (e.g., wheelchair, bedside chair)? 3  -CHRISTA (r) EW (t) CHRISTA (c) 3  -AN    Climbing 3-5 steps with a railing? 2  -CHRISTA (r) EW (t) CHRISTA (c) 2  -AN    To walk in hospital room? 3  -CHRISTA (r) EW (t) CHRISTA (c) 2  -AN    AM-PAC 6 Clicks Score (PT) 19  -CHRISTA (r) EW (t) 17  -AN      Row Name 10/14/24 1309          Functional Assessment    Outcome Measure Options AM-PAC 6 Clicks Daily Activity (OT);Optimal Instrument  -PG       Row Name 10/14/24 1309          Optimal Instrument    Optimal Instrument Optimal - 3  -PG     Bending/Stooping 3  -PG     Standing 2  -PG     Reaching 1  -PG       Row Name 10/14/24 1111 10/14/24 1017       Daily Care    Highest Level of Mobility Goal 6 --> Walk 10 steps or more  -CHRISTA (r) EW (t) 5 --> Static standing  -AN              User Key  (r) = Recorded By, (t) = Taken By, (c) = Cosigned By      Initials Name Provider Type    PG [place], OT Occupational Therapist    Oseas Johnson, PT Physical Therapist    Peyton Hopkins, RN Registered Nurse    Miranda Tinajero, PT Student PT Student                    Occupational Therapy Education       Title: PT OT SLP Therapies (In Progress)       Topic: Occupational Therapy (Done)       Point: ADL training (Done)       Description:   Instruct learner(s) on proper safety adaptation and remediation techniques during self care or transfers.   Instruct in proper use of assistive devices.                  Learning Progress Summary            Patient Acceptance, E,D, DU by PG at 10/11/2024 1005                      Point: Home exercise program (Done)       Description:   Instruct learner(s) on appropriate technique for monitoring, assisting and/or progressing therapeutic exercises/activities.                  Learning Progress Summary            Patient Acceptance, E,D, DU by PG at 10/11/2024 1005                      Point: Precautions (Done)       Description:   Instruct  learner(s) on prescribed precautions during self-care and functional transfers.                  Learning Progress Summary            Patient Acceptance, E,D, DU by PG at 10/11/2024 1005                      Point: Body mechanics (Done)       Description:   Instruct learner(s) on proper positioning and spine alignment during self-care, functional mobility activities and/or exercises.                  Learning Progress Summary            Patient Acceptance, E,D, DU by PG at 10/11/2024 1005                                      User Key       Initials Effective Dates Name Provider Type Discipline     06/16/21 -  [place] OT Occupational Therapist OT                  OT Recommendation and Plan  Planned Therapy Interventions (OT): activity tolerance training, BADL retraining, strengthening exercise, transfer/mobility retraining, patient/caregiver education/training, occupation/activity based interventions  Therapy Frequency (OT): 5 times/wk  Plan of Care Review  Plan of Care Reviewed With: patient  Progress: no change  Outcome Evaluation: Patient presents with limitations affecting strength, activity tolerance, and balance impacting patient's ability to return home safely and independently.  The skills of a therapist will be required to safely and effectively implement the following treatment plan to restore maximal level of function     Time Calculation:   Evaluation Complexity (OT)  Review Occupational Profile/Medical/Therapy History Complexity: brief/low complexity  Assessment, Occupational Performance/Identification of Deficit Complexity: 3-5 performance deficits  Clinical Decision Making Complexity (OT): problem focused assessment/low complexity  Overall Complexity of Evaluation (OT): low complexity     Time Calculation- OT       Row Name 10/14/24 1310             Time Calculation- OT    OT Received On 10/14/24  -PG      OT Goal Re-Cert Due Date 10/20/24  -PG         Timed Charges    82317 - OT Therapeutic  Exercise Minutes 12  -PG         Total Minutes    Timed Charges Total Minutes 12  -PG       Total Minutes 12  -PG                User Key  (r) = Recorded By, (t) = Taken By, (c) = Cosigned By      Initials Name Provider Type    PG [place] OT Occupational Therapist                  Therapy Charges for Today       Code Description Service Date Service Provider Modifiers Qty    [id number]  OT THER PROC EA 15 MIN 10/14/2024 [place] OT GO 1                 [place] OT  10/14/2024

## 2024-10-14 NOTE — PLAN OF CARE
Goal Outcome Evaluation:  Plan of Care Reviewed With: patient        Progress: no change  Outcome Evaluation: VSS, AAOx4, except patient lost track of time after napping, thought he had slept until morning. Pt reports improved pain/nausea since admission.

## 2024-10-15 ENCOUNTER — HOSPITAL ENCOUNTER (INPATIENT)
Facility: HOSPITAL | Age: 89
DRG: 948 | End: 2024-10-15
Attending: FAMILY MEDICINE | Admitting: INTERNAL MEDICINE
Payer: MEDICARE

## 2024-10-15 VITALS
OXYGEN SATURATION: 92 % | RESPIRATION RATE: 18 BRPM | WEIGHT: 142.64 LBS | TEMPERATURE: 97.9 F | BODY MASS INDEX: 22.39 KG/M2 | SYSTOLIC BLOOD PRESSURE: 133 MMHG | HEIGHT: 67 IN | HEART RATE: 65 BPM | DIASTOLIC BLOOD PRESSURE: 57 MMHG

## 2024-10-15 DIAGNOSIS — N18.32 STAGE 3B CHRONIC KIDNEY DISEASE: ICD-10-CM

## 2024-10-15 DIAGNOSIS — E11.42 TYPE 2 DIABETES MELLITUS WITH DIABETIC POLYNEUROPATHY, WITHOUT LONG-TERM CURRENT USE OF INSULIN: ICD-10-CM

## 2024-10-15 DIAGNOSIS — Z78.9 DECREASED ACTIVITIES OF DAILY LIVING (ADL): Primary | ICD-10-CM

## 2024-10-15 DIAGNOSIS — R26.2 DIFFICULTY WALKING: ICD-10-CM

## 2024-10-15 DIAGNOSIS — R53.81 PHYSICAL DEBILITY: ICD-10-CM

## 2024-10-15 LAB
GLUCOSE BLDC GLUCOMTR-MCNC: 108 MG/DL (ref 70–99)
GLUCOSE BLDC GLUCOMTR-MCNC: 147 MG/DL (ref 70–99)
GLUCOSE BLDC GLUCOMTR-MCNC: 148 MG/DL (ref 70–99)
GLUCOSE BLDC GLUCOMTR-MCNC: 187 MG/DL (ref 70–99)

## 2024-10-15 PROCEDURE — 82948 REAGENT STRIP/BLOOD GLUCOSE: CPT | Performed by: FAMILY MEDICINE

## 2024-10-15 PROCEDURE — 82948 REAGENT STRIP/BLOOD GLUCOSE: CPT

## 2024-10-15 PROCEDURE — 99239 HOSP IP/OBS DSCHRG MGMT >30: CPT | Performed by: FAMILY MEDICINE

## 2024-10-15 PROCEDURE — 63710000001 INSULIN LISPRO (HUMAN) PER 5 UNITS: Performed by: FAMILY MEDICINE

## 2024-10-15 PROCEDURE — 97530 THERAPEUTIC ACTIVITIES: CPT

## 2024-10-15 PROCEDURE — 63710000001 INSULIN GLARGINE PER 5 UNITS: Performed by: FAMILY MEDICINE

## 2024-10-15 PROCEDURE — 99306 1ST NF CARE HIGH MDM 50: CPT | Performed by: PHYSICIAN ASSISTANT

## 2024-10-15 PROCEDURE — 25010000002 CEFTRIAXONE PER 250 MG: Performed by: FAMILY MEDICINE

## 2024-10-15 PROCEDURE — 97110 THERAPEUTIC EXERCISES: CPT

## 2024-10-15 RX ORDER — LEVOTHYROXINE SODIUM 100 UG/1
200 TABLET ORAL
Status: CANCELLED | OUTPATIENT
Start: 2024-10-16

## 2024-10-15 RX ORDER — SODIUM CHLORIDE 0.9 % (FLUSH) 0.9 %
10 SYRINGE (ML) INJECTION EVERY 12 HOURS SCHEDULED
Status: CANCELLED | OUTPATIENT
Start: 2024-10-15

## 2024-10-15 RX ORDER — MIDODRINE HYDROCHLORIDE 5 MG/1
5 TABLET ORAL
Status: CANCELLED | OUTPATIENT
Start: 2024-10-15

## 2024-10-15 RX ORDER — POLYETHYLENE GLYCOL 3350 17 G/17G
17 POWDER, FOR SOLUTION ORAL DAILY PRN
Status: CANCELLED | OUTPATIENT
Start: 2024-10-15

## 2024-10-15 RX ORDER — INSULIN LISPRO 100 [IU]/ML
2-9 INJECTION, SOLUTION INTRAVENOUS; SUBCUTANEOUS
Status: CANCELLED | OUTPATIENT
Start: 2024-10-15

## 2024-10-15 RX ORDER — BISACODYL 10 MG
10 SUPPOSITORY, RECTAL RECTAL DAILY PRN
Status: CANCELLED | OUTPATIENT
Start: 2024-10-15

## 2024-10-15 RX ORDER — IBUPROFEN 600 MG/1
1 TABLET ORAL
Status: CANCELLED | OUTPATIENT
Start: 2024-10-15

## 2024-10-15 RX ORDER — SODIUM CHLORIDE 0.9 % (FLUSH) 0.9 %
10 SYRINGE (ML) INJECTION AS NEEDED
Status: ACTIVE | OUTPATIENT
Start: 2024-10-15

## 2024-10-15 RX ORDER — LATANOPROST 50 UG/ML
1 SOLUTION/ DROPS OPHTHALMIC NIGHTLY
Status: DISPENSED | OUTPATIENT
Start: 2024-10-15

## 2024-10-15 RX ORDER — AMOXICILLIN 250 MG
2 CAPSULE ORAL 2 TIMES DAILY PRN
Status: CANCELLED | OUTPATIENT
Start: 2024-10-15

## 2024-10-15 RX ORDER — GABAPENTIN 100 MG/1
100 CAPSULE ORAL 2 TIMES DAILY
Status: CANCELLED | OUTPATIENT
Start: 2024-10-15

## 2024-10-15 RX ORDER — LATANOPROST 50 UG/ML
1 SOLUTION/ DROPS OPHTHALMIC NIGHTLY
Status: CANCELLED | OUTPATIENT
Start: 2024-10-15

## 2024-10-15 RX ORDER — LEVOTHYROXINE SODIUM 100 UG/1
200 TABLET ORAL
Status: DISPENSED | OUTPATIENT
Start: 2024-10-16

## 2024-10-15 RX ORDER — BISACODYL 10 MG
10 SUPPOSITORY, RECTAL RECTAL DAILY PRN
Status: ACTIVE | OUTPATIENT
Start: 2024-10-15

## 2024-10-15 RX ORDER — DEXTROSE MONOHYDRATE 25 G/50ML
25 INJECTION, SOLUTION INTRAVENOUS
Status: CANCELLED | OUTPATIENT
Start: 2024-10-15

## 2024-10-15 RX ORDER — AMOXICILLIN 250 MG
2 CAPSULE ORAL 2 TIMES DAILY PRN
Status: ACTIVE | OUTPATIENT
Start: 2024-10-15

## 2024-10-15 RX ORDER — NICOTINE POLACRILEX 4 MG
15 LOZENGE BUCCAL
Status: CANCELLED | OUTPATIENT
Start: 2024-10-15

## 2024-10-15 RX ORDER — GABAPENTIN 100 MG/1
100 CAPSULE ORAL 2 TIMES DAILY
Status: DISPENSED | OUTPATIENT
Start: 2024-10-15

## 2024-10-15 RX ORDER — SODIUM CHLORIDE 0.9 % (FLUSH) 0.9 %
10 SYRINGE (ML) INJECTION AS NEEDED
Status: CANCELLED | OUTPATIENT
Start: 2024-10-15

## 2024-10-15 RX ORDER — IBUPROFEN 600 MG/1
1 TABLET ORAL
Status: ACTIVE | OUTPATIENT
Start: 2024-10-15

## 2024-10-15 RX ORDER — ONDANSETRON 2 MG/ML
4 INJECTION INTRAMUSCULAR; INTRAVENOUS EVERY 6 HOURS PRN
Status: ACTIVE | OUTPATIENT
Start: 2024-10-15

## 2024-10-15 RX ORDER — NICOTINE POLACRILEX 4 MG
15 LOZENGE BUCCAL
Status: ACTIVE | OUTPATIENT
Start: 2024-10-15

## 2024-10-15 RX ORDER — BISACODYL 5 MG/1
5 TABLET, DELAYED RELEASE ORAL DAILY PRN
Status: ACTIVE | OUTPATIENT
Start: 2024-10-15

## 2024-10-15 RX ORDER — LACTULOSE 10 G/15ML
10 SOLUTION ORAL DAILY
Status: COMPLETED | OUTPATIENT
Start: 2024-10-15 | End: 2024-10-15

## 2024-10-15 RX ORDER — MIDODRINE HYDROCHLORIDE 5 MG/1
5 TABLET ORAL
Status: DISPENSED | OUTPATIENT
Start: 2024-10-15

## 2024-10-15 RX ORDER — SODIUM CHLORIDE 0.9 % (FLUSH) 0.9 %
10 SYRINGE (ML) INJECTION EVERY 12 HOURS SCHEDULED
Status: ACTIVE | OUTPATIENT
Start: 2024-10-15

## 2024-10-15 RX ORDER — SACCHAROMYCES BOULARDII 250 MG
250 CAPSULE ORAL 2 TIMES DAILY
Status: CANCELLED | OUTPATIENT
Start: 2024-10-15

## 2024-10-15 RX ORDER — INSULIN LISPRO 100 [IU]/ML
2-9 INJECTION, SOLUTION INTRAVENOUS; SUBCUTANEOUS
Status: DISPENSED | OUTPATIENT
Start: 2024-10-15

## 2024-10-15 RX ORDER — AMIODARONE HYDROCHLORIDE 200 MG/1
200 TABLET ORAL DAILY
Status: DISPENSED | OUTPATIENT
Start: 2024-10-16

## 2024-10-15 RX ORDER — AMIODARONE HYDROCHLORIDE 200 MG/1
200 TABLET ORAL DAILY
Status: CANCELLED | OUTPATIENT
Start: 2024-10-16

## 2024-10-15 RX ORDER — POLYETHYLENE GLYCOL 3350 17 G/17G
17 POWDER, FOR SOLUTION ORAL DAILY PRN
Status: ACTIVE | OUTPATIENT
Start: 2024-10-15

## 2024-10-15 RX ORDER — ONDANSETRON 2 MG/ML
4 INJECTION INTRAMUSCULAR; INTRAVENOUS EVERY 6 HOURS PRN
Status: CANCELLED | OUTPATIENT
Start: 2024-10-15

## 2024-10-15 RX ORDER — SACCHAROMYCES BOULARDII 250 MG
250 CAPSULE ORAL 2 TIMES DAILY
Status: DISPENSED | OUTPATIENT
Start: 2024-10-15

## 2024-10-15 RX ORDER — DEXTROSE MONOHYDRATE 25 G/50ML
25 INJECTION, SOLUTION INTRAVENOUS
Status: ACTIVE | OUTPATIENT
Start: 2024-10-15

## 2024-10-15 RX ORDER — BISACODYL 5 MG/1
5 TABLET, DELAYED RELEASE ORAL DAILY PRN
Status: CANCELLED | OUTPATIENT
Start: 2024-10-15

## 2024-10-15 RX ADMIN — APIXABAN 5 MG: 5 TABLET, FILM COATED ORAL at 20:04

## 2024-10-15 RX ADMIN — Medication 250 MG: at 09:42

## 2024-10-15 RX ADMIN — MIDODRINE HYDROCHLORIDE 5 MG: 5 TABLET ORAL at 06:37

## 2024-10-15 RX ADMIN — MIDODRINE HYDROCHLORIDE 5 MG: 5 TABLET ORAL at 17:46

## 2024-10-15 RX ADMIN — MIDODRINE HYDROCHLORIDE 5 MG: 5 TABLET ORAL at 12:48

## 2024-10-15 RX ADMIN — APIXABAN 5 MG: 5 TABLET, FILM COATED ORAL at 09:42

## 2024-10-15 RX ADMIN — METRONIDAZOLE 500 MG: 500 TABLET ORAL at 14:18

## 2024-10-15 RX ADMIN — INSULIN GLARGINE 15 UNITS: 100 INJECTION, SOLUTION SUBCUTANEOUS at 20:05

## 2024-10-15 RX ADMIN — POLYETHYLENE GLYCOL 400 AND PROPYLENE GLYCOL 2 DROP: 4; 3 SOLUTION/ DROPS OPHTHALMIC at 14:18

## 2024-10-15 RX ADMIN — INSULIN LISPRO 2 UNITS: 100 INJECTION, SOLUTION INTRAVENOUS; SUBCUTANEOUS at 20:05

## 2024-10-15 RX ADMIN — SODIUM CHLORIDE 2000 MG: 9 INJECTION, SOLUTION INTRAMUSCULAR; INTRAVENOUS; SUBCUTANEOUS at 06:36

## 2024-10-15 RX ADMIN — GABAPENTIN 100 MG: 100 CAPSULE ORAL at 09:42

## 2024-10-15 RX ADMIN — METRONIDAZOLE 500 MG: 500 TABLET ORAL at 06:37

## 2024-10-15 RX ADMIN — LATANOPROST 1 DROP: 50 SOLUTION OPHTHALMIC at 20:11

## 2024-10-15 RX ADMIN — AMIODARONE HYDROCHLORIDE 200 MG: 200 TABLET ORAL at 09:42

## 2024-10-15 RX ADMIN — Medication 10 ML: at 09:42

## 2024-10-15 RX ADMIN — TUBERCULIN PURIFIED PROTEIN DERIVATIVE 5 UNITS: 5 INJECTION, SOLUTION INTRADERMAL at 20:05

## 2024-10-15 RX ADMIN — Medication 250 MG: at 20:04

## 2024-10-15 RX ADMIN — LACTULOSE 10 G: 10 SOLUTION ORAL at 09:42

## 2024-10-15 RX ADMIN — GABAPENTIN 100 MG: 100 CAPSULE ORAL at 20:04

## 2024-10-15 RX ADMIN — LEVOTHYROXINE SODIUM 200 MCG: 0.1 TABLET ORAL at 06:37

## 2024-10-15 NOTE — PLAN OF CARE
Goal Outcome Evaluation:  Plan of Care Reviewed With: patient        Progress: no change  Outcome Evaluation: PT AAOx4, VSS. Pt reports eating better and having more appetite. Ate popscicles and jello for snack. No nausea. Patient has been ambulating well in the room with only a standby assist.

## 2024-10-15 NOTE — PROGRESS NOTES
[facility]     Progress Note    Patient Name: Darci Munson  : 1935  MRN: [id number]  Primary Care Physician:  Adrien Mayer MD  Date of admission: 10/10/2024    Subjective patient is extremely pleasant and he did not express any new concerns or issues except just being a little weak and also appetite is poor    Review of Systems  All review of systems are negative except as mentioned in subjective complaints.    Objective   Objective     Vitals:   Temp:  [97.4 °F (36.3 °C)-98.5 °F (36.9 °C)] 98.3 °F (36.8 °C)  Heart Rate:  [65-77] 68  Resp:  [16-18] 18  BP: ()/(55-68) 118/55  Physical Exam    Constitutional: Awake, alert responsive, conversant, no obvious distress              Psychiatric:  Appropriate affect, cooperative   Neurologic:  Awake alert ,oriented x 3, strength symmetric in all extremities, Cranial Nerves grossly intact to confrontation, speech clear   Eyes:   PERRLA, sclerae anicteric, no conjunctival injection   HEENT:  Moist mucous membranes, no nasal or eye discharge, no throat congestion   Neck:   Supple, no thyromegaly, no lymphadenopathy, trachea midline, no elevated JVD   Respiratory:  Clear to auscultation bilaterally, nonlabored respirations    Cardiovascular: RRR, no murmurs, rubs, or gallops, palpable pedal pulses bilaterally, No bilateral ankle edema   Gastrointestinal: Positive bowel sounds, soft, nontender, nondistended, no organomegaly   Musculoskeletal:  No clubbing or cyanosis to extremities,muscle wasting, joint swelling, muscle weakness             Skin:                      No rashes, bruising, skin ulcers, petechiae or ecchymosis    Result Review    Result Review:  I have personally reviewed the results from the time of this admission to 10/15/2024 08:06 EDT and agree with these findings:  []  Laboratory  []  Microbiology  []  Radiology  []  EKG/Telemetry   []  Cardiology/Vascular   []  Pathology  []  Old records  []  Other:    Results from last 7 days   Lab  Units 10/14/24  0537 10/13/24  0605 10/12/24  0535 10/10/24  2038   WBC 10*3/mm3 4.94 5.06 6.99 20.03*   HEMOGLOBIN g/dL 10.2* 9.6* 9.6* 13.6   PLATELETS 10*3/mm3 141 130* 126* 201     Results from last 7 days   Lab Units 10/14/24  0537 10/13/24  0605 10/12/24  0535 10/11/24  0544 10/11/24  0248 10/10/24  2038   SODIUM mmol/L 140 139 138 138 136 133*   POTASSIUM mmol/L 4.1 3.9 4.1 5.3* 5.1 6.0*   CHLORIDE mmol/L 106 106 106 104 102 104   CO2 mmol/L 25.7 24.7 23.5 21.0* 16.1* 12.6*   ANION GAP mmol/L 8.3 8.3 8.5 13.0 17.9* 16.4*   BUN mg/dL 17 20 28* 48* 50* 56*   CREATININE mg/dL 1.19 1.17 1.26 1.74* 1.83* 2.03*   GLUCOSE mg/dL 124* 75 85 290* 354* 352*       Assessment & Plan   Assessment / Plan       Active Hospital Problems:    Active Hospital Problems    Diagnosis  POA    **Acute renal failure [N17.9]  Yes    Intractable nausea and vomiting [R11.2]  Unknown    Abdominal pain [R10.9]  Unknown    Stage 3b chronic kidney disease [N18.32]  Yes    Prostate CA [C61]  Yes    Hypothyroidism [E03.9]  Yes    Type 2 diabetes mellitus with hyperglycemia, without long-term current use of insulin [E11.65]  Yes     Description: (PL)      Coronary arteriosclerosis [I25.10]  Yes    Hypertensive disorder [I10]  Yes     Description: (PL)      Gastroparesis [K31.84]  Yes     ITA resolved  Plan:   Waiting for rehab placement       Electronically signed by Lilly Macias MD, 10/15/24, 8:06 AM EDT.

## 2024-10-15 NOTE — THERAPY TREATMENT NOTE
Patient Name: Darci Munson  : 1935    MRN: [id number]                              Today's Date: 10/15/2024       Admit Date: 10/10/2024    Visit Dx:     ICD-10-CM ICD-9-CM   1. Acute renal failure, unspecified acute renal failure type  N17.9 584.9   2. Difficulty in walking  R26.2 719.7     Patient Active Problem List   Diagnosis    Type 2 diabetes mellitus with hyperglycemia, without long-term current use of insulin    Chronic kidney disease    Hypothyroidism    Hypertensive disorder    Hyperlipidemia    Paroxysmal atrial fibrillation    Dyspepsia    Coronary arteriosclerosis    Gastroparesis    Hearing loss    Mitral valve regurgitation    Peripheral neuropathy    Prostate CA    Primary insomnia    Primary osteoarthritis of left knee    Anticoagulated    Vitamin D insufficiency    Iron deficiency anemia    Medicare annual wellness visit, subsequent    Thickened nails    Pain in toes of both feet    History of prostate cancer    Stage 3b chronic kidney disease    Chronic bilateral low back pain without sciatica    Acute renal failure    Intractable nausea and vomiting    Abdominal pain     Past Medical History:   Diagnosis Date    Anemia, unspecified     Atherosclerotic heart disease of native coronary artery without angina pectoris     CAD (coronary artery disease)     CKD (chronic kidney disease), stage III     Essential (primary) hypertension     Gastric ulcer, unspecified as acute or chronic, without hemorrhage or perforation     Gastroparesis     GERD without esophagitis     Glaucoma     Hereditary and idiopathic neuropathy, unspecified     History of falling     HLD (hyperlipidemia)     HTN (hypertension)     Hypotension, unspecified     Hypothyroidism     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    Irritable bowel syndrome without diarrhea     Laceration without foreign body of right forearm, initial encounter     Low back pain     Malignant neoplasm of prostate     Mixed hyperlipidemia     OA (osteoarthritis)      Other specified disorders of the skin and subcutaneous tissue     Pain in left foot     Peripheral vascular disease, unspecified     Phimosis     Presence of unspecified artificial knee joint     Primary insomnia     Primary osteoarthritis of both knees     Prostate cancer     Sensorineural hearing loss (SNHL) of both ears     Sigmoid diverticulosis     severe sigmoid and descending colon diverticulosis and 3+ gastritis    Sleep related leg cramps     Type 2 diabetes mellitus with diabetic polyneuropathy     and gastroparesis    Unspecified atrial fibrillation     Unspecified dementia without behavioral disturbance     Unspecified hearing loss, bilateral      Past Surgical History:   Procedure Laterality Date    CATARACT EXTRACTION Bilateral 2014    COLONOSCOPY      COLONOSCOPY N/A 7/1/2022    Procedure: COLONOSCOPY WITH POLYPECTOMIES, HOT SNARE;  Surgeon: Jennifer Mann MD;  Location: [facility] ENDOSCOPY;  Service: Gastroenterology;  Laterality: N/A;  DIVERTICULOSIS, COLON POLYPS, HEMORRHOIDS    ENDOSCOPY N/A 7/1/2022    Procedure: ESOPHAGOGASTRODUODENOSCOPY WITH BX, POLYPECTOMY;  Surgeon: Jennifer Mann MD;  Location: [facility] ENDOSCOPY;  Service: Gastroenterology;  Laterality: N/A;  GASTRIC POLYP, GASTRITIS, HIATAL HERNIA    HAND SURGERY Right     JOINT REPLACEMENT      KNEE ARTHROSCOPY Right 03/14/2017    PROSTATECTOMY      REPLACEMENT TOTAL KNEE  03/2017    UPPER GASTROINTESTINAL ENDOSCOPY        General Information       Row Name 10/15/24 1002          OT Time and Intention    Subjective Information weakness  -PG     Document Type therapy note (daily note)  -PG     Mode of Treatment individual therapy;occupational therapy  -PG     Total Minutes, Occupational Therapy 23  -PG     Patient Effort good  -PG               User Key  (r) = Recorded By, (t) = Taken By, (c) = Cosigned By      Initials Name Provider Type    PG Los Eubanks OT Occupational Therapist                     Mobility/ADL's        Row Name 10/15/24 1002          Transfers    Transfers bed-chair transfer  -PG     Comment, (Transfers) Walked in hallway ~100 ft CGa rwx  -PG       Row Name 10/15/24 1002          Bed-Chair Transfer    Bed-Chair [place] (Transfers) contact guard;verbal cues  -PG     Assistive Device (Bed-Chair Transfers) walker, front-wheeled  -PG               User Key  (r) = Recorded By, (t) = Taken By, (c) = Cosigned By      Initials Name Provider Type    PG [place]anks OT Occupational Therapist                   Obj/Interventions       Row Name 10/15/24 1003          Shoulder (Therapeutic Exercise)    Shoulder (Therapeutic Exercise) strengthening exercise  -PG     Shoulder Strengthening (Therapeutic Exercise) 15 repititions;resistance band;yellow  -PG       Row Name 10/15/24 1003          Elbow/Forearm (Therapeutic Exercise)    Elbow/Forearm (Therapeutic Exercise) strengthening exercise  -PG     Elbow/Forearm Strengthening (Therapeutic Exercise) 15 repititions;yellow;resistance band  -PG       Row Name 10/15/24 1003          Motor Skills    Therapeutic Exercise shoulder;elbow/forearm  -PG               User Key  (r) = Recorded By, (t) = Taken By, (c) = Cosigned By      Initials Name Provider Type    PG [place] OT Occupational Therapist                   Goals/Plan    No documentation.                  Clinical Impression       Row Name 10/15/24 1003          Plan of Care Review    Plan of Care Reviewed With patient  -PG     Progress no change  -PG               User Key  (r) = Recorded By, (t) = Taken By, (c) = Cosigned By      Initials Name Provider Type    [place] OT Occupational Therapist                   Outcome Measures       Row Name 10/15/24 1003          How much help from another is currently needed...    Putting on and taking off regular lower body clothing? 2  -PG     Bathing (including washing, rinsing, and drying) 3  -PG     Toileting (which includes using toilet bed pan or urinal) 3  -PG      Putting on and taking off regular upper body clothing 4  -PG     Taking care of personal grooming (such as brushing teeth) 4  -PG     Eating meals 4  -PG     AM-PAC 6 Clicks Score (OT) 20  -PG       Row Name 10/15/24 1003          Functional Assessment    Outcome Measure Options AM-PAC 6 Clicks Daily Activity (OT);Optimal Instrument  -PG       Row Name 10/15/24 1003          Optimal Instrument    Optimal Instrument Optimal - 3  -PG     Bending/Stooping 2  -PG     Standing 2  -PG     Reaching 1  -PG               User Key  (r) = Recorded By, (t) = Taken By, (c) = Cosigned By      Initials Name Provider Type    PG [place]anks OT Occupational Therapist                    Occupational Therapy Education       Title: PT OT SLP Therapies (In Progress)       Topic: Occupational Therapy (Done)       Point: ADL training (Done)       Description:   Instruct learner(s) on proper safety adaptation and remediation techniques during self care or transfers.   Instruct in proper use of assistive devices.                  Learning Progress Summary            Patient Acceptance, E,D, DU by PG at 10/11/2024 1005                      Point: Home exercise program (Done)       Description:   Instruct learner(s) on appropriate technique for monitoring, assisting and/or progressing therapeutic exercises/activities.                  Learning Progress Summary            Patient Acceptance, E,D, DU by PG at 10/11/2024 1005                      Point: Precautions (Done)       Description:   Instruct learner(s) on prescribed precautions during self-care and functional transfers.                  Learning Progress Summary            Patient Acceptance, E,D, DU by PG at 10/11/2024 1005                      Point: Body mechanics (Done)       Description:   Instruct learner(s) on proper positioning and spine alignment during self-care, functional mobility activities and/or exercises.                  Learning Progress Summary             Patient Acceptance, E,D, DU by PG at 10/11/2024 1005                                      User Key       Initials Effective Dates Name Provider Type Discipline    PG 06/16/21 -  Los E[place]anks OT Occupational Therapist OT                  OT Recommendation and Plan  Planned Therapy Interventions (OT): activity tolerance training, BADL retraining, strengthening exercise, transfer/mobility retraining, patient/caregiver education/training, occupation/activity based interventions  Therapy Frequency (OT): 5 times/wk  Plan of Care Review  Plan of Care Reviewed With: patient  Progress: no change  Outcome Evaluation: Patient presents with limitations affecting strength, activity tolerance, and balance impacting patient's ability to return home safely and independently.  The skills of a therapist will be required to safely and effectively implement the following treatment plan to restore maximal level of function     Time Calculation:   Evaluation Complexity (OT)  Review Occupational Profile/Medical/Therapy History Complexity: brief/low complexity  Assessment, Occupational Performance/Identification of Deficit Complexity: 3-5 performance deficits  Clinical Decision Making Complexity (OT): problem focused assessment/low complexity  Overall Complexity of Evaluation (OT): low complexity     Time Calculation- OT       Row Name 10/15/24 1004             Time Calculation- OT    OT Received On 10/15/24  -PG      OT Goal Re-Cert Due Date 10/20/24  -PG         Timed Charges    54219 - OT Therapeutic Exercise Minutes 8  -PG      81582 - OT Therapeutic Activity Minutes 15  -PG         Total Minutes    Timed Charges Total Minutes 23  -PG       Total Minutes 23  -PG                User Key  (r) = Recorded By, (t) = Taken By, (c) = Cosigned By      Initials Name Provider Type    PG [place]anks OT Occupational Therapist                  Therapy Charges for Today       Code Description Service Date Service Provider Modifiers Qty     31823244077 HC OT THER PROC EA 15 MIN 10/14/2024 [place], OT GO 1    42567265511 HC OT THER PROC EA 15 MIN 10/15/2024 [place] OT GO 1    53888813288 HC OT THERAPEUTIC ACT EA 15 MIN 10/15/2024 Los Eubanks OT GO 1                 [place] E[place]anks OT  10/15/2024

## 2024-10-15 NOTE — PLAN OF CARE
Goal Outcome Evaluation:  Plan of Care Reviewed With: patient        Progress: improving  Outcome Evaluation: Alert and oriented and pleasant with staff, x1 assist for transfers and ambulation. No c/o pain requiring PRN pain medication. Pt new to SNF this shift. Appears to be adjusting to new surroundings without issue. Sitting up in recliner call light in reach.

## 2024-10-15 NOTE — SIGNIFICANT NOTE
10/15/24 1117   Physical Therapy Time and Intention   Session Not Performed   (Pt has orders for discharge shortly.)

## 2024-10-15 NOTE — PROGRESS NOTES
Pt vaccination history reviewed for eligibility of Prevnar 20 and pt meets  criteria for Prevnar 20 (PPSV23 02/11/2011, PCV13 06/29/2017)    Pt vaccination history reviewed for eligibility of Covid Vaccine and pt meets  criteria for Covid vaccine (last dose received 05/23/2023)    Order for Prevnar placed if applicable; RN to place order COVID vaccine per standing order if criteria met and patient consents.       Immunization History   Administered Date(s) Administered    Arexvy (RSV, Adults 60+ yrs) 02/19/2024    COVID-19 (MODERNA) 1st,2nd,3rd Dose Monovalent 03/11/2021, 04/08/2021, 10/29/2021    COVID-19 (PFIZER) BIVALENT 12+YRS 12/08/2022, 05/23/2023    Covid-19 (Pfizer) Gray Cap Monovalent 08/15/2022    Fluzone High-Dose 65+YRS 10/29/2013    Fluzone High-Dose 65+yrs 11/05/2021, 10/08/2022, 10/09/2023    Influenza, Unspecified 09/10/2020    Pneumococcal Conjugate 13-Valent (PCV13) 06/29/2017    Pneumococcal Polysaccharide (PPSV23) 02/11/2011    Shingrix 04/24/2023, 08/22/2023    Td (TDVAX) 01/14/2020       Pneumococcal Vaccine Recommendations    https://www.cdc.gov/vaccines/vpd/pneumo/downloads/pneumo-vaccine-timing.pdf    Covid Vaccine Recommendations  Beginning September 30, 2024, individuals are considered up to date if they have received a single dose of the [id number] updated COVID-19 vaccine.   Administer the dose >=8 weeks after the last dose of previous formulation.    Please contact pharmacy with any questions.      Mel Teran RP  Clinical Pharmacist

## 2024-10-15 NOTE — H&P
[facility]   HOSPITALIST HISTORY AND PHYSICAL  Date: 10/15/2024   Patient Name: Darci Munson  : 1935  MRN: [id number]  Primary Care Physician:  Adrien Mayer MD  Date of admission: 10/15/2024    Subjective   Subjective     Chief Complaint: Hospital-acquired weakness    HPI:    Darci Munson is a 89 y.o. male past medical history significant for chronic atrial fibrillation/flutter, diabetes, CAD, hypertension, hyperlipidemia, hypothyroidism, history of prostate cancer, chronic kidney disease, glaucoma, mitral valve regurgitation, that was hospitalized for chief complaint of nausea vomiting and diarrhea was noted to have ITA with hyperkalemia in addition to lactic acidosis CT scan demonstrating nodules in the lower lobe which will require follow-up CT scan of the chest in 6 months concern for possible acute diverticulitis placed on antibiotics to cover GI tract IV fluids with improvement of symptoms seen by PT and OT deemed a good candidate for inpatient rehab is been transferred to skilled nursing facility for ongoing therapy.      Personal History     Past Medical History:  Past Medical History:   Diagnosis Date    Anemia, unspecified     Atherosclerotic heart disease of native coronary artery without angina pectoris     CAD (coronary artery disease)     CKD (chronic kidney disease), stage III     Essential (primary) hypertension     Gastric ulcer, unspecified as acute or chronic, without hemorrhage or perforation     Gastroparesis     GERD without esophagitis     Glaucoma     Hereditary and idiopathic neuropathy, unspecified     History of falling     HLD (hyperlipidemia)     HTN (hypertension)     Hypotension, unspecified     Hypothyroidism     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    Irritable bowel syndrome without diarrhea     Laceration without foreign body of right forearm, initial encounter     Low back pain     Malignant neoplasm of prostate     Mixed hyperlipidemia     OA (osteoarthritis)     Other  specified disorders of the skin and subcutaneous tissue     Pain in left foot     Peripheral vascular disease, unspecified     Phimosis     Presence of unspecified artificial knee joint     Primary insomnia     Primary osteoarthritis of both knees     Prostate cancer     Sensorineural hearing loss (SNHL) of both ears     Sigmoid diverticulosis     severe sigmoid and descending colon diverticulosis and 3+ gastritis    Sleep related leg cramps     Type 2 diabetes mellitus with diabetic polyneuropathy     and gastroparesis    Unspecified atrial fibrillation     Unspecified dementia without behavioral disturbance     Unspecified hearing loss, bilateral        Past Surgical History:  Past Surgical History:   Procedure Laterality Date    CATARACT EXTRACTION Bilateral     COLONOSCOPY      COLONOSCOPY N/A 2022    Procedure: COLONOSCOPY WITH POLYPECTOMIES, HOT SNARE;  Surgeon: Jennifer Mann MD;  Location: [facility] ENDOSCOPY;  Service: Gastroenterology;  Laterality: N/A;  DIVERTICULOSIS, COLON POLYPS, HEMORRHOIDS    ENDOSCOPY N/A 2022    Procedure: ESOPHAGOGASTRODUODENOSCOPY WITH BX, POLYPECTOMY;  Surgeon: Jennifer Mann MD;  Location: [facility] ENDOSCOPY;  Service: Gastroenterology;  Laterality: N/A;  GASTRIC POLYP, GASTRITIS, HIATAL HERNIA    HAND SURGERY Right     JOINT REPLACEMENT      KNEE ARTHROSCOPY Right 2017    PROSTATECTOMY      REPLACEMENT TOTAL KNEE  2017    UPPER GASTROINTESTINAL ENDOSCOPY          Family History:   Family History   Problem Relation Age of Onset    Diabetes type II Mother     Lung cancer Father         Social History:   Social History     Socioeconomic History    Marital status:      Spouse name: Liudmila ( 16)    Number of children: 4   Tobacco Use    Smoking status: Former     Current packs/day: 0.00     Average packs/day: 1 pack/day for 27.0 years (27.0 ttl pk-yrs)     Types: Cigarettes     Start date:      Quit date: 1970     Years since  quittin.8     Passive exposure: Never    Smokeless tobacco: Never   Vaping Use    Vaping status: Never Used   Substance and Sexual Activity    Alcohol use: Never    Drug use: Never    Sexual activity: Defer        Home Medications:  HYDROcodone-acetaminophen, Insulin Lispro, amiodarone, calcium carbonate, carboxymethylcellulose, cholecalciferol, clotrimazole, ferrous sulfate, insulin detemir, isosorbide mononitrate, latanoprost, levothyroxine, lisinopril, loperamide, multivitamin, nystatin, ondansetron ODT, pantoprazole, saccharomyces boulardii, and sucralfate    Allergies:  Allergies   Allergen Reactions    Meloxicam Irritability    Nsaids Hives       Review of Systems   All systems were reviewed and negative except for: Generalized weakness fatigue    Objective   Objective     Vitals:   Temp:  [97.4 °F (36.3 °C)-98.4 °F (36.9 °C)] 97.4 °F (36.3 °C)  Heart Rate:  [65-76] 76  Resp:  [16-18] 18  BP: (113-145)/(55-68) 145/65    Physical Exam   Constitutional awake alert oriented no acute distress hard of hearing  Respiratory: Clear  Cardiovascular: Normal rate  GI: Abdomen soft nontender    Result Review    Result Review:  I have personally reviewed the results from the time of this admission to 10/15/2024 17:06 EDT and agree with these findings:  []  Laboratory  []  Microbiology  []  Radiology  []  EKG/Telemetry   []  Cardiology/Vascular   []  Pathology  []  Old records  []  Other:      Assessment & Plan   Assessment / Plan   Assessment:    Hospital-acquired weakness  Recent ITA  Recent hyperkalemia  Recent dehydration  Acute diverticulitis  Atrial fibrillation/flutter chronic  Diverticulosis  Diabetes  CAD  Hypertension  Gastroparesis  History of prostate cancer  Hypothyroidism    Plan:      Continue amiodarone and Eliquis for atrial fibrillation  Continue basal bolus insulin monitoring  Renal function electrolytes closely  Continue midodrine  PT OT  Further treatment contingent upon his hospital  course      VTE Prophylaxis:  Pharmacologic VTE prophylaxis orders are present.        CODE STATUS:    Level Of Support Discussed With: Patient  Code Status (Patient has no pulse and is not breathing): CPR (Attempt to Resuscitate)  Medical Interventions (Patient has pulse or is breathing): Full Support      Electronically signed by ABDIEL Stroud, 10/15/24, 5:06 PM EDT.

## 2024-10-15 NOTE — PLAN OF CARE
Goal Outcome Evaluation:         Vitals stable during shift. Pt up in chair for breakfast. Eager to get to rehab and start physical therapy. Report given to Juan Carlos in SNF. No complaints at this time. Wound care completed and new images obtained. Care plan complete.

## 2024-10-15 NOTE — DISCHARGE SUMMARY
[facility]         HOSPITALIST  DISCHARGE SUMMARY    Patient Name: Darci Munson  : 1935  MRN: [id number]    Date of Admission: 10/10/2024  Date of Discharge:  10/15/2024    Primary Care Physician: Adrien Mayer MD    Consults       Date and Time Order Name Status Description    10/10/2024  9:52 PM Inpatient Hospitalist Consult      10/10/2024  9:10 PM Inpatient Hospitalist Consult      10/10/2024  9:10 PM Inpatient Nephrology Consult Completed             Active and Resolved Hospital Problems:    ITA with CKD stage IIIb  Hyperkalemia  Volume depletion  Dehydration  Acute diverticulitis  Atrial fibrillation/flutter chronic  Diverticulosis  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  CAD  Hypertension  Gastroparesis  History of prostate cancer  Hypothyroidism    Active Hospital Problems    Diagnosis POA    **Acute renal failure [N17.9] Yes    Intractable nausea and vomiting [R11.2] Unknown    Abdominal pain [R10.9] Unknown    Stage 3b chronic kidney disease [N18.32] Yes    Prostate CA [C61] Yes    Hypothyroidism [E03.9] Yes    Type 2 diabetes mellitus with hyperglycemia, without long-term current use of insulin [E11.65] Yes    Coronary arteriosclerosis [I25.10] Yes    Hypertensive disorder [I10] Yes    Gastroparesis [K31.84] Yes      Resolved Hospital Problems   No resolved problems to display.       Hospital Course     Hospital Course:  89-year-old male with history of gastroparesis, CAD, diabetes, hypothyroidism, hypertension, CKD stage IIIb, mitral valve regurgitation, glaucoma, dyslipidemia, hospitalized on 10/11/2024 for chief complaint of nausea and vomiting with diarrhea, with ITA and hyperkalemia, in addition to lactic acidosis of clinical significance with CT imaging showing acute diverticulitis with diverticulosis, placed on antibiotics to cover GI tract, nephrology consulted.  Renal function improving, diarrhea is subsiding.  Patient subsequently had constipation  posttreatment of acute diverticulitis.  Renal function improved to better than baseline, white blood cell count improved to 4000, hemoglobin 10.2.  As of 10/15/2024, patient was hemodynamically stable for discharge to skilled nursing facility for ongoing rehabilitation due to physical debility during hospitalization.  Nephrology to continue to follow while on skilled nursing facility.    Day of Discharge     Vital Signs:  Temp:  [97.4 °F (36.3 °C)-98.5 °F (36.9 °C)] 98.3 °F (36.8 °C)  Heart Rate:  [65-77] 68  Resp:  [16-18] 18  BP: ()/(55-68) 118/55    Review of systems:  All systems reviewed and negative for weakness, fatigue    Physical Exam                         Constitutional: Awake, alert, no acute distress              Eyes: PERRLA, sclerae anicteric, no conjunctival injection              HENT: NCAT, mucous membranes moist              Neck: Supple, full range of motion              Respiratory: Clear to auscultation bilaterally, nonlabored respirations               Cardiovascular: RRR, no murmurs, rubs, or gallops, palpable pedal pulses bilaterally              Gastrointestinal: No abdominal tenderness, positive bowel sounds, soft, nondistended              Musculoskeletal: No bilateral ankle edema, no clubbing or cyanosis to extremities              Psychiatric: Appropriate affect, cooperative              Neurologic: Oriented x 3, strength symmetric in all extremities, Cranial Nerves grossly intact to confrontation, speech clear              Skin: No rashes          Discharge Details        Discharge Medications        ASK your doctor about these medications        Instructions Start Date   amiodarone 200 MG tablet  Commonly known as: PACERONE   200 mg, Oral, Daily      calcium carbonate 500 MG chewable tablet  Commonly known as: TUMS   2 tablets, Oral, Every 4 Hours PRN      carboxymethylcellulose 0.5 % solution  Commonly known as: REFRESH PLUS   1 drop, Both Eyes, 3 Times Daily PRN       cholecalciferol 25 MCG (1000 UT) tablet  Commonly known as: VITAMIN D3   1,000 Units, Oral, Daily      clotrimazole 1 % cream  Commonly known as: LOTRIMIN   1 Application, Topical, 2 Times Daily      ferrous sulfate 325 (65 FE) MG tablet   325 mg, Oral, Every Evening      HYDROcodone-acetaminophen 5-325 MG per tablet  Commonly known as: NORCO   1 tablet, Oral, Every 4 Hours PRN      insulin detemir 100 UNIT/ML injection  Commonly known as: LEVEMIR  Ask about: Which instructions should I use?   10 Units, Subcutaneous, Daily      Insulin Lispro 100 UNIT/ML injection  Commonly known as: humaLOG   6 Units, Subcutaneous, 4 Times Daily Before Meals & Nightly      isosorbide mononitrate 30 MG 24 hr tablet  Commonly known as: IMDUR   30 mg, Oral, Daily      latanoprost 0.005 % ophthalmic solution  Commonly known as: XALATAN   Administer 1 drop to both eyes 2 (Two) Times a Day.      levothyroxine 200 MCG tablet  Commonly known as: SYNTHROID, LEVOTHROID   Take 1 tablet by mouth once daily      lisinopril 10 MG tablet  Commonly known as: PRINIVIL,ZESTRIL   10 mg, Oral, Daily      loperamide 2 MG capsule  Commonly known as: IMODIUM   2 mg, Oral, As Needed      multivitamin tablet tablet   1 tablet, Oral, Daily      nystatin 109719 UNIT/GM powder  Commonly known as: MYCOSTATIN   1 Application, Topical, 2 Times Daily      ondansetron ODT 4 MG disintegrating tablet  Commonly known as: ZOFRAN-ODT   4 mg, Translingual, Every 8 Hours PRN      pantoprazole 40 MG EC tablet  Commonly known as: PROTONIX   Take 1 tablet by mouth once daily      saccharomyces boulardii 250 MG capsule  Commonly known as: FLORASTOR   250 mg, Oral, 2 Times Daily      sucralfate 1 g tablet  Commonly known as: CARAFATE   Take 1 tablet by mouth 4 times daily               Allergies   Allergen Reactions    Meloxicam Irritability    Nsaids Hives       Discharge Disposition:  Skilled Nursing Facility ([facility])    Diet:  Hospital:  Diet Order    Procedures    Diet: Diabetic; Consistent Carbohydrate; Fluid Consistency: Thin (IDDSI 0)       Discharge Activity: as tolerates      CODE STATUS:  Code Status and Medical Interventions: CPR (Attempt to Resuscitate); Full Support   Ordered at: 10/10/24 2230     Level Of Support Discussed With:    Patient     Code Status (Patient has no pulse and is not breathing):    CPR (Attempt to Resuscitate)     Medical Interventions (Patient has pulse or is breathing):    Full Support         Future Appointments   Date Time Provider Department Center   11/7/2024 11:15 AM Adrien Mayer MD [facility]           Pertinent  and/or Most Recent Results     PROCEDURES:   CT Abdomen Pelvis Without Contrast    Result Date: 10/10/2024  CT ABDOMEN PELVIS WO CONTRAST Date of Exam: 10/10/2024 9:16 PM EDT Indication: Flank pain, kidney stone suspected Abdominal pain flank pain. Comparison: KUB 5/30/2024 Technique: Axial CT images were obtained of the abdomen and pelvis without the administration of contrast. Reconstructed coronal and sagittal images were also obtained. Automated exposure control and iterative construction methods were used. Findings: Reticulation at the pulmonary periphery with septal thickening is consistent with chronic interstitial lung disease. 3 mm noncalcified nodules are seen in the left lower lobe on series 204 image 21 and 20. Follow-up CT scan of the chest is recommended in  6 months. Extensive coronary artery calcifications are evident. The liver is of normal size. Scattered tiny calcifications are consistent with old granulomatous disease, and are of doubtful significance. The gallbladder is not abnormally distended. No pancreatic or adrenal mass is evident. The spleen is of normal size. Vascular calcifications are seen in the kidneys. No ureteral stones are seen. There is no evidence of hydronephrosis. The urinary bladder is not abnormally distended. Post prostatectomy. The stomach is not  abnormally distended. Bowel loops are of normal caliber. The appendix is normal. Moderate diverticulosis of the descending colon and sigmoid colon is evident. Ill-defined increased density in fat posterior to the proximal descending colon on series 201 image 104 may represent acute diverticulitis or scarring from prior episodes of diverticulitis. This could also represent a mural mass of the colon. Degenerative changes are seen in the lower thoracic and lumbar spine.     Impression: CT scan of the abdomen and pelvis without contrast demonstrating tiny noncalcified nodules in the left lower lobe. Follow-up CT scan of the chest is recommended in 6 months. Post prostatectomy. Moderate diverticulosis of the descending colon and sigmoid colon. Ill-defined increased density in fat posterior to the proximal descending colon may represent acute diverticulitis or scarring from prior episodes of diverticulitis. This could also represent an underlying mural abnormality of the colon. Electronically Signed: Armaan Garcia MD  10/10/2024 9:45 PM EDT  Workstation ID: [id number]    XR HIP 2 VIEW WITH PELVIS RIGHT    Result Date: 9/19/2024  This result has an attachment that is not available. Xrays ordered by ABDIEL Manriquez and performed in office/[facility] on 9/19/2024. Images were independently viewed and interpreted by myself, my impression as follows: Findings: expected post operative appearance of right hip ORIF, fracture in anatomic alignment Bony lesion: No Soft tissues: within normal limits Joint spaces: within normal limits Hardware appropriately positioned: yes Prior studies available for comparison: Yes         LAB RESULTS:      Lab 10/14/24  0537 10/13/24  0605 10/12/24  0535 10/11/24  1140 10/11/24  0544 10/11/24  0248 10/10/24  2335 10/10/24  2038   WBC 4.94 5.06 6.99  --   --   --   --  20.03*   HEMOGLOBIN 10.2* 9.6* 9.6*  --   --   --   --  13.6   HEMATOCRIT 32.0* 30.1* 29.8*  --   --   --   --  42.9    PLATELETS 141 130* 126*  --   --   --   --  201   NEUTROS ABS 2.92 3.24 4.85  --   --   --   --  17.59*   IMMATURE GRANS (ABS) 0.02 0.02 0.02  --   --   --   --  0.10*   LYMPHS ABS 1.39 1.28 1.57  --   --   --   --  1.59   MONOS ABS 0.37 0.31 0.42  --   --   --   --  0.72   EOS ABS 0.22 0.18 0.11  --   --   --   --  0.00   MCV 92.8 92.6 92.0  --   --   --   --  91.9   LACTATE  --   --   --  1.7 3.9* 5.7* 3.1* 3.3*         Lab 10/14/24  0537 10/13/24  0605 10/12/24  0535 10/11/24  0544 10/11/24  0248   SODIUM 140 139 138 138 136   POTASSIUM 4.1 3.9 4.1 5.3* 5.1   CHLORIDE 106 106 106 104 102   CO2 25.7 24.7 23.5 21.0* 16.1*   ANION GAP 8.3 8.3 8.5 13.0 17.9*   BUN 17 20 28* 48* 50*   CREATININE 1.19 1.17 1.26 1.74* 1.83*   EGFR 58.4* 59.6* 54.5* 37.0* 34.8*   GLUCOSE 124* 75 85 290* 354*   CALCIUM 9.1 9.1 9.0 9.5 9.3   MAGNESIUM 1.6 1.6 1.6  --   --    PHOSPHORUS 3.4 3.0 2.7  --   --          Lab 10/14/24  0537 10/13/24  0605 10/12/24  0535 10/10/24  2038   TOTAL PROTEIN 5.6* 5.4* 5.4* 7.3   ALBUMIN 2.8* 2.7* 2.8* 3.7   GLOBULIN  --   --   --  3.6   ALT (SGPT) 8 8 12 19   AST (SGOT) 13 12 13 23   BILIRUBIN 0.4 0.5 0.5 0.5   INDIRECT BILIRUBIN 0.3 0.3 0.3  --    BILIRUBIN DIRECT 0.1 0.2 0.2  --    ALK PHOS 105 100 109 175*   LIPASE  --   --   --  40                     Lab 10/11/24  0434   PH, ARTERIAL 7.442   PCO2, ARTERIAL 28.4*   PO2 ART 76.6*   O2 SATURATION ART 96.0   FIO2 21   HCO3 ART 19.3*   BASE EXCESS ART -3.8*     Brief Urine Lab Results  (Last result in the past 365 days)        Color   Clarity   Blood   Leuk Est   Nitrite   Protein   CREAT   Urine HCG        10/14/24 1239 Yellow   Clear   Negative   Negative   Negative   Trace                 Microbiology Results (last 10 days)       Procedure Component Value - Date/Time    COVID PRE-OP / PRE-PROCEDURE SCREENING ORDER (NO ISOLATION) - Swab, Nasopharynx [[id number]]  (Normal) Collected: 10/14/24 1558    Lab Status: Final result Specimen: Swab from  Nasopharynx Updated: 10/14/24 1643    Narrative:      The following orders were created for panel order COVID PRE-OP / PRE-PROCEDURE SCREENING ORDER (NO ISOLATION) - Swab, Nasopharynx.  Procedure                               Abnormality         Status                     ---------                               -----------         ------                     COVID-19, FLU A/B, RSV P...[[id number]]  Normal              Final result                 Please view results for these tests on the individual orders.    COVID-19, FLU A/B, RSV PCR 1 HR TAT - Swab, Nasopharynx [[id number]]  (Normal) Collected: 10/14/24 3246    Lab Status: Final result Specimen: Swab from Nasopharynx Updated: 10/14/24 1643     COVID19 Not Detected     Influenza A PCR Not Detected     Influenza B PCR Not Detected     RSV, PCR Not Detected    Narrative:      Fact sheet for providers: https://www.fda.gov/media/731414/download    Fact sheet for patients: https://www.fda.gov/media/984090/download    Test performed by PCR.    Blood Culture - Blood, Arm, Right [[id number]]  (Normal) Collected: 10/11/24 0544    Lab Status: Preliminary result Specimen: Blood from Arm, Right Updated: 10/15/24 0600     Blood Culture No growth at 4 days    Blood Culture - Blood, Hand, Right [[id number]]  (Normal) Collected: 10/11/24 0544    Lab Status: Preliminary result Specimen: Blood from Hand, Right Updated: 10/15/24 0600     Blood Culture No growth at 4 days            CT Abdomen Pelvis Without Contrast    Result Date: 10/10/2024  Impression: Impression: CT scan of the abdomen and pelvis without contrast demonstrating tiny noncalcified nodules in the left lower lobe. Follow-up CT scan of the chest is recommended in 6 months. Post prostatectomy. Moderate diverticulosis of the descending colon and sigmoid colon. Ill-defined increased density in fat posterior to the proximal descending colon may represent acute diverticulitis or scarring from prior episodes of diverticulitis.  This could also represent an underlying mural abnormality of the colon. Electronically Signed: Armaan Garcia MD  10/10/2024 9:45 PM EDT  Workstation ID: [id number]                 Labs Pending at Discharge:  Pending Labs       Order Current Status    Blood Culture - Blood, Arm, Right Preliminary result    Blood Culture - Blood, Hand, Right Preliminary result              Time spent on Discharge including face to face service:  33 minutes    Electronically signed by Jose Armando Bryson MD, 10/15/24, 10:22 AM EDT.    Portions of this documentation were transcribed electronically from a voice recognition software.  I confirm all data accurately represents the service(s) I performed at today's visit.

## 2024-10-16 LAB
ALBUMIN SERPL-MCNC: 2.9 G/DL (ref 3.5–5.2)
ALP SERPL-CCNC: 102 U/L (ref 39–117)
ALT SERPL W P-5'-P-CCNC: 8 U/L (ref 1–41)
ANION GAP SERPL CALCULATED.3IONS-SCNC: 10 MMOL/L (ref 5–15)
AST SERPL-CCNC: 15 U/L (ref 1–40)
BACTERIA SPEC AEROBE CULT: NORMAL
BACTERIA SPEC AEROBE CULT: NORMAL
BASOPHILS # BLD AUTO: 0.02 10*3/MM3 (ref 0–0.2)
BASOPHILS NFR BLD AUTO: 0.5 % (ref 0–1.5)
BILIRUB CONJ SERPL-MCNC: 0.1 MG/DL (ref 0–0.3)
BILIRUB INDIRECT SERPL-MCNC: 0.3 MG/DL
BILIRUB SERPL-MCNC: 0.4 MG/DL (ref 0–1.2)
BUN SERPL-MCNC: 13 MG/DL (ref 8–23)
BUN/CREAT SERPL: 12.9 (ref 7–25)
CALCIUM SPEC-SCNC: 9.4 MG/DL (ref 8.6–10.5)
CHLORIDE SERPL-SCNC: 106 MMOL/L (ref 98–107)
CO2 SERPL-SCNC: 24 MMOL/L (ref 22–29)
CREAT SERPL-MCNC: 1.01 MG/DL (ref 0.76–1.27)
DEPRECATED RDW RBC AUTO: 44.1 FL (ref 37–54)
EGFRCR SERPLBLD CKD-EPI 2021: 71.1 ML/MIN/1.73
EOSINOPHIL # BLD AUTO: 0.19 10*3/MM3 (ref 0–0.4)
EOSINOPHIL NFR BLD AUTO: 4.3 % (ref 0.3–6.2)
ERYTHROCYTE [DISTWIDTH] IN BLOOD BY AUTOMATED COUNT: 13.1 % (ref 12.3–15.4)
GLUCOSE BLDC GLUCOMTR-MCNC: 109 MG/DL (ref 70–99)
GLUCOSE BLDC GLUCOMTR-MCNC: 198 MG/DL (ref 70–99)
GLUCOSE BLDC GLUCOMTR-MCNC: 199 MG/DL (ref 70–99)
GLUCOSE BLDC GLUCOMTR-MCNC: 83 MG/DL (ref 70–99)
GLUCOSE SERPL-MCNC: 98 MG/DL (ref 65–99)
HCT VFR BLD AUTO: 35.2 % (ref 37.5–51)
HGB BLD-MCNC: 11 G/DL (ref 13–17.7)
IMM GRANULOCYTES # BLD AUTO: 0.04 10*3/MM3 (ref 0–0.05)
IMM GRANULOCYTES NFR BLD AUTO: 0.9 % (ref 0–0.5)
LYMPHOCYTES # BLD AUTO: 1.57 10*3/MM3 (ref 0.7–3.1)
LYMPHOCYTES NFR BLD AUTO: 35.9 % (ref 19.6–45.3)
MAGNESIUM SERPL-MCNC: 1.5 MG/DL (ref 1.6–2.4)
MCH RBC QN AUTO: 28.8 PG (ref 26.6–33)
MCHC RBC AUTO-ENTMCNC: 31.3 G/DL (ref 31.5–35.7)
MCV RBC AUTO: 92.1 FL (ref 79–97)
MONOCYTES # BLD AUTO: 0.47 10*3/MM3 (ref 0.1–0.9)
MONOCYTES NFR BLD AUTO: 10.8 % (ref 5–12)
NEUTROPHILS NFR BLD AUTO: 2.08 10*3/MM3 (ref 1.7–7)
NEUTROPHILS NFR BLD AUTO: 47.6 % (ref 42.7–76)
NRBC BLD AUTO-RTO: 0 /100 WBC (ref 0–0.2)
PHOSPHATE SERPL-MCNC: 3.3 MG/DL (ref 2.5–4.5)
PLATELET # BLD AUTO: 162 10*3/MM3 (ref 140–450)
PMV BLD AUTO: 10.9 FL (ref 6–12)
POTASSIUM SERPL-SCNC: 3.9 MMOL/L (ref 3.5–5.2)
PROT SERPL-MCNC: 5.7 G/DL (ref 6–8.5)
RBC # BLD AUTO: 3.82 10*6/MM3 (ref 4.14–5.8)
SODIUM SERPL-SCNC: 140 MMOL/L (ref 136–145)
WBC NRBC COR # BLD AUTO: 4.37 10*3/MM3 (ref 3.4–10.8)

## 2024-10-16 PROCEDURE — 97535 SELF CARE MNGMENT TRAINING: CPT

## 2024-10-16 PROCEDURE — 97161 PT EVAL LOW COMPLEX 20 MIN: CPT

## 2024-10-16 PROCEDURE — 97166 OT EVAL MOD COMPLEX 45 MIN: CPT

## 2024-10-16 PROCEDURE — 80048 BASIC METABOLIC PNL TOTAL CA: CPT | Performed by: FAMILY MEDICINE

## 2024-10-16 PROCEDURE — 80076 HEPATIC FUNCTION PANEL: CPT | Performed by: FAMILY MEDICINE

## 2024-10-16 PROCEDURE — 97530 THERAPEUTIC ACTIVITIES: CPT

## 2024-10-16 PROCEDURE — 84100 ASSAY OF PHOSPHORUS: CPT | Performed by: FAMILY MEDICINE

## 2024-10-16 PROCEDURE — 97110 THERAPEUTIC EXERCISES: CPT

## 2024-10-16 PROCEDURE — 97116 GAIT TRAINING THERAPY: CPT

## 2024-10-16 PROCEDURE — 82948 REAGENT STRIP/BLOOD GLUCOSE: CPT | Performed by: FAMILY MEDICINE

## 2024-10-16 PROCEDURE — 82948 REAGENT STRIP/BLOOD GLUCOSE: CPT

## 2024-10-16 PROCEDURE — 85025 COMPLETE CBC W/AUTO DIFF WBC: CPT | Performed by: FAMILY MEDICINE

## 2024-10-16 PROCEDURE — 63710000001 INSULIN GLARGINE PER 5 UNITS: Performed by: FAMILY MEDICINE

## 2024-10-16 PROCEDURE — 83735 ASSAY OF MAGNESIUM: CPT | Performed by: FAMILY MEDICINE

## 2024-10-16 PROCEDURE — 63710000001 INSULIN LISPRO (HUMAN) PER 5 UNITS: Performed by: FAMILY MEDICINE

## 2024-10-16 RX ADMIN — INSULIN LISPRO 2 UNITS: 100 INJECTION, SOLUTION INTRAVENOUS; SUBCUTANEOUS at 12:36

## 2024-10-16 RX ADMIN — LEVOTHYROXINE SODIUM 200 MCG: 0.1 TABLET ORAL at 08:43

## 2024-10-16 RX ADMIN — Medication 250 MG: at 20:53

## 2024-10-16 RX ADMIN — LATANOPROST 1 DROP: 50 SOLUTION OPHTHALMIC at 20:59

## 2024-10-16 RX ADMIN — AMIODARONE HYDROCHLORIDE 200 MG: 200 TABLET ORAL at 08:43

## 2024-10-16 RX ADMIN — INSULIN LISPRO 2 UNITS: 100 INJECTION, SOLUTION INTRAVENOUS; SUBCUTANEOUS at 20:53

## 2024-10-16 RX ADMIN — GABAPENTIN 100 MG: 100 CAPSULE ORAL at 20:53

## 2024-10-16 RX ADMIN — MIDODRINE HYDROCHLORIDE 5 MG: 5 TABLET ORAL at 08:43

## 2024-10-16 RX ADMIN — MIDODRINE HYDROCHLORIDE 5 MG: 5 TABLET ORAL at 11:43

## 2024-10-16 RX ADMIN — GABAPENTIN 100 MG: 100 CAPSULE ORAL at 08:43

## 2024-10-16 RX ADMIN — MIDODRINE HYDROCHLORIDE 5 MG: 5 TABLET ORAL at 17:46

## 2024-10-16 RX ADMIN — Medication 250 MG: at 08:43

## 2024-10-16 RX ADMIN — APIXABAN 5 MG: 5 TABLET, FILM COATED ORAL at 20:53

## 2024-10-16 RX ADMIN — INSULIN GLARGINE 15 UNITS: 100 INJECTION, SOLUTION SUBCUTANEOUS at 20:54

## 2024-10-16 RX ADMIN — APIXABAN 5 MG: 5 TABLET, FILM COATED ORAL at 08:43

## 2024-10-16 NOTE — PLAN OF CARE
Goal Outcome Evaluation:              Outcome Evaluation: pt up to chair during shift. worked with therapy. no complaints of pain.

## 2024-10-16 NOTE — SIGNIFICANT NOTE
10/16/24 1358   Rash 10/11/24 0333 scrotum   Placement Date/Time: 10/11/24 0333   Location: scrotum   Wound Image Images linked          Images in this note were compressed because they were too large. The original images are available in the .

## 2024-10-16 NOTE — THERAPY EVALUATION
SNF - Physical Therapy Initial Evaluation and Treatment Note  RAKEL Alcaraz     Patient Name: Darci Munson  : 1935  MRN: [id number]  Today's Date: 10/16/2024      Visit Dx:    ICD-10-CM ICD-9-CM   1. Decreased activities of daily living (ADL)  Z78.9 V49.89   2. Difficulty walking  R26.2 719.7     Patient Active Problem List   Diagnosis    Type 2 diabetes mellitus with hyperglycemia, without long-term current use of insulin    Chronic kidney disease    Hypothyroidism    Hypertensive disorder    Hyperlipidemia    Paroxysmal atrial fibrillation    Dyspepsia    Coronary arteriosclerosis    Gastroparesis    Hearing loss    Mitral valve regurgitation    Peripheral neuropathy    Prostate CA    Primary insomnia    Primary osteoarthritis of left knee    Anticoagulated    Vitamin D insufficiency    Iron deficiency anemia    Medicare annual wellness visit, subsequent    Thickened nails    Pain in toes of both feet    History of prostate cancer    Stage 3b chronic kidney disease    Chronic bilateral low back pain without sciatica    Acute renal failure    Intractable nausea and vomiting    Abdominal pain    Physical debility     Past Medical History:   Diagnosis Date    Anemia, unspecified     Atherosclerotic heart disease of native coronary artery without angina pectoris     CAD (coronary artery disease)     CKD (chronic kidney disease), stage III     Essential (primary) hypertension     Gastric ulcer, unspecified as acute or chronic, without hemorrhage or perforation     Gastroparesis     GERD without esophagitis     Glaucoma     Hereditary and idiopathic neuropathy, unspecified     History of falling     HLD (hyperlipidemia)     HTN (hypertension)     Hypotension, unspecified     Hypothyroidism     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    Irritable bowel syndrome without diarrhea     Laceration without foreign body of right forearm, initial encounter     Low back pain     Malignant neoplasm of prostate     Mixed hyperlipidemia      OA (osteoarthritis)     Other specified disorders of the skin and subcutaneous tissue     Pain in left foot     Peripheral vascular disease, unspecified     Phimosis     Presence of unspecified artificial knee joint     Primary insomnia     Primary osteoarthritis of both knees     Prostate cancer     Sensorineural hearing loss (SNHL) of both ears     Sigmoid diverticulosis     severe sigmoid and descending colon diverticulosis and 3+ gastritis    Sleep related leg cramps     Type 2 diabetes mellitus with diabetic polyneuropathy     and gastroparesis    Unspecified atrial fibrillation     Unspecified dementia without behavioral disturbance     Unspecified hearing loss, bilateral      Past Surgical History:   Procedure Laterality Date    CATARACT EXTRACTION Bilateral 2014    COLONOSCOPY      COLONOSCOPY N/A 7/1/2022    Procedure: COLONOSCOPY WITH POLYPECTOMIES, HOT SNARE;  Surgeon: Jennifer Mann MD;  Location: [facility] ENDOSCOPY;  Service: Gastroenterology;  Laterality: N/A;  DIVERTICULOSIS, COLON POLYPS, HEMORRHOIDS    ENDOSCOPY N/A 7/1/2022    Procedure: ESOPHAGOGASTRODUODENOSCOPY WITH BX, POLYPECTOMY;  Surgeon: Jennifer Mann MD;  Location: [facility] ENDOSCOPY;  Service: Gastroenterology;  Laterality: N/A;  GASTRIC POLYP, GASTRITIS, HIATAL HERNIA    HAND SURGERY Right     JOINT REPLACEMENT      KNEE ARTHROSCOPY Right 03/14/2017    PROSTATECTOMY      REPLACEMENT TOTAL KNEE  03/2017    UPPER GASTROINTESTINAL ENDOSCOPY         PT Assessment (Last 12 Hours)       PT Evaluation and Treatment       Row Name 10/16/24 1300          Physical Therapy Time and Intention    Subjective Information no complaints  -CS     Document Type evaluation;therapy note (daily note)  -CS     Mode of Treatment individual therapy;physical therapy  -CS     Patient Effort adequate  -CS       Row Name 10/16/24 1300          General Information    Patient Profile Reviewed yes  -CS     Patient Observations cooperative;alert;agree to  therapy  -CS     Prior Level of Function independent:;all household mobility;gait;transfer;bed mobility;ADL's  Prior to recent hip fracture and ORIF on 9/5/24, patient was independent with all functional mobility and ADLs.  -CS     Equipment Currently Used at Home walker, rolling  Pt recently using rolling walker post hip fracture and s/p ORIF on 9/5/24. He was having assist with ADLs at subacute rehab. No home O2.  -CS     Existing Precautions/Restrictions fall  -CS     Barriers to Rehab none identified  -CS       Row Name 10/16/24 1300          Living Environment    Current Living Arrangements home  -CS     Home Accessibility stairs to enter home;stairs within home  -CS     People in Home alone  -CS     Primary Care Provided by self  -CS       Row Name 10/16/24 1300          Home Main Entrance    Number of Stairs, Main Entrance three  -CS     Stair Railings, Main Entrance railings safe and in good condition  -CS       Row Name 10/16/24 1300          Stairs Within Home, Primary    Stairs, Within Home, Primary Pt has a flight of stairs to the basement where the laundry is located.  -CS     Stair Railings, Within Home, Primary railings safe and in good condition  -CS       Row Name 10/16/24 1300          Pain    Pretreatment Pain Rating 0/10 - no pain  -CS     Posttreatment Pain Rating 0/10 - no pain  -CS       Row Name 10/16/24 1300          Cognition    Orientation Status (Cognition) oriented x 3  -CS       Row Name 10/16/24 1300          Range of Motion Comprehensive    General Range of Motion bilateral lower extremity ROM WFL  -CS       Row Name 10/16/24 1300          Strength (Manual Muscle Testing)    Strength (Manual Muscle Testing) left lower extremity strength detail;right lower extremity strength detail  -CS     Left Lower Extremity Strength left LE strength is WFL  -CS     Right Lower Extremity Strength hip;knee;ankle  -CS     Hip, Right (Strength) 3+/5  -CS     Knee, Right (Strength) 4/5  -CS     Ankle,  Right (Strength) 4/5  -CS       Row Name 10/16/24 1300          Bed Mobility    Bed Mobility sit-supine  -CS     Sit-Supine [place] (Bed Mobility) verbal cues;contact guard  -CS     Bed Mobility, Safety Issues decreased use of legs for bridging/pushing  -CS     Assistive Device (Bed Mobility) bed rails;head of bed elevated  -       Row Name 10/16/24 1300          Transfers    Transfers sit-stand transfer;stand-sit transfer  -       Row Name 10/16/24 1300          Sit-Stand Transfer    Sit-Stand [place] (Transfers) verbal cues;contact guard  -CS     Assistive Device (Sit-Stand Transfers) walker, front-wheeled  -       Row Name 10/16/24 1300          Stand-Sit Transfer    Stand-Sit [place] (Transfers) verbal cues;contact guard  -CS       Row Name 10/16/24 1300          Gait/Stairs (Locomotion)    Gait/Stairs Locomotion gait/ambulation assistive device  -     [place] Level (Gait) verbal cues;contact guard  -CS     Assistive Device (Gait) walker, front-wheeled  -     Patient was able to Ambulate yes  -CS     Distance in Feet (Gait) 100  x3  -CS     Pattern (Gait) step-through;4-point  -CS     Deviations/Abnormal Patterns (Gait) gait speed decreased;connie decreased  -       Row Name 10/16/24 1300          Safety Issues/Impairments Affecting Functional Mobility    Impairments Affecting Function (Mobility) balance;endurance/activity tolerance;strength;shortness of breath;pain  -       Row Name 10/16/24 1300          Balance    Balance Assessment standing dynamic balance  -     Dynamic Standing Balance verbal cues;contact guard;minimal assist  -CS     Position/Device Used, Standing Balance walker, front-wheeled  -       Row Name 10/16/24 1300          Motor Skills    Therapeutic Exercise hip;knee;ankle;aerobic  -       Row Name 10/16/24 1300          Hip (Therapeutic Exercise)    Hip (Therapeutic Exercise) AROM (active range of motion)  -     Hip AROM (Therapeutic Exercise)  bilateral;flexion;extension;aBduction;aDduction;10 repetitions  -CS       Row Name 10/16/24 1300          Knee (Therapeutic Exercise)    Knee (Therapeutic Exercise) AROM (active range of motion)  -CS     Knee AROM (Therapeutic Exercise) bilateral;LAQ (long arc quad);10 repetitions  -CS       Row Name 10/16/24 1300          Ankle (Therapeutic Exercise)    Ankle (Therapeutic Exercise) AROM (active range of motion)  -CS     Ankle AROM (Therapeutic Exercise) bilateral;dorsiflexion;plantarflexion;10 repetitions  -CS       Row Name 10/16/24 1300          Aerobic Exercise    Type (Aerobic Exercise) other (see comments)  NuStep  -CS     Time Performed (Aerobic Exercise) x16 minutes at level 3  -CS       Row Name             Wound 10/11/24 0332 gluteal    Wound - Properties Group Placement Date: 10/11/24  -YUMIKO Placement Time: 0332  -YUMIKO Location: gluteal  -YUMIKO    Retired Wound - Properties Group Placement Date: 10/11/24  -YUMIKO Placement Time: 0332  -YUMIKO Location: gluteal  -YUMIKO    Retired Wound - Properties Group Placement Date: 10/11/24  -YUMIKO Placement Time: 0332  -YUMIKO Location: gluteal  -YUMIKO    Retired Wound - Properties Group Date first assessed: 10/11/24  -YUMIKO Time first assessed: 0332  -YUMIKO Location: gluteal  -YUMIKO      Row Name 10/16/24 1300          Plan of Care Review    Plan of Care Reviewed With patient  -CS     Progress no change  -     Outcome Evaluation Pt presents with limitations that impede their ability to safely and independently transfer and ambulate. The skills of a therapist will be required to safely and effectively implement the following treatment plan to restore maximal level of function.  -CS       Row Name 10/16/24 1300          Positioning and Restraints    Pre-Treatment Position sitting in chair/recliner  -CS     Post Treatment Position bed  -CS     In Bed supine;call light within reach;encouraged to call for assist;exit alarm on  -CS       Row Name 10/16/24 1300          Therapy Assessment/Plan (PT)    Rehab  Potential (PT) good  -CS     Criteria for Skilled Interventions Met (PT) yes;skilled treatment is necessary  -CS     Therapy Frequency (PT) 6 times/wk  -CS     Predicted Duration of Therapy Intervention (PT) 30 days  -CS     Problem List (PT) problems related to;balance;mobility;strength;pain  -CS     Activity Limitations Related to Problem List (PT) unable to ambulate safely;unable to transfer safely  -CS       Row Name 10/16/24 1300          PT Evaluation Complexity    History, PT Evaluation Complexity 1-2 personal factors and/or comorbidities  -CS     Examination of Body Systems (PT Eval Complexity) total of 4 or more elements  -CS     Clinical Presentation (PT Evaluation Complexity) stable  -CS     Clinical Decision Making (PT Evaluation Complexity) low complexity  -CS     Overall Complexity (PT Evaluation Complexity) low complexity  -CS       Row Name 10/16/24 1300          Therapy Plan Review/Discharge Plan (PT)    Therapy Plan Review (PT) evaluation/treatment results reviewed;patient  -CS       Row Name 10/16/24 1300          Physical Therapy Goals    Bed Mobility Goal Selection (PT) bed mobility, PT goal 1  -CS     Transfer Goal Selection (PT) transfer, PT goal 1  -CS     Gait Training Goal Selection (PT) gait training, PT goal 1;gait training, PT goal 2  -CS     Stairs Goal Selection (PT) stairs, PT goal 1  -CS       Row Name 10/16/24 1300          Bed Mobility Goal 1 (PT)    Activity/Assistive Device (Bed Mobility Goal 1, PT) bed mobility activities, all  -CS     [place] Level/Cues Needed (Bed Mobility Goal 1, PT) modified independence  -CS     Time Frame (Bed Mobility Goal 1, PT) long term goal (LTG);30 days  -CS       Row Name 10/16/24 1300          Transfer Goal 1 (PT)    Activity/Assistive Device (Transfer Goal 1, PT) sit-to-stand/stand-to-sit;bed-to-chair/chair-to-bed;walker, rolling  -CS     [place] Level/Cues Needed (Transfer Goal 1, PT) modified independence  -CS     Time Frame (Transfer  Goal 1, PT) long term goal (LTG);30 days  -CS       Row Name 10/16/24 1300          Gait Training Goal 1 (PT)    Activity/Assistive Device (Gait Training Goal 1, PT) gait (walking locomotion);assistive device use;walker, rolling  -CS     [place] Level (Gait Training Goal 1, PT) modified independence  -CS     Distance (Gait Training Goal 1, PT) 50  -CS     Time Frame (Gait Training Goal 1, PT) long term goal (LTG);30 days  -CS       Row Name 10/16/24 1300          Gait Training Goal 2 (PT)    Activity/Assistive Device (Gait Training Goal 2, PT) gait (walking locomotion);assistive device use;walker, rolling  -CS     [place] Level (Gait Training Goal 2, PT) supervision required  -CS     Distance (Gait Training Goal 2, PT) 600  -CS     Time Frame (Gait Training Goal 2, PT) long term goal (LTG);30 days  -CS       Row Name 10/16/24 1300          Stairs Goal 1 (PT)    Activity/Assistive Device (Stairs Goal 1, PT) ascending stairs;descending stairs;using handrail, left;using handrail, right  -CS     [place] Level/Cues Needed (Stairs Goal 1, PT) supervision required  -CS     Number of Stairs (Stairs Goal 1, PT) 6  -CS     Time Frame (Stairs Goal 1, PT) long term goal (LTG);30 days  -CS               User Key  (r) = Recorded By, (t) = Taken By, (c) = Cosigned By      Initials Name Provider Type    Jimbo Islas RN Registered Nurse    Josefina Correa, PT Physical Therapist                  Section G              Section GG  Functional Ability/Goals, Adm (Section GG)  Indoor Mobility - Ambulation, Prior Function (GT7935U): 3. Independent  Stairs, Prior Function (PN2589U): 3. Independent  Prior Device Use (IQ5780): none of the above (Z)  Lower Extremity Range of Motion (IA2147W): Impairment on one side     Mobility, Admission Performance (ST9493)  Roll Left & Right: Mobility Admission Performance (ED4729F7): supervision or touching assistance (04)  Sit to Lying: Mobility Admission Performance (CC5738H4):  supervision or touching assistance (04)  Lying to Sitting, Side of Bed: Mobility Admission Performance (QE9277G7): supervision or touching assistance (04)  Sit to Stand: Mobility Admission Performance (RP2589A9): supervision or touching assistance (04)  Chair/Bed-Chair Transfer: Mobility Admission Performance (SP7822X3): supervision or touching assistance (04)  Car Transfer: Mobility Admission Performance (AZ3462I6): not attempted due to environmental limitations (10)  Walk 10 Feet: Mobility Admission Performance (XR0283R8): supervision or touching assistance (04)  Walk 50 Feet With Two Turns: Mobility Admission Performance (SA5307A4): supervision or touching assistance ()  Walk 150 Feet: Mobility Admission Performance (MI7197A3): not attempted, medical condition/safety concern (88)  Walk 10 Ft, Uneven Surfaces: Mobility Admission Performance (SN5474R1): not applicable (09)  1 Step/Curb: Mobility Admission Performance (BS1302G4): not attempted, medical condition/safety concern ()  4 Steps: Mobility Admission Performance (FM2168Y3): not attempted, medical condition/safety concern ()  12 Steps: Mobility Admission Performance (CS9290U0): not applicable (09)  Picking up object: Mobility Admission Performance (SQ5657O3): not attempted, medical condition/safety concern ()  Use a Wheelchair and/or Scooter: Mobility (LI4929T2): no (0)     RETIRED Mobility (GG), Discharge Goal (NK1831)  Use a Wheelchair and/or Scooter: Mobility (ET4016K3): no (0)     Mobility, Discharge Performance (VQ7336)  Use a Wheelchair and/or Scooter: Mobility (DC1378R9): no (0)  Physical Therapy Education       Title: PT OT SLP Therapies (In Progress)       Topic: Physical Therapy (Not Started)       Point: Mobility training (Not Started)       Learner Progress:  Not documented in this visit.              Point: Home exercise program (Not Started)       Learner Progress:  Not documented in this visit.              Point: Body mechanics (Not  Started)       Learner Progress:  Not documented in this visit.              Point: Precautions (Not Started)       Learner Progress:  Not documented in this visit.                                  PT Recommendation and Plan  Anticipated Discharge Disposition (PT): home with home health, home with assist  Planned Therapy Interventions (PT): balance training, bed mobility training, gait training, strengthening, transfer training  Therapy Frequency (PT): 6 times/wk  Plan of Care Reviewed With: patient  Progress: no change  Outcome Evaluation: Pt presents with limitations that impede their ability to safely and independently transfer and ambulate. The skills of a therapist will be required to safely and effectively implement the following treatment plan to restore maximal level of function.   Outcome Measures       Row Name 10/16/24 1300             How much help from another person do you currently need...    Turning from your back to your side while in flat bed without using bedrails? 2  -CS      Moving from lying on back to sitting on the side of a flat bed without bedrails? 2  -CS      Moving to and from a bed to a chair (including a wheelchair)? 3  -CS      Standing up from a chair using your arms (e.g., wheelchair, bedside chair)? 3  -CS      Climbing 3-5 steps with a railing? 2  -CS      To walk in hospital room? 3  -CS      AM-PAC 6 Clicks Score (PT) 15  -CS         Functional Assessment    Outcome Measure Options AM-PAC 6 Clicks Basic Mobility (PT)  -CS                User Key  (r) = Recorded By, (t) = Taken By, (c) = Cosigned By      Initials Name Provider Type    Josefina Correa, PT Physical Therapist                     Time Calculation:    PT Charges       Row Name 10/16/24 1305             Time Calculation    PT Received On 10/16/24  -      PT Goal Re-Cert Due Date 11/14/24  -         Timed Charges    90386 - PT Therapeutic Exercise Minutes 24  -CS      26956 - Gait Training Minutes  13  -CS       97211 - PT Therapeutic Activity Minutes 5  -CS         Untimed Charges    PT Eval/Re-eval Minutes 35  -CS         SNF Physical Therapy Minutes    Skilled Minutes- PT 42 min  -CS         Total Minutes    Timed Charges Total Minutes 42  -CS      Untimed Charges Total Minutes 35  -CS       Total Minutes 77  -CS                User Key  (r) = Recorded By, (t) = Taken By, (c) = Cosigned By      Initials Name Provider Type    CS Josefina Ramires, PT Physical Therapist                  Therapy Charges for Today       Code Description Service Date Service Provider Modifiers Qty    43587310178 HC GAIT TRAINING EA 15 MIN 10/16/2024 Josefina Ramires, PT GP 1    61024248548 HC PT THER PROC EA 15 MIN 10/16/2024 Josefina Ramires, PT GP 1    98956513630 HC PT EVAL LOW COMPLEXITY 3 10/16/2024 Josefina Ramires, PT GP 1    66811870841 HC PT THER PROC EA 15 MIN 10/16/2024 Josefina Ramirse, PT GP 1            PT G-Codes  Outcome Measure Options: AM-PAC 6 Clicks Basic Mobility (PT)  AM-PAC 6 Clicks Score (PT): 15  AM-PAC 6 Clicks Score (OT): 21    Josefina Ramires PT  10/16/2024

## 2024-10-16 NOTE — THERAPY EVALUATION
SNF - Occupational Therapy Initial Evaluation and Treatment Note  RAKEL Alcaraz    Patient Name: Darci Munson  : 1935    MRN: [id number]                              Today's Date: 10/16/2024       Admit Date: 10/15/2024    Visit Dx:     ICD-10-CM ICD-9-CM   1. Decreased activities of daily living (ADL)  Z78.9 V49.89     Patient Active Problem List   Diagnosis    Type 2 diabetes mellitus with hyperglycemia, without long-term current use of insulin    Chronic kidney disease    Hypothyroidism    Hypertensive disorder    Hyperlipidemia    Paroxysmal atrial fibrillation    Dyspepsia    Coronary arteriosclerosis    Gastroparesis    Hearing loss    Mitral valve regurgitation    Peripheral neuropathy    Prostate CA    Primary insomnia    Primary osteoarthritis of left knee    Anticoagulated    Vitamin D insufficiency    Iron deficiency anemia    Medicare annual wellness visit, subsequent    Thickened nails    Pain in toes of both feet    History of prostate cancer    Stage 3b chronic kidney disease    Chronic bilateral low back pain without sciatica    Acute renal failure    Intractable nausea and vomiting    Abdominal pain    Physical debility     Past Medical History:   Diagnosis Date    Anemia, unspecified     Atherosclerotic heart disease of native coronary artery without angina pectoris     CAD (coronary artery disease)     CKD (chronic kidney disease), stage III     Essential (primary) hypertension     Gastric ulcer, unspecified as acute or chronic, without hemorrhage or perforation     Gastroparesis     GERD without esophagitis     Glaucoma     Hereditary and idiopathic neuropathy, unspecified     History of falling     HLD (hyperlipidemia)     HTN (hypertension)     Hypotension, unspecified     Hypothyroidism     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    Irritable bowel syndrome without diarrhea     Laceration without foreign body of right forearm, initial encounter     Low back pain     Malignant neoplasm of prostate      Mixed hyperlipidemia     OA (osteoarthritis)     Other specified disorders of the skin and subcutaneous tissue     Pain in left foot     Peripheral vascular disease, unspecified     Phimosis     Presence of unspecified artificial knee joint     Primary insomnia     Primary osteoarthritis of both knees     Prostate cancer     Sensorineural hearing loss (SNHL) of both ears     Sigmoid diverticulosis     severe sigmoid and descending colon diverticulosis and 3+ gastritis    Sleep related leg cramps     Type 2 diabetes mellitus with diabetic polyneuropathy     and gastroparesis    Unspecified atrial fibrillation     Unspecified dementia without behavioral disturbance     Unspecified hearing loss, bilateral      Past Surgical History:   Procedure Laterality Date    CATARACT EXTRACTION Bilateral 2014    COLONOSCOPY      COLONOSCOPY N/A 7/1/2022    Procedure: COLONOSCOPY WITH POLYPECTOMIES, HOT SNARE;  Surgeon: Jennifer Mann MD;  Location: [facility] ENDOSCOPY;  Service: Gastroenterology;  Laterality: N/A;  DIVERTICULOSIS, COLON POLYPS, HEMORRHOIDS    ENDOSCOPY N/A 7/1/2022    Procedure: ESOPHAGOGASTRODUODENOSCOPY WITH BX, POLYPECTOMY;  Surgeon: Jennifer Mann MD;  Location: [facility] ENDOSCOPY;  Service: Gastroenterology;  Laterality: N/A;  GASTRIC POLYP, GASTRITIS, HIATAL HERNIA    HAND SURGERY Right     JOINT REPLACEMENT      KNEE ARTHROSCOPY Right 03/14/2017    PROSTATECTOMY      REPLACEMENT TOTAL KNEE  03/2017    UPPER GASTROINTESTINAL ENDOSCOPY        General Information       Row Name 10/16/24 0927 10/16/24 0912       OT Time and Intention    Document Type therapy note (daily note)  -GC evaluation  -GC    Mode of Treatment individual therapy;occupational therapy  -GC individual therapy;occupational therapy  -GC    Patient Effort -- excellent  -GC      Row Name 10/16/24 0927 10/16/24 0912       General Information    Patient Profile Reviewed -- yes  -GC    Prior Level of Function -- independent:;ADL's;all  household mobility  Patient was independent  with adls and mobility prior to recent hip fx.  -    Existing Precautions/Restrictions fall  - fall  -    Barriers to Rehab -- none identified  -      Row Name 10/16/24 0912          Occupational Profile    Reason for Services/Referral (Occupational Profile) Pt. is an 89 y.o WM re admitted from SNF for the above diagnosis with note recent R hip fx s/p ORIF. OT referral received to evaluate adls needes and rehab to home.  -       Row Name 10/16/24 0912          Living Environment    People in Home alone  -       Row Name 10/16/24 0912          Cognition    Orientation Status (Cognition) oriented x 3;place;situation;person  -       Row Name 10/16/24 0927 10/16/24 0912       Safety Issues/Impairments Affecting Functional Mobility    Impairments Affecting Function (Mobility) balance;endurance/activity tolerance;strength;shortness of breath;pain  - balance;endurance/activity tolerance;strength;shortness of breath;pain  -              User Key  (r) = Recorded By, (t) = Taken By, (c) = Cosigned By      Initials Name Provider Type     Ruth Islas, LOUIE Occupational Therapist                     Mobility/ADL's       Row Name 10/16/24 0927 10/16/24 0917       Transfers    Transfers bed-chair transfer;sit-stand transfer;stand-sit transfer;toilet transfer  - bed-chair transfer;sit-stand transfer;stand-sit transfer;toilet transfer  -      Row Name 10/16/24 0927 10/16/24 0917       Bed-Chair Transfer    Bed-Chair [place] (Transfers) contact guard;verbal cues  - contact guard;verbal cues  -    Assistive Device (Bed-Chair Transfers) walker, front-wheeled  - walker, front-wheeled  -      Row Name 10/16/24 0927 10/16/24 0917       Sit-Stand Transfer    Sit-Stand [place] (Transfers) contact guard  - contact guard  -    Assistive Device (Sit-Stand Transfers) walker, front-wheeled  -GC walker, front-wheeled  -      Row Name 10/16/24 0927           Stand-Sit Transfer    Stand-Sit [place] (Transfers) contact guard  -     Assistive Device (Stand-Sit Transfers) walker, front-wheeled  -GC       Row Name 10/16/24 0927 10/16/24 0917       Toilet Transfer    [place] Level (Toilet Transfer) contact guard  - contact guard  -GC    Assistive Device (Toilet Transfer) raised toilet seat  - raised toilet seat  -      Row Name 10/16/24 0927 10/16/24 0917       Functional Mobility    Functional Mobility- Ind. Level contact guard assist  - contact guard assist  -    Functional Mobility- Device walker, front-wheeled  -GC --    Functional Mobility- Comment -- Pt. ambulated to/from shower and BR with CGA using RW  -      Row Name 10/16/24 0927 10/16/24 0917       Activities of Daily Living    BADL Assessment/Intervention bathing;upper body dressing;lower body dressing;grooming;toileting  - bathing;upper body dressing;lower body dressing;grooming;toileting  -      Row Name 10/16/24 0927 10/16/24 0917       Bathing Assessment/Intervention    [place] Level (Bathing) bathing skills;minimum assist (75% patient effort);contact guard assist  - bathing skills;minimum assist (75% patient effort);contact guard assist  -    Assistive Devices (Bathing) grab bar, tub/shower;hand-held shower spray hose;tub bench  - grab bar, tub/shower;hand-held shower spray hose;tub bench  -      Row Name 10/16/24 0927 10/16/24 0917       Upper Body Dressing Assessment/Training    [place] Level (Upper Body Dressing) upper body dressing skills;set up  - upper body dressing skills;set up  -      Row Name 10/16/24 0927 10/16/24 0917       Lower Body Dressing Assessment/Training    [place] Level (Lower Body Dressing) lower body dressing skills;minimum assist (75% patient effort)  - lower body dressing skills;minimum assist (75% patient effort)  -      Row Name 10/16/24 0927 10/16/24 0917       Toileting Assessment/Training    [place] Level  (Toileting) toileting skills;minimum assist (75% patient effort);contact guard assist  - toileting skills;minimum assist (75% patient effort);contact guard assist  -      Row Name 10/16/24 0927 10/16/24 0917       Grooming Assessment/Training    [place] Level (Grooming) grooming skills;set up  - grooming skills;set up  -GC              User Key  (r) = Recorded By, (t) = Taken By, (c) = Cosigned By      Initials Name Provider Type     Ruth Islas OT Occupational Therapist                   Obj/Interventions       Row Name 10/16/24 0919          Sensory Assessment (Somatosensory)    Sensory Assessment (Somatosensory) sensation intact  -     Sensory Assessment Grossly assessed  -[facility] Name 10/16/24 0919          Vision Assessment/Intervention    Visual Impairment/Limitations WFL  -[facility] Name 10/16/24 0919          Range of Motion Comprehensive    General Range of Motion no range of motion deficits identified  -[facility] Name 10/16/24 0928 10/16/24 0919       Motor Skills    Motor Skills -- coordination;functional endurance  -    Coordination -- WFL  -    Functional Endurance Omnicycle x12 minutes  - fair minus endurance  -    Therapeutic Exercise aerobic  - --              User Key  (r) = Recorded By, (t) = Taken By, (c) = Cosigned By      Initials Name Provider Type     Ruth Islas OT Occupational Therapist                   Goals/Plan       [facility] Name 10/16/24 0923          Transfer Goal 1 (OT)    Activity/Assistive Device (Transfer Goal 1, OT) transfers, all  -GC     [place] Level/Cues Needed (Transfer Goal 1, OT) modified independence  -GC     Time Frame (Transfer Goal 1, OT) long term goal (LTG);30 days  -       Row Name 10/16/24 0923          Bathing Goal 1 (OT)    Activity/Device (Bathing Goal 1, OT) bathing skills, all  -     [place] Level/Cues Needed (Bathing Goal 1, OT) modified independence  -GC     Time Frame (Bathing Goal 1, OT) long term goal  (LTG);30 days  -GC       Row Name 10/16/24 0923          Dressing Goal 1 (OT)    Activity/Device (Dressing Goal 1, OT) dressing skills, all  -GC     [place]/Cues Needed (Dressing Goal 1, OT) modified independence  -GC     Time Frame (Dressing Goal 1, OT) long term goal (LTG);30 days  -GC       Row Name 10/16/24 0923          Toileting Goal 1 (OT)    Activity/Device (Toileting Goal 1, OT) toileting skills, all  -GC     [place] Level/Cues Needed (Toileting Goal 1, OT) modified independence  -GC     Time Frame (Toileting Goal 1, OT) long term goal (LTG);30 days  -GC       Row Name 10/16/24 0923          Grooming Goal 1 (OT)    Activity/Device (Grooming Goal 1, OT) grooming skills, all  -GC     [place] (Grooming Goal 1, OT) modified independence  -GC     Time Frame (Grooming Goal 1, OT) long term goal (LTG);30 days  -GC       Row Name 10/16/24 0923          Problem Specific Goal 1 (OT)    Problem Specific Goal 1 (OT) Patient will improve activity tolerance to fair plus to support independence and engagement with ADL activities  -GC     Time Frame (Problem Specific Goal 1, OT) long term goal (LTG);30 days  -GC       Row Name 10/16/24 0923          Therapy Assessment/Plan (OT)    Planned Therapy Interventions (OT) activity tolerance training;adaptive equipment training;BADL retraining;functional balance retraining;occupation/activity based interventions;patient/caregiver education/training;ROM/therapeutic exercise;strengthening exercise;transfer/mobility retraining  -               User Key  (r) = Recorded By, (t) = Taken By, (c) = Cosigned By      Initials Name Provider Type    GC Ruth Islas OT Occupational Therapist                   Clinical Impression       Row Name 10/16/24 0922          Plan of Care Review    Plan of Care Reviewed With patient  -     Progress no change  -     Outcome Evaluation OT evaluation completed and POC established.  Performance barriers include: balance, endurance,  strength, safety and GW.  Continued skilled OT services required to maximize independence in adls to return home to [facility]  -       Row Name 10/16/24 0922          Therapy Assessment/Plan (OT)    Criteria for Skilled Therapeutic Interventions Met (OT) yes;meets criteria;skilled treatment is necessary  -     Therapy Frequency (OT) 5 times/wk  -     Predicted Duration of Therapy Intervention (OT) x30  -GC       Row Name 10/16/24 0922          Therapy Plan Review/Discharge Plan (OT)    Anticipated Discharge Disposition (OT) home with home health  -               User Key  (r) = Recorded By, (t) = Taken By, (c) = Cosigned By      Initials Name Provider Type    GC Ruth Islas OT Occupational Therapist                   Outcome Measures       Row Name 10/16/24 0924          How much help from another is currently needed...    Putting on and taking off regular lower body clothing? 3  -GC     Bathing (including washing, rinsing, and drying) 3  -GC     Toileting (which includes using toilet bed pan or urinal) 3  -GC     Putting on and taking off regular upper body clothing 4  -GC     Taking care of personal grooming (such as brushing teeth) 4  -GC     Eating meals 4  -GC     AM-PAC 6 Clicks Score (OT) 21  -GC       Row Name 10/16/24 0204          How much help from another person do you currently need...    Turning from your back to your side while in flat bed without using bedrails? 4  -TM     Moving from lying on back to sitting on the side of a flat bed without bedrails? 4  -TM     Moving to and from a bed to a chair (including a wheelchair)? 3  -TM     Standing up from a chair using your arms (e.g., wheelchair, bedside chair)? 3  -TM     Climbing 3-5 steps with a railing? 3  -TM     To walk in hospital room? 3  -TM     AM-PAC 6 Clicks Score (PT) 20  -TM       Row Name 10/16/24 0924          Functional Assessment    Outcome Measure Options AM-PAC 6 Clicks Daily Activity (OT);Optimal Instrument  -       Row Name  10/16/24 0924          Optimal Instrument    Optimal Instrument Optimal - 3  -GC     Bending/Stooping 2  -GC     Standing 2  -GC     Reaching 1  -               User Key  (r) = Recorded By, (t) = Taken By, (c) = Cosigned By      Initials Name Provider Type    TM Sherie Craft, RN Registered Nurse     Ruth Islas OT Occupational Therapist                  Section G              Section GG                         Occupational Therapy Education       Title: PT OT SLP Therapies (In Progress)       Topic: Occupational Therapy (In Progress)       Point: ADL training (Done)       Description:   Instruct learner(s) on proper safety adaptation and remediation techniques during self care or transfers.   Instruct in proper use of assistive devices.                  Learning Progress Summary            Patient Acceptance, E, VU,NR by  at 10/16/2024 0925                      Point: Home exercise program (Not Started)       Description:   Instruct learner(s) on appropriate technique for monitoring, assisting and/or progressing therapeutic exercises/activities.                  Learner Progress:  Not documented in this visit.              Point: Precautions (Done)       Description:   Instruct learner(s) on prescribed precautions during self-care and functional transfers.                  Learning Progress Summary            Patient Acceptance, E, VU,NR by  at 10/16/2024 0925                      Point: Body mechanics (Done)       Description:   Instruct learner(s) on proper positioning and spine alignment during self-care, functional mobility activities and/or exercises.                  Learning Progress Summary            Patient Acceptance, E, VU,NR by  at 10/16/2024 0925                                      User Key       Initials Effective Dates Name Provider Type Discipline     06/16/21 -  Ruth Islas OT Occupational Therapist OT                  OT Recommendation and Plan  Planned Therapy Interventions  (OT): activity tolerance training, adaptive equipment training, BADL retraining, functional balance retraining, occupation/activity based interventions, patient/caregiver education/training, ROM/therapeutic exercise, strengthening exercise, transfer/mobility retraining  Therapy Frequency (OT): 5 times/wk  Plan of Care Review  Plan of Care Reviewed With: patient  Progress: no change  Outcome Evaluation: OT evaluation completed and POC established.  Performance barriers include: balance, endurance, strength, safety and GW.  Continued skilled OT services required to maximize independence in adls to return home to [facility]     Time Calculation:   Evaluation Complexity (OT)  Review Occupational Profile/Medical/Therapy History Complexity: expanded/moderate complexity  Assessment, Occupational Performance/Identification of Deficit Complexity: 3-5 performance deficits  Clinical Decision Making Complexity (OT): detailed assessment/moderate complexity  Overall Complexity of Evaluation (OT): moderate complexity     Time Calculation- OT       Row Name 10/16/24 0929             Time Calculation- OT    OT Received On 10/16/24  -GC      OT Goal Re-Cert Due Date 11/15/24  -GC         Timed Charges    83326 - OT Therapeutic Exercise Minutes 12  -GC      95909 - OT Therapeutic Activity Minutes 13  -GC      59800 - OT Self Care/Mgmt Minutes 30  -GC         Untimed Charges    OT Eval/Re-eval Minutes 36  -GC         SNF Occupational Therapy Minutes    Skilled Minutes- OT 55 min  -GC         Total Minutes    Timed Charges Total Minutes 55  -GC      Untimed Charges Total Minutes 36  -GC       Total Minutes 91  -GC                User Key  (r) = Recorded By, (t) = Taken By, (c) = Cosigned By      Initials Name Provider Type     Ruth Islas, OT Occupational Therapist                  Therapy Charges for Today       Code Description Service Date Service Provider Modifiers Qty    51779395033  OT THER PROC EA 15 MIN 10/16/2024 Ruth Islas,  OT GO 1    16664284394 HC OT THERAPEUTIC ACT EA 15 MIN 10/16/2024 Ruth Islas OT GO 1    05315883777 HC OT SELF CARE/MGMT/TRAIN EA 15 MIN 10/16/2024 Ruth Islas OT GO 2    17976394592 HC OT EVAL MOD COMPLEXITY 4 10/16/2024 Ruth Islas OT GO 1                 Ruth Islas OT  10/16/2024

## 2024-10-16 NOTE — PLAN OF CARE
Goal Outcome Evaluation:  Plan of Care Reviewed With: patient        Progress: no change  Outcome Evaluation: Pt presents with limitations that impede their ability to safely and independently transfer and ambulate. The skills of a therapist will be required to safely and effectively implement the following treatment plan to restore maximal level of function.    Anticipated Discharge Disposition (PT): home with home health, home with assist

## 2024-10-16 NOTE — CONSULTS
The Pt asks for prayer for his life. We talk about his life. We pray toogether. He has a very good attitude.

## 2024-10-16 NOTE — PLAN OF CARE
Goal Outcome Evaluation:  Plan of Care Reviewed With: patient        Progress: no change  Outcome Evaluation: Alert and oriented; [place]. Able to make needs known to staff. Transfers to BR x1 person assist using gait belt and walker. Turned Q 2hrs. No complaints of pain verbalized. Blood sugar 187; covered with insulin per MAR. Rested well between care. Call light within reach; care plan ongoing.

## 2024-10-16 NOTE — PLAN OF CARE
Goal Outcome Evaluation:  Plan of Care Reviewed With: patient        Progress: no change  Outcome Evaluation: OT evaluation completed and POC established.  Performance barriers include: balance, endurance, strength, safety and GW.  Continued skilled OT services required to maximize independence in adls to return home to [facility]    Anticipated Discharge Disposition (OT): home with home health

## 2024-10-16 NOTE — SIGNIFICANT NOTE
10/16/24 1400   Wound 10/11/24 0332 gluteal   Placement Date/Time: 10/11/24 0332   Location: gluteal   Wound Image Images linked          Images in this note were compressed because they were too large. The original images are available in the .

## 2024-10-16 NOTE — SIGNIFICANT NOTE
10/16/24 1430   Coping/Psychosocial   Observed Emotional State calm;cooperative   Verbalized Emotional State hopefulness   Trust Relationship/Rapport empathic listening provided   Involvement in Care interacting with patient   Additional Documentation Spiritual Care (Group)   Spiritual Care   Use of Spiritual Resources prayer;spirituality for coping, indicated strong use of   Spiritual Care Source patient request (describe)   Spiritual Care Follow-Up follow-up, none required as presently assessed   Response to Spiritual Care receptive of support;engaged in conversation;thanks expressed   Spiritual Care Interventions supportive conversation provided;prayer support provided   Spiritual Care Visit Type other (see comments)  (Consult)   Spiritual Care Request prayer support   Receptivity to Spiritual Care visit welcomed

## 2024-10-17 LAB
GLUCOSE BLDC GLUCOMTR-MCNC: 115 MG/DL (ref 70–99)
GLUCOSE BLDC GLUCOMTR-MCNC: 127 MG/DL (ref 70–99)
GLUCOSE BLDC GLUCOMTR-MCNC: 136 MG/DL (ref 70–99)
GLUCOSE BLDC GLUCOMTR-MCNC: 93 MG/DL (ref 70–99)
INDURATION: NORMAL
SARS-COV-2 RNA RESP QL NAA+PROBE: NOT DETECTED
TB SKIN TEST: NEGATIVE

## 2024-10-17 PROCEDURE — 63710000001 INSULIN GLARGINE PER 5 UNITS: Performed by: FAMILY MEDICINE

## 2024-10-17 PROCEDURE — 97535 SELF CARE MNGMENT TRAINING: CPT

## 2024-10-17 PROCEDURE — 97110 THERAPEUTIC EXERCISES: CPT

## 2024-10-17 PROCEDURE — 82948 REAGENT STRIP/BLOOD GLUCOSE: CPT | Performed by: FAMILY MEDICINE

## 2024-10-17 PROCEDURE — 82948 REAGENT STRIP/BLOOD GLUCOSE: CPT

## 2024-10-17 PROCEDURE — 97530 THERAPEUTIC ACTIVITIES: CPT

## 2024-10-17 PROCEDURE — 87635 SARS-COV-2 COVID-19 AMP PRB: CPT | Performed by: INTERNAL MEDICINE

## 2024-10-17 PROCEDURE — 97116 GAIT TRAINING THERAPY: CPT

## 2024-10-17 RX ADMIN — AMIODARONE HYDROCHLORIDE 200 MG: 200 TABLET ORAL at 08:33

## 2024-10-17 RX ADMIN — Medication 250 MG: at 21:37

## 2024-10-17 RX ADMIN — MIDODRINE HYDROCHLORIDE 5 MG: 5 TABLET ORAL at 12:27

## 2024-10-17 RX ADMIN — LEVOTHYROXINE SODIUM 200 MCG: 0.1 TABLET ORAL at 08:34

## 2024-10-17 RX ADMIN — INSULIN GLARGINE 15 UNITS: 100 INJECTION, SOLUTION SUBCUTANEOUS at 21:37

## 2024-10-17 RX ADMIN — APIXABAN 5 MG: 5 TABLET, FILM COATED ORAL at 21:37

## 2024-10-17 RX ADMIN — GABAPENTIN 100 MG: 100 CAPSULE ORAL at 21:37

## 2024-10-17 RX ADMIN — Medication 250 MG: at 08:33

## 2024-10-17 RX ADMIN — GABAPENTIN 100 MG: 100 CAPSULE ORAL at 08:33

## 2024-10-17 RX ADMIN — APIXABAN 5 MG: 5 TABLET, FILM COATED ORAL at 08:34

## 2024-10-17 RX ADMIN — MIDODRINE HYDROCHLORIDE 5 MG: 5 TABLET ORAL at 08:33

## 2024-10-17 RX ADMIN — LATANOPROST 1 DROP: 50 SOLUTION OPHTHALMIC at 21:43

## 2024-10-17 RX ADMIN — MIDODRINE HYDROCHLORIDE 5 MG: 5 TABLET ORAL at 17:51

## 2024-10-17 NOTE — PROGRESS NOTES
"Nursing Facility Medication Regimen Review    Potentially Clinically Significant Medication Issues during this review: []Identified   [x]Not identified    Provider acknowledgement required?   []Yes   [x]No    Darci Munson is a 89 y.o.male admitted by John Tyson MD , on 10/15/2024  3:02 PM , for Physical debility [R53.81]    Visit Vitals  /51 (BP Location: Right arm, Patient Position: Lying)   Pulse 71   Temp 97.9 °F (36.6 °C) (Oral)   Resp 16   Ht 170.2 cm (67\")   Wt 63.4 kg (139 lb 12.4 oz)   SpO2 98%   BMI 21.89 kg/m²        Lab Results   Component Value Date    GLUCOSE 98 10/16/2024    BUN 13 10/16/2024    CREATININE 1.01 10/16/2024    EGFRIFNONA 54 (L) 02/09/2022    BCR 12.9 10/16/2024    K 3.9 10/16/2024    CO2 24.0 10/16/2024    CALCIUM 9.4 10/16/2024    ALBUMIN 2.9 (L) 10/16/2024    LABIL2 1.4 05/28/2021    AST 15 10/16/2024    ALT 8 10/16/2024    WBC 4.37 10/16/2024    HGB 11.0 (L) 10/16/2024    HCT 35.2 (L) 10/16/2024    MCV 92.1 10/16/2024     10/16/2024        Active Ambulatory Problems     Diagnosis Date Noted    Type 2 diabetes mellitus with hyperglycemia, without long-term current use of insulin 05/30/2017    Chronic kidney disease 06/09/2021    Hypothyroidism 06/09/2021    Hypertensive disorder 08/08/2016    Hyperlipidemia 06/09/2021    Paroxysmal atrial fibrillation 11/30/2017    Dyspepsia 06/18/2014    Coronary arteriosclerosis 04/17/2017    Gastroparesis 08/20/2014    Hearing loss 07/19/2021    Mitral valve regurgitation 11/22/2019    Peripheral neuropathy 07/19/2021    Prostate CA 07/19/2021    Primary insomnia 09/02/2021    Primary osteoarthritis of left knee 11/05/2021    Anticoagulated 12/14/2021    Vitamin D insufficiency 12/14/2021    Iron deficiency anemia 01/04/2022    Medicare annual wellness visit, subsequent 02/09/2022    Thickened nails 02/09/2022    Pain in toes of both feet 02/01/2024    History of prostate cancer 02/19/2024    Stage 3b chronic kidney disease " 04/19/2024    Chronic bilateral low back pain without sciatica 05/30/2024    Acute renal failure 10/10/2024    Intractable nausea and vomiting 10/11/2024    Abdominal pain 10/11/2024     Resolved Ambulatory Problems     Diagnosis Date Noted    Hearing aid worn 07/19/2021    At risk for negative response to medication 11/27/2018    Bloating 06/18/2014    Bradycardia 05/21/2018    Early satiety 06/18/2014    Iron deficiency 07/19/2021    Diabetes 07/19/2021    Balanitis 07/19/2021    Essential tremor 11/05/2021    Melena 11/05/2021    Hip pain 12/14/2021    Occult blood in stools 01/04/2022    Weight loss 01/04/2022    Skin cancer 05/06/2022    Adenomatous polyp of transverse colon 08/15/2022    Tubulovillous adenoma 08/15/2022    Skin tear of left elbow without complication 10/24/2022    History of fall 10/24/2022    Ulcer of left foot 02/17/2023    Gastritis 02/17/2023    Shortness of breath 05/23/2023    COVID-19 virus infection 09/04/2023    Foreign body of right ear 11/06/2023    Viral upper respiratory tract infection 02/01/2024    Fecal urgency 05/30/2024    Wound of right upper extremity 05/30/2024    Hypocalcemia 05/30/2024     Past Medical History:   Diagnosis Date    Anemia, unspecified     Atherosclerotic heart disease of native coronary artery without angina pectoris     CAD (coronary artery disease)     CKD (chronic kidney disease), stage III     Essential (primary) hypertension     Gastric ulcer, unspecified as acute or chronic, without hemorrhage or perforation     GERD without esophagitis     Glaucoma     Hereditary and idiopathic neuropathy, unspecified     History of falling     HLD (hyperlipidemia)     HTN (hypertension)     Hypotension, unspecified     Irritable bowel syndrome without diarrhea     Laceration without foreign body of right forearm, initial encounter     Low back pain     Malignant neoplasm of prostate     Mixed hyperlipidemia     OA (osteoarthritis)     Other specified disorders of  the skin and subcutaneous tissue     Pain in left foot     Peripheral vascular disease, unspecified     Phimosis     Presence of unspecified artificial knee joint     Primary osteoarthritis of both knees     Prostate cancer     Sensorineural hearing loss (SNHL) of both ears     Sigmoid diverticulosis     Sleep related leg cramps     Type 2 diabetes mellitus with diabetic polyneuropathy     Unspecified atrial fibrillation     Unspecified dementia without behavioral disturbance     Unspecified hearing loss, bilateral         Hospital Medications (active)         Dose Frequency Start End Indication    amiodarone (PACERONE) tablet 200 mg 200 mg Daily 10/16/2024 -- Afib    apixaban (ELIQUIS) tablet 5 mg 5 mg 2 Times Daily 10/15/2024 -- afib    bisacodyl (DULCOLAX) EC tablet 5 mg 5 mg Daily PRN 10/15/2024 -- Bowel regimen    bisacodyl (DULCOLAX) suppository 10 mg 10 mg Daily PRN 10/15/2024 -- Bowel regimen    dextrose (D50W) (25 g/50 mL) IV injection 25 g 25 g Every 15 Minutes PRN 10/15/2024 -- hypoglycemia    dextrose (GLUTOSE) oral gel 15 g 15 g Every 15 Minutes PRN 10/15/2024 -- hypoglycemia    gabapentin (NEURONTIN) capsule 100 mg 100 mg 2 Times Daily 10/15/2024 -- neuropathy    glucagon (GLUCAGEN) injection 1 mg 1 mg Every 15 Minutes PRN 10/15/2024 --   hypoglycemia    insulin glargine (LANTUS, SEMGLEE) injection 15 Units 15 Units Nightly 10/15/2024 -- T2DM    Insulin Lispro (humaLOG) injection 2-9 Units 2-9 Units 4 Times Daily Before Meals & Nightly 10/15/2024 -- T2DM    latanoprost (XALATAN) 0.005 % ophthalmic solution 1 drop 1 drop Nightly 10/15/2024 -- glaucoma    levothyroxine (SYNTHROID, LEVOTHROID) tablet 200 mcg 200 mcg Every Morning Before Breakfast 10/16/2024 -- hypothyroidism    midodrine (PROAMATINE) tablet 5 mg 5 mg 3 Times Daily Before Meals 10/15/2024 -- hypotension    ondansetron (ZOFRAN) injection 4 mg 4 mg Every 6 Hours PRN 10/15/2024 -- nausea    polyethyl glycol-propyl glycol (SYSTANE) 0.4-0.3 %  ophthalmic solution (artificial tears) 2 drop 2 drop Every 3 Hours PRN 10/15/2024 -- Dry eye    polyethylene glycol (MIRALAX) packet 17 g 17 g Daily PRN 10/15/2024 -- Bowel regimen    saccharomyces boulardii (FLORASTOR) capsule 250 mg 250 mg 2 Times Daily 10/15/2024 -- Gut health    sennosides-docusate (PERICOLACE) 8.6-50 MG per tablet 2 tablet 2 tablet 2 Times Daily PRN 10/15/2024 -- Bowel regimen               Potentially Clinically Significant Medication Issues/PharmD Recommendations:   Psychotropic Medication: n/a  Opioid Use Review: n/a   Medication without an appropriate indication: n/a  Untreated indication: n/a  Missing Duration: n/a  Excessive or Inadequate Dose: n/a  Ineffective Drug Therapy: n/a  Medication Adverse Reactions/Consequences: n/a  Medication Monitoring: N/A  Drug Interactions: n/a  Duplicate Therapy: n/a  PTA Medication Omissions (not currently ordered): Lisinopril held in setting of ITA, imdur not ordered - currently BP borderline on midodrine, could consider restarting if BP improves. Carafate  not ordered, consider restarting carafate.  Safety: n/a            In completing this Drug Regimen Review for BONNIE Morgan, Pato Morales, have reviewed the electronic medical chart contents, including but not limited to the following: Medication Administration Records (MAR), Prescriber's orders, Progress, nursing and consultants' notes, Laboratory and diagnostic test results, Other sources of information about documented expressions or indications of distress and/or changes in condition.

## 2024-10-17 NOTE — CONSULTS
Nutrition Services    Patient Name: Darci Munson  YOB: 1935  MRN: [id number]  Admission date: 10/15/2024      CLINICAL NUTRITION ASSESSMENT      Reason for Assessment  MST Score 2+, Identified at Risk by Screening Criteria , and Nursing Facility Admission   H&P:  Past Medical History:   Diagnosis Date    Anemia, unspecified     Atherosclerotic heart disease of native coronary artery without angina pectoris     CAD (coronary artery disease)     CKD (chronic kidney disease), stage III     Essential (primary) hypertension     Gastric ulcer, unspecified as acute or chronic, without hemorrhage or perforation     Gastroparesis     GERD without esophagitis     Glaucoma     Hereditary and idiopathic neuropathy, unspecified     History of falling     HLD (hyperlipidemia)     HTN (hypertension)     Hypotension, unspecified     Hypothyroidism     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    Irritable bowel syndrome without diarrhea     Laceration without foreign body of right forearm, initial encounter     Low back pain     Malignant neoplasm of prostate     Mixed hyperlipidemia     OA (osteoarthritis)     Other specified disorders of the skin and subcutaneous tissue     Pain in left foot     Peripheral vascular disease, unspecified     Phimosis     Presence of unspecified artificial knee joint     Primary insomnia     Primary osteoarthritis of both knees     Prostate cancer     Sensorineural hearing loss (SNHL) of both ears     Sigmoid diverticulosis     severe sigmoid and descending colon diverticulosis and 3+ gastritis    Sleep related leg cramps     Type 2 diabetes mellitus with diabetic polyneuropathy     and gastroparesis    Unspecified atrial fibrillation     Unspecified dementia without behavioral disturbance     Unspecified hearing loss, bilateral         Current Problems:   Active Hospital Problems    Diagnosis     **Physical debility         Nutrition/Diet History         Narrative   Upon my visit, patient sitting up  "in bed. Reports that his poor appetite and N/V have improved and subsided. Reports eating well today and he is no longer experiencing N/V. Wears false teeth, and says that the bottom set sometimes doesn't fit well. Denies difficulties c/s. NKFA. Reports -162 lbs, this is consistent with weight hx. Over the past 6 months, patient with 14.2% decrease in weight--clinically significant. NFPE performed finding both fat loss and muscle wasting. See full report below. Pt would like to order snacks and agrees to trial of Boost.     Section K completed.     Anthropometrics        Current Height, Weight Height: 170.2 cm (67\")  Weight: 63.4 kg (139 lb 12.4 oz)   Current BMI Body mass index is 21.89 kg/m².   BMI Classification Normal range   % IBW 95%   Adjusted Body Weight (ABW) N/A   Weight Hx  Wt Readings from Last 30 Encounters:   10/16/24 0849 63.4 kg (139 lb 12.4 oz)   10/15/24 1508 64.5 kg (142 lb 3.2 oz)   10/15/24 1010 64.5 kg (142 lb 3.2 oz)   10/11/24 0243 64.7 kg (142 lb 10.2 oz)   10/10/24 2113 68.9 kg (152 lb)   08/20/24 1111 72 kg (158 lb 12.8 oz)   06/18/24 1450 72.2 kg (159 lb 3.2 oz)   06/10/24 0958 72.7 kg (160 lb 3.2 oz)   06/03/24 1127 72.9 kg (160 lb 12.8 oz)   05/30/24 1316 73.8 kg (162 lb 12.8 oz)   04/19/24 1448 73.5 kg (162 lb)   02/19/24 0924 75 kg (165 lb 6.4 oz)   02/01/24 1244 74 kg (163 lb 3.2 oz)   11/06/23 1340 74.3 kg (163 lb 12.8 oz)   08/22/23 0957 72.6 kg (160 lb)   06/27/23 0957 73.2 kg (161 lb 6.4 oz)   06/12/23 1411 73.8 kg (162 lb 12.8 oz)   05/23/23 0951 72.8 kg (160 lb 6.4 oz)   02/17/23 1435 73.8 kg (162 lb 12.8 oz)   01/10/23 1216 72.7 kg (160 lb 3.2 oz)   10/24/22 1135 71.4 kg (157 lb 6 oz)   10/11/22 0845 71.6 kg (157 lb 14.4 oz)   08/15/22 1005 69.4 kg (153 lb)   08/08/22 1406 71.2 kg (157 lb)   07/19/22 0850 69.9 kg (154 lb 3.2 oz)   07/11/22 1049 71.1 kg (156 lb 12.8 oz)   07/01/22 0848 66.4 kg (146 lb 6.2 oz)   05/06/22 1055 67.1 kg (148 lb)   02/09/22 0950 74.4 kg (164 " lb)   01/04/22 0933 70.5 kg (155 lb 6.4 oz)   12/14/21 0808 75 kg (165 lb 6.4 oz)   12/01/21 1402 75.7 kg (166 lb 12.8 oz)   11/05/21 0939 75.5 kg (166 lb 6.4 oz)          Wt Change Observation -14.2% x 6 months     Estimated/Assessed Needs  Estimated Needs based on: Current Body Weight       Energy Requirements 30 kcal/kg    EST Needs (kcal/day) 1890 kcal       Protein Requirements 1.0-1.2 g/kg   EST Daily Needs (g/day) 63-76 g       Fluid Requirements 1 ml/kcal    Estimated Needs (mL/day) 1890 ml     Labs/Medications         Pertinent Labs Reviewed.   Results from last 7 days   Lab Units 10/16/24  0517 10/14/24  0537 10/13/24  0605   SODIUM mmol/L 140 140 139   POTASSIUM mmol/L 3.9 4.1 3.9   CHLORIDE mmol/L 106 106 106   CO2 mmol/L 24.0 25.7 24.7   BUN mg/dL 13 17 20   CREATININE mg/dL 1.01 1.19 1.17   CALCIUM mg/dL 9.4 9.1 9.1   BILIRUBIN mg/dL 0.4 0.4 0.5   ALK PHOS U/L 102 105 100   ALT (SGPT) U/L 8 8 8   AST (SGOT) U/L 15 13 12   GLUCOSE mg/dL 98 124* 75     Results from last 7 days   Lab Units 10/16/24  0517 10/14/24  0537 10/13/24  0605   MAGNESIUM mg/dL 1.5* 1.6 1.6   PHOSPHORUS mg/dL 3.3 3.4 3.0   HEMOGLOBIN g/dL 11.0* 10.2* 9.6*   HEMATOCRIT % 35.2* 32.0* 30.1*     COVID19   Date Value Ref Range Status   10/17/2024 Not Detected Not Detected - Ref. Range Final     Lab Results   Component Value Date    HGBA1C 7.00 (H) 05/30/2024         Pertinent Medications Reviewed.     Malnutrition Severity Assessment      Patient meets criteria for : Severe Malnutrition  Malnutrition Type (Last 8 Hours)       Malnutrition Severity Assessment       Row Name 10/17/24 1342       Malnutrition Severity Assessment    Malnutrition Type Chronic Disease - Related Malnutrition      Row Name 10/17/24 1342       Unintentional Weight Loss     Unintentional Weight Loss Findings Severe    Unintentional Weight Loss  Weight loss greater than 10% in six months      Row Name 10/17/24 1342       Muscle Loss    Loss of Muscle Mass Findings  Moderate    Temple Region Severe - deep hollowing/scooping, lack of muscle to touch, facial bones well defined    Clavicle Bone Region Moderate - some protrusion in females, visible in males    Acromion Bone Region Moderate - acromion may slightly protrude    Patellar Region Severe - prominent bone, square looking, very little muscle definition    Anterior Thigh Region Severe - line/depression along thigh, obviously thin    Posterior Calf Region Severe - thin with very little definition/firmness      Row Name 10/17/24 1342       Fat Loss    Subcutaneous Fat Loss Findings Moderate    Upper Arm Region Moderate - some fat tissue, not ample      Row Name 10/17/24 1342       Criteria Met (Must meet criteria for severity in at least 2 of these categories: M Wasting, Fat Loss, Fluid, Secondary Signs, Wt. Status, Intake)    Patient meets criteria for  Severe Malnutrition                     Nutrition Diagnosis         Nutrition Dx Problem 1 Severe malnutrition related to Inability to consume sufficient energy as evidenced by decreased appetite., unintended weight change., body composition changes., and patient report.     Nutrition Intervention           Current Nutrition Orders & Evaluation of Intake       Current PO Diet Diet: Diabetic; Consistent Carbohydrate; Fluid Consistency: Thin (IDDSI 0)   Supplement Orders Placed This Encounter      Dietary Nutrition Supplements Boost Plus (Ensure Plus)      Snack: Cottage cheese and peaches      Snack: Fruit plate           Nutrition Intervention/Prescription        -Boost Plus TID (+360 kcal, 14 g pro each)  -cottage cheese and peaches AM snack  -fruit plate PM snack        Medical Nutrition Therapy/Nutrition Education          Learner     Readiness Patient  Acceptance     Method     Response Explanation  Verbalizes understanding     Monitor/Evaluation        Monitor Per protocol, PO intake, Supplement intake, Pertinent labs, Weight, POC/GOC     Nutrition Discharge Plan          Recommend to continue oral nutrition supplements on discharge.      Electronically signed by:  Veronica Jones RD  10/17/24 13:49 EDT

## 2024-10-17 NOTE — PLAN OF CARE
Goal Outcome Evaluation:  Plan of Care Reviewed With: patient           Outcome Evaluation: Pt is alert and oriented and vital signs are stable. He is a 1 assist to the bathroom with a walker. No c/o pain or discomfort. Will continue with his POC. Call light and personal items within reach.

## 2024-10-17 NOTE — PLAN OF CARE
Goal Outcome Evaluation:           Progress: improving  Outcome Evaluation: Resident alert, oriented, able to make needs known to staff. Transfers with assist of one to bathroom. participated in therapy as ordered, tolerated well. denies pain this shift. Resident pleasant and cooperative.

## 2024-10-17 NOTE — THERAPY TREATMENT NOTE
SNF - Occupational Therapy Treatment Note  RAKEL Alcaraz    Patient Name: Darci Munson  : 1935    MRN: [id number]                              Today's Date: 10/17/2024       Admit Date: 10/15/2024    Visit Dx:     ICD-10-CM ICD-9-CM   1. Decreased activities of daily living (ADL)  Z78.9 V49.89   2. Difficulty walking  R26.2 719.7     Patient Active Problem List   Diagnosis    Type 2 diabetes mellitus with hyperglycemia, without long-term current use of insulin    Chronic kidney disease    Hypothyroidism    Hypertensive disorder    Hyperlipidemia    Paroxysmal atrial fibrillation    Dyspepsia    Coronary arteriosclerosis    Gastroparesis    Hearing loss    Mitral valve regurgitation    Peripheral neuropathy    Prostate CA    Primary insomnia    Primary osteoarthritis of left knee    Anticoagulated    Vitamin D insufficiency    Iron deficiency anemia    Medicare annual wellness visit, subsequent    Thickened nails    Pain in toes of both feet    History of prostate cancer    Stage 3b chronic kidney disease    Chronic bilateral low back pain without sciatica    Acute renal failure    Intractable nausea and vomiting    Abdominal pain    Physical debility     Past Medical History:   Diagnosis Date    Anemia, unspecified     Atherosclerotic heart disease of native coronary artery without angina pectoris     CAD (coronary artery disease)     CKD (chronic kidney disease), stage III     Essential (primary) hypertension     Gastric ulcer, unspecified as acute or chronic, without hemorrhage or perforation     Gastroparesis     GERD without esophagitis     Glaucoma     Hereditary and idiopathic neuropathy, unspecified     History of falling     HLD (hyperlipidemia)     HTN (hypertension)     Hypotension, unspecified     Hypothyroidism     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    Irritable bowel syndrome without diarrhea     Laceration without foreign body of right forearm, initial encounter     Low back pain     Malignant neoplasm of  prostate     Mixed hyperlipidemia     OA (osteoarthritis)     Other specified disorders of the skin and subcutaneous tissue     Pain in left foot     Peripheral vascular disease, unspecified     Phimosis     Presence of unspecified artificial knee joint     Primary insomnia     Primary osteoarthritis of both knees     Prostate cancer     Sensorineural hearing loss (SNHL) of both ears     Sigmoid diverticulosis     severe sigmoid and descending colon diverticulosis and 3+ gastritis    Sleep related leg cramps     Type 2 diabetes mellitus with diabetic polyneuropathy     and gastroparesis    Unspecified atrial fibrillation     Unspecified dementia without behavioral disturbance     Unspecified hearing loss, bilateral      Past Surgical History:   Procedure Laterality Date    CATARACT EXTRACTION Bilateral 2014    COLONOSCOPY      COLONOSCOPY N/A 7/1/2022    Procedure: COLONOSCOPY WITH POLYPECTOMIES, HOT SNARE;  Surgeon: Jennifer Mann MD;  Location: [facility] ENDOSCOPY;  Service: Gastroenterology;  Laterality: N/A;  DIVERTICULOSIS, COLON POLYPS, HEMORRHOIDS    ENDOSCOPY N/A 7/1/2022    Procedure: ESOPHAGOGASTRODUODENOSCOPY WITH BX, POLYPECTOMY;  Surgeon: Jennifer Mann MD;  Location: [facility] ENDOSCOPY;  Service: Gastroenterology;  Laterality: N/A;  GASTRIC POLYP, GASTRITIS, HIATAL HERNIA    HAND SURGERY Right     JOINT REPLACEMENT      KNEE ARTHROSCOPY Right 03/14/2017    PROSTATECTOMY      REPLACEMENT TOTAL KNEE  03/2017    UPPER GASTROINTESTINAL ENDOSCOPY        General Information       Row Name 10/17/24 1111          OT Time and Intention    Document Type therapy note (daily note)  -SC     Mode of Treatment individual therapy;occupational therapy  -SC     Patient Effort good  -SC       Row Name 10/17/24 1111          General Information    Patient Profile Reviewed yes  -SC     Existing Precautions/Restrictions fall  -SC       Row Name 10/17/24 1111          Cognition    Orientation Status (Cognition)  oriented x 3  -SC       Row Name 10/17/24 1111          Safety Issues/Impairments Affecting Functional Mobility    Impairments Affecting Function (Mobility) balance;endurance/activity tolerance;strength;shortness of breath;pain  -SC               User Key  (r) = Recorded By, (t) = Taken By, (c) = Cosigned By      Initials Name Provider Type    SC Xuan Contreras OT Occupational Therapist                     Mobility/ADL's       Row Name 10/17/24 1112          Bed Mobility    Supine-Sit [place] (Bed Mobility) contact guard  -SC     Assistive Device (Bed Mobility) bed rails;head of bed elevated  -SC       Row Name 10/17/24 1112          Bed-Chair Transfer    Bed-Chair [place] (Transfers) contact guard;verbal cues  -SC     Assistive Device (Bed-Chair Transfers) walker, front-wheeled  -SC     Comment, (Bed-Chair Transfer) Instructed on use of walker through fall pivot turn  -[facility] Name 10/17/24 1112          Sit-Stand Transfer    Comment, (Sit-Stand Transfer) Sit to stand x 8 reps verbal cues for push up and reach back from chair  -[facility] Name 10/17/24 1112          Stand-Sit Transfer    Stand-Sit [place] (Transfers) verbal cues;contact guard  -SC     Assistive Device (Stand-Sit Transfers) walker, front-wheeled  -[facility] Name 10/17/24 1112          Toilet Transfer    [place] Level (Toilet Transfer) contact guard  -SC     Assistive Device (Toilet Transfer) raised toilet seat  -SC     Comment, (Toilet Transfer) Max verbal cues for correct technique with walker use and fall pivot turn to toilet  -[facility] Name 10/17/24 1112          Functional Mobility    Functional Mobility- Ind. Level contact guard assist  -SC     Functional Mobility- Device walker, front-wheeled  -SC     Functional Mobility- Comment Patient tolerated functional ambulation from recliner to gym with verbal cues for pushing walker versus lifting  -SC       Row Name 10/17/24 1112          Activities of Daily Living     BADL Assessment/Intervention bathing;upper body dressing;lower body dressing;grooming;toileting  -SC       Row Name 10/17/24 1112          Bathing Assessment/Intervention    [place] Level (Bathing) contact guard assist  -SC     Comment, (Bathing) Seated sponge bath  -SC       Row Name 10/17/24 1112          Upper Body Dressing Assessment/Training    [place] Level (Upper Body Dressing) upper body dressing skills;set up  -SC       Row Name 10/17/24 1112          Lower Body Dressing Assessment/Training    [place] Level (Lower Body Dressing) lower body dressing skills;contact guard assist  -SC     Comment, (Lower Body Dressing) Standard time, verbal cues for encouragement for increased independence  -SC       Row Name 10/17/24 1112          Toileting Assessment/Training    [place] Level (Toileting) toileting skills;contact guard assist  -SC     Comment, (Toileting) CGA for hygeine/clothing management;Pt ambualted from bed to bathroom for use of 3:1 over commode  -SC       Row Name 10/17/24 1112          Grooming Assessment/Training    [place] Level (Grooming) grooming skills;set up;contact guard assist  -SC     Comment, (Grooming) CGA, sink side standing  -SC               User Key  (r) = Recorded By, (t) = Taken By, (c) = Cosigned By      Initials Name Provider Type    Xuan Freed OT Occupational Therapist                   Obj/Interventions       Row Name 10/17/24 1117          Shoulder (Therapeutic Exercise)    Shoulder Strengthening (Therapeutic Exercise) bilateral;flexion;extension;2 lb free weight;horizontal aBduction/aDduction;10 repetitions;2 sets;sitting  -SC       Row Name 10/17/24 1117          Elbow/Forearm (Therapeutic Exercise)    Elbow/Forearm (Therapeutic Exercise) strengthening exercise  -SC     Elbow/Forearm Strengthening (Therapeutic Exercise) bilateral;flexion;extension;2 lb free weight;2 sets;10 repetitions;sitting  -SC       Row Name 10/17/24 1117          Motor  Skills    Therapeutic Exercise shoulder;elbow/forearm  -SC               User Key  (r) = Recorded By, (t) = Taken By, (c) = Cosigned By      Initials Name Provider Type    Xuan Freed OT Occupational Therapist                   Goals/Plan    No documentation.                  Clinical Impression       Row Name 10/17/24 1120          Pain Assessment    Pretreatment Pain Rating 0/10 - no pain  -SC     Posttreatment Pain Rating 0/10 - no pain  -SC       Row Name 10/17/24 1120          Plan of Care Review    Plan of Care Reviewed With patient  -SC     Progress improving  -SC     Outcome Evaluation Patient remains highly motivated and demonstrates steady progress. He is receptive of all safety education and able to immediately apply education. He demonstrates improved function for LB ADLs to CGA today. Cont OT per poc.  -SC       Row Name 10/17/24 1120          Positioning and Restraints    Pre-Treatment Position in bed  -SC     Post Treatment Position chair  -SC     In Bed call light within reach;encouraged to call for assist;exit alarm on  -SC               User Key  (r) = Recorded By, (t) = Taken By, (c) = Cosigned By      Initials Name Provider Type    Xuan Freed OT Occupational Therapist                   Outcome Measures       Row Name 10/17/24 1123          How much help from another is currently needed...    Putting on and taking off regular lower body clothing? 3  -SC     Bathing (including washing, rinsing, and drying) 3  -SC     Toileting (which includes using toilet bed pan or urinal) 3  -SC     Putting on and taking off regular upper body clothing 4  -SC     Taking care of personal grooming (such as brushing teeth) 4  -SC     Eating meals 4  -SC     AM-PAC 6 Clicks Score (OT) 21  -SC       Row Name 10/17/24 1123          Functional Assessment    Outcome Measure Options AM-PAC 6 Clicks Daily Activity (OT);Optimal Instrument  -SC       Row Name 10/17/24 1123          Optimal Instrument    Optimal  Instrument Optimal - 3  -SC     Bending/Stooping 2  -SC     Standing 1  -SC     Reaching 1  -SC               User Key  (r) = Recorded By, (t) = Taken By, (c) = Cosigned By      Initials Name Provider Type    Xuan Freed OT Occupational Therapist                  Section G              Section GG                         Occupational Therapy Education       Title: PT OT SLP Therapies (In Progress)       Topic: Occupational Therapy (In Progress)       Point: ADL training (Done)       Description:   Instruct learner(s) on proper safety adaptation and remediation techniques during self care or transfers.   Instruct in proper use of assistive devices.                  Learning Progress Summary            Patient Acceptance, E, VU,NR by  at 10/16/2024 0925                      Point: Home exercise program (Not Started)       Description:   Instruct learner(s) on appropriate technique for monitoring, assisting and/or progressing therapeutic exercises/activities.                  Learner Progress:  Not documented in this visit.              Point: Precautions (Done)       Description:   Instruct learner(s) on prescribed precautions during self-care and functional transfers.                  Learning Progress Summary            Patient Acceptance, E, VU,NR by  at 10/16/2024 0925                      Point: Body mechanics (Done)       Description:   Instruct learner(s) on proper positioning and spine alignment during self-care, functional mobility activities and/or exercises.                  Learning Progress Summary            Patient Acceptance, E, VU,NR by  at 10/16/2024 0925                                      User Key       Initials Effective Dates Name Provider Type Discipline     06/16/21 -  Ruth Islas OT Occupational Therapist OT                  OT Recommendation and Plan     Plan of Care Review  Plan of Care Reviewed With: patient  Progress: improving  Outcome Evaluation: Patient remains highly  motivated and demonstrates steady progress. He is receptive of all safety education and able to immediately apply education. He demonstrates improved function for LB ADLs to CGA today. Cont OT per poc.     Time Calculation:         Time Calculation- OT       Row Name 10/17/24 1128             Time Calculation- OT    OT Received On 10/17/24  -SC         Timed Charges    63477 - OT Therapeutic Exercise Minutes 15  -SC      44579 - OT Self Care/Mgmt Minutes 44  -SC         SNF Occupational Therapy Minutes    Skilled Minutes- OT 59 min  -SC         Total Minutes    Timed Charges Total Minutes 59  -SC       Total Minutes 59  -SC                User Key  (r) = Recorded By, (t) = Taken By, (c) = Cosigned By      Initials Name Provider Type    SC Xuan Contreras OT Occupational Therapist                  Therapy Charges for Today       Code Description Service Date Service Provider Modifiers Qty    [id number]  OT SELF CARE/MGMT/TRAIN EA 15 MIN 10/17/2024 Xuan Contreras OT GO 3    [id number]  OT THER PROC EA 15 MIN 10/17/2024 Xuan Contreras OT GO 1                 Xuan Contreras OT  10/17/2024

## 2024-10-17 NOTE — THERAPY TREATMENT NOTE
SNF - Physical Therapy Treatment Note  RAKEL Alcaraz     Patient Name: Darci Munson  : 1935  MRN: [id number]  Today's Date: 10/17/2024      Visit Dx:    ICD-10-CM ICD-9-CM   1. Decreased activities of daily living (ADL)  Z78.9 V49.89   2. Difficulty walking  R26.2 719.7     Patient Active Problem List   Diagnosis    Type 2 diabetes mellitus with hyperglycemia, without long-term current use of insulin    Chronic kidney disease    Hypothyroidism    Hypertensive disorder    Hyperlipidemia    Paroxysmal atrial fibrillation    Dyspepsia    Coronary arteriosclerosis    Gastroparesis    Hearing loss    Mitral valve regurgitation    Peripheral neuropathy    Prostate CA    Primary insomnia    Primary osteoarthritis of left knee    Anticoagulated    Vitamin D insufficiency    Iron deficiency anemia    Medicare annual wellness visit, subsequent    Thickened nails    Pain in toes of both feet    History of prostate cancer    Stage 3b chronic kidney disease    Chronic bilateral low back pain without sciatica    Acute renal failure    Intractable nausea and vomiting    Abdominal pain    Physical debility     Past Medical History:   Diagnosis Date    Anemia, unspecified     Atherosclerotic heart disease of native coronary artery without angina pectoris     CAD (coronary artery disease)     CKD (chronic kidney disease), stage III     Essential (primary) hypertension     Gastric ulcer, unspecified as acute or chronic, without hemorrhage or perforation     Gastroparesis     GERD without esophagitis     Glaucoma     Hereditary and idiopathic neuropathy, unspecified     History of falling     HLD (hyperlipidemia)     HTN (hypertension)     Hypotension, unspecified     Hypothyroidism     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    Irritable bowel syndrome without diarrhea     Laceration without foreign body of right forearm, initial encounter     Low back pain     Malignant neoplasm of prostate     Mixed hyperlipidemia     OA (osteoarthritis)      Other specified disorders of the skin and subcutaneous tissue     Pain in left foot     Peripheral vascular disease, unspecified     Phimosis     Presence of unspecified artificial knee joint     Primary insomnia     Primary osteoarthritis of both knees     Prostate cancer     Sensorineural hearing loss (SNHL) of both ears     Sigmoid diverticulosis     severe sigmoid and descending colon diverticulosis and 3+ gastritis    Sleep related leg cramps     Type 2 diabetes mellitus with diabetic polyneuropathy     and gastroparesis    Unspecified atrial fibrillation     Unspecified dementia without behavioral disturbance     Unspecified hearing loss, bilateral      Past Surgical History:   Procedure Laterality Date    CATARACT EXTRACTION Bilateral 2014    COLONOSCOPY      COLONOSCOPY N/A 7/1/2022    Procedure: COLONOSCOPY WITH POLYPECTOMIES, HOT SNARE;  Surgeon: Jennifer Mann MD;  Location: [facility] ENDOSCOPY;  Service: Gastroenterology;  Laterality: N/A;  DIVERTICULOSIS, COLON POLYPS, HEMORRHOIDS    ENDOSCOPY N/A 7/1/2022    Procedure: ESOPHAGOGASTRODUODENOSCOPY WITH BX, POLYPECTOMY;  Surgeon: Jennifer Mann MD;  Location: [facility] ENDOSCOPY;  Service: Gastroenterology;  Laterality: N/A;  GASTRIC POLYP, GASTRITIS, HIATAL HERNIA    HAND SURGERY Right     JOINT REPLACEMENT      KNEE ARTHROSCOPY Right 03/14/2017    PROSTATECTOMY      REPLACEMENT TOTAL KNEE  03/2017    UPPER GASTROINTESTINAL ENDOSCOPY         PT Assessment (Last 12 Hours)       PT Evaluation and Treatment       Row Name 10/17/24 1400          Physical Therapy Time and Intention    Subjective Information no complaints  -CS     Document Type therapy note (daily note)  -CS     Mode of Treatment individual therapy;physical therapy  -CS     Patient Effort good  -CS       Row Name 10/17/24 1400          Pain    Pretreatment Pain Rating 0/10 - no pain  -CS     Posttreatment Pain Rating 0/10 - no pain  -CS       Row Name 10/17/24 1400          Bed  Mobility    Bed Mobility supine-sit-supine  -CS     Supine-Sit-Supine [place] (Bed Mobility) verbal cues;standby assist;contact guard  -CS     Assistive Device (Bed Mobility) bed rails;head of bed elevated  -       Row Name 10/17/24 1400          Sit-Stand Transfer    Sit-Stand [place] (Transfers) verbal cues;contact guard  -CS     Assistive Device (Sit-Stand Transfers) walker, front-wheeled  -       Row Name 10/17/24 1400          Stand-Sit Transfer    Stand-Sit [place] (Transfers) verbal cues  -     Assistive Device (Stand-Sit Transfers) walker, front-wheeled  -       Row Name 10/17/24 1400          Gait/Stairs (Locomotion)    Gait/Stairs Locomotion gait/ambulation assistive device  -     [place] Level (Gait) verbal cues;contact guard  -     Assistive Device (Gait) walker, front-wheeled  -     Patient was able to Ambulate yes  -CS     Distance in Feet (Gait) 60  x2 plus 75'  -     Pattern (Gait) step-through;4-point  -CS     Deviations/Abnormal Patterns (Gait) gait speed decreased;connie decreased  -       Row Name 10/17/24 1400          Safety Issues/Impairments Affecting Functional Mobility    Impairments Affecting Function (Mobility) balance;endurance/activity tolerance;strength;shortness of breath;pain  -       Row Name 10/17/24 1400          Balance    Balance Interventions sit to stand;standing;supported;static;dynamic;dynamic reaching;foam;single limb stance;step overs;weight shifting activity;combined head and eye movements;UE activity with balance activity;occupation based/functional task  2LB ankle weights on  -       Row Name 10/17/24 1400          Motor Skills    Therapeutic Exercise hip;knee;ankle;aerobic  -       Row Name 10/17/24 1400          Hip (Therapeutic Exercise)    Hip (Therapeutic Exercise) strengthening exercise  -     Hip Strengthening (Therapeutic Exercise) bilateral;flexion;extension;aBduction;aDduction;mini squats;marching while standing;2  lb free weight;15 repititions;2 sets  -CS       Row Name 10/17/24 1400          Knee (Therapeutic Exercise)    Knee (Therapeutic Exercise) strengthening exercise  -CS     Knee Strengthening (Therapeutic Exercise) bilateral;hamstring curls;marching while standing;2 lb free weight;15 repititions;2 sets  -CS       Row Name 10/17/24 1400          Ankle (Therapeutic Exercise)    Ankle (Therapeutic Exercise) --  -CS       Row Name 10/17/24 1400          Aerobic Exercise    Type (Aerobic Exercise) other (see comments)  NuStep  -CS     Time Performed (Aerobic Exercise) x15 minutes at level 3  -CS       Row Name             Wound 10/11/24 0332 gluteal    Wound - Properties Group Placement Date: 10/11/24  -YUMIKO Placement Time: 0332  -YUMIKO Location: gluteal  -YUMIKO    Retired Wound - Properties Group Placement Date: 10/11/24  -YUMIKO Placement Time: 0332  -YUMIKO Location: gluteal  -YUMIKO    Retired Wound - Properties Group Placement Date: 10/11/24  -YUMIKO Placement Time: 0332  -YUMIKO Location: gluteal  -YUMIKO    Retired Wound - Properties Group Date first assessed: 10/11/24  -YUMIKO Time first assessed: 0332  -YUMIKO Location: gluteal  -YUMIKO      Row Name 10/17/24 1400          Plan of Care Review    Plan of Care Reviewed With patient  -CS     Progress improving  -CS     Outcome Evaluation Continue plan of care  -CS       Row Name 10/17/24 1400          Positioning and Restraints    Pre-Treatment Position in bed  -CS     Post Treatment Position other  -CS     Other Position with other staff  returning to room with PCA  -CS       Row Name 10/17/24 1400          Progress Summary (PT)    Progress Toward Functional Goals (PT) progress toward functional goals is good  -CS               User Key  (r) = Recorded By, (t) = Taken By, (c) = Cosigned By      Initials Name Provider Type    Jimbo Islas, RN Registered Nurse    Josefina Correa, PT Physical Therapist                  Section G              Section GG  Functional Ability/Goals, Adm (Section  GG)  Indoor Mobility - Ambulation, Prior Function (GY5454I): 3. Independent  Stairs, Prior Function (XK7845Z): 3. Independent  Prior Device Use (LE8214): none of the above (Z)  Lower Extremity Range of Motion (ZK0692Y): Impairment on one side     Mobility, Admission Performance (EK2322)  Roll Left & Right: Mobility Admission Performance (NW8657C2): supervision or touching assistance (04)  Sit to Lying: Mobility Admission Performance (GF2312B5): supervision or touching assistance (04)  Lying to Sitting, Side of Bed: Mobility Admission Performance (WW9610K4): supervision or touching assistance (04)  Sit to Stand: Mobility Admission Performance (QK0000X8): supervision or touching assistance (04)  Chair/Bed-Chair Transfer: Mobility Admission Performance (VH4013E5): supervision or touching assistance (04)  Car Transfer: Mobility Admission Performance (LU3585I6): not attempted due to environmental limitations (10)  Walk 10 Feet: Mobility Admission Performance (ZD4304U2): supervision or touching assistance (04)  Walk 50 Feet With Two Turns: Mobility Admission Performance (RE9417C5): supervision or touching assistance (04)  Walk 150 Feet: Mobility Admission Performance (AH8549C8): not attempted, medical condition/safety concern (88)  Walk 10 Ft, Uneven Surfaces: Mobility Admission Performance (TF0076H0): not applicable (09)  1 Step/Curb: Mobility Admission Performance (FC7705X5): not attempted, medical condition/safety concern (88)  4 Steps: Mobility Admission Performance (QF9387Y1): not attempted, medical condition/safety concern (88)  12 Steps: Mobility Admission Performance (ZF3676N6): not applicable (09)  Picking up object: Mobility Admission Performance (ER7022U6): not attempted, medical condition/safety concern (88)  Use a Wheelchair and/or Scooter: Mobility (OV6772G7): no (0)     RETIRED Mobility (GG), Discharge Goal (QU1048)  Use a Wheelchair and/or Scooter: Mobility (JO7992U5): no (0)     Mobility, Discharge  Performance (LQ4674)  Use a Wheelchair and/or Scooter: Mobility (SD2171S2): no (0)  Physical Therapy Education       Title: PT OT SLP Therapies (In Progress)       Topic: Physical Therapy (Not Started)       Point: Mobility training (Not Started)       Learner Progress:  Not documented in this visit.              Point: Home exercise program (Not Started)       Learner Progress:  Not documented in this visit.              Point: Body mechanics (Not Started)       Learner Progress:  Not documented in this visit.              Point: Precautions (Not Started)       Learner Progress:  Not documented in this visit.                                  PT Recommendation and Plan  Anticipated Discharge Disposition (PT): home with home health, home with assist  Planned Therapy Interventions (PT): balance training, bed mobility training, gait training, strengthening, transfer training  Therapy Frequency (PT): 6 times/wk  Progress Summary (PT)  Progress Toward Functional Goals (PT): progress toward functional goals is good  Plan of Care Reviewed With: patient  Progress: improving  Outcome Evaluation: Continue plan of care   Outcome Measures       Row Name 10/16/24 1300             How much help from another person do you currently need...    Turning from your back to your side while in flat bed without using bedrails? 2  -CS      Moving from lying on back to sitting on the side of a flat bed without bedrails? 2  -CS      Moving to and from a bed to a chair (including a wheelchair)? 3  -CS      Standing up from a chair using your arms (e.g., wheelchair, bedside chair)? 3  -CS      Climbing 3-5 steps with a railing? 2  -CS      To walk in hospital room? 3  -CS      AM-PAC 6 Clicks Score (PT) 15  -CS         Functional Assessment    Outcome Measure Options AM-PAC 6 Clicks Basic Mobility (PT)  -CS                User Key  (r) = Recorded By, (t) = Taken By, (c) = Cosigned By      Initials Name Provider Type    Josefina Correa, PT  Physical Therapist                     Time Calculation:    PT Charges       Row Name 10/17/24 1359             Time Calculation    PT Received On 10/17/24  -CS         Timed Charges    14648 - PT Therapeutic Exercise Minutes 28  -CS      85659 - Gait Training Minutes  10  -CS      40364 - PT Therapeutic Activity Minutes 15  -CS         SNF Physical Therapy Minutes    Skilled Minutes- PT 53 min  -CS         Total Minutes    Timed Charges Total Minutes 53  -CS       Total Minutes 53  -CS                User Key  (r) = Recorded By, (t) = Taken By, (c) = Cosigned By      Initials Name Provider Type    CS Josefina Ramires, PT Physical Therapist                  Therapy Charges for Today       Code Description Service Date Service Provider Modifiers Qty    06169223274 HC GAIT TRAINING EA 15 MIN 10/16/2024 Josefina Ramires, PT GP 1    54673116989 HC PT THER PROC EA 15 MIN 10/16/2024 Josefina Ramires, PT GP 1    07986841299 HC PT EVAL LOW COMPLEXITY 3 10/16/2024 Josefina Ramires, PT GP 1    37305719127 HC PT THER PROC EA 15 MIN 10/16/2024 Josefina Ramires, PT GP 1    36963786320 HC PT THER PROC EA 15 MIN 10/17/2024 Josefina Ramires, PT GP 2    37564770596 HC GAIT TRAINING EA 15 MIN 10/17/2024 Josefina Ramires, PT GP 1    70381684529 HC PT THERAPEUTIC ACT EA 15 MIN 10/17/2024 Josefina Ramires, PT GP 1            PT G-Codes  Outcome Measure Options: AM-PAC 6 Clicks Daily Activity (OT), Optimal Instrument  AM-PAC 6 Clicks Score (PT): 15  AM-PAC 6 Clicks Score (OT): 21    Josefina Ramires, PT  10/17/2024

## 2024-10-18 PROBLEM — E43 SEVERE MALNUTRITION: Status: ACTIVE | Noted: 2024-10-18

## 2024-10-18 LAB
GLUCOSE BLDC GLUCOMTR-MCNC: 100 MG/DL (ref 70–99)
GLUCOSE BLDC GLUCOMTR-MCNC: 122 MG/DL (ref 70–99)
GLUCOSE BLDC GLUCOMTR-MCNC: 132 MG/DL (ref 70–99)
GLUCOSE BLDC GLUCOMTR-MCNC: 224 MG/DL (ref 70–99)

## 2024-10-18 PROCEDURE — 63710000001 INSULIN GLARGINE PER 5 UNITS: Performed by: FAMILY MEDICINE

## 2024-10-18 PROCEDURE — 63710000001 INSULIN LISPRO (HUMAN) PER 5 UNITS: Performed by: FAMILY MEDICINE

## 2024-10-18 PROCEDURE — 82948 REAGENT STRIP/BLOOD GLUCOSE: CPT | Performed by: FAMILY MEDICINE

## 2024-10-18 PROCEDURE — 97535 SELF CARE MNGMENT TRAINING: CPT

## 2024-10-18 PROCEDURE — 82948 REAGENT STRIP/BLOOD GLUCOSE: CPT

## 2024-10-18 PROCEDURE — 97110 THERAPEUTIC EXERCISES: CPT

## 2024-10-18 PROCEDURE — 97530 THERAPEUTIC ACTIVITIES: CPT

## 2024-10-18 RX ORDER — ONDANSETRON 4 MG/1
4 TABLET, ORALLY DISINTEGRATING ORAL EVERY 6 HOURS PRN
Status: ACTIVE | OUTPATIENT
Start: 2024-10-18

## 2024-10-18 RX ORDER — ALUMINA, MAGNESIA, AND SIMETHICONE 2400; 2400; 240 MG/30ML; MG/30ML; MG/30ML
15 SUSPENSION ORAL EVERY 6 HOURS PRN
Status: ACTIVE | OUTPATIENT
Start: 2024-10-18

## 2024-10-18 RX ORDER — CALCIUM CARBONATE 500 MG/1
2 TABLET, CHEWABLE ORAL 3 TIMES DAILY PRN
Status: ACTIVE | OUTPATIENT
Start: 2024-10-18

## 2024-10-18 RX ADMIN — INSULIN GLARGINE 15 UNITS: 100 INJECTION, SOLUTION SUBCUTANEOUS at 21:06

## 2024-10-18 RX ADMIN — GABAPENTIN 100 MG: 100 CAPSULE ORAL at 21:06

## 2024-10-18 RX ADMIN — GABAPENTIN 100 MG: 100 CAPSULE ORAL at 08:25

## 2024-10-18 RX ADMIN — LATANOPROST 1 DROP: 50 SOLUTION OPHTHALMIC at 21:37

## 2024-10-18 RX ADMIN — APIXABAN 5 MG: 5 TABLET, FILM COATED ORAL at 08:24

## 2024-10-18 RX ADMIN — APIXABAN 5 MG: 5 TABLET, FILM COATED ORAL at 21:06

## 2024-10-18 RX ADMIN — MIDODRINE HYDROCHLORIDE 5 MG: 5 TABLET ORAL at 17:46

## 2024-10-18 RX ADMIN — AMIODARONE HYDROCHLORIDE 200 MG: 200 TABLET ORAL at 08:25

## 2024-10-18 RX ADMIN — INSULIN LISPRO 4 UNITS: 100 INJECTION, SOLUTION INTRAVENOUS; SUBCUTANEOUS at 12:15

## 2024-10-18 RX ADMIN — LEVOTHYROXINE SODIUM 200 MCG: 0.1 TABLET ORAL at 08:25

## 2024-10-18 RX ADMIN — Medication 250 MG: at 21:06

## 2024-10-18 RX ADMIN — Medication 250 MG: at 08:25

## 2024-10-18 RX ADMIN — POLYETHYLENE GLYCOL 400 AND PROPYLENE GLYCOL 2 DROP: 4; 3 SOLUTION/ DROPS OPHTHALMIC at 21:10

## 2024-10-18 NOTE — SIGNIFICANT NOTE
10/18/24 1446   OTHER   Discipline physical therapist   Rehab Time/Intention   Session Not Performed patient/family declined, not feeling well  (x2 attempts and patient not feeling well so declined both times)

## 2024-10-18 NOTE — THERAPY TREATMENT NOTE
SNF - Occupational Therapy Treatment Note  RAKEL Alcaraz    Patient Name: Darci Munson  : 1935    MRN: [id number]                              Today's Date: 10/18/2024       Admit Date: 10/15/2024    Visit Dx:     ICD-10-CM ICD-9-CM   1. Decreased activities of daily living (ADL)  Z78.9 V49.89   2. Difficulty walking  R26.2 719.7     Patient Active Problem List   Diagnosis    Type 2 diabetes mellitus with hyperglycemia, without long-term current use of insulin    Chronic kidney disease    Hypothyroidism    Hypertensive disorder    Hyperlipidemia    Paroxysmal atrial fibrillation    Dyspepsia    Coronary arteriosclerosis    Gastroparesis    Hearing loss    Mitral valve regurgitation    Peripheral neuropathy    Prostate CA    Primary insomnia    Primary osteoarthritis of left knee    Anticoagulated    Vitamin D insufficiency    Iron deficiency anemia    Medicare annual wellness visit, subsequent    Thickened nails    Pain in toes of both feet    History of prostate cancer    Stage 3b chronic kidney disease    Chronic bilateral low back pain without sciatica    Acute renal failure    Intractable nausea and vomiting    Abdominal pain    Physical debility     Past Medical History:   Diagnosis Date    Anemia, unspecified     Atherosclerotic heart disease of native coronary artery without angina pectoris     CAD (coronary artery disease)     CKD (chronic kidney disease), stage III     Essential (primary) hypertension     Gastric ulcer, unspecified as acute or chronic, without hemorrhage or perforation     Gastroparesis     GERD without esophagitis     Glaucoma     Hereditary and idiopathic neuropathy, unspecified     History of falling     HLD (hyperlipidemia)     HTN (hypertension)     Hypotension, unspecified     Hypothyroidism     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    Irritable bowel syndrome without diarrhea     Laceration without foreign body of right forearm, initial encounter     Low back pain     Malignant neoplasm of  prostate     Mixed hyperlipidemia     OA (osteoarthritis)     Other specified disorders of the skin and subcutaneous tissue     Pain in left foot     Peripheral vascular disease, unspecified     Phimosis     Presence of unspecified artificial knee joint     Primary insomnia     Primary osteoarthritis of both knees     Prostate cancer     Sensorineural hearing loss (SNHL) of both ears     Sigmoid diverticulosis     severe sigmoid and descending colon diverticulosis and 3+ gastritis    Sleep related leg cramps     Type 2 diabetes mellitus with diabetic polyneuropathy     and gastroparesis    Unspecified atrial fibrillation     Unspecified dementia without behavioral disturbance     Unspecified hearing loss, bilateral      Past Surgical History:   Procedure Laterality Date    CATARACT EXTRACTION Bilateral 2014    COLONOSCOPY      COLONOSCOPY N/A 7/1/2022    Procedure: COLONOSCOPY WITH POLYPECTOMIES, HOT SNARE;  Surgeon: Jennifer Mann MD;  Location: [facility] ENDOSCOPY;  Service: Gastroenterology;  Laterality: N/A;  DIVERTICULOSIS, COLON POLYPS, HEMORRHOIDS    ENDOSCOPY N/A 7/1/2022    Procedure: ESOPHAGOGASTRODUODENOSCOPY WITH BX, POLYPECTOMY;  Surgeon: Jennifer Mann MD;  Location: [facility] ENDOSCOPY;  Service: Gastroenterology;  Laterality: N/A;  GASTRIC POLYP, GASTRITIS, HIATAL HERNIA    HAND SURGERY Right     JOINT REPLACEMENT      KNEE ARTHROSCOPY Right 03/14/2017    PROSTATECTOMY      REPLACEMENT TOTAL KNEE  03/2017    UPPER GASTROINTESTINAL ENDOSCOPY        General Information       Row Name 10/18/24 0832          OT Time and Intention    Document Type therapy note (daily note)  -GC     Mode of Treatment individual therapy;occupational therapy  -GC     Patient Effort good  -GC       Row Name 10/18/24 0832          General Information    Patient Profile Reviewed yes  -GC     Existing Precautions/Restrictions fall  -GC       Row Name 10/18/24 0832          Safety Issues/Impairments Affecting  Functional Mobility    Impairments Affecting Function (Mobility) balance;endurance/activity tolerance;strength;shortness of breath;pain  -               User Key  (r) = Recorded By, (t) = Taken By, (c) = Cosigned By      Initials Name Provider Type     Ruth Islas OT Occupational Therapist                     Mobility/ADL's       Row Name 10/18/24 0833          Transfers    Transfers bed-chair transfer;sit-stand transfer;stand-sit transfer;toilet transfer  -       Row Name 10/18/24 0833          Bed-Chair Transfer    Bed-Chair [place] (Transfers) contact guard;verbal cues  -     Assistive Device (Bed-Chair Transfers) walker, front-wheeled  -       Row Name 10/18/24 08          Sit-Stand Transfer    Sit-Stand [place] (Transfers) verbal cues;contact guard;standby assist  -     Assistive Device (Sit-Stand Transfers) walker, front-wheeled  -       Row Name 10/18/24 08          Stand-Sit Transfer    Stand-Sit [place] (Transfers) verbal cues;contact guard;standby assist  -       Row Name 10/18/24 08          Toilet Transfer    [place] Level (Toilet Transfer) contact guard;standby assist  -     Assistive Device (Toilet Transfer) raised toilet seat  -       Row Name 10/18/24 0833          Functional Mobility    Functional Mobility- Ind. Level contact guard assist;supervision required  -     Functional Mobility- Device walker, front-wheeled  -       Row Name 10/18/24 0833          Activities of Daily Living    BADL Assessment/Intervention bathing;upper body dressing;lower body dressing;grooming;toileting  -[facility] Name 10/18/24 0833          Bathing Assessment/Intervention    [place] Level (Bathing) contact guard assist  -     Assistive Devices (Bathing) grab bar, tub/shower;hand-held shower spray hose;tub bench  -       Row Name 10/18/24 0833          Upper Body Dressing Assessment/Training    [place] Level (Upper Body Dressing) upper body dressing  skills;set up  -GC       Row Name 10/18/24 0833          Lower Body Dressing Assessment/Training    [place] Level (Lower Body Dressing) lower body dressing skills;contact guard assist  -GC       Row Name 10/18/24 0833          Toileting Assessment/Training    [place] Level (Toileting) toileting skills;contact guard assist  -GC       Row Name 10/18/24 0833          Grooming Assessment/Training    [place] Level (Grooming) grooming skills;set up  -               User Key  (r) = Recorded By, (t) = Taken By, (c) = Cosigned By      Initials Name Provider Type    Ruth Whitney OT Occupational Therapist                   Obj/Interventions       Row Name 10/18/24 0835          Motor Skills    Functional Endurance omnicycle x15 minutes  -     Therapeutic Exercise aerobic  -               User Key  (r) = Recorded By, (t) = Taken By, (c) = Cosigned By      Initials Name Provider Type    Ruth Whitney OT Occupational Therapist                   Goals/Plan    No documentation.                  Clinical Impression       Row Name 10/18/24 0841          Plan of Care Review    Plan of Care Reviewed With patient  -     Progress improving  -     Outcome Evaluation Pt. is able to perform most all BADLS with CGA using RW ambulating through the facility.  Pt. does live alone and presenting with limited dynamic balance and endurance to support independence with adls.  Plan to continue POC established for LTGs.  -       Row Name 10/18/24 0841          Therapy Plan Review/Discharge Plan (OT)    Anticipated Discharge Disposition (OT) home with home health  -               User Key  (r) = Recorded By, (t) = Taken By, (c) = Cosigned By      Initials Name Provider Type    Ruth Whitney OT Occupational Therapist                   Outcome Measures       Row Name 10/18/24 0845          How much help from another is currently needed...    Putting on and taking off regular lower body clothing? 3  -     Bathing  (including washing, rinsing, and drying) 3  -GC     Toileting (which includes using toilet bed pan or urinal) 3  -GC     Putting on and taking off regular upper body clothing 4  -GC     Taking care of personal grooming (such as brushing teeth) 4  -GC     Eating meals 4  -GC     AM-PAC 6 Clicks Score (OT) 21  -GC       Row Name 10/18/24 0845          Functional Assessment    Outcome Measure Options AM-PAC 6 Clicks Daily Activity (OT);Optimal Instrument  -GC       Row Name 10/18/24 0845          Optimal Instrument    Bending/Stooping 2  -GC     Standing 1  -GC     Reaching 1  -GC               User Key  (r) = Recorded By, (t) = Taken By, (c) = Cosigned By      Initials Name Provider Type    GC Ruth Ilsas OT Occupational Therapist                  Section G  Mobility  Dressing - self performance: limited assistance (staff provide guided maneuvering of limbs or other non-weight bearing assistance)  Dressing support/assistance: One person assist  Eating - self performance: independent  Eating support/assistance: No setup or physical help  Toileting - self performance: limited assistance (staff provide guided maneuvering of limbs or other non-weight bearing assistance)  Toileting support/assistance: One person assist  Personal hygiene - self performance: limited assistance (staff provide guided maneuvering of limbs or other non-weight bearing assistance)  Personal hygiene support/assistance: One person assist  Bathing  Bathing - self performance: Physical help only to transfer to bathe  Bathing support/assistance: One person assist     Range of Motion  Upper Extremity: No impairment  Section GG  Functional Ability/Goals, Adm (Section GG)  Self Care, Prior Functioning (ER7683Y): 3. Independent  Functional Cognition, Prior Functioning (LP1950H): 3. Independent  Upper Extremity Range of Motion (EV5853F): No impairment  Self Care, Admission (Section GG)  Eating: Self-Care Admission Performance (TY1430Y1): independent  (06)  Oral Hygiene: Self-Care Admission Performance (UF4044R0): setup or clean-up assistance (05)  Toileting Hygiene: Self-Care Admission Performance (CT3248A7): partial/moderate assistance (03)  Shower/Bathe Self: Self-Care Admission Performance (HS7986M0): partial/moderate assistance (03)  Upper Body Dressing: Self-Care Admission Performance (HT1050Q1): setup or clean-up assistance (05)  Lower Body Dressing: Self-Care Admission Performance (VN1225X5): partial/moderate assistance (03)  Putting On/Taking Off Footwear: Self-Care Admission Performance (DY0675G6): partial/moderate assistance (03)  Personal Hygiene: Self-Care Admission Performance (DB7671G8): supervision or touching assistance (04)  Mobility, Admission Performance (PV7138)  Toilet Transfer: Mobility Admission Performance (KZ6824O8): supervision or touching assistance (04)  Tub/shower Transfer: Mobility Admission Performance (GE9878GV6): supervision or touching assistance (04)                Occupational Therapy Education       Title: PT OT SLP Therapies (In Progress)       Topic: Occupational Therapy (In Progress)       Point: ADL training (Done)       Description:   Instruct learner(s) on proper safety adaptation and remediation techniques during self care or transfers.   Instruct in proper use of assistive devices.                  Learning Progress Summary            Patient Acceptance, E, VU,NR by  at 10/16/2024 0925                      Point: Home exercise program (Not Started)       Description:   Instruct learner(s) on appropriate technique for monitoring, assisting and/or progressing therapeutic exercises/activities.                  Learner Progress:  Not documented in this visit.              Point: Precautions (Done)       Description:   Instruct learner(s) on prescribed precautions during self-care and functional transfers.                  Learning Progress Summary            Patient Acceptance, E, VU,NR by  at 10/16/2024 0925                       Point: Body mechanics (Done)       Description:   Instruct learner(s) on proper positioning and spine alignment during self-care, functional mobility activities and/or exercises.                  Learning Progress Summary            Patient Acceptance, E, VU,NR by  at 10/16/2024 0957                                      User Key       Initials Effective Dates Name Provider Type Discipline     06/16/21 -  Ruth Islas OT Occupational Therapist OT                  OT Recommendation and Plan  Planned Therapy Interventions (OT): activity tolerance training, adaptive equipment training, BADL retraining, functional balance retraining, occupation/activity based interventions, patient/caregiver education/training, ROM/therapeutic exercise, strengthening exercise, transfer/mobility retraining  Therapy Frequency (OT): 5 times/wk  Plan of Care Review  Plan of Care Reviewed With: patient  Progress: improving  Outcome Evaluation: Pt. is able to perform most all BADLS with CGA using RW ambulating through the facility.  Pt. does live alone and presenting with limited dynamic balance and endurance to support independence with adls.  Plan to continue POC established for LTGs.     Time Calculation:   Evaluation Complexity (OT)  Review Occupational Profile/Medical/Therapy History Complexity: expanded/moderate complexity  Assessment, Occupational Performance/Identification of Deficit Complexity: 3-5 performance deficits  Clinical Decision Making Complexity (OT): detailed assessment/moderate complexity  Overall Complexity of Evaluation (OT): moderate complexity     Time Calculation- OT       Row Name 10/18/24 0845             Time Calculation- OT    OT Received On 10/18/24  -         Timed Charges    06323 - OT Therapeutic Exercise Minutes 15  -GC      35966 - OT Therapeutic Activity Minutes 10  -      56150 - OT Self Care/Mgmt Minutes 30  -GC         SNF Occupational Therapy Minutes    Skilled Minutes- OT 55 min   -GC         Total Minutes    Timed Charges Total Minutes 55  -GC       Total Minutes 55  -GC                User Key  (r) = Recorded By, (t) = Taken By, (c) = Cosigned By      Initials Name Provider Type     Ruth Islas OT Occupational Therapist                  Therapy Charges for Today       Code Description Service Date Service Provider Modifiers Qty    22896582390  OT THER PROC EA 15 MIN 10/18/2024 Ruth Islas OT GO 1    [id number]  OT THERAPEUTIC ACT EA 15 MIN 10/18/2024 Ruth Islas OT GO 1    [id number]  OT SELF CARE/MGMT/TRAIN EA 15 MIN 10/18/2024 Ruth Islas OT GO 2                 Ruth Islas OT  10/18/2024

## 2024-10-18 NOTE — PLAN OF CARE
Goal Outcome Evaluation:  Plan of Care Reviewed With: patient        Progress: no change  Outcome Evaluation: Aox4, call light and personal items within reach. ABle to make needs known to staff. C/o stomach cramping earlier today, cramping improved after BM. 1x to the BR with walker and gait belt. No other c/o pain during the shift. Napping inbetween care. Con't plan of care

## 2024-10-18 NOTE — PLAN OF CARE
Goal Outcome Evaluation:  Plan of Care Reviewed With: patient   Pt A&O able to voice needs and wants no issues or concerns this shift cont POC

## 2024-10-19 LAB
GLUCOSE BLDC GLUCOMTR-MCNC: 126 MG/DL (ref 70–99)
GLUCOSE BLDC GLUCOMTR-MCNC: 148 MG/DL (ref 70–99)
GLUCOSE BLDC GLUCOMTR-MCNC: 185 MG/DL (ref 70–99)
GLUCOSE BLDC GLUCOMTR-MCNC: 194 MG/DL (ref 70–99)

## 2024-10-19 PROCEDURE — 97110 THERAPEUTIC EXERCISES: CPT

## 2024-10-19 PROCEDURE — 99309 SBSQ NF CARE MODERATE MDM 30: CPT | Performed by: PHYSICIAN ASSISTANT

## 2024-10-19 PROCEDURE — 63710000001 INSULIN LISPRO (HUMAN) PER 5 UNITS: Performed by: FAMILY MEDICINE

## 2024-10-19 PROCEDURE — 82948 REAGENT STRIP/BLOOD GLUCOSE: CPT | Performed by: FAMILY MEDICINE

## 2024-10-19 PROCEDURE — 97116 GAIT TRAINING THERAPY: CPT

## 2024-10-19 PROCEDURE — 82948 REAGENT STRIP/BLOOD GLUCOSE: CPT

## 2024-10-19 PROCEDURE — 63710000001 INSULIN GLARGINE PER 5 UNITS: Performed by: FAMILY MEDICINE

## 2024-10-19 RX ADMIN — APIXABAN 5 MG: 5 TABLET, FILM COATED ORAL at 09:51

## 2024-10-19 RX ADMIN — LEVOTHYROXINE SODIUM 200 MCG: 0.1 TABLET ORAL at 07:53

## 2024-10-19 RX ADMIN — GABAPENTIN 100 MG: 100 CAPSULE ORAL at 09:51

## 2024-10-19 RX ADMIN — INSULIN LISPRO 2 UNITS: 100 INJECTION, SOLUTION INTRAVENOUS; SUBCUTANEOUS at 12:29

## 2024-10-19 RX ADMIN — MIDODRINE HYDROCHLORIDE 5 MG: 5 TABLET ORAL at 07:53

## 2024-10-19 RX ADMIN — Medication 250 MG: at 20:06

## 2024-10-19 RX ADMIN — INSULIN GLARGINE 15 UNITS: 100 INJECTION, SOLUTION SUBCUTANEOUS at 20:06

## 2024-10-19 RX ADMIN — Medication 250 MG: at 09:51

## 2024-10-19 RX ADMIN — APIXABAN 5 MG: 5 TABLET, FILM COATED ORAL at 20:06

## 2024-10-19 RX ADMIN — LATANOPROST 1 DROP: 50 SOLUTION OPHTHALMIC at 20:56

## 2024-10-19 RX ADMIN — GABAPENTIN 100 MG: 100 CAPSULE ORAL at 20:06

## 2024-10-19 RX ADMIN — AMIODARONE HYDROCHLORIDE 200 MG: 200 TABLET ORAL at 09:51

## 2024-10-19 RX ADMIN — INSULIN LISPRO 2 UNITS: 100 INJECTION, SOLUTION INTRAVENOUS; SUBCUTANEOUS at 17:45

## 2024-10-19 NOTE — THERAPY TREATMENT NOTE
SNF - Physical Therapy Progress Note  RAKEL Alcaraz     Patient Name: Darci Munson  : 1935  MRN: [id number]  Today's Date: 10/19/2024      Visit Dx:    ICD-10-CM ICD-9-CM   1. Decreased activities of daily living (ADL)  Z78.9 V49.89   2. Difficulty walking  R26.2 719.7     Patient Active Problem List   Diagnosis    Type 2 diabetes mellitus with hyperglycemia, without long-term current use of insulin    Chronic kidney disease    Hypothyroidism    Hypertensive disorder    Hyperlipidemia    Paroxysmal atrial fibrillation    Dyspepsia    Coronary arteriosclerosis    Gastroparesis    Hearing loss    Mitral valve regurgitation    Peripheral neuropathy    Prostate CA    Primary insomnia    Primary osteoarthritis of left knee    Anticoagulated    Vitamin D insufficiency    Iron deficiency anemia    Medicare annual wellness visit, subsequent    Thickened nails    Pain in toes of both feet    History of prostate cancer    Stage 3b chronic kidney disease    Chronic bilateral low back pain without sciatica    Acute renal failure    Intractable nausea and vomiting    Abdominal pain    Physical debility    Severe malnutrition     Past Medical History:   Diagnosis Date    Anemia, unspecified     Atherosclerotic heart disease of native coronary artery without angina pectoris     CAD (coronary artery disease)     CKD (chronic kidney disease), stage III     Essential (primary) hypertension     Gastric ulcer, unspecified as acute or chronic, without hemorrhage or perforation     Gastroparesis     GERD without esophagitis     Glaucoma     Hereditary and idiopathic neuropathy, unspecified     History of falling     HLD (hyperlipidemia)     HTN (hypertension)     Hypotension, unspecified     Hypothyroidism     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    Irritable bowel syndrome without diarrhea     Laceration without foreign body of right forearm, initial encounter     Low back pain     Malignant neoplasm of prostate     Mixed hyperlipidemia     OA  (osteoarthritis)     Other specified disorders of the skin and subcutaneous tissue     Pain in left foot     Peripheral vascular disease, unspecified     Phimosis     Presence of unspecified artificial knee joint     Primary insomnia     Primary osteoarthritis of both knees     Prostate cancer     Sensorineural hearing loss (SNHL) of both ears     Sigmoid diverticulosis     severe sigmoid and descending colon diverticulosis and 3+ gastritis    Sleep related leg cramps     Type 2 diabetes mellitus with diabetic polyneuropathy     and gastroparesis    Unspecified atrial fibrillation     Unspecified dementia without behavioral disturbance     Unspecified hearing loss, bilateral      Past Surgical History:   Procedure Laterality Date    CATARACT EXTRACTION Bilateral 2014    COLONOSCOPY      COLONOSCOPY N/A 7/1/2022    Procedure: COLONOSCOPY WITH POLYPECTOMIES, HOT SNARE;  Surgeon: Jennifer Mann MD;  Location: [facility] ENDOSCOPY;  Service: Gastroenterology;  Laterality: N/A;  DIVERTICULOSIS, COLON POLYPS, HEMORRHOIDS    ENDOSCOPY N/A 7/1/2022    Procedure: ESOPHAGOGASTRODUODENOSCOPY WITH BX, POLYPECTOMY;  Surgeon: Jennifer Mann MD;  Location: [facility] ENDOSCOPY;  Service: Gastroenterology;  Laterality: N/A;  GASTRIC POLYP, GASTRITIS, HIATAL HERNIA    HAND SURGERY Right     JOINT REPLACEMENT      KNEE ARTHROSCOPY Right 03/14/2017    PROSTATECTOMY      REPLACEMENT TOTAL KNEE  03/2017    UPPER GASTROINTESTINAL ENDOSCOPY         PT Assessment (Last 12 Hours)       PT Evaluation and Treatment       Row Name 10/19/24 1300          Physical Therapy Time and Intention    Subjective Information no complaints  -CS     Document Type therapy note (daily note)  -CS     Mode of Treatment individual therapy;physical therapy  -CS     Patient Effort good  -CS     Symptoms Noted During/After Treatment fatigue  -CS       Row Name 10/19/24 1300          Pain    Pretreatment Pain Rating 0/10 - no pain  -CS     Posttreatment  Pain Rating 0/10 - no pain  -       Row Name 10/19/24 1300          Bed Mobility    Supine-Sit [place] (Bed Mobility) standby assist  -CS     Assistive Device (Bed Mobility) bed rails  -CS       Row Name 10/19/24 1300          Sit-Stand Transfer    Sit-Stand [place] (Transfers) standby assist  -CS     Assistive Device (Sit-Stand Transfers) walker, front-wheeled  -CS       Row Name 10/19/24 1300          Stand-Sit Transfer    Stand-Sit [place] (Transfers) standby assist  -CS     Assistive Device (Stand-Sit Transfers) walker, front-wheeled  -CS       Row Name 10/19/24 1300          Toilet Transfer    [place] Level (Toilet Transfer) standby assist  -CS     Assistive Device (Toilet Transfer) commode;raised toilet seat;grab bars/safety frame;walker, front-wheeled  -       Row Name 10/19/24 1300          Gait/Stairs (Locomotion)    [place] Level (Gait) standby assist;verbal cues;1 person assist  -     Assistive Device (Gait) walker, front-wheeled  -     Distance in Feet (Gait) 250  + 50'  -CS     Pattern (Gait) 4-point;step-through  -CS     Deviations/Abnormal Patterns (Gait) gait speed decreased;connie decreased  -       Row Name 10/19/24 1300          Aerobic Exercise    Time Performed (Aerobic Exercise) 10:10  -     Comment, Aerobic Exercise (Therapeutic Exercise) Nu-Step x 484 steps at 48 SPM on load 5  -       Row Name             Wound 10/11/24 0332 gluteal    Wound - Properties Group Placement Date: 10/11/24  -YUMIKO Placement Time: 0332 -JO Location: gluteal  -YUMIKO    Retired Wound - Properties Group Placement Date: 10/11/24  -YUMIKO Placement Time: 0332  -YUMIKO Location: gluteal  -YUMIKO    Retired Wound - Properties Group Placement Date: 10/11/24  -YUMIKO Placement Time: 0332 -JO Location: gluteal  -YUMIKO    Retired Wound - Properties Group Date first assessed: 10/11/24  -YUMIKO Time first assessed: 0332 -JO Location: gluteal  -YUMIKO      Row Name 10/19/24 1300          Positioning and Restraints     Pre-Treatment Position in bed  -CS     Post Treatment Position chair  -CS     In Chair reclined;call light within reach;encouraged to call for assist;exit alarm on  -CS       Row Name 10/19/24 1300          Progress Summary (PT)    Progress Toward Functional Goals (PT) progress toward functional goals is good  -CS               User Key  (r) = Recorded By, (t) = Taken By, (c) = Cosigned By      Initials Name Provider Type    Jimbo Islas RN Registered Nurse    Antelmo Rosario PTA Physical Therapist Assistant                  Section G              Section GG                       Physical Therapy Education       Title: PT OT SLP Therapies (In Progress)       Topic: Physical Therapy (Not Started)       Point: Mobility training (Not Started)       Learner Progress:  Not documented in this visit.              Point: Home exercise program (Not Started)       Learner Progress:  Not documented in this visit.              Point: Body mechanics (Not Started)       Learner Progress:  Not documented in this visit.              Point: Precautions (Not Started)       Learner Progress:  Not documented in this visit.                                  PT Recommendation and Plan     Progress Summary (PT)  Progress Toward Functional Goals (PT): progress toward functional goals is good   Outcome Measures       Row Name 10/19/24 1300             How much help from another person do you currently need...    Turning from your back to your side while in flat bed without using bedrails? 3  -CS      Moving from lying on back to sitting on the side of a flat bed without bedrails? 3  -CS      Moving to and from a bed to a chair (including a wheelchair)? 3  -CS      Standing up from a chair using your arms (e.g., wheelchair, bedside chair)? 3  -CS      Climbing 3-5 steps with a railing? 2  -CS      To walk in hospital room? 3  -CS      AM-PAC 6 Clicks Score (PT) 17  -CS         Functional Assessment    Outcome Measure Options  AM-PAC 6 Clicks Basic Mobility (PT)  -CS                User Key  (r) = Recorded By, (t) = Taken By, (c) = Cosigned By      Initials Name Provider Type    CS Antelmo Resendiz PTA Physical Therapist Assistant                     Time Calculation:    PT Charges       Row Name 10/19/24 1342             Time Calculation    Start Time 1147  -CS      PT Received On 10/19/24  -CS         Timed Charges    75889 - PT Therapeutic Exercise Minutes 11  -CS      36036 - Gait Training Minutes  12  -CS      53845 - PT Therapeutic Activity Minutes 3  -CS         SNF Physical Therapy Minutes    Skilled Minutes- PT 26 min  -CS         Total Minutes    Timed Charges Total Minutes 26  -CS       Total Minutes 26  -CS                User Key  (r) = Recorded By, (t) = Taken By, (c) = Cosigned By      Initials Name Provider Type     Antelmo Resendiz PTA Physical Therapist Assistant                  Therapy Charges for Today       Code Description Service Date Service Provider Modifiers Qty    80884547177 HC PT THER PROC EA 15 MIN 10/19/2024 Antelmo Resendiz PTA GP 1    [id number] HC GAIT TRAINING EA 15 MIN 10/19/2024 Antelmo Resendiz PTA GP 1            PT G-Codes  Outcome Measure Options: AM-PAC 6 Clicks Basic Mobility (PT)  AM-PAC 6 Clicks Score (PT): 17  AM-PAC 6 Clicks Score (OT): 21    Antelmo Resendiz PTA  10/19/2024

## 2024-10-19 NOTE — PLAN OF CARE
Goal Outcome Evaluation:              Outcome Evaluation: Alert and oriented. Able to communicate needs. /47 with irregular hear rate at HS night . Denied pain. Family at bedside during beginnin of shift. Continue plan of care

## 2024-10-19 NOTE — PLAN OF CARE
Goal Outcome Evaluation:  Plan of Care Reviewed With: patient        Progress: no change  Outcome Evaluation: AOx4, call light and personal items within reach. Able to make needs known to staff. 1x to the BR with walker and gait belt. No c/o pain during the shift. Skin care given. Napping inbetween care. Family at bedside this afternoon. Con't plan of care

## 2024-10-19 NOTE — PROGRESS NOTES
[facility]   Hospitalist Progress Note       Patient Name: Darci Munson  : 1935  MRN: [id number]  Primary Care Physician: Adrien Mayer MD  Date of admission: 10/15/2024  Today's Date: 10/19/2024  Room / Bed:   305/2  Subjective   Chief Complaint: Hospital-acquired weakness     HPI:     Darci Munson is a 89 y.o. male past medical history significant for chronic atrial fibrillation/flutter, diabetes, CAD, hypertension, hyperlipidemia, hypothyroidism, history of prostate cancer, chronic kidney disease, glaucoma, mitral valve regurgitation, that was hospitalized for chief complaint of nausea vomiting and diarrhea was noted to have ITA with hyperkalemia in addition to lactic acidosis CT scan demonstrating nodules in the lower lobe which will require follow-up CT scan of the chest in 6 months concern for possible acute diverticulitis placed on antibiotics to cover GI tract IV fluids with improvement of symptoms seen by PT and OT deemed a good candidate for inpatient rehab is been transferred to skilled nursing facility for ongoing therapy.       Interval Followup: 10/19/2024    Up in recliner eating breakfast.  Appropriate conversation.  Hard of hearing.  Pleasant demeanor.  Polite.  Reports appetite continues to improve.  Denies any issues with participating with physical therapy.  Denies any setbacks.  BP range 102/50  -117/47  98% sats on room air  Glucose range 100-224  Creatinine normalizing; on 10/16/24 was 1.0        REVIEW OF SYSTEMS:   Weakness  Objective   Temp:  [97.9 °F (36.6 °C)-98.1 °F (36.7 °C)] 97.9 °F (36.6 °C)  Heart Rate:  [47-80] 60  Resp:  [18] 18  BP: (102-130)/(47-68) 102/50  PHYSICAL EXAM   CON: WN. WD. NAD.   NECK:  No thyromegaly. No stridor.   RESP:  CTA. No wheezes. No crackles.  CV:  Rhythm irregular. Rate WNL. 2/6 SM noted.  No edema.  GI:  Soft and nontender. Nondistended.    EXT: Peripheral pulses intact.  No cyanosis.  PSYCH:  Alert. Oriented. Normal affect and  mood.  NEURO:  No dysarthria or aphasia.   SKIN: No chronic venous stasis changes or varicosities.  No cellulitis  Results from last 7 days   Lab Units 10/16/24  0517 10/14/24  0537 10/13/24  0605   WBC 10*3/mm3 4.37 4.94 5.06   HEMOGLOBIN g/dL 11.0* 10.2* 9.6*   HEMATOCRIT % 35.2* 32.0* 30.1*   PLATELETS 10*3/mm3 162 141 130*     Results from last 7 days   Lab Units 10/16/24  0517 10/14/24  0537 10/13/24  0605   SODIUM mmol/L 140 140 139   POTASSIUM mmol/L 3.9 4.1 3.9   CO2 mmol/L 24.0 25.7 24.7   CHLORIDE mmol/L 106 106 106   ANION GAP mmol/L 10.0 8.3 8.3   BUN mg/dL 13 17 20   CREATININE mg/dL 1.01 1.19 1.17   GLUCOSE mg/dL 98 124* 75         COMPLEXITY OF DATA / DECISION MAKING     []  Moderate: One acute illness or mild exacerbation of chronic or 2 stable chronic or tx side effects   []  High:  Severe acute illness or severe exacerbation of chronic - potential for major debility / life threatening         I have personally reviewed the results from the time of this admission to 10/19/2024 08:59 EDT:  []  Laboratory:  []  Microbiology: []  Radiology:  []  Telemetry:   []  Cardiology/Vascular:  []  Pathology:  []  Prior external records:  []  Independent historian provided additional details:      []  Discussed case with specialists:    []  Independent interpretation of ECG/Imaging etc:             []  Moderate: Rx management, low risk surgery, suboptimal social situation   []  High:  Rx with close monitoring for toxicity, mod-high risk surgery, DNR decision, Comfort initiated, IV pain meds    Assessment / Plan   Assessment:     Hospital-acquired weakness  Recent ITA  Recent hyperkalemia  Recent dehydration  Acute diverticulitis  Atrial fibrillation/flutter chronic  Diverticulosis  Diabetes  CAD  Hypertension  Gastroparesis  History of prostate cancer  Hypothyroidism     Plan:        Daily physical therapy  Continue carb consistent diet with snacks and nutritional supplements  Continue amiodarone and Eliquis for  atrial fibrillation  Continue basal, bolus, correctional insulin   Renal function electrolytes periodically  Continue midodrine  Further treatment contingent upon his hospital course    VTE Prophylaxis:  Pharmacologic VTE prophylaxis orders are present.      CODE STATUS:      Level Of Support Discussed With: Patient  Code Status (Patient has no pulse and is not breathing): CPR (Attempt to Resuscitate)  Medical Interventions (Patient has pulse or is breathing): Full Support       Electronically signed by ABDIEL Carty, 10/19/24, 8:59 AM EDT.

## 2024-10-20 LAB
GLUCOSE BLDC GLUCOMTR-MCNC: 108 MG/DL (ref 70–99)
GLUCOSE BLDC GLUCOMTR-MCNC: 131 MG/DL (ref 70–99)
GLUCOSE BLDC GLUCOMTR-MCNC: 144 MG/DL (ref 70–99)
GLUCOSE BLDC GLUCOMTR-MCNC: 161 MG/DL (ref 70–99)

## 2024-10-20 PROCEDURE — 82948 REAGENT STRIP/BLOOD GLUCOSE: CPT

## 2024-10-20 PROCEDURE — 63710000001 INSULIN GLARGINE PER 5 UNITS: Performed by: FAMILY MEDICINE

## 2024-10-20 PROCEDURE — 82948 REAGENT STRIP/BLOOD GLUCOSE: CPT | Performed by: FAMILY MEDICINE

## 2024-10-20 PROCEDURE — 63710000001 INSULIN LISPRO (HUMAN) PER 5 UNITS: Performed by: FAMILY MEDICINE

## 2024-10-20 RX ADMIN — LEVOTHYROXINE SODIUM 200 MCG: 0.1 TABLET ORAL at 07:29

## 2024-10-20 RX ADMIN — INSULIN LISPRO 2 UNITS: 100 INJECTION, SOLUTION INTRAVENOUS; SUBCUTANEOUS at 17:38

## 2024-10-20 RX ADMIN — Medication 250 MG: at 21:42

## 2024-10-20 RX ADMIN — GABAPENTIN 100 MG: 100 CAPSULE ORAL at 08:30

## 2024-10-20 RX ADMIN — MIDODRINE HYDROCHLORIDE 5 MG: 5 TABLET ORAL at 08:30

## 2024-10-20 RX ADMIN — APIXABAN 5 MG: 5 TABLET, FILM COATED ORAL at 21:42

## 2024-10-20 RX ADMIN — APIXABAN 5 MG: 5 TABLET, FILM COATED ORAL at 08:30

## 2024-10-20 RX ADMIN — GABAPENTIN 100 MG: 100 CAPSULE ORAL at 21:43

## 2024-10-20 RX ADMIN — INSULIN GLARGINE 15 UNITS: 100 INJECTION, SOLUTION SUBCUTANEOUS at 21:48

## 2024-10-20 RX ADMIN — AMIODARONE HYDROCHLORIDE 200 MG: 200 TABLET ORAL at 08:30

## 2024-10-20 RX ADMIN — LATANOPROST 1 DROP: 50 SOLUTION OPHTHALMIC at 21:49

## 2024-10-20 RX ADMIN — Medication 250 MG: at 08:30

## 2024-10-20 NOTE — PLAN OF CARE
Goal Outcome Evaluation:  Plan of Care Reviewed With: patient        Progress: no change  Outcome Evaluation: AOx4, call light and personal items within reach. Able to make needs known to staff. 1x to the BR with walker and gait belt. No c/o pain during the shift. Skin care given. Con't plan of care

## 2024-10-20 NOTE — PLAN OF CARE
Goal Outcome Evaluation:  Plan of Care Reviewed With: patient        Progress: no change  Outcome Evaluation: Alert and oriented x4; [place]. Turned Q 2hrs. Transfers to BR x1 person assist using gait belt and walker. No requests for pain medication this shift. VSS. Rested well between care. Call light within reach; care plan ongoing.

## 2024-10-21 VITALS
OXYGEN SATURATION: 99 % | DIASTOLIC BLOOD PRESSURE: 57 MMHG | RESPIRATION RATE: 18 BRPM | WEIGHT: 139.77 LBS | BODY MASS INDEX: 21.94 KG/M2 | HEART RATE: 71 BPM | TEMPERATURE: 97.9 F | HEIGHT: 67 IN | SYSTOLIC BLOOD PRESSURE: 115 MMHG

## 2024-10-21 LAB
GLUCOSE BLDC GLUCOMTR-MCNC: 108 MG/DL (ref 70–99)
GLUCOSE BLDC GLUCOMTR-MCNC: 130 MG/DL (ref 70–99)
GLUCOSE BLDC GLUCOMTR-MCNC: 150 MG/DL (ref 70–99)
GLUCOSE BLDC GLUCOMTR-MCNC: 153 MG/DL (ref 70–99)
SARS-COV-2 RNA RESP QL NAA+PROBE: NOT DETECTED

## 2024-10-21 PROCEDURE — 97110 THERAPEUTIC EXERCISES: CPT

## 2024-10-21 PROCEDURE — 82948 REAGENT STRIP/BLOOD GLUCOSE: CPT

## 2024-10-21 PROCEDURE — 97116 GAIT TRAINING THERAPY: CPT

## 2024-10-21 PROCEDURE — 82948 REAGENT STRIP/BLOOD GLUCOSE: CPT | Performed by: FAMILY MEDICINE

## 2024-10-21 PROCEDURE — 97535 SELF CARE MNGMENT TRAINING: CPT

## 2024-10-21 PROCEDURE — 97530 THERAPEUTIC ACTIVITIES: CPT

## 2024-10-21 PROCEDURE — 87635 SARS-COV-2 COVID-19 AMP PRB: CPT | Performed by: PHYSICIAN ASSISTANT

## 2024-10-21 PROCEDURE — 63710000001 INSULIN GLARGINE PER 5 UNITS: Performed by: FAMILY MEDICINE

## 2024-10-21 PROCEDURE — 63710000001 INSULIN LISPRO (HUMAN) PER 5 UNITS: Performed by: FAMILY MEDICINE

## 2024-10-21 RX ADMIN — GABAPENTIN 100 MG: 100 CAPSULE ORAL at 21:50

## 2024-10-21 RX ADMIN — Medication 250 MG: at 21:50

## 2024-10-21 RX ADMIN — GABAPENTIN 100 MG: 100 CAPSULE ORAL at 08:41

## 2024-10-21 RX ADMIN — APIXABAN 5 MG: 5 TABLET, FILM COATED ORAL at 08:41

## 2024-10-21 RX ADMIN — INSULIN LISPRO 2 UNITS: 100 INJECTION, SOLUTION INTRAVENOUS; SUBCUTANEOUS at 12:20

## 2024-10-21 RX ADMIN — APIXABAN 5 MG: 5 TABLET, FILM COATED ORAL at 21:50

## 2024-10-21 RX ADMIN — POLYETHYLENE GLYCOL 400 AND PROPYLENE GLYCOL 2 DROP: 4; 3 SOLUTION/ DROPS OPHTHALMIC at 10:37

## 2024-10-21 RX ADMIN — MIDODRINE HYDROCHLORIDE 5 MG: 5 TABLET ORAL at 12:19

## 2024-10-21 RX ADMIN — LEVOTHYROXINE SODIUM 200 MCG: 0.1 TABLET ORAL at 08:41

## 2024-10-21 RX ADMIN — MIDODRINE HYDROCHLORIDE 5 MG: 5 TABLET ORAL at 08:41

## 2024-10-21 RX ADMIN — LATANOPROST 1 DROP: 50 SOLUTION OPHTHALMIC at 21:53

## 2024-10-21 RX ADMIN — AMIODARONE HYDROCHLORIDE 200 MG: 200 TABLET ORAL at 08:41

## 2024-10-21 RX ADMIN — Medication 250 MG: at 08:41

## 2024-10-21 RX ADMIN — INSULIN LISPRO 2 UNITS: 100 INJECTION, SOLUTION INTRAVENOUS; SUBCUTANEOUS at 18:12

## 2024-10-21 RX ADMIN — INSULIN GLARGINE 15 UNITS: 100 INJECTION, SOLUTION SUBCUTANEOUS at 21:50

## 2024-10-21 NOTE — THERAPY TREATMENT NOTE
SNF - Occupational Therapy Treatment Note  RAKEL Alcaraz    Patient Name: Darci Munson  : 1935    MRN: [id number]                              Today's Date: 10/21/2024       Admit Date: 10/15/2024    Visit Dx:     ICD-10-CM ICD-9-CM   1. Decreased activities of daily living (ADL)  Z78.9 V49.89   2. Difficulty walking  R26.2 719.7     Patient Active Problem List   Diagnosis    Type 2 diabetes mellitus with hyperglycemia, without long-term current use of insulin    Chronic kidney disease    Hypothyroidism    Hypertensive disorder    Hyperlipidemia    Paroxysmal atrial fibrillation    Dyspepsia    Coronary arteriosclerosis    Gastroparesis    Hearing loss    Mitral valve regurgitation    Peripheral neuropathy    Prostate CA    Primary insomnia    Primary osteoarthritis of left knee    Anticoagulated    Vitamin D insufficiency    Iron deficiency anemia    Medicare annual wellness visit, subsequent    Thickened nails    Pain in toes of both feet    History of prostate cancer    Stage 3b chronic kidney disease    Chronic bilateral low back pain without sciatica    Acute renal failure    Intractable nausea and vomiting    Abdominal pain    Physical debility    Severe malnutrition     Past Medical History:   Diagnosis Date    Anemia, unspecified     Atherosclerotic heart disease of native coronary artery without angina pectoris     CAD (coronary artery disease)     CKD (chronic kidney disease), stage III     Essential (primary) hypertension     Gastric ulcer, unspecified as acute or chronic, without hemorrhage or perforation     Gastroparesis     GERD without esophagitis     Glaucoma     Hereditary and idiopathic neuropathy, unspecified     History of falling     HLD (hyperlipidemia)     HTN (hypertension)     Hypotension, unspecified     Hypothyroidism     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    Irritable bowel syndrome without diarrhea     Laceration without foreign body of right forearm, initial encounter     Low back pain      Malignant neoplasm of prostate     Mixed hyperlipidemia     OA (osteoarthritis)     Other specified disorders of the skin and subcutaneous tissue     Pain in left foot     Peripheral vascular disease, unspecified     Phimosis     Presence of unspecified artificial knee joint     Primary insomnia     Primary osteoarthritis of both knees     Prostate cancer     Sensorineural hearing loss (SNHL) of both ears     Sigmoid diverticulosis     severe sigmoid and descending colon diverticulosis and 3+ gastritis    Sleep related leg cramps     Type 2 diabetes mellitus with diabetic polyneuropathy     and gastroparesis    Unspecified atrial fibrillation     Unspecified dementia without behavioral disturbance     Unspecified hearing loss, bilateral      Past Surgical History:   Procedure Laterality Date    CATARACT EXTRACTION Bilateral 2014    COLONOSCOPY      COLONOSCOPY N/A 7/1/2022    Procedure: COLONOSCOPY WITH POLYPECTOMIES, HOT SNARE;  Surgeon: Jennifer Mann MD;  Location: [facility] ENDOSCOPY;  Service: Gastroenterology;  Laterality: N/A;  DIVERTICULOSIS, COLON POLYPS, HEMORRHOIDS    ENDOSCOPY N/A 7/1/2022    Procedure: ESOPHAGOGASTRODUODENOSCOPY WITH BX, POLYPECTOMY;  Surgeon: Jennifer Mann MD;  Location: [facility] ENDOSCOPY;  Service: Gastroenterology;  Laterality: N/A;  GASTRIC POLYP, GASTRITIS, HIATAL HERNIA    HAND SURGERY Right     JOINT REPLACEMENT      KNEE ARTHROSCOPY Right 03/14/2017    PROSTATECTOMY      REPLACEMENT TOTAL KNEE  03/2017    UPPER GASTROINTESTINAL ENDOSCOPY        General Information       Row Name 10/21/24 1511          OT Time and Intention    Document Type therapy note (daily note)  -SC     Mode of Treatment individual therapy;occupational therapy  -SC     Patient Effort good  -SC       Row Name 10/21/24 1511          General Information    Patient Profile Reviewed yes  -SC     Existing Precautions/Restrictions fall  -SC       Row Name 10/21/24 1511          Cognition     "Orientation Status (Cognition) oriented x 3  -SC       Row Name 10/21/24 1511          Safety Issues/Impairments Affecting Functional Mobility    Impairments Affecting Function (Mobility) balance;endurance/activity tolerance;strength  -SC               User Key  (r) = Recorded By, (t) = Taken By, (c) = Cosigned By      Initials Name Provider Type    SC Xuan Contreras OT Occupational Therapist                     Mobility/ADL's       Row Name 10/21/24 1511          Bed Mobility    Supine-Sit [place] (Bed Mobility) standby assist  -SC     Bed Mobility, Safety Issues decreased use of legs for bridging/pushing  -SC     Assistive Device (Bed Mobility) bed rails;head of bed elevated  -SC       Row Name 10/21/24 1511          Bed-Chair Transfer    Bed-Chair [place] (Transfers) verbal cues;standby assist  -SC     Assistive Device (Bed-Chair Transfers) walker, front-wheeled  -SC       Row Name 10/21/24 1511          Sit-Stand Transfer    Sit-Stand [place] (Transfers) verbal cues;standby assist  -SC     Assistive Device (Sit-Stand Transfers) walker, front-wheeled  -SC     Comment, (Sit-Stand Transfer) sts x 9 reps  -SC       Row Name 10/21/24 1511          Stand-Sit Transfer    Stand-Sit [place] (Transfers) verbal cues;standby assist  -SC     Assistive Device (Stand-Sit Transfers) walker, front-wheeled  -SC       Row Name 10/21/24 1511          Toilet Transfer    [place] Level (Toilet Transfer) standby assist  -SC     Assistive Device (Toilet Transfer) commode;raised toilet seat;grab bars/safety frame;walker, front-wheeled  -SC       Row Name 10/21/24 1511          Functional Mobility    Functional Mobility- Ind. Level contact guard assist;supervision required  -SC     Functional Mobility- Device walker, front-wheeled  -SC     Functional Mobility- Comment Engaged patient in functional ambulation from recliner to gym at [facility]. Noted patient legs to become \"shaky\" with fatigue.  -SC       Row Name " 10/21/24 [facility]          Activities of Daily Living    BADL Assessment/Intervention grooming  -SC       Row Name 10/21/24 1511          Toileting Assessment/Training    [place] Level (Toileting) toileting skills;contact guard assist  -SC       Row Name 10/21/24 1511          Grooming Assessment/Training    Comment, (Grooming) CGA sink standing for hair care, and oral care  -SC               User Key  (r) = Recorded By, (t) = Taken By, (c) = Cosigned By      Initials Name Provider Type    Xuan Freed OT Occupational Therapist                   Obj/Interventions       Row Name 10/21/24 1513          Shoulder (Therapeutic Exercise)    Shoulder Strengthening (Therapeutic Exercise) bilateral;flexion;extension;horizontal aBduction/aDduction;3 lb free weight;15 repititions;2 sets;sitting  Additional exercises included horizontal chest press, inclined chest press, and tricep extension  -[facility] Name 10/21/24 1513          Elbow/Forearm (Therapeutic Exercise)    Elbow/Forearm (Therapeutic Exercise) strengthening exercise  -SC     Elbow/Forearm Strengthening (Therapeutic Exercise) bilateral;flexion;extension;sitting;15 repititions;2 sets;2 lb free weight  -SC       Row Name 10/21/24 1513          Motor Skills    Motor Skills functional endurance  -SC     Functional Endurance omni cycle 15 min with focus on increased cardiovascular endurance  -SC       Row Name 10/21/24 1513          Balance    Balance Interventions occupation based/functional task;dynamic;moderate challenge  -SC     Comment, Balance Balance challenges at [facility]-min assist  included blue foam mat 1 min with shoulder shrugs, punches, and scap retraction x 3 sets, Reaching side/side and up/down, and toe taps.  -SC               User Key  (r) = Recorded By, (t) = Taken By, (c) = Cosigned By      Initials Name Provider Type    Xuan Freed OT Occupational Therapist                   Goals/Plan    No documentation.                  Clinical  "Impression       Row Name 10/21/24 1517          Pain Assessment    Pretreatment Pain Rating 0/10 - no pain  -SC     Posttreatment Pain Rating 0/10 - no pain  -SC       Row Name 10/21/24 1517          Plan of Care Review    Plan of Care Reviewed With patient  -SC     Progress improving  -SC     Outcome Evaluation Patient continues to make good progress and demonstrates improved balance evidence by increased tolerance of challenges today. He is now completing functional mobility to toilet and gym at [facility]. He fatigues easily and his legs are noted to become weak and \"shaky\" with fatigue presenting a fall risk. He will benefit from continued services to continue to address activity tolerance, balance, strengthening and safety during adls/iadls.  -SC       Row Name 10/21/24 1517          Positioning and Restraints    Pre-Treatment Position in bed  -SC     Post Treatment Position chair  -SC     In Chair encouraged to call for assist;exit alarm on;with family/caregiver  -SC               User Key  (r) = Recorded By, (t) = Taken By, (c) = Cosigned By      Initials Name Provider Type    SC Xuan Contreras OT Occupational Therapist                   Outcome Measures       Row Name 10/21/24 1519          How much help from another is currently needed...    Putting on and taking off regular lower body clothing? 3  -SC     Bathing (including washing, rinsing, and drying) 3  -SC     Toileting (which includes using toilet bed pan or urinal) 3  -SC     Putting on and taking off regular upper body clothing 4  -SC     Taking care of personal grooming (such as brushing teeth) 4  -SC     Eating meals 4  -SC     AM-PAC 6 Clicks Score (OT) 21  -SC       Row Name 10/21/24 0859 10/21/24 0833       How much help from another person do you currently need...    Turning from your back to your side while in flat bed without using bedrails? 3  -WA 3  -WM    Moving from lying on back to sitting on the side of a flat bed without bedrails? 3  " -WV 3  -WM    Moving to and from a bed to a chair (including a wheelchair)? 3  -WV 3  -WM    Standing up from a chair using your arms (e.g., wheelchair, bedside chair)? 3  -WV 3  -WM    Climbing 3-5 steps with a railing? 2  -WV 2  -WM    To walk in hospital room? 3  -WV 3  -WM    AM-PAC 6 Clicks Score (PT) 17  -WV 17  -WM    Highest Level of Mobility Goal 5 --> Static standing  -WV 5 --> Static standing  -WM      Row Name 10/21/24 1519          Functional Assessment    Outcome Measure Options AM-PAC 6 Clicks Daily Activity (OT);Optimal Instrument  -SC       Row Name 10/21/24 1519          Optimal Instrument    Optimal Instrument Optimal - 3  -SC     Bending/Stooping 2  -SC     Standing 1  -SC     Reaching 1  -SC               User Key  (r) = Recorded By, (t) = Taken By, (c) = Cosigned By      Initials Name Provider Type    [place] Carri Maravilla, RN Registered Nurse    Marquise Ramirez PTA Physical Therapist Assistant    Xuan Freed OT Occupational Therapist                  Section G              Section GG                         Occupational Therapy Education       Title: PT OT SLP Therapies (In Progress)       Topic: Occupational Therapy (In Progress)       Point: ADL training (Done)       Description:   Instruct learner(s) on proper safety adaptation and remediation techniques during self care or transfers.   Instruct in proper use of assistive devices.                  Learning Progress Summary            Patient Acceptance, E, VU,NR by  at 10/16/2024 2932                      Point: Home exercise program (Not Started)       Description:   Instruct learner(s) on appropriate technique for monitoring, assisting and/or progressing therapeutic exercises/activities.                  Learner Progress:  Not documented in this visit.              Point: Precautions (Done)       Description:   Instruct learner(s) on prescribed precautions during self-care and functional transfers.                  Learning  "Progress Summary            Patient Acceptance, E, VU,NR by  at 10/16/2024 0925                      Point: Body mechanics (Done)       Description:   Instruct learner(s) on proper positioning and spine alignment during self-care, functional mobility activities and/or exercises.                  Learning Progress Summary            Patient Acceptance, E, VU,NR by  at 10/16/2024 0925                                      User Key       Initials Effective Dates Name Provider Type Discipline     06/16/21 -  Ruth Islas OT Occupational Therapist OT                  OT Recommendation and Plan     Plan of Care Review  Plan of Care Reviewed With: patient  Progress: improving  Outcome Evaluation: Patient continues to make good progress and demonstrates improved balance evidence by increased tolerance of challenges today. He is now completing functional mobility to toilet and gym at [facility]. He fatigues easily and his legs are noted to become weak and \"shaky\" with fatigue presenting a fall risk. He will benefit from continued services to continue to address activity tolerance, balance, strengthening and safety during adls/iadls.     Time Calculation:         Time Calculation- OT       Row Name 10/21/24 1520 10/21/24 0827          Time Calculation- OT    OT Received On 10/21/24  -SC --        Timed Charges    84763 - OT Therapeutic Exercise Minutes 32  -SC --     00874 - Gait Training Minutes  -- 7  -     54173 - OT Therapeutic Activity Minutes 12  -SC --     37473 - OT Self Care/Mgmt Minutes 14  -SC --        SNF Occupational Therapy Minutes    Skilled Minutes- OT 58 min  -SC --        Total Minutes    Timed Charges Total Minutes 58  -SC 7  -WM      Total Minutes 58  -SC 7  -WM               User Key  (r) = Recorded By, (t) = Taken By, (c) = Cosigned By      Initials Name Provider Type    Marquise Ramirez, MANAN Physical Therapist Assistant    Xuan Freed OT Occupational Therapist                  Therapy " Charges for Today       Code Description Service Date Service Provider Modifiers Qty    91059252374  OT SELF CARE/MGMT/TRAIN EA 15 MIN 10/21/2024 Xuan Contreras OT GO 1    [id number]  OT THERAPEUTIC ACT EA 15 MIN 10/21/2024 Xuan Contreras OT GO 1    [id number]  OT THER PROC EA 15 MIN 10/21/2024 Xuan Contreras OT GO 2                 Xuan Contreras OT  10/21/2024

## 2024-10-21 NOTE — PLAN OF CARE
Goal Outcome Evaluation:      Patient slept well tonight. Patient requested that I put a mepilex on his bottom due to redness and soreness. Vital signs stable. Continue plan of care.

## 2024-10-21 NOTE — THERAPY TREATMENT NOTE
SNF - Physical Therapy Treatment Note  RAKEL Alcaraz     Patient Name: Darci Munson  : 1935  MRN: [id number]  Today's Date: 10/21/2024      Visit Dx:    ICD-10-CM ICD-9-CM   1. Decreased activities of daily living (ADL)  Z78.9 V49.89   2. Difficulty walking  R26.2 719.7     Patient Active Problem List   Diagnosis    Type 2 diabetes mellitus with hyperglycemia, without long-term current use of insulin    Chronic kidney disease    Hypothyroidism    Hypertensive disorder    Hyperlipidemia    Paroxysmal atrial fibrillation    Dyspepsia    Coronary arteriosclerosis    Gastroparesis    Hearing loss    Mitral valve regurgitation    Peripheral neuropathy    Prostate CA    Primary insomnia    Primary osteoarthritis of left knee    Anticoagulated    Vitamin D insufficiency    Iron deficiency anemia    Medicare annual wellness visit, subsequent    Thickened nails    Pain in toes of both feet    History of prostate cancer    Stage 3b chronic kidney disease    Chronic bilateral low back pain without sciatica    Acute renal failure    Intractable nausea and vomiting    Abdominal pain    Physical debility    Severe malnutrition     Past Medical History:   Diagnosis Date    Anemia, unspecified     Atherosclerotic heart disease of native coronary artery without angina pectoris     CAD (coronary artery disease)     CKD (chronic kidney disease), stage III     Essential (primary) hypertension     Gastric ulcer, unspecified as acute or chronic, without hemorrhage or perforation     Gastroparesis     GERD without esophagitis     Glaucoma     Hereditary and idiopathic neuropathy, unspecified     History of falling     HLD (hyperlipidemia)     HTN (hypertension)     Hypotension, unspecified     Hypothyroidism     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    Irritable bowel syndrome without diarrhea     Laceration without foreign body of right forearm, initial encounter     Low back pain     Malignant neoplasm of prostate     Mixed hyperlipidemia      OA (osteoarthritis)     Other specified disorders of the skin and subcutaneous tissue     Pain in left foot     Peripheral vascular disease, unspecified     Phimosis     Presence of unspecified artificial knee joint     Primary insomnia     Primary osteoarthritis of both knees     Prostate cancer     Sensorineural hearing loss (SNHL) of both ears     Sigmoid diverticulosis     severe sigmoid and descending colon diverticulosis and 3+ gastritis    Sleep related leg cramps     Type 2 diabetes mellitus with diabetic polyneuropathy     and gastroparesis    Unspecified atrial fibrillation     Unspecified dementia without behavioral disturbance     Unspecified hearing loss, bilateral      Past Surgical History:   Procedure Laterality Date    CATARACT EXTRACTION Bilateral 2014    COLONOSCOPY      COLONOSCOPY N/A 7/1/2022    Procedure: COLONOSCOPY WITH POLYPECTOMIES, HOT SNARE;  Surgeon: Jennifer Mann MD;  Location: [facility] ENDOSCOPY;  Service: Gastroenterology;  Laterality: N/A;  DIVERTICULOSIS, COLON POLYPS, HEMORRHOIDS    ENDOSCOPY N/A 7/1/2022    Procedure: ESOPHAGOGASTRODUODENOSCOPY WITH BX, POLYPECTOMY;  Surgeon: Jennifer Mann MD;  Location: [facility] ENDOSCOPY;  Service: Gastroenterology;  Laterality: N/A;  GASTRIC POLYP, GASTRITIS, HIATAL HERNIA    HAND SURGERY Right     JOINT REPLACEMENT      KNEE ARTHROSCOPY Right 03/14/2017    PROSTATECTOMY      REPLACEMENT TOTAL KNEE  03/2017    UPPER GASTROINTESTINAL ENDOSCOPY         PT Assessment (Last 12 Hours)       PT Evaluation and Treatment       Row Name 10/21/24 0829          Physical Therapy Time and Intention    Document Type therapy note (daily note)  -WM     Mode of Treatment individual therapy;physical therapy  -WM     Patient Effort good  -WM     Symptoms Noted During/After Treatment fatigue  -WM       Row Name 10/21/24 0828          Bed Mobility    Supine-Sit-Supine Stoddard (Bed Mobility) standby assist  -WM     Assistive Device (Bed  Mobility) bed rails;head of bed elevated  -       Row Name 10/21/24 0828          Sit-Stand Transfer    Sit-Stand Wilson (Transfers) contact guard;verbal cues  -WM     Assistive Device (Sit-Stand Transfers) walker, front-wheeled  -WM       Row Name 10/21/24 0828          Stand-Sit Transfer    Stand-Sit Wilson (Transfers) contact guard;verbal cues  -WM     Assistive Device (Stand-Sit Transfers) walker, front-wheeled  -WM       Row Name 10/21/24 0828          Gait/Stairs (Locomotion)    Wilson Level (Gait) contact guard;verbal cues  -WM     Assistive Device (Gait) walker, front-wheeled  -WM     Distance in Feet (Gait) 120  -WM     Pattern (Gait) 4-point;step-through  -WM     Deviations/Abnormal Patterns (Gait) gait speed decreased;stride length decreased  -       Row Name 10/21/24 0828          Safety Issues/Impairments Affecting Functional Mobility    Impairments Affecting Function (Mobility) balance;endurance/activity tolerance;strength  -       Row Name 10/21/24 0828          Hip (Therapeutic Exercise)    Hip Strengthening (Therapeutic Exercise) bilateral;aBduction;aDduction;marching while seated;sitting;2 lb free weight;resistance band;green;15 repititions;2 sets  -       Row Name 10/21/24 0828          Knee (Therapeutic Exercise)    Knee Strengthening (Therapeutic Exercise) bilateral;LAQ (long arc quad);hamstring curls;sitting;2 lb free weight;resistance band;green;15 repititions;2 sets  -       Row Name 10/21/24 0828          Ankle (Therapeutic Exercise)    Ankle Strengthening (Therapeutic Exercise) bilateral;dorsiflexion;sitting;5 lb free weight;15 repititions;2 sets  -       Row Name 10/21/24 0828          Aerobic Exercise    Time Performed (Aerobic Exercise) NuStep x 15 minutes, load 3  -       Row Name             Wound 10/11/24 0332 gluteal    Wound - Properties Group Placement Date: 10/11/24  -YUMIKO Placement Time: 0332 -JO Location: gluteal  -YUMIKO    Retired Wound - Properties  Group Placement Date: 10/11/24  -YUMIKO Placement Time: 0332 -JO Location: gluteal  -YUMIKO    Retired Wound - Properties Group Placement Date: 10/11/24  -YUMIKO Placement Time: 0332 -JO Location: gluteal  -YUMIKO    Retired Wound - Properties Group Date first assessed: 10/11/24  -YUMIKO Time first assessed: 0332 -JO Location: gluteal  -YUMIKO      Row Name 10/21/24 0828          Positioning and Restraints    Pre-Treatment Position in bed  -     Post Treatment Position bathroom  -WM     Bathroom sitting;with nsg;call light within reach  -WM       Row Name 10/21/24 0828          Progress Summary (PT)    Progress Toward Functional Goals (PT) progress toward functional goals is good  -WM               User Key  (r) = Recorded By, (t) = Taken By, (c) = Cosigned By      Initials Name Provider Type    Jimbo Islas RN Registered Nurse    Marquise Ramirez PTA Physical Therapist Assistant                  Section G              Section GG                       Physical Therapy Education       Title: PT OT SLP Therapies (In Progress)       Topic: Physical Therapy (Not Started)       Point: Mobility training (Not Started)       Learner Progress:  Not documented in this visit.              Point: Home exercise program (Not Started)       Learner Progress:  Not documented in this visit.              Point: Body mechanics (Not Started)       Learner Progress:  Not documented in this visit.              Point: Precautions (Not Started)       Learner Progress:  Not documented in this visit.                                  PT Recommendation and Plan     Progress Summary (PT)  Progress Toward Functional Goals (PT): progress toward functional goals is good   Outcome Measures       Row Name 10/21/24 0833 10/19/24 1300          How much help from another person do you currently need...    Turning from your back to your side while in flat bed without using bedrails? 3  -WM 3  -CS     Moving from lying on back to sitting on the side of a flat  bed without bedrails? 3  -WM 3  -CS     Moving to and from a bed to a chair (including a wheelchair)? 3  -WM 3  -CS     Standing up from a chair using your arms (e.g., wheelchair, bedside chair)? 3  -WM 3  -CS     Climbing 3-5 steps with a railing? 2  -WM 2  -CS     To walk in hospital room? 3  -WM 3  -CS     AM-PAC 6 Clicks Score (PT) 17  -WM 17  -CS        Functional Assessment    Outcome Measure Options -- AM-PAC 6 Clicks Basic Mobility (PT)  -CS               User Key  (r) = Recorded By, (t) = Taken By, (c) = Cosigned By      Initials Name Provider Type    WM Marquise Escobar PTA Physical Therapist Assistant    Antelmo Rosario PTA Physical Therapist Assistant                     Time Calculation:    PT Charges       Row Name 10/21/24 0827             Time Calculation    PT Received On 10/21/24  -WM         Timed Charges    98926 - PT Therapeutic Exercise Minutes 29  -WM      48220 - Gait Training Minutes  7  -WM      19516 - PT Therapeutic Activity Minutes 5  -WM         SNF Physical Therapy Minutes    Skilled Minutes- PT 41 min  -WM         Total Minutes    Timed Charges Total Minutes 41  -WM       Total Minutes 41  -WM                User Key  (r) = Recorded By, (t) = Taken By, (c) = Cosigned By      Initials Name Provider Type     Marquise Escobar PTA Physical Therapist Assistant                      PT G-Codes  Outcome Measure Options: AM-PAC 6 Clicks Basic Mobility (PT)  AM-PAC 6 Clicks Score (PT): 17  AM-PAC 6 Clicks Score (OT): 21    Marquise Escobar PTA  10/21/2024

## 2024-10-22 LAB
GLUCOSE BLDC GLUCOMTR-MCNC: 161 MG/DL (ref 70–99)
GLUCOSE BLDC GLUCOMTR-MCNC: 167 MG/DL (ref 70–99)
GLUCOSE BLDC GLUCOMTR-MCNC: 195 MG/DL (ref 70–99)
GLUCOSE BLDC GLUCOMTR-MCNC: 99 MG/DL (ref 70–99)

## 2024-10-22 PROCEDURE — 97530 THERAPEUTIC ACTIVITIES: CPT

## 2024-10-22 PROCEDURE — 63710000001 INSULIN LISPRO (HUMAN) PER 5 UNITS: Performed by: FAMILY MEDICINE

## 2024-10-22 PROCEDURE — 82948 REAGENT STRIP/BLOOD GLUCOSE: CPT | Performed by: FAMILY MEDICINE

## 2024-10-22 PROCEDURE — 97116 GAIT TRAINING THERAPY: CPT

## 2024-10-22 PROCEDURE — 97110 THERAPEUTIC EXERCISES: CPT

## 2024-10-22 PROCEDURE — 82948 REAGENT STRIP/BLOOD GLUCOSE: CPT

## 2024-10-22 PROCEDURE — 63710000001 INSULIN GLARGINE PER 5 UNITS: Performed by: FAMILY MEDICINE

## 2024-10-22 RX ADMIN — GABAPENTIN 100 MG: 100 CAPSULE ORAL at 09:52

## 2024-10-22 RX ADMIN — GABAPENTIN 100 MG: 100 CAPSULE ORAL at 21:56

## 2024-10-22 RX ADMIN — MIDODRINE HYDROCHLORIDE 5 MG: 5 TABLET ORAL at 09:52

## 2024-10-22 RX ADMIN — Medication 250 MG: at 21:56

## 2024-10-22 RX ADMIN — LEVOTHYROXINE SODIUM 200 MCG: 0.1 TABLET ORAL at 05:42

## 2024-10-22 RX ADMIN — Medication 250 MG: at 09:52

## 2024-10-22 RX ADMIN — AMIODARONE HYDROCHLORIDE 200 MG: 200 TABLET ORAL at 09:52

## 2024-10-22 RX ADMIN — TUBERCULIN PURIFIED PROTEIN DERIVATIVE 5 UNITS: 5 INJECTION, SOLUTION INTRADERMAL at 21:58

## 2024-10-22 RX ADMIN — MIDODRINE HYDROCHLORIDE 5 MG: 5 TABLET ORAL at 18:29

## 2024-10-22 RX ADMIN — INSULIN LISPRO 2 UNITS: 100 INJECTION, SOLUTION INTRAVENOUS; SUBCUTANEOUS at 21:58

## 2024-10-22 RX ADMIN — APIXABAN 5 MG: 5 TABLET, FILM COATED ORAL at 09:52

## 2024-10-22 RX ADMIN — APIXABAN 5 MG: 5 TABLET, FILM COATED ORAL at 21:56

## 2024-10-22 RX ADMIN — INSULIN GLARGINE 15 UNITS: 100 INJECTION, SOLUTION SUBCUTANEOUS at 21:57

## 2024-10-22 RX ADMIN — MIDODRINE HYDROCHLORIDE 5 MG: 5 TABLET ORAL at 12:55

## 2024-10-22 RX ADMIN — INSULIN LISPRO 2 UNITS: 100 INJECTION, SOLUTION INTRAVENOUS; SUBCUTANEOUS at 12:55

## 2024-10-22 NOTE — PLAN OF CARE
Goal Outcome Evaluation:  Plan of Care Reviewed With: patient           Outcome Evaluation: Pt is AO x 4 . He uses the urinal and is a stand by assist to the bathroom. No c/o pain or discomfort. Blood sugar was 105. Will continue with his POC. Call light and personal items within reach.

## 2024-10-22 NOTE — PROGRESS NOTES
Nutrition Services    Patient Name: Darci Munson  YOB: 1935  MRN: [id number]  Admission date: 10/15/2024      CLINICAL NUTRITION ASSESSMENT      Reason for Assessment  Follow Up   H&P:  Past Medical History:   Diagnosis Date    Anemia, unspecified     Atherosclerotic heart disease of native coronary artery without angina pectoris     CAD (coronary artery disease)     CKD (chronic kidney disease), stage III     Essential (primary) hypertension     Gastric ulcer, unspecified as acute or chronic, without hemorrhage or perforation     Gastroparesis     GERD without esophagitis     Glaucoma     Hereditary and idiopathic neuropathy, unspecified     History of falling     HLD (hyperlipidemia)     HTN (hypertension)     Hypotension, unspecified     Hypothyroidism     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    Irritable bowel syndrome without diarrhea     Laceration without foreign body of right forearm, initial encounter     Low back pain     Malignant neoplasm of prostate     Mixed hyperlipidemia     OA (osteoarthritis)     Other specified disorders of the skin and subcutaneous tissue     Pain in left foot     Peripheral vascular disease, unspecified     Phimosis     Presence of unspecified artificial knee joint     Primary insomnia     Primary osteoarthritis of both knees     Prostate cancer     Sensorineural hearing loss (SNHL) of both ears     Sigmoid diverticulosis     severe sigmoid and descending colon diverticulosis and 3+ gastritis    Sleep related leg cramps     Type 2 diabetes mellitus with diabetic polyneuropathy     and gastroparesis    Unspecified atrial fibrillation     Unspecified dementia without behavioral disturbance     Unspecified hearing loss, bilateral         Current Problems:   Active Hospital Problems    Diagnosis     **Physical debility     Severe malnutrition         Nutrition/Diet History         Narrative   Pt alert at time of visit. Currently, pt reports eating 100% of his meals and snacks w/  "no issues. Pt continues ONS TID. No n/v/c/d complaints. RD to continue to monitor.      Anthropometrics        Current Height, Weight Height: 170.2 cm (67\")  Weight: 63.4 kg (139 lb 12.4 oz)   Current BMI Body mass index is 21.89 kg/m².   BMI Classification Normal range   % IBW 95%   Adjusted Body Weight (ABW) N/A   Weight Hx  Wt Readings from Last 30 Encounters:   10/16/24 0849 63.4 kg (139 lb 12.4 oz)   10/15/24 1508 64.5 kg (142 lb 3.2 oz)   10/15/24 1010 64.5 kg (142 lb 3.2 oz)   10/11/24 0243 64.7 kg (142 lb 10.2 oz)   10/10/24 2113 68.9 kg (152 lb)   08/20/24 1111 72 kg (158 lb 12.8 oz)   06/18/24 1450 72.2 kg (159 lb 3.2 oz)   06/10/24 0958 72.7 kg (160 lb 3.2 oz)   06/03/24 1127 72.9 kg (160 lb 12.8 oz)   05/30/24 1316 73.8 kg (162 lb 12.8 oz)   04/19/24 1448 73.5 kg (162 lb)   02/19/24 0924 75 kg (165 lb 6.4 oz)   02/01/24 1244 74 kg (163 lb 3.2 oz)   11/06/23 1340 74.3 kg (163 lb 12.8 oz)   08/22/23 0957 72.6 kg (160 lb)   06/27/23 0957 73.2 kg (161 lb 6.4 oz)   06/12/23 1411 73.8 kg (162 lb 12.8 oz)   05/23/23 0951 72.8 kg (160 lb 6.4 oz)   02/17/23 1435 73.8 kg (162 lb 12.8 oz)   01/10/23 1216 72.7 kg (160 lb 3.2 oz)   10/24/22 1135 71.4 kg (157 lb 6 oz)   10/11/22 0845 71.6 kg (157 lb 14.4 oz)   08/15/22 1005 69.4 kg (153 lb)   08/08/22 1406 71.2 kg (157 lb)   07/19/22 0850 69.9 kg (154 lb 3.2 oz)   07/11/22 1049 71.1 kg (156 lb 12.8 oz)   07/01/22 0848 66.4 kg (146 lb 6.2 oz)   05/06/22 1055 67.1 kg (148 lb)   02/09/22 0950 74.4 kg (164 lb)   01/04/22 0933 70.5 kg (155 lb 6.4 oz)   12/14/21 0808 75 kg (165 lb 6.4 oz)   12/01/21 1402 75.7 kg (166 lb 12.8 oz)   11/05/21 0939 75.5 kg (166 lb 6.4 oz)          Wt Change Observation -14.2% x 6 months     Estimated/Assessed Needs  Estimated Needs based on: Current Body Weight       Energy Requirements 30 kcal/kg    EST Needs (kcal/day) 1890 kcal       Protein Requirements 1.0-1.2 g/kg   EST Daily Needs (g/day) 63-76 g       Fluid Requirements 1 ml/kcal    " Estimated Needs (mL/day) 1890 ml     Labs/Medications         Pertinent Labs Reviewed.   Results from last 7 days   Lab Units 10/16/24  0517   SODIUM mmol/L 140   POTASSIUM mmol/L 3.9   CHLORIDE mmol/L 106   CO2 mmol/L 24.0   BUN mg/dL 13   CREATININE mg/dL 1.01   CALCIUM mg/dL 9.4   BILIRUBIN mg/dL 0.4   ALK PHOS U/L 102   ALT (SGPT) U/L 8   AST (SGOT) U/L 15   GLUCOSE mg/dL 98     Results from last 7 days   Lab Units 10/16/24  0517   MAGNESIUM mg/dL 1.5*   PHOSPHORUS mg/dL 3.3   HEMOGLOBIN g/dL 11.0*   HEMATOCRIT % 35.2*     COVID19   Date Value Ref Range Status   10/21/2024 Not Detected Not Detected - Ref. Range Final     Lab Results   Component Value Date    HGBA1C 7.00 (H) 05/30/2024         Pertinent Medications Reviewed.     Malnutrition Severity Assessment      Patient meets criteria for : Severe Malnutrition         Nutrition Diagnosis         Nutrition Dx Problem 1 Severe malnutrition related to Inability to consume sufficient energy as evidenced by decreased appetite., unintended weight change., body composition changes., and patient report.     Nutrition Intervention           Current Nutrition Orders & Evaluation of Intake       Current PO Diet Diet: Diabetic; Consistent Carbohydrate; Fluid Consistency: Thin (IDDSI 0)   Supplement Orders Placed This Encounter      Dietary Nutrition Supplements Boost Plus (Ensure Plus)      Snack: Cottage cheese and peaches      Snack: Fruit plate           Nutrition Intervention/Prescription        -Boost Plus TID (+360 kcal, 14 g pro each)  -cottage cheese and peaches AM snack  -fruit plate PM snack        Medical Nutrition Therapy/Nutrition Education          Learner     Readiness Patient  Acceptance     Method     Response Explanation  Verbalizes understanding     Monitor/Evaluation        Monitor Per protocol, PO intake, Supplement intake, Pertinent labs, Weight, POC/GOC     Nutrition Discharge Plan         Recommend to continue oral nutrition supplements on  discharge.      Electronically signed by:  Ilene Couch RD  10/22/24 13:38 EDT

## 2024-10-22 NOTE — THERAPY TREATMENT NOTE
SNF - Physical Therapy Treatment Note  RAK[facility]     Patient Name: Darci Munson  : 1935  MRN: [id number]  Today's Date: 10/22/2024      Visit Dx:    ICD-10-CM ICD-9-CM   1. Decreased activities of daily living (ADL)  Z78.9 V49.89   2. Difficulty walking  R26.2 719.7     Patient Active Problem List   Diagnosis    Type 2 diabetes mellitus with hyperglycemia, without long-term current use of insulin    Chronic kidney disease    Hypothyroidism    Hypertensive disorder    Hyperlipidemia    Paroxysmal atrial fibrillation    Dyspepsia    Coronary arteriosclerosis    Gastroparesis    Hearing loss    Mitral valve regurgitation    Peripheral neuropathy    Prostate CA    Primary insomnia    Primary osteoarthritis of left knee    Anticoagulated    Vitamin D insufficiency    Iron deficiency anemia    Medicare annual wellness visit, subsequent    Thickened nails    Pain in toes of both feet    History of prostate cancer    Stage 3b chronic kidney disease    Chronic bilateral low back pain without sciatica    Acute renal failure    Intractable nausea and vomiting    Abdominal pain    Physical debility    Severe malnutrition     Past Medical History:   Diagnosis Date    Anemia, unspecified     Atherosclerotic heart disease of native coronary artery without angina pectoris     CAD (coronary artery disease)     CKD (chronic kidney disease), stage III     Essential (primary) hypertension     Gastric ulcer, unspecified as acute or chronic, without hemorrhage or perforation     Gastroparesis     GERD without esophagitis     Glaucoma     Hereditary and idiopathic neuropathy, unspecified     History of falling     HLD (hyperlipidemia)     HTN (hypertension)     Hypotension, unspecified     Hypothyroidism     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    Irritable bowel syndrome without diarrhea     Laceration without foreign body of right forearm, initial encounter     Low back pain     Malignant neoplasm of prostate     Mixed hyperlipidemia      OA (osteoarthritis)     Other specified disorders of the skin and subcutaneous tissue     Pain in left foot     Peripheral vascular disease, unspecified     Phimosis     Presence of unspecified artificial knee joint     Primary insomnia     Primary osteoarthritis of both knees     Prostate cancer     Sensorineural hearing loss (SNHL) of both ears     Sigmoid diverticulosis     severe sigmoid and descending colon diverticulosis and 3+ gastritis    Sleep related leg cramps     Type 2 diabetes mellitus with diabetic polyneuropathy     and gastroparesis    Unspecified atrial fibrillation     Unspecified dementia without behavioral disturbance     Unspecified hearing loss, bilateral      Past Surgical History:   Procedure Laterality Date    CATARACT EXTRACTION Bilateral 2014    COLONOSCOPY      COLONOSCOPY N/A 7/1/2022    Procedure: COLONOSCOPY WITH POLYPECTOMIES, HOT SNARE;  Surgeon: Jennifer Mann MD;  Location: formerly [facility] ENDOSCOPY;  Service: Gastroenterology;  Laterality: N/A;  DIVERTICULOSIS, COLON POLYPS, HEMORRHOIDS    ENDOSCOPY N/A 7/1/2022    Procedure: ESOPHAGOGASTRODUODENOSCOPY WITH BX, POLYPECTOMY;  Surgeon: Jennifer Mann MD;  Location: formerly [facility] ENDOSCOPY;  Service: Gastroenterology;  Laterality: N/A;  GASTRIC POLYP, GASTRITIS, HIATAL HERNIA    HAND SURGERY Right     JOINT REPLACEMENT      KNEE ARTHROSCOPY Right 03/14/2017    PROSTATECTOMY      REPLACEMENT TOTAL KNEE  03/2017    UPPER GASTROINTESTINAL ENDOSCOPY         PT Assessment (Last 12 Hours)       PT Evaluation and Treatment       Row Name 10/22/24 1100          Physical Therapy Time and Intention    Subjective Information no complaints  -CS     Document Type therapy note (daily note)  -CS     Mode of Treatment individual therapy;physical therapy  -CS     Patient Effort good  -CS     Symptoms Noted During/After Treatment fatigue  -CS       Row Name 10/22/24 1100          Pain    Pretreatment Pain Rating 0/10 - no pain  -CS      Posttreatment Pain Rating 0/10 - no pain  -       Row Name 10/22/24 1100          Cognition    Orientation Status (Cognition) oriented x 3  -CS       Row Name 10/22/24 1100          Transfers    Transfers sit-stand transfer;stand-sit transfer  -CS       Row Name 10/22/24 1100          Sit-Stand Transfer    Sit-Stand Wetzel (Transfers) verbal cues;standby assist  -     Assistive Device (Sit-Stand Transfers) walker, front-wheeled  -CS       Row Name 10/22/24 1100          Stand-Sit Transfer    Stand-Sit Wetzel (Transfers) verbal cues;standby assist  -     Assistive Device (Stand-Sit Transfers) walker, front-wheeled  -CS       Row Name 10/22/24 1100          Gait/Stairs (Locomotion)    Gait/Stairs Locomotion gait/ambulation assistive device  -     Wetzel Level (Gait) verbal cues;standby assist;contact guard  -     Assistive Device (Gait) walker, front-wheeled  -CS     Patient was able to Ambulate yes  -CS     Distance in Feet (Gait) 125  x2  -CS     Pattern (Gait) 4-point;step-through  -CS     Deviations/Abnormal Patterns (Gait) gait speed decreased;stride length decreased  -       Row Name 10/22/24 1100          Safety Issues/Impairments Affecting Functional Mobility    Impairments Affecting Function (Mobility) balance;endurance/activity tolerance;strength  -       Row Name 10/22/24 1100          Balance    Balance Interventions occupation based/functional task;weight shifting activity;UE activity with balance activity;sit to stand;supported;static;dynamic;foam;dynamic reaching  -     Comment, Balance Pt worked on obstacle avoidance, stepping over varying heights of cones, and different surfaces to focus on safety with assistive device and safety in the home and community environment to help reduce falls risk.  -       Row Name 10/22/24 1100          Motor Skills    Therapeutic Exercise hip;knee;ankle;aerobic  -       Row Name 10/22/24 1100          Hip (Therapeutic Exercise)     Hip (Therapeutic Exercise) strengthening exercise  -CS     Hip Strengthening (Therapeutic Exercise) bilateral;marching while standing;marching while seated;mini squats;step ups;flexion;extension;aBduction;aDduction;2 lb free weight;15 repititions;2 sets  -CS       Row Name 10/22/24 1100          Aerobic Exercise    Type (Aerobic Exercise) other (see comments)  NuStep  -CS     Time Performed (Aerobic Exercise) x16 minutes level 5  -CS       Row Name             Wound 10/11/24 0332 gluteal    Wound - Properties Group Placement Date: 10/11/24  -YUMIKO Placement Time: 0332  -YUMIKO Location: gluteal  -YUMIKO    Retired Wound - Properties Group Placement Date: 10/11/24  -YUMIKO Placement Time: 0332  -YUMIKO Location: gluteal  -YUMIKO    Retired Wound - Properties Group Placement Date: 10/11/24  -YUMIKO Placement Time: 0332 -YUMIKO Location: gluteal  -YUMIKO    Retired Wound - Properties Group Date first assessed: 10/11/24  -YUMIKO Time first assessed: 0332  -YUMIKO Location: gluteal  -YUMIKO      Row Name 10/22/24 1100          Plan of Care Review    Plan of Care Reviewed With patient  -CS     Progress improving  -CS     Outcome Evaluation Continue plan of care  -CS       Row Name 10/22/24 1100          Positioning and Restraints    Pre-Treatment Position sitting in chair/recliner  -CS     Post Treatment Position chair  -CS     In Chair reclined;call light within reach;encouraged to call for assist;exit alarm on  -CS       Row Name 10/22/24 1100          Progress Summary (PT)    Progress Toward Functional Goals (PT) progress toward functional goals is good  -CS               User Key  (r) = Recorded By, (t) = Taken By, (c) = Cosigned By      Initials Name Provider Type    Jimbo Islas, RN Registered Nurse    Josefina Correa, PT Physical Therapist                  Section G              Section GG  Functional Ability/Goals, Adm (Section GG)  Indoor Mobility - Ambulation, Prior Function (UR5707M): 3. Independent  Stairs, Prior Function (IB4469N): 3.  Independent  Prior Device Use (SI1726): none of the above (Z)  Lower Extremity Range of Motion (KX2528N): Impairment on one side     Mobility, Admission Performance (ES2450)  Roll Left & Right: Mobility Admission Performance (EL1631A2): supervision or touching assistance (04)  Sit to Lying: Mobility Admission Performance (KX8363R9): supervision or touching assistance (04)  Lying to Sitting, Side of Bed: Mobility Admission Performance (AS5678K2): supervision or touching assistance (04)  Sit to Stand: Mobility Admission Performance (WB4659E1): supervision or touching assistance (04)  Chair/Bed-Chair Transfer: Mobility Admission Performance (AR7330J6): supervision or touching assistance (04)  Car Transfer: Mobility Admission Performance (XH3548R3): not attempted due to environmental limitations (10)  Walk 10 Feet: Mobility Admission Performance (EO1702P9): supervision or touching assistance (04)  Walk 50 Feet With Two Turns: Mobility Admission Performance (HD1418S2): supervision or touching assistance (04)  Walk 150 Feet: Mobility Admission Performance (WK8972K6): not attempted, medical condition/safety concern (88)  Walk 10 Ft, Uneven Surfaces: Mobility Admission Performance (DW9284R7): not applicable (09)  1 Step/Curb: Mobility Admission Performance (JL4776D5): not attempted, medical condition/safety concern (88)  4 Steps: Mobility Admission Performance (UP8361S0): not attempted, medical condition/safety concern (88)  12 Steps: Mobility Admission Performance (WN1019L3): not applicable (09)  Picking up object: Mobility Admission Performance (FL1592B3): not attempted, medical condition/safety concern (88)  Use a Wheelchair and/or Scooter: Mobility (TL6432N1): no (0)     RETIRED Mobility (GG), Discharge Goal (QM8481)  Use a Wheelchair and/or Scooter: Mobility (BU2417Q4): no (0)     Mobility, Discharge Performance (FD6138)  Use a Wheelchair and/or Scooter: Mobility (GO6647U0): no (0)  Physical Therapy Education        Title: PT OT SLP Therapies (In Progress)       Topic: Physical Therapy (Not Started)       Point: Mobility training (Not Started)       Learner Progress:  Not documented in this visit.              Point: Home exercise program (Not Started)       Learner Progress:  Not documented in this visit.              Point: Body mechanics (Not Started)       Learner Progress:  Not documented in this visit.              Point: Precautions (Not Started)       Learner Progress:  Not documented in this visit.                                  PT Recommendation and Plan  Anticipated Discharge Disposition (PT): home with home health, home with assist  Planned Therapy Interventions (PT): balance training, bed mobility training, gait training, strengthening, transfer training  Therapy Frequency (PT): 6 times/wk  Progress Summary (PT)  Progress Toward Functional Goals (PT): progress toward functional goals is good  Plan of Care Reviewed With: patient  Progress: improving  Outcome Evaluation: Continue plan of care   Outcome Measures       Row Name 10/21/24 0833             How much help from another person do you currently need...    Turning from your back to your side while in flat bed without using bedrails? 3  -WM      Moving from lying on back to sitting on the side of a flat bed without bedrails? 3  -WM      Moving to and from a bed to a chair (including a wheelchair)? 3  -WM      Standing up from a chair using your arms (e.g., wheelchair, bedside chair)? 3  -WM      Climbing 3-5 steps with a railing? 2  -WM      To walk in hospital room? 3  -WM      AM-PAC 6 Clicks Score (PT) 17  -WM                User Key  (r) = Recorded By, (t) = Taken By, (c) = Cosigned By      Initials Name Provider Type    Marquise Ramirez PTA Physical Therapist Assistant                     Time Calculation:    PT Charges       Row Name 10/22/24 1109             Time Calculation    PT Received On 10/22/24  -         Timed Charges    97439 - PT  Therapeutic Exercise Minutes 32  -CS      08899 - Gait Training Minutes  12  -CS      45022 - PT Therapeutic Activity Minutes 10  -CS         SNF Physical Therapy Minutes    Skilled Minutes- PT 54 min  -CS         Total Minutes    Timed Charges Total Minutes 54  -CS       Total Minutes 54  -CS                User Key  (r) = Recorded By, (t) = Taken By, (c) = Cosigned By      Initials Name Provider Type    CS Josefina Ramires, PT Physical Therapist                  Therapy Charges for Today       Code Description Service Date Service Provider Modifiers Qty    02365477770 HC PT THER PROC EA 15 MIN 10/22/2024 Josefina Ramires, PT GP 2    [id number] HC GAIT TRAINING EA 15 MIN 10/22/2024 Josefina Ramires, PT GP 1    [id number]  PT THERAPEUTIC ACT EA 15 MIN 10/22/2024 Josefina Ramires, PT GP 1            PT G-Codes  Outcome Measure Options: AM-PAC 6 Clicks Daily Activity (OT), Optimal Instrument  AM-PAC 6 Clicks Score (PT): 17  AM-PAC 6 Clicks Score (OT): 21    Josefina Ramires PT  10/22/2024

## 2024-10-22 NOTE — PLAN OF CARE
Goal Outcome Evaluation:           Progress: improving  Outcome Evaluation: Patient is alert and oriented x4. Given PRN Systane eye drops at 1037 for c/o dry eyes. Med effective. Patient offered covid vaccine today. At first he stated he wanted the vaccine, but then later changed his mind and refused it. Voices needs. Denies pain. Con't current POC.

## 2024-10-22 NOTE — THERAPY TREATMENT NOTE
SNF - Occupational Therapy Treatment Note  RAKEL Alcaraz    Patient Name: Darci Munson  : 1935    MRN: [id number]                              Today's Date: 10/22/2024       Admit Date: 10/15/2024    Visit Dx:     ICD-10-CM ICD-9-CM   1. Decreased activities of daily living (ADL)  Z78.9 V49.89   2. Difficulty walking  R26.2 719.7     Patient Active Problem List   Diagnosis    Type 2 diabetes mellitus with hyperglycemia, without long-term current use of insulin    Chronic kidney disease    Hypothyroidism    Hypertensive disorder    Hyperlipidemia    Paroxysmal atrial fibrillation    Dyspepsia    Coronary arteriosclerosis    Gastroparesis    Hearing loss    Mitral valve regurgitation    Peripheral neuropathy    Prostate CA    Primary insomnia    Primary osteoarthritis of left knee    Anticoagulated    Vitamin D insufficiency    Iron deficiency anemia    Medicare annual wellness visit, subsequent    Thickened nails    Pain in toes of both feet    History of prostate cancer    Stage 3b chronic kidney disease    Chronic bilateral low back pain without sciatica    Acute renal failure    Intractable nausea and vomiting    Abdominal pain    Physical debility    Severe malnutrition     Past Medical History:   Diagnosis Date    Anemia, unspecified     Atherosclerotic heart disease of native coronary artery without angina pectoris     CAD (coronary artery disease)     CKD (chronic kidney disease), stage III     Essential (primary) hypertension     Gastric ulcer, unspecified as acute or chronic, without hemorrhage or perforation     Gastroparesis     GERD without esophagitis     Glaucoma     Hereditary and idiopathic neuropathy, unspecified     History of falling     HLD (hyperlipidemia)     HTN (hypertension)     Hypotension, unspecified     Hypothyroidism     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    Irritable bowel syndrome without diarrhea     Laceration without foreign body of right forearm, initial encounter     Low back pain      Malignant neoplasm of prostate     Mixed hyperlipidemia     OA (osteoarthritis)     Other specified disorders of the skin and subcutaneous tissue     Pain in left foot     Peripheral vascular disease, unspecified     Phimosis     Presence of unspecified artificial knee joint     Primary insomnia     Primary osteoarthritis of both knees     Prostate cancer     Sensorineural hearing loss (SNHL) of both ears     Sigmoid diverticulosis     severe sigmoid and descending colon diverticulosis and 3+ gastritis    Sleep related leg cramps     Type 2 diabetes mellitus with diabetic polyneuropathy     and gastroparesis    Unspecified atrial fibrillation     Unspecified dementia without behavioral disturbance     Unspecified hearing loss, bilateral      Past Surgical History:   Procedure Laterality Date    CATARACT EXTRACTION Bilateral 2014    COLONOSCOPY      COLONOSCOPY N/A 7/1/2022    Procedure: COLONOSCOPY WITH POLYPECTOMIES, HOT SNARE;  Surgeon: Jennifer Mann MD;  Location: [facility] ENDOSCOPY;  Service: Gastroenterology;  Laterality: N/A;  DIVERTICULOSIS, COLON POLYPS, HEMORRHOIDS    ENDOSCOPY N/A 7/1/2022    Procedure: ESOPHAGOGASTRODUODENOSCOPY WITH BX, POLYPECTOMY;  Surgeon: Jennifer Mann MD;  Location: [facility] ENDOSCOPY;  Service: Gastroenterology;  Laterality: N/A;  GASTRIC POLYP, GASTRITIS, HIATAL HERNIA    HAND SURGERY Right     JOINT REPLACEMENT      KNEE ARTHROSCOPY Right 03/14/2017    PROSTATECTOMY      REPLACEMENT TOTAL KNEE  03/2017    UPPER GASTROINTESTINAL ENDOSCOPY        General Information       Row Name 10/22/24 1524          OT Time and Intention    Document Type therapy note (daily note)  -SC     Mode of Treatment individual therapy;occupational therapy  -SC     Patient Effort good  -SC       Row Name 10/22/24 1522          General Information    Patient Profile Reviewed yes  -SC     Existing Precautions/Restrictions fall  -SC       Row Name 10/22/24 1522          Cognition     Orientation Status (Cognition) oriented x 3  -SC       Row Name 10/22/24 1524          Safety Issues/Impairments Affecting Functional Mobility    Impairments Affecting Function (Mobility) balance;endurance/activity tolerance;strength  -SC               User Key  (r) = Recorded By, (t) = Taken By, (c) = Cosigned By      Initials Name Provider Type    SC Xuan Contreras OT Occupational Therapist                     Mobility/ADL's       Row Name 10/22/24 1524          Sit-Stand Transfer    Sit-Stand [place] (Transfers) verbal cues;standby assist  -SC     Assistive Device (Sit-Stand Transfers) walker, front-wheeled  -SC     Comment, (Sit-Stand Transfer) STS x 12 [facility]       Row Name 10/22/24 1524          Stand-Sit Transfer    Stand-Sit [place] (Transfers) verbal cues;standby assist  -SC     Assistive Device (Stand-Sit Transfers) walker, front-wheeled  -SC       Row Name 10/22/24 1524          Functional Mobility    Functional Mobility- Ind. Level supervision required  -SC     Functional Mobility- Device walker, front-wheeled  -SC     Functional Mobility- Comment Engaged patientin functional ambulation from bedroom to gym at [facility]  with good safety noted.  -SC               User Key  (r) = Recorded By, (t) = Taken By, (c) = Cosigned By      Initials Name Provider Type    SC Xuan Contreras OT Occupational Therapist                   Obj/Interventions       Row Name 10/22/24 1526          Shoulder (Therapeutic Exercise)    Shoulder (Therapeutic Exercise) strengthening exercise  -SC     Shoulder Strengthening (Therapeutic Exercise) 3 lb free weight;bilateral;flexion;extension;horizontal aBduction/aDduction;sitting;standing;2 sets;15 repititions  Upgraded UB strength exercise to standing position for simultaneous work on strength and balance at [facility]  -SC       Row Name 10/22/24 1526          Elbow/Forearm (Therapeutic Exercise)    Elbow/Forearm (Therapeutic Exercise) strengthening exercise  -SC      Elbow/Forearm Strengthening (Therapeutic Exercise) 3 lb free weight;15 repititions;2 sets;bilateral;flexion;extension;sitting;standing  -SC       Row Name 10/22/24 1526          Motor Skills    Functional Endurance omni cycle power level 4 for improved cardiovscular endurance/pacing.  -SC     Therapeutic Exercise shoulder;elbow/forearm  -SC       Row Name 10/22/24 1526          Balance    Balance Interventions moderate challenge;highly challenging;standing;dynamic  -SC     Comment, Balance Engaged patient in dynamic balance activities  for improved postural stability with use of secondary activity for weight shift and improved coordination. Activities included picking up cones and stacking from standing position, and retreival of bean bags and tossing with step. Additonal balance exercise for improved dyanmic balance included toe taps with light  bilateral UE support x 15 reps x 2 sets.  -SC               User Key  (r) = Recorded By, (t) = Taken By, (c) = Cosigned By      Initials Name Provider Type    SC Xuan Contreras OT Occupational Therapist                   Goals/Plan    No documentation.                  Clinical Impression       Row Name 10/22/24 1533          Pain Assessment    Pretreatment Pain Rating 0/10 - no pain  -SC     Posttreatment Pain Rating 0/10 - no pain  -SC       Row Name 10/22/24 1533          Plan of Care Review    Plan of Care Reviewed With patient  -SC     Progress improving  -SC     Outcome Evaluation Patient continues to make steady progress. He demonstrates improved activity tolerance this date with improved dynamic balance evidence by no LOB with moderate balance challenge. He does continue to require  for rest breaks and pacing self for safe mobility and fall prevention. He will continue to benefit from skilled services with focus on IADLs.  -SC       Row Name 10/22/24 1530          Positioning and Restraints    Pre-Treatment Position sitting in chair/recliner  -SC     Post  Treatment Position chair  -SC     In Chair call light within reach;encouraged to call for assist;exit alarm on  -SC               User Key  (r) = Recorded By, (t) = Taken By, (c) = Cosigned By      Initials Name Provider Type    Xuan Freed OT Occupational Therapist                   Outcome Measures       Row Name 10/22/24 1400          How much help from another person do you currently need...    Turning from your back to your side while in flat bed without using bedrails? 3  -MT     Moving from lying on back to sitting on the side of a flat bed without bedrails? 3  -MT     Moving to and from a bed to a chair (including a wheelchair)? 3  -MT     Standing up from a chair using your arms (e.g., wheelchair, bedside chair)? 3  -MT     Climbing 3-5 steps with a railing? 2  -MT     To walk in hospital room? 3  -MT     AM-PAC 6 Clicks Score (PT) 17  -MT     Highest Level of Mobility Goal 5 --> Static standing  -MT               User Key  (r) = Recorded By, (t) = Taken By, (c) = Cosigned By      Initials Name Provider Type    Brianna Gates LPN Licensed Nurse                  Section G              Section GG                         Occupational Therapy Education       Title: PT OT SLP Therapies (In Progress)       Topic: Occupational Therapy (In Progress)       Point: ADL training (Done)       Description:   Instruct learner(s) on proper safety adaptation and remediation techniques during self care or transfers.   Instruct in proper use of assistive devices.                  Learning Progress Summary            Patient Acceptance, E, VU,NR by  at 10/16/2024 1829                      Point: Home exercise program (Not Started)       Description:   Instruct learner(s) on appropriate technique for monitoring, assisting and/or progressing therapeutic exercises/activities.                  Learner Progress:  Not documented in this visit.              Point: Precautions (Done)       Description:   Instruct  learner(s) on prescribed precautions during self-care and functional transfers.                  Learning Progress Summary            Patient Acceptance, E, VU,NR by  at 10/16/2024 0925                      Point: Body mechanics (Done)       Description:   Instruct learner(s) on proper positioning and spine alignment during self-care, functional mobility activities and/or exercises.                  Learning Progress Summary            Patient Acceptance, E, VU,NR by  at 10/16/2024 0925                                      User Key       Initials Effective Dates Name Provider Type [facility] 06/16/21 -  Ruth Islas OT Occupational Therapist OT                  OT Recommendation and Plan     Plan of Care Review  Plan of Care Reviewed With: patient  Progress: improving  Outcome Evaluation: Patient continues to make steady progress. He demonstrates improved activity tolerance this date with improved dynamic balance evidence by no LOB with moderate balance challenge. He does continue to require vc for rest breaks and pacing self for safe mobility and fall prevention. He will continue to benefit from skilled services with focus on IADLs.     Time Calculation:         Time Calculation- OT       Row Name 10/22/24 1536 10/22/24 1518 10/22/24 1105       Time Calculation- OT    OT Received On -- 10/22/24  -SC --       Timed Charges    63695 - OT Therapeutic Exercise Minutes 35  -SC -- --    36939 - Gait Training Minutes  -- -- 12  -CS    66987 - OT Therapeutic Activity Minutes 25  -SC -- --       SNF Occupational Therapy Minutes    Skilled Minutes- OT 60 min  -SC -- --       Total Minutes    Timed Charges Total Minutes 60  -SC -- 12  -CS     Total Minutes 60  -SC -- 12  -CS              User Key  (r) = Recorded By, (t) = Taken By, (c) = Cosigned By      Initials Name Provider Type    CS Josefina Ramires, PT Physical Therapist    Xuan Freed, OT Occupational Therapist                  Therapy Charges for Today        Code Description Service Date Service Provider Modifiers Qty    16873155678 HC OT SELF CARE/MGMT/TRAIN EA 15 MIN 10/21/2024 Xuan Contreras OT GO 1    44807690236 HC OT THERAPEUTIC ACT EA 15 MIN 10/21/2024 Xuan Contreras OT GO 1    68842652363 HC OT THER PROC EA 15 MIN 10/21/2024 Xuan Contreras OT GO 2    16520131966 HC OT THERAPEUTIC ACT EA 15 MIN 10/22/2024 Xuan Contreras OT GO 2    [id number]  OT THER PROC EA 15 MIN 10/22/2024 Xuan Contreras OT GO 2                 Xaun Contreras OT  10/22/2024

## 2024-10-23 LAB
GLUCOSE BLDC GLUCOMTR-MCNC: 134 MG/DL (ref 70–99)
GLUCOSE BLDC GLUCOMTR-MCNC: 159 MG/DL (ref 70–99)
GLUCOSE BLDC GLUCOMTR-MCNC: 251 MG/DL (ref 70–99)
GLUCOSE BLDC GLUCOMTR-MCNC: 96 MG/DL (ref 70–99)

## 2024-10-23 PROCEDURE — 82948 REAGENT STRIP/BLOOD GLUCOSE: CPT | Performed by: FAMILY MEDICINE

## 2024-10-23 PROCEDURE — 82948 REAGENT STRIP/BLOOD GLUCOSE: CPT

## 2024-10-23 PROCEDURE — 63710000001 ONDANSETRON ODT 4 MG TABLET DISPERSIBLE: Performed by: PHYSICIAN ASSISTANT

## 2024-10-23 PROCEDURE — 97530 THERAPEUTIC ACTIVITIES: CPT

## 2024-10-23 PROCEDURE — 63710000001 INSULIN GLARGINE PER 5 UNITS: Performed by: FAMILY MEDICINE

## 2024-10-23 PROCEDURE — 63710000001 INSULIN LISPRO (HUMAN) PER 5 UNITS: Performed by: FAMILY MEDICINE

## 2024-10-23 RX ORDER — ACETAMINOPHEN 325 MG/1
650 TABLET ORAL EVERY 6 HOURS PRN
Status: DISCONTINUED | OUTPATIENT
Start: 2024-10-23 | End: 2024-11-06 | Stop reason: HOSPADM

## 2024-10-23 RX ADMIN — APIXABAN 5 MG: 5 TABLET, FILM COATED ORAL at 20:38

## 2024-10-23 RX ADMIN — MIDODRINE HYDROCHLORIDE 5 MG: 5 TABLET ORAL at 11:57

## 2024-10-23 RX ADMIN — MIDODRINE HYDROCHLORIDE 5 MG: 5 TABLET ORAL at 17:29

## 2024-10-23 RX ADMIN — INSULIN GLARGINE 15 UNITS: 100 INJECTION, SOLUTION SUBCUTANEOUS at 20:38

## 2024-10-23 RX ADMIN — Medication 250 MG: at 08:50

## 2024-10-23 RX ADMIN — LATANOPROST 1 DROP: 50 SOLUTION OPHTHALMIC at 20:39

## 2024-10-23 RX ADMIN — ACETAMINOPHEN 650 MG: 325 TABLET ORAL at 12:27

## 2024-10-23 RX ADMIN — GABAPENTIN 100 MG: 100 CAPSULE ORAL at 20:38

## 2024-10-23 RX ADMIN — APIXABAN 5 MG: 5 TABLET, FILM COATED ORAL at 08:50

## 2024-10-23 RX ADMIN — INSULIN LISPRO 2 UNITS: 100 INJECTION, SOLUTION INTRAVENOUS; SUBCUTANEOUS at 11:57

## 2024-10-23 RX ADMIN — INSULIN LISPRO 6 UNITS: 100 INJECTION, SOLUTION INTRAVENOUS; SUBCUTANEOUS at 20:38

## 2024-10-23 RX ADMIN — ONDANSETRON 4 MG: 4 TABLET, ORALLY DISINTEGRATING ORAL at 14:58

## 2024-10-23 RX ADMIN — LEVOTHYROXINE SODIUM 200 MCG: 0.1 TABLET ORAL at 05:21

## 2024-10-23 RX ADMIN — GABAPENTIN 100 MG: 100 CAPSULE ORAL at 08:50

## 2024-10-23 RX ADMIN — Medication 250 MG: at 20:38

## 2024-10-23 RX ADMIN — AMIODARONE HYDROCHLORIDE 200 MG: 200 TABLET ORAL at 08:50

## 2024-10-23 RX ADMIN — MIDODRINE HYDROCHLORIDE 5 MG: 5 TABLET ORAL at 08:01

## 2024-10-23 NOTE — THERAPY TREATMENT NOTE
SNF - Occupational Therapy Treatment Note  RAKEL Alcaraz    Patient Name: Darci Munson  : 1935    MRN: [id number]                              Today's Date: 10/23/2024       Admit Date: 10/15/2024    Visit Dx:     ICD-10-CM ICD-9-CM   1. Decreased activities of daily living (ADL)  Z78.9 V49.89   2. Difficulty walking  R26.2 719.7     Patient Active Problem List   Diagnosis    Type 2 diabetes mellitus with hyperglycemia, without long-term current use of insulin    Chronic kidney disease    Hypothyroidism    Hypertensive disorder    Hyperlipidemia    Paroxysmal atrial fibrillation    Dyspepsia    Coronary arteriosclerosis    Gastroparesis    Hearing loss    Mitral valve regurgitation    Peripheral neuropathy    Prostate CA    Primary insomnia    Primary osteoarthritis of left knee    Anticoagulated    Vitamin D insufficiency    Iron deficiency anemia    Medicare annual wellness visit, subsequent    Thickened nails    Pain in toes of both feet    History of prostate cancer    Stage 3b chronic kidney disease    Chronic bilateral low back pain without sciatica    Acute renal failure    Intractable nausea and vomiting    Abdominal pain    Physical debility    Severe malnutrition     Past Medical History:   Diagnosis Date    Anemia, unspecified     Atherosclerotic heart disease of native coronary artery without angina pectoris     CAD (coronary artery disease)     CKD (chronic kidney disease), stage III     Essential (primary) hypertension     Gastric ulcer, unspecified as acute or chronic, without hemorrhage or perforation     Gastroparesis     GERD without esophagitis     Glaucoma     Hereditary and idiopathic neuropathy, unspecified     History of falling     HLD (hyperlipidemia)     HTN (hypertension)     Hypotension, unspecified     Hypothyroidism     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    Irritable bowel syndrome without diarrhea     Laceration without foreign body of right forearm, initial encounter     Low back pain      Malignant neoplasm of prostate     Mixed hyperlipidemia     OA (osteoarthritis)     Other specified disorders of the skin and subcutaneous tissue     Pain in left foot     Peripheral vascular disease, unspecified     Phimosis     Presence of unspecified artificial knee joint     Primary insomnia     Primary osteoarthritis of both knees     Prostate cancer     Sensorineural hearing loss (SNHL) of both ears     Sigmoid diverticulosis     severe sigmoid and descending colon diverticulosis and 3+ gastritis    Sleep related leg cramps     Type 2 diabetes mellitus with diabetic polyneuropathy     and gastroparesis    Unspecified atrial fibrillation     Unspecified dementia without behavioral disturbance     Unspecified hearing loss, bilateral      Past Surgical History:   Procedure Laterality Date    CATARACT EXTRACTION Bilateral 2014    COLONOSCOPY      COLONOSCOPY N/A 7/1/2022    Procedure: COLONOSCOPY WITH POLYPECTOMIES, HOT SNARE;  Surgeon: Jennifer Mann MD;  Location: [facility] ENDOSCOPY;  Service: Gastroenterology;  Laterality: N/A;  DIVERTICULOSIS, COLON POLYPS, HEMORRHOIDS    ENDOSCOPY N/A 7/1/2022    Procedure: ESOPHAGOGASTRODUODENOSCOPY WITH BX, POLYPECTOMY;  Surgeon: Jennifer Mann MD;  Location: [facility] ENDOSCOPY;  Service: Gastroenterology;  Laterality: N/A;  GASTRIC POLYP, GASTRITIS, HIATAL HERNIA    HAND SURGERY Right     JOINT REPLACEMENT      KNEE ARTHROSCOPY Right 03/14/2017    PROSTATECTOMY      REPLACEMENT TOTAL KNEE  03/2017    UPPER GASTROINTESTINAL ENDOSCOPY        General Information       Row Name 10/23/24 5010          OT Time and Intention    Document Type therapy note (daily note)  -ES     Mode of Treatment individual therapy;occupational therapy  -ES       Row Name 10/23/24 0068          General Information    Patient Profile Reviewed yes  -ES     Existing Precautions/Restrictions fall  -ES     Barriers to Rehab none identified  -ES       Row Name 10/23/24 4879           Cognition    Orientation Status (Cognition) oriented x 3  -ES       Row Name 10/23/24 1333          Safety Issues/Impairments Affecting Functional Mobility    Impairments Affecting Function (Mobility) balance;endurance/activity tolerance;strength  -ES               User Key  (r) = Recorded By, (t) = Taken By, (c) = Cosigned By      Initials Name Provider Type    ES Jacklyn Ogden OTR/L, YUE Occupational Therapist                     Mobility/ADL's       Row Name 10/23/24 1333          Bed Mobility    Supine-Sit [place] (Bed Mobility) not tested  -ES     Comment, (Bed Mobility) Patient met seated in recliner.  -ES       Row Name 10/23/24 1333          Transfers    Transfers sit-stand transfer;stand-sit transfer  -ES     Comment, (Transfers) Patient completed x5 sit-stands through out session in preparation for mobility with household ADLs.  -ES       Row Name 10/23/24 1333          Sit-Stand Transfer    Sit-Stand [place] (Transfers) standby assist  -ES     Assistive Device (Sit-Stand Transfers) walker, front-wheeled  -ES       Row Name 10/23/24 1333          Stand-Sit Transfer    Stand-Sit [place] (Transfers) standby assist  -ES     Assistive Device (Stand-Sit Transfers) walker, front-wheeled  -ES       Row Name 10/23/24 1333          Functional Mobility    Functional Mobility- Ind. Level contact guard assist  -ES     Functional Mobility- Device walker, front-wheeled  -ES     Functional Mobility- Comment Patient completed functional mobility from room to kitchen, within kitchen in preparation for mobility with household ADLs, and mobility to gym.  -ES               User Key  (r) = Recorded By, (t) = Taken By, (c) = Cosigned By      Initials Name Provider Type    Jacklyn Lincoln, OTR/L, YUE Occupational Therapist                   Obj/Interventions       Row Name 10/23/24 1338          Motor Skills    Motor Skills functional endurance  -ES     Functional Endurance fair, patient required multiple  seated rest breaks throught session to relieve back and leg pain. Patient completed circuit task in kitchen targeting functional endurance and balance in preparation for independence with household ADLs. Tasks included individually gathering x6 cones from fridge and placing them on counter across kitchen, individually transfering x6 cones across counter with use use side-stepping, and lastly placing cones on various heights in cabinet, reaching outside base of support. Patient required CGA for support without rolling walker. Task upgraded with use of 2lb wrist weights on BUEs and 1lb weights on BLEs, completing same task. Once cicuit tasks were complete, patient transfered x6 cones from kitchen to table, requiring CGA without the rolling walker.  -ES       Row Name 10/23/24 3096          Balance    Balance Assessment sitting dynamic balance;standing dynamic balance  -ES     Dynamic Sitting Balance standby assist  -ES     Position, Sitting Balance sitting in chair  -ES     Dynamic Standing Balance contact guard  -ES     Position/Device Used, Standing Balance supported;walker, front-wheeled  -ES     Comment, Balance In therapy gym, patient completed balance activity with added cognitive piece, by releasing/catching therapy ball on correct colored target called out by OT. Patient completed x12 reps in unsupported standing. Patient demonstrated no losses of balance. Upgraded task to throwing bean bags on correct color target in unsupported standing. Patient completed x2 trials, picking up x8 bean bags from floor and taking step with throw.  -ES               User Key  (r) = Recorded By, (t) = Taken By, (c) = Cosigned By      Initials Name Provider Type    ES Jacklyn Ogden, OTR/L, CSRS Occupational Therapist                   Goals/Plan    No documentation.                  Clinical Impression       Row Name 10/23/24 6740          Pain Assessment    Pretreatment Pain Rating 3/10  -ES     Posttreatment Pain Rating 7/10   -ES     Pain Location back;hip;knee  -ES     Pain Side/Orientation generalized;lower  -ES     Pain Management Interventions activity modification encouraged;positioning techniques utilized;nursing notified  -ES     Pre/Posttreatment Pain Comment Patient complained of pain in back and LB during session, nursing reported they would bring meds at end of session  -ES       Row Name 10/23/24 9665          Plan of Care Review    Plan of Care Reviewed With patient  -ES     Progress improving  -ES     Outcome Evaluation Patient presents AOx3 and motivated to work with therapy this date. Patient participated in functional tasks targeting endurance, strength and balance. Patient showed improvement with balance and strength during circuit task in kitchen, tolerating task without support from rolling walker and added wrist weights. Patient shows limited progress with functional endurance, requiring multiple rest breaks between tasks for pain management and recovery inhibiting his independence in ADLs. Patient would benefit from skilled OT intervention to address deficits to maximize independence in ADLs.  -ES       Row Name 10/23/24 8450          Positioning and Restraints    Pre-Treatment Position sitting in chair/recliner  -ES     Post Treatment Position chair  -ES     In Chair sitting;call light within reach;encouraged to call for assist;exit alarm on;with nsg  -ES               User Key  (r) = Recorded By, (t) = Taken By, (c) = Cosigned By      Initials Name Provider Type    Jacklyn Lincoln, OTR/L, CSRS Occupational Therapist                   Outcome Measures       Row Name 10/23/24 6585          How much help from another is currently needed...    Putting on and taking off regular lower body clothing? 3  -ES     Bathing (including washing, rinsing, and drying) 3  -ES     Toileting (which includes using toilet bed pan or urinal) 3  -ES     Putting on and taking off regular upper body clothing 4  -ES     Taking care of  personal grooming (such as brushing teeth) 4  -ES     Eating meals 4  -ES     AM-PAC 6 Clicks Score (OT) 21  -ES       Row Name 10/23/24 1402          Functional Assessment    Outcome Measure Options AM-PAC 6 Clicks Daily Activity (OT)  -ES               User Key  (r) = Recorded By, (t) = Taken By, (c) = Cosigned By      Initials Name Provider Type     Jacklyn Ogden, OTR/L, CSRS Occupational Therapist                  Section G              Section GG                         Occupational Therapy Education       Title: PT OT SLP Therapies (In Progress)       Topic: Occupational Therapy (In Progress)       Point: ADL training (Done)       Description:   Instruct learner(s) on proper safety adaptation and remediation techniques during self care or transfers.   Instruct in proper use of assistive devices.                  Learning Progress Summary            Patient Acceptance, E, VU,NR by  at 10/16/2024 0925                      Point: Home exercise program (Not Started)       Description:   Instruct learner(s) on appropriate technique for monitoring, assisting and/or progressing therapeutic exercises/activities.                  Learner Progress:  Not documented in this visit.              Point: Precautions (Done)       Description:   Instruct learner(s) on prescribed precautions during self-care and functional transfers.                  Learning Progress Summary            Patient Acceptance, E, VU,NR by  at 10/16/2024 0925                      Point: Body mechanics (Done)       Description:   Instruct learner(s) on proper positioning and spine alignment during self-care, functional mobility activities and/or exercises.                  Learning Progress Summary            Patient Acceptance, E, VU,NR by  at 10/16/2024 0925                                      User Key       Initials Effective Dates Name Provider Type Discipline     06/16/21 -  Ruth Islas OT Occupational Therapist OT                   OT Recommendation and Plan     Plan of Care Review  Plan of Care Reviewed With: patient  Progress: improving  Outcome Evaluation: Patient presents AOx3 and motivated to work with therapy this date. Patient participated in functional tasks targeting endurance, strength and balance. Patient showed improvement with balance and strength during circuit task in kitchen, tolerating task without support from rolling walker and added wrist weights. Patient shows limited progress with functional endurance, requiring multiple rest breaks between tasks for pain management and recovery inhibiting his independence in ADLs. Patient would benefit from skilled OT intervention to address deficits to maximize independence in ADLs.     Time Calculation:         Time Calculation- OT       Row Name 10/23/24 1522             Time Calculation- OT    OT Received On 10/23/24  -ES      OT Goal Re-Cert Due Date 11/15/24  -ES         Timed Charges    95483 - OT Therapeutic Activity Minutes 40  -ES         SNF Occupational Therapy Minutes    Skilled Minutes- OT 40 min  -ES         Total Minutes    Timed Charges Total Minutes 40  -ES       Total Minutes 40  -ES                User Key  (r) = Recorded By, (t) = Taken By, (c) = Cosigned By      Initials Name Provider Type    ES Jacklyn Ogden OTR/L, CSRS Occupational Therapist                  Therapy Charges for Today       Code Description Service Date Service Provider Modifiers Qty    [id number]  OT THERAPEUTIC ACT EA 15 MIN 10/23/2024 Jacklyn Ogden OTR/L, YUE GO 3                 VIRAL Garcia/L, CSRS  10/23/2024

## 2024-10-23 NOTE — PLAN OF CARE
Goal Outcome Evaluation:  Plan of Care Reviewed With: patient        Progress: improving  Outcome Evaluation: Alert and oriented and pleasant with staff. x1 assist for transfers and ambulation. x1 admin PRN pain medication, with relief of symptoms noted. x1 admin antiemetic, for c/o moderate nausea, also with relief of symptoms noted. Shows improving Mobility and endurance this shift. Sitting up in recliner,, fmaily at bedside, call light in reach.

## 2024-10-23 NOTE — PLAN OF CARE
Goal Outcome Evaluation:  Plan of Care Reviewed With: patient        Progress: improving  Outcome Evaluation: PT is AAOx4, [place], wears bilat hearing aids, VSS, no c/o of pain, SOA, N/V/D, tolerating diet, BM noted this shift, compliant with tx plan, no new concerns voiced this shift, bed/chair in low/locked position, alarm audible, call light and personal items within reach. Continue with current POC at this time.

## 2024-10-24 LAB
GLUCOSE BLDC GLUCOMTR-MCNC: 104 MG/DL (ref 70–99)
GLUCOSE BLDC GLUCOMTR-MCNC: 110 MG/DL (ref 70–99)
GLUCOSE BLDC GLUCOMTR-MCNC: 207 MG/DL (ref 70–99)
GLUCOSE BLDC GLUCOMTR-MCNC: 261 MG/DL (ref 70–99)
QT INTERVAL: 446 MS
QTC INTERVAL: 561 MS
SARS-COV-2 RNA RESP QL NAA+PROBE: NOT DETECTED

## 2024-10-24 PROCEDURE — 82948 REAGENT STRIP/BLOOD GLUCOSE: CPT | Performed by: FAMILY MEDICINE

## 2024-10-24 PROCEDURE — 97530 THERAPEUTIC ACTIVITIES: CPT

## 2024-10-24 PROCEDURE — 82948 REAGENT STRIP/BLOOD GLUCOSE: CPT

## 2024-10-24 PROCEDURE — 63710000001 INSULIN GLARGINE PER 5 UNITS: Performed by: FAMILY MEDICINE

## 2024-10-24 PROCEDURE — 97110 THERAPEUTIC EXERCISES: CPT

## 2024-10-24 PROCEDURE — 97116 GAIT TRAINING THERAPY: CPT

## 2024-10-24 PROCEDURE — 63710000001 INSULIN LISPRO (HUMAN) PER 5 UNITS: Performed by: FAMILY MEDICINE

## 2024-10-24 PROCEDURE — 87635 SARS-COV-2 COVID-19 AMP PRB: CPT | Performed by: PHYSICIAN ASSISTANT

## 2024-10-24 RX ADMIN — INSULIN LISPRO 6 UNITS: 100 INJECTION, SOLUTION INTRAVENOUS; SUBCUTANEOUS at 20:56

## 2024-10-24 RX ADMIN — INSULIN LISPRO 4 UNITS: 100 INJECTION, SOLUTION INTRAVENOUS; SUBCUTANEOUS at 11:57

## 2024-10-24 RX ADMIN — LEVOTHYROXINE SODIUM 200 MCG: 0.1 TABLET ORAL at 06:10

## 2024-10-24 RX ADMIN — INSULIN GLARGINE 15 UNITS: 100 INJECTION, SOLUTION SUBCUTANEOUS at 20:56

## 2024-10-24 RX ADMIN — LATANOPROST 1 DROP: 50 SOLUTION OPHTHALMIC at 21:00

## 2024-10-24 RX ADMIN — MIDODRINE HYDROCHLORIDE 5 MG: 5 TABLET ORAL at 11:57

## 2024-10-24 RX ADMIN — APIXABAN 5 MG: 5 TABLET, FILM COATED ORAL at 20:56

## 2024-10-24 RX ADMIN — Medication 250 MG: at 20:58

## 2024-10-24 RX ADMIN — APIXABAN 5 MG: 5 TABLET, FILM COATED ORAL at 09:15

## 2024-10-24 RX ADMIN — CALCIUM CARBONATE 2 TABLET: 500 TABLET, CHEWABLE ORAL at 20:55

## 2024-10-24 RX ADMIN — ACETAMINOPHEN 650 MG: 325 TABLET ORAL at 07:09

## 2024-10-24 RX ADMIN — AMIODARONE HYDROCHLORIDE 200 MG: 200 TABLET ORAL at 09:16

## 2024-10-24 RX ADMIN — GABAPENTIN 100 MG: 100 CAPSULE ORAL at 20:56

## 2024-10-24 RX ADMIN — MIDODRINE HYDROCHLORIDE 5 MG: 5 TABLET ORAL at 07:47

## 2024-10-24 RX ADMIN — MIDODRINE HYDROCHLORIDE 5 MG: 5 TABLET ORAL at 17:49

## 2024-10-24 RX ADMIN — CALCIUM CARBONATE 2 TABLET: 500 TABLET, CHEWABLE ORAL at 12:19

## 2024-10-24 RX ADMIN — Medication 250 MG: at 09:15

## 2024-10-24 RX ADMIN — GABAPENTIN 100 MG: 100 CAPSULE ORAL at 09:16

## 2024-10-24 NOTE — PLAN OF CARE
Goal Outcome Evaluation:  Plan of Care Reviewed With: patient        Progress: improving  Outcome Evaluation: Alert and oriented and pleasant with staff. x1 assist for transfers and ambulation. x1 admin PRN pain medication, with relief of symptoms noted. New order for heating Pad recieved this shift, and pt states he has recieved relief of back symptoms with use. Sitting up in recliner, family at bedside, call light in reach.

## 2024-10-24 NOTE — PLAN OF CARE
Goal Outcome Evaluation:  Plan of Care Reviewed With: patient        Progress: improving  Outcome Evaluation: Alert and oriented x4; [place]. Able to make needs known to staff. Transfers to BR x1 person assist; using urinal at night. No requests for pain medication this shift. Assisted with turns Q 2hrs. Blood sugar 251 at bedtime; insulin administered per MAR as ordered. VSS. No compaints of nausea this shift. Call light within reach; care plan ongoing.

## 2024-10-24 NOTE — THERAPY TREATMENT NOTE
SNF - Occupational Therapy Treatment Note  RAKEL Alcaraz    Patient Name: Darci Munson  : 1935    MRN: [id number]                              Today's Date: 10/24/2024       Admit Date: 10/15/2024    Visit Dx:     ICD-10-CM ICD-9-CM   1. Decreased activities of daily living (ADL)  Z78.9 V49.89   2. Difficulty walking  R26.2 719.7     Patient Active Problem List   Diagnosis    Type 2 diabetes mellitus with hyperglycemia, without long-term current use of insulin    Chronic kidney disease    Hypothyroidism    Hypertensive disorder    Hyperlipidemia    Paroxysmal atrial fibrillation    Dyspepsia    Coronary arteriosclerosis    Gastroparesis    Hearing loss    Mitral valve regurgitation    Peripheral neuropathy    Prostate CA    Primary insomnia    Primary osteoarthritis of left knee    Anticoagulated    Vitamin D insufficiency    Iron deficiency anemia    Medicare annual wellness visit, subsequent    Thickened nails    Pain in toes of both feet    History of prostate cancer    Stage 3b chronic kidney disease    Chronic bilateral low back pain without sciatica    Acute renal failure    Intractable nausea and vomiting    Abdominal pain    Physical debility    Severe malnutrition     Past Medical History:   Diagnosis Date    Anemia, unspecified     Atherosclerotic heart disease of native coronary artery without angina pectoris     CAD (coronary artery disease)     CKD (chronic kidney disease), stage III     Essential (primary) hypertension     Gastric ulcer, unspecified as acute or chronic, without hemorrhage or perforation     Gastroparesis     GERD without esophagitis     Glaucoma     Hereditary and idiopathic neuropathy, unspecified     History of falling     HLD (hyperlipidemia)     HTN (hypertension)     Hypotension, unspecified     Hypothyroidism     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    Irritable bowel syndrome without diarrhea     Laceration without foreign body of right forearm, initial encounter     Low back pain      Malignant neoplasm of prostate     Mixed hyperlipidemia     OA (osteoarthritis)     Other specified disorders of the skin and subcutaneous tissue     Pain in left foot     Peripheral vascular disease, unspecified     Phimosis     Presence of unspecified artificial knee joint     Primary insomnia     Primary osteoarthritis of both knees     Prostate cancer     Sensorineural hearing loss (SNHL) of both ears     Sigmoid diverticulosis     severe sigmoid and descending colon diverticulosis and 3+ gastritis    Sleep related leg cramps     Type 2 diabetes mellitus with diabetic polyneuropathy     and gastroparesis    Unspecified atrial fibrillation     Unspecified dementia without behavioral disturbance     Unspecified hearing loss, bilateral      Past Surgical History:   Procedure Laterality Date    CATARACT EXTRACTION Bilateral 2014    COLONOSCOPY      COLONOSCOPY N/A 7/1/2022    Procedure: COLONOSCOPY WITH POLYPECTOMIES, HOT SNARE;  Surgeon: Jennifer Mann MD;  Location: [facility] ENDOSCOPY;  Service: Gastroenterology;  Laterality: N/A;  DIVERTICULOSIS, COLON POLYPS, HEMORRHOIDS    ENDOSCOPY N/A 7/1/2022    Procedure: ESOPHAGOGASTRODUODENOSCOPY WITH BX, POLYPECTOMY;  Surgeon: Jennifer Mann MD;  Location: [facility] ENDOSCOPY;  Service: Gastroenterology;  Laterality: N/A;  GASTRIC POLYP, GASTRITIS, HIATAL HERNIA    HAND SURGERY Right     JOINT REPLACEMENT      KNEE ARTHROSCOPY Right 03/14/2017    PROSTATECTOMY      REPLACEMENT TOTAL KNEE  03/2017    UPPER GASTROINTESTINAL ENDOSCOPY        General Information       Row Name 10/24/24 1061          OT Time and Intention    Document Type therapy note (daily note)  -ES     Mode of Treatment individual therapy;occupational therapy  -ES       Row Name 10/24/24 8940          General Information    Patient Profile Reviewed yes  -ES     Existing Precautions/Restrictions fall  -ES     Barriers to Rehab none identified  -ES       Row Name 10/24/24 2089           Cognition    Orientation Status (Cognition) oriented x 3  -ES       Row Name 10/24/24 1402          Safety Issues/Impairments Affecting Functional Mobility    Impairments Affecting Function (Mobility) balance;endurance/activity tolerance;pain;strength  -ES               User Key  (r) = Recorded By, (t) = Taken By, (c) = Cosigned By      Initials Name Provider Type    ES Jacklyn Ogden, OTR/L, CSRS Occupational Therapist                     Mobility/ADL's       Row Name 10/24/24 1402          Bed Mobility    Supine-Sit [place] (Bed Mobility) not tested  -ES     Comment, (Bed Mobility) patient met seated in recliner chair.  -ES       Row Name 10/24/24 1402          Transfers    Transfers sit-stand transfer;stand-sit transfer  -ES     Comment, (Transfers) Patient completd x5 sit-stands throughout session.  -ES       Row Name 10/24/24 1402          Sit-Stand Transfer    Sit-Stand [place] (Transfers) standby assist  -ES     Assistive Device (Sit-Stand Transfers) walker, front-wheeled  -ES       Row Name 10/24/24 1402          Stand-Sit Transfer    Stand-Sit [place] (Transfers) standby assist  -ES     Assistive Device (Stand-Sit Transfers) walker, front-wheeled  -ES       Row Name 10/24/24 1402          Functional Mobility    Functional Mobility- Ind. Level supervision required  -ES     Functional Mobility- Device walker, front-wheeled  -ES     Functional Mobility- Comment Patient completed functional mobility within gym, requiring SBA with rolling walker transitioning to CGA without rolling walker.  -ES       Row Name 10/24/24 1402          Activities of Daily Living    BADL Assessment/Intervention lower body dressing;toileting  -ES       Row Name 10/24/24 1402          Lower Body Dressing Assessment/Training    [place] Level (Lower Body Dressing) lower body dressing skills;don;shoes/slippers;standby assist  -ES     Position (Lower Body Dressing) unsupported sitting  -ES     Comment, (Lower Body  Dressing) Patient donned shoes prior to walking to gym, did not require physical assist with task.  -ES       Row Name 10/24/24 1402          Toileting Assessment/Training    [place] Level (Toileting) toileting skills;standby assist  -ES     Assistive Devices (Toileting) urinal  -ES     Position (Toileting) supported standing  -ES     Comment, (Toileting) Patient completed toileting with urinal in supported standing, required SBA.  -ES               User Key  (r) = Recorded By, (t) = Taken By, (c) = Cosigned By      Initials Name Provider Type    ES Jacklyn Ogden, OTR/L, CSRS Occupational Therapist                   Obj/Interventions       Row Name 10/24/24 1406          Motor Skills    Motor Skills functional endurance  -ES     Functional Endurance fair, patient demonstrated labored breathing during tasks and required rest breaks after each task to relieve back pain.  -ES       Row Name 10/24/24 140          Balance    Balance Assessment standing dynamic balance;sitting dynamic balance  -ES     Dynamic Sitting Balance standby assist  -ES     Position, Sitting Balance sitting in chair  -ES     Dynamic Standing Balance contact guard  -ES     Position/Device Used, Standing Balance unsupported  -ES     Balance Interventions standing;dynamic;dynamic reaching;foam;narrowed base of support;weight shifting activity;UE activity with balance activity  -ES     Comment, Balance In therapy gym, patient completed balance activity with cognitive element, by releasing/catching therapy ball [id number] on correct colored target called out by OT while standing on incline wedge. Patient required CGA and demonstrated slight loss of balance posteriorly. Patient completed weight shifting task with unilateral dynamic reaching for x7 cards, x4 trials in unsupported standing on foam balance pad, requring CGA to support posterior lean. Patient matched cards by number during task, demonstrated no losses of balance. Patient completed  weightshifting task, hitting therapy ball with 1lb weighted bar [id number], in unsupported standing demonstrating no losses of balance. Upgraded to complete task standing on foam balance pad with 3lb weighted bar [id number], patient demonstrated no losses of balance.  -ES               User Key  (r) = Recorded By, (t) = Taken By, (c) = Cosigned By      Initials Name Provider Type    ES Jacklyn Ogden, OTR/L, CSRS Occupational Therapist                   Goals/Plan    No documentation.                  Clinical Impression       Row Name 10/24/24 1427          Pain Assessment    Pretreatment Pain Rating 0/10 - no pain  -ES     Posttreatment Pain Rating 4/10  -ES     Pain Location back  -ES     Pain Side/Orientation generalized;lower  -ES     Pain Management Interventions exercise or physical activity utilized;heat applied;nursing notified  -ES     Pre/Posttreatment Pain Comment Patient complained of back pain during session, requiring frequent breaks to sit down to relieve pain.  -ES       Row Name 10/24/24 1427          Plan of Care Review    Plan of Care Reviewed With patient  -ES     Progress improving  -ES     Outcome Evaluation Patient presents AOx3 and demonstrates fair tolerance throughout session. Patient participated in functional tasks targeting balance, strength and functional endurance in preperation for B and IADL engagement. Patient shows improvement in balance and strength with tasks, tolerating all tasks in unsupported standing on uneven surfaces and with increased weighted objects. Patient shows limited improvment with functional endurance, requiring multiple rest breaks to relieve pain rather than feeling short of breath. Patient would benefit from skilled OT intervention addressing deficits to maximize independence in ADLs. Patient is nearing baseline status, discharge planning initiated.  -ES       Row Name 10/24/24 1427          Positioning and Restraints    Pre-Treatment Position sitting in  chair/recliner  -ES     Post Treatment Position chair  -ES     In Chair reclined;call light within reach;encouraged to call for assist;exit alarm on  -ES               User Key  (r) = Recorded By, (t) = Taken By, (c) = Cosigned By      Initials Name Provider Type    Jacklyn Lincoln OTR/L, YUE Occupational Therapist                   Outcome Measures       Row Name 10/24/24 1437          How much help from another is currently needed...    Putting on and taking off regular lower body clothing? 3  -ES     Bathing (including washing, rinsing, and drying) 3  -ES     Toileting (which includes using toilet bed pan or urinal) 3  -ES     Putting on and taking off regular upper body clothing 4  -ES     Taking care of personal grooming (such as brushing teeth) 4  -ES     Eating meals 4  -ES     AM-PAC 6 Clicks Score (OT) 21  -ES       Row Name 10/24/24 0850          How much help from another person do you currently need...    Turning from your back to your side while in flat bed without using bedrails? 3  -WM     Moving from lying on back to sitting on the side of a flat bed without bedrails? 3  -WM     Moving to and from a bed to a chair (including a wheelchair)? 3  -WM     Standing up from a chair using your arms (e.g., wheelchair, bedside chair)? 3  -WM     Climbing 3-5 steps with a railing? 3  -WM     To walk in hospital room? 3  -WM     AM-PAC 6 Clicks Score (PT) 18  -WM     Highest Level of Mobility Goal 6 --> Walk 10 steps or more  -       Row Name 10/24/24 1437          Functional Assessment    Outcome Measure Options AM-PAC 6 Clicks Daily Activity (OT)  -ES               User Key  (r) = Recorded By, (t) = Taken By, (c) = Cosigned By      Initials Name Provider Type    Marquise Ramirez PTA Physical Therapist Assistant    Jacklyn Lincoln OTR/L, CSRS Occupational Therapist                  Section G              Section GG                         Occupational Therapy Education       Title: PT OT SLP Therapies  (In Progress)       Topic: Occupational Therapy (In Progress)       Point: ADL training (Done)       Description:   Instruct learner(s) on proper safety adaptation and remediation techniques during self care or transfers.   Instruct in proper use of assistive devices.                  Learning Progress Summary            Patient Acceptance, E, VU,NR by  at 10/16/2024 0925                      Point: Home exercise program (Not Started)       Description:   Instruct learner(s) on appropriate technique for monitoring, assisting and/or progressing therapeutic exercises/activities.                  Learner Progress:  Not documented in this visit.              Point: Precautions (Done)       Description:   Instruct learner(s) on prescribed precautions during self-care and functional transfers.                  Learning Progress Summary            Patient Acceptance, E, VU,NR by  at 10/16/2024 0925                      Point: Body mechanics (Done)       Description:   Instruct learner(s) on proper positioning and spine alignment during self-care, functional mobility activities and/or exercises.                  Learning Progress Summary            Patient Acceptance, E, VU,NR by  at 10/16/2024 0925                                      User Key       Initials Effective Dates Name Provider Type Discipline     06/16/21 -  Ruth Islas OT Occupational Therapist OT                  OT Recommendation and Plan     Plan of Care Review  Plan of Care Reviewed With: patient  Progress: improving  Outcome Evaluation: Patient presents AOx3 and demonstrates fair tolerance throughout session. Patient participated in functional tasks targeting balance, strength and functional endurance in preperation for B and IADL engagement. Patient shows improvement in balance and strength with tasks, tolerating all tasks in unsupported standing on uneven surfaces and with increased weighted objects. Patient shows limited improvment with  functional endurance, requiring multiple rest breaks to relieve pain rather than feeling short of breath. Patient would benefit from skilled OT intervention addressing deficits to maximize independence in ADLs. Patient is nearing baseline status, discharge planning initiated.     Time Calculation:         Time Calculation- OT       Row Name 10/24/24 1514 10/24/24 0844          Time Calculation- OT    OT Received On 10/24/24  -ES --     OT Goal Re-Cert Due Date 11/15/24  -ES --        Timed Charges    56819 - Gait Training Minutes  -- 10  -WM     68468 - OT Therapeutic Activity Minutes 40  -ES --        SNF Occupational Therapy Minutes    Skilled Minutes- OT 40 min  -ES --        Total Minutes    Timed Charges Total Minutes 40  -ES 10  -WM      Total Minutes 40  -ES 10  -WM               User Key  (r) = Recorded By, (t) = Taken By, (c) = Cosigned By      Initials Name Provider Type    Marquise Ramirez, PTA Physical Therapist Assistant    ES Jacklyn Ogden, OTR/L, CSRS Occupational Therapist                  Therapy Charges for Today       Code Description Service Date Service Provider Modifiers Qty    [id number] HC OT THERAPEUTIC ACT EA 15 MIN 10/23/2024 Jacklyn Ogden, OTR/L, CSRS GO 3    [id number] HC OT THERAPEUTIC ACT EA 15 MIN 10/24/2024 Jacklyn Ogden, OTR/L, CSRS GO 3                 Jacklyn Ogden OTR/L, CSRS  10/24/2024

## 2024-10-24 NOTE — THERAPY TREATMENT NOTE
SNF - Physical Therapy Treatment Note  RAKEL Alcaraz     Patient Name: Darci Munson  : 1935  MRN: [id number]  Today's Date: 10/24/2024      Visit Dx:    ICD-10-CM ICD-9-CM   1. Decreased activities of daily living (ADL)  Z78.9 V49.89   2. Difficulty walking  R26.2 719.7     Patient Active Problem List   Diagnosis    Type 2 diabetes mellitus with hyperglycemia, without long-term current use of insulin    Chronic kidney disease    Hypothyroidism    Hypertensive disorder    Hyperlipidemia    Paroxysmal atrial fibrillation    Dyspepsia    Coronary arteriosclerosis    Gastroparesis    Hearing loss    Mitral valve regurgitation    Peripheral neuropathy    Prostate CA    Primary insomnia    Primary osteoarthritis of left knee    Anticoagulated    Vitamin D insufficiency    Iron deficiency anemia    Medicare annual wellness visit, subsequent    Thickened nails    Pain in toes of both feet    History of prostate cancer    Stage 3b chronic kidney disease    Chronic bilateral low back pain without sciatica    Acute renal failure    Intractable nausea and vomiting    Abdominal pain    Physical debility    Severe malnutrition     Past Medical History:   Diagnosis Date    Anemia, unspecified     Atherosclerotic heart disease of native coronary artery without angina pectoris     CAD (coronary artery disease)     CKD (chronic kidney disease), stage III     Essential (primary) hypertension     Gastric ulcer, unspecified as acute or chronic, without hemorrhage or perforation     Gastroparesis     GERD without esophagitis     Glaucoma     Hereditary and idiopathic neuropathy, unspecified     History of falling     HLD (hyperlipidemia)     HTN (hypertension)     Hypotension, unspecified     Hypothyroidism     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    Irritable bowel syndrome without diarrhea     Laceration without foreign body of right forearm, initial encounter     Low back pain     Malignant neoplasm of prostate     Mixed hyperlipidemia      OA (osteoarthritis)     Other specified disorders of the skin and subcutaneous tissue     Pain in left foot     Peripheral vascular disease, unspecified     Phimosis     Presence of unspecified artificial knee joint     Primary insomnia     Primary osteoarthritis of both knees     Prostate cancer     Sensorineural hearing loss (SNHL) of both ears     Sigmoid diverticulosis     severe sigmoid and descending colon diverticulosis and 3+ gastritis    Sleep related leg cramps     Type 2 diabetes mellitus with diabetic polyneuropathy     and gastroparesis    Unspecified atrial fibrillation     Unspecified dementia without behavioral disturbance     Unspecified hearing loss, bilateral      Past Surgical History:   Procedure Laterality Date    CATARACT EXTRACTION Bilateral 2014    COLONOSCOPY      COLONOSCOPY N/A 7/1/2022    Procedure: COLONOSCOPY WITH POLYPECTOMIES, HOT SNARE;  Surgeon: Jennifer Mann MD;  Location: [facility] ENDOSCOPY;  Service: Gastroenterology;  Laterality: N/A;  DIVERTICULOSIS, COLON POLYPS, HEMORRHOIDS    ENDOSCOPY N/A 7/1/2022    Procedure: ESOPHAGOGASTRODUODENOSCOPY WITH BX, POLYPECTOMY;  Surgeon: Jennifer Mann MD;  Location: [facility] ENDOSCOPY;  Service: Gastroenterology;  Laterality: N/A;  GASTRIC POLYP, GASTRITIS, HIATAL HERNIA    HAND SURGERY Right     JOINT REPLACEMENT      KNEE ARTHROSCOPY Right 03/14/2017    PROSTATECTOMY      REPLACEMENT TOTAL KNEE  03/2017    UPPER GASTROINTESTINAL ENDOSCOPY         PT Assessment (Last 12 Hours)       PT Evaluation and Treatment       Row Name 10/24/24 1065          Physical Therapy Time and Intention    Subjective Information complains of;pain  -WM     Document Type therapy note (daily note)  -WM     Mode of Treatment individual therapy;physical therapy  -WM     Patient Effort good  -WM     Symptoms Noted During/After Treatment fatigue  -WM       Row Name 10/24/24 6370          Pain    Pretreatment Pain Rating 6/10  -WM     Posttreatment Pain  Rating 6/10  -     Pain Location back  -     Pain Management Interventions exercise or physical activity utilized;nursing notified  -       Row Name 10/24/24 0845          Bed Mobility    Supine-Sit-Supine [place] (Bed Mobility) standby assist;verbal cues  -     Assistive Device (Bed Mobility) bed rails  -       Row Name 10/24/24 0845          Sit-Stand Transfer    Sit-Stand [place] (Transfers) standby assist;verbal cues  -WM     Assistive Device (Sit-Stand Transfers) walker, front-wheeled  -WM       Row Name 10/24/24 0845          Stand-Sit Transfer    Stand-Sit [place] (Transfers) standby assist;verbal cues  -WM     Assistive Device (Stand-Sit Transfers) walker, front-wheeled  -WM       Row Name 10/24/24 0845          Gait/Stairs (Locomotion)    [place] Level (Gait) contact guard;standby assist;verbal cues  -     Assistive Device (Gait) walker, front-wheeled  -     Distance in Feet (Gait) 225  + 50  -WM     Pattern (Gait) 4-point;step-through  -     Deviations/Abnormal Patterns (Gait) gait speed decreased  -       Row Name 10/24/24 0845          Safety Issues/Impairments Affecting Functional Mobility    Impairments Affecting Function (Mobility) balance;endurance/activity tolerance;pain;strength  -       Row Name 10/24/24 0845          Hip (Therapeutic Exercise)    Hip AROM (Therapeutic Exercise) bilateral;aBduction;aDduction;supine;15 repititions  2 sets  -     Hip Strengthening (Therapeutic Exercise) bilateral;aBduction;aDduction;heel slides;marching while seated;sitting;supine;2 lb free weight;resistance band;green;15 repititions;2 sets  Manual resistance  -       Row Name 10/24/24 0845          Knee (Therapeutic Exercise)    Knee Strengthening (Therapeutic Exercise) bilateral;LAQ (long arc quad);hamstring curls;sitting;2 lb free weight;resistance band;green;15 repititions;2 sets  -       Row Name 10/24/24 0845          Ankle (Therapeutic Exercise)    Ankle AROM  (Therapeutic Exercise) bilateral;dorsiflexion;plantarflexion;supine;30 repititions  -WM       Row Name 10/24/24 0845          Aerobic Exercise    Time Performed (Aerobic Exercise) NuStep x 15 minutes, load 5  -WM       Row Name             Wound 10/11/24 0332 gluteal    Wound - Properties Group Placement Date: 10/11/24  -YUMIKO Placement Time: 0332 -JO Location: gluteal  -YUMIKO    Retired Wound - Properties Group Placement Date: 10/11/24  -YUMIKO Placement Time: 0332 -JO Location: gluteal  -YUMIKO    Retired Wound - Properties Group Placement Date: 10/11/24  -YUMIKO Placement Time: 0332 -JO Location: gluteal  -YUMIKO    Retired Wound - Properties Group Date first assessed: 10/11/24  -YUMIKO Time first assessed: 0332 -JO Location: gluteal  -YUMIKO      Row Name 10/24/24 0845          Positioning and Restraints    Pre-Treatment Position in bed  -WM     Post Treatment Position bed  -WM     In Bed fowlers;call light within reach;encouraged to call for assist;exit alarm on  -WM       Row Name 10/24/24 0845          Progress Summary (PT)    Progress Toward Functional Goals (PT) progress toward functional goals is good  -WM               User Key  (r) = Recorded By, (t) = Taken By, (c) = Cosigned By      Initials Name Provider Type    Jimbo Islas RN Registered Nurse    Marquise Ramirez PTA Physical Therapist Assistant                  Section G              Section GG                       Physical Therapy Education       Title: PT OT SLP Therapies (In Progress)       Topic: Physical Therapy (Not Started)       Point: Mobility training (Not Started)       Learner Progress:  Not documented in this visit.              Point: Home exercise program (Not Started)       Learner Progress:  Not documented in this visit.              Point: Body mechanics (Not Started)       Learner Progress:  Not documented in this visit.              Point: Precautions (Not Started)       Learner Progress:  Not documented in this visit.                                   PT Recommendation and Plan     Progress Summary (PT)  Progress Toward Functional Goals (PT): progress toward functional goals is good   Outcome Measures       Row Name 10/24/24 0850             How much help from another person do you currently need...    Turning from your back to your side while in flat bed without using bedrails? 3  -WM      Moving from lying on back to sitting on the side of a flat bed without bedrails? 3  -WM      Moving to and from a bed to a chair (including a wheelchair)? 3  -WM      Standing up from a chair using your arms (e.g., wheelchair, bedside chair)? 3  -WM      Climbing 3-5 steps with a railing? 3  -WM      To walk in hospital room? 3  -WM      AM-PAC 6 Clicks Score (PT) 18  -WM                User Key  (r) = Recorded By, (t) = Taken By, (c) = Cosigned By      Initials Name Provider Type    Marquise Ramirez PTA Physical Therapist Assistant                     Time Calculation:    PT Charges       Row Name 10/24/24 0844             Time Calculation    PT Received On 10/24/24  -WM         Timed Charges    72712 - PT Therapeutic Exercise Minutes 31  -WM      17428 - Gait Training Minutes  10  -WM      42232 - PT Therapeutic Activity Minutes 5  -WM         SNF Physical Therapy Minutes    Skilled Minutes- PT 45 min  -WM         Total Minutes    Timed Charges Total Minutes 46  -WM       Total Minutes 46  -WM                User Key  (r) = Recorded By, (t) = Taken By, (c) = Cosigned By      Initials Name Provider Type    Marquise Ramirez PTA Physical Therapist Assistant                      PT G-Codes  Outcome Measure Options: AM-PAC 6 Clicks Daily Activity (OT)  AM-PAC 6 Clicks Score (PT): 18  AM-PAC 6 Clicks Score (OT): 21    Marquise Escobar PTA  10/24/2024

## 2024-10-25 LAB
GLUCOSE BLDC GLUCOMTR-MCNC: 133 MG/DL (ref 70–99)
GLUCOSE BLDC GLUCOMTR-MCNC: 167 MG/DL (ref 70–99)
GLUCOSE BLDC GLUCOMTR-MCNC: 291 MG/DL (ref 70–99)
GLUCOSE BLDC GLUCOMTR-MCNC: 92 MG/DL (ref 70–99)
INDURATION: 0 MM (ref 0–10)
Lab: NORMAL
Lab: NORMAL
TB SKIN TEST: NEGATIVE

## 2024-10-25 PROCEDURE — 97116 GAIT TRAINING THERAPY: CPT

## 2024-10-25 PROCEDURE — 63710000001 INSULIN LISPRO (HUMAN) PER 5 UNITS: Performed by: FAMILY MEDICINE

## 2024-10-25 PROCEDURE — 97110 THERAPEUTIC EXERCISES: CPT

## 2024-10-25 PROCEDURE — 82948 REAGENT STRIP/BLOOD GLUCOSE: CPT | Performed by: FAMILY MEDICINE

## 2024-10-25 PROCEDURE — 97535 SELF CARE MNGMENT TRAINING: CPT

## 2024-10-25 PROCEDURE — 63710000001 INSULIN GLARGINE PER 5 UNITS: Performed by: FAMILY MEDICINE

## 2024-10-25 PROCEDURE — 82948 REAGENT STRIP/BLOOD GLUCOSE: CPT

## 2024-10-25 RX ORDER — PANTOPRAZOLE SODIUM 40 MG/1
40 TABLET, DELAYED RELEASE ORAL
Status: DISCONTINUED | OUTPATIENT
Start: 2024-10-25 | End: 2024-11-06 | Stop reason: HOSPADM

## 2024-10-25 RX ORDER — BISMUTH SUBSALICYLATE 262 MG/1
524 TABLET, CHEWABLE ORAL
Status: DISCONTINUED | OUTPATIENT
Start: 2024-10-25 | End: 2024-10-25

## 2024-10-25 RX ORDER — BISMUTH SUBSALICYLATE 262 MG/1
524 TABLET, CHEWABLE ORAL 3 TIMES DAILY PRN
Status: DISCONTINUED | OUTPATIENT
Start: 2024-10-25 | End: 2024-11-06 | Stop reason: HOSPADM

## 2024-10-25 RX ADMIN — INSULIN LISPRO 2 UNITS: 100 INJECTION, SOLUTION INTRAVENOUS; SUBCUTANEOUS at 12:25

## 2024-10-25 RX ADMIN — PANTOPRAZOLE SODIUM 40 MG: 40 TABLET, DELAYED RELEASE ORAL at 08:40

## 2024-10-25 RX ADMIN — AMIODARONE HYDROCHLORIDE 200 MG: 200 TABLET ORAL at 08:40

## 2024-10-25 RX ADMIN — Medication 250 MG: at 21:05

## 2024-10-25 RX ADMIN — GABAPENTIN 100 MG: 100 CAPSULE ORAL at 21:05

## 2024-10-25 RX ADMIN — LATANOPROST 1 DROP: 50 SOLUTION OPHTHALMIC at 21:08

## 2024-10-25 RX ADMIN — LEVOTHYROXINE SODIUM 200 MCG: 0.1 TABLET ORAL at 06:08

## 2024-10-25 RX ADMIN — INSULIN GLARGINE 15 UNITS: 100 INJECTION, SOLUTION SUBCUTANEOUS at 21:05

## 2024-10-25 RX ADMIN — GABAPENTIN 100 MG: 100 CAPSULE ORAL at 08:40

## 2024-10-25 RX ADMIN — MIDODRINE HYDROCHLORIDE 5 MG: 5 TABLET ORAL at 12:26

## 2024-10-25 RX ADMIN — Medication 250 MG: at 08:40

## 2024-10-25 RX ADMIN — BISMUTH SUBSALICYLATE 524 MG: 262 TABLET, CHEWABLE ORAL at 21:56

## 2024-10-25 RX ADMIN — APIXABAN 5 MG: 5 TABLET, FILM COATED ORAL at 21:05

## 2024-10-25 RX ADMIN — APIXABAN 5 MG: 5 TABLET, FILM COATED ORAL at 08:40

## 2024-10-25 RX ADMIN — INSULIN LISPRO 6 UNITS: 100 INJECTION, SOLUTION INTRAVENOUS; SUBCUTANEOUS at 21:05

## 2024-10-25 NOTE — THERAPY TREATMENT NOTE
SNF - Physical Therapy Treatment Note  RAKEL Alcaraz     Patient Name: Darci Munson  : 1935  MRN: [id number]  Today's Date: 10/25/2024      Visit Dx:    ICD-10-CM ICD-9-CM   1. Decreased activities of daily living (ADL)  Z78.9 V49.89   2. Difficulty walking  R26.2 719.7     Patient Active Problem List   Diagnosis    Type 2 diabetes mellitus with hyperglycemia, without long-term current use of insulin    Chronic kidney disease    Hypothyroidism    Hypertensive disorder    Hyperlipidemia    Paroxysmal atrial fibrillation    Dyspepsia    Coronary arteriosclerosis    Gastroparesis    Hearing loss    Mitral valve regurgitation    Peripheral neuropathy    Prostate CA    Primary insomnia    Primary osteoarthritis of left knee    Anticoagulated    Vitamin D insufficiency    Iron deficiency anemia    Medicare annual wellness visit, subsequent    Thickened nails    Pain in toes of both feet    History of prostate cancer    Stage 3b chronic kidney disease    Chronic bilateral low back pain without sciatica    Acute renal failure    Intractable nausea and vomiting    Abdominal pain    Physical debility    Severe malnutrition     Past Medical History:   Diagnosis Date    Anemia, unspecified     Atherosclerotic heart disease of native coronary artery without angina pectoris     CAD (coronary artery disease)     CKD (chronic kidney disease), stage III     Essential (primary) hypertension     Gastric ulcer, unspecified as acute or chronic, without hemorrhage or perforation     Gastroparesis     GERD without esophagitis     Glaucoma     Hereditary and idiopathic neuropathy, unspecified     History of falling     HLD (hyperlipidemia)     HTN (hypertension)     Hypotension, unspecified     Hypothyroidism     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    Irritable bowel syndrome without diarrhea     Laceration without foreign body of right forearm, initial encounter     Low back pain     Malignant neoplasm of prostate     Mixed hyperlipidemia      OA (osteoarthritis)     Other specified disorders of the skin and subcutaneous tissue     Pain in left foot     Peripheral vascular disease, unspecified     Phimosis     Presence of unspecified artificial knee joint     Primary insomnia     Primary osteoarthritis of both knees     Prostate cancer     Sensorineural hearing loss (SNHL) of both ears     Sigmoid diverticulosis     severe sigmoid and descending colon diverticulosis and 3+ gastritis    Sleep related leg cramps     Type 2 diabetes mellitus with diabetic polyneuropathy     and gastroparesis    Unspecified atrial fibrillation     Unspecified dementia without behavioral disturbance     Unspecified hearing loss, bilateral      Past Surgical History:   Procedure Laterality Date    CATARACT EXTRACTION Bilateral 2014    COLONOSCOPY      COLONOSCOPY N/A 7/1/2022    Procedure: COLONOSCOPY WITH POLYPECTOMIES, HOT SNARE;  Surgeon: Jennifer Mann MD;  Location: [facility] ENDOSCOPY;  Service: Gastroenterology;  Laterality: N/A;  DIVERTICULOSIS, COLON POLYPS, HEMORRHOIDS    ENDOSCOPY N/A 7/1/2022    Procedure: ESOPHAGOGASTRODUODENOSCOPY WITH BX, POLYPECTOMY;  Surgeon: Jennifer Mann MD;  Location: [facility] ENDOSCOPY;  Service: Gastroenterology;  Laterality: N/A;  GASTRIC POLYP, GASTRITIS, HIATAL HERNIA    HAND SURGERY Right     JOINT REPLACEMENT      KNEE ARTHROSCOPY Right 03/14/2017    PROSTATECTOMY      REPLACEMENT TOTAL KNEE  03/2017    UPPER GASTROINTESTINAL ENDOSCOPY         PT Assessment (Last 12 Hours)       PT Evaluation and Treatment       Row Name 10/25/24 0875          Physical Therapy Time and Intention    Subjective Information complains of;pain  -WM     Document Type therapy note (daily note)  -WM     Mode of Treatment individual therapy;physical therapy  -WM     Patient Effort good  -WM     Symptoms Noted During/After Treatment fatigue  -WM       Row Name 10/25/24 7663          Pain    Pretreatment Pain Rating 4/10  -WM     Posttreatment Pain  Rating 4/10  -     Pain Location back  -     Pain Side/Orientation generalized  -     Pain Management Interventions exercise or physical activity utilized;nursing notified  -       Row Name 10/25/24 0808          Bed Mobility    Supine-Sit [place] (Bed Mobility) standby assist  -     Assistive Device (Bed Mobility) bed rails  -       Row Name 10/25/24 0808          Sit-Stand Transfer    Sit-Stand [place] (Transfers) standby assist  -     Assistive Device (Sit-Stand Transfers) walker, front-wheeled  -       Row Name 10/25/24 0808          Stand-Sit Transfer    Stand-Sit [place] (Transfers) standby assist  -     Assistive Device (Stand-Sit Transfers) walker, front-wheeled  -       Row Name 10/25/24 0808          Gait/Stairs (Locomotion)    [place] Level (Gait) standby assist  -     Assistive Device (Gait) walker, front-wheeled  -     Distance in Feet (Gait) 250  -     Pattern (Gait) 4-point;step-through  -     Deviations/Abnormal Patterns (Gait) gait speed decreased  -       Row Name 10/25/24 0808          Safety Issues/Impairments Affecting Functional Mobility    Impairments Affecting Function (Mobility) balance;endurance/activity tolerance;pain;strength  -       Row Name 10/25/24 0808          Hip (Therapeutic Exercise)    Hip AROM (Therapeutic Exercise) bilateral;aBduction;aDduction;supine;15 repititions  2 sets  -     Hip Strengthening (Therapeutic Exercise) bilateral;aBduction;heel slides;marching while seated;sitting;supine;2 lb free weight;resistance band;green;15 repititions;2 sets  Manual resistance  -       Row Name 10/25/24 0808          Knee (Therapeutic Exercise)    Knee (Therapeutic Exercise) isometric exercises  -     Knee Isometrics (Therapeutic Exercise) bilateral;quad sets;supine;10 repetitions;5 repetitions;3 second hold;2 sets  -     Knee Strengthening (Therapeutic Exercise) bilateral;LAQ (long arc quad);hamstring curls;sitting;2 lb free  weight;resistance band;15 repititions;2 sets  -       Row Name 10/25/24 0808          Ankle (Therapeutic Exercise)    Ankle AROM (Therapeutic Exercise) bilateral;dorsiflexion;plantarflexion;supine;30 repititions  -WM       Row Name 10/25/24 0808          Aerobic Exercise    Time Performed (Aerobic Exercise) NuStep x 15 minutes, load 5  -WM       Row Name             Wound 10/11/24 0332 gluteal    Wound - Properties Group Placement Date: 10/11/24  -YUMIKO Placement Time: 0332 -JO Location: gluteal  -YUMIKO    Retired Wound - Properties Group Placement Date: 10/11/24  -YUMIKO Placement Time: 0332 -JO Location: gluteal  -YUMIKO    Retired Wound - Properties Group Placement Date: 10/11/24  -YUMIKO Placement Time: 0332 -JO Location: gluteal  -YUMIKO    Retired Wound - Properties Group Date first assessed: 10/11/24  -YUMIKO Time first assessed: 0332 -JO Location: gluteal  -YUMIKO      Row Name 10/25/24 0808          Positioning and Restraints    Pre-Treatment Position in bed  -     Post Treatment Position --  Shower room  -     Other Position with OT  -WM       Row Name 10/25/24 0808          Progress Summary (PT)    Progress Toward Functional Goals (PT) progress toward functional goals is good  -               User Key  (r) = Recorded By, (t) = Taken By, (c) = Cosigned By      Initials Name Provider Type    Jimbo Islas RN Registered Nurse    Marquise Ramirez PTA Physical Therapist Assistant                  Section G              Section GG                       Physical Therapy Education       Title: PT OT SLP Therapies (In Progress)       Topic: Physical Therapy (Not Started)       Point: Mobility training (Not Started)       Learner Progress:  Not documented in this visit.              Point: Home exercise program (Not Started)       Learner Progress:  Not documented in this visit.              Point: Body mechanics (Not Started)       Learner Progress:  Not documented in this visit.              Point: Precautions (Not  Started)       Learner Progress:  Not documented in this visit.                                  PT Recommendation and Plan     Progress Summary (PT)  Progress Toward Functional Goals (PT): progress toward functional goals is good   Outcome Measures       Row Name 10/25/24 0818 10/24/24 0850          How much help from another person do you currently need...    Turning from your back to your side while in flat bed without using bedrails? 3  -WM 3  -WM     Moving from lying on back to sitting on the side of a flat bed without bedrails? 3  -WM 3  -WM     Moving to and from a bed to a chair (including a wheelchair)? 3  -WM 3  -WM     Standing up from a chair using your arms (e.g., wheelchair, bedside chair)? 3  -WM 3  -WM     Climbing 3-5 steps with a railing? 3  -WM 3  -WM     To walk in hospital room? 3  -WM 3  -WM     AM-PAC 6 Clicks Score (PT) 18  -WM 18  -WM               User Key  (r) = Recorded By, (t) = Taken By, (c) = Cosigned By      Initials Name Provider Type    Marquise Ramirez PTA Physical Therapist Assistant                     Time Calculation:    PT Charges       Row Name 10/25/24 0807             Time Calculation    PT Received On 10/25/24  -WM         Timed Charges    83098 - PT Therapeutic Exercise Minutes 30  -WM      52774 - Gait Training Minutes  8  -WM      85012 - PT Therapeutic Activity Minutes 5  -WM         SNF Physical Therapy Minutes    Skilled Minutes- PT 43 min  -WM         Total Minutes    Timed Charges Total Minutes 43  -WM       Total Minutes 43  -WM                User Key  (r) = Recorded By, (t) = Taken By, (c) = Cosigned By      Initials Name Provider Type    Marquise Ramirez PTA Physical Therapist Assistant                  Therapy Charges for Today       Code Description Service Date Service Provider Modifiers Qty    58855528608 HC PT THER PROC EA 15 MIN 10/24/2024 Marquise Escobar PTA GP 2    34849581084 HC GAIT TRAINING EA 15 MIN 10/24/2024 Marquise Escobar PTA GP 1             PT G-Codes  Outcome Measure Options: AM-PAC 6 Clicks Daily Activity (OT)  AM-PAC 6 Clicks Score (PT): 18  AM-PAC 6 Clicks Score (OT): 21    Marquise Escobar, MANAN  10/25/2024

## 2024-10-25 NOTE — PLAN OF CARE
Goal Outcome Evaluation:  Plan of Care Reviewed With: patient        Progress: improving  Outcome Evaluation: Alert and oriented x4; [place]. Able to make needs known to staff. Transfers to BR x1 person with gait belt and walker. TUMS given per request for complaints of heartburn with relief. Kpad at bedside prn for back pain; denied need to use tonight. Blood sugar elevated at 261 at bedtime; insulin given per MAR. VSS. Call light within reach; care plan ongoing.

## 2024-10-25 NOTE — PLAN OF CARE
Goal Outcome Evaluation:  Plan of Care Reviewed With: patient        Progress: no change  Outcome Evaluation: Aox4, call light and personal items within reach. Able to make needs known to staff. No c/o pain during the shift. 1x to the BR with walker and gait belt. con't with blood sugar checks. Napping inbetween care. Skin barrier applied PRN. Con't plan of care

## 2024-10-25 NOTE — THERAPY TREATMENT NOTE
SNF - Occupational Therapy Treatment Note  RAKEL Alcaraz    Patient Name: Darci Munson  : 1935    MRN: [id number]                              Today's Date: 10/25/2024       Admit Date: 10/15/2024    Visit Dx:     ICD-10-CM ICD-9-CM   1. Decreased activities of daily living (ADL)  Z78.9 V49.89   2. Difficulty walking  R26.2 719.7     Patient Active Problem List   Diagnosis    Type 2 diabetes mellitus with hyperglycemia, without long-term current use of insulin    Chronic kidney disease    Hypothyroidism    Hypertensive disorder    Hyperlipidemia    Paroxysmal atrial fibrillation    Dyspepsia    Coronary arteriosclerosis    Gastroparesis    Hearing loss    Mitral valve regurgitation    Peripheral neuropathy    Prostate CA    Primary insomnia    Primary osteoarthritis of left knee    Anticoagulated    Vitamin D insufficiency    Iron deficiency anemia    Medicare annual wellness visit, subsequent    Thickened nails    Pain in toes of both feet    History of prostate cancer    Stage 3b chronic kidney disease    Chronic bilateral low back pain without sciatica    Acute renal failure    Intractable nausea and vomiting    Abdominal pain    Physical debility    Severe malnutrition     Past Medical History:   Diagnosis Date    Anemia, unspecified     Atherosclerotic heart disease of native coronary artery without angina pectoris     CAD (coronary artery disease)     CKD (chronic kidney disease), stage III     Essential (primary) hypertension     Gastric ulcer, unspecified as acute or chronic, without hemorrhage or perforation     Gastroparesis     GERD without esophagitis     Glaucoma     Hereditary and idiopathic neuropathy, unspecified     History of falling     HLD (hyperlipidemia)     HTN (hypertension)     Hypotension, unspecified     Hypothyroidism     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    Irritable bowel syndrome without diarrhea     Laceration without foreign body of right forearm, initial encounter     Low back pain      Malignant neoplasm of prostate     Mixed hyperlipidemia     OA (osteoarthritis)     Other specified disorders of the skin and subcutaneous tissue     Pain in left foot     Peripheral vascular disease, unspecified     Phimosis     Presence of unspecified artificial knee joint     Primary insomnia     Primary osteoarthritis of both knees     Prostate cancer     Sensorineural hearing loss (SNHL) of both ears     Sigmoid diverticulosis     severe sigmoid and descending colon diverticulosis and 3+ gastritis    Sleep related leg cramps     Type 2 diabetes mellitus with diabetic polyneuropathy     and gastroparesis    Unspecified atrial fibrillation     Unspecified dementia without behavioral disturbance     Unspecified hearing loss, bilateral      Past Surgical History:   Procedure Laterality Date    CATARACT EXTRACTION Bilateral 2014    COLONOSCOPY      COLONOSCOPY N/A 7/1/2022    Procedure: COLONOSCOPY WITH POLYPECTOMIES, HOT SNARE;  Surgeon: Jennifer Mann MD;  Location: [facility] ENDOSCOPY;  Service: Gastroenterology;  Laterality: N/A;  DIVERTICULOSIS, COLON POLYPS, HEMORRHOIDS    ENDOSCOPY N/A 7/1/2022    Procedure: ESOPHAGOGASTRODUODENOSCOPY WITH BX, POLYPECTOMY;  Surgeon: Jennifer Mann MD;  Location: [facility] ENDOSCOPY;  Service: Gastroenterology;  Laterality: N/A;  GASTRIC POLYP, GASTRITIS, HIATAL HERNIA    HAND SURGERY Right     JOINT REPLACEMENT      KNEE ARTHROSCOPY Right 03/14/2017    PROSTATECTOMY      REPLACEMENT TOTAL KNEE  03/2017    UPPER GASTROINTESTINAL ENDOSCOPY        General Information       Row Name 10/25/24 0835          OT Time and Intention    Document Type therapy note (daily note)  -GC     Mode of Treatment individual therapy;occupational therapy  -GC       Row Name 10/25/24 0835          General Information    Patient Profile Reviewed yes  -GC     Existing Precautions/Restrictions fall  -GC       Row Name 10/25/24 0835          Safety Issues/Impairments Affecting Functional  Mobility    Impairments Affecting Function (Mobility) balance;endurance/activity tolerance;pain;strength  -               User Key  (r) = Recorded By, (t) = Taken By, (c) = Cosigned By      Initials Name Provider Type     Ruth Islas OT Occupational Therapist                     Mobility/ADL's       Row Name 10/25/24 0836          Transfers    Transfers sit-stand transfer;stand-sit transfer;toilet transfer  -       Row Name 10/25/24 08          Sit-Stand Transfer    Sit-Stand [place] (Transfers) supervision  -     Assistive Device (Sit-Stand Transfers) walker, front-wheeled  -       Row Name 10/25/24 08          Stand-Sit Transfer    Stand-Sit [place] (Transfers) supervision  -     Assistive Device (Stand-Sit Transfers) walker, front-wheeled  -       Row Name 10/25/24 08          Toilet Transfer    [place] Level (Toilet Transfer) supervision  -     Assistive Device (Toilet Transfer) raised toilet seat  -       Row Name 10/25/24 08          Functional Mobility    Functional Mobility- Ind. Level supervision required  -     Functional Mobility- Device walker, front-wheeled  -       Row Name 10/25/24 08          Activities of Daily Living    BADL Assessment/Intervention bathing;upper body dressing;lower body dressing;grooming;toileting  -[facility] Name 10/25/24 08          Bathing Assessment/Intervention    [place] Level (Bathing) supervision  -     Assistive Devices (Bathing) grab bar, tub/shower;hand-held shower spray hose;tub bench  -       Row Name 10/25/24 0836          Upper Body Dressing Assessment/Training    [place] Level (Upper Body Dressing) set up  -       Row Name 10/25/24 08          Lower Body Dressing Assessment/Training    [place] Level (Lower Body Dressing) supervision  -[facility] Name 10/25/24 08          Toileting Assessment/Training    [place] Level (Toileting) supervision  -       Row Name 10/25/24 08           Grooming Assessment/Training    [place] Level (Grooming) set up  -               User Key  (r) = Recorded By, (t) = Taken By, (c) = Cosigned By      Initials Name Provider Type    Ruth Whitney OT Occupational Therapist                   Obj/Interventions       Row Name 10/25/24 0837          Motor Skills    Functional Endurance Endurance training with Omnicycle x15 minutes with 2 rest breaks  -     Therapeutic Exercise aerobic  -GC               User Key  (r) = Recorded By, (t) = Taken By, (c) = Cosigned By      Initials Name Provider Type    Ruth Whitney OT Occupational Therapist                   Goals/Plan    No documentation.                  Clinical Impression       Row Name 10/25/24 0838          Plan of Care Review    Plan of Care Reviewed With patient  -GC     Progress improving  -     Outcome Evaluation Pt. is setup to spv for most all BADLS using RW with improved balance, endurance and general strength.  Pt. does live alone therefore continued skilled services required to reach LTGs for MOD Indep using RW.  Pt. plans to possibly d/c next week with f/u HH with progression toware Mod Indep is acheived.  -       Row Name 10/25/24 0838          Therapy Plan Review/Discharge Plan (OT)    Anticipated Discharge Disposition (OT) home with home health  -               User Key  (r) = Recorded By, (t) = Taken By, (c) = Cosigned By      Initials Name Provider Type    Ruth Whitney OT Occupational Therapist                   Outcome Measures       Row Name 10/25/24 0840          How much help from another is currently needed...    Putting on and taking off regular lower body clothing? 3  -GC     Bathing (including washing, rinsing, and drying) 4  -GC     Toileting (which includes using toilet bed pan or urinal) 4  -GC     Putting on and taking off regular upper body clothing 4  -GC     Taking care of personal grooming (such as brushing teeth) 4  -GC     Eating meals 4  -GC     AM-PAC 6  Clicks Score (OT) 23  -GC       Row Name 10/25/24 0818 10/24/24 7642       How much help from another person do you currently need...    Turning from your back to your side while in flat bed without using bedrails? 3  -WM 3  -TM    Moving from lying on back to sitting on the side of a flat bed without bedrails? 3  -WM 3  -TM    Moving to and from a bed to a chair (including a wheelchair)? 3  -WM 3  -TM    Standing up from a chair using your arms (e.g., wheelchair, bedside chair)? 3  -WM 3  -TM    Climbing 3-5 steps with a railing? 3  -WM 3  -TM    To walk in hospital room? 3  -WM 3  -TM    AM-PAC 6 Clicks Score (PT) 18  -WM 18  -TM    Highest Level of Mobility Goal 6 --> Walk 10 steps or more  -WM 6 --> Walk 10 steps or more  -TM      Row Name 10/25/24 0840          Functional Assessment    Outcome Measure Options AM-PAC 6 Clicks Daily Activity (OT);Optimal Instrument  -GC       Row Name 10/25/24 0840          Optimal Instrument    Optimal Instrument Optimal - 3  -GC     Bending/Stooping 2  -GC     Standing 1  -GC     Reaching 1  -GC               User Key  (r) = Recorded By, (t) = Taken By, (c) = Cosigned By      Initials Name Provider Type    TM Sherie Craft, RN Registered Nurse    Marquise Ramirez PTA Physical Therapist Assistant    Ruth Whitney OT Occupational Therapist                  Section G  Mobility  Dressing - self performance: limited assistance (staff provide guided maneuvering of limbs or other non-weight bearing assistance)  Dressing support/assistance: One person assist  Eating - self performance: independent  Eating support/assistance: No setup or physical help  Toileting - self performance: limited assistance (staff provide guided maneuvering of limbs or other non-weight bearing assistance)  Toileting support/assistance: One person assist  Personal hygiene - self performance: limited assistance (staff provide guided maneuvering of limbs or other non-weight bearing assistance)  Personal  hygiene support/assistance: One person assist  Bathing  Bathing - self performance: Physical help only to transfer to bathe  Bathing support/assistance: One person assist     Range of Motion  Upper Extremity: No impairment  Section GG  Functional Ability/Goals, Adm (Section GG)  Self Care, Prior Functioning (RP5878J): 3. Independent  Functional Cognition, Prior Functioning (LF5496N): 3. Independent  Upper Extremity Range of Motion (OS0591M): No impairment  Self Care, Admission (Section GG)  Eating: Self-Care Admission Performance (IB9579O4): independent (06)  Oral Hygiene: Self-Care Admission Performance (NI4816U5): setup or clean-up assistance (05)  Toileting Hygiene: Self-Care Admission Performance ([id number]): partial/moderate assistance (03)  Shower/Bathe Self: Self-Care Admission Performance (AI5081X5): partial/moderate assistance (03)  Upper Body Dressing: Self-Care Admission Performance (BB1593C4): setup or clean-up assistance (05)  Lower Body Dressing: Self-Care Admission Performance (KP2447D8): partial/moderate assistance (03)  Putting On/Taking Off Footwear: Self-Care Admission Performance (XH7920X9): partial/moderate assistance (03)  Personal Hygiene: Self-Care Admission Performance (HG2331Z5): supervision or touching assistance (04)  Mobility, Admission Performance (ET6883)  Toilet Transfer: Mobility Admission Performance (BX8226U1): supervision or touching assistance (04)  Tub/shower Transfer: Mobility Admission Performance (SJ3421RK2): supervision or touching assistance (04)                Occupational Therapy Education       Title: PT OT SLP Therapies (In Progress)       Topic: Occupational Therapy (In Progress)       Point: ADL training (Done)       Description:   Instruct learner(s) on proper safety adaptation and remediation techniques during self care or transfers.   Instruct in proper use of assistive devices.                  Learning Progress Summary            Patient Acceptance, E, VU,NR by OSWALD  at 10/16/2024 0925                      Point: Home exercise program (Not Started)       Description:   Instruct learner(s) on appropriate technique for monitoring, assisting and/or progressing therapeutic exercises/activities.                  Learner Progress:  Not documented in this visit.              Point: Precautions (Done)       Description:   Instruct learner(s) on prescribed precautions during self-care and functional transfers.                  Learning Progress Summary            Patient Acceptance, E, VU,NR by  at 10/16/2024 0925                      Point: Body mechanics (Done)       Description:   Instruct learner(s) on proper positioning and spine alignment during self-care, functional mobility activities and/or exercises.                  Learning Progress Summary            Patient Acceptance, E, VU,NR by  at 10/16/2024 0925                                      User Key       Initials Effective Dates Name Provider Type Discipline     06/16/21 -  Ruth Islas OT Occupational Therapist OT                  OT Recommendation and Plan  Planned Therapy Interventions (OT): activity tolerance training, adaptive equipment training, BADL retraining, functional balance retraining, occupation/activity based interventions, patient/caregiver education/training, ROM/therapeutic exercise, strengthening exercise, transfer/mobility retraining  Therapy Frequency (OT): 5 times/wk  Plan of Care Review  Plan of Care Reviewed With: patient  Progress: improving  Outcome Evaluation: Pt. is setup to spv for most all BADLS using RW with improved balance, endurance and general strength.  Pt. does live alone therefore continued skilled services required to reach LTGs for MOD Indep using RW.  Pt. plans to possibly d/c next week with f/u HH with progression toware Mod Indep is acheived.     Time Calculation:   Evaluation Complexity (OT)  Review Occupational Profile/Medical/Therapy History Complexity: expanded/moderate  complexity  Assessment, Occupational Performance/Identification of Deficit Complexity: 3-5 performance deficits  Clinical Decision Making Complexity (OT): detailed assessment/moderate complexity  Overall Complexity of Evaluation (OT): moderate complexity     Time Calculation- OT       Row Name 10/25/24 0840 10/25/24 0807          Time Calculation- OT    OT Received On 10/25/24  -GC --        Timed Charges    90112 - OT Therapeutic Exercise Minutes 15  -GC --     23125 - Gait Training Minutes  -- 8  -WM     25689 - OT Therapeutic Activity Minutes 5  -GC --     38010 - OT Self Care/Mgmt Minutes 25  -GC --        SNF Occupational Therapy Minutes    Skilled Minutes- OT 45 min  -GC --        Total Minutes    Timed Charges Total Minutes 45  -GC 8  -WM      Total Minutes 45  -GC 8  -WM               User Key  (r) = Recorded By, (t) = Taken By, (c) = Cosigned By      Initials Name Provider Type     Marquise Escobar, PTA Physical Therapist Assistant     Ruth Islas, OT Occupational Therapist                  Therapy Charges for Today       Code Description Service Date Service Provider Modifiers Qty    [id number]  OT THER PROC EA 15 MIN 10/25/2024 Ruth Islas OT GO 1    [id number]  OT SELF CARE/MGMT/TRAIN EA 15 MIN 10/25/2024 Ruth Islas OT GO 2                 Ruth Islas OT  10/25/2024

## 2024-10-26 LAB
ANION GAP SERPL CALCULATED.3IONS-SCNC: 9.4 MMOL/L (ref 5–15)
BUN SERPL-MCNC: 29 MG/DL (ref 8–23)
BUN/CREAT SERPL: 23.4 (ref 7–25)
CALCIUM SPEC-SCNC: 9.3 MG/DL (ref 8.6–10.5)
CHLORIDE SERPL-SCNC: 107 MMOL/L (ref 98–107)
CO2 SERPL-SCNC: 22.6 MMOL/L (ref 22–29)
CREAT SERPL-MCNC: 1.24 MG/DL (ref 0.76–1.27)
DEPRECATED RDW RBC AUTO: 47.3 FL (ref 37–54)
EGFRCR SERPLBLD CKD-EPI 2021: 55.6 ML/MIN/1.73
ERYTHROCYTE [DISTWIDTH] IN BLOOD BY AUTOMATED COUNT: 13.7 % (ref 12.3–15.4)
GLUCOSE BLDC GLUCOMTR-MCNC: 142 MG/DL (ref 70–99)
GLUCOSE BLDC GLUCOMTR-MCNC: 201 MG/DL (ref 70–99)
GLUCOSE BLDC GLUCOMTR-MCNC: 90 MG/DL (ref 70–99)
GLUCOSE BLDC GLUCOMTR-MCNC: 98 MG/DL (ref 70–99)
GLUCOSE SERPL-MCNC: 85 MG/DL (ref 65–99)
HCT VFR BLD AUTO: 33.7 % (ref 37.5–51)
HGB BLD-MCNC: 10.4 G/DL (ref 13–17.7)
MCH RBC QN AUTO: 29.3 PG (ref 26.6–33)
MCHC RBC AUTO-ENTMCNC: 30.9 G/DL (ref 31.5–35.7)
MCV RBC AUTO: 94.9 FL (ref 79–97)
PLATELET # BLD AUTO: 179 10*3/MM3 (ref 140–450)
PMV BLD AUTO: 10.9 FL (ref 6–12)
POTASSIUM SERPL-SCNC: 4.9 MMOL/L (ref 3.5–5.2)
RBC # BLD AUTO: 3.55 10*6/MM3 (ref 4.14–5.8)
SODIUM SERPL-SCNC: 139 MMOL/L (ref 136–145)
WBC NRBC COR # BLD AUTO: 4.47 10*3/MM3 (ref 3.4–10.8)

## 2024-10-26 PROCEDURE — 82948 REAGENT STRIP/BLOOD GLUCOSE: CPT

## 2024-10-26 PROCEDURE — 97110 THERAPEUTIC EXERCISES: CPT

## 2024-10-26 PROCEDURE — 63710000001 INSULIN GLARGINE PER 5 UNITS: Performed by: FAMILY MEDICINE

## 2024-10-26 PROCEDURE — 99308 SBSQ NF CARE LOW MDM 20: CPT | Performed by: INTERNAL MEDICINE

## 2024-10-26 PROCEDURE — 97116 GAIT TRAINING THERAPY: CPT

## 2024-10-26 PROCEDURE — 80048 BASIC METABOLIC PNL TOTAL CA: CPT | Performed by: PHYSICIAN ASSISTANT

## 2024-10-26 PROCEDURE — 82948 REAGENT STRIP/BLOOD GLUCOSE: CPT | Performed by: FAMILY MEDICINE

## 2024-10-26 PROCEDURE — 85027 COMPLETE CBC AUTOMATED: CPT | Performed by: PHYSICIAN ASSISTANT

## 2024-10-26 PROCEDURE — 63710000001 INSULIN LISPRO (HUMAN) PER 5 UNITS: Performed by: FAMILY MEDICINE

## 2024-10-26 RX ADMIN — MIDODRINE HYDROCHLORIDE 5 MG: 5 TABLET ORAL at 07:46

## 2024-10-26 RX ADMIN — INSULIN LISPRO 4 UNITS: 100 INJECTION, SOLUTION INTRAVENOUS; SUBCUTANEOUS at 21:07

## 2024-10-26 RX ADMIN — LEVOTHYROXINE SODIUM 200 MCG: 0.1 TABLET ORAL at 05:44

## 2024-10-26 RX ADMIN — GABAPENTIN 100 MG: 100 CAPSULE ORAL at 21:07

## 2024-10-26 RX ADMIN — PANTOPRAZOLE SODIUM 40 MG: 40 TABLET, DELAYED RELEASE ORAL at 05:44

## 2024-10-26 RX ADMIN — GABAPENTIN 100 MG: 100 CAPSULE ORAL at 09:15

## 2024-10-26 RX ADMIN — MIDODRINE HYDROCHLORIDE 5 MG: 5 TABLET ORAL at 12:23

## 2024-10-26 RX ADMIN — APIXABAN 5 MG: 5 TABLET, FILM COATED ORAL at 21:07

## 2024-10-26 RX ADMIN — AMIODARONE HYDROCHLORIDE 200 MG: 200 TABLET ORAL at 09:15

## 2024-10-26 RX ADMIN — LATANOPROST 1 DROP: 50 SOLUTION OPHTHALMIC at 21:09

## 2024-10-26 RX ADMIN — INSULIN GLARGINE 15 UNITS: 100 INJECTION, SOLUTION SUBCUTANEOUS at 21:06

## 2024-10-26 RX ADMIN — Medication 250 MG: at 21:08

## 2024-10-26 RX ADMIN — APIXABAN 5 MG: 5 TABLET, FILM COATED ORAL at 09:15

## 2024-10-26 RX ADMIN — Medication 250 MG: at 09:15

## 2024-10-26 NOTE — THERAPY TREATMENT NOTE
SNF - Physical Therapy Progress Note  RAKEL Alcaraz     Patient Name: Darci Munson  : 1935  MRN: [id number]  Today's Date: 10/26/2024      Visit Dx:    ICD-10-CM ICD-9-CM   1. Decreased activities of daily living (ADL)  Z78.9 V49.89   2. Difficulty walking  R26.2 719.7     Patient Active Problem List   Diagnosis    Type 2 diabetes mellitus with hyperglycemia, without long-term current use of insulin    Chronic kidney disease    Hypothyroidism    Hypertensive disorder    Hyperlipidemia    Paroxysmal atrial fibrillation    Dyspepsia    Coronary arteriosclerosis    Gastroparesis    Hearing loss    Mitral valve regurgitation    Peripheral neuropathy    Prostate CA    Primary insomnia    Primary osteoarthritis of left knee    Anticoagulated    Vitamin D insufficiency    Iron deficiency anemia    Medicare annual wellness visit, subsequent    Thickened nails    Pain in toes of both feet    History of prostate cancer    Stage 3b chronic kidney disease    Chronic bilateral low back pain without sciatica    Acute renal failure    Intractable nausea and vomiting    Abdominal pain    Physical debility    Severe malnutrition     Past Medical History:   Diagnosis Date    Anemia, unspecified     Atherosclerotic heart disease of native coronary artery without angina pectoris     CAD (coronary artery disease)     CKD (chronic kidney disease), stage III     Essential (primary) hypertension     Gastric ulcer, unspecified as acute or chronic, without hemorrhage or perforation     Gastroparesis     GERD without esophagitis     Glaucoma     Hereditary and idiopathic neuropathy, unspecified     History of falling     HLD (hyperlipidemia)     HTN (hypertension)     Hypotension, unspecified     Hypothyroidism     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    Irritable bowel syndrome without diarrhea     Laceration without foreign body of right forearm, initial encounter     Low back pain     Malignant neoplasm of prostate     Mixed hyperlipidemia     OA  (osteoarthritis)     Other specified disorders of the skin and subcutaneous tissue     Pain in left foot     Peripheral vascular disease, unspecified     Phimosis     Presence of unspecified artificial knee joint     Primary insomnia     Primary osteoarthritis of both knees     Prostate cancer     Sensorineural hearing loss (SNHL) of both ears     Sigmoid diverticulosis     severe sigmoid and descending colon diverticulosis and 3+ gastritis    Sleep related leg cramps     Type 2 diabetes mellitus with diabetic polyneuropathy     and gastroparesis    Unspecified atrial fibrillation     Unspecified dementia without behavioral disturbance     Unspecified hearing loss, bilateral      Past Surgical History:   Procedure Laterality Date    CATARACT EXTRACTION Bilateral 2014    COLONOSCOPY      COLONOSCOPY N/A 7/1/2022    Procedure: COLONOSCOPY WITH POLYPECTOMIES, HOT SNARE;  Surgeon: Jennifer Mann MD;  Location: [facility];  Service: Gastroenterology;  Laterality: N/A;  DIVERTICULOSIS, COLON POLYPS, HEMORRHOIDS    ENDOSCOPY N/A 7/1/2022    Procedure: ESOPHAGOGASTRODUODENOSCOPY WITH BX, POLYPECTOMY;  Surgeon: Jennifer Mann MD;  Location: [facility];  Service: Gastroenterology;  Laterality: N/A;  GASTRIC POLYP, GASTRITIS, HIATAL HERNIA    HAND SURGERY Right     JOINT REPLACEMENT      KNEE ARTHROSCOPY Right 03/14/2017    PROSTATECTOMY      REPLACEMENT TOTAL KNEE  03/2017    UPPER GASTROINTESTINAL ENDOSCOPY         PT Assessment (Last 12 Hours)       PT Evaluation and Treatment       Row Name 10/26/24 1600          Physical Therapy Time and Intention    Subjective Information no complaints  -CS     Document Type therapy note (daily note)  -CS     Mode of Treatment individual therapy;physical therapy  -CS     Patient Effort good  -CS     Symptoms Noted During/After Treatment fatigue  -CS       Row Name 10/26/24 1600          Pain    Pretreatment Pain Rating 0/10 - no pain  -CS     Posttreatment  Pain Rating 0/10 - no pain  -       Row Name 10/26/24 1600          Sit-Stand Transfer    Sit-Stand [place] (Transfers) standby assist  -CS     Assistive Device (Sit-Stand Transfers) walker, front-wheeled  -CS       Row Name 10/26/24 1600          Stand-Sit Transfer    Stand-Sit [place] (Transfers) standby assist  -CS     Assistive Device (Stand-Sit Transfers) walker, front-wheeled  -CS       Row Name 10/26/24 1600          Gait/Stairs (Locomotion)    [place] Level (Gait) standby assist  -CS     Assistive Device (Gait) walker, front-wheeled  and no AD  -CS     Distance in Feet (Gait) 225  with Rw and an additional 225' without any AD  -CS     Pattern (Gait) 4-point;step-through;2-point  -CS     Deviations/Abnormal Patterns (Gait) base of support, narrow;festinating/shuffling;gait speed decreased;stride length decreased  -     Bilateral Gait Deviations forward flexed posture;heel strike decreased  -       Row Name 10/26/24 1600          Hip (Therapeutic Exercise)    Hip AROM (Therapeutic Exercise) bilateral;aBduction;aDduction;external rotation;internal rotation;sitting;20 repititions;supine  marches  -       Row Name 10/26/24 1600          Knee (Therapeutic Exercise)    Knee AROM (Therapeutic Exercise) bilateral;LAQ (long arc quad);SLR (straight leg raise);sitting;supine;20 repititions  -       Row Name 10/26/24 1600          Ankle (Therapeutic Exercise)    Ankle AROM (Therapeutic Exercise) bilateral;dorsiflexion;plantarflexion;supine;20 repititions  -       Row Name             Wound 10/11/24 0332 gluteal    Wound - Properties Group Placement Date: 10/11/24  -YUMIKO Placement Time: 0332 -JO Location: gluteal  -YUMIKO    Retired Wound - Properties Group Placement Date: 10/11/24  -YUMIKO Placement Time: 0332 -JO Location: gluteal  -YUMIKO    Retired Wound - Properties Group Placement Date: 10/11/24  -YUMIKO Placement Time: 0332 -JO Location: gluteal  -YUMIKO    Retired Wound - Properties Group Date first  assessed: 10/11/24  -YUMIKO Time first assessed: 0332  -YUMIKO Location: gluteal  -YUMIKO      Row Name 10/26/24 1600          Positioning and Restraints    Pre-Treatment Position sitting in chair/recliner  -CS     Post Treatment Position chair  -CS     In Chair reclined;call light within reach;encouraged to call for assist;exit alarm on;heels elevated;legs elevated  -CS       Row Name 10/26/24 1600          Progress Summary (PT)    Progress Toward Functional Goals (PT) progress toward functional goals is good  -CS               User Key  (r) = Recorded By, (t) = Taken By, (c) = Cosigned By      Initials Name Provider Type    Jimbo Islas, RN Registered Nurse    Antelmo Rosario PTA Physical Therapist Assistant                  Section G              Section GG                       Physical Therapy Education       Title: PT OT SLP Therapies (In Progress)       Topic: Physical Therapy (Not Started)       Point: Mobility training (Not Started)       Learner Progress:  Not documented in this visit.              Point: Home exercise program (Not Started)       Learner Progress:  Not documented in this visit.              Point: Body mechanics (Not Started)       Learner Progress:  Not documented in this visit.              Point: Precautions (Not Started)       Learner Progress:  Not documented in this visit.                                  PT Recommendation and Plan     Progress Summary (PT)  Progress Toward Functional Goals (PT): progress toward functional goals is good   Outcome Measures       Row Name 10/26/24 1600 10/25/24 0818 10/24/24 0850       How much help from another person do you currently need...    Turning from your back to your side while in flat bed without using bedrails? 3  -CS 3  -WM 3  -WM    Moving from lying on back to sitting on the side of a flat bed without bedrails? 3  -CS 3  -WM 3  -WM    Moving to and from a bed to a chair (including a wheelchair)? 3  -CS 3  -WM 3  -WM    Standing up from a  chair using your arms (e.g., wheelchair, bedside chair)? 3  -CS 3  -WM 3  -WM    Climbing 3-5 steps with a railing? 3  -CS 3  -WM 3  -WM    To walk in hospital room? 3  -CS 3  -WM 3  -WM    AM-PAC 6 Clicks Score (PT) 18  -CS 18  -WM 18  -WM       Functional Assessment    Outcome Measure Options AM-PAC 6 Clicks Basic Mobility (PT)  -CS -- --              User Key  (r) = Recorded By, (t) = Taken By, (c) = Cosigned By      Initials Name Provider Type     Marquise Escobar PTA Physical Therapist Assistant    Antelmo Rosario PTA Physical Therapist Assistant                     Time Calculation:    PT Charges       Row Name 10/26/24 1604             Time Calculation    Start Time 1316  -CS      PT Received On 10/26/24  -CS         Timed Charges    60079 - PT Therapeutic Exercise Minutes 10  -CS      31336 - Gait Training Minutes  13  -CS      15678 - PT Therapeutic Activity Minutes 2  -CS         SNF Physical Therapy Minutes    Skilled Minutes- PT 25 min  -CS         Total Minutes    Timed Charges Total Minutes 25  -CS       Total Minutes 25  -CS                User Key  (r) = Recorded By, (t) = Taken By, (c) = Cosigned By      Initials Name Provider Type     Antelmo Resendiz PTA Physical Therapist Assistant                  Therapy Charges for Today       Code Description Service Date Service Provider Modifiers Qty    63690855456 HC PT THER PROC EA 15 MIN 10/26/2024 Antelmo Resendiz PTA GP 1    89166347568 HC GAIT TRAINING EA 15 MIN 10/26/2024 Antelmo Resendiz PTA GP 1            PT G-Codes  Outcome Measure Options: AM-PAC 6 Clicks Basic Mobility (PT)  AM-PAC 6 Clicks Score (PT): 18  AM-PAC 6 Clicks Score (OT): 23    Antelmo Resendiz PTA  10/26/2024

## 2024-10-26 NOTE — PLAN OF CARE
Goal Outcome Evaluation:         Patient resting well this shift. Patient did complain of stomach discomfort that he believes is brought on by drinking the boosts. Pepto bismol was ordered and seems to help with his belly. Vitals stable. Denies any pain. Will continue plan of care.

## 2024-10-26 NOTE — PLAN OF CARE
Goal Outcome Evaluation:  Plan of Care Reviewed With: patient        Progress: no change  Outcome Evaluation: Aox4, call light and personal items within reach. Able to make needs known to staff. No c/o pain during the shift. 1x to the BR with walker and gait belt, urinal at bedside. Napping inbetween care. Skin barrier applied PRN. Con't plan of care

## 2024-10-27 LAB
GLUCOSE BLDC GLUCOMTR-MCNC: 123 MG/DL (ref 70–99)
GLUCOSE BLDC GLUCOMTR-MCNC: 131 MG/DL (ref 70–99)
GLUCOSE BLDC GLUCOMTR-MCNC: 284 MG/DL (ref 70–99)
GLUCOSE BLDC GLUCOMTR-MCNC: 291 MG/DL (ref 70–99)

## 2024-10-27 PROCEDURE — 82948 REAGENT STRIP/BLOOD GLUCOSE: CPT

## 2024-10-27 PROCEDURE — 63710000001 INSULIN GLARGINE PER 5 UNITS: Performed by: FAMILY MEDICINE

## 2024-10-27 PROCEDURE — 63710000001 INSULIN LISPRO (HUMAN) PER 5 UNITS: Performed by: FAMILY MEDICINE

## 2024-10-27 PROCEDURE — 82948 REAGENT STRIP/BLOOD GLUCOSE: CPT | Performed by: FAMILY MEDICINE

## 2024-10-27 RX ADMIN — Medication 250 MG: at 08:57

## 2024-10-27 RX ADMIN — APIXABAN 5 MG: 5 TABLET, FILM COATED ORAL at 08:57

## 2024-10-27 RX ADMIN — GABAPENTIN 100 MG: 100 CAPSULE ORAL at 20:17

## 2024-10-27 RX ADMIN — LATANOPROST 1 DROP: 50 SOLUTION OPHTHALMIC at 20:24

## 2024-10-27 RX ADMIN — INSULIN LISPRO 6 UNITS: 100 INJECTION, SOLUTION INTRAVENOUS; SUBCUTANEOUS at 20:17

## 2024-10-27 RX ADMIN — LEVOTHYROXINE SODIUM 200 MCG: 0.1 TABLET ORAL at 05:06

## 2024-10-27 RX ADMIN — Medication 5 MG: at 23:05

## 2024-10-27 RX ADMIN — AMIODARONE HYDROCHLORIDE 200 MG: 200 TABLET ORAL at 08:57

## 2024-10-27 RX ADMIN — MIDODRINE HYDROCHLORIDE 5 MG: 5 TABLET ORAL at 12:20

## 2024-10-27 RX ADMIN — PANTOPRAZOLE SODIUM 40 MG: 40 TABLET, DELAYED RELEASE ORAL at 05:06

## 2024-10-27 RX ADMIN — Medication 250 MG: at 20:16

## 2024-10-27 RX ADMIN — INSULIN LISPRO 6 UNITS: 100 INJECTION, SOLUTION INTRAVENOUS; SUBCUTANEOUS at 12:20

## 2024-10-27 RX ADMIN — APIXABAN 5 MG: 5 TABLET, FILM COATED ORAL at 20:17

## 2024-10-27 RX ADMIN — INSULIN GLARGINE 15 UNITS: 100 INJECTION, SOLUTION SUBCUTANEOUS at 20:17

## 2024-10-27 RX ADMIN — MIDODRINE HYDROCHLORIDE 5 MG: 5 TABLET ORAL at 17:36

## 2024-10-27 RX ADMIN — GABAPENTIN 100 MG: 100 CAPSULE ORAL at 08:57

## 2024-10-27 NOTE — PLAN OF CARE
Goal Outcome Evaluation:  Plan of Care Reviewed With: patient        Progress: improving  Outcome Evaluation: Aox4, call light and personal items within reach. Able to make needs known to staff. No c/o pain during the shift. Walked with staff around the unit, tolorated well. 1x to the BR with walker and gait belt, urinal at bedsid. Con't plan of care

## 2024-10-27 NOTE — PLAN OF CARE
Goal Outcome Evaluation:  Plan of Care Reviewed With: patient        Progress: improving  Outcome Evaluation: Resident is alert and oriented but [place]. Able to make needs knonw to staff.  at BS. SSI and Lantus given per MAR. Popsicle given for snack. Urinal used for elimination during shift. No complaints of pain. Slept between care. Call light and personal items within reach. Continue with current plan of care.

## 2024-10-27 NOTE — PROGRESS NOTES
[facility]   Hospitalist Progress Note       Patient Name: Darci Munson  : 1935  MRN: [id number]  Primary Care Physician: Adrien Mayer MD  Date of admission: 10/15/2024  Date of Service: 10/26/2024  Room / Bed:   305/2  Subjective   Chief Complaint: Hospital-acquired weakness     HPI:     Darci Munson is a 89 y.o. male past medical history significant for chronic atrial fibrillation/flutter, diabetes, CAD, hypertension, hyperlipidemia, hypothyroidism, history of prostate cancer, chronic kidney disease, glaucoma, mitral valve regurgitation, that was hospitalized for chief complaint of nausea vomiting and diarrhea was noted to have ITA with hyperkalemia in addition to lactic acidosis CT scan demonstrating nodules in the lower lobe which will require follow-up CT scan of the chest in 6 months concern for possible acute diverticulitis placed on antibiotics to cover GI tract IV fluids with improvement of symptoms seen by PT and OT deemed a good candidate for inpatient rehab is been transferred to skilled nursing facility for ongoing therapy.       Interval Followup: Seen 10/26/24; Denied new complaints        REVIEW OF SYSTEMS:   Weakness  Objective   Temp:  [97.6 °F (36.4 °C)-97.7 °F (36.5 °C)] 97.7 °F (36.5 °C)  Heart Rate:  [69-75] 75  Resp:  [14-16] 16  BP: (108-132)/(52-63) 111/52    PHYSICAL EXAM   Gen: NAD, in chair  Resp: CTA michael, no wheeze  CV: s1s2  Abd: sndnt      Results from last 7 days   Lab Units 10/26/24  0506   WBC 10*3/mm3 4.47   HEMOGLOBIN g/dL 10.4*   HEMATOCRIT % 33.7*   PLATELETS 10*3/mm3 179     Results from last 7 days   Lab Units 10/26/24  0506   SODIUM mmol/L 139   POTASSIUM mmol/L 4.9   CO2 mmol/L 22.6   CHLORIDE mmol/L 107   ANION GAP mmol/L 9.4   BUN mg/dL 29*   CREATININE mg/dL 1.24   GLUCOSE mg/dL 85         Assessment / Plan   Assessment:     Hospital-acquired weakness  Recent ITA  Recent hyperkalemia  Recent dehydration  Acute diverticulitis  Atrial  fibrillation/flutter chronic  Diverticulosis  Diabetes  CAD  Hypertension  Gastroparesis  History of prostate cancer  Hypothyroidism     Plan:     Daily physical therapy  Continue carb consistent diet with snacks and nutritional supplements  Continue amiodarone and Eliquis for atrial fibrillation  Continue basal, bolus, correctional insulin   Renal function electrolytes periodically, stable 10/26  Continue midodrine  Melatonin prn sleep    VTE Prophylaxis:  Pharmacologic VTE prophylaxis orders are present.      CODE STATUS:      Level Of Support Discussed With: Patient  Code Status (Patient has no pulse and is not breathing): CPR (Attempt to Resuscitate)  Medical Interventions (Patient has pulse or is breathing): Full Support      Electronically signed by Jelani Grady MD, 10/27/24, 12:10 PM EDT.

## 2024-10-28 LAB
GLUCOSE BLDC GLUCOMTR-MCNC: 138 MG/DL (ref 70–99)
GLUCOSE BLDC GLUCOMTR-MCNC: 155 MG/DL (ref 70–99)
GLUCOSE BLDC GLUCOMTR-MCNC: 208 MG/DL (ref 70–99)
GLUCOSE BLDC GLUCOMTR-MCNC: 248 MG/DL (ref 70–99)
SARS-COV-2 RNA RESP QL NAA+PROBE: NOT DETECTED

## 2024-10-28 PROCEDURE — 97535 SELF CARE MNGMENT TRAINING: CPT

## 2024-10-28 PROCEDURE — 82948 REAGENT STRIP/BLOOD GLUCOSE: CPT

## 2024-10-28 PROCEDURE — 63710000001 INSULIN LISPRO (HUMAN) PER 5 UNITS: Performed by: FAMILY MEDICINE

## 2024-10-28 PROCEDURE — 97110 THERAPEUTIC EXERCISES: CPT

## 2024-10-28 PROCEDURE — 82948 REAGENT STRIP/BLOOD GLUCOSE: CPT | Performed by: FAMILY MEDICINE

## 2024-10-28 PROCEDURE — 63710000001 INSULIN GLARGINE PER 5 UNITS: Performed by: FAMILY MEDICINE

## 2024-10-28 PROCEDURE — 87635 SARS-COV-2 COVID-19 AMP PRB: CPT | Performed by: FAMILY MEDICINE

## 2024-10-28 PROCEDURE — 97116 GAIT TRAINING THERAPY: CPT

## 2024-10-28 RX ADMIN — MIDODRINE HYDROCHLORIDE 5 MG: 5 TABLET ORAL at 08:23

## 2024-10-28 RX ADMIN — Medication 250 MG: at 20:22

## 2024-10-28 RX ADMIN — PANTOPRAZOLE SODIUM 40 MG: 40 TABLET, DELAYED RELEASE ORAL at 05:11

## 2024-10-28 RX ADMIN — INSULIN LISPRO 4 UNITS: 100 INJECTION, SOLUTION INTRAVENOUS; SUBCUTANEOUS at 20:22

## 2024-10-28 RX ADMIN — APIXABAN 5 MG: 5 TABLET, FILM COATED ORAL at 09:33

## 2024-10-28 RX ADMIN — APIXABAN 5 MG: 5 TABLET, FILM COATED ORAL at 20:22

## 2024-10-28 RX ADMIN — Medication 5 MG: at 23:04

## 2024-10-28 RX ADMIN — MIDODRINE HYDROCHLORIDE 5 MG: 5 TABLET ORAL at 11:48

## 2024-10-28 RX ADMIN — INSULIN LISPRO 2 UNITS: 100 INJECTION, SOLUTION INTRAVENOUS; SUBCUTANEOUS at 08:13

## 2024-10-28 RX ADMIN — GABAPENTIN 100 MG: 100 CAPSULE ORAL at 20:22

## 2024-10-28 RX ADMIN — INSULIN LISPRO 4 UNITS: 100 INJECTION, SOLUTION INTRAVENOUS; SUBCUTANEOUS at 11:48

## 2024-10-28 RX ADMIN — INSULIN GLARGINE 15 UNITS: 100 INJECTION, SOLUTION SUBCUTANEOUS at 20:23

## 2024-10-28 RX ADMIN — Medication 250 MG: at 09:33

## 2024-10-28 RX ADMIN — GABAPENTIN 100 MG: 100 CAPSULE ORAL at 09:33

## 2024-10-28 RX ADMIN — LEVOTHYROXINE SODIUM 200 MCG: 0.1 TABLET ORAL at 05:11

## 2024-10-28 RX ADMIN — LATANOPROST 1 DROP: 50 SOLUTION OPHTHALMIC at 20:30

## 2024-10-28 RX ADMIN — AMIODARONE HYDROCHLORIDE 200 MG: 200 TABLET ORAL at 09:33

## 2024-10-28 NOTE — PLAN OF CARE
Goal Outcome Evaluation:  Plan of Care Reviewed With: patient        Progress: improving  Outcome Evaluation: Alert and oriented and pleasant with staff. x1 assist for transfers and ambulation. No c/o pain requiring PRN pain medication noted this shift. Shows improved mobility and endurance this shfit. Elevate heels while in bed. Sitting up in bed, call light in reach.

## 2024-10-28 NOTE — PLAN OF CARE
Goal Outcome Evaluation:  Plan of Care Reviewed With: patient        Progress: improving  Outcome Evaluation: Alert and oriented x4; [place] with bilateral hearing aids. Able to make needs known to staff. No complaints of pain or heartburn this shift. Transfers to BR x1 person assist using rolling walker and gait belt; utilizes urinal at night. Blood sugar 291 at bedtime; covered with insulin per MAR. Melatonin administered at 2305 per resident request. Call light within reach; care plan ongoing.

## 2024-10-28 NOTE — THERAPY TREATMENT NOTE
SNF - Physical Therapy Treatment Note  RAKEL Alcaraz     Patient Name: Darci Munson  : 1935  MRN: [id number]  Today's Date: 10/28/2024      Visit Dx:    ICD-10-CM ICD-9-CM   1. Decreased activities of daily living (ADL)  Z78.9 V49.89   2. Difficulty walking  R26.2 719.7     Patient Active Problem List   Diagnosis    Type 2 diabetes mellitus with hyperglycemia, without long-term current use of insulin    Chronic kidney disease    Hypothyroidism    Hypertensive disorder    Hyperlipidemia    Paroxysmal atrial fibrillation    Dyspepsia    Coronary arteriosclerosis    Gastroparesis    Hearing loss    Mitral valve regurgitation    Peripheral neuropathy    Prostate CA    Primary insomnia    Primary osteoarthritis of left knee    Anticoagulated    Vitamin D insufficiency    Iron deficiency anemia    Medicare annual wellness visit, subsequent    Thickened nails    Pain in toes of both feet    History of prostate cancer    Stage 3b chronic kidney disease    Chronic bilateral low back pain without sciatica    Acute renal failure    Intractable nausea and vomiting    Abdominal pain    Physical debility    Severe malnutrition     Past Medical History:   Diagnosis Date    Anemia, unspecified     Atherosclerotic heart disease of native coronary artery without angina pectoris     CAD (coronary artery disease)     CKD (chronic kidney disease), stage III     Essential (primary) hypertension     Gastric ulcer, unspecified as acute or chronic, without hemorrhage or perforation     Gastroparesis     GERD without esophagitis     Glaucoma     Hereditary and idiopathic neuropathy, unspecified     History of falling     HLD (hyperlipidemia)     HTN (hypertension)     Hypotension, unspecified     Hypothyroidism     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    Irritable bowel syndrome without diarrhea     Laceration without foreign body of right forearm, initial encounter     Low back pain     Malignant neoplasm of prostate     Mixed hyperlipidemia      OA (osteoarthritis)     Other specified disorders of the skin and subcutaneous tissue     Pain in left foot     Peripheral vascular disease, unspecified     Phimosis     Presence of unspecified artificial knee joint     Primary insomnia     Primary osteoarthritis of both knees     Prostate cancer     Sensorineural hearing loss (SNHL) of both ears     Sigmoid diverticulosis     severe sigmoid and descending colon diverticulosis and 3+ gastritis    Sleep related leg cramps     Type 2 diabetes mellitus with diabetic polyneuropathy     and gastroparesis    Unspecified atrial fibrillation     Unspecified dementia without behavioral disturbance     Unspecified hearing loss, bilateral      Past Surgical History:   Procedure Laterality Date    CATARACT EXTRACTION Bilateral 2014    COLONOSCOPY      COLONOSCOPY N/A 7/1/2022    Procedure: COLONOSCOPY WITH POLYPECTOMIES, HOT SNARE;  Surgeon: Jennifer Mann MD;  Location: [facility] ENDOSCOPY;  Service: Gastroenterology;  Laterality: N/A;  DIVERTICULOSIS, COLON POLYPS, HEMORRHOIDS    ENDOSCOPY N/A 7/1/2022    Procedure: ESOPHAGOGASTRODUODENOSCOPY WITH BX, POLYPECTOMY;  Surgeon: Jennifer Mann MD;  Location: [facility] ENDOSCOPY;  Service: Gastroenterology;  Laterality: N/A;  GASTRIC POLYP, GASTRITIS, HIATAL HERNIA    HAND SURGERY Right     JOINT REPLACEMENT      KNEE ARTHROSCOPY Right 03/14/2017    PROSTATECTOMY      REPLACEMENT TOTAL KNEE  03/2017    UPPER GASTROINTESTINAL ENDOSCOPY         PT Assessment (Last 12 Hours)       PT Evaluation and Treatment       Row Name 10/28/24 0758          Physical Therapy Time and Intention    Document Type therapy note (daily note)  -WM     Mode of Treatment individual therapy;physical therapy  -WM     Patient Effort good  -WM     Symptoms Noted During/After Treatment fatigue  -WM       Row Name 10/28/24 0758          Bed Mobility    Supine-Sit [place] (Bed Mobility) standby assist  -WM     Assistive Device (Bed Mobility) bed  rails  -       Row Name 10/28/24 0758          Sit-Stand Transfer    Sit-Stand [place] (Transfers) standby assist;verbal cues  -     Assistive Device (Sit-Stand Transfers) walker, front-wheeled  -WM       Row Name 10/28/24 0758          Stand-Sit Transfer    Stand-Sit [place] (Transfers) standby assist;verbal cues  -     Assistive Device (Stand-Sit Transfers) walker, front-wheeled  -WM       Row Name 10/28/24 0758          Gait/Stairs (Locomotion)    [place] Level (Gait) contact guard;standby assist;verbal cues  -     Assistive Device (Gait) walker, front-wheeled  -     Distance in Feet (Gait) 225  + 40  -WM     Pattern (Gait) 4-point;step-through  -WM     Deviations/Abnormal Patterns (Gait) gait speed decreased;stride length decreased  -       Row Name 10/28/24 0758          Safety Issues/Impairments Affecting Functional Mobility    Impairments Affecting Function (Mobility) balance;endurance/activity tolerance;strength  -       Row Name 10/28/24 0758          Hip (Therapeutic Exercise)    Hip AROM (Therapeutic Exercise) bilateral;aBduction;aDduction;supine;15 repititions  2 sets  -     Hip Strengthening (Therapeutic Exercise) bilateral;aBduction;heel slides;marching while seated;sitting;supine;2 lb free weight;resistance band;green;15 repititions;2 sets  Manual resistance  -       Row Name 10/28/24 0758          Knee (Therapeutic Exercise)    Knee Strengthening (Therapeutic Exercise) bilateral;LAQ (long arc quad);hamstring curls;sitting;2 lb free weight;resistance band;green;15 repititions;2 sets  -       Row Name 10/28/24 0758          Ankle (Therapeutic Exercise)    Ankle AROM (Therapeutic Exercise) bilateral;dorsiflexion;plantarflexion;supine;30 repititions  -       Row Name 10/28/24 0758          Aerobic Exercise    Time Performed (Aerobic Exercise) NuStep x 15 minutes, load 5  -       Row Name             Wound 10/11/24 0332 gluteal    Wound - Properties Group Placement  Date: 10/11/24  -YUMIKO Placement Time: 0332 -JO Location: gluteal  -YUMIKO    Retired Wound - Properties Group Placement Date: 10/11/24  -YUMIKO Placement Time: 0332 -YUMIKO Location: gluteal  -YUMIKO    Retired Wound - Properties Group Placement Date: 10/11/24  -YUMIKO Placement Time: 0332 -JO Location: gluteal  -YUMIOK    Retired Wound - Properties Group Date first assessed: 10/11/24  -YUMIKO Time first assessed: 0332 -JO Location: gluteal  -YUMIKO      Row Name             Wound 10/26/24 2107 Right posterior heel unspecified    Wound - Properties Group Placement Date: 10/26/24  -KR Placement Time: 2107 -KR Side: Right  -KR Orientation: posterior  -KR Location: heel  -KR Primary Wound Type: Traumatic  -KR Type: unspecified  -KR Present on Original Admission: N  -KR    Retired Wound - Properties Group Placement Date: 10/26/24  -KR Placement Time: 2107 -KR Present on Original Admission: N  -KR Side: Right  -KR Orientation: posterior  -KR Location: heel  -KR Primary Wound Type: Traumatic  -KR Type: unspecified  -KR    Retired Wound - Properties Group Placement Date: 10/26/24  -KR Placement Time: 2107 -KR Present on Original Admission: N  -KR Side: Right  -KR Orientation: posterior  -KR Location: heel  -KR Primary Wound Type: Traumatic  -KR Type: unspecified  -KR    Retired Wound - Properties Group Date first assessed: 10/26/24  -KR Time first assessed: 2107 -KR Present on Original Admission: N  -KR Side: Right  -KR Location: heel  -KR Primary Wound Type: Traumatic  -KR Type: unspecified  -KR      Row Name 10/28/24 0758          Positioning and Restraints    Pre-Treatment Position in bed  -WM     Post Treatment Position chair  -WM     In Chair reclined;call light within reach;encouraged to call for assist;exit alarm on  -WM       Row Name 10/28/24 0758          Progress Summary (PT)    Progress Toward Functional Goals (PT) progress toward functional goals is good  -               User Key  (r) = Recorded By, (t) = Taken By, (c) = Cosigned By       Initials Name Provider Type    Jimbo Islas, RN Registered Nurse    Marquise Ramirez PTA Physical Therapist Assistant    Blanca Kahn RN Registered Nurse                  Section G              Section GG                       Physical Therapy Education       Title: PT OT SLP Therapies (In Progress)       Topic: Physical Therapy (Not Started)       Point: Mobility training (Not Started)       Learner Progress:  Not documented in this visit.              Point: Home exercise program (Not Started)       Learner Progress:  Not documented in this visit.              Point: Body mechanics (Not Started)       Learner Progress:  Not documented in this visit.              Point: Precautions (Not Started)       Learner Progress:  Not documented in this visit.                                  PT Recommendation and Plan     Progress Summary (PT)  Progress Toward Functional Goals (PT): progress toward functional goals is good   Outcome Measures       Row Name 10/28/24 0803 10/26/24 1600 10/25/24 0818       How much help from another person do you currently need...    Turning from your back to your side while in flat bed without using bedrails? 3  -WM 3  -CS 3  -WM    Moving from lying on back to sitting on the side of a flat bed without bedrails? 3  -WM 3  -CS 3  -WM    Moving to and from a bed to a chair (including a wheelchair)? 3  -WM 3  -CS 3  -WM    Standing up from a chair using your arms (e.g., wheelchair, bedside chair)? 3  -WM 3  -CS 3  -WM    Climbing 3-5 steps with a railing? 3  -WM 3  -CS 3  -WM    To walk in hospital room? 3  -WM 3  -CS 3  -WM    AM-PAC 6 Clicks Score (PT) 18  -WM 18  -CS 18  -WM       Functional Assessment    Outcome Measure Options -- AM-PAC 6 Clicks Basic Mobility (PT)  -CS --              User Key  (r) = Recorded By, (t) = Taken By, (c) = Cosigned By      Initials Name Provider Type    Marquise Ramirez PTA Physical Therapist Assistant    Antelmo Rosario PTA  Physical Therapist Assistant                     Time Calculation:    PT Charges       Row Name 10/28/24 0757             Time Calculation    PT Received On 10/28/24  -WM         Timed Charges    80980 - PT Therapeutic Exercise Minutes 30  -WM      83420 - Gait Training Minutes  10  -WM      43527 - PT Therapeutic Activity Minutes 4  -WM         SNF Physical Therapy Minutes    Skilled Minutes- PT 44 min  -WM         Total Minutes    Timed Charges Total Minutes 44  -WM       Total Minutes 44  -WM                User Key  (r) = Recorded By, (t) = Taken By, (c) = Cosigned By      Initials Name Provider Type    Marquise Ramirez PTA Physical Therapist Assistant                      PT G-Codes  Outcome Measure Options: AM-PAC 6 Clicks Basic Mobility (PT)  AM-PAC 6 Clicks Score (PT): 18  AM-PAC 6 Clicks Score (OT): 23    Marquise Escobar PTA  10/28/2024

## 2024-10-28 NOTE — THERAPY TREATMENT NOTE
SNF - Occupational Therapy Treatment Note  RAKEL Alcaraz    Patient Name: Darci Munson  : 1935    MRN: [id number]                              Today's Date: 10/28/2024       Admit Date: 10/15/2024    Visit Dx:     ICD-10-CM ICD-9-CM   1. Decreased activities of daily living (ADL)  Z78.9 V49.89   2. Difficulty walking  R26.2 719.7     Patient Active Problem List   Diagnosis    Type 2 diabetes mellitus with hyperglycemia, without long-term current use of insulin    Chronic kidney disease    Hypothyroidism    Hypertensive disorder    Hyperlipidemia    Paroxysmal atrial fibrillation    Dyspepsia    Coronary arteriosclerosis    Gastroparesis    Hearing loss    Mitral valve regurgitation    Peripheral neuropathy    Prostate CA    Primary insomnia    Primary osteoarthritis of left knee    Anticoagulated    Vitamin D insufficiency    Iron deficiency anemia    Medicare annual wellness visit, subsequent    Thickened nails    Pain in toes of both feet    History of prostate cancer    Stage 3b chronic kidney disease    Chronic bilateral low back pain without sciatica    Acute renal failure    Intractable nausea and vomiting    Abdominal pain    Physical debility    Severe malnutrition     Past Medical History:   Diagnosis Date    Anemia, unspecified     Atherosclerotic heart disease of native coronary artery without angina pectoris     CAD (coronary artery disease)     CKD (chronic kidney disease), stage III     Essential (primary) hypertension     Gastric ulcer, unspecified as acute or chronic, without hemorrhage or perforation     Gastroparesis     GERD without esophagitis     Glaucoma     Hereditary and idiopathic neuropathy, unspecified     History of falling     HLD (hyperlipidemia)     HTN (hypertension)     Hypotension, unspecified     Hypothyroidism     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    Irritable bowel syndrome without diarrhea     Laceration without foreign body of right forearm, initial encounter     Low back pain      Malignant neoplasm of prostate     Mixed hyperlipidemia     OA (osteoarthritis)     Other specified disorders of the skin and subcutaneous tissue     Pain in left foot     Peripheral vascular disease, unspecified     Phimosis     Presence of unspecified artificial knee joint     Primary insomnia     Primary osteoarthritis of both knees     Prostate cancer     Sensorineural hearing loss (SNHL) of both ears     Sigmoid diverticulosis     severe sigmoid and descending colon diverticulosis and 3+ gastritis    Sleep related leg cramps     Type 2 diabetes mellitus with diabetic polyneuropathy     and gastroparesis    Unspecified atrial fibrillation     Unspecified dementia without behavioral disturbance     Unspecified hearing loss, bilateral      Past Surgical History:   Procedure Laterality Date    CATARACT EXTRACTION Bilateral 2014    COLONOSCOPY      COLONOSCOPY N/A 7/1/2022    Procedure: COLONOSCOPY WITH POLYPECTOMIES, HOT SNARE;  Surgeon: Jennifer Mann MD;  Location: [facility] ENDOSCOPY;  Service: Gastroenterology;  Laterality: N/A;  DIVERTICULOSIS, COLON POLYPS, HEMORRHOIDS    ENDOSCOPY N/A 7/1/2022    Procedure: ESOPHAGOGASTRODUODENOSCOPY WITH BX, POLYPECTOMY;  Surgeon: Jennifer Mann MD;  Location: [facility] ENDOSCOPY;  Service: Gastroenterology;  Laterality: N/A;  GASTRIC POLYP, GASTRITIS, HIATAL HERNIA    HAND SURGERY Right     JOINT REPLACEMENT      KNEE ARTHROSCOPY Right 03/14/2017    PROSTATECTOMY      REPLACEMENT TOTAL KNEE  03/2017    UPPER GASTROINTESTINAL ENDOSCOPY        General Information       Row Name 10/28/24 8711          OT Time and Intention    Document Type therapy note (daily note)  -SC     Mode of Treatment individual therapy;occupational therapy  -SC     Patient Effort good  -SC     Comment Patient reports he did not sleep well.  -SC       Row Name 10/28/24 9777          General Information    Patient Profile Reviewed yes  -SC       Row Name 10/28/24 4673          Cognition     Orientation Status (Cognition) oriented x 3  -SC       Row Name 10/28/24 1205          Safety Issues/Impairments Affecting Functional Mobility    Impairments Affecting Function (Mobility) balance;endurance/activity tolerance;strength  -SC               User Key  (r) = Recorded By, (t) = Taken By, (c) = Cosigned By      Initials Name Provider Type    SC Xuan Contreras OT Occupational Therapist                     Mobility/ADL's       Row Name 10/28/24 1207          Bed Mobility    Sit-Supine [place] (Bed Mobility) verbal cues;standby assist  -SC     Supine-Sit-Supine [place] (Bed Mobility) standby assist;verbal cues  -SC       Row Name 10/28/24 1207          Bed-Chair Transfer    Bed-Chair [place] (Transfers) verbal cues;standby assist  -SC     Assistive Device (Bed-Chair Transfers) walker, front-wheeled  -SC       Row Name 10/28/24 1207          Sit-Stand Transfer    Sit-Stand [place] (Transfers) standby assist;verbal cues  -SC     Assistive Device (Sit-Stand Transfers) walker, front-wheeled  -SC       Row Name 10/28/24 1207          Stand-Sit Transfer    Stand-Sit [place] (Transfers) standby assist;verbal cues  -SC     Assistive Device (Stand-Sit Transfers) walker, front-wheeled  -SC       Row Name 10/28/24 1207          Toilet Transfer    [place] Level (Toilet Transfer) supervision  -SC     Assistive Device (Toilet Transfer) raised toilet seat  -[facility] Name 10/28/24 1207          Functional Mobility    Functional Mobility- Ind. Level supervision required  -SC     Functional Mobility- Device walker, front-wheeled  -SC       Row Name 10/28/24 1207          Activities of Daily Living    BADL Assessment/Intervention bathing;upper body dressing;lower body dressing;grooming;toileting  -[facility] Name 10/28/24 1207          Bathing Assessment/Intervention    [place] Level (Bathing) supervision  -SC     Assistive Devices (Bathing) grab bar, tub/shower;hand-held shower spray  hose;tub bench  -SC     Comment, (Bathing) seated shower ; TTB; mod vc for safety. Carried over instruction to remain seated for duration of task except for washing luciana area and for stabilization on GB with one hand at all times.  -SC       Row Name 10/28/24 1207          Upper Body Dressing Assessment/Training    [place] Level (Upper Body Dressing) set up  -SC       Row Name 10/28/24 1207          Lower Body Dressing Assessment/Training    [place] Level (Lower Body Dressing) supervision  -SC     Position (Lower Body Dressing) unsupported sitting  -SC       Row Name 10/28/24 1207          Toileting Assessment/Training    [place] Level (Toileting) supervision  -SC     Assistive Devices (Toileting) commode  -SC       Row Name 10/28/24 1207          Grooming Assessment/Training    [place] Level (Grooming) set up  -SC     Comment, (Grooming) SBA standing; with extreme fatigue after shower requiring a seated break before walking to gym.  -SC               User Key  (r) = Recorded By, (t) = Taken By, (c) = Cosigned By      Initials Name Provider Type    SC Xuan Contreras OT Occupational Therapist                   Obj/Interventions       Row Name 10/28/24 1211          Shoulder (Therapeutic Exercise)    Shoulder Strengthening (Therapeutic Exercise) bilateral;flexion;extension;3 lb free weight;aBduction;aDduction;10 repetitions;2 sets;standing  Exercises completed in standing with rest breaks following each exercise.  -SC       Row Name 10/28/24 1211          Elbow/Forearm (Therapeutic Exercise)    Elbow/Forearm Strengthening (Therapeutic Exercise) bilateral;flexion;extension;2 sets;standing;10 repetitions  -SC       Row Name 10/28/24 1211          Motor Skills    Functional Endurance Engaged patient in ub strength exercise in standing position for simultaneous work on endurance, strength, and balance.  -SC     Therapeutic Exercise shoulder;elbow/forearm  -SC       Row Name 10/28/24 1211           Balance    Dynamic Standing Balance contact guard  -SC               User Key  (r) = Recorded By, (t) = Taken By, (c) = Cosigned By      Initials Name Provider Type    Xuan Freed OT Occupational Therapist                   Goals/Plan    No documentation.                  Clinical Impression       Row Name 10/28/24 1213          Pain Assessment    Pretreatment Pain Rating 0/10 - no pain  -SC     Posttreatment Pain Rating 0/10 - no pain  -SC       Row Name 10/28/24 1213          Plan of Care Review    Plan of Care Reviewed With patient  -SC     Progress improving  -SC     Outcome Evaluation Patient continues to make steady progress. He is now completing basic ADL task to include a shower at SBA with set up. He continues to fatigue easily, require frequent rest breaks, and requires vc for consistent use of safety principles during ADL task . Continue services per [facility] as patient lives independently and desires to safely transition back to [facility] [address] [facility].  -SC       Row Name 10/28/24 1213          Positioning and Restraints    Pre-Treatment Position in bed  -SC     Post Treatment Position bed  -SC     In Bed sitting EOB;call light within reach;encouraged to call for assist  -SC               User Key  (r) = Recorded By, (t) = Taken By, (c) = Cosigned By      Initials Name Provider Type    Xuan Freed OT Occupational Therapist                   Outcome Measures       Row Name 10/28/24 1216          How much help from another is currently needed...    Putting on and taking off regular lower body clothing? 4  -SC     Bathing (including washing, rinsing, and drying) 4  -SC     Toileting (which includes using toilet bed pan or urinal) 4  -SC     Putting on and taking off regular upper body clothing 4  -SC     Taking care of personal grooming (such as brushing teeth) 4  -SC     Eating meals 4  -SC     AM-PAC 6 Clicks Score (OT) 24  -SC       Row Name 10/28/24 0803          How much help from another person do you currently  need...    Turning from your back to your side while in flat bed without using bedrails? 3  -WM     Moving from lying on back to sitting on the side of a flat bed without bedrails? 3  -WM     Moving to and from a bed to a chair (including a wheelchair)? 3  -WM     Standing up from a chair using your arms (e.g., wheelchair, bedside chair)? 3  -WM     Climbing 3-5 steps with a railing? 3  -WM     To walk in hospital room? 3  -WM     AM-PAC 6 Clicks Score (PT) 18  -WM     Highest Level of Mobility Goal 6 --> Walk 10 steps or more  -WM       Row Name 10/28/24 1216          Optimal Instrument    Optimal Instrument Optimal - 3  -SC     Bending/Stooping 2  -SC     Standing 1  -SC     Reaching 1  -SC               User Key  (r) = Recorded By, (t) = Taken By, (c) = Cosigned By      Initials Name Provider Type    WM Marquise Escobar, PTA Physical Therapist Assistant    SC Xuan Contreras, OT Occupational Therapist                  Section G              Section GG                         Occupational Therapy Education       Title: PT OT SLP Therapies (In Progress)       Topic: Occupational Therapy (In Progress)       Point: ADL training (Done)       Description:   Instruct learner(s) on proper safety adaptation and remediation techniques during self care or transfers.   Instruct in proper use of assistive devices.                  Learning Progress Summary            Patient Acceptance, E, VU,NR by  at 10/16/2024 09                      Point: Home exercise program (Not Started)       Description:   Instruct learner(s) on appropriate technique for monitoring, assisting and/or progressing therapeutic exercises/activities.                  Learner Progress:  Not documented in this visit.              Point: Precautions (Done)       Description:   Instruct learner(s) on prescribed precautions during self-care and functional transfers.                  Learning Progress Summary            Patient Acceptance, E, VU,NR by  at  10/16/2024 0925                      Point: Body mechanics (Done)       Description:   Instruct learner(s) on proper positioning and spine alignment during self-care, functional mobility activities and/or exercises.                  Learning Progress Summary            Patient Acceptance, E, VU,NR by  at 10/16/2024 0925                                      User Key       Initials Effective Dates Name Provider Type Discipline     06/16/21 -  Ruth Islas OT Occupational Therapist OT                  OT Recommendation and Plan     Plan of Care Review  Plan of Care Reviewed With: patient  Progress: improving  Outcome Evaluation: Patient continues to make steady progress. He is now completing basic ADL task to include a shower at SBA with set up. He continues to fatigue easily, require frequent rest breaks, and requires vc for consistent use of safety principles during ADL task . Continue services per poc as patient lives independently and desires to safely transition back to of.     Time Calculation:         Time Calculation- OT       Row Name 10/28/24 1217 10/28/24 0757          Time Calculation- OT    OT Received On 10/28/24  -SC --        Timed Charges    93052 - OT Therapeutic Exercise Minutes 20  -SC --     39584 - Gait Training Minutes  -- 10  -WM     16157 - OT Self Care/Mgmt Minutes 35  -SC --        SNF Occupational Therapy Minutes    Skilled Minutes- OT 55 min  -SC --        Total Minutes    Timed Charges Total Minutes 55  -SC 10  -WM      Total Minutes 55  -SC 10  -WM               User Key  (r) = Recorded By, (t) = Taken By, (c) = Cosigned By      Initials Name Provider Type     Marquise Escobar PTA Physical Therapist Assistant    SC Xuan Contreras OT Occupational Therapist                  Therapy Charges for Today       Code Description Service Date Service Provider Modifiers Qty    [id number]  OT THER PROC EA 15 MIN 10/28/2024 Xuan Contreras OT GO 2    [id number]  OT SELF CARE/MGMT/TRAIN  EA 15 MIN 10/28/2024 Xuan Contreras, OT GO 2                 Xuan Contreras OT  10/28/2024

## 2024-10-28 NOTE — SIGNIFICANT NOTE
Wound Eval / Progress Noted    RAKEL Alcaraz     Patient Name: Darci Munson  : 1935  MRN: [id number]  Today's Date: 10/28/2024                 Admit Date: 10/15/2024    Visit Dx:    ICD-10-CM ICD-9-CM   1. Decreased activities of daily living (ADL)  Z78.9 V49.89   2. Difficulty walking  R26.2 719.7         Physical debility    Severe malnutrition        Past Medical History:   Diagnosis Date    Anemia, unspecified     Atherosclerotic heart disease of native coronary artery without angina pectoris     CAD (coronary artery disease)     CKD (chronic kidney disease), stage III     Essential (primary) hypertension     Gastric ulcer, unspecified as acute or chronic, without hemorrhage or perforation     Gastroparesis     GERD without esophagitis     Glaucoma     Hereditary and idiopathic neuropathy, unspecified     History of falling     HLD (hyperlipidemia)     HTN (hypertension)     Hypotension, unspecified     Hypothyroidism     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    Irritable bowel syndrome without diarrhea     Laceration without foreign body of right forearm, initial encounter     Low back pain     Malignant neoplasm of prostate     Mixed hyperlipidemia     OA (osteoarthritis)     Other specified disorders of the skin and subcutaneous tissue     Pain in left foot     Peripheral vascular disease, unspecified     Phimosis     Presence of unspecified artificial knee joint     Primary insomnia     Primary osteoarthritis of both knees     Prostate cancer     Sensorineural hearing loss (SNHL) of both ears     Sigmoid diverticulosis     severe sigmoid and descending colon diverticulosis and 3+ gastritis    Sleep related leg cramps     Type 2 diabetes mellitus with diabetic polyneuropathy     and gastroparesis    Unspecified atrial fibrillation     Unspecified dementia without behavioral disturbance     Unspecified hearing loss, bilateral         Past Surgical History:   Procedure Laterality Date    CATARACT EXTRACTION Bilateral  2014    COLONOSCOPY      COLONOSCOPY N/A 7/1/2022    Procedure: COLONOSCOPY WITH POLYPECTOMIES, HOT SNARE;  Surgeon: Jennifer Mann MD;  Location: [facility] ENDOSCOPY;  Service: Gastroenterology;  Laterality: N/A;  DIVERTICULOSIS, COLON POLYPS, HEMORRHOIDS    ENDOSCOPY N/A 7/1/2022    Procedure: ESOPHAGOGASTRODUODENOSCOPY WITH BX, POLYPECTOMY;  Surgeon: Jennifer Mann MD;  Location: [facility] ENDOSCOPY;  Service: Gastroenterology;  Laterality: N/A;  GASTRIC POLYP, GASTRITIS, HIATAL HERNIA    HAND SURGERY Right     JOINT REPLACEMENT      KNEE ARTHROSCOPY Right 03/14/2017    PROSTATECTOMY      REPLACEMENT TOTAL KNEE  03/2017    UPPER GASTROINTESTINAL ENDOSCOPY           Physical Assessment:  Rash 10/11/24 0333 scrotum (Active)   Wound Image   10/28/24 1030       Wound 10/11/24 0332 gluteal (Active)   Wound Image   10/28/24 1030   Dressing Appearance open to air 10/28/24 1030   Base red 10/28/24 1030   Periwound pink 10/28/24 1030   Dressing Care skin barrier agent applied 10/28/24 1030       Wound 10/26/24 2107 Right posterior heel unspecified (Active)   Wound Image   10/28/24 1030   Drainage Amount none 10/28/24 1030        Wound Check / Follow-up:  wound nurse consult for right heel. Patient in bed on SNF, alert and oriented and agreeable to care, slightly hard of hearing.     Right heel is noted with beatrice deformity with small callus and brown center, non tender, floated. BLE washed with bathing cloths and moisturizer applied. Unable to rule out pressure injury as does lie along beatrice deformity.     Buttocks with resolving redness, blanchable, cleansed and barrier applied, able to turn self when asked. Observed patient going to recliner with waffle cushion in place at end of assessment.     Left scrotum with palpable firm area present, no drainage present, patient states occasionally feels moist to area but no active drainage even with palpation.     Impression: improved wounds/skin overall, new area to  right heel does lie over beatrice deformity, floating discussed. Palpable area to left scrotum no active drainage (suggested and provided mesh underwear to assist with skin to skin contact. MASD to buttocks resolving redness and slight rash does continue .     Short term goals:  improved skin status, moisture management.     Luana Diggs RN    10/28/2024    13:06 EDT

## 2024-10-29 LAB
GLUCOSE BLDC GLUCOMTR-MCNC: 134 MG/DL (ref 70–99)
GLUCOSE BLDC GLUCOMTR-MCNC: 158 MG/DL (ref 70–99)
GLUCOSE BLDC GLUCOMTR-MCNC: 211 MG/DL (ref 70–99)
GLUCOSE BLDC GLUCOMTR-MCNC: 236 MG/DL (ref 70–99)

## 2024-10-29 PROCEDURE — 97530 THERAPEUTIC ACTIVITIES: CPT

## 2024-10-29 PROCEDURE — 97110 THERAPEUTIC EXERCISES: CPT

## 2024-10-29 PROCEDURE — 97116 GAIT TRAINING THERAPY: CPT

## 2024-10-29 PROCEDURE — 82948 REAGENT STRIP/BLOOD GLUCOSE: CPT | Performed by: FAMILY MEDICINE

## 2024-10-29 PROCEDURE — 63710000001 INSULIN GLARGINE PER 5 UNITS: Performed by: FAMILY MEDICINE

## 2024-10-29 PROCEDURE — 63710000001 INSULIN LISPRO (HUMAN) PER 5 UNITS: Performed by: FAMILY MEDICINE

## 2024-10-29 PROCEDURE — 82948 REAGENT STRIP/BLOOD GLUCOSE: CPT

## 2024-10-29 RX ADMIN — APIXABAN 5 MG: 5 TABLET, FILM COATED ORAL at 08:50

## 2024-10-29 RX ADMIN — Medication 250 MG: at 20:15

## 2024-10-29 RX ADMIN — PANTOPRAZOLE SODIUM 40 MG: 40 TABLET, DELAYED RELEASE ORAL at 05:36

## 2024-10-29 RX ADMIN — GABAPENTIN 100 MG: 100 CAPSULE ORAL at 08:50

## 2024-10-29 RX ADMIN — APIXABAN 5 MG: 5 TABLET, FILM COATED ORAL at 20:15

## 2024-10-29 RX ADMIN — Medication 10 MG: at 23:13

## 2024-10-29 RX ADMIN — Medication 250 MG: at 08:50

## 2024-10-29 RX ADMIN — INSULIN LISPRO 4 UNITS: 100 INJECTION, SOLUTION INTRAVENOUS; SUBCUTANEOUS at 20:15

## 2024-10-29 RX ADMIN — AMIODARONE HYDROCHLORIDE 200 MG: 200 TABLET ORAL at 08:50

## 2024-10-29 RX ADMIN — INSULIN GLARGINE 15 UNITS: 100 INJECTION, SOLUTION SUBCUTANEOUS at 20:15

## 2024-10-29 RX ADMIN — GABAPENTIN 100 MG: 100 CAPSULE ORAL at 20:15

## 2024-10-29 RX ADMIN — LATANOPROST 1 DROP: 50 SOLUTION OPHTHALMIC at 20:16

## 2024-10-29 RX ADMIN — ACETAMINOPHEN 650 MG: 325 TABLET ORAL at 16:31

## 2024-10-29 RX ADMIN — MIDODRINE HYDROCHLORIDE 5 MG: 5 TABLET ORAL at 17:31

## 2024-10-29 RX ADMIN — INSULIN LISPRO 4 UNITS: 100 INJECTION, SOLUTION INTRAVENOUS; SUBCUTANEOUS at 11:51

## 2024-10-29 RX ADMIN — LEVOTHYROXINE SODIUM 200 MCG: 0.1 TABLET ORAL at 05:36

## 2024-10-29 RX ADMIN — INSULIN LISPRO 2 UNITS: 100 INJECTION, SOLUTION INTRAVENOUS; SUBCUTANEOUS at 17:31

## 2024-10-29 RX ADMIN — MIDODRINE HYDROCHLORIDE 5 MG: 5 TABLET ORAL at 11:52

## 2024-10-29 NOTE — PLAN OF CARE
Goal Outcome Evaluation:  Plan of Care Reviewed With: patient        Progress: improving  Outcome Evaluation: Alert and oriented and pleasant with staff. x1 assist for transfers and ambulation. x1 admin PRN pain medication, with relief of symptoms noted. New order recieved this shift, to increase melatonin dosage to 10mg po nightly. Sitting up in recliner, family at bedside, call light in reach.

## 2024-10-29 NOTE — THERAPY TREATMENT NOTE
SNF - Occupational Therapy Treatment Note  RAKEL Alcaraz    Patient Name: Darci Munson  : 1935    MRN: [id number]                              Today's Date: 10/29/2024       Admit Date: 10/15/2024    Visit Dx:     ICD-10-CM ICD-9-CM   1. Decreased activities of daily living (ADL)  Z78.9 V49.89   2. Difficulty walking  R26.2 719.7     Patient Active Problem List   Diagnosis    Type 2 diabetes mellitus with hyperglycemia, without long-term current use of insulin    Chronic kidney disease    Hypothyroidism    Hypertensive disorder    Hyperlipidemia    Paroxysmal atrial fibrillation    Dyspepsia    Coronary arteriosclerosis    Gastroparesis    Hearing loss    Mitral valve regurgitation    Peripheral neuropathy    Prostate CA    Primary insomnia    Primary osteoarthritis of left knee    Anticoagulated    Vitamin D insufficiency    Iron deficiency anemia    Medicare annual wellness visit, subsequent    Thickened nails    Pain in toes of both feet    History of prostate cancer    Stage 3b chronic kidney disease    Chronic bilateral low back pain without sciatica    Acute renal failure    Intractable nausea and vomiting    Abdominal pain    Physical debility    Severe malnutrition     Past Medical History:   Diagnosis Date    Anemia, unspecified     Atherosclerotic heart disease of native coronary artery without angina pectoris     CAD (coronary artery disease)     CKD (chronic kidney disease), stage III     Essential (primary) hypertension     Gastric ulcer, unspecified as acute or chronic, without hemorrhage or perforation     Gastroparesis     GERD without esophagitis     Glaucoma     Hereditary and idiopathic neuropathy, unspecified     History of falling     HLD (hyperlipidemia)     HTN (hypertension)     Hypotension, unspecified     Hypothyroidism     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    Irritable bowel syndrome without diarrhea     Laceration without foreign body of right forearm, initial encounter     Low back pain      Malignant neoplasm of prostate     Mixed hyperlipidemia     OA (osteoarthritis)     Other specified disorders of the skin and subcutaneous tissue     Pain in left foot     Peripheral vascular disease, unspecified     Phimosis     Presence of unspecified artificial knee joint     Primary insomnia     Primary osteoarthritis of both knees     Prostate cancer     Sensorineural hearing loss (SNHL) of both ears     Sigmoid diverticulosis     severe sigmoid and descending colon diverticulosis and 3+ gastritis    Sleep related leg cramps     Type 2 diabetes mellitus with diabetic polyneuropathy     and gastroparesis    Unspecified atrial fibrillation     Unspecified dementia without behavioral disturbance     Unspecified hearing loss, bilateral      Past Surgical History:   Procedure Laterality Date    CATARACT EXTRACTION Bilateral 2014    COLONOSCOPY      COLONOSCOPY N/A 7/1/2022    Procedure: COLONOSCOPY WITH POLYPECTOMIES, HOT SNARE;  Surgeon: Jennifer Mann MD;  Location: [facility] ENDOSCOPY;  Service: Gastroenterology;  Laterality: N/A;  DIVERTICULOSIS, COLON POLYPS, HEMORRHOIDS    ENDOSCOPY N/A 7/1/2022    Procedure: ESOPHAGOGASTRODUODENOSCOPY WITH BX, POLYPECTOMY;  Surgeon: Jennifer Mann MD;  Location: [facility] ENDOSCOPY;  Service: Gastroenterology;  Laterality: N/A;  GASTRIC POLYP, GASTRITIS, HIATAL HERNIA    HAND SURGERY Right     JOINT REPLACEMENT      KNEE ARTHROSCOPY Right 03/14/2017    PROSTATECTOMY      REPLACEMENT TOTAL KNEE  03/2017    UPPER GASTROINTESTINAL ENDOSCOPY        General Information       Row Name 10/29/24 1418          OT Time and Intention    Document Type therapy note (daily note)  -SC     Mode of Treatment individual therapy;occupational therapy  -SC     Patient Effort good  -SC       Row Name 10/29/24 1418          General Information    Patient Profile Reviewed yes  -SC     Existing Precautions/Restrictions fall  -SC       Row Name 10/29/24 1418          Cognition     Orientation Status (Cognition) oriented x 3  -SC       Row Name 10/29/24 1418          Safety Issues/Impairments Affecting Functional Mobility    Impairments Affecting Function (Mobility) balance;endurance/activity tolerance;strength  -SC               User Key  (r) = Recorded By, (t) = Taken By, (c) = Cosigned By      Initials Name Provider Type    SC Xuan Contreras OT Occupational Therapist                     Mobility/ADL's       Row Name 10/29/24 1419          Bed-Chair Transfer    Bed-Chair [place] (Transfers) verbal cues;standby assist  -SC     Assistive Device (Bed-Chair Transfers) walker, front-wheeled  -SC       Row Name 10/29/24 1419          Sit-Stand Transfer    Sit-Stand [place] (Transfers) verbal cues;supervision  -SC     Assistive Device (Sit-Stand Transfers) walker, front-wheeled  -SC       Row Name 10/29/24 1419          Stand-Sit Transfer    Stand-Sit [place] (Transfers) verbal cues;supervision  -SC     Assistive Device (Stand-Sit Transfers) walker, front-wheeled  -SC       Row Name 10/29/24 1419          Toilet Transfer    [place] Level (Toilet Transfer) supervision  -SC     Assistive Device (Toilet Transfer) raised toilet seat  -SC       Row Name 10/29/24 1419          Functional Mobility    Functional Mobility- Ind. Level supervision required  -SC     Functional Mobility- Device walker, front-wheeled  -SC     Functional Mobility- Comment Engaged patient in functional mobility from bedroom to gym and gym to activity room at [facility]. No rest breaks required this date.  -SC               User Key  (r) = Recorded By, (t) = Taken By, (c) = Cosigned By      Initials Name Provider Type    Xuan Freed OT Occupational Therapist                   Obj/Interventions       Row Name 10/29/24 1420          Shoulder (Therapeutic Exercise)    Shoulder Strengthening (Therapeutic Exercise) bilateral;flexion;extension;horizontal aBduction/aDduction;3 lb free weight;10 repetitions;2  sets;standing  -[facility] Name 10/29/24 1420          Elbow/Forearm (Therapeutic Exercise)    Elbow/Forearm Strengthening (Therapeutic Exercise) bilateral;flexion;extension;3 lb free weight;10 repetitions;standing  Upgraded UB HEP to standing for increased dynamic balance to support ADLs.  -[facility] Name 10/29/24 1420          Motor Skills    Therapeutic Exercise shoulder;elbow/forearm  -[facility] Name 10/29/24 1420          Balance    Balance Interventions standing;dynamic;moderate challenge;UE activity with balance activity;weight shifting activity;dynamic reaching  -SC     Comment, Balance Engaged patient in lateral side reach, low reach and bean bag toss with focus on increased reflexes, eye hand coordination, and dynamic balance for decreased fall risk. Patient completed activity at [facility] without LOB.  -SC               User Key  (r) = Recorded By, (t) = Taken By, (c) = Cosigned By      Initials Name Provider Type    SC Xuan Contreras OT Occupational Therapist                   Goals/Plan    No documentation.                  Clinical Impression       [facility] Name 10/29/24 1424          Pain Assessment    Pretreatment Pain Rating 0/10 - no pain  -SC     Posttreatment Pain Rating 0/10 - no pain  -SC       Row Name 10/29/24 1424          Plan of Care Review    Plan of Care Reviewed With patient  -SC     Progress no change  -SC     Outcome Evaluation No change in function this date. Patient demonstrted good motivation in session with focus on dynamic balance. Session was shortened per patient request due to patient visitors arrival.  -[facility] Name 10/29/24 1424          Positioning and Restraints    Pre-Treatment Position sitting in chair/recliner  -SC     Post Treatment Position chair  -SC     In Chair call light within reach;encouraged to call for assist;exit alarm on  -SC               User Key  (r) = Recorded By, (t) = Taken By, (c) = Cosigned By      Initials Name Provider Type    SC Xuan Contreras  OT Occupational Therapist                   Outcome Measures       Row Name 10/29/24 1426          How much help from another is currently needed...    Putting on and taking off regular lower body clothing? 4  -SC     Bathing (including washing, rinsing, and drying) 4  -SC     Toileting (which includes using toilet bed pan or urinal) 4  -SC     Putting on and taking off regular upper body clothing 4  -SC     Taking care of personal grooming (such as brushing teeth) 4  -SC     Eating meals 4  -SC     AM-PAC 6 Clicks Score (OT) 24  -SC       Row Name 10/29/24 1307          How much help from another person do you currently need...    Turning from your back to your side while in flat bed without using bedrails? 3  -CR     Moving from lying on back to sitting on the side of a flat bed without bedrails? 3  -CR     Moving to and from a bed to a chair (including a wheelchair)? 3  -CR     Standing up from a chair using your arms (e.g., wheelchair, bedside chair)? 3  -CR     Climbing 3-5 steps with a railing? 3  -CR     To walk in hospital room? 3  -CR     AM-PAC 6 Clicks Score (PT) 18  -CR     Highest Level of Mobility Goal 6 --> Walk 10 steps or more  -CR       Row Name 10/29/24 1426          Optimal Instrument    Optimal Instrument Optimal - 3  -SC     Bending/Stooping 2  -SC     Standing 1  -SC     Reaching 1  -SC               User Key  (r) = Recorded By, (t) = Taken By, (c) = Cosigned By      Initials Name Provider Type    CR Juan Carlos Carney LPN Licensed Nurse    Xuan Freed OT Occupational Therapist                  Section G              Section GG                         Occupational Therapy Education       Title: PT OT SLP Therapies (In Progress)       Topic: Occupational Therapy (In Progress)       Point: ADL training (Done)       Description:   Instruct learner(s) on proper safety adaptation and remediation techniques during self care or transfers.   Instruct in proper use of assistive devices.                   Learning Progress Summary            Patient Acceptance, E, VU,NR by  at 10/16/2024 0925                      Point: Home exercise program (Not Started)       Description:   Instruct learner(s) on appropriate technique for monitoring, assisting and/or progressing therapeutic exercises/activities.                  Learner Progress:  Not documented in this visit.              Point: Precautions (Done)       Description:   Instruct learner(s) on prescribed precautions during self-care and functional transfers.                  Learning Progress Summary            Patient Acceptance, E, VU,NR by  at 10/16/2024 0925                      Point: Body mechanics (Done)       Description:   Instruct learner(s) on proper positioning and spine alignment during self-care, functional mobility activities and/or exercises.                  Learning Progress Summary            Patient Acceptance, E, VU,NR by  at 10/16/2024 0925                                      User Key       Initials Effective Dates Name Provider Type Discipline     06/16/21 -  Ruth Islas OT Occupational Therapist OT                  OT Recommendation and Plan     Plan of Care Review  Plan of Care Reviewed With: patient  Progress: no change  Outcome Evaluation: No change in function this date. Patient demonstrted good motivation in session with focus on dynamic balance. Session was shortened per patient request due to patient visitors arrival.     Time Calculation:         Time Calculation- OT       Row Name 10/29/24 1428 10/29/24 1323          Time Calculation- OT    OT Received On 10/29/24  -SC --        Timed Charges    23013 - OT Therapeutic Exercise Minutes 20  -SC --     71631 - Gait Training Minutes  -- 12  -CS     04922 - OT Therapeutic Activity Minutes 10  -SC --        Total Minutes    Timed Charges Total Minutes 30  -SC 12  -CS      Total Minutes 30  -SC 12  -CS               User Key  (r) = Recorded By, (t) = Taken By, (c) = Cosigned  By      Initials Name Provider Type    Josefina Correa, PT Physical Therapist    SC Xuan Contreras OT Occupational Therapist                  Therapy Charges for Today       Code Description Service Date Service Provider Modifiers Qty    36509061622 HC OT THER PROC EA 15 MIN 10/28/2024 Xuan Contreras OT GO 2    77940449134 HC OT SELF CARE/MGMT/TRAIN EA 15 MIN 10/28/2024 Xuan Contreras OT GO 2    [id number]  OT THERAPEUTIC ACT EA 15 MIN 10/29/2024 Xuan Contreras OT GO 1    [id number]  OT THER PROC EA 15 MIN 10/29/2024 Xuan Contreras OT GO 1                 Xuan Contreras OT  10/29/2024

## 2024-10-29 NOTE — THERAPY TREATMENT NOTE
SNF - Physical Therapy Treatment Note  RAKEL Alcaraz     Patient Name: Darci Munson  : 1935  MRN: [id number]  Today's Date: 10/29/2024      Visit Dx:    ICD-10-CM ICD-9-CM   1. Decreased activities of daily living (ADL)  Z78.9 V49.89   2. Difficulty walking  R26.2 719.7     Patient Active Problem List   Diagnosis    Type 2 diabetes mellitus with hyperglycemia, without long-term current use of insulin    Chronic kidney disease    Hypothyroidism    Hypertensive disorder    Hyperlipidemia    Paroxysmal atrial fibrillation    Dyspepsia    Coronary arteriosclerosis    Gastroparesis    Hearing loss    Mitral valve regurgitation    Peripheral neuropathy    Prostate CA    Primary insomnia    Primary osteoarthritis of left knee    Anticoagulated    Vitamin D insufficiency    Iron deficiency anemia    Medicare annual wellness visit, subsequent    Thickened nails    Pain in toes of both feet    History of prostate cancer    Stage 3b chronic kidney disease    Chronic bilateral low back pain without sciatica    Acute renal failure    Intractable nausea and vomiting    Abdominal pain    Physical debility    Severe malnutrition     Past Medical History:   Diagnosis Date    Anemia, unspecified     Atherosclerotic heart disease of native coronary artery without angina pectoris     CAD (coronary artery disease)     CKD (chronic kidney disease), stage III     Essential (primary) hypertension     Gastric ulcer, unspecified as acute or chronic, without hemorrhage or perforation     Gastroparesis     GERD without esophagitis     Glaucoma     Hereditary and idiopathic neuropathy, unspecified     History of falling     HLD (hyperlipidemia)     HTN (hypertension)     Hypotension, unspecified     Hypothyroidism     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    Irritable bowel syndrome without diarrhea     Laceration without foreign body of right forearm, initial encounter     Low back pain     Malignant neoplasm of prostate     Mixed hyperlipidemia      OA (osteoarthritis)     Other specified disorders of the skin and subcutaneous tissue     Pain in left foot     Peripheral vascular disease, unspecified     Phimosis     Presence of unspecified artificial knee joint     Primary insomnia     Primary osteoarthritis of both knees     Prostate cancer     Sensorineural hearing loss (SNHL) of both ears     Sigmoid diverticulosis     severe sigmoid and descending colon diverticulosis and 3+ gastritis    Sleep related leg cramps     Type 2 diabetes mellitus with diabetic polyneuropathy     and gastroparesis    Unspecified atrial fibrillation     Unspecified dementia without behavioral disturbance     Unspecified hearing loss, bilateral      Past Surgical History:   Procedure Laterality Date    CATARACT EXTRACTION Bilateral 2014    COLONOSCOPY      COLONOSCOPY N/A 7/1/2022    Procedure: COLONOSCOPY WITH POLYPECTOMIES, HOT SNARE;  Surgeon: Jennifer Mann MD;  Location: [facility] ENDOSCOPY;  Service: Gastroenterology;  Laterality: N/A;  DIVERTICULOSIS, COLON POLYPS, HEMORRHOIDS    ENDOSCOPY N/A 7/1/2022    Procedure: ESOPHAGOGASTRODUODENOSCOPY WITH BX, POLYPECTOMY;  Surgeon: Jennifer Mann MD;  Location: [facility] ENDOSCOPY;  Service: Gastroenterology;  Laterality: N/A;  GASTRIC POLYP, GASTRITIS, HIATAL HERNIA    HAND SURGERY Right     JOINT REPLACEMENT      KNEE ARTHROSCOPY Right 03/14/2017    PROSTATECTOMY      REPLACEMENT TOTAL KNEE  03/2017    UPPER GASTROINTESTINAL ENDOSCOPY         PT Assessment (Last 12 Hours)       PT Evaluation and Treatment       Row Name 10/29/24 1300          Physical Therapy Time and Intention    Subjective Information no complaints  -CS     Document Type therapy note (daily note)  -CS     Mode of Treatment individual therapy;physical therapy  -CS     Patient Effort good  -CS     Symptoms Noted During/After Treatment fatigue  -CS       Row Name 10/29/24 1300          Cognition    Orientation Status (Cognition) oriented x 3  -CS        Row Name 10/29/24 1300          Transfers    Transfers sit-stand transfer;stand-sit transfer  -CS       Row Name 10/29/24 1300          Bed-Chair Transfer    Bed-Chair [place] (Transfers) verbal cues;standby assist  -CS     Assistive Device (Bed-Chair Transfers) walker, front-wheeled  -CS       Row Name 10/29/24 1300          Sit-Stand Transfer    Sit-Stand [place] (Transfers) verbal cues;supervision  -CS     Assistive Device (Sit-Stand Transfers) walker, front-wheeled  -CS       Row Name 10/29/24 1300          Stand-Sit Transfer    Stand-Sit [place] (Transfers) verbal cues;supervision  -CS     Assistive Device (Stand-Sit Transfers) walker, front-wheeled  -CS       Row Name 10/29/24 1300          Gait/Stairs (Locomotion)    Gait/Stairs Locomotion gait/ambulation assistive device  -CS     [place] Level (Gait) verbal cues;standby assist  -CS     Assistive Device (Gait) walker, front-wheeled  -CS     Patient was able to Ambulate yes  -CS     Distance in Feet (Gait) 225  plus 55x2  -CS     Pattern (Gait) 4-point;step-through  -CS     Deviations/Abnormal Patterns (Gait) gait speed decreased;stride length decreased  -CS     Bilateral Gait Deviations forward flexed posture;heel strike decreased  -CS     Negotiation (Stairs) other (see comments)  curb  -CS     [place] Level (Stairs) verbal cues;contact guard  -CS     Assistive Device (Stairs) walker, front-wheeled  -CS     Number of Steps (Stairs) 1x4  -CS     Ascending Technique (Stairs) step-to-step  -CS     Descending Technique (Stairs) step-to-step  -CS     Stairs, Impairments impaired balance  -CS       Row Name 10/29/24 1300          Safety Issues/Impairments Affecting Functional Mobility    Impairments Affecting Function (Mobility) balance;endurance/activity tolerance;strength  -       Row Name 10/29/24 1300          Balance    Balance Interventions supported;dynamic;sit to stand;dynamic reaching;foam;single limb stance;occupation  based/functional task;weight shifting activity;UE activity with balance activity;combined head and eye movements  -CS       Row Name 10/29/24 1300          Motor Skills    Therapeutic Exercise hip;knee;ankle;aerobic  -CS       Row Name 10/29/24 1300          Aerobic Exercise    Type (Aerobic Exercise) other (see comments)  NuStep  -CS     Time Performed (Aerobic Exercise) x15 minutes at level 6  -CS       Row Name             Wound 10/11/24 0332 gluteal    Wound - Properties Group Placement Date: 10/11/24  -YUMIKO Placement Time: 0332 -JO Location: gluteal  -YUMIKO    Retired Wound - Properties Group Placement Date: 10/11/24  -YUMIKO Placement Time: 0332 -JO Location: gluteal  -YUMIKO    Retired Wound - Properties Group Placement Date: 10/11/24  -YUMIKO Placement Time: 0332 -JO Location: gluteal  -YUMIKO    Retired Wound - Properties Group Date first assessed: 10/11/24  -YUMIKO Time first assessed: 0332 -JO Location: gluteal  -YUMIKO      Row Name             Wound 10/26/24 2107 Right posterior heel unspecified    Wound - Properties Group Placement Date: 10/26/24  -KR Placement Time: 2107 -KR Side: Right  -KR Orientation: posterior  -KR Location: heel  -KR Primary Wound Type: Traumatic  -KR Type: unspecified  -KR Present on Original Admission: N  -KR    Retired Wound - Properties Group Placement Date: 10/26/24  -KR Placement Time: 2107 -KR Present on Original Admission: N  -KR Side: Right  -KR Orientation: posterior  -KR Location: heel  -KR Primary Wound Type: Traumatic  -KR Type: unspecified  -KR    Retired Wound - Properties Group Placement Date: 10/26/24  -KR Placement Time: 2107 -KR Present on Original Admission: N  -KR Side: Right  -KR Orientation: posterior  -KR Location: heel  -KR Primary Wound Type: Traumatic  -KR Type: unspecified  -KR    Retired Wound - Properties Group Date first assessed: 10/26/24  -KR Time first assessed: 2107 -KR Present on Original Admission: N  -KR Side: Right  -KR Location: heel  -KR Primary Wound Type:  Traumatic  -KR Type: unspecified  -KR      Row Name 10/29/24 1300          Plan of Care Review    Plan of Care Reviewed With patient  -CS     Progress improving  -CS     Outcome Evaluation Continue plan of care  -CS       Row Name 10/29/24 1300          Positioning and Restraints    Pre-Treatment Position sitting in chair/recliner  -CS     Post Treatment Position other  -CS     Other Position with OT  -CS       Row Name 10/29/24 1300          Progress Summary (PT)    Progress Toward Functional Goals (PT) progress toward functional goals is good  -CS               User Key  (r) = Recorded By, (t) = Taken By, (c) = Cosigned By      Initials Name Provider Type    Jimbo Islas, RN Registered Nurse    Josefina Correa, PT Physical Therapist    Blanca Kahn RN Registered Nurse                  Section G              Section GG  Functional Ability/Goals, Adm (Section GG)  Indoor Mobility - Ambulation, Prior Function (ET4218T): 3. Independent  Stairs, Prior Function (KM4614U): 3. Independent  Prior Device Use (QH5081): none of the above (Z)  Lower Extremity Range of Motion (BC2553B): Impairment on one side     Mobility, Admission Performance (TU5666)  Roll Left & Right: Mobility Admission Performance (NO6868Y1): supervision or touching assistance (04)  Sit to Lying: Mobility Admission Performance (GA3872R3): supervision or touching assistance (04)  Lying to Sitting, Side of Bed: Mobility Admission Performance (WB4532V4): supervision or touching assistance (04)  Sit to Stand: Mobility Admission Performance (CX9901U1): supervision or touching assistance (04)  Chair/Bed-Chair Transfer: Mobility Admission Performance (AV8296A0): supervision or touching assistance (04)  Car Transfer: Mobility Admission Performance (QI3286R3): not attempted due to environmental limitations (10)  Walk 10 Feet: Mobility Admission Performance (TM6107K5): supervision or touching assistance (04)  Walk 50 Feet With Two Turns: Mobility  Admission Performance (BF3720T3): supervision or touching assistance (04)  Walk 150 Feet: Mobility Admission Performance (FT2573L4): not attempted, medical condition/safety concern (88)  Walk 10 Ft, Uneven Surfaces: Mobility Admission Performance (OX5830D1): not applicable (09)  1 Step/Curb: Mobility Admission Performance (RH3937M3): not attempted, medical condition/safety concern ()  4 Steps: Mobility Admission Performance (AZ6489Y8): not attempted, medical condition/safety concern ()  12 Steps: Mobility Admission Performance (XY9153E8): not applicable (09)  Picking up object: Mobility Admission Performance (QE7027M3): not attempted, medical condition/safety concern (88)  Use a Wheelchair and/or Scooter: Mobility (DJ7009Y1): no (0)     RETIRED Mobility (GG), Discharge Goal (RW3153)  Use a Wheelchair and/or Scooter: Mobility (KY7102V8): no (0)     Mobility, Discharge Performance (EL2220)  Use a Wheelchair and/or Scooter: Mobility (PI6605S0): no (0)  Physical Therapy Education       Title: PT OT SLP Therapies (In Progress)       Topic: Physical Therapy (Not Started)       Point: Mobility training (Not Started)       Learner Progress:  Not documented in this visit.              Point: Home exercise program (Not Started)       Learner Progress:  Not documented in this visit.              Point: Body mechanics (Not Started)       Learner Progress:  Not documented in this visit.              Point: Precautions (Not Started)       Learner Progress:  Not documented in this visit.                                  PT Recommendation and Plan  Anticipated Discharge Disposition (PT): home with home health, home with assist  Planned Therapy Interventions (PT): balance training, bed mobility training, gait training, strengthening, transfer training  Therapy Frequency (PT): 6 times/wk  Progress Summary (PT)  Progress Toward Functional Goals (PT): progress toward functional goals is good  Plan of Care Reviewed With:  patient  Progress: improving  Outcome Evaluation: Continue plan of care   Outcome Measures       Row Name 10/28/24 0803             How much help from another person do you currently need...    Turning from your back to your side while in flat bed without using bedrails? 3  -WM      Moving from lying on back to sitting on the side of a flat bed without bedrails? 3  -WM      Moving to and from a bed to a chair (including a wheelchair)? 3  -WM      Standing up from a chair using your arms (e.g., wheelchair, bedside chair)? 3  -WM      Climbing 3-5 steps with a railing? 3  -WM      To walk in hospital room? 3  -WM      AM-PAC 6 Clicks Score (PT) 18  -WM                User Key  (r) = Recorded By, (t) = Taken By, (c) = Cosigned By      Initials Name Provider Type    Marquise Ramirez PTA Physical Therapist Assistant                     Time Calculation:    PT Charges       Row Name 10/29/24 1323             Time Calculation    PT Received On 10/29/24  -CS         Timed Charges    84637 - PT Therapeutic Exercise Minutes 28  -CS      36044 - Gait Training Minutes  12  -CS      67364 - PT Therapeutic Activity Minutes 13  -CS         SNF Physical Therapy Minutes    Skilled Minutes- PT 53 min  -CS         Total Minutes    Timed Charges Total Minutes 53  -CS       Total Minutes 53  -CS                User Key  (r) = Recorded By, (t) = Taken By, (c) = Cosigned By      Initials Name Provider Type    CS Josefina Ramires, PT Physical Therapist                  Therapy Charges for Today       Code Description Service Date Service Provider Modifiers Qty    67976653584 HC PT THER PROC EA 15 MIN 10/29/2024 Josefina Ramires, PT GP 2    59162623267 HC GAIT TRAINING EA 15 MIN 10/29/2024 Josefina Ramires, PT GP 1    01752943180 HC PT THERAPEUTIC ACT EA 15 MIN 10/29/2024 Josefina Ramires, PT GP 1            PT G-Codes  Outcome Measure Options: AM-PAC 6 Clicks Basic Mobility (PT)  AM-PAC 6 Clicks Score (PT): 18  AM-PAC 6 Clicks Score (OT):  24    Josefina Ramires, PT  10/29/2024

## 2024-10-29 NOTE — PLAN OF CARE
Goal Outcome Evaluation:  Plan of Care Reviewed With: patient        Progress: improving  Outcome Evaluation: Alert and oriented x4; able to make needs known to staff. No complaints of pain this shift. Transfers to BR x1 person assist using gait belt and walker; using urinal at night. Blood sugar 248 at bedtime; insulin administered per MAR. Call light within reach; care plan  ongoing.

## 2024-10-30 LAB
GLUCOSE BLDC GLUCOMTR-MCNC: 120 MG/DL (ref 70–99)
GLUCOSE BLDC GLUCOMTR-MCNC: 150 MG/DL (ref 70–99)
GLUCOSE BLDC GLUCOMTR-MCNC: 271 MG/DL (ref 70–99)
GLUCOSE BLDC GLUCOMTR-MCNC: 272 MG/DL (ref 70–99)

## 2024-10-30 PROCEDURE — 63710000001 INSULIN LISPRO (HUMAN) PER 5 UNITS: Performed by: FAMILY MEDICINE

## 2024-10-30 PROCEDURE — 97530 THERAPEUTIC ACTIVITIES: CPT

## 2024-10-30 PROCEDURE — 97535 SELF CARE MNGMENT TRAINING: CPT

## 2024-10-30 PROCEDURE — 97110 THERAPEUTIC EXERCISES: CPT

## 2024-10-30 PROCEDURE — 82948 REAGENT STRIP/BLOOD GLUCOSE: CPT | Performed by: FAMILY MEDICINE

## 2024-10-30 PROCEDURE — 82948 REAGENT STRIP/BLOOD GLUCOSE: CPT

## 2024-10-30 PROCEDURE — 63710000001 INSULIN GLARGINE PER 5 UNITS: Performed by: FAMILY MEDICINE

## 2024-10-30 RX ADMIN — PANTOPRAZOLE SODIUM 40 MG: 40 TABLET, DELAYED RELEASE ORAL at 06:04

## 2024-10-30 RX ADMIN — LEVOTHYROXINE SODIUM 200 MCG: 0.1 TABLET ORAL at 06:04

## 2024-10-30 RX ADMIN — INSULIN LISPRO 6 UNITS: 100 INJECTION, SOLUTION INTRAVENOUS; SUBCUTANEOUS at 20:37

## 2024-10-30 RX ADMIN — INSULIN GLARGINE 15 UNITS: 100 INJECTION, SOLUTION SUBCUTANEOUS at 20:37

## 2024-10-30 RX ADMIN — Medication 10 MG: at 20:36

## 2024-10-30 RX ADMIN — LATANOPROST 1 DROP: 50 SOLUTION OPHTHALMIC at 20:39

## 2024-10-30 RX ADMIN — AMIODARONE HYDROCHLORIDE 200 MG: 200 TABLET ORAL at 09:26

## 2024-10-30 RX ADMIN — PANTOPRAZOLE SODIUM 40 MG: 40 TABLET, DELAYED RELEASE ORAL at 08:09

## 2024-10-30 RX ADMIN — APIXABAN 5 MG: 5 TABLET, FILM COATED ORAL at 09:26

## 2024-10-30 RX ADMIN — Medication 250 MG: at 09:26

## 2024-10-30 RX ADMIN — INSULIN LISPRO 6 UNITS: 100 INJECTION, SOLUTION INTRAVENOUS; SUBCUTANEOUS at 11:42

## 2024-10-30 RX ADMIN — MIDODRINE HYDROCHLORIDE 5 MG: 5 TABLET ORAL at 11:43

## 2024-10-30 RX ADMIN — APIXABAN 5 MG: 5 TABLET, FILM COATED ORAL at 20:36

## 2024-10-30 RX ADMIN — Medication 250 MG: at 20:36

## 2024-10-30 RX ADMIN — GABAPENTIN 100 MG: 100 CAPSULE ORAL at 20:36

## 2024-10-30 RX ADMIN — GABAPENTIN 100 MG: 100 CAPSULE ORAL at 09:26

## 2024-10-30 NOTE — PLAN OF CARE
Goal Outcome Evaluation:  Plan of Care Reviewed With: patient        Progress: improving  Outcome Evaluation: Alert and oriented x4; [place]. Able to make needs known to staff. No complaints of pain verballized. Transfers to BR x1 person assist; uses urinal at night. Blood sugar 236 at bedtime; insulin given per MAR. Rested well after receiving Melatonin 10mg. VSS. Call light within reach; care plan ongoing.

## 2024-10-30 NOTE — THERAPY TREATMENT NOTE
SNF - Occupational Therapy Treatment Note  RAKEL Alcaraz    Patient Name: Darci Munson  : 1935    MRN: [id number]                              Today's Date: 10/30/2024       Admit Date: 10/15/2024    Visit Dx:     ICD-10-CM ICD-9-CM   1. Decreased activities of daily living (ADL)  Z78.9 V49.89   2. Difficulty walking  R26.2 719.7     Patient Active Problem List   Diagnosis    Type 2 diabetes mellitus with hyperglycemia, without long-term current use of insulin    Chronic kidney disease    Hypothyroidism    Hypertensive disorder    Hyperlipidemia    Paroxysmal atrial fibrillation    Dyspepsia    Coronary arteriosclerosis    Gastroparesis    Hearing loss    Mitral valve regurgitation    Peripheral neuropathy    Prostate CA    Primary insomnia    Primary osteoarthritis of left knee    Anticoagulated    Vitamin D insufficiency    Iron deficiency anemia    Medicare annual wellness visit, subsequent    Thickened nails    Pain in toes of both feet    History of prostate cancer    Stage 3b chronic kidney disease    Chronic bilateral low back pain without sciatica    Acute renal failure    Intractable nausea and vomiting    Abdominal pain    Physical debility    Severe malnutrition     Past Medical History:   Diagnosis Date    Anemia, unspecified     Atherosclerotic heart disease of native coronary artery without angina pectoris     CAD (coronary artery disease)     CKD (chronic kidney disease), stage III     Essential (primary) hypertension     Gastric ulcer, unspecified as acute or chronic, without hemorrhage or perforation     Gastroparesis     GERD without esophagitis     Glaucoma     Hereditary and idiopathic neuropathy, unspecified     History of falling     HLD (hyperlipidemia)     HTN (hypertension)     Hypotension, unspecified     Hypothyroidism     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    Irritable bowel syndrome without diarrhea     Laceration without foreign body of right forearm, initial encounter     Low back pain      Malignant neoplasm of prostate     Mixed hyperlipidemia     OA (osteoarthritis)     Other specified disorders of the skin and subcutaneous tissue     Pain in left foot     Peripheral vascular disease, unspecified     Phimosis     Presence of unspecified artificial knee joint     Primary insomnia     Primary osteoarthritis of both knees     Prostate cancer     Sensorineural hearing loss (SNHL) of both ears     Sigmoid diverticulosis     severe sigmoid and descending colon diverticulosis and 3+ gastritis    Sleep related leg cramps     Type 2 diabetes mellitus with diabetic polyneuropathy     and gastroparesis    Unspecified atrial fibrillation     Unspecified dementia without behavioral disturbance     Unspecified hearing loss, bilateral      Past Surgical History:   Procedure Laterality Date    CATARACT EXTRACTION Bilateral 2014    COLONOSCOPY      COLONOSCOPY N/A 7/1/2022    Procedure: COLONOSCOPY WITH POLYPECTOMIES, HOT SNARE;  Surgeon: Jennifer Mann MD;  Location: [facility] ENDOSCOPY;  Service: Gastroenterology;  Laterality: N/A;  DIVERTICULOSIS, COLON POLYPS, HEMORRHOIDS    ENDOSCOPY N/A 7/1/2022    Procedure: ESOPHAGOGASTRODUODENOSCOPY WITH BX, POLYPECTOMY;  Surgeon: Jennifer Mann MD;  Location: [facility] ENDOSCOPY;  Service: Gastroenterology;  Laterality: N/A;  GASTRIC POLYP, GASTRITIS, HIATAL HERNIA    HAND SURGERY Right     JOINT REPLACEMENT      KNEE ARTHROSCOPY Right 03/14/2017    PROSTATECTOMY      REPLACEMENT TOTAL KNEE  03/2017    UPPER GASTROINTESTINAL ENDOSCOPY        General Information       Row Name 10/30/24 0816          OT Time and Intention    Document Type therapy note (daily note)  -GC     Mode of Treatment individual therapy;occupational therapy  -GC     Patient Effort good  -GC       Row Name 10/30/24 0816          General Information    Patient Profile Reviewed yes  -GC     Existing Precautions/Restrictions fall  -GC       Row Name 10/30/24 0816          Safety  Issues/Impairments Affecting Functional Mobility    Impairments Affecting Function (Mobility) balance;endurance/activity tolerance;strength  -               User Key  (r) = Recorded By, (t) = Taken By, (c) = Cosigned By      Initials Name Provider Type     Ruth Islas OT Occupational Therapist                     Mobility/ADL's       Row Name 10/30/24 0816          Transfers    Transfers bed-chair transfer;sit-stand transfer;stand-sit transfer;toilet transfer  -       Row Name 10/30/24 0816          Bed-Chair Transfer    Bed-Chair [place] (Transfers) supervision  -     Assistive Device (Bed-Chair Transfers) walker, front-wheeled  -       Row Name 10/30/24 08          Sit-Stand Transfer    Sit-Stand [place] (Transfers) independent  -       Row Name 10/30/24 08          Stand-Sit Transfer    Stand-Sit [place] (Transfers) independent  -     Assistive Device (Stand-Sit Transfers) walker, front-wheeled  -[facility] Name 10/30/24 0816          Toilet Transfer    [place] Level (Toilet Transfer) supervision  -     Assistive Device (Toilet Transfer) raised toilet seat  -       Row Name 10/30/24 08          Functional Mobility    Functional Mobility- Ind. Level supervision required  -     Functional Mobility- Device walker, front-wheeled  -       Row Name 10/30/24 0816          Activities of Daily Living    BADL Assessment/Intervention bathing;upper body dressing;lower body dressing;grooming;toileting  -[facility] Name 10/30/24 0816          Bathing Assessment/Intervention    [place] Level (Bathing) supervision  -     Assistive Devices (Bathing) grab bar, tub/shower;hand-held shower spray hose;tub bench  -[facility] Name 10/30/24 0816          Upper Body Dressing Assessment/Training    [place] Level (Upper Body Dressing) set up  -[facility] Name 10/30/24 0816          Lower Body Dressing Assessment/Training    [place] Level (Lower Body Dressing)  supervision  -       Row Name 10/30/24 0816          Toileting Assessment/Training    [place] Level (Toileting) supervision  -               User Key  (r) = Recorded By, (t) = Taken By, (c) = Cosigned By      Initials Name Provider Type    Ruth Whitney OT Occupational Therapist                   Obj/Interventions       Row Name 10/30/24 0817          Motor Skills    Functional Endurance endurance training x15 minutes on Omnicycle with one rest break  -     Therapeutic Exercise aerobic  -               User Key  (r) = Recorded By, (t) = Taken By, (c) = Cosigned By      Initials Name Provider Type    Ruth Whitney OT Occupational Therapist                   Goals/Plan    No documentation.                  Clinical Impression       Row Name 10/30/24 0817          Plan of Care Review    Plan of Care Reviewed With patient  -     Progress no change  -     Outcome Evaluation Patient demonstrated SPV for all BADLS this am using RW noting pt. is still somewhat unsteady at times but able to self correct balance.  Pt. needs VCs for safety with device/RW mainly to not step outside with activities.  Plan to continue POC established for LTGs.  -       Row Name 10/30/24 0817          Therapy Plan Review/Discharge Plan (OT)    Anticipated Discharge Disposition (OT) home with home health  -               User Key  (r) = Recorded By, (t) = Taken By, (c) = Cosigned By      Initials Name Provider Type    Ruth Whitney OT Occupational Therapist                   Outcome Measures       Row Name 10/30/24 0819          How much help from another is currently needed...    Putting on and taking off regular lower body clothing? 4  -GC     Bathing (including washing, rinsing, and drying) 4  -GC     Toileting (which includes using toilet bed pan or urinal) 4  -GC     Putting on and taking off regular upper body clothing 4  -GC     Taking care of personal grooming (such as brushing teeth) 4  -GC     Eating meals 4   -     AM-PAC 6 Clicks Score (OT) 24  -GC       Row Name 10/29/24 1002          How much help from another person do you currently need...    Turning from your back to your side while in flat bed without using bedrails? 3  -TM     Moving from lying on back to sitting on the side of a flat bed without bedrails? 3  -TM     Moving to and from a bed to a chair (including a wheelchair)? 3  -TM     Standing up from a chair using your arms (e.g., wheelchair, bedside chair)? 3  -TM     Climbing 3-5 steps with a railing? 3  -TM     To walk in hospital room? 3  -TM     AM-PAC 6 Clicks Score (PT) 18  -TM     Highest Level of Mobility Goal 6 --> Walk 10 steps or more  -       Row Name 10/30/24 0819          Functional Assessment    Outcome Measure Options AM-[facility] 6 Clicks Daily Activity (OT);Optimal Instrument  -       Row Name 10/30/24 0819          Optimal Instrument    Optimal Instrument Optimal - 3  -GC     Bending/Stooping 2  -GC     Standing 1  -GC     Reaching 1  -GC               User Key  (r) = Recorded By, (t) = Taken By, (c) = Cosigned By      Initials Name Provider Type    TM Sherie Craft, RN Registered Nurse    GC Ruth Islas OT Occupational Therapist                  Section G  Mobility  Dressing - self performance: limited assistance (staff provide guided maneuvering of limbs or other non-weight bearing assistance)  Dressing support/assistance: One person assist  Eating - self performance: independent  Eating support/assistance: No setup or physical help  Toileting - self performance: limited assistance (staff provide guided maneuvering of limbs or other non-weight bearing assistance)  Toileting support/assistance: One person assist  Personal hygiene - self performance: limited assistance (staff provide guided maneuvering of limbs or other non-weight bearing assistance)  Personal hygiene support/assistance: One person assist  Bathing  Bathing - self performance: Physical help only to transfer to  bathe  Bathing support/assistance: One person assist     Range of Motion  Upper Extremity: No impairment  Section GG  Functional Ability/Goals, Adm (Section GG)  Self Care, Prior Functioning (XO3059T): 3. Independent  Functional Cognition, Prior Functioning (IZ2489K): 3. Independent  Upper Extremity Range of Motion (YJ8995J): No impairment  Self Care, Admission (Section GG)  Eating: Self-Care Admission Performance (BL5312G4): independent (06)  Oral Hygiene: Self-Care Admission Performance (BG0881R9): setup or clean-up assistance (05)  Toileting Hygiene: Self-Care Admission Performance (CI4074D9): partial/moderate assistance (03)  Shower/Bathe Self: Self-Care Admission Performance (VH6661Y8): partial/moderate assistance (03)  Upper Body Dressing: Self-Care Admission Performance (BS2008M0): setup or clean-up assistance (05)  Lower Body Dressing: Self-Care Admission Performance (DY2618M2): partial/moderate assistance (03)  Putting On/Taking Off Footwear: Self-Care Admission Performance (ER2211C2): partial/moderate assistance (03)  Personal Hygiene: Self-Care Admission Performance (FZ2663E2): supervision or touching assistance (04)  Mobility, Admission Performance (MT0758)  Toilet Transfer: Mobility Admission Performance (NN1518D4): supervision or touching assistance (04)  Tub/shower Transfer: Mobility Admission Performance (PA4559XM7): supervision or touching assistance (04)                Occupational Therapy Education       Title: PT OT SLP Therapies (In Progress)       Topic: Occupational Therapy (In Progress)       Point: ADL training (Done)       Description:   Instruct learner(s) on proper safety adaptation and remediation techniques during self care or transfers.   Instruct in proper use of assistive devices.                  Learning Progress Summary            Patient Acceptance, E, VU,NR by  at 10/16/2024 2253                      Point: Home exercise program (Not Started)       Description:   Instruct  learner(s) on appropriate technique for monitoring, assisting and/or progressing therapeutic exercises/activities.                  Learner Progress:  Not documented in this visit.              Point: Precautions (Done)       Description:   Instruct learner(s) on prescribed precautions during self-care and functional transfers.                  Learning Progress Summary            Patient Acceptance, E, VU,NR by  at 10/16/2024 0925                      Point: Body mechanics (Done)       Description:   Instruct learner(s) on proper positioning and spine alignment during self-care, functional mobility activities and/or exercises.                  Learning Progress Summary            Patient Acceptance, E, VU,NR by  at 10/16/2024 0925                                      User Key       Initials Effective Dates Name Provider Type Discipline     06/16/21 -  Ruth Islas OT Occupational Therapist OT                  OT Recommendation and Plan  Planned Therapy Interventions (OT): activity tolerance training, adaptive equipment training, BADL retraining, functional balance retraining, occupation/activity based interventions, patient/caregiver education/training, ROM/therapeutic exercise, strengthening exercise, transfer/mobility retraining  Therapy Frequency (OT): 5 times/wk  Plan of Care Review  Plan of Care Reviewed With: patient  Progress: no change  Outcome Evaluation: Patient demonstrated SPV for all BADLS this am using RW noting pt. is still somewhat unsteady at times but able to self correct balance.  Pt. needs VCs for safety with device/RW mainly to not step outside with activities.  Plan to continue POC established for LTGs.     Time Calculation:   Evaluation Complexity (OT)  Review Occupational Profile/Medical/Therapy History Complexity: expanded/moderate complexity  Assessment, Occupational Performance/Identification of Deficit Complexity: 3-5 performance deficits  Clinical Decision Making Complexity (OT):  detailed assessment/moderate complexity  Overall Complexity of Evaluation (OT): moderate complexity     Time Calculation- OT       Row Name 10/30/24 0820             Time Calculation- OT    OT Received On 10/30/24  -GC         Timed Charges    46139 - OT Therapeutic Exercise Minutes 15  -GC      53709 - OT Therapeutic Activity Minutes 8  -GC      45492 - OT Self Care/Mgmt Minutes 31  -GC         SNF Occupational Therapy Minutes    Skilled Minutes- OT 54 min  -GC         Total Minutes    Timed Charges Total Minutes 54  -GC       Total Minutes 54  -GC                User Key  (r) = Recorded By, (t) = Taken By, (c) = Cosigned By      Initials Name Provider Type     Ruth Islas OT Occupational Therapist                  Therapy Charges for Today       Code Description Service Date Service Provider Modifiers Qty    36130092868 HC OT THER PROC EA 15 MIN 10/30/2024 Ruth Islas OT GO 1    44419710667 HC OT THERAPEUTIC ACT EA 15 MIN 10/30/2024 Ruth Islas OT GO 1    44752837726 HC OT SELF CARE/MGMT/TRAIN EA 15 MIN 10/30/2024 Ruth Islas OT GO 2                 Ruth Islas OT  10/30/2024

## 2024-10-30 NOTE — SIGNIFICANT NOTE
10/30/24 1559   OTHER   Discipline physical therapist   Rehab Time/Intention   Session Not Performed other (see comments)  (pt had visitors and wanted to stay to visit)

## 2024-10-30 NOTE — PLAN OF CARE
Goal Outcome Evaluation:  Plan of Care Reviewed With: patient        Progress: improving  Outcome Evaluation: Alert and oriented and pleasant with staff. x1 assist for transfers and ambulation. No c/o pain requiring PRN pain medicaiton noted this shift. Continues to show improved mobility and endurance this shift. States the higher dose of melatonin was effective overnight. Sitting up in recliner, call light in reach.

## 2024-10-31 LAB
GLUCOSE BLDC GLUCOMTR-MCNC: 130 MG/DL (ref 70–99)
GLUCOSE BLDC GLUCOMTR-MCNC: 156 MG/DL (ref 70–99)
GLUCOSE BLDC GLUCOMTR-MCNC: 170 MG/DL (ref 70–99)
GLUCOSE BLDC GLUCOMTR-MCNC: 248 MG/DL (ref 70–99)
SARS-COV-2 RNA RESP QL NAA+PROBE: NOT DETECTED

## 2024-10-31 PROCEDURE — 87635 SARS-COV-2 COVID-19 AMP PRB: CPT | Performed by: FAMILY MEDICINE

## 2024-10-31 PROCEDURE — 97110 THERAPEUTIC EXERCISES: CPT

## 2024-10-31 PROCEDURE — 63710000001 INSULIN GLARGINE PER 5 UNITS: Performed by: FAMILY MEDICINE

## 2024-10-31 PROCEDURE — 63710000001 INSULIN LISPRO (HUMAN) PER 5 UNITS: Performed by: FAMILY MEDICINE

## 2024-10-31 PROCEDURE — 82948 REAGENT STRIP/BLOOD GLUCOSE: CPT

## 2024-10-31 PROCEDURE — 82948 REAGENT STRIP/BLOOD GLUCOSE: CPT | Performed by: FAMILY MEDICINE

## 2024-10-31 PROCEDURE — 97116 GAIT TRAINING THERAPY: CPT

## 2024-10-31 PROCEDURE — 97530 THERAPEUTIC ACTIVITIES: CPT

## 2024-10-31 RX ADMIN — APIXABAN 5 MG: 5 TABLET, FILM COATED ORAL at 20:35

## 2024-10-31 RX ADMIN — INSULIN LISPRO 2 UNITS: 100 INJECTION, SOLUTION INTRAVENOUS; SUBCUTANEOUS at 20:35

## 2024-10-31 RX ADMIN — LATANOPROST 1 DROP: 50 SOLUTION OPHTHALMIC at 20:39

## 2024-10-31 RX ADMIN — GABAPENTIN 100 MG: 100 CAPSULE ORAL at 20:34

## 2024-10-31 RX ADMIN — GABAPENTIN 100 MG: 100 CAPSULE ORAL at 08:57

## 2024-10-31 RX ADMIN — Medication 10 MG: at 20:35

## 2024-10-31 RX ADMIN — MIDODRINE HYDROCHLORIDE 5 MG: 5 TABLET ORAL at 12:27

## 2024-10-31 RX ADMIN — INSULIN GLARGINE 15 UNITS: 100 INJECTION, SOLUTION SUBCUTANEOUS at 20:36

## 2024-10-31 RX ADMIN — APIXABAN 5 MG: 5 TABLET, FILM COATED ORAL at 08:57

## 2024-10-31 RX ADMIN — Medication 250 MG: at 08:57

## 2024-10-31 RX ADMIN — AMIODARONE HYDROCHLORIDE 200 MG: 200 TABLET ORAL at 08:57

## 2024-10-31 RX ADMIN — ACETAMINOPHEN 650 MG: 325 TABLET ORAL at 11:20

## 2024-10-31 RX ADMIN — MIDODRINE HYDROCHLORIDE 5 MG: 5 TABLET ORAL at 17:55

## 2024-10-31 RX ADMIN — LEVOTHYROXINE SODIUM 200 MCG: 0.1 TABLET ORAL at 05:29

## 2024-10-31 RX ADMIN — MIDODRINE HYDROCHLORIDE 5 MG: 5 TABLET ORAL at 08:57

## 2024-10-31 RX ADMIN — INSULIN LISPRO 4 UNITS: 100 INJECTION, SOLUTION INTRAVENOUS; SUBCUTANEOUS at 17:50

## 2024-10-31 RX ADMIN — Medication 250 MG: at 20:34

## 2024-10-31 RX ADMIN — INSULIN LISPRO 2 UNITS: 100 INJECTION, SOLUTION INTRAVENOUS; SUBCUTANEOUS at 12:27

## 2024-10-31 RX ADMIN — PANTOPRAZOLE SODIUM 40 MG: 40 TABLET, DELAYED RELEASE ORAL at 05:29

## 2024-10-31 NOTE — THERAPY TREATMENT NOTE
SNF - Physical Therapy Treatment Note  RAKEL Alcaraz     Patient Name: Darci Munson  : 1935  MRN: [id number]  Today's Date: 10/31/2024      Visit Dx:    ICD-10-CM ICD-9-CM   1. Decreased activities of daily living (ADL)  Z78.9 V49.89   2. Difficulty walking  R26.2 719.7     Patient Active Problem List   Diagnosis    Type 2 diabetes mellitus with hyperglycemia, without long-term current use of insulin    Chronic kidney disease    Hypothyroidism    Hypertensive disorder    Hyperlipidemia    Paroxysmal atrial fibrillation    Dyspepsia    Coronary arteriosclerosis    Gastroparesis    Hearing loss    Mitral valve regurgitation    Peripheral neuropathy    Prostate CA    Primary insomnia    Primary osteoarthritis of left knee    Anticoagulated    Vitamin D insufficiency    Iron deficiency anemia    Medicare annual wellness visit, subsequent    Thickened nails    Pain in toes of both feet    History of prostate cancer    Stage 3b chronic kidney disease    Chronic bilateral low back pain without sciatica    Acute renal failure    Intractable nausea and vomiting    Abdominal pain    Physical debility    Severe malnutrition     Past Medical History:   Diagnosis Date    Anemia, unspecified     Atherosclerotic heart disease of native coronary artery without angina pectoris     CAD (coronary artery disease)     CKD (chronic kidney disease), stage III     Essential (primary) hypertension     Gastric ulcer, unspecified as acute or chronic, without hemorrhage or perforation     Gastroparesis     GERD without esophagitis     Glaucoma     Hereditary and idiopathic neuropathy, unspecified     History of falling     HLD (hyperlipidemia)     HTN (hypertension)     Hypotension, unspecified     Hypothyroidism     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    Irritable bowel syndrome without diarrhea     Laceration without foreign body of right forearm, initial encounter     Low back pain     Malignant neoplasm of prostate     Mixed hyperlipidemia      OA (osteoarthritis)     Other specified disorders of the skin and subcutaneous tissue     Pain in left foot     Peripheral vascular disease, unspecified     Phimosis     Presence of unspecified artificial knee joint     Primary insomnia     Primary osteoarthritis of both knees     Prostate cancer     Sensorineural hearing loss (SNHL) of both ears     Sigmoid diverticulosis     severe sigmoid and descending colon diverticulosis and 3+ gastritis    Sleep related leg cramps     Type 2 diabetes mellitus with diabetic polyneuropathy     and gastroparesis    Unspecified atrial fibrillation     Unspecified dementia without behavioral disturbance     Unspecified hearing loss, bilateral      Past Surgical History:   Procedure Laterality Date    CATARACT EXTRACTION Bilateral 2014    COLONOSCOPY      COLONOSCOPY N/A 7/1/2022    Procedure: COLONOSCOPY WITH POLYPECTOMIES, HOT SNARE;  Surgeon: Jennifer Mann MD;  Location: [facility] ENDOSCOPY;  Service: Gastroenterology;  Laterality: N/A;  DIVERTICULOSIS, COLON POLYPS, HEMORRHOIDS    ENDOSCOPY N/A 7/1/2022    Procedure: ESOPHAGOGASTRODUODENOSCOPY WITH BX, POLYPECTOMY;  Surgeon: Jennifer Mann MD;  Location: [facility] ENDOSCOPY;  Service: Gastroenterology;  Laterality: N/A;  GASTRIC POLYP, GASTRITIS, HIATAL HERNIA    HAND SURGERY Right     JOINT REPLACEMENT      KNEE ARTHROSCOPY Right 03/14/2017    PROSTATECTOMY      REPLACEMENT TOTAL KNEE  03/2017    UPPER GASTROINTESTINAL ENDOSCOPY         PT Assessment (Last 12 Hours)       PT Evaluation and Treatment       Row Name 10/31/24 1200          Physical Therapy Time and Intention    Subjective Information no complaints  -CS     Document Type therapy note (daily note)  -CS     Mode of Treatment individual therapy;physical therapy  -CS     Patient Effort good  -CS     Symptoms Noted During/After Treatment increased pain  -CS       Row Name 10/31/24 1200          Pain    Pretreatment Pain Rating 2/10  -CS     Posttreatment  Pain Rating 4/10  -CS     Pain Location back  -CS     Pain Side/Orientation lower  -CS     Pain Management Interventions nursing notified  -CS       Row Name 10/31/24 1200          Cognition    Orientation Status (Cognition) oriented x 3  -CS       Row Name 10/31/24 1200          Bed Mobility    Bed Mobility supine-sit  -CS     Supine-Sit [place] (Bed Mobility) supervision  -CS     Bed Mobility, Safety Issues decreased use of legs for bridging/pushing  -CS     Assistive Device (Bed Mobility) bed rails  -CS       Row Name 10/31/24 1200          Transfers    Transfers sit-stand transfer;stand-sit transfer  -CS       Row Name 10/31/24 1200          Sit-Stand Transfer    Sit-Stand [place] (Transfers) modified independence  -     Assistive Device (Sit-Stand Transfers) walker, front-wheeled  -CS       Row Name 10/31/24 1200          Stand-Sit Transfer    Stand-Sit [place] (Transfers) modified independence  -     Assistive Device (Stand-Sit Transfers) walker, front-wheeled  -CS       Row Name 10/31/24 1200          Gait/Stairs (Locomotion)    Gait/Stairs Locomotion gait/ambulation assistive device  -     [place] Level (Gait) verbal cues;contact guard  -CS     Assistive Device (Gait) cane, straight  -CS     Patient was able to Ambulate yes  -CS     Distance in Feet (Gait) 250  plus 40'  -CS     Pattern (Gait) 4-point;step-through  -CS     Deviations/Abnormal Patterns (Gait) gait speed decreased;stride length decreased  -CS     Right Sided Gait Deviations hip hiking  slight hipe hike due to recent hip fracture  -CS     Comment, (Gait/Stairs) trialed cane today but patient continues to have decreased balance and safety while using it so he was transitioned back to the rolling walker to use in his room and with staff. Pt also continues with an abnormal gait pattern when using the cane due to recent hip fracture  -CS       Row Name 10/31/24 1200          Safety Issues/Impairments Affecting Functional  Mobility    Impairments Affecting Function (Mobility) balance;endurance/activity tolerance;strength  -CS       Row Name 10/31/24 1200          Balance    Balance Interventions standing;sit to stand;supported;static;dynamic;dynamic reaching;occupation based/functional task;single limb stance;weight shifting activity;UE activity with balance activity;combined head and eye movements  -CS       Row Name 10/31/24 1200          Motor Skills    Therapeutic Exercise hip;knee;ankle;aerobic  -CS       Row Name 10/31/24 1200          Aerobic Exercise    Type (Aerobic Exercise) other (see comments)  NuStep  -     Time Performed (Aerobic Exercise) x13 minutes at level 5  -CS       Row Name             Wound 10/11/24 0332 gluteal    Wound - Properties Group Placement Date: 10/11/24  -YUMIKO Placement Time: 0332 -JO Location: gluteal  -YUMIKO    Retired Wound - Properties Group Placement Date: 10/11/24  -YUMIKO Placement Time: 0332 -JO Location: gluteal  -YUMIKO    Retired Wound - Properties Group Placement Date: 10/11/24  -YUMIKO Placement Time: 0332 -JO Location: gluteal  -YUMIKO    Retired Wound - Properties Group Date first assessed: 10/11/24  -YUMIKO Time first assessed: 0332 -JO Location: gluteal  -YUMIKO      Row Name             Wound 10/26/24 2107 Right posterior heel unspecified    Wound - Properties Group Placement Date: 10/26/24  -KR Placement Time: 2107 -KR Side: Right  -KR Orientation: posterior  -KR Location: heel  -KR Primary Wound Type: Traumatic  -KR Type: unspecified  -KR Present on Original Admission: N  -KR    Retired Wound - Properties Group Placement Date: 10/26/24  -KR Placement Time: 2107 -KR Present on Original Admission: N  -KR Side: Right  -KR Orientation: posterior  -KR Location: heel  -KR Primary Wound Type: Traumatic  -KR Type: unspecified  -KR    Retired Wound - Properties Group Placement Date: 10/26/24  -KR Placement Time: 2107 -KR Present on Original Admission: N  -KR Side: Right  -KR Orientation: posterior  -KR  Location: heel  -KR Primary Wound Type: Traumatic  -KR Type: unspecified  -KR    Retired Wound - Properties Group Date first assessed: 10/26/24  -KR Time first assessed: 2107  -KR Present on Original Admission: N  -KR Side: Right  -KR Location: heel  -KR Primary Wound Type: Traumatic  -KR Type: unspecified  -KR      Row Name 10/31/24 1200          Plan of Care Review    Plan of Care Reviewed With patient  -CS     Progress improving  -CS     Outcome Evaluation Continue plan of care  -CS       Row Name 10/31/24 1200          Positioning and Restraints    Pre-Treatment Position in bed  -CS     Post Treatment Position other  -CS     Other Position with OT  -CS       Row Name 10/31/24 1200          Progress Summary (PT)    Progress Toward Functional Goals (PT) progress toward functional goals is good  -CS               User Key  (r) = Recorded By, (t) = Taken By, (c) = Cosigned By      Initials Name Provider Type    Jimbo Islas, RN Registered Nurse    Josefina Correa, PT Physical Therapist    Blanca Kahn RN Registered Nurse                  Section G              Section GG  Functional Ability/Goals, Adm (Section GG)  Indoor Mobility - Ambulation, Prior Function (BU6454X): 3. Independent  Stairs, Prior Function (TA6479C): 3. Independent  Prior Device Use (RB1466): none of the above (Z)  Lower Extremity Range of Motion (ZX0656B): Impairment on one side     Mobility, Admission Performance (FR5193)  Roll Left & Right: Mobility Admission Performance (GV7396Z4): supervision or touching assistance (04)  Sit to Lying: Mobility Admission Performance (LI9585W7): supervision or touching assistance (04)  Lying to Sitting, Side of Bed: Mobility Admission Performance (PT4844O6): supervision or touching assistance (04)  Sit to Stand: Mobility Admission Performance (FI3381N2): supervision or touching assistance (04)  Chair/Bed-Chair Transfer: Mobility Admission Performance (HD6683D8): supervision or touching  assistance (04)  Car Transfer: Mobility Admission Performance (GK9906O8): not attempted due to environmental limitations (10)  Walk 10 Feet: Mobility Admission Performance (BN8874W6): supervision or touching assistance (04)  Walk 50 Feet With Two Turns: Mobility Admission Performance (PI3346L5): supervision or touching assistance (04)  Walk 150 Feet: Mobility Admission Performance (GH7732B9): not attempted, medical condition/safety concern (88)  Walk 10 Ft, Uneven Surfaces: Mobility Admission Performance (RW5356S6): not applicable (09)  1 Step/Curb: Mobility Admission Performance (EW1644J1): not attempted, medical condition/safety concern ()  4 Steps: Mobility Admission Performance (VD4795T2): not attempted, medical condition/safety concern (88)  12 Steps: Mobility Admission Performance (NX9670N5): not applicable (09)  Picking up object: Mobility Admission Performance (CZ8950K7): not attempted, medical condition/safety concern (88)  Use a Wheelchair and/or Scooter: Mobility (WF1880R5): no (0)     RETIRED Mobility (GG), Discharge Goal (GK7524)  Use a Wheelchair and/or Scooter: Mobility (QW3195G4): no (0)     Mobility, Discharge Performance (GW0439)  Use a Wheelchair and/or Scooter: Mobility (NY4653I5): no (0)  Physical Therapy Education       Title: PT OT SLP Therapies (In Progress)       Topic: Physical Therapy (Not Started)       Point: Mobility training (Not Started)       Learner Progress:  Not documented in this visit.              Point: Home exercise program (Not Started)       Learner Progress:  Not documented in this visit.              Point: Body mechanics (Not Started)       Learner Progress:  Not documented in this visit.              Point: Precautions (Not Started)       Learner Progress:  Not documented in this visit.                                  PT Recommendation and Plan  Anticipated Discharge Disposition (PT): home with home health, home with assist  Planned Therapy Interventions (PT):  balance training, bed mobility training, gait training, strengthening, transfer training  Therapy Frequency (PT): 6 times/wk  Progress Summary (PT)  Progress Toward Functional Goals (PT): progress toward functional goals is good  Plan of Care Reviewed With: patient  Progress: improving  Outcome Evaluation: Continue plan of care       Time Calculation:    PT Charges       Row Name 10/31/24 1218             Time Calculation    PT Received On 10/31/24  -CS         Timed Charges    43274 - PT Therapeutic Exercise Minutes 20  -CS      10556 - Gait Training Minutes  10  -CS      95699 - PT Therapeutic Activity Minutes 23  -CS         SNF Physical Therapy Minutes    Skilled Minutes- PT 53 min  -CS         Total Minutes    Timed Charges Total Minutes 53  -CS       Total Minutes 53  -CS                User Key  (r) = Recorded By, (t) = Taken By, (c) = Cosigned By      Initials Name Provider Type    CS Josefina Ramires, PT Physical Therapist                  Therapy Charges for Today       Code Description Service Date Service Provider Modifiers Qty    92314529998 HC PT THER PROC EA 15 MIN 10/31/2024 Josefina Ramires, PT GP 1    14191071386 HC GAIT TRAINING EA 15 MIN 10/31/2024 Josefina Ramires, PT GP 1    28896093973 HC PT THERAPEUTIC ACT EA 15 MIN 10/31/2024 Josefina Ramires, PT GP 2            PT G-Codes  Outcome Measure Options: AM-PAC 6 Clicks Daily Activity (OT), Optimal Instrument  AM-PAC 6 Clicks Score (PT): 19  AM-PAC 6 Clicks Score (OT): 24    Josefina Ramires PT  10/31/2024

## 2024-10-31 NOTE — THERAPY TREATMENT NOTE
SNF - Occupational Therapy Treatment Note  RAKEL Alcaraz    Patient Name: Darci Munson  : 1935    MRN: [id number]                              Today's Date: 10/31/2024       Admit Date: 10/15/2024    Visit Dx:     ICD-10-CM ICD-9-CM   1. Decreased activities of daily living (ADL)  Z78.9 V49.89   2. Difficulty walking  R26.2 719.7     Patient Active Problem List   Diagnosis    Type 2 diabetes mellitus with hyperglycemia, without long-term current use of insulin    Chronic kidney disease    Hypothyroidism    Hypertensive disorder    Hyperlipidemia    Paroxysmal atrial fibrillation    Dyspepsia    Coronary arteriosclerosis    Gastroparesis    Hearing loss    Mitral valve regurgitation    Peripheral neuropathy    Prostate CA    Primary insomnia    Primary osteoarthritis of left knee    Anticoagulated    Vitamin D insufficiency    Iron deficiency anemia    Medicare annual wellness visit, subsequent    Thickened nails    Pain in toes of both feet    History of prostate cancer    Stage 3b chronic kidney disease    Chronic bilateral low back pain without sciatica    Acute renal failure    Intractable nausea and vomiting    Abdominal pain    Physical debility    Severe malnutrition     Past Medical History:   Diagnosis Date    Anemia, unspecified     Atherosclerotic heart disease of native coronary artery without angina pectoris     CAD (coronary artery disease)     CKD (chronic kidney disease), stage III     Essential (primary) hypertension     Gastric ulcer, unspecified as acute or chronic, without hemorrhage or perforation     Gastroparesis     GERD without esophagitis     Glaucoma     Hereditary and idiopathic neuropathy, unspecified     History of falling     HLD (hyperlipidemia)     HTN (hypertension)     Hypotension, unspecified     Hypothyroidism     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    Irritable bowel syndrome without diarrhea     Laceration without foreign body of right forearm, initial encounter     Low back pain      Malignant neoplasm of prostate     Mixed hyperlipidemia     OA (osteoarthritis)     Other specified disorders of the skin and subcutaneous tissue     Pain in left foot     Peripheral vascular disease, unspecified     Phimosis     Presence of unspecified artificial knee joint     Primary insomnia     Primary osteoarthritis of both knees     Prostate cancer     Sensorineural hearing loss (SNHL) of both ears     Sigmoid diverticulosis     severe sigmoid and descending colon diverticulosis and 3+ gastritis    Sleep related leg cramps     Type 2 diabetes mellitus with diabetic polyneuropathy     and gastroparesis    Unspecified atrial fibrillation     Unspecified dementia without behavioral disturbance     Unspecified hearing loss, bilateral      Past Surgical History:   Procedure Laterality Date    CATARACT EXTRACTION Bilateral 2014    COLONOSCOPY      COLONOSCOPY N/A 7/1/2022    Procedure: COLONOSCOPY WITH POLYPECTOMIES, HOT SNARE;  Surgeon: Jennifer Mann MD;  Location: [facility] ENDOSCOPY;  Service: Gastroenterology;  Laterality: N/A;  DIVERTICULOSIS, COLON POLYPS, HEMORRHOIDS    ENDOSCOPY N/A 7/1/2022    Procedure: ESOPHAGOGASTRODUODENOSCOPY WITH BX, POLYPECTOMY;  Surgeon: Jennifer Mann MD;  Location: [facility] ENDOSCOPY;  Service: Gastroenterology;  Laterality: N/A;  GASTRIC POLYP, GASTRITIS, HIATAL HERNIA    HAND SURGERY Right     JOINT REPLACEMENT      KNEE ARTHROSCOPY Right 03/14/2017    PROSTATECTOMY      REPLACEMENT TOTAL KNEE  03/2017    UPPER GASTROINTESTINAL ENDOSCOPY        General Information       Row Name 10/31/24 1245          OT Time and Intention    Document Type therapy note (daily note)  -GC     Mode of Treatment individual therapy;occupational therapy  -GC     Patient Effort good  -GC       Row Name 10/31/24 1245          General Information    Existing Precautions/Restrictions fall  -GC       Row Name 10/31/24 1245          Safety Issues/Impairments Affecting Functional Mobility     Impairments Affecting Function (Mobility) balance;endurance/activity tolerance;strength  -               User Key  (r) = Recorded By, (t) = Taken By, (c) = Cosigned By      Initials Name Provider Type     Ruth Islas OT Occupational Therapist                     Mobility/ADL's       Row Name 10/31/24 1246          Transfers    Transfers sit-stand transfer;stand-sit transfer;toilet transfer  -       Row Name 10/31/24 1246          Bed-Chair Transfer    Bed-Chair [place] (Transfers) supervision  -     Assistive Device (Bed-Chair Transfers) walker, front-wheeled  -       Row Name 10/31/24 1246          Sit-Stand Transfer    Sit-Stand [place] (Transfers) modified independence  -     Assistive Device (Sit-Stand Transfers) walker, front-wheeled  -       Row Name 10/31/24 1246          Stand-Sit Transfer    Stand-Sit [place] (Transfers) modified independence  -     Assistive Device (Stand-Sit Transfers) walker, front-wheeled  -       Row Name 10/31/24 1246          Toilet Transfer    [place] Level (Toilet Transfer) supervision  -     Assistive Device (Toilet Transfer) raised toilet seat  -       Row Name 10/31/24 1246          Functional Mobility    Functional Mobility- Ind. Level supervision required  -     Functional Mobility- Device walker, front-wheeled  -     Functional Mobility- Comment Patient ambulated from therapy gym with spv using RW  -               User Key  (r) = Recorded By, (t) = Taken By, (c) = Cosigned By      Initials Name Provider Type     Ruth Islas OT Occupational Therapist                   Obj/Interventions       Row Name 10/31/24 1247          Shoulder (Therapeutic Exercise)    Shoulder (Therapeutic Exercise) wand exercises  3 different exercises  -     Shoulder Wand Exercises (Therapeutic Exercise) 10 repetitions;5 repetitions;3 sets  -       Row Name 10/31/24 1247          Elbow/Forearm (Therapeutic Exercise)    Elbow/Forearm  Strengthening (Therapeutic Exercise) bilateral;flexion;extension;2 lb free weight;30 repititions;2 sets  -       Row Name 10/31/24 1247          Motor Skills    Functional Endurance Omnicycle x18 minutes  -     Therapeutic Exercise aerobic;elbow/forearm;shoulder  -               User Key  (r) = Recorded By, (t) = Taken By, (c) = Cosigned By      Initials Name Provider Type    Ruth Whitney OT Occupational Therapist                   Goals/Plan    No documentation.                  Clinical Impression       Row Name 10/31/24 1248          Plan of Care Review    Plan of Care Reviewed With patient  -     Progress improving  -     Outcome Evaluation Patient tolerated all of tx with no complaints of pain.  Pt. overall requires spv for basic adls using RW with VCs at times for safety.  Pt. is self-correcting his balance but continues to struggle with follow through using RW safely to decrease his risk for falls.  Pt. will step outside walker or leave walker.  Pt. is recommended to have spv on discharge to ensure safety to decrease his risk for falls.  Pt. plans to d/c home next week with f/u   -       Row Name 10/31/24 1248          Therapy Plan Review/Discharge Plan (OT)    Anticipated Discharge Disposition (OT) home with home health  -               User Key  (r) = Recorded By, (t) = Taken By, (c) = Cosigned By      Initials Name Provider Type    Ruth Whitney OT Occupational Therapist                   Outcome Measures       Row Name 10/31/24 1251          How much help from another is currently needed...    Putting on and taking off regular lower body clothing? 4  -GC     Bathing (including washing, rinsing, and drying) 4  -GC     Toileting (which includes using toilet bed pan or urinal) 4  -GC     Putting on and taking off regular upper body clothing 4  -GC     Taking care of personal grooming (such as brushing teeth) 4  -GC     Eating meals 4  -GC     AM-PAC 6 Clicks Score (OT) 24  -GC        Row Name 10/31/24 1251          Functional Assessment    Outcome Measure Options AM-PAC 6 Clicks Daily Activity (OT);Optimal Instrument  -GC       Row Name 10/31/24 1251          Optimal Instrument    Optimal Instrument Optimal - 3  -GC     Bending/Stooping 2  -GC     Standing 1  -GC     Reaching 1  -GC               User Key  (r) = Recorded By, (t) = Taken By, (c) = Cosigned By      Initials Name Provider Type    GC Ruth Islas OT Occupational Therapist                  Section G  Mobility  Dressing - self performance: limited assistance (staff provide guided maneuvering of limbs or other non-weight bearing assistance)  Dressing support/assistance: One person assist  Eating - self performance: independent  Eating support/assistance: No setup or physical help  Toileting - self performance: limited assistance (staff provide guided maneuvering of limbs or other non-weight bearing assistance)  Toileting support/assistance: One person assist  Personal hygiene - self performance: limited assistance (staff provide guided maneuvering of limbs or other non-weight bearing assistance)  Personal hygiene support/assistance: One person assist  Bathing  Bathing - self performance: Physical help only to transfer to bathe  Bathing support/assistance: One person assist     Range of Motion  Upper Extremity: No impairment  Section GG  Functional Ability/Goals, Adm (Section GG)  Self Care, Prior Functioning (RQ8969I): 3. Independent  Functional Cognition, Prior Functioning (VE2672J): 3. Independent  Upper Extremity Range of Motion (BP6486G): No impairment  Self Care, Admission (Section GG)  Eating: Self-Care Admission Performance (LN8272D6): independent (06)  Oral Hygiene: Self-Care Admission Performance (FB4528V3): setup or clean-up assistance (05)  Toileting Hygiene: Self-Care Admission Performance (BD3356G3): partial/moderate assistance (03)  Shower/Bathe Self: Self-Care Admission Performance (PB7708T8): partial/moderate  assistance (03)  Upper Body Dressing: Self-Care Admission Performance ([id number]): setup or clean-up assistance (05)  Lower Body Dressing: Self-Care Admission Performance (RE9347U0): partial/moderate assistance (03)  Putting On/Taking Off Footwear: Self-Care Admission Performance (IB0084D3): partial/moderate assistance (03)  Personal Hygiene: Self-Care Admission Performance (BN1176I4): supervision or touching assistance (04)  Mobility, Admission Performance (XZ9658)  Toilet Transfer: Mobility Admission Performance (WQ2876D9): supervision or touching assistance (04)  Tub/shower Transfer: Mobility Admission Performance (FW6287HY7): supervision or touching assistance (04)                Occupational Therapy Education       Title: PT OT SLP Therapies (In Progress)       Topic: Occupational Therapy (In Progress)       Point: ADL training (Done)       Description:   Instruct learner(s) on proper safety adaptation and remediation techniques during self care or transfers.   Instruct in proper use of assistive devices.                  Learning Progress Summary            Patient Acceptance, E, VU,NR by  at 10/16/2024 0925                      Point: Home exercise program (Not Started)       Description:   Instruct learner(s) on appropriate technique for monitoring, assisting and/or progressing therapeutic exercises/activities.                  Learner Progress:  Not documented in this visit.              Point: Precautions (Done)       Description:   Instruct learner(s) on prescribed precautions during self-care and functional transfers.                  Learning Progress Summary            Patient Acceptance, E, VU,NR by  at 10/16/2024 0925                      Point: Body mechanics (Done)       Description:   Instruct learner(s) on proper positioning and spine alignment during self-care, functional mobility activities and/or exercises.                  Learning Progress Summary            Patient Acceptance, E, VU,NR  by  at 10/16/2024 0925                                      User Key       Initials Effective Dates Name Provider Type Discipline     06/16/21 -  Ruth Islas OT Occupational Therapist OT                  OT Recommendation and Plan  Planned Therapy Interventions (OT): activity tolerance training, adaptive equipment training, BADL retraining, functional balance retraining, occupation/activity based interventions, patient/caregiver education/training, ROM/therapeutic exercise, strengthening exercise, transfer/mobility retraining  Therapy Frequency (OT): 5 times/wk  Plan of Care Review  Plan of Care Reviewed With: patient  Progress: improving  Outcome Evaluation: Patient tolerated all of tx with no complaints of pain.  Pt. overall requires spv for basic adls using RW with VCs at times for safety.  Pt. is self-correcting his balance but continues to struggle with follow through using RW safely to decrease his risk for falls.  Pt. will step outside walker or leave walker.  Pt. is recommended to have spv on discharge to ensure safety to decrease his risk for falls.  Pt. plans to d/c home next week with f/u      Time Calculation:   Evaluation Complexity (OT)  Review Occupational Profile/Medical/Therapy History Complexity: expanded/moderate complexity  Assessment, Occupational Performance/Identification of Deficit Complexity: 3-5 performance deficits  Clinical Decision Making Complexity (OT): detailed assessment/moderate complexity  Overall Complexity of Evaluation (OT): moderate complexity     Time Calculation- OT       Row Name 10/31/24 1251 10/31/24 1218          Time Calculation- OT    OT Received On 10/31/24  -GC --        Timed Charges    22024 - OT Therapeutic Exercise Minutes 38  -GC --     18569 - Gait Training Minutes  -- 10  -CS     39456 - OT Therapeutic Activity Minutes 2  -GC --        SNF Occupational Therapy Minutes    Skilled Minutes- OT 40 min  -GC --        Total Minutes    Timed Charges Total  Minutes 40  -GC 10  -CS      Total Minutes 40  -GC 10  -CS               User Key  (r) = Recorded By, (t) = Taken By, (c) = Cosigned By      Initials Name Provider Type    Ruth Whitney OT Occupational Therapist    CS Josefina Ramries PT Physical Therapist                  Therapy Charges for Today       Code Description Service Date Service Provider Modifiers Qty    47281214772 HC OT THER PROC EA 15 MIN 10/30/2024 Ruth Islas OT GO 1    14983292656 HC OT THERAPEUTIC ACT EA 15 MIN 10/30/2024 Ruth Islas OT GO 1    54964881259 HC OT SELF CARE/MGMT/TRAIN EA 15 MIN 10/30/2024 Ruth Islas OT GO 2    99026289953 HC OT THER PROC EA 15 MIN 10/31/2024 Ruth Islas OT GO 3                 Ruth Islas OT  10/31/2024

## 2024-10-31 NOTE — PLAN OF CARE
Goal Outcome Evaluation:              Outcome Evaluation: Medicated with tylenol during rehab today. Progressing well. BM today. Tolerates sitting in recliner well. Family member at bedside.

## 2024-10-31 NOTE — PLAN OF CARE
Goal Outcome Evaluation:  Plan of Care Reviewed With: patient           Outcome Evaluation: Pt is alert and oriented. He is a standby assist to the bathroom or for transfers. No complaints of pain or discomfort. Pt did seem to rest well during the night. Blood sugar was 271 and he had insulin to cover. Will continue with his POC. Call light within reach.

## 2024-11-01 LAB
GLUCOSE BLDC GLUCOMTR-MCNC: 149 MG/DL (ref 70–99)
GLUCOSE BLDC GLUCOMTR-MCNC: 177 MG/DL (ref 70–99)
GLUCOSE BLDC GLUCOMTR-MCNC: 197 MG/DL (ref 70–99)
GLUCOSE BLDC GLUCOMTR-MCNC: 371 MG/DL (ref 70–99)
QT INTERVAL: 435 MS
QTC INTERVAL: 524 MS

## 2024-11-01 PROCEDURE — 82948 REAGENT STRIP/BLOOD GLUCOSE: CPT

## 2024-11-01 PROCEDURE — 97110 THERAPEUTIC EXERCISES: CPT

## 2024-11-01 PROCEDURE — 63710000001 INSULIN LISPRO (HUMAN) PER 5 UNITS: Performed by: FAMILY MEDICINE

## 2024-11-01 PROCEDURE — 97116 GAIT TRAINING THERAPY: CPT

## 2024-11-01 PROCEDURE — 97530 THERAPEUTIC ACTIVITIES: CPT

## 2024-11-01 PROCEDURE — 97535 SELF CARE MNGMENT TRAINING: CPT

## 2024-11-01 PROCEDURE — 99308 SBSQ NF CARE LOW MDM 20: CPT | Performed by: PHYSICIAN ASSISTANT

## 2024-11-01 PROCEDURE — 63710000001 INSULIN GLARGINE PER 5 UNITS: Performed by: FAMILY MEDICINE

## 2024-11-01 PROCEDURE — 82948 REAGENT STRIP/BLOOD GLUCOSE: CPT | Performed by: FAMILY MEDICINE

## 2024-11-01 RX ADMIN — LATANOPROST 1 DROP: 50 SOLUTION OPHTHALMIC at 20:37

## 2024-11-01 RX ADMIN — APIXABAN 5 MG: 5 TABLET, FILM COATED ORAL at 09:20

## 2024-11-01 RX ADMIN — GABAPENTIN 100 MG: 100 CAPSULE ORAL at 09:20

## 2024-11-01 RX ADMIN — INSULIN LISPRO 2 UNITS: 100 INJECTION, SOLUTION INTRAVENOUS; SUBCUTANEOUS at 12:30

## 2024-11-01 RX ADMIN — Medication 250 MG: at 20:30

## 2024-11-01 RX ADMIN — AMIODARONE HYDROCHLORIDE 200 MG: 200 TABLET ORAL at 09:20

## 2024-11-01 RX ADMIN — ACETAMINOPHEN 650 MG: 325 TABLET ORAL at 13:45

## 2024-11-01 RX ADMIN — PANTOPRAZOLE SODIUM 40 MG: 40 TABLET, DELAYED RELEASE ORAL at 05:56

## 2024-11-01 RX ADMIN — INSULIN GLARGINE 15 UNITS: 100 INJECTION, SOLUTION SUBCUTANEOUS at 20:31

## 2024-11-01 RX ADMIN — INSULIN LISPRO 8 UNITS: 100 INJECTION, SOLUTION INTRAVENOUS; SUBCUTANEOUS at 20:31

## 2024-11-01 RX ADMIN — GABAPENTIN 100 MG: 100 CAPSULE ORAL at 20:31

## 2024-11-01 RX ADMIN — LEVOTHYROXINE SODIUM 200 MCG: 0.1 TABLET ORAL at 05:56

## 2024-11-01 RX ADMIN — INSULIN LISPRO 2 UNITS: 100 INJECTION, SOLUTION INTRAVENOUS; SUBCUTANEOUS at 17:04

## 2024-11-01 RX ADMIN — Medication 10 MG: at 20:31

## 2024-11-01 RX ADMIN — Medication 250 MG: at 09:20

## 2024-11-01 RX ADMIN — APIXABAN 5 MG: 5 TABLET, FILM COATED ORAL at 20:31

## 2024-11-01 NOTE — THERAPY TREATMENT NOTE
SNF - Occupational Therapy Treatment Note  RAKEL Alcaraz    Patient Name: Darci Munson  : 1935    MRN: [id number]                              Today's Date: 2024       Admit Date: 10/15/2024    Visit Dx:     ICD-10-CM ICD-9-CM   1. Decreased activities of daily living (ADL)  Z78.9 V49.89   2. Difficulty walking  R26.2 719.7     Patient Active Problem List   Diagnosis    Type 2 diabetes mellitus with hyperglycemia, without long-term current use of insulin    Chronic kidney disease    Hypothyroidism    Hypertensive disorder    Hyperlipidemia    Paroxysmal atrial fibrillation    Dyspepsia    Coronary arteriosclerosis    Gastroparesis    Hearing loss    Mitral valve regurgitation    Peripheral neuropathy    Prostate CA    Primary insomnia    Primary osteoarthritis of left knee    Anticoagulated    Vitamin D insufficiency    Iron deficiency anemia    Medicare annual wellness visit, subsequent    Thickened nails    Pain in toes of both feet    History of prostate cancer    Stage 3b chronic kidney disease    Chronic bilateral low back pain without sciatica    Acute renal failure    Intractable nausea and vomiting    Abdominal pain    Physical debility    Severe malnutrition     Past Medical History:   Diagnosis Date    Anemia, unspecified     Atherosclerotic heart disease of native coronary artery without angina pectoris     CAD (coronary artery disease)     CKD (chronic kidney disease), stage III     Essential (primary) hypertension     Gastric ulcer, unspecified as acute or chronic, without hemorrhage or perforation     Gastroparesis     GERD without esophagitis     Glaucoma     Hereditary and idiopathic neuropathy, unspecified     History of falling     HLD (hyperlipidemia)     HTN (hypertension)     Hypotension, unspecified     Hypothyroidism     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    Irritable bowel syndrome without diarrhea     Laceration without foreign body of right forearm, initial encounter     Low back pain      Malignant neoplasm of prostate     Mixed hyperlipidemia     OA (osteoarthritis)     Other specified disorders of the skin and subcutaneous tissue     Pain in left foot     Peripheral vascular disease, unspecified     Phimosis     Presence of unspecified artificial knee joint     Primary insomnia     Primary osteoarthritis of both knees     Prostate cancer     Sensorineural hearing loss (SNHL) of both ears     Sigmoid diverticulosis     severe sigmoid and descending colon diverticulosis and 3+ gastritis    Sleep related leg cramps     Type 2 diabetes mellitus with diabetic polyneuropathy     and gastroparesis    Unspecified atrial fibrillation     Unspecified dementia without behavioral disturbance     Unspecified hearing loss, bilateral      Past Surgical History:   Procedure Laterality Date    CATARACT EXTRACTION Bilateral 2014    COLONOSCOPY      COLONOSCOPY N/A 7/1/2022    Procedure: COLONOSCOPY WITH POLYPECTOMIES, HOT SNARE;  Surgeon: Jennifer Mann MD;  Location: [facility];  Service: Gastroenterology;  Laterality: N/A;  DIVERTICULOSIS, COLON POLYPS, HEMORRHOIDS    ENDOSCOPY N/A 7/1/2022    Procedure: ESOPHAGOGASTRODUODENOSCOPY WITH BX, POLYPECTOMY;  Surgeon: Jennifer Mann MD;  Location: [facility]OSCOPY;  Service: Gastroenterology;  Laterality: N/A;  GASTRIC POLYP, GASTRITIS, HIATAL HERNIA    HAND SURGERY Right     JOINT REPLACEMENT      KNEE ARTHROSCOPY Right 03/14/2017    PROSTATECTOMY      REPLACEMENT TOTAL KNEE  03/2017    UPPER GASTROINTESTINAL ENDOSCOPY        General Information       Row Name 11/01/24 0821          OT Time and Intention    Document Type therapy note (daily note)  -GC     Mode of Treatment individual therapy;occupational therapy  -GC       Row Name 11/01/24 0821          General Information    Patient Profile Reviewed yes  -GC     Existing Precautions/Restrictions fall  -GC       Row Name 11/01/24 0821          Safety Issues/Impairments Affecting Functional  Mobility    Impairments Affecting Function (Mobility) balance;endurance/activity tolerance;strength  -               User Key  (r) = Recorded By, (t) = Taken By, (c) = Cosigned By      Initials Name Provider Type     Ruth Islas OT Occupational Therapist                     Mobility/ADL's       Row Name 11/01/24 0823          Transfers    Transfers sit-stand transfer;stand-sit transfer;toilet transfer;bed-chair transfer  -       Row Name 11/01/24 0823          Bed-Chair Transfer    Bed-Chair [place] (Transfers) supervision  -     Assistive Device (Bed-Chair Transfers) walker, front-wheeled  -       Row Name 11/01/24 0823          Sit-Stand Transfer    Sit-Stand [place] (Transfers) modified independence  -     Assistive Device (Sit-Stand Transfers) walker, front-wheeled  -       Row Name 11/01/24 0823          Stand-Sit Transfer    Stand-Sit [place] (Transfers) modified independence  -     Assistive Device (Stand-Sit Transfers) walker, front-wheeled  -       Row Name 11/01/24 0823          Toilet Transfer    [place] Level (Toilet Transfer) supervision;modified independence  -     Assistive Device (Toilet Transfer) raised toilet seat  -       Row Name 11/01/24 0823          Functional Mobility    Functional Mobility- Ind. Level supervision required  -     Functional Mobility- Device walker, front-wheeled  -       Row Name 11/01/24 0823          Activities of Daily Living    BADL Assessment/Intervention bathing;upper body dressing;lower body dressing;grooming;toileting  -       Row Name 11/01/24 0823          Bathing Assessment/Intervention    [place] Level (Bathing) supervision  -     Assistive Devices (Bathing) grab bar, tub/shower;hand-held shower spray hose;tub bench  -       Row Name 11/01/24 0823          Upper Body Dressing Assessment/Training    [place] Level (Upper Body Dressing) set up  -       Row Name 11/01/24 0823          Lower Body  Dressing Assessment/Training    [place] Level (Lower Body Dressing) supervision  -       Row Name 11/01/24 0823          Toileting Assessment/Training    [place] Level (Toileting) toileting skills;supervision;modified independence  -               User Key  (r) = Recorded By, (t) = Taken By, (c) = Cosigned By      Initials Name Provider Type    Ruth Whitney OT Occupational Therapist                   Obj/Interventions       Row Name 11/01/24 0824          Motor Skills    Functional Endurance Endurance training to support adls with Omnicycle x20 minutes with one rest break  -     Therapeutic Exercise aerobic  -               User Key  (r) = Recorded By, (t) = Taken By, (c) = Cosigned By      Initials Name Provider Type     Ruth Islas OT Occupational Therapist                   Goals/Plan    No documentation.                  Clinical Impression       Row Name 11/01/24 0825          Plan of Care Review    Plan of Care Reviewed With patient  -     Progress improving  -     Outcome Evaluation Pt. progressed in all areas of BADLS to spv to Mod Indep level using RW with improved balance, endurance and general strength.  Pt. occasionally requires VCs for safety with equipment use.  Pt. had no LOB today during functional mobiltiy in performance of shower/adls.  Pt. plans to d/c home next week with f/u HH.  -       Row Name 11/01/24 0825          Therapy Assessment/Plan (OT)    Criteria for Skilled Therapeutic Interventions Met (OT) yes;meets criteria;skilled treatment is necessary  -     Therapy Frequency (OT) 5 times/wk  -       Row Name 11/01/24 0825          Therapy Plan Review/Discharge Plan (OT)    Anticipated Discharge Disposition (OT) home with home health  -               User Key  (r) = Recorded By, (t) = Taken By, (c) = Cosigned By      Initials Name Provider Type     Ruth Islas OT Occupational Therapist                   Outcome Measures       Row Name 11/01/24 0827           How much help from another is currently needed...    Putting on and taking off regular lower body clothing? 4  -GC     Bathing (including washing, rinsing, and drying) 4  -GC     Toileting (which includes using toilet bed pan or urinal) 4  -GC     Putting on and taking off regular upper body clothing 4  -GC     Taking care of personal grooming (such as brushing teeth) 4  -GC     Eating meals 4  -GC     AM-PAC 6 Clicks Score (OT) 24  -GC       Row Name 10/31/24 9287          How much help from another person do you currently need...    Turning from your back to your side while in flat bed without using bedrails? 4  -BR     Moving from lying on back to sitting on the side of a flat bed without bedrails? 4  -BR     Moving to and from a bed to a chair (including a wheelchair)? 3  -BR     Standing up from a chair using your arms (e.g., wheelchair, bedside chair)? 3  -BR     Climbing 3-5 steps with a railing? 2  -BR     To walk in hospital room? 3  -BR     AM-PAC 6 Clicks Score (PT) 19  -BR     Highest Level of Mobility Goal 6 --> Walk 10 steps or more  -BR       Row Name 11/01/24 0827          Functional Assessment    Outcome Measure Options AM-PAC 6 Clicks Daily Activity (OT);Optimal Instrument  -GC       Row Name 11/01/24 0827          Optimal Instrument    Optimal Instrument Optimal - 3  -GC     Bending/Stooping 1  -GC     Standing 1  -GC     Reaching 1  -GC               User Key  (r) = Recorded By, (t) = Taken By, (c) = Cosigned By      Initials Name Provider Type    Carol Ledezma, RN Registered Nurse    Ruth Whitney OT Occupational Therapist                  Section G  Mobility  Dressing - self performance: limited assistance (staff provide guided maneuvering of limbs or other non-weight bearing assistance)  Dressing support/assistance: One person assist  Eating - self performance: independent  Eating support/assistance: No setup or physical help  Toileting - self performance: limited assistance  (staff provide guided maneuvering of limbs or other non-weight bearing assistance)  Toileting support/assistance: One person assist  Personal hygiene - self performance: limited assistance (staff provide guided maneuvering of limbs or other non-weight bearing assistance)  Personal hygiene support/assistance: One person assist  Bathing  Bathing - self performance: Physical help only to transfer to bathe  Bathing support/assistance: One person assist     Range of Motion  Upper Extremity: No impairment  Section GG  Functional Ability/Goals, Adm (Section GG)  Self Care, Prior Functioning (YG1343U): 3. Independent  Functional Cognition, Prior Functioning (QY1958I): 3. Independent  Upper Extremity Range of Motion (AC6538V): No impairment  Self Care, Admission (Section GG)  Eating: Self-Care Admission Performance (PY0620X2): independent (06)  Oral Hygiene: Self-Care Admission Performance (CG7462C6): setup or clean-up assistance (05)  Toileting Hygiene: Self-Care Admission Performance (HJ5138M6): partial/moderate assistance (03)  Shower/Bathe Self: Self-Care Admission Performance (XH7531B2): partial/moderate assistance (03)  Upper Body Dressing: Self-Care Admission Performance (LR6555G5): setup or clean-up assistance (05)  Lower Body Dressing: Self-Care Admission Performance (QE3060C4): partial/moderate assistance (03)  Putting On/Taking Off Footwear: Self-Care Admission Performance (LK5916U2): partial/moderate assistance (03)  Personal Hygiene: Self-Care Admission Performance (VY8355K0): supervision or touching assistance (04)  Mobility, Admission Performance (BW2694)  Toilet Transfer: Mobility Admission Performance (FK0199E8): supervision or touching assistance (04)  Tub/shower Transfer: Mobility Admission Performance (MZ2003HS5): supervision or touching assistance (04)                Occupational Therapy Education       Title: PT OT SLP Therapies (In Progress)       Topic: Occupational Therapy (In Progress)       Point:  ADL training (Done)       Description:   Instruct learner(s) on proper safety adaptation and remediation techniques during self care or transfers.   Instruct in proper use of assistive devices.                  Learning Progress Summary            Patient Acceptance, E, VU,NR by  at 10/16/2024 0925                      Point: Home exercise program (Not Started)       Description:   Instruct learner(s) on appropriate technique for monitoring, assisting and/or progressing therapeutic exercises/activities.                  Learner Progress:  Not documented in this visit.              Point: Precautions (Done)       Description:   Instruct learner(s) on prescribed precautions during self-care and functional transfers.                  Learning Progress Summary            Patient Acceptance, E, VU,NR by  at 10/16/2024 0925                      Point: Body mechanics (Done)       Description:   Instruct learner(s) on proper positioning and spine alignment during self-care, functional mobility activities and/or exercises.                  Learning Progress Summary            Patient Acceptance, E, VU,NR by  at 10/16/2024 0925                                      User Key       Initials Effective Dates Name Provider Type Discipline     06/16/21 -  Ruth Islas OT Occupational Therapist OT                  OT Recommendation and Plan  Planned Therapy Interventions (OT): activity tolerance training, adaptive equipment training, BADL retraining, functional balance retraining, occupation/activity based interventions, patient/caregiver education/training, ROM/therapeutic exercise, strengthening exercise, transfer/mobility retraining  Therapy Frequency (OT): 5 times/wk  Plan of Care Review  Plan of Care Reviewed With: patient  Progress: improving  Outcome Evaluation: Pt. progressed in all areas of BADLS to spv to Mod Indep level using RW with improved balance, endurance and general strength.  Pt. occasionally requires VCs  for safety with equipment use.  Pt. had no LOB today during functional mobiltiy in performance of shower/adls.  Pt. plans to d/c home next week with f/u HH.     Time Calculation:   Evaluation Complexity (OT)  Review Occupational Profile/Medical/Therapy History Complexity: expanded/moderate complexity  Assessment, Occupational Performance/Identification of Deficit Complexity: 3-5 performance deficits  Clinical Decision Making Complexity (OT): detailed assessment/moderate complexity  Overall Complexity of Evaluation (OT): moderate complexity     Time Calculation- OT       Row Name 11/01/24 0828             Time Calculation- OT    OT Received On 11/01/24  -GC         Timed Charges    39083 - OT Therapeutic Exercise Minutes 20  -GC      26161 - OT Therapeutic Activity Minutes 10  -GC      96572 - OT Self Care/Mgmt Minutes 28  -GC         SNF Occupational Therapy Minutes    Skilled Minutes- OT 58 min  -GC         Total Minutes    Timed Charges Total Minutes 58  -GC       Total Minutes 58  -GC                User Key  (r) = Recorded By, (t) = Taken By, (c) = Cosigned By      Initials Name Provider Type     Ruth Islas OT Occupational Therapist                  Therapy Charges for Today       Code Description Service Date Service Provider Modifiers Qty    77465644912 HC OT THER PROC EA 15 MIN 10/31/2024 Ruth Islas OT GO 3    91187279802 HC OT THER PROC EA 15 MIN 11/1/2024 Ruth Islas OT GO 1    37359871082 HC OT THERAPEUTIC ACT EA 15 MIN 11/1/2024 Ruth Islas OT GO 1    80884873310 HC OT SELF CARE/MGMT/TRAIN EA 15 MIN 11/1/2024 Ruth Islas OT GO 2                 Ruth Islas OT  11/1/2024

## 2024-11-01 NOTE — PLAN OF CARE
Goal Outcome Evaluation:  Plan of Care Reviewed With: patient           Outcome Evaluation: Pt is AO x 4 and VSS. He is a 1 assist to the bathroom and for transfers. No c/o pain or discomfort. Continue plan of care. Call light and personal items within reach.

## 2024-11-01 NOTE — THERAPY TREATMENT NOTE
SNF - Physical Therapy Progress Note  RAKEL Alcaraz     Patient Name: Darci Munson  : 1935  MRN: [id number]  Today's Date: 2024      Visit Dx:    ICD-10-CM ICD-9-CM   1. Decreased activities of daily living (ADL)  Z78.9 V49.89   2. Difficulty walking  R26.2 719.7     Patient Active Problem List   Diagnosis    Type 2 diabetes mellitus with hyperglycemia, without long-term current use of insulin    Chronic kidney disease    Hypothyroidism    Hypertensive disorder    Hyperlipidemia    Paroxysmal atrial fibrillation    Dyspepsia    Coronary arteriosclerosis    Gastroparesis    Hearing loss    Mitral valve regurgitation    Peripheral neuropathy    Prostate CA    Primary insomnia    Primary osteoarthritis of left knee    Anticoagulated    Vitamin D insufficiency    Iron deficiency anemia    Medicare annual wellness visit, subsequent    Thickened nails    Pain in toes of both feet    History of prostate cancer    Stage 3b chronic kidney disease    Chronic bilateral low back pain without sciatica    Acute renal failure    Intractable nausea and vomiting    Abdominal pain    Physical debility    Severe malnutrition     Past Medical History:   Diagnosis Date    Anemia, unspecified     Atherosclerotic heart disease of native coronary artery without angina pectoris     CAD (coronary artery disease)     CKD (chronic kidney disease), stage III     Essential (primary) hypertension     Gastric ulcer, unspecified as acute or chronic, without hemorrhage or perforation     Gastroparesis     GERD without esophagitis     Glaucoma     Hereditary and idiopathic neuropathy, unspecified     History of falling     HLD (hyperlipidemia)     HTN (hypertension)     Hypotension, unspecified     Hypothyroidism     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    Irritable bowel syndrome without diarrhea     Laceration without foreign body of right forearm, initial encounter     Low back pain     Malignant neoplasm of prostate     Mixed hyperlipidemia     OA  (osteoarthritis)     Other specified disorders of the skin and subcutaneous tissue     Pain in left foot     Peripheral vascular disease, unspecified     Phimosis     Presence of unspecified artificial knee joint     Primary insomnia     Primary osteoarthritis of both knees     Prostate cancer     Sensorineural hearing loss (SNHL) of both ears     Sigmoid diverticulosis     severe sigmoid and descending colon diverticulosis and 3+ gastritis    Sleep related leg cramps     Type 2 diabetes mellitus with diabetic polyneuropathy     and gastroparesis    Unspecified atrial fibrillation     Unspecified dementia without behavioral disturbance     Unspecified hearing loss, bilateral      Past Surgical History:   Procedure Laterality Date    CATARACT EXTRACTION Bilateral 2014    COLONOSCOPY      COLONOSCOPY N/A 7/1/2022    Procedure: COLONOSCOPY WITH POLYPECTOMIES, HOT SNARE;  Surgeon: Jennifer Mann MD;  Location: [facility] ENDOSCOPY;  Service: Gastroenterology;  Laterality: N/A;  DIVERTICULOSIS, COLON POLYPS, HEMORRHOIDS    ENDOSCOPY N/A 7/1/2022    Procedure: ESOPHAGOGASTRODUODENOSCOPY WITH BX, POLYPECTOMY;  Surgeon: Jennifer Mann MD;  Location: [facility] ENDOSCOPY;  Service: Gastroenterology;  Laterality: N/A;  GASTRIC POLYP, GASTRITIS, HIATAL HERNIA    HAND SURGERY Right     JOINT REPLACEMENT      KNEE ARTHROSCOPY Right 03/14/2017    PROSTATECTOMY      REPLACEMENT TOTAL KNEE  03/2017    UPPER GASTROINTESTINAL ENDOSCOPY         PT Assessment (Last 12 Hours)       PT Evaluation and Treatment       Row Name 11/01/24 1500          Physical Therapy Time and Intention    Subjective Information complains of;pain  -CS     Document Type therapy note (daily note)  -CS     Mode of Treatment individual therapy;physical therapy  -CS     Patient Effort good  -CS     Symptoms Noted During/After Treatment none  -CS       Row Name 11/01/24 1500          Pain    Pretreatment Pain Rating 3/10  -CS     Posttreatment Pain  Rating 3/10  -CS     Pain Location back  -CS     Pain Side/Orientation lower  -CS     Pain Management Interventions nursing notified  -CS     Response to Pain Interventions activity participation with tolerable pain  -CS       Row Name 11/01/24 1500          Sit-Stand Transfer    Sit-Stand [place] (Transfers) modified independence  -     Assistive Device (Sit-Stand Transfers) walker, front-wheeled  -CS       Row Name 11/01/24 1500          Stand-Sit Transfer    Stand-Sit [place] (Transfers) modified independence  -     Assistive Device (Stand-Sit Transfers) walker, front-wheeled  -CS       Row Name 11/01/24 1500          Gait/Stairs (Locomotion)    [place] Level (Gait) standby assist  -     Assistive Device (Gait) walker, front-wheeled  -CS     Distance in Feet (Gait) 250  x 2 reps  -CS     Pattern (Gait) 4-point;step-through  -CS     Deviations/Abnormal Patterns (Gait) gait speed decreased;stride length decreased  -     Bilateral Gait Deviations forward flexed posture;heel strike decreased  -       Row Name 11/01/24 1500          Hip (Therapeutic Exercise)    Hip Strengthening (Therapeutic Exercise) bilateral;aBduction;aDduction;marching while seated;2 lb free weight;resistance band;green;sitting;30 repititions  ball squeeze for hip adduction  -       Row Name 11/01/24 1500          Knee (Therapeutic Exercise)    Knee Strengthening (Therapeutic Exercise) bilateral;LAQ (long arc quad);hamstring curls;sitting;2 lb free weight;resistance band;green;30 repititions  -       Row Name 11/01/24 1500          Ankle (Therapeutic Exercise)    Ankle Strengthening (Therapeutic Exercise) bilateral;dorsiflexion;plantarflexion;sitting;2 lb free weight;resistance band;green;30 repititions  -       Row Name 11/01/24 1500          Aerobic Exercise    Time Performed (Aerobic Exercise) 20:06  -     Comment, Aerobic Exercise (Therapeutic Exercise) Nu-Step x 934 steps at 46 SPM on load 6  -       Row  Name             [REMOVED] Wound 10/11/24 0332 gluteal    Wound - Properties Group Placement Date: 10/11/24  -YUMIKO Placement Time: 0332 -JO Location: gluteal  -YUMIKO Wound Outcome: Healed  -KE Removal Date: 11/01/24  -KE Removal Time: 1402  -KE    Retired Wound - Properties Group Placement Date: 10/11/24  -YUMIKO Placement Time: 0332 -JO Location: gluteal  -YUMIKO Wound Outcome: Healed  -KE Removal Date: 11/01/24  -KE Removal Time: 1402  -KE    Retired Wound - Properties Group Placement Date: 10/11/24  -YUMIKO Placement Time: 0332 -JO Location: gluteal  -YUMIKO Removal Date: 11/01/24  -KE Removal Time: 1402 -KE Wound Outcome: Healed  -KE    Retired Wound - Properties Group Date first assessed: 10/11/24  -YUMIKO Time first assessed: 0332 -JO Location: gluteal  -YUMIKO Resolution Date: 11/01/24  -KE Resolution Time: 1402 -KE Wound Outcome: Healed  -KE      Row Name             Wound 10/26/24 2107 Right posterior heel unspecified    Wound - Properties Group Placement Date: 10/26/24  -KR Placement Time: 2107 -KR Side: Right  -KR Orientation: posterior  -KR Location: heel  -KR Primary Wound Type: Traumatic  -KR Type: unspecified  -KR Present on Original Admission: N  -KR    Retired Wound - Properties Group Placement Date: 10/26/24  -KR Placement Time: 2107 -KR Present on Original Admission: N  -KR Side: Right  -KR Orientation: posterior  -KR Location: heel  -KR Primary Wound Type: Traumatic  -KR Type: unspecified  -KR    Retired Wound - Properties Group Placement Date: 10/26/24  -KR Placement Time: 2107 -KR Present on Original Admission: N  -KR Side: Right  -KR Orientation: posterior  -KR Location: heel  -KR Primary Wound Type: Traumatic  -KR Type: unspecified  -KR    Retired Wound - Properties Group Date first assessed: 10/26/24  -KR Time first assessed: 2107 -KR Present on Original Admission: N  -KR Side: Right  -KR Location: heel  -KR Primary Wound Type: Traumatic  -KR Type: unspecified  -KR      Row Name 11/01/24 1500           Positioning and Restraints    Pre-Treatment Position sitting in chair/recliner  -CS     Post Treatment Position chair  -CS     In Chair reclined;call light within reach;encouraged to call for assist;exit alarm on;waffle cushion;legs elevated;heels elevated  -CS       Row Name 11/01/24 1500          Progress Summary (PT)    Progress Toward Functional Goals (PT) progress toward functional goals is good  -CS               User Key  (r) = Recorded By, (t) = Taken By, (c) = Cosigned By      Initials Name Provider Type    Alyce Wisdom, RN Registered Nurse    Jimbo Islas RN Registered Nurse    Antelmo Rosario PTA Physical Therapist Assistant    Blanca Kahn RN Registered Nurse                  Section G              Section GG                       Physical Therapy Education       Title: PT OT SLP Therapies (In Progress)       Topic: Physical Therapy (Not Started)       Point: Mobility training (Not Started)       Learner Progress:  Not documented in this visit.              Point: Home exercise program (Not Started)       Learner Progress:  Not documented in this visit.              Point: Body mechanics (Not Started)       Learner Progress:  Not documented in this visit.              Point: Precautions (Not Started)       Learner Progress:  Not documented in this visit.                                  PT Recommendation and Plan     Progress Summary (PT)  Progress Toward Functional Goals (PT): progress toward functional goals is good   Outcome Measures       Row Name 11/01/24 1500             How much help from another person do you currently need...    Turning from your back to your side while in flat bed without using bedrails? 4  -CS      Moving from lying on back to sitting on the side of a flat bed without bedrails? 4  -CS      Moving to and from a bed to a chair (including a wheelchair)? 3  -CS      Standing up from a chair using your arms (e.g., wheelchair, bedside chair)? 4  -CS       Climbing 3-5 steps with a railing? 3  -CS      To walk in hospital room? 3  -CS      AM-PAC 6 Clicks Score (PT) 21  -CS         Functional Assessment    Outcome Measure Options AM-PAC 6 Clicks Basic Mobility (PT)  -CS                User Key  (r) = Recorded By, (t) = Taken By, (c) = Cosigned By      Initials Name Provider Type    CS Antelmo Resendiz PTA Physical Therapist Assistant                     Time Calculation:    PT Charges       Row Name 11/01/24 1517             Time Calculation    Start Time 1423  -CS      PT Received On 11/01/24  -CS         Timed Charges    23467 - PT Therapeutic Exercise Minutes 32  -CS      21956 - Gait Training Minutes  20  -CS      37348 - PT Therapeutic Activity Minutes 6  -CS         SNF Physical Therapy Minutes    Skilled Minutes- PT 58 min  -CS         Total Minutes    Timed Charges Total Minutes 58  -CS       Total Minutes 58  -CS                User Key  (r) = Recorded By, (t) = Taken By, (c) = Cosigned By      Initials Name Provider Type    CS Antelmo Resendiz, PTA Physical Therapist Assistant                  Therapy Charges for Today       Code Description Service Date Service Provider Modifiers Qty    69518747683 HC PT THER PROC EA 15 MIN 11/1/2024 Antelmo Resendiz PTA GP 2    38970278667 HC GAIT TRAINING EA 15 MIN 11/1/2024 Antelmo Resendiz PTA GP 1    53273307716 HC PT THERAPEUTIC ACT EA 15 MIN 11/1/2024 Antelmo Resendiz PTA GP 1            PT G-Codes  Outcome Measure Options: AM-PAC 6 Clicks Basic Mobility (PT)  AM-PAC 6 Clicks Score (PT): 21  AM-PAC 6 Clicks Score (OT): 24    Antelmo Resendiz PTA  11/1/2024

## 2024-11-01 NOTE — PROGRESS NOTES
[facility]   Hospitalist Progress Note       Patient Name: Darci Munson  : 1935  MRN: [id number]  Primary Care Physician: Adrien Mayer MD  Date of admission: 10/15/2024  Today's Date: 2024  Room / Bed:   305/2  Subjective   Chief Complaint: Hospital-acquired weakness     HPI:     Darci Munson is a 89 y.o. male past medical history significant for chronic atrial fibrillation/flutter, diabetes, CAD, hypertension, hyperlipidemia, hypothyroidism, history of prostate cancer, chronic kidney disease, glaucoma, mitral valve regurgitation, that was hospitalized for chief complaint of nausea vomiting and diarrhea was noted to have ITA with hyperkalemia in addition to lactic acidosis CT scan demonstrating nodules in the lower lobe which will require follow-up CT scan of the chest in 6 months concern for possible acute diverticulitis placed on antibiotics to cover GI tract IV fluids with improvement of symptoms seen by PT and OT deemed a good candidate for inpatient rehab is been transferred to skilled nursing facility for ongoing therapy.       Interval Followup: 2024    Up in recliner.  Alert and conversational.  Elderly, frail but interactive  Feels like he is getting stronger.  No setbacks in rehab.  BP range 130-150/50s  On room air  Glucose 149-248        REVIEW OF SYSTEMS:   Weakness  Objective   Temp:  [97.4 °F (36.3 °C)-98 °F (36.7 °C)] 97.4 °F (36.3 °C)  Heart Rate:  [57-66] 66  Resp:  [18] 18  BP: (105-156)/(48-62) 156/48  PHYSICAL EXAM   CON: WN. WD. NAD.   NECK:  No thyromegaly. No stridor.   RESP:  CTA. No wheezes. No crackles.  CV:  Rhythm irregular. Rate WNL. 2/6 SM noted.  No edema.  GI:  Soft and nontender. Nondistended.    EXT: Peripheral pulses intact.  No cyanosis.  PSYCH:  Alert. Oriented. Normal affect and mood.  NEURO:  No dysarthria or aphasia.   SKIN: No chronic venous stasis changes or varicosities.  No cellulitis  Results from last 7 days   Lab Units 10/26/24  0506   WBC  10*3/mm3 4.47   HEMOGLOBIN g/dL 10.4*   HEMATOCRIT % 33.7*   PLATELETS 10*3/mm3 179     Results from last 7 days   Lab Units 10/26/24  0506   SODIUM mmol/L 139   POTASSIUM mmol/L 4.9   CO2 mmol/L 22.6   CHLORIDE mmol/L 107   ANION GAP mmol/L 9.4   BUN mg/dL 29*   CREATININE mg/dL 1.24   GLUCOSE mg/dL 85         COMPLEXITY OF DATA / DECISION MAKING     []  Moderate: One acute illness or mild exacerbation of chronic or 2 stable chronic or tx side effects   []  High:  Severe acute illness or severe exacerbation of chronic - potential for major debility / life threatening         I have personally reviewed the results from the time of this admission to 11/1/2024 17:31 EDT:  []  Laboratory:  []  Microbiology: []  Radiology:  []  Telemetry:   []  Cardiology/Vascular:  []  Pathology:  []  Prior external records:  []  Independent historian provided additional details:      []  Discussed case with specialists:    []  Independent interpretation of ECG/Imaging etc:             []  Moderate: Rx management, low risk surgery, suboptimal social situation   []  High:  Rx with close monitoring for toxicity, mod-high risk surgery, DNR decision, Comfort initiated, IV pain meds    Assessment / Plan   Assessment:     Hospital-acquired weakness  Recent ITA  Recent hyperkalemia  Recent dehydration  Acute diverticulitis  Atrial fibrillation/flutter chronic  Diverticulosis  Diabetes  CAD  Hypertension  Gastroparesis  History of prostate cancer  Hypothyroidism     Plan:        Continue current plan.  Sliding scale insulin protocol.  Also basal/bolus.  Daily physical therapy  Continue carb consistent diet with snacks and nutritional supplements  Continue amiodarone and Eliquis for atrial fibrillation  Renal function electrolytes periodically  Continue midodrine  Further treatment contingent upon his hospital course    VTE Prophylaxis:  Pharmacologic VTE prophylaxis orders are present.      CODE STATUS:      Level Of Support Discussed With:  Patient  Code Status (Patient has no pulse and is not breathing): CPR (Attempt to Resuscitate)  Medical Interventions (Patient has pulse or is breathing): Full Support       Electronically signed by ABDIEL Carty, 10/19/24, 8:59 AM EDT.

## 2024-11-01 NOTE — PLAN OF CARE
Goal Outcome Evaluation:  Plan of Care Reviewed With: patient        Progress: improving  Outcome Evaluation: AOx4, [place]. Call light and personal items within reach. Able to make needs known to staff. C/o back pain with PT, medication given, intervetnion effective. Postional changes done independently. Legs elevated. Wound care RN f/u, new orders added. 1x to the BR with walker and gait belt. Con't plan of care

## 2024-11-01 NOTE — SIGNIFICANT NOTE
Wound Eval / Progress Noted    RAKEL Alcaraz     Patient Name: Darci Munson  : 1935  MRN: [id number]  Today's Date: 2024                 Admit Date: 10/15/2024    Visit Dx:    ICD-10-CM ICD-9-CM   1. Decreased activities of daily living (ADL)  Z78.9 V49.89   2. Difficulty walking  R26.2 719.7         Physical debility    Severe malnutrition        Past Medical History:   Diagnosis Date    Anemia, unspecified     Atherosclerotic heart disease of native coronary artery without angina pectoris     CAD (coronary artery disease)     CKD (chronic kidney disease), stage III     Essential (primary) hypertension     Gastric ulcer, unspecified as acute or chronic, without hemorrhage or perforation     Gastroparesis     GERD without esophagitis     Glaucoma     Hereditary and idiopathic neuropathy, unspecified     History of falling     HLD (hyperlipidemia)     HTN (hypertension)     Hypotension, unspecified     Hypothyroidism     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    Irritable bowel syndrome without diarrhea     Laceration without foreign body of right forearm, initial encounter     Low back pain     Malignant neoplasm of prostate     Mixed hyperlipidemia     OA (osteoarthritis)     Other specified disorders of the skin and subcutaneous tissue     Pain in left foot     Peripheral vascular disease, unspecified     Phimosis     Presence of unspecified artificial knee joint     Primary insomnia     Primary osteoarthritis of both knees     Prostate cancer     Sensorineural hearing loss (SNHL) of both ears     Sigmoid diverticulosis     severe sigmoid and descending colon diverticulosis and 3+ gastritis    Sleep related leg cramps     Type 2 diabetes mellitus with diabetic polyneuropathy     and gastroparesis    Unspecified atrial fibrillation     Unspecified dementia without behavioral disturbance     Unspecified hearing loss, bilateral         Past Surgical History:   Procedure Laterality Date    CATARACT EXTRACTION Bilateral  2014    COLONOSCOPY      COLONOSCOPY N/A 7/1/2022    Procedure: COLONOSCOPY WITH POLYPECTOMIES, HOT SNARE;  Surgeon: Jennifer Mann MD;  Location: [facility] ENDOSCOPY;  Service: Gastroenterology;  Laterality: N/A;  DIVERTICULOSIS, COLON POLYPS, HEMORRHOIDS    ENDOSCOPY N/A 7/1/2022    Procedure: ESOPHAGOGASTRODUODENOSCOPY WITH BX, POLYPECTOMY;  Surgeon: Jennifer Mann MD;  Location: [facility] ENDOSCOPY;  Service: Gastroenterology;  Laterality: N/A;  GASTRIC POLYP, GASTRITIS, HIATAL HERNIA    HAND SURGERY Right     JOINT REPLACEMENT      KNEE ARTHROSCOPY Right 03/14/2017    PROSTATECTOMY      REPLACEMENT TOTAL KNEE  03/2017    UPPER GASTROINTESTINAL ENDOSCOPY           Physical Assessment:  Wound 10/26/24 2107 Right posterior heel unspecified (Active)   Wound Image   11/01/24 1158   Dressing Appearance open to air 11/01/24 1158   Closure None 11/01/24 1158   Base closed/resurfaced 11/01/24 1158   Periwound dry;redness 11/01/24 1158   Periwound Temperature warm 11/01/24 1158   Periwound Skin Turgor soft 11/01/24 1158   Edges rolled/closed 11/01/24 1158   Drainage Amount none 11/01/24 1158   Care, Wound cleansed with;sterile normal saline 11/01/24 1158   Dressing Care skin barrier agent applied;dressing applied;silicone border foam 11/01/24 1158   Periwound Care barrier ointment applied 11/01/24 1158        Wound Check / Follow-up:  Patient seen today for wound follow-up.  Patient is currently on skilled nursing facility.  Patient is awake, alert, and oriented.  Patient is sitting in recliner with waffle cushion in place at time of assessment.  Patient was able to stand independently for assessment.    No discoloration noted to gluteal aspects, left heel, abdominal crease, or groin.  Recommending to continue quality skin care and hygiene with application of blue top moisture barrier 3 times a day to buttocks and left heel, as well as daily application of cornstarch powder to abdominal crease and groin.   Implement every 2 hour turns and offload at all times.  Keep patient clean, dry, and free from all moisture.    Right posterior heel presents with area of blanchable redness to dry, intact tissue.  Cleansed with normal saline and gauze, blotted dry.  Recommending every other day skin care with a thin layer of blue top moisture barrier and a preventative silicone border dressing.  Implement offloading at all times.    Wound care signing off, please reconsult if any further skin issues or wound needs arise.    Impression: Blanchable redness to right posterior heel    Short term goals: Skin protection, moisture prevention, pressure reduction, quality skin care and hygiene    Alyce Pizano RN    11/1/2024    14:04 EDT

## 2024-11-02 LAB
GLUCOSE BLDC GLUCOMTR-MCNC: 111 MG/DL (ref 70–99)
GLUCOSE BLDC GLUCOMTR-MCNC: 162 MG/DL (ref 70–99)
GLUCOSE BLDC GLUCOMTR-MCNC: 176 MG/DL (ref 70–99)
GLUCOSE BLDC GLUCOMTR-MCNC: 242 MG/DL (ref 70–99)

## 2024-11-02 PROCEDURE — 97530 THERAPEUTIC ACTIVITIES: CPT

## 2024-11-02 PROCEDURE — 82948 REAGENT STRIP/BLOOD GLUCOSE: CPT

## 2024-11-02 PROCEDURE — 63710000001 INSULIN LISPRO (HUMAN) PER 5 UNITS: Performed by: FAMILY MEDICINE

## 2024-11-02 PROCEDURE — 82948 REAGENT STRIP/BLOOD GLUCOSE: CPT | Performed by: FAMILY MEDICINE

## 2024-11-02 PROCEDURE — 97116 GAIT TRAINING THERAPY: CPT

## 2024-11-02 PROCEDURE — 97110 THERAPEUTIC EXERCISES: CPT

## 2024-11-02 PROCEDURE — 63710000001 INSULIN GLARGINE PER 5 UNITS: Performed by: FAMILY MEDICINE

## 2024-11-02 RX ADMIN — INSULIN LISPRO 2 UNITS: 100 INJECTION, SOLUTION INTRAVENOUS; SUBCUTANEOUS at 12:18

## 2024-11-02 RX ADMIN — LEVOTHYROXINE SODIUM 200 MCG: 0.1 TABLET ORAL at 05:19

## 2024-11-02 RX ADMIN — GABAPENTIN 100 MG: 100 CAPSULE ORAL at 20:30

## 2024-11-02 RX ADMIN — PANTOPRAZOLE SODIUM 40 MG: 40 TABLET, DELAYED RELEASE ORAL at 05:19

## 2024-11-02 RX ADMIN — APIXABAN 5 MG: 5 TABLET, FILM COATED ORAL at 08:41

## 2024-11-02 RX ADMIN — INSULIN GLARGINE 15 UNITS: 100 INJECTION, SOLUTION SUBCUTANEOUS at 20:29

## 2024-11-02 RX ADMIN — ACETAMINOPHEN 650 MG: 325 TABLET ORAL at 08:41

## 2024-11-02 RX ADMIN — INSULIN LISPRO 2 UNITS: 100 INJECTION, SOLUTION INTRAVENOUS; SUBCUTANEOUS at 17:23

## 2024-11-02 RX ADMIN — INSULIN LISPRO 4 UNITS: 100 INJECTION, SOLUTION INTRAVENOUS; SUBCUTANEOUS at 20:29

## 2024-11-02 RX ADMIN — Medication 10 MG: at 20:30

## 2024-11-02 RX ADMIN — Medication 250 MG: at 08:41

## 2024-11-02 RX ADMIN — Medication 250 MG: at 20:30

## 2024-11-02 RX ADMIN — GABAPENTIN 100 MG: 100 CAPSULE ORAL at 08:41

## 2024-11-02 RX ADMIN — APIXABAN 5 MG: 5 TABLET, FILM COATED ORAL at 20:30

## 2024-11-02 RX ADMIN — AMIODARONE HYDROCHLORIDE 200 MG: 200 TABLET ORAL at 08:42

## 2024-11-02 RX ADMIN — LATANOPROST 1 DROP: 50 SOLUTION OPHTHALMIC at 20:31

## 2024-11-02 NOTE — PLAN OF CARE
Goal Outcome Evaluation:  Plan of Care Reviewed With: patient        Progress: improving  Outcome Evaluation: AOx4, [place]. Call light and personal items within reach. Able to make needs known to staff. C/o back pain this morning, medication given, intervetnion effective. Postional changes done independently. Legs elevated. Wound care tolorated well. 1x to the BR with walker and gait belt. Con't plan of care

## 2024-11-02 NOTE — THERAPY TREATMENT NOTE
SNF - Physical Therapy Progress Note  RAKEL Alcaraz     Patient Name: Darci Munson  : 1935  MRN: [id number]  Today's Date: 2024      Visit Dx:    ICD-10-CM ICD-9-CM   1. Decreased activities of daily living (ADL)  Z78.9 V49.89   2. Difficulty walking  R26.2 719.7     Patient Active Problem List   Diagnosis    Type 2 diabetes mellitus with hyperglycemia, without long-term current use of insulin    Chronic kidney disease    Hypothyroidism    Hypertensive disorder    Hyperlipidemia    Paroxysmal atrial fibrillation    Dyspepsia    Coronary arteriosclerosis    Gastroparesis    Hearing loss    Mitral valve regurgitation    Peripheral neuropathy    Prostate CA    Primary insomnia    Primary osteoarthritis of left knee    Anticoagulated    Vitamin D insufficiency    Iron deficiency anemia    Medicare annual wellness visit, subsequent    Thickened nails    Pain in toes of both feet    History of prostate cancer    Stage 3b chronic kidney disease    Chronic bilateral low back pain without sciatica    Acute renal failure    Intractable nausea and vomiting    Abdominal pain    Physical debility    Severe malnutrition     Past Medical History:   Diagnosis Date    Anemia, unspecified     Atherosclerotic heart disease of native coronary artery without angina pectoris     CAD (coronary artery disease)     CKD (chronic kidney disease), stage III     Essential (primary) hypertension     Gastric ulcer, unspecified as acute or chronic, without hemorrhage or perforation     Gastroparesis     GERD without esophagitis     Glaucoma     Hereditary and idiopathic neuropathy, unspecified     History of falling     HLD (hyperlipidemia)     HTN (hypertension)     Hypotension, unspecified     Hypothyroidism     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    Irritable bowel syndrome without diarrhea     Laceration without foreign body of right forearm, initial encounter     Low back pain     Malignant neoplasm of prostate     Mixed hyperlipidemia     OA  (osteoarthritis)     Other specified disorders of the skin and subcutaneous tissue     Pain in left foot     Peripheral vascular disease, unspecified     Phimosis     Presence of unspecified artificial knee joint     Primary insomnia     Primary osteoarthritis of both knees     Prostate cancer     Sensorineural hearing loss (SNHL) of both ears     Sigmoid diverticulosis     severe sigmoid and descending colon diverticulosis and 3+ gastritis    Sleep related leg cramps     Type 2 diabetes mellitus with diabetic polyneuropathy     and gastroparesis    Unspecified atrial fibrillation     Unspecified dementia without behavioral disturbance     Unspecified hearing loss, bilateral      Past Surgical History:   Procedure Laterality Date    CATARACT EXTRACTION Bilateral 2014    COLONOSCOPY      COLONOSCOPY N/A 7/1/2022    Procedure: COLONOSCOPY WITH POLYPECTOMIES, HOT SNARE;  Surgeon: Jennifer Mann MD;  Location: [facility];  Service: Gastroenterology;  Laterality: N/A;  DIVERTICULOSIS, COLON POLYPS, HEMORRHOIDS    ENDOSCOPY N/A 7/1/2022    Procedure: ESOPHAGOGASTRODUODENOSCOPY WITH BX, POLYPECTOMY;  Surgeon: Jennifer Mann MD;  Location: [facility] END[facility];  Service: Gastroenterology;  Laterality: N/A;  GASTRIC POLYP, GASTRITIS, HIATAL HERNIA    HAND SURGERY Right     JOINT REPLACEMENT      KNEE ARTHROSCOPY Right 03/14/2017    PROSTATECTOMY      REPLACEMENT TOTAL KNEE  03/2017    UPPER GASTROINTESTINAL ENDOSCOPY         PT Assessment (Last 12 Hours)       PT Evaluation and Treatment       Row Name 11/02/24 1700          Physical Therapy Time and Intention    Subjective Information no complaints  -CS     Document Type therapy note (daily note)  -CS     Mode of Treatment individual therapy;physical therapy  -CS     Patient Effort good  -CS     Symptoms Noted During/After Treatment fatigue  -CS       Row Name 11/02/24 1700          Pain    Pretreatment Pain Rating 0/10 - no pain  -CS     Posttreatment  Pain Rating 0/10 - no pain  -       Row Name 11/02/24 1700          Bed Mobility    Supine-Sit Walsh (Bed Mobility) supervision  -CS     Supine-Sit-Supine Walsh (Bed Mobility) supervision  -CS     Assistive Device (Bed Mobility) bed rails  -       Row Name 11/02/24 1700          Sit-Stand Transfer    Sit-Stand Walsh (Transfers) modified independence  -CS     Assistive Device (Sit-Stand Transfers) walker, front-wheeled  -CS       Row Name 11/02/24 1700          Stand-Sit Transfer    Stand-Sit Walsh (Transfers) modified independence  -CS     Assistive Device (Stand-Sit Transfers) walker, front-wheeled  -CS       Row Name 11/02/24 1700          Gait/Stairs (Locomotion)    Walsh Level (Gait) supervision  -     Assistive Device (Gait) walker, front-wheeled  -     Distance in Feet (Gait) 275  x 2 reps  -CS     Pattern (Gait) 4-point;step-through  -CS     Deviations/Abnormal Patterns (Gait) gait speed decreased;stride length decreased  -     Bilateral Gait Deviations forward flexed posture;heel strike decreased  -       Row Name 11/02/24 1700          Hip (Therapeutic Exercise)    Hip Strengthening (Therapeutic Exercise) bilateral;aBduction;aDduction;marching while seated;sitting;3 lb free weight;resistance band;green;20 repititions  -       Row Name 11/02/24 1700          Knee (Therapeutic Exercise)    Knee Strengthening (Therapeutic Exercise) bilateral;LAQ (long arc quad);hamstring curls;sitting;3 lb free weight;resistance band;green;20 repititions  -       Row Name 11/02/24 1700          Ankle (Therapeutic Exercise)    Ankle Strengthening (Therapeutic Exercise) bilateral;dorsiflexion;plantarflexion;sitting;3 lb free weight;resistance band;green;20 repititions  -       Row Name             Wound 10/26/24 2107 Right posterior heel unspecified    Wound - Properties Group Placement Date: 10/26/24  -KR Placement Time: 2107 -KR Side: Right  -KR Orientation: posterior  -KR  Location: heel  -KR Primary Wound Type: Traumatic  -KR Type: unspecified  -KR Present on Original Admission: N  -KR    Retired Wound - Properties Group Placement Date: 10/26/24  -KR Placement Time: 2107 -KR Present on Original Admission: N  -KR Side: Right  -KR Orientation: posterior  -KR Location: heel  -KR Primary Wound Type: Traumatic  -KR Type: unspecified  -KR    Retired Wound - Properties Group Placement Date: 10/26/24  -KR Placement Time: 2107 -KR Present on Original Admission: N  -KR Side: Right  -KR Orientation: posterior  -KR Location: heel  -KR Primary Wound Type: Traumatic  -KR Type: unspecified  -KR    Retired Wound - Properties Group Date first assessed: 10/26/24  -KR Time first assessed: 2107 -KR Present on Original Admission: N  -KR Side: Right  -KR Location: heel  -KR Primary Wound Type: Traumatic  -KR Type: unspecified  -KR      Row Name 11/02/24 1700          Positioning and Restraints    Pre-Treatment Position in bed  -CS     Post Treatment Position bed  -CS     In Bed fowlers;call light within reach;encouraged to call for assist;exit alarm on;legs elevated;heels elevated  -CS       Row Name 11/02/24 1700          Progress Summary (PT)    Progress Toward Functional Goals (PT) progress toward functional goals is good  -CS               User Key  (r) = Recorded By, (t) = Taken By, (c) = Cosigned By      Initials Name Provider Type    CS Antelmo Resendiz PTA Physical Therapist Assistant    Blanca Kahn RN Registered Nurse                  Section G              Section GG                       Physical Therapy Education       Title: PT OT SLP Therapies (In Progress)       Topic: Physical Therapy (Not Started)       Point: Mobility training (Not Started)       Learner Progress:  Not documented in this visit.              Point: Home exercise program (Not Started)       Learner Progress:  Not documented in this visit.              Point: Body mechanics (Not Started)       Learner Progress:   Not documented in this visit.              Point: Precautions (Not Started)       Learner Progress:  Not documented in this visit.                                  PT Recommendation and Plan     Progress Summary (PT)  Progress Toward Functional Goals (PT): progress toward functional goals is good   Outcome Measures       Row Name 11/02/24 1700 11/01/24 1500          How much help from another person do you currently need...    Turning from your back to your side while in flat bed without using bedrails? 4  -CS 4  -CS     Moving from lying on back to sitting on the side of a flat bed without bedrails? 4  -CS 4  -CS     Moving to and from a bed to a chair (including a wheelchair)? 3  -CS 3  -CS     Standing up from a chair using your arms (e.g., wheelchair, bedside chair)? 4  -CS 4  -CS     Climbing 3-5 steps with a railing? 3  -CS 3  -CS     To walk in hospital room? 3  -CS 3  -CS     AM-PAC 6 Clicks Score (PT) 21  -CS 21  -CS        Functional Assessment    Outcome Measure Options AM-PAC 6 Clicks Basic Mobility (PT)  -CS AM-PAC 6 Clicks Basic Mobility (PT)  -CS               User Key  (r) = Recorded By, (t) = Taken By, (c) = Cosigned By      Initials Name Provider Type    CS Antelmo Resendiz PTA Physical Therapist Assistant                     Time Calculation:    PT Charges       Row Name 11/02/24 1725             Time Calculation    Start Time 0950  -CS      PT Received On 11/02/24  -CS         Timed Charges    27725 - PT Therapeutic Exercise Minutes 12  -CS      68476 - Gait Training Minutes  18  -CS      05545 - PT Therapeutic Activity Minutes 8  -CS         SNF Physical Therapy Minutes    Skilled Minutes- PT 38 min  -CS         Total Minutes    Timed Charges Total Minutes 38  -CS       Total Minutes 38  -CS                User Key  (r) = Recorded By, (t) = Taken By, (c) = Cosigned By      Initials Name Provider Type    CS Antelmo Resendiz PTA Physical Therapist Assistant                  Therapy Charges for  Today       Code Description Service Date Service Provider Modifiers Qty    94157892588 HC PT THER PROC EA 15 MIN 11/1/2024 Antelmo Resendiz, PTA GP 2    96777847960 HC GAIT TRAINING EA 15 MIN 11/1/2024 Antelmo Resendiz, PTA GP 1    53264800378 HC PT THERAPEUTIC ACT EA 15 MIN 11/1/2024 Antelmo Resendiz, PTA GP 1    39297409648 HC PT THER PROC EA 15 MIN 11/2/2024 Antelmo Resendiz, PTA GP 1    31337544618 HC GAIT TRAINING EA 15 MIN 11/2/2024 Antelmo Resendiz, PTA GP 1    96298360787 HC PT THERAPEUTIC ACT EA 15 MIN 11/2/2024 Antelmo Resendiz, PTA GP 1            PT G-Codes  Outcome Measure Options: AM-PAC 6 Clicks Basic Mobility (PT)  AM-PAC 6 Clicks Score (PT): 21  AM-PAC 6 Clicks Score (OT): 24    Antelmo Resendiz PTA  11/2/2024

## 2024-11-02 NOTE — PLAN OF CARE
Goal Outcome Evaluation:  Plan of Care Reviewed With: patient        Progress: improving  Outcome Evaluation: Resident is alert and oriented x4, but [place]. Able to make needs known to staff. Encouraged to turn q2 hours, but refused some turns this shift. Educated resident on the importance of turns for wound healing on butt.  at bedtime. SSI and Lantus given per MAR. Denies pain this shift. Transfers x1 assist with gaitbelt and walker. Urinal at bedside. Woundcare done per orders.Call light and personal items within reach. Continue with current plan of care.

## 2024-11-03 LAB
GLUCOSE BLDC GLUCOMTR-MCNC: 120 MG/DL (ref 70–99)
GLUCOSE BLDC GLUCOMTR-MCNC: 180 MG/DL (ref 70–99)
GLUCOSE BLDC GLUCOMTR-MCNC: 317 MG/DL (ref 70–99)
GLUCOSE BLDC GLUCOMTR-MCNC: 98 MG/DL (ref 70–99)

## 2024-11-03 PROCEDURE — 63710000001 INSULIN LISPRO (HUMAN) PER 5 UNITS: Performed by: FAMILY MEDICINE

## 2024-11-03 PROCEDURE — 82948 REAGENT STRIP/BLOOD GLUCOSE: CPT | Performed by: FAMILY MEDICINE

## 2024-11-03 PROCEDURE — 82948 REAGENT STRIP/BLOOD GLUCOSE: CPT

## 2024-11-03 PROCEDURE — 63710000001 INSULIN GLARGINE PER 5 UNITS: Performed by: FAMILY MEDICINE

## 2024-11-03 RX ADMIN — GABAPENTIN 100 MG: 100 CAPSULE ORAL at 21:24

## 2024-11-03 RX ADMIN — INSULIN LISPRO 7 UNITS: 100 INJECTION, SOLUTION INTRAVENOUS; SUBCUTANEOUS at 11:58

## 2024-11-03 RX ADMIN — APIXABAN 5 MG: 5 TABLET, FILM COATED ORAL at 08:59

## 2024-11-03 RX ADMIN — APIXABAN 5 MG: 5 TABLET, FILM COATED ORAL at 21:24

## 2024-11-03 RX ADMIN — Medication 10 MG: at 21:24

## 2024-11-03 RX ADMIN — Medication 250 MG: at 21:24

## 2024-11-03 RX ADMIN — GABAPENTIN 100 MG: 100 CAPSULE ORAL at 08:59

## 2024-11-03 RX ADMIN — INSULIN LISPRO 2 UNITS: 100 INJECTION, SOLUTION INTRAVENOUS; SUBCUTANEOUS at 21:23

## 2024-11-03 RX ADMIN — INSULIN GLARGINE 15 UNITS: 100 INJECTION, SOLUTION SUBCUTANEOUS at 21:23

## 2024-11-03 RX ADMIN — LEVOTHYROXINE SODIUM 200 MCG: 0.1 TABLET ORAL at 05:42

## 2024-11-03 RX ADMIN — Medication 250 MG: at 08:59

## 2024-11-03 RX ADMIN — LATANOPROST 1 DROP: 50 SOLUTION OPHTHALMIC at 21:25

## 2024-11-03 RX ADMIN — AMIODARONE HYDROCHLORIDE 200 MG: 200 TABLET ORAL at 08:59

## 2024-11-03 RX ADMIN — PANTOPRAZOLE SODIUM 40 MG: 40 TABLET, DELAYED RELEASE ORAL at 05:42

## 2024-11-03 NOTE — PLAN OF CARE
Goal Outcome Evaluation:  Plan of Care Reviewed With: patient        Progress: improving  Outcome Evaluation: Resident alert and oriented x4, [place], wears hearing aids. Able to make needs known to staff. Denies pain this shift. Had one episode of incontinence of urine, requiring bed change. Transfers x1 assist to BR with walker and gaitbelt. Urinal at bedside. Pt cooperative with q2 turns.  at bedtime. SSI and Lantus given per MAR. Sherbet eaten for snack. Call light and personal items within reach. Continue with current plan of care.

## 2024-11-03 NOTE — PLAN OF CARE
Goal Outcome Evaluation:  Plan of Care Reviewed With: patient        Progress: no change  Outcome Evaluation: AOx4. Call light and personal items within reach. Able to make needs known to staff. Postional changes done independently. Legs elevated. Wound care tolorated well. 1x to the BR with walker and gait belt. Reminder to use call light, falls risk education given. Con't plan of care

## 2024-11-04 LAB
GLUCOSE BLDC GLUCOMTR-MCNC: 109 MG/DL (ref 70–99)
GLUCOSE BLDC GLUCOMTR-MCNC: 163 MG/DL (ref 70–99)
GLUCOSE BLDC GLUCOMTR-MCNC: 166 MG/DL (ref 70–99)
GLUCOSE BLDC GLUCOMTR-MCNC: 278 MG/DL (ref 70–99)
SARS-COV-2 RNA RESP QL NAA+PROBE: NOT DETECTED

## 2024-11-04 PROCEDURE — 82948 REAGENT STRIP/BLOOD GLUCOSE: CPT

## 2024-11-04 PROCEDURE — 97116 GAIT TRAINING THERAPY: CPT

## 2024-11-04 PROCEDURE — 97110 THERAPEUTIC EXERCISES: CPT

## 2024-11-04 PROCEDURE — 63710000001 INSULIN GLARGINE PER 5 UNITS: Performed by: FAMILY MEDICINE

## 2024-11-04 PROCEDURE — 99309 SBSQ NF CARE MODERATE MDM 30: CPT | Performed by: PHYSICIAN ASSISTANT

## 2024-11-04 PROCEDURE — 82948 REAGENT STRIP/BLOOD GLUCOSE: CPT | Performed by: FAMILY MEDICINE

## 2024-11-04 PROCEDURE — 97535 SELF CARE MNGMENT TRAINING: CPT

## 2024-11-04 PROCEDURE — 63710000001 INSULIN LISPRO (HUMAN) PER 5 UNITS: Performed by: FAMILY MEDICINE

## 2024-11-04 PROCEDURE — 87635 SARS-COV-2 COVID-19 AMP PRB: CPT | Performed by: FAMILY MEDICINE

## 2024-11-04 RX ADMIN — AMIODARONE HYDROCHLORIDE 200 MG: 200 TABLET ORAL at 08:51

## 2024-11-04 RX ADMIN — GABAPENTIN 100 MG: 100 CAPSULE ORAL at 20:05

## 2024-11-04 RX ADMIN — Medication 250 MG: at 08:51

## 2024-11-04 RX ADMIN — Medication 10 MG: at 20:04

## 2024-11-04 RX ADMIN — MIDODRINE HYDROCHLORIDE 5 MG: 5 TABLET ORAL at 08:20

## 2024-11-04 RX ADMIN — INSULIN LISPRO 2 UNITS: 100 INJECTION, SOLUTION INTRAVENOUS; SUBCUTANEOUS at 20:04

## 2024-11-04 RX ADMIN — APIXABAN 5 MG: 5 TABLET, FILM COATED ORAL at 20:05

## 2024-11-04 RX ADMIN — LATANOPROST 1 DROP: 50 SOLUTION OPHTHALMIC at 20:08

## 2024-11-04 RX ADMIN — APIXABAN 5 MG: 5 TABLET, FILM COATED ORAL at 08:51

## 2024-11-04 RX ADMIN — INSULIN LISPRO 2 UNITS: 100 INJECTION, SOLUTION INTRAVENOUS; SUBCUTANEOUS at 08:19

## 2024-11-04 RX ADMIN — Medication 250 MG: at 20:04

## 2024-11-04 RX ADMIN — PANTOPRAZOLE SODIUM 40 MG: 40 TABLET, DELAYED RELEASE ORAL at 05:26

## 2024-11-04 RX ADMIN — LEVOTHYROXINE SODIUM 200 MCG: 0.1 TABLET ORAL at 05:26

## 2024-11-04 RX ADMIN — INSULIN LISPRO 6 UNITS: 100 INJECTION, SOLUTION INTRAVENOUS; SUBCUTANEOUS at 12:18

## 2024-11-04 RX ADMIN — INSULIN GLARGINE 15 UNITS: 100 INJECTION, SOLUTION SUBCUTANEOUS at 20:04

## 2024-11-04 RX ADMIN — GABAPENTIN 100 MG: 100 CAPSULE ORAL at 08:51

## 2024-11-04 NOTE — PLAN OF CARE
Goal Outcome Evaluation:  Plan of Care Reviewed With: patient        Progress: improving  Outcome Evaluation: Resident alert, oriented, able to make needs known to staff. Transfers with assist of one to bathroom. participated in therapy as ordered, tolerated well. Blood glucose stable for BF and dinner, elevated at lunch, covered with sliding scale. Observed ambulating in [place] with PCA this afternoon. Resident tolerated well, Resident pleasant and cooperative.

## 2024-11-04 NOTE — PLAN OF CARE
Goal Outcome Evaluation:              Outcome Evaluation: Alert and oriented. Able to communicate needs. Denied pain. Declined snack. Turned/ repositioned every two hours. Continue plan of care.

## 2024-11-04 NOTE — THERAPY TREATMENT NOTE
SNF - Occupational Therapy Treatment Note  RAKEL Alcaraz    Patient Name: Darci Munson  : 1935    MRN: [id number]                              Today's Date: 2024       Admit Date: 10/15/2024    Visit Dx:     ICD-10-CM ICD-9-CM   1. Decreased activities of daily living (ADL)  Z78.9 V49.89   2. Difficulty walking  R26.2 719.7     Patient Active Problem List   Diagnosis    Type 2 diabetes mellitus with hyperglycemia, without long-term current use of insulin    Chronic kidney disease    Hypothyroidism    Hypertensive disorder    Hyperlipidemia    Paroxysmal atrial fibrillation    Dyspepsia    Coronary arteriosclerosis    Gastroparesis    Hearing loss    Mitral valve regurgitation    Peripheral neuropathy    Prostate CA    Primary insomnia    Primary osteoarthritis of left knee    Anticoagulated    Vitamin D insufficiency    Iron deficiency anemia    Medicare annual wellness visit, subsequent    Thickened nails    Pain in toes of both feet    History of prostate cancer    Stage 3b chronic kidney disease    Chronic bilateral low back pain without sciatica    Acute renal failure    Intractable nausea and vomiting    Abdominal pain    Physical debility    Severe malnutrition     Past Medical History:   Diagnosis Date    Anemia, unspecified     Atherosclerotic heart disease of native coronary artery without angina pectoris     CAD (coronary artery disease)     CKD (chronic kidney disease), stage III     Essential (primary) hypertension     Gastric ulcer, unspecified as acute or chronic, without hemorrhage or perforation     Gastroparesis     GERD without esophagitis     Glaucoma     Hereditary and idiopathic neuropathy, unspecified     History of falling     HLD (hyperlipidemia)     HTN (hypertension)     Hypotension, unspecified     Hypothyroidism     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    Irritable bowel syndrome without diarrhea     Laceration without foreign body of right forearm, initial encounter     Low back pain      Malignant neoplasm of prostate     Mixed hyperlipidemia     OA (osteoarthritis)     Other specified disorders of the skin and subcutaneous tissue     Pain in left foot     Peripheral vascular disease, unspecified     Phimosis     Presence of unspecified artificial knee joint     Primary insomnia     Primary osteoarthritis of both knees     Prostate cancer     Sensorineural hearing loss (SNHL) of both ears     Sigmoid diverticulosis     severe sigmoid and descending colon diverticulosis and 3+ gastritis    Sleep related leg cramps     Type 2 diabetes mellitus with diabetic polyneuropathy     and gastroparesis    Unspecified atrial fibrillation     Unspecified dementia without behavioral disturbance     Unspecified hearing loss, bilateral      Past Surgical History:   Procedure Laterality Date    CATARACT EXTRACTION Bilateral 2014    COLONOSCOPY      COLONOSCOPY N/A 7/1/2022    Procedure: COLONOSCOPY WITH POLYPECTOMIES, HOT SNARE;  Surgeon: Jennifer Mann MD;  Location: [facility] ENDOSCOPY;  Service: Gastroenterology;  Laterality: N/A;  DIVERTICULOSIS, COLON POLYPS, HEMORRHOIDS    ENDOSCOPY N/A 7/1/2022    Procedure: ESOPHAGOGASTRODUODENOSCOPY WITH BX, POLYPECTOMY;  Surgeon: Jennifer Mann MD;  Location: [facility] ENDOSCOPY;  Service: Gastroenterology;  Laterality: N/A;  GASTRIC POLYP, GASTRITIS, HIATAL HERNIA    HAND SURGERY Right     JOINT REPLACEMENT      KNEE ARTHROSCOPY Right 03/14/2017    PROSTATECTOMY      REPLACEMENT TOTAL KNEE  03/2017    UPPER GASTROINTESTINAL ENDOSCOPY        General Information       Row Name 11/04/24 0805          OT Time and Intention    Document Type therapy note (daily note)  -GC     Mode of Treatment individual therapy;occupational therapy  -GC       Row Name 11/04/24 0805          General Information    Patient Profile Reviewed yes  -GC     Existing Precautions/Restrictions fall  -GC       Row Name 11/04/24 0805          Safety Issues/Impairments Affecting Functional  Mobility    Impairments Affecting Function (Mobility) balance;endurance/activity tolerance;strength  -               User Key  (r) = Recorded By, (t) = Taken By, (c) = Cosigned By      Initials Name Provider Type     Ruth Islas OT Occupational Therapist                     Mobility/ADL's       Row Name 11/04/24 0806          Transfers    Transfers sit-stand transfer;stand-sit transfer;toilet transfer;bed-chair transfer  -       Row Name 11/04/24 0806          Bed-Chair Transfer    Bed-Chair [place] (Transfers) modified independence;supervision  -     Assistive Device (Bed-Chair Transfers) walker, front-wheeled  -       Row Name 11/04/24 0806          Sit-Stand Transfer    Sit-Stand [place] (Transfers) modified independence  -     Assistive Device (Sit-Stand Transfers) walker, front-wheeled  -       Row Name 11/04/24 0806          Stand-Sit Transfer    Stand-Sit [place] (Transfers) modified independence  -     Assistive Device (Stand-Sit Transfers) walker, front-wheeled  -       Row Name 11/04/24 0806          Toilet Transfer    [place] Level (Toilet Transfer) supervision;modified independence  -     Assistive Device (Toilet Transfer) raised toilet seat  -       Row Name 11/04/24 0806          Functional Mobility    Functional Mobility- Ind. Level supervision required;conditional independence  -     Functional Mobility- Device walker, front-wheeled  -       Row Name 11/04/24 0806          Bathing Assessment/Intervention    [place] Level (Bathing) supervision;modified independence  -     Assistive Devices (Bathing) grab bar, tub/shower;hand-held shower spray hose;tub bench  -       Row Name 11/04/24 0806          Lower Body Dressing Assessment/Training    [place] Level (Lower Body Dressing) lower body dressing skills;modified independence;supervision  -       Row Name 11/04/24 0806          Toileting Assessment/Training    [place] Level (Toileting)  toileting skills;supervision;modified independence  -               User Key  (r) = Recorded By, (t) = Taken By, (c) = Cosigned By      Initials Name Provider Type    Ruth Whitney OT Occupational Therapist                   Obj/Interventions       Row Name 11/04/24 0808          Shoulder (Therapeutic Exercise)    Shoulder Strengthening (Therapeutic Exercise) bilateral;horizontal aBduction/aDduction;2 lb free weight;2 sets;15 repititions  -       Row Name 11/04/24 0808          Elbow/Forearm (Therapeutic Exercise)    Elbow/Forearm (Therapeutic Exercise) strengthening exercise  -     Elbow/Forearm Strengthening (Therapeutic Exercise) bilateral;flexion;extension;2 lb free weight;30 repititions;2 sets  -       Row Name 11/04/24 0808          Motor Skills    Functional Endurance Endurance training with Omnicycle x15 minutes  -     Therapeutic Exercise elbow/forearm;aerobic;shoulder  -               User Key  (r) = Recorded By, (t) = Taken By, (c) = Cosigned By      Initials Name Provider Type    Ruth Whitney OT Occupational Therapist                   Goals/Plan    No documentation.                  Clinical Impression       Row Name 11/04/24 0809          Plan of Care Review    Plan of Care Reviewed With patient  -     Progress improving  -     Outcome Evaluation Pt. is able to perform all BADLS with spv to Mod indep using RW.  Pt. continues to be unsafe at times with RW however no LOB observed.  Pt. is d/cing home with f/u HH to address transition from RW to cane.  Pt. recommended to stay inside his RW for adls.  Plan to continue POC established.  -       Row Name 11/04/24 0809          Therapy Plan Review/Discharge Plan (OT)    Anticipated Discharge Disposition (OT) home with home health  -               User Key  (r) = Recorded By, (t) = Taken By, (c) = Cosigned By      Initials Name Provider Type    Ruth Whitney OT Occupational Therapist                   Outcome Measures       Row  Name 11/04/24 0813          How much help from another is currently needed...    Putting on and taking off regular lower body clothing? 4  -GC     Bathing (including washing, rinsing, and drying) 4  -GC     Toileting (which includes using toilet bed pan or urinal) 4  -GC     Putting on and taking off regular upper body clothing 4  -GC     Taking care of personal grooming (such as brushing teeth) 4  -GC     Eating meals 4  -GC     AM-PAC 6 Clicks Score (OT) 24  -GC       Row Name 11/03/24 2130          How much help from another person do you currently need...    Turning from your back to your side while in flat bed without using bedrails? 4  -CC     Moving from lying on back to sitting on the side of a flat bed without bedrails? 4  -CC     Moving to and from a bed to a chair (including a wheelchair)? 3  -CC     Standing up from a chair using your arms (e.g., wheelchair, bedside chair)? 4  -CC     Climbing 3-5 steps with a railing? 3  -CC     To walk in hospital room? 3  -CC     AM-PAC 6 Clicks Score (PT) 21  -CC     Highest Level of Mobility Goal 6 --> Walk 10 steps or more  -CC       Row Name 11/04/24 0813          Functional Assessment    Outcome Measure Options AM-PAC 6 Clicks Daily Activity (OT);Optimal Instrument  -GC       Row Name 11/04/24 0813          Optimal Instrument    Optimal Instrument Optimal - 3  -GC     Bending/Stooping 1  -GC     Standing 1  -GC     Reaching 1  -GC               User Key  (r) = Recorded By, (t) = Taken By, (c) = Cosigned By      Initials Name Provider Type    CC Amanda Lopez RN Registered Nurse    Ruth Whitney OT Occupational Therapist                  Section G  Mobility  Dressing - self performance: limited assistance (staff provide guided maneuvering of limbs or other non-weight bearing assistance)  Dressing support/assistance: One person assist  Eating - self performance: independent  Eating support/assistance: No setup or physical help  Toileting - self  performance: limited assistance (staff provide guided maneuvering of limbs or other non-weight bearing assistance)  Toileting support/assistance: One person assist  Personal hygiene - self performance: limited assistance (staff provide guided maneuvering of limbs or other non-weight bearing assistance)  Personal hygiene support/assistance: One person assist  Bathing  Bathing - self performance: Physical help only to transfer to bathe  Bathing support/assistance: One person assist     Range of Motion  Upper Extremity: No impairment  Section GG  Functional Ability/Goals, Adm (Section GG)  Self Care, Prior Functioning ([id number]): 3. Independent  Functional Cognition, Prior Functioning (DQ5076Y): 3. Independent  Upper Extremity Range of Motion (HW2198D): No impairment  Self Care, Admission (Section GG)  Eating: Self-Care Admission Performance (BE9121O9): independent (06)  Oral Hygiene: Self-Care Admission Performance (ED8194S0): setup or clean-up assistance (05)  Toileting Hygiene: Self-Care Admission Performance (FA3972Y1): partial/moderate assistance (03)  Shower/Bathe Self: Self-Care Admission Performance (IM7381T6): partial/moderate assistance (03)  Upper Body Dressing: Self-Care Admission Performance (ZA8095V8): setup or clean-up assistance (05)  Lower Body Dressing: Self-Care Admission Performance (CV1478D3): partial/moderate assistance (03)  Putting On/Taking Off Footwear: Self-Care Admission Performance (ZX9858U9): partial/moderate assistance (03)  Personal Hygiene: Self-Care Admission Performance (KL0800E8): supervision or touching assistance (04)  Mobility, Admission Performance (YF6753)  Toilet Transfer: Mobility Admission Performance (QD2008G7): supervision or touching assistance (04)  Tub/shower Transfer: Mobility Admission Performance (UJ8529UE3): supervision or touching assistance (04)                Occupational Therapy Education       Title: PT OT SLP Therapies (In Progress)       Topic: Occupational  Therapy (In Progress)       Point: ADL training (Done)       Description:   Instruct learner(s) on proper safety adaptation and remediation techniques during self care or transfers.   Instruct in proper use of assistive devices.                  Learning Progress Summary            Patient Acceptance, E, VU,NR by  at 10/16/2024 0925                      Point: Home exercise program (Not Started)       Description:   Instruct learner(s) on appropriate technique for monitoring, assisting and/or progressing therapeutic exercises/activities.                  Learner Progress:  Not documented in this visit.              Point: Precautions (Done)       Description:   Instruct learner(s) on prescribed precautions during self-care and functional transfers.                  Learning Progress Summary            Patient Acceptance, E, VU,NR by  at 10/16/2024 0925                      Point: Body mechanics (Done)       Description:   Instruct learner(s) on proper positioning and spine alignment during self-care, functional mobility activities and/or exercises.                  Learning Progress Summary            Patient Acceptance, E, VU,NR by  at 10/16/2024 0925                                      User Key       Initials Effective Dates Name Provider Type Discipline     06/16/21 -  Ruth Islas OT Occupational Therapist OT                  OT Recommendation and Plan  Planned Therapy Interventions (OT): activity tolerance training, adaptive equipment training, BADL retraining, functional balance retraining, occupation/activity based interventions, patient/caregiver education/training, ROM/therapeutic exercise, strengthening exercise, transfer/mobility retraining  Therapy Frequency (OT): 5 times/wk  Plan of Care Review  Plan of Care Reviewed With: patient  Progress: improving  Outcome Evaluation: Pt. is able to perform all BADLS with spv to Mod indep using RW.  Pt. continues to be unsafe at times with RW however no  LOB observed.  Pt. is d/cing home with f/u HH to address transition from RW to cane.  Pt. recommended to stay inside his RW for adls.  Plan to continue POC established.     Time Calculation:   Evaluation Complexity (OT)  Review Occupational Profile/Medical/Therapy History Complexity: expanded/moderate complexity  Assessment, Occupational Performance/Identification of Deficit Complexity: 3-5 performance deficits  Clinical Decision Making Complexity (OT): detailed assessment/moderate complexity  Overall Complexity of Evaluation (OT): moderate complexity     Time Calculation- OT       Row Name 11/04/24 0813             Time Calculation- OT    OT Received On 11/04/24  -GC         Timed Charges    25046 - OT Therapeutic Exercise Minutes 23  -GC      58657 - OT Therapeutic Activity Minutes 7  -GC      74134 - OT Self Care/Mgmt Minutes 24  -GC         SNF Occupational Therapy Minutes    Skilled Minutes- OT 54 min  -GC         Total Minutes    Timed Charges Total Minutes 54  -GC       Total Minutes 54  -GC                User Key  (r) = Recorded By, (t) = Taken By, (c) = Cosigned By      Initials Name Provider Type     Ruth Islas OT Occupational Therapist                  Therapy Charges for Today       Code Description Service Date Service Provider Modifiers Qty    [id number]  OT THER PROC EA 15 MIN 11/4/2024 Ruth Islas OT GO 2    [id number] HC OT SELF CARE/MGMT/TRAIN EA 15 MIN 11/4/2024 Ruth Islas OT GO 2                 Ruth Islas OT  11/4/2024

## 2024-11-04 NOTE — THERAPY TREATMENT NOTE
SNF - Physical Therapy Treatment Note  RAKEL Alcaraz     Patient Name: Darci Munson  : 1935  MRN: [id number]  Today's Date: 2024      Visit Dx:    ICD-10-CM ICD-9-CM   1. Decreased activities of daily living (ADL)  Z78.9 V49.89   2. Difficulty walking  R26.2 719.7     Patient Active Problem List   Diagnosis    Type 2 diabetes mellitus with hyperglycemia, without long-term current use of insulin    Chronic kidney disease    Hypothyroidism    Hypertensive disorder    Hyperlipidemia    Paroxysmal atrial fibrillation    Dyspepsia    Coronary arteriosclerosis    Gastroparesis    Hearing loss    Mitral valve regurgitation    Peripheral neuropathy    Prostate CA    Primary insomnia    Primary osteoarthritis of left knee    Anticoagulated    Vitamin D insufficiency    Iron deficiency anemia    Medicare annual wellness visit, subsequent    Thickened nails    Pain in toes of both feet    History of prostate cancer    Stage 3b chronic kidney disease    Chronic bilateral low back pain without sciatica    Acute renal failure    Intractable nausea and vomiting    Abdominal pain    Physical debility    Severe malnutrition     Past Medical History:   Diagnosis Date    Anemia, unspecified     Atherosclerotic heart disease of native coronary artery without angina pectoris     CAD (coronary artery disease)     CKD (chronic kidney disease), stage III     Essential (primary) hypertension     Gastric ulcer, unspecified as acute or chronic, without hemorrhage or perforation     Gastroparesis     GERD without esophagitis     Glaucoma     Hereditary and idiopathic neuropathy, unspecified     History of falling     HLD (hyperlipidemia)     HTN (hypertension)     Hypotension, unspecified     Hypothyroidism     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    Irritable bowel syndrome without diarrhea     Laceration without foreign body of right forearm, initial encounter     Low back pain     Malignant neoplasm of prostate     Mixed hyperlipidemia     OA  (osteoarthritis)     Other specified disorders of the skin and subcutaneous tissue     Pain in left foot     Peripheral vascular disease, unspecified     Phimosis     Presence of unspecified artificial knee joint     Primary insomnia     Primary osteoarthritis of both knees     Prostate cancer     Sensorineural hearing loss (SNHL) of both ears     Sigmoid diverticulosis     severe sigmoid and descending colon diverticulosis and 3+ gastritis    Sleep related leg cramps     Type 2 diabetes mellitus with diabetic polyneuropathy     and gastroparesis    Unspecified atrial fibrillation     Unspecified dementia without behavioral disturbance     Unspecified hearing loss, bilateral      Past Surgical History:   Procedure Laterality Date    CATARACT EXTRACTION Bilateral 2014    COLONOSCOPY      COLONOSCOPY N/A 7/1/2022    Procedure: COLONOSCOPY WITH POLYPECTOMIES, HOT SNARE;  Surgeon: Jennifer Mann MD;  Location: [facility] ENDOSCOPY;  Service: Gastroenterology;  Laterality: N/A;  DIVERTICULOSIS, COLON POLYPS, HEMORRHOIDS    ENDOSCOPY N/A 7/1/2022    Procedure: ESOPHAGOGASTRODUODENOSCOPY WITH BX, POLYPECTOMY;  Surgeon: eJnnifer Mann MD;  Location: [facility] ENDOSCOPY;  Service: Gastroenterology;  Laterality: N/A;  GASTRIC POLYP, GASTRITIS, HIATAL HERNIA    HAND SURGERY Right     JOINT REPLACEMENT      KNEE ARTHROSCOPY Right 03/14/2017    PROSTATECTOMY      REPLACEMENT TOTAL KNEE  03/2017    UPPER GASTROINTESTINAL ENDOSCOPY         PT Assessment (Last 12 Hours)       PT Evaluation and Treatment       Row Name 11/04/24 6772          Physical Therapy Time and Intention    Document Type therapy note (daily note)  -WM     Mode of Treatment individual therapy;physical therapy  -WM     Patient Effort good  -WM     Symptoms Noted During/After Treatment fatigue  -WM       Row Name 11/04/24 1201          Sit-Stand Transfer    Sit-Stand [place] (Transfers) contact guard  -WM     Assistive Device (Sit-Stand Transfers)  cane, straight  -       Row Name 11/04/24 1208          Stand-Sit Transfer    Stand-Sit [place] (Transfers) contact guard  -     Assistive Device (Stand-Sit Transfers) cane, straight  -       Row Name 11/04/24 1208          Gait/Stairs (Locomotion)    [place] Level (Gait) contact guard  -     Assistive Device (Gait) cane, straight  -     Distance in Feet (Gait) 250  + 50  -WM     Pattern (Gait) 3-point;step-through  -     Deviations/Abnormal Patterns (Gait) gait speed decreased  -       Row Name 11/04/24 1208          Safety Issues/Impairments Affecting Functional Mobility    Impairments Affecting Function (Mobility) balance;endurance/activity tolerance;strength  -       Row Name 11/04/24 1208          Hip (Therapeutic Exercise)    Hip Strengthening (Therapeutic Exercise) bilateral;aBduction;aDduction;marching while seated;sitting;3 lb free weight;resistance band;green;15 repititions;2 sets  -       Row Name 11/04/24 1208          Knee (Therapeutic Exercise)    Knee Strengthening (Therapeutic Exercise) bilateral;LAQ (long arc quad);hamstring curls;sitting;3 lb free weight;resistance band;green;15 repititions;2 sets  -       Row Name 11/04/24 1208          Ankle (Therapeutic Exercise)    Ankle Strengthening (Therapeutic Exercise) bilateral;dorsiflexion;sitting;resistance band;green;15 repititions;2 sets  -       Row Name 11/04/24 1208          Aerobic Exercise    Time Performed (Aerobic Exercise) NuStep x 15 minutes, load 5  -       Row Name             Wound 10/26/24 2107 Right posterior heel unspecified    Wound - Properties Group Placement Date: 10/26/24  -KR Placement Time: 2107 -KR Side: Right  -KR Orientation: posterior  -KR Location: heel  -KR Primary Wound Type: Traumatic  -KR Type: unspecified  -KR Present on Original Admission: N  -KR    Retired Wound - Properties Group Placement Date: 10/26/24  -KR Placement Time: 2107 -KR Present on Original Admission: N  -KR Side:  Right  -KR Orientation: posterior  -KR Location: heel  -KR Primary Wound Type: Traumatic  -KR Type: unspecified  -KR    Retired Wound - Properties Group Placement Date: 10/26/24  -KR Placement Time: 2107 -KR Present on Original Admission: N  -KR Side: Right  -KR Orientation: posterior  -KR Location: heel  -KR Primary Wound Type: Traumatic  -KR Type: unspecified  -KR    Retired Wound - Properties Group Date first assessed: 10/26/24  -KR Time first assessed: 2107 -KR Present on Original Admission: N  -KR Side: Right  -KR Location: heel  -KR Primary Wound Type: Traumatic  -KR Type: unspecified  -KR      Row Name 11/04/24 1208          Positioning and Restraints    Pre-Treatment Position sitting in chair/recliner  -WM     Post Treatment Position chair  -WM     In Chair sitting;call light within reach;encouraged to call for assist;exit alarm on  -WM       Row Name 11/04/24 1208          Progress Summary (PT)    Progress Toward Functional Goals (PT) progress toward functional goals is good  -WM               User Key  (r) = Recorded By, (t) = Taken By, (c) = Cosigned By      Initials Name Provider Type    Marquise Ramirez PTA Physical Therapist Assistant    Blanca Kahn, RN Registered Nurse                  Section G              Section GG                       Physical Therapy Education       Title: PT OT SLP Therapies (In Progress)       Topic: Physical Therapy (Not Started)       Point: Mobility training (Not Started)       Learner Progress:  Not documented in this visit.              Point: Home exercise program (Not Started)       Learner Progress:  Not documented in this visit.              Point: Body mechanics (Not Started)       Learner Progress:  Not documented in this visit.              Point: Precautions (Not Started)       Learner Progress:  Not documented in this visit.                                  PT Recommendation and Plan     Progress Summary (PT)  Progress Toward Functional Goals (PT):  progress toward functional goals is good   Outcome Measures       Row Name 11/04/24 1213 11/02/24 1700 11/01/24 1500       How much help from another person do you currently need...    Turning from your back to your side while in flat bed without using bedrails? 4  -WM 4  -CS 4  -CS    Moving from lying on back to sitting on the side of a flat bed without bedrails? 4  -WM 4  -CS 4  -CS    Moving to and from a bed to a chair (including a wheelchair)? 3  -WM 3  -CS 3  -CS    Standing up from a chair using your arms (e.g., wheelchair, bedside chair)? 4  -WM 4  -CS 4  -CS    Climbing 3-5 steps with a railing? 3  -WM 3  -CS 3  -CS    To walk in hospital room? 3  -WM 3  -CS 3  -CS    AM-PAC 6 Clicks Score (PT) 21  -WM 21  -CS 21  -CS       Functional Assessment    Outcome Measure Options -- AM-PAC 6 Clicks Basic Mobility (PT)  -CS AM-PAC 6 Clicks Basic Mobility (PT)  -CS              User Key  (r) = Recorded By, (t) = Taken By, (c) = Cosigned By      Initials Name Provider Type     Marquise Escobar, MANAN Physical Therapist Assistant    Antelmo Rosario PTA Physical Therapist Assistant                     Time Calculation:    PT Charges       Row Name 11/04/24 1207             Time Calculation    PT Received On 11/04/24  -WM         Timed Charges    06016 - PT Therapeutic Exercise Minutes 29  -WM      56414 - Gait Training Minutes  10  -WM      77154 - PT Therapeutic Activity Minutes 5  -WM         SNF Physical Therapy Minutes    Skilled Minutes- PT 44 min  -WM         Total Minutes    Timed Charges Total Minutes 44  -WM       Total Minutes 44  -WM                User Key  (r) = Recorded By, (t) = Taken By, (c) = Cosigned By      Initials Name Provider Type     Marquise Escobar, MANAN Physical Therapist Assistant                      PT G-Codes  Outcome Measure Options: AM-PAC 6 Clicks Daily Activity (OT), Optimal Instrument  AM-PAC 6 Clicks Score (PT): 21  AM-PAC 6 Clicks Score (OT): 24    Marquise Escobar  PTA  11/4/2024

## 2024-11-04 NOTE — PROGRESS NOTES
[facility]   Hospitalist Progress Note       Patient Name: Darci Munson  : 1935  MRN: [id number]  Primary Care Physician: Adrien Mayer MD  Date of admission: 10/15/2024  Today's Date: 2024  Room / Bed:   305/2  Subjective   Chief Complaint: Hospital-acquired weakness     HPI:     Darci Munson is a 89 y.o. male past medical history significant for chronic atrial fibrillation/flutter, diabetes, CAD, hypertension, hyperlipidemia, hypothyroidism, history of prostate cancer, chronic kidney disease, glaucoma, mitral valve regurgitation, that was hospitalized for chief complaint of nausea vomiting and diarrhea was noted to have ITA with hyperkalemia in addition to lactic acidosis CT scan demonstrating nodules in the lower lobe which will require follow-up CT scan of the chest in 6 months concern for possible acute diverticulitis placed on antibiotics to cover GI tract IV fluids with improvement of symptoms seen by PT and OT deemed a good candidate for inpatient rehab is been transferred to skilled nursing facility for ongoing therapy.       Interval Followup: 2024    Very pleasant gentleman.  Resting in bed.  Has been participating in physical therapy.  He is eager to be returning home Wednesday.  He has been in the gym working out and ambulated around SNF loop twice.  Making good progress.  No setbacks with therapy.  Medically stable  Glucose mostly controlled, has been getting into some Halloween candy however.          REVIEW OF SYSTEMS:   Weakness  Objective   Temp:  [97.4 °F (36.3 °C)-97.5 °F (36.4 °C)] 97.5 °F (36.4 °C)  Heart Rate:  [64-91] 91  Resp:  [18] 18  BP: (126-162)/(60-71) 134/68  PHYSICAL EXAM   CON: WN. WD. NAD.   NECK:  No thyromegaly. No stridor.   RESP:  CTA. No wheezes. No crackles.  CV:  Rhythm irregular. Rate WNL. 2/6 SM noted.  No edema.  GI:  Soft and nontender. Nondistended.    EXT: Peripheral pulses intact.  No cyanosis.  PSYCH:  Alert. Oriented. Normal affect and  mood.  NEURO:  No dysarthria or aphasia.   SKIN: No chronic venous stasis changes or varicosities.  No cellulitis                  COMPLEXITY OF DATA / DECISION MAKING     []  Moderate: One acute illness or mild exacerbation of chronic or 2 stable chronic or tx side effects   []  High:  Severe acute illness or severe exacerbation of chronic - potential for major debility / life threatening         I have personally reviewed the results from the time of this admission to 11/4/2024 15:55 EST:  []  Laboratory:  []  Microbiology: []  Radiology:  []  Telemetry:   []  Cardiology/Vascular:  []  Pathology:  []  Prior external records:  []  Independent historian provided additional details:      []  Discussed case with specialists:    []  Independent interpretation of ECG/Imaging etc:             []  Moderate: Rx management, low risk surgery, suboptimal social situation   []  High:  Rx with close monitoring for toxicity, mod-high risk surgery, DNR decision, Comfort initiated, IV pain meds    Assessment / Plan   Assessment:     Hospital-acquired weakness  Recent ITA  Recent hyperkalemia  Recent dehydration  Acute diverticulitis  Atrial fibrillation/flutter chronic  Diverticulosis  Diabetes  CAD  Hypertension  Gastroparesis  History of prostate cancer  Hypothyroidism     Plan:        Discussed disposition.  Eager to return home Wednesday.  Will need follow-up with PCP and nephrology ([facility]) after rehab  Continue current plan.  Sliding scale insulin protocol.  Also basal/bolus.  Daily physical therapy  Continue carb consistent diet with snacks and nutritional supplements  Continue amiodarone and Eliquis for atrial fibrillation  Renal function electrolytes periodically  Continue midodrine  Further treatment contingent upon his hospital course    VTE Prophylaxis:  Pharmacologic VTE prophylaxis orders are present.      CODE STATUS:      Level Of Support Discussed With: Patient  Code Status (Patient has no pulse and is not  breathing): CPR (Attempt to Resuscitate)  Medical Interventions (Patient has pulse or is breathing): Full Support

## 2024-11-05 LAB
ANION GAP SERPL CALCULATED.3IONS-SCNC: 8.2 MMOL/L (ref 5–15)
BUN SERPL-MCNC: 38 MG/DL (ref 8–23)
BUN/CREAT SERPL: 35.5 (ref 7–25)
CALCIUM SPEC-SCNC: 9.5 MG/DL (ref 8.6–10.5)
CHLORIDE SERPL-SCNC: 105 MMOL/L (ref 98–107)
CO2 SERPL-SCNC: 25.8 MMOL/L (ref 22–29)
CREAT SERPL-MCNC: 1.07 MG/DL (ref 0.76–1.27)
EGFRCR SERPLBLD CKD-EPI 2021: 66.3 ML/MIN/1.73
GLUCOSE BLDC GLUCOMTR-MCNC: 131 MG/DL (ref 70–99)
GLUCOSE BLDC GLUCOMTR-MCNC: 140 MG/DL (ref 70–99)
GLUCOSE BLDC GLUCOMTR-MCNC: 203 MG/DL (ref 70–99)
GLUCOSE BLDC GLUCOMTR-MCNC: 319 MG/DL (ref 70–99)
GLUCOSE SERPL-MCNC: 116 MG/DL (ref 65–99)
POTASSIUM SERPL-SCNC: 4.9 MMOL/L (ref 3.5–5.2)
SODIUM SERPL-SCNC: 139 MMOL/L (ref 136–145)
WHOLE BLOOD HOLD SPECIMEN: NORMAL

## 2024-11-05 PROCEDURE — 80048 BASIC METABOLIC PNL TOTAL CA: CPT | Performed by: PHYSICIAN ASSISTANT

## 2024-11-05 PROCEDURE — 63710000001 INSULIN LISPRO (HUMAN) PER 5 UNITS: Performed by: FAMILY MEDICINE

## 2024-11-05 PROCEDURE — 97110 THERAPEUTIC EXERCISES: CPT

## 2024-11-05 PROCEDURE — 97112 NEUROMUSCULAR REEDUCATION: CPT

## 2024-11-05 PROCEDURE — 82948 REAGENT STRIP/BLOOD GLUCOSE: CPT | Performed by: FAMILY MEDICINE

## 2024-11-05 PROCEDURE — 99316 NF DSCHRG MGMT 30 MIN+: CPT | Performed by: PHYSICIAN ASSISTANT

## 2024-11-05 PROCEDURE — 63710000001 INSULIN GLARGINE PER 5 UNITS: Performed by: FAMILY MEDICINE

## 2024-11-05 PROCEDURE — 97530 THERAPEUTIC ACTIVITIES: CPT

## 2024-11-05 PROCEDURE — 82948 REAGENT STRIP/BLOOD GLUCOSE: CPT

## 2024-11-05 PROCEDURE — 97116 GAIT TRAINING THERAPY: CPT

## 2024-11-05 RX ORDER — GABAPENTIN 100 MG/1
100 CAPSULE ORAL EVERY 12 HOURS SCHEDULED
Start: 2024-11-05 | End: 2024-11-08

## 2024-11-05 RX ORDER — MIDODRINE HYDROCHLORIDE 2.5 MG/1
2.5 TABLET ORAL
Qty: 90 TABLET | Refills: 0 | Status: SHIPPED | OUTPATIENT
Start: 2024-11-05

## 2024-11-05 RX ADMIN — Medication 250 MG: at 20:37

## 2024-11-05 RX ADMIN — Medication 10 MG: at 20:37

## 2024-11-05 RX ADMIN — Medication 250 MG: at 08:22

## 2024-11-05 RX ADMIN — APIXABAN 5 MG: 5 TABLET, FILM COATED ORAL at 08:22

## 2024-11-05 RX ADMIN — GABAPENTIN 100 MG: 100 CAPSULE ORAL at 08:22

## 2024-11-05 RX ADMIN — AMIODARONE HYDROCHLORIDE 200 MG: 200 TABLET ORAL at 08:22

## 2024-11-05 RX ADMIN — GABAPENTIN 100 MG: 100 CAPSULE ORAL at 20:37

## 2024-11-05 RX ADMIN — INSULIN LISPRO 7 UNITS: 100 INJECTION, SOLUTION INTRAVENOUS; SUBCUTANEOUS at 20:36

## 2024-11-05 RX ADMIN — PANTOPRAZOLE SODIUM 40 MG: 40 TABLET, DELAYED RELEASE ORAL at 06:20

## 2024-11-05 RX ADMIN — INSULIN LISPRO 4 UNITS: 100 INJECTION, SOLUTION INTRAVENOUS; SUBCUTANEOUS at 11:50

## 2024-11-05 RX ADMIN — MIDODRINE HYDROCHLORIDE 5 MG: 5 TABLET ORAL at 07:55

## 2024-11-05 RX ADMIN — LEVOTHYROXINE SODIUM 200 MCG: 0.1 TABLET ORAL at 06:20

## 2024-11-05 RX ADMIN — ACETAMINOPHEN 650 MG: 325 TABLET ORAL at 10:06

## 2024-11-05 RX ADMIN — APIXABAN 5 MG: 5 TABLET, FILM COATED ORAL at 20:37

## 2024-11-05 RX ADMIN — LATANOPROST 1 DROP: 50 SOLUTION OPHTHALMIC at 20:38

## 2024-11-05 RX ADMIN — INSULIN GLARGINE 15 UNITS: 100 INJECTION, SOLUTION SUBCUTANEOUS at 20:37

## 2024-11-05 NOTE — THERAPY DISCHARGE NOTE
SNF - Physical Therapy Discharge   [place]     Patient Name: Darci Munson  : 1935  MRN: [id number]  Today's Date: 2024                Admit Date: 10/15/2024    Visit Dx:    ICD-10-CM ICD-9-CM   1. Decreased activities of daily living (ADL)  Z78.9 V49.89   2. Difficulty walking  R26.2 719.7   3. Type 2 diabetes mellitus with diabetic polyneuropathy, without long-term current use of insulin  E11.42 250.60     357.2     Patient Active Problem List   Diagnosis    Type 2 diabetes mellitus with hyperglycemia, without long-term current use of insulin    Chronic kidney disease    Hypothyroidism    Hypertensive disorder    Hyperlipidemia    Paroxysmal atrial fibrillation    Dyspepsia    Coronary arteriosclerosis    Gastroparesis    Hearing loss    Mitral valve regurgitation    Peripheral neuropathy    Prostate CA    Primary insomnia    Primary osteoarthritis of left knee    Anticoagulated    Vitamin D insufficiency    Iron deficiency anemia    Medicare annual wellness visit, subsequent    Thickened nails    Pain in toes of both feet    History of prostate cancer    Stage 3b chronic kidney disease    Chronic bilateral low back pain without sciatica    Acute renal failure    Intractable nausea and vomiting    Abdominal pain    Physical debility    Severe malnutrition     Past Medical History:   Diagnosis Date    Anemia, unspecified     Atherosclerotic heart disease of native coronary artery without angina pectoris     CAD (coronary artery disease)     CKD (chronic kidney disease), stage III     Essential (primary) hypertension     Gastric ulcer, unspecified as acute or chronic, without hemorrhage or perforation     Gastroparesis     GERD without esophagitis     Glaucoma     Hereditary and idiopathic neuropathy, unspecified     History of falling     HLD (hyperlipidemia)     HTN (hypertension)     Hypotension, unspecified     Hypothyroidism     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    Irritable bowel syndrome without diarrhea      Laceration without foreign body of right forearm, initial encounter     Low back pain     Malignant neoplasm of prostate     Mixed hyperlipidemia     OA (osteoarthritis)     Other specified disorders of the skin and subcutaneous tissue     Pain in left foot     Peripheral vascular disease, unspecified     Phimosis     Presence of unspecified artificial knee joint     Primary insomnia     Primary osteoarthritis of both knees     Prostate cancer     Sensorineural hearing loss (SNHL) of both ears     Sigmoid diverticulosis     severe sigmoid and descending colon diverticulosis and 3+ gastritis    Sleep related leg cramps     Type 2 diabetes mellitus with diabetic polyneuropathy     and gastroparesis    Unspecified atrial fibrillation     Unspecified dementia without behavioral disturbance     Unspecified hearing loss, bilateral      Past Surgical History:   Procedure Laterality Date    CATARACT EXTRACTION Bilateral 2014    COLONOSCOPY      COLONOSCOPY N/A 7/1/2022    Procedure: COLONOSCOPY WITH POLYPECTOMIES, HOT SNARE;  Surgeon: Jennifer Mann MD;  Location: [facility] ENDOSCOPY;  Service: Gastroenterology;  Laterality: N/A;  DIVERTICULOSIS, COLON POLYPS, HEMORRHOIDS    ENDOSCOPY N/A 7/1/2022    Procedure: ESOPHAGOGASTRODUODENOSCOPY WITH BX, POLYPECTOMY;  Surgeon: Jennifer Mann MD;  Location: [facility] ENDOSCOPY;  Service: Gastroenterology;  Laterality: N/A;  GASTRIC POLYP, GASTRITIS, HIATAL HERNIA    HAND SURGERY Right     JOINT REPLACEMENT      KNEE ARTHROSCOPY Right 03/14/2017    PROSTATECTOMY      REPLACEMENT TOTAL KNEE  03/2017    UPPER GASTROINTESTINAL ENDOSCOPY         PT Assessment (Last 12 Hours)       PT Evaluation and Treatment       Row Name 11/05/24 1000          Physical Therapy Time and Intention    Subjective Information complains of;pain  -VK     Document Type therapy note (daily note)  -VK     Mode of Treatment individual therapy;physical therapy  -VK     Patient Effort good  -VK        Row Name 11/05/24 1000          General Information    Patient Profile Reviewed yes  -VK     Existing Precautions/Restrictions fall  -VK       Row Name 11/05/24 1000          Pain    Pretreatment Pain Rating 5/10  -VK     Pain Location back  -VK     Pain Management Interventions nursing notified;exercise or physical activity utilized  Nursing medicated patient  -VK       Row Name 11/05/24 1000          Cognition    Affect/Mental Status (Cognition) WNL  -VK     Orientation Status (Cognition) oriented x 3  -VK     Personal Safety Interventions nonskid shoes/slippers when out of bed;gait belt;fall prevention program maintained  -VK       Row Name 11/05/24 1000          Sit-Stand Transfer    Sit-Stand [place] (Transfers) standby assist  -VK     Assistive Device (Sit-Stand Transfers) walker, front-wheeled;walker, 4-wheeled  -VK       Row Name 11/05/24 1000          Stand-Sit Transfer    Stand-Sit [place] (Transfers) standby assist  -VK     Assistive Device (Stand-Sit Transfers) walker, front-wheeled;walker, 4-wheeled  -VK       Row Name 11/05/24 1000          Gait/Stairs (Locomotion)    [place] Level (Gait) standby assist;contact guard  -VK     Assistive Device (Gait) walker, front-wheeled;walker, 4-wheeled  -VK     Patient was able to Ambulate yes  -VK     Distance in Feet (Gait) --  28ft, 100ft, 225ft, 225ft  -VK     Pattern (Gait) step-to;step-through  -VK     Deviations/Abnormal Patterns (Gait) connie decreased;gait speed decreased;stride length decreased  -VK     Bilateral Gait Deviations forward flexed posture;heel strike decreased  -VK     Right Sided Gait Deviations hip hiking  -VK     Gait Assessment/Intervention Pt started off with straight cane, but due to being unsteady with cane, returned to using FWW.  Near end of treatment a rollator was trialed, pt tolerated this well/safely. Pt has carpet at home, and rollator would likely move on carpet better/safer than a FWW. SW made aware that pt  may want a rollator ordered for home.  -VK     [place] Level (Stairs) stand by assist;contact guard;verbal cues  -VK     Assistive Device (Stairs) walker, front-wheeled  -VK     Handrail Location (Stairs) both sides  -VK     Number of Steps (Stairs) 4x2  -VK     Ascending Technique (Stairs) step-to-step  -VK     Descending Technique (Stairs) step-to-step  -VK     Stairs, Safety Issues sequencing ability decreased  -VK       Row Name 11/05/24 1000          Safety Issues/Impairments Affecting Functional Mobility    Impairments Affecting Function (Mobility) balance;endurance/activity tolerance;strength  -VK       Row Name 11/05/24 1000          Balance    Dynamic Standing Balance standby assist;contact guard  -VK     Position/Device Used, Standing Balance walker, front-wheeled;walker, 4-wheeled  -VK       Row Name 11/05/24 1000          Aerobic Exercise    Type (Aerobic Exercise) recumbent stationary bike  -VK     Time Performed (Aerobic Exercise) 20 minutes  -VK     Comment, Aerobic Exercise (Therapeutic Exercise) Load 5  -VK       Row Name             Wound 10/26/24 2107 Right posterior heel unspecified    Wound - Properties Group Placement Date: 10/26/24  -KR Placement Time: 2107 -KR Side: Right  -KR Orientation: posterior  -KR Location: heel  -KR Primary Wound Type: Traumatic  -KR Type: unspecified  -KR Present on Original Admission: N  -KR    Retired Wound - Properties Group Placement Date: 10/26/24  -KR Placement Time: 2107 -KR Present on Original Admission: N  -KR Side: Right  -KR Orientation: posterior  -KR Location: heel  -KR Primary Wound Type: Traumatic  -KR Type: unspecified  -KR    Retired Wound - Properties Group Placement Date: 10/26/24  -KR Placement Time: 2107 -KR Present on Original Admission: N  -KR Side: Right  -KR Orientation: posterior  -KR Location: heel  -KR Primary Wound Type: Traumatic  -KR Type: unspecified  -KR    Retired Wound - Properties Group Date first assessed: 10/26/24  -KR  Time first assessed: 2107  -KR Present on Original Admission: N  -KR Side: Right  -KR Location: heel  -KR Primary Wound Type: Traumatic  -KR Type: unspecified  -KR      Row Name 11/05/24 1000          Positioning and Restraints    Pre-Treatment Position sitting in chair/recliner  -VK     Post Treatment Position chair  -VK     In Chair sitting;with OT  In gym  -VK       Row Name 11/05/24 1000          Progress Summary (PT)    Progress Toward Functional Goals (PT) progress toward functional goals is good  -VK       Row Name 11/05/24 1244          Discharge Summary (PT)    Additional Documentation Discharge Summary (PT) (Group)  -VK       Row Name 11/05/24 1244          Discharge Summary (PT)    Reason for Discharge (PT Discharge Summary) patient discharged from this facility  -VK     Outcomes Achieved Upon Discharge (PT Discharge Summary) goals partially achieved prior to discharge;progress was made toward goals  -VK     Transfer to Another Level of Care or Facility (PT Discharge Summary) home with home health recommended;home with assist recommended  -VK               User Key  (r) = Recorded By, (t) = Taken By, (c) = Cosigned By      Initials Name Provider Type    VK Cheyanne Mondragon PTA Physical Therapist Assistant    Blanca Kahn, RN Registered Nurse                  Section G              Section GG                    Mobility, Discharge Performance (CQ6154)  Roll Left & Right: Mobility Discharge (IN0166G1): independent (06)  Sit to Lying: Mobility Discharge Performance (IO4466K3): independent (06)  Lying to Sitting, Side of Bed: Mobility Discharge Performance (JL3769E2): independent (06)  Sit to Stand: Mobility Discharge Performance (IJ9128D4): supervision or touching assistance (04)  Chair/Bed-Chair Transfer: Mobility Discharge Performance (MI6957Y6): supervision or touching assistance (04)  Toilet Transfer: Mobility Discharge Performance (CA3882J0): supervision or touching assistance (04)  Tub/shower  Transfer: Mobility Discharge Performance ([id number]): supervision or touching assistance (04)  Car Transfer: Mobility Discharge Performance (VS6721G7): supervision or touching assistance (04)  Walk 10 Feet: Mobility Discharge Performance (FV7597G2): supervision or touching assistance (04)  Walk 50 Feet With Two Turns: Mobility Discharge Performance (YD3909I9): supervision or touching assistance (04)  Walk 150 Feet: Mobility Discharge Performance (LR0363J8): supervision or touching assistance (04)  Walk 10 Ft, Uneven Surfaces: Mobility Discharge Performance (QE0111I6): supervision or touching assistance (04)  1 Step/Curb: Mobility Discharge Performance (JD1595X0): supervision or touching assistance (04)  4 Steps: Mobility Discharge Performance (PA2858F2): supervision or touching assistance (04)  12 Steps: Mobility Discharge Performance (GG4895I7): partial/moderate assistance (03)  Picking up object: Mobility Discharge Performance (YL2916B7): setup or clean-up assistance (05)  Wheel 50 Ft Two Turns: Mobility Discharge Performance (EC7741R5): not applicable (09)  Wheel 150 Feet: Mobility Discharge Performance (DB8575E0): not applicable (09)    Physical Therapy Education       Title: PT OT SLP Therapies (In Progress)       Topic: Physical Therapy (Not Started)       Point: Mobility training (Not Started)       Learner Progress:  Not documented in this visit.              Point: Home exercise program (Not Started)       Learner Progress:  Not documented in this visit.              Point: Body mechanics (Not Started)       Learner Progress:  Not documented in this visit.              Point: Precautions (Not Started)       Learner Progress:  Not documented in this visit.                                    PT Recommendation and Plan     Progress Summary (PT)  Progress Toward Functional Goals (PT): progress toward functional goals is good     Outcome Measures       Row Name 24 1200 24 1213 24 1700       How  much help from another person do you currently need...    Turning from your back to your side while in flat bed without using bedrails? 4  -VK 4  -WM 4  -CS    Moving from lying on back to sitting on the side of a flat bed without bedrails? 4  -VK 4  -WM 4  -CS    Moving to and from a bed to a chair (including a wheelchair)? 3  -VK 3  -WM 3  -CS    Standing up from a chair using your arms (e.g., wheelchair, bedside chair)? 4  -VK 4  -WM 4  -CS    Climbing 3-5 steps with a railing? 3  -VK 3  -WM 3  -CS    To walk in hospital room? 3  -VK 3  -WM 3  -CS    AM-PAC 6 Clicks Score (PT) 21  -VK 21  -WM 21  -CS       Functional Assessment    Outcome Measure Options -- -- AM-PAC 6 Clicks Basic Mobility (PT)  -CS              User Key  (r) = Recorded By, (t) = Taken By, (c) = Cosigned By      Initials Name Provider Type     Marquise Escobar PTA Physical Therapist Assistant     Antelmo Resendiz PTA Physical Therapist Assistant     Cheyanne Mondragon PTA Physical Therapist Assistant                     Time Calculation:    PT Charges       Row Name 11/05/24 1243 11/05/24 1228          Time Calculation    PT Received On 11/05/24  -VK 11/05/24  -VK        Timed Charges    38227 - PT Therapeutic Exercise Minutes -- 23  -VK     49163 - Gait Training Minutes  -- 13  -VK     64985 - PT Therapeutic Activity Minutes -- 17  -VK        SNF Physical Therapy Minutes    Skilled Minutes- PT -- 53 min  -VK        Total Minutes    Timed Charges Total Minutes -- 53  -VK      Total Minutes -- 53  -VK               User Key  (r) = Recorded By, (t) = Taken By, (c) = Cosigned By      Initials Name Provider Type     Cheyanne Mondragon PTA Physical Therapist Assistant                  Therapy Charges for Today       Code Description Service Date Service Provider Modifiers Qty    75321034474 HC PT THER PROC EA 15 MIN 11/5/2024 Cheyanne Mondragon PTA GP 2    05183247056 HC GAIT TRAINING EA 15 MIN 11/5/2024 Cheyanne Mondragon PTA GP 1    41997386195 HC PT  THERAPEUTIC ACT EA 15 MIN 11/5/2024 Cheyanne Mondragon, PTA GP 1            PT G-Codes  Outcome Measure Options: AM-PAC 6 Clicks Daily Activity (OT), Optimal Instrument  AM-PAC 6 Clicks Score (PT): 21  AM-PAC 6 Clicks Score (OT): 24         Cheyanne Mondragon PTA  11/5/2024

## 2024-11-05 NOTE — THERAPY TREATMENT NOTE
SNF - Occupational Therapy Treatment Note  RAKEL Alcaraz    Patient Name: Darci Munson  : 1935    MRN: [id number]                              Today's Date: 2024       Admit Date: 10/15/2024    Visit Dx:     ICD-10-CM ICD-9-CM   1. Decreased activities of daily living (ADL)  Z78.9 V49.89   2. Difficulty walking  R26.2 719.7     Patient Active Problem List   Diagnosis    Type 2 diabetes mellitus with hyperglycemia, without long-term current use of insulin    Chronic kidney disease    Hypothyroidism    Hypertensive disorder    Hyperlipidemia    Paroxysmal atrial fibrillation    Dyspepsia    Coronary arteriosclerosis    Gastroparesis    Hearing loss    Mitral valve regurgitation    Peripheral neuropathy    Prostate CA    Primary insomnia    Primary osteoarthritis of left knee    Anticoagulated    Vitamin D insufficiency    Iron deficiency anemia    Medicare annual wellness visit, subsequent    Thickened nails    Pain in toes of both feet    History of prostate cancer    Stage 3b chronic kidney disease    Chronic bilateral low back pain without sciatica    Acute renal failure    Intractable nausea and vomiting    Abdominal pain    Physical debility    Severe malnutrition     Past Medical History:   Diagnosis Date    Anemia, unspecified     Atherosclerotic heart disease of native coronary artery without angina pectoris     CAD (coronary artery disease)     CKD (chronic kidney disease), stage III     Essential (primary) hypertension     Gastric ulcer, unspecified as acute or chronic, without hemorrhage or perforation     Gastroparesis     GERD without esophagitis     Glaucoma     Hereditary and idiopathic neuropathy, unspecified     History of falling     HLD (hyperlipidemia)     HTN (hypertension)     Hypotension, unspecified     Hypothyroidism     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    Irritable bowel syndrome without diarrhea     Laceration without foreign body of right forearm, initial encounter     Low back pain      Malignant neoplasm of prostate     Mixed hyperlipidemia     OA (osteoarthritis)     Other specified disorders of the skin and subcutaneous tissue     Pain in left foot     Peripheral vascular disease, unspecified     Phimosis     Presence of unspecified artificial knee joint     Primary insomnia     Primary osteoarthritis of both knees     Prostate cancer     Sensorineural hearing loss (SNHL) of both ears     Sigmoid diverticulosis     severe sigmoid and descending colon diverticulosis and 3+ gastritis    Sleep related leg cramps     Type 2 diabetes mellitus with diabetic polyneuropathy     and gastroparesis    Unspecified atrial fibrillation     Unspecified dementia without behavioral disturbance     Unspecified hearing loss, bilateral      Past Surgical History:   Procedure Laterality Date    CATARACT EXTRACTION Bilateral 2014    COLONOSCOPY      COLONOSCOPY N/A 7/1/2022    Procedure: COLONOSCOPY WITH POLYPECTOMIES, HOT SNARE;  Surgeon: Jennifer Mann MD;  Location: [facility];  Service: Gastroenterology;  Laterality: N/A;  DIVERTICULOSIS, COLON POLYPS, HEMORRHOIDS    ENDOSCOPY N/A 7/1/2022    Procedure: ESOPHAGOGASTRODUODENOSCOPY WITH BX, POLYPECTOMY;  Surgeon: Jennifer Mann MD;  Location: [facility];  Service: Gastroenterology;  Laterality: N/A;  GASTRIC POLYP, GASTRITIS, HIATAL HERNIA    HAND SURGERY Right     JOINT REPLACEMENT      KNEE ARTHROSCOPY Right 03/14/2017    PROSTATECTOMY      REPLACEMENT TOTAL KNEE  03/2017    UPPER GASTROINTESTINAL ENDOSCOPY        General Information       Row Name 11/05/24 1115          OT Time and Intention    Document Type therapy note (daily note)  -EG     Mode of Treatment individual therapy;occupational therapy  -EG     Patient Effort good  -EG       Row Name 11/05/24 1115          General Information    Existing Precautions/Restrictions fall  -EG       Row Name 11/05/24 1115          Cognition    Orientation Status (Cognition) oriented x 3   -EG       Row Name 11/05/24 1115          Safety Issues/Impairments Affecting Functional Mobility    Impairments Affecting Function (Mobility) balance;endurance/activity tolerance;strength  -EG               User Key  (r) = Recorded By, (t) = Taken By, (c) = Cosigned By      Initials Name Provider Type    Johanna Rachel OT Occupational Therapist                     Mobility/ADL's       Row Name 11/05/24 1115          Bed Mobility    Comment, (Bed Mobility) up in chair in gym; recieved from PT  -EG       Row Name 11/05/24 1115          Transfers    Transfers sit-stand transfer;stand-sit transfer;bed-chair transfer  -EG       Row Name 11/05/24 1115          Bed-Chair Transfer    Bed-Chair Stephenson (Transfers) modified independence;supervision  -EG     Assistive Device (Bed-Chair Transfers) walker, 4-wheeled  -EG       Row Name 11/05/24 1115          Sit-Stand Transfer    Sit-Stand Stephenson (Transfers) modified independence;supervision  -EG     Assistive Device (Sit-Stand Transfers) walker, 4-wheeled  -EG       Row Name 11/05/24 1115          Stand-Sit Transfer    Stand-Sit Stephenson (Transfers) modified independence  -EG     Assistive Device (Stand-Sit Transfers) walker, 4-wheeled  -EG       Row Name 11/05/24 1115          Functional Mobility    Functional Mobility- Ind. Level supervision required;conditional independence  -EG     Functional Mobility- Device walker, 4-wheeled  -EG     Functional Mobility- Comment Patient able to perform functional mobility obstacle course manuevering small spaces, altered surfaces, incline/decline surfaces, thresholds and doorways, picking up items from ground all using rollator with training, no LOB; Education on safe use of AD and education on brake use  -EG               User Key  (r) = Recorded By, (t) = Taken By, (c) = Cosigned By      Initials Name Provider Type    Johanna Rachel OT Occupational Therapist                   Obj/Interventions       Row Name  11/05/24 1117          Vision Assessment/Intervention    Visual Impairment/Limitations WFL  -EG       [facility] Name 11/05/24 1117          Motor Skills    Therapeutic Exercise aerobic  -EG       Row Name 11/05/24 1117          Balance    Balance Interventions standing;sit to stand;supported;static;dynamic;weight shifting activity;occupation based/functional task;foam;UE activity with balance activity  -EG     Comment, Balance Unsupported standing on airex foam pad with BUE gross motor tasks using ball and dynamic weight shifting no LOB 3-4 minutes; Patient particiapted in stair training with education and good follow through on stepping stradegies for safety and negotiation on one hand rail use 2x5 performed no LOB; gross motor stepping tasks unsupported with reaching outside base of support  -EG       Row Name 11/05/24 1117          Aerobic Exercise    Type (Aerobic Exercise) arm bike  -EG     Comment, Aerobic Exercise (Therapeutic Exercise) 17:00 omnicycle cardiac interval setting no rest breaks  -EG               User Key  (r) = Recorded By, (t) = Taken By, (c) = Cosigned By      Initials Name Provider Type    EG Johanna Donato, LOUIE Occupational Therapist                   Goals/Plan    No documentation.                  Clinical Impression       [facility] Name 11/05/24 1118          Pain Assessment    Pretreatment Pain Rating 0/10 - no pain  -EG     Posttreatment Pain Rating 0/10 - no pain  -EG       Row Name 11/05/24 1118          Plan of Care Review    Plan of Care Reviewed With patient  -EG     Progress improving  -EG     Outcome Evaluation Pleasant and cooperative; Mod(I) for ADL's and use of rollator during mobility at this time; good carryover on training and follow through noted; Able to meet all goals during POC; D/C scheduled for tomorrow.  -EG       [facility] Name 11/05/24 1118          Therapy Assessment/Plan (OT)    Criteria for Skilled Therapeutic Interventions Met (OT) yes;meets criteria;skilled treatment is  necessary  -EG     Therapy Frequency (OT) 5 times/wk  -EG       Row Name 11/05/24 1118          Therapy Plan Review/Discharge Plan (OT)    Anticipated Discharge Disposition (OT) home with home health  -EG       Row Name 11/05/24 1118          Positioning and Restraints    Pre-Treatment Position other (comment)  With PT  -EG     Post Treatment Position chair  -EG     In Chair reclined;sitting;call light within reach;encouraged to call for assist;exit alarm on  -EG               User Key  (r) = Recorded By, (t) = Taken By, (c) = Cosigned By      Initials Name Provider Type    Johanna Rachel OT Occupational Therapist                   Outcome Measures       Row Name 11/05/24 1119          How much help from another is currently needed...    Putting on and taking off regular lower body clothing? 4  -EG     Bathing (including washing, rinsing, and drying) 4  -EG     Toileting (which includes using toilet bed pan or urinal) 4  -EG     Putting on and taking off regular upper body clothing 4  -EG     Taking care of personal grooming (such as brushing teeth) 4  -EG     Eating meals 4  -EG     AM-PAC 6 Clicks Score (OT) 24  -EG       Row Name 11/05/24 1119          Functional Assessment    Outcome Measure Options AM-PAC 6 Clicks Daily Activity (OT);Optimal Instrument  -EG       Row Name 11/05/24 1119          Optimal Instrument    Optimal Instrument Optimal - 3  -EG     Bending/Stooping 1  -EG     Standing 1  -EG     Reaching 1  -EG               User Key  (r) = Recorded By, (t) = Taken By, (c) = Cosigned By      Initials Name Provider Type    Johanna Rachel OT Occupational Therapist                  Section G              Section GG                         Occupational Therapy Education       Title: PT OT SLP Therapies (In Progress)       Topic: Occupational Therapy (In Progress)       Point: ADL training (Done)       Description:   Instruct learner(s) on proper safety adaptation and remediation techniques during  self care or transfers.   Instruct in proper use of assistive devices.                  Learning Progress Summary            Patient Acceptance, E, VU,NR by  at 10/16/2024 0925                      Point: Home exercise program (Not Started)       Description:   Instruct learner(s) on appropriate technique for monitoring, assisting and/or progressing therapeutic exercises/activities.                  Learner Progress:  Not documented in this visit.              Point: Precautions (Done)       Description:   Instruct learner(s) on prescribed precautions during self-care and functional transfers.                  Learning Progress Summary            Patient Acceptance, E, VU,NR by  at 10/16/2024 0925                      Point: Body mechanics (Done)       Description:   Instruct learner(s) on proper positioning and spine alignment during self-care, functional mobility activities and/or exercises.                  Learning Progress Summary            Patient Acceptance, E, VU,NR by  at 10/16/2024 0925                                      User Key       Initials Effective Dates Name Provider Type Discipline     06/16/21 -  Ruth Islas OT Occupational Therapist OT                  OT Recommendation and Plan  Therapy Frequency (OT): 5 times/wk  Plan of Care Review  Plan of Care Reviewed With: patient  Progress: improving  Outcome Evaluation: Pleasant and cooperative; Mod(I) for ADL's and use of rollator during mobility at this time; good carryover on training and follow through noted; Able to meet all goals during POC; D/C scheduled for tomorrow.     Time Calculation:         Time Calculation- OT       Row Name 11/05/24 1120             Time Calculation- OT    OT Received On 11/05/24  -EG      OT Goal Re-Cert Due Date 11/15/24  -EG         Timed Charges    97324 - OT Therapeutic Exercise Minutes 17  -EG      59569 -  OT Neuromuscular Reeducation Minutes 13  -EG      03380 - OT Therapeutic Activity Minutes 23  -EG          SNF Occupational Therapy Minutes    Skilled Minutes- OT 53 min  -EG         Total Minutes    Timed Charges Total Minutes 53  -EG       Total Minutes 53  -EG                User Key  (r) = Recorded By, (t) = Taken By, (c) = Cosigned By      Initials Name Provider Type    EG Johanna Donato OT Occupational Therapist                  Therapy Charges for Today       Code Description Service Date Service Provider Modifiers Qty    [id number]  OT NEUROMUSC RE EDUCATION EA 15 MIN 11/5/2024 Johanna Donato OT GO 1    [id number]  OT THER PROC EA 15 MIN 11/5/2024 Johanna Donato OT GO 1    [id number]  OT THERAPEUTIC ACT EA 15 MIN 11/5/2024 Johanna Donato OT GO 2                 Johanna Donato OT  11/5/2024

## 2024-11-05 NOTE — THERAPY TREATMENT NOTE
SNF - Physical Therapy Treatment Note  RAKEL Alcaraz     Patient Name: Darci Munson  : 1935  MRN: [id number]  Today's Date: 2024      Visit Dx:    ICD-10-CM ICD-9-CM   1. Decreased activities of daily living (ADL)  Z78.9 V49.89   2. Difficulty walking  R26.2 719.7   3. Type 2 diabetes mellitus with diabetic polyneuropathy, without long-term current use of insulin  E11.42 250.60     357.2     Patient Active Problem List   Diagnosis    Type 2 diabetes mellitus with hyperglycemia, without long-term current use of insulin    Chronic kidney disease    Hypothyroidism    Hypertensive disorder    Hyperlipidemia    Paroxysmal atrial fibrillation    Dyspepsia    Coronary arteriosclerosis    Gastroparesis    Hearing loss    Mitral valve regurgitation    Peripheral neuropathy    Prostate CA    Primary insomnia    Primary osteoarthritis of left knee    Anticoagulated    Vitamin D insufficiency    Iron deficiency anemia    Medicare annual wellness visit, subsequent    Thickened nails    Pain in toes of both feet    History of prostate cancer    Stage 3b chronic kidney disease    Chronic bilateral low back pain without sciatica    Acute renal failure    Intractable nausea and vomiting    Abdominal pain    Physical debility    Severe malnutrition     Past Medical History:   Diagnosis Date    Anemia, unspecified     Atherosclerotic heart disease of native coronary artery without angina pectoris     CAD (coronary artery disease)     CKD (chronic kidney disease), stage III     Essential (primary) hypertension     Gastric ulcer, unspecified as acute or chronic, without hemorrhage or perforation     Gastroparesis     GERD without esophagitis     Glaucoma     Hereditary and idiopathic neuropathy, unspecified     History of falling     HLD (hyperlipidemia)     HTN (hypertension)     Hypotension, unspecified     Hypothyroidism     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    Irritable bowel syndrome without diarrhea     Laceration without foreign  body of right forearm, initial encounter     Low back pain     Malignant neoplasm of prostate     Mixed hyperlipidemia     OA (osteoarthritis)     Other specified disorders of the skin and subcutaneous tissue     Pain in left foot     Peripheral vascular disease, unspecified     Phimosis     Presence of unspecified artificial knee joint     Primary insomnia     Primary osteoarthritis of both knees     Prostate cancer     Sensorineural hearing loss (SNHL) of both ears     Sigmoid diverticulosis     severe sigmoid and descending colon diverticulosis and 3+ gastritis    Sleep related leg cramps     Type 2 diabetes mellitus with diabetic polyneuropathy     and gastroparesis    Unspecified atrial fibrillation     Unspecified dementia without behavioral disturbance     Unspecified hearing loss, bilateral      Past Surgical History:   Procedure Laterality Date    CATARACT EXTRACTION Bilateral 2014    COLONOSCOPY      COLONOSCOPY N/A 7/1/2022    Procedure: COLONOSCOPY WITH POLYPECTOMIES, HOT SNARE;  Surgeon: Jennifer Mann MD;  Location: [facility] ENDOSCOPY;  Service: Gastroenterology;  Laterality: N/A;  DIVERTICULOSIS, COLON POLYPS, HEMORRHOIDS    ENDOSCOPY N/A 7/1/2022    Procedure: ESOPHAGOGASTRODUODENOSCOPY WITH BX, POLYPECTOMY;  Surgeon: Jennifer Mann MD;  Location: [facility] ENDOSCOPY;  Service: Gastroenterology;  Laterality: N/A;  GASTRIC POLYP, GASTRITIS, HIATAL HERNIA    HAND SURGERY Right     JOINT REPLACEMENT      KNEE ARTHROSCOPY Right 03/14/2017    PROSTATECTOMY      REPLACEMENT TOTAL KNEE  03/2017    UPPER GASTROINTESTINAL ENDOSCOPY         PT Assessment (Last 12 Hours)       PT Evaluation and Treatment       Row Name 11/05/24 1000          Physical Therapy Time and Intention    Subjective Information complains of;pain  -VK     Document Type therapy note (daily note)  -VK     Mode of Treatment individual therapy;physical therapy  -VK     Patient Effort good  -VK       Row Name 11/05/24 1000           General Information    Patient Profile Reviewed yes  -VK     Existing Precautions/Restrictions fall  -VK       Row Name 11/05/24 1000          Pain    Pretreatment Pain Rating 5/10  -VK     Pain Location back  -VK     Pain Management Interventions nursing notified;exercise or physical activity utilized  Nursing medicated patient  -VK       Row Name 11/05/24 1000          Cognition    Affect/Mental Status (Cognition) WNL  -VK     Orientation Status (Cognition) oriented x 3  -VK     Personal Safety Interventions nonskid shoes/slippers when out of bed;gait belt;fall prevention program maintained  -VK       Row Name 11/05/24 1000          Sit-Stand Transfer    Sit-Stand [place] (Transfers) standby assist  -VK     Assistive Device (Sit-Stand Transfers) walker, front-wheeled;walker, 4-wheeled  -VK       Row Name 11/05/24 1000          Stand-Sit Transfer    Stand-Sit [place] (Transfers) standby assist  -VK     Assistive Device (Stand-Sit Transfers) walker, front-wheeled;walker, 4-wheeled  -VK       Row Name 11/05/24 1000          Gait/Stairs (Locomotion)    [place] Level (Gait) standby assist;contact guard  -VK     Assistive Device (Gait) walker, front-wheeled;walker, 4-wheeled  -VK     Patient was able to Ambulate yes  -VK     Distance in Feet (Gait) --  28ft, 100ft, 225ft, 225ft  -VK     Pattern (Gait) step-to;step-through  -VK     Deviations/Abnormal Patterns (Gait) connie decreased;gait speed decreased;stride length decreased  -VK     Bilateral Gait Deviations forward flexed posture;heel strike decreased  -VK     Right Sided Gait Deviations hip hiking  -VK     Gait Assessment/Intervention Pt started off with straight cane, but due to being unsteady with cane, returned to using FWW.  Near end of treatment a rollator was trialed, pt tolerated this well/safely. Pt has carpet at home, and rollator would likely move on carpet better/safer than a FWW. SW made aware that pt may want a rollator ordered for  home.  -VK     [place] Level (Stairs) stand by assist;contact guard;verbal cues  -VK     Assistive Device (Stairs) walker, front-wheeled  -VK     Handrail Location (Stairs) both sides  -VK     Number of Steps (Stairs) 4x2  -VK     Ascending Technique (Stairs) step-to-step  -VK     Descending Technique (Stairs) step-to-step  -VK     Stairs, Safety Issues sequencing ability decreased  -VK       Row Name 11/05/24 1000          Safety Issues/Impairments Affecting Functional Mobility    Impairments Affecting Function (Mobility) balance;endurance/activity tolerance;strength  -VK       Row Name 11/05/24 1000          Balance    Dynamic Standing Balance standby assist;contact guard  -VK     Position/Device Used, Standing Balance walker, front-wheeled;walker, 4-wheeled  -VK       Row Name 11/05/24 1000          Aerobic Exercise    Type (Aerobic Exercise) recumbent stationary bike  -VK     Time Performed (Aerobic Exercise) 20 minutes  -VK     Comment, Aerobic Exercise (Therapeutic Exercise) Load 5  -VK       Row Name             Wound 10/26/24 2107 Right posterior heel unspecified    Wound - Properties Group Placement Date: 10/26/24  -KR Placement Time: 2107 -KR Side: Right  -KR Orientation: posterior  -KR Location: heel  -KR Primary Wound Type: Traumatic  -KR Type: unspecified  -KR Present on Original Admission: N  -KR    Retired Wound - Properties Group Placement Date: 10/26/24  -KR Placement Time: 2107 -KR Present on Original Admission: N  -KR Side: Right  -KR Orientation: posterior  -KR Location: heel  -KR Primary Wound Type: Traumatic  -KR Type: unspecified  -KR    Retired Wound - Properties Group Placement Date: 10/26/24  -KR Placement Time: 2107 -KR Present on Original Admission: N  -KR Side: Right  -KR Orientation: posterior  -KR Location: heel  -KR Primary Wound Type: Traumatic  -KR Type: unspecified  -KR    Retired Wound - Properties Group Date first assessed: 10/26/24  -KR Time first assessed: 2107 -KR  Present on Original Admission: N  -KR Side: Right  -KR Location: heel  -KR Primary Wound Type: Traumatic  -KR Type: unspecified  -KR      Row Name 11/05/24 1000          Positioning and Restraints    Pre-Treatment Position sitting in chair/recliner  -VK     Post Treatment Position chair  -VK     In Chair sitting;with OT  In gym  -VK       Row Name 11/05/24 1000          Progress Summary (PT)    Progress Toward Functional Goals (PT) progress toward functional goals is good  -VK               User Key  (r) = Recorded By, (t) = Taken By, (c) = Cosigned By      Initials Name Provider Type    VK Cheyanne Mondragon PTA Physical Therapist Assistant    Blanca Kahn RN Registered Nurse                  Section G              Section GG                       Physical Therapy Education       Title: PT OT SLP Therapies (In Progress)       Topic: Physical Therapy (Not Started)       Point: Mobility training (Not Started)       Learner Progress:  Not documented in this visit.              Point: Home exercise program (Not Started)       Learner Progress:  Not documented in this visit.              Point: Body mechanics (Not Started)       Learner Progress:  Not documented in this visit.              Point: Precautions (Not Started)       Learner Progress:  Not documented in this visit.                                  PT Recommendation and Plan     Progress Summary (PT)  Progress Toward Functional Goals (PT): progress toward functional goals is good   Outcome Measures       Row Name 11/05/24 1200 11/04/24 1213 11/02/24 1700       How much help from another person do you currently need...    Turning from your back to your side while in flat bed without using bedrails? 4  -VK 4  -WM 4  -CS    Moving from lying on back to sitting on the side of a flat bed without bedrails? 4  -VK 4  -WM 4  -CS    Moving to and from a bed to a chair (including a wheelchair)? 3  -VK 3  -WM 3  -CS    Standing up from a chair using your arms (e.g.,  wheelchair, bedside chair)? 4  -VK 4  -WM 4  -CS    Climbing 3-5 steps with a railing? 3  -VK 3  -WM 3  -CS    To walk in hospital room? 3  -VK 3  -WM 3  -CS    AM-PAC 6 Clicks Score (PT) 21  -VK 21  -WM 21  -CS       Functional Assessment    Outcome Measure Options -- -- AM-PAC 6 Clicks Basic Mobility (PT)  -CS              User Key  (r) = Recorded By, (t) = Taken By, (c) = Cosigned By      Initials Name Provider Type     Marquise Escobar, PTA Physical Therapist Assistant    Antelmo Rosario PTA Physical Therapist Assistant     Cheyanne Mondragon PTA Physical Therapist Assistant                     Time Calculation:    PT Charges       Row Name 11/05/24 1228             Time Calculation    PT Received On 11/05/24  -VK         Timed Charges    76318 - PT Therapeutic Exercise Minutes 23  -VK      51735 - Gait Training Minutes  13  -VK      54598 - PT Therapeutic Activity Minutes 17  -VK         SNF Physical Therapy Minutes    Skilled Minutes- PT 53 min  -VK         Total Minutes    Timed Charges Total Minutes 53  -VK       Total Minutes 53  -VK                User Key  (r) = Recorded By, (t) = Taken By, (c) = Cosigned By      Initials Name Provider Type    Cheyanne Adam PTA Physical Therapist Assistant                  Therapy Charges for Today       Code Description Service Date Service Provider Modifiers Qty    62445086766 HC PT THER PROC EA 15 MIN 11/5/2024 Cheyanne Mondragon PTA GP 2    70216448252 HC GAIT TRAINING EA 15 MIN 11/5/2024 Cheyanne Mondragon PTA GP 1    53803311426 HC PT THERAPEUTIC ACT EA 15 MIN 11/5/2024 Cheyanne Mondragon [facility] GP 1            PT G-Codes  Outcome Measure Options: AM-PAC 6 Clicks Daily Activity (OT), Optimal Instrument  AM-PAC 6 Clicks Score (PT): 21  AM-PAC 6 Clicks Score (OT): 24    Cheyanne Mondragon PTA  11/5/2024

## 2024-11-05 NOTE — THERAPY DISCHARGE NOTE
SNF - Occupational Therapy Discharge   [place]    Patient Name: Darci Munson  : 1935    MRN: [id number]                              Today's Date: 2024       Admit Date: 10/15/2024    Visit Dx:     ICD-10-CM ICD-9-CM   1. Decreased activities of daily living (ADL)  Z78.9 V49.89   2. Difficulty walking  R26.2 719.7     Patient Active Problem List   Diagnosis    Type 2 diabetes mellitus with hyperglycemia, without long-term current use of insulin    Chronic kidney disease    Hypothyroidism    Hypertensive disorder    Hyperlipidemia    Paroxysmal atrial fibrillation    Dyspepsia    Coronary arteriosclerosis    Gastroparesis    Hearing loss    Mitral valve regurgitation    Peripheral neuropathy    Prostate CA    Primary insomnia    Primary osteoarthritis of left knee    Anticoagulated    Vitamin D insufficiency    Iron deficiency anemia    Medicare annual wellness visit, subsequent    Thickened nails    Pain in toes of both feet    History of prostate cancer    Stage 3b chronic kidney disease    Chronic bilateral low back pain without sciatica    Acute renal failure    Intractable nausea and vomiting    Abdominal pain    Physical debility    Severe malnutrition     Past Medical History:   Diagnosis Date    Anemia, unspecified     Atherosclerotic heart disease of native coronary artery without angina pectoris     CAD (coronary artery disease)     CKD (chronic kidney disease), stage III     Essential (primary) hypertension     Gastric ulcer, unspecified as acute or chronic, without hemorrhage or perforation     Gastroparesis     GERD without esophagitis     Glaucoma     Hereditary and idiopathic neuropathy, unspecified     History of falling     HLD (hyperlipidemia)     HTN (hypertension)     Hypotension, unspecified     Hypothyroidism     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    Irritable bowel syndrome without diarrhea     Laceration without foreign body of right forearm, initial encounter     Low back pain      Malignant neoplasm of prostate     Mixed hyperlipidemia     OA (osteoarthritis)     Other specified disorders of the skin and subcutaneous tissue     Pain in left foot     Peripheral vascular disease, unspecified     Phimosis     Presence of unspecified artificial knee joint     Primary insomnia     Primary osteoarthritis of both knees     Prostate cancer     Sensorineural hearing loss (SNHL) of both ears     Sigmoid diverticulosis     severe sigmoid and descending colon diverticulosis and 3+ gastritis    Sleep related leg cramps     Type 2 diabetes mellitus with diabetic polyneuropathy     and gastroparesis    Unspecified atrial fibrillation     Unspecified dementia without behavioral disturbance     Unspecified hearing loss, bilateral      Past Surgical History:   Procedure Laterality Date    CATARACT EXTRACTION Bilateral 2014    COLONOSCOPY      COLONOSCOPY N/A 7/1/2022    Procedure: COLONOSCOPY WITH POLYPECTOMIES, HOT SNARE;  Surgeon: Jennifer Mann MD;  Location: [facility] ENDOSCOPY;  Service: Gastroenterology;  Laterality: N/A;  DIVERTICULOSIS, COLON POLYPS, HEMORRHOIDS    ENDOSCOPY N/A 7/1/2022    Procedure: ESOPHAGOGASTRODUODENOSCOPY WITH BX, POLYPECTOMY;  Surgeon: Jennifer Mann MD;  Location: [facility] ENDOSCOPY;  Service: Gastroenterology;  Laterality: N/A;  GASTRIC POLYP, GASTRITIS, HIATAL HERNIA    HAND SURGERY Right     JOINT REPLACEMENT      KNEE ARTHROSCOPY Right 03/14/2017    PROSTATECTOMY      REPLACEMENT TOTAL KNEE  03/2017    UPPER GASTROINTESTINAL ENDOSCOPY        General Information       Row Name 11/05/24 1115          OT Time and Intention    Document Type therapy note (daily note)  -EG     Mode of Treatment individual therapy;occupational therapy  -EG     Patient Effort good  -EG       Row Name 11/05/24 1115          General Information    Existing Precautions/Restrictions fall  -EG       Row Name 11/05/24 1115          Cognition    Orientation Status (Cognition) oriented x 3   -EG       Row Name 11/05/24 1115          Safety Issues/Impairments Affecting Functional Mobility    Impairments Affecting Function (Mobility) balance;endurance/activity tolerance;strength  -EG               User Key  (r) = Recorded By, (t) = Taken By, (c) = Cosigned By      Initials Name Provider Type    Johanna Rachel OT Occupational Therapist                   Mobility/ADL's       Row Name 11/05/24 1115          Bed Mobility    Comment, (Bed Mobility) up in chair in gym; recieved from PT  -EG       Row Name 11/05/24 1115          Transfers    Transfers sit-stand transfer;stand-sit transfer;bed-chair transfer  -EG       Row Name 11/05/24 1115          Bed-Chair Transfer    Bed-Chair [place] (Transfers) modified independence;supervision  -EG     Assistive Device (Bed-Chair Transfers) walker, 4-wheeled  -EG       Row Name 11/05/24 1115          Sit-Stand Transfer    Sit-Stand [place] (Transfers) modified independence;supervision  -EG     Assistive Device (Sit-Stand Transfers) walker, 4-wheeled  -EG       Row Name 11/05/24 1115          Stand-Sit Transfer    Stand-Sit [place] (Transfers) modified independence  -EG     Assistive Device (Stand-Sit Transfers) walker, 4-wheeled  -EG       Row Name 11/05/24 1115          Functional Mobility    Functional Mobility- Ind. Level supervision required;conditional independence  -EG     Functional Mobility- Device walker, 4-wheeled  -EG     Functional Mobility- Comment Patient able to perform functional mobility obstacle course manuevering small spaces, altered surfaces, incline/decline surfaces, thresholds and doorways, picking up items from ground all using rollator with training, no LOB; Education on safe use of AD and education on brake use  -EG               User Key  (r) = Recorded By, (t) = Taken By, (c) = Cosigned By      Initials Name Provider Type    Johanna Rachel OT Occupational Therapist                   Obj/Interventions       Row Name  11/05/24 1117          Vision Assessment/Intervention    Visual Impairment/Limitations WFL  -EG       Row Name 11/05/24 1117          Motor Skills    Therapeutic Exercise aerobic  -EG       [facility] Name 11/05/24 1117          Balance    Balance Interventions standing;sit to stand;supported;static;dynamic;weight shifting activity;occupation based/functional task;foam;UE activity with balance activity  -EG     Comment, Balance Unsupported standing on airex foam pad with BUE gross motor tasks using ball and dynamic weight shifting no LOB 3-4 minutes; Patient particiapted in stair training with education and good follow through on stepping stradegies for safety and negotiation on one hand rail use 2x5 performed no LOB; gross motor stepping tasks unsupported with reaching outside base of support  -EG       [facility] Name 11/05/24 1117          Aerobic Exercise    Type (Aerobic Exercise) arm bike  -EG     Comment, Aerobic Exercise (Therapeutic Exercise) 17:00 omnicycle cardiac interval setting no rest breaks  -EG               User Key  (r) = Recorded By, (t) = Taken By, (c) = Cosigned By      Initials Name Provider Type    EG Johanna Donato, OT Occupational Therapist                   Goals/Plan       Row Name 11/05/24 1145          Transfer Goal 1 (OT)    Activity/Assistive Device (Transfer Goal 1, OT) transfers, all  -EG     [place] Level/Cues Needed (Transfer Goal 1, OT) modified independence  -EG     Time Frame (Transfer Goal 1, OT) long term goal (LTG);30 days  -EG     Progress/Outcome (Transfer Goal 1, OT) goal met  -EG       Row Name 11/05/24 1145          Bathing Goal 1 (OT)    Activity/Device (Bathing Goal 1, OT) bathing skills, all  -EG     [place] Level/Cues Needed (Bathing Goal 1, OT) modified independence  -EG     Time Frame (Bathing Goal 1, OT) long term goal (LTG);30 days  -EG     Progress/Outcomes (Bathing Goal 1, OT) goal met  -EG       Row Name 11/05/24 1145          Dressing Goal 1 (OT)     Activity/Device (Dressing Goal 1, OT) dressing skills, all  -EG     [place]/Cues Needed (Dressing Goal 1, OT) modified independence  -EG     Time Frame (Dressing Goal 1, OT) long term goal (LTG);30 days  -EG     Progress/Outcome (Dressing Goal 1, OT) goal met  -EG       Row Name 11/05/24 1145          Toileting Goal 1 (OT)    Activity/Device (Toileting Goal 1, OT) toileting skills, all  -EG     [place] Level/Cues Needed (Toileting Goal 1, OT) modified independence  -EG     Time Frame (Toileting Goal 1, OT) long term goal (LTG);30 days  -EG     Progress/Outcome (Toileting Goal 1, OT) goal met  -EG       Row Name 11/05/24 1145          Grooming Goal 1 (OT)    Activity/Device (Grooming Goal 1, OT) grooming skills, all  -EG     [place] (Grooming Goal 1, OT) modified independence  -EG     Time Frame (Grooming Goal 1, OT) long term goal (LTG);30 days  -EG     Progress/Outcome (Grooming Goal 1, OT) goal met  -EG       Row Name 11/05/24 1145          Problem Specific Goal 1 (OT)    Problem Specific Goal 1 (OT) Patient will improve activity tolerance to fair plus to support independence and engagement with ADL activities  -EG     Time Frame (Problem Specific Goal 1, OT) long term goal (LTG);30 days  -EG     Progress/Outcome (Problem Specific Goal 1, OT) goal met  -EG               User Key  (r) = Recorded By, (t) = Taken By, (c) = Cosigned By      Initials Name Provider Type    EG Johanna Donato, OT Occupational Therapist                   Clinical Impression       Row Name 11/05/24 1118          Pain Assessment    Pretreatment Pain Rating 0/10 - no pain  -EG     Posttreatment Pain Rating 0/10 - no pain  -EG       Row Name 11/05/24 1118          Plan of Care Review    Plan of Care Reviewed With patient  -EG     Progress improving  -EG     Outcome Evaluation Pleasant and cooperative; Mod(I) for ADL's and use of rollator during mobility at this time; good carryover on training and follow through noted; Able to  meet all goals during POC; D/C scheduled for tomorrow.  -EG       Row Name 11/05/24 1118          Therapy Assessment/Plan (OT)    Criteria for Skilled Therapeutic Interventions Met (OT) yes;meets criteria;skilled treatment is necessary  -EG     Therapy Frequency (OT) 5 times/wk  -EG       Row Name 11/05/24 1118          Therapy Plan Review/Discharge Plan (OT)    Anticipated Discharge Disposition (OT) home with home health  -EG       Row Name 11/05/24 1118          Positioning and Restraints    Pre-Treatment Position other (comment)  With PT  -EG     Post Treatment Position chair  -EG     In Chair reclined;sitting;call light within reach;encouraged to call for assist;exit alarm on  -EG               User Key  (r) = Recorded By, (t) = Taken By, (c) = Cosigned By      Initials Name Provider Type    Johanna Rachel OT Occupational Therapist                   Outcome Measures       Row Name 11/05/24 1119          How much help from another is currently needed...    Putting on and taking off regular lower body clothing? 4  -EG     Bathing (including washing, rinsing, and drying) 4  -EG     Toileting (which includes using toilet bed pan or urinal) 4  -EG     Putting on and taking off regular upper body clothing 4  -EG     Taking care of personal grooming (such as brushing teeth) 4  -EG     Eating meals 4  -EG     AM-PAC 6 Clicks Score (OT) 24  -EG       Row Name 11/05/24 1119          Functional Assessment    Outcome Measure Options AM-PAC 6 Clicks Daily Activity (OT);Optimal Instrument  -EG       Row Name 11/05/24 1119          Optimal Instrument    Optimal Instrument Optimal - 3  -EG     Bending/Stooping 1  -EG     Standing 1  -EG     Reaching 1  -EG               User Key  (r) = Recorded By, (t) = Taken By, (c) = Cosigned By      Initials Name Provider Type    Johanna Rachel OT Occupational Therapist                  Section G              Section GG                 Self Care, Discharge Performance  ([id number])  Eating: Self-Care Discharge Performance ([id number]): independent (06)  Oral Hygiene: Self-Care Discharge Performance (UA3518D1): independent (06)  Toileting Hygiene: Self-Care Discharge Performance (XT4483S1): independent (06)  Shower/Bathe Self: Self-Care Discharge Performance (GT1999M7): setup or clean-up assistance (05)  Upper Body Dressing: Self-Care Discharge Performance ([id number]): independent (06)  Lower Body Dressing: Self-Care Discharge Performance (NK9769M6): independent (06)  Putting On/Taking Off Footwear: Self-Care Discharge Performance ([id number]): independent (06)  Personal Hygiene: Self-Care Discharge Performance (PL8944K2): independent (06)  Mobility, Discharge Performance (JS4031)  Toilet Transfer: Mobility Discharge Performance (KN1335P6): independent (06)  Tub/shower Transfer: Mobility Discharge Performance ([id number]): independent (06)    Occupational Therapy Education       Title: PT OT SLP Therapies (In Progress)       Topic: Occupational Therapy (In Progress)       Point: ADL training (Done)       Description:   Instruct learner(s) on proper safety adaptation and remediation techniques during self care or transfers.   Instruct in proper use of assistive devices.                  Learning Progress Summary            Patient Acceptance, E, VU,NR by  at 10/16/2024 0925                      Point: Home exercise program (Not Started)       Description:   Instruct learner(s) on appropriate technique for monitoring, assisting and/or progressing therapeutic exercises/activities.                  Learner Progress:  Not documented in this visit.              Point: Precautions (Done)       Description:   Instruct learner(s) on prescribed precautions during self-care and functional transfers.                  Learning Progress Summary            Patient Acceptance, E, VU,NR by  at 10/16/2024 0925                      Point: Body mechanics (Done)       Description:   Instruct learner(s) on  proper positioning and spine alignment during self-care, functional mobility activities and/or exercises.                  Learning Progress Summary            Patient Acceptance, E, VU,NR by  at 10/16/2024 0925                                      User Key       Initials Effective Dates Name Provider Type Discipline     06/16/21 -  Ruth Islas OT Occupational Therapist OT                  OT Recommendation and Plan  Therapy Frequency (OT): 5 times/wk  Plan of Care Review  Plan of Care Reviewed With: patient  Progress: improving  Outcome Evaluation: Pleasant and cooperative; Mod(I) for ADL's and use of rollator during mobility at this time; good carryover on training and follow through noted; Able to meet all goals during POC; D/C scheduled for tomorrow.  Plan of Care Reviewed With: patient  Outcome Evaluation: Pleasant and cooperative; Mod(I) for ADL's and use of rollator during mobility at this time; good carryover on training and follow through noted; Able to meet all goals during POC; D/C scheduled for tomorrow.     Time Calculation:         Time Calculation- OT       Row Name 11/05/24 1120             Time Calculation- OT    OT Received On 11/05/24  -EG      OT Goal Re-Cert Due Date 11/15/24  -EG         Timed Charges    12166 - OT Therapeutic Exercise Minutes 17  -EG      77704 -  OT Neuromuscular Reeducation Minutes 13  -EG      62439 - OT Therapeutic Activity Minutes 23  -EG         SNF Occupational Therapy Minutes    Skilled Minutes- OT 53 min  -EG         Total Minutes    Timed Charges Total Minutes 53  -EG       Total Minutes 53  -EG                User Key  (r) = Recorded By, (t) = Taken By, (c) = Cosigned By      Initials Name Provider Type    EG Johanna Donato OT Occupational Therapist                  Therapy Charges for Today       Code Description Service Date Service Provider Modifiers Qty    [id number]  OT NEUROMUSC RE EDUCATION EA 15 MIN 11/5/2024 Johanna Donato OT GO 1     [id number]  OT THER PROC EA 15 MIN 11/5/2024 Johanna Donato, OT GO 1    [id number]  OT THERAPEUTIC ACT EA 15 MIN 11/5/2024 Johanna Donato OT GO 2                 Johanna Donato OT  11/5/2024

## 2024-11-05 NOTE — DISCHARGE SUMMARY
[facility]  HOSPITALIST  DISCHARGE SUMMARY       Patient Name: Darci Munson  : 1935  MRN: [id number]  Primary Care Physician: Adrien Mayer MD    Date of Admission to SNF rehab: 10/15/2024  Date of Discharge from SNF rehab: 24  SNF rehab-Hospital Discharge Diagnoses   Hospital-acquired weakness  Recent ITA  Recent hyperkalemia  Recent dehydration  Acute diverticulitis  Atrial fibrillation/flutter chronic  Diverticulosis  Diabetes  CAD  Hypertension   Gastroparesis  History of prostate cancer  Hypothyroidism  SNF rehab-Hospital Course   SNF rehab-Hospital Course:    Darci Munson is a 89 y.o. male w past medical history significant for chronic atrial fibrillation/flutter, diabetes, CAD, hypertension, hyperlipidemia, hypothyroidism, history of prostate cancer, chronic kidney disease, glaucoma, and mitral valve regurgitation.  He was recently hospitalized, that was hospitalized 10/11   -  10/15 with ITA, hyperkalemia presenting with nausea vomiting diarrhea symptoms.  CT scan demonstrating nodules in the lower lobe which will require follow-up CT scan of the chest in 6 months.  There was concern for possible acute diverticulitis and was treated with antibiotics.  Over time, renal function improved to baseline and white count normalized.  He remained hemodynamically stable and deemed a good candidate for more rehab.  Subsequently admitted to our SNF at [facility] on 10/15/24.      He spent approximately 3 weeks in our SNF rehab and worked with PT and OT on a daily basis.  His renal indices remained stable during rehab.  He remained medically stable during rehab and had no setbacks during rehab stay.  Creatinine 1.07 on 24.  Potassium 4.9.  Patient has done well and is now returning home.  Home health will follow.  He is to follow-up with his PCP in 1 week.  He will follow-up with his nephrologist 1 month.  Home Eliquis changed to 5 mg twice daily based on improved renal function.  Midodrine  added for blood pressure support.  BP has been stable 120-130s/60-80s range.  Home lisinopril held due to recent low BP, hyperkalemia, and ITA.    Discharge Follow Up / Recommendations (labs, diagnostics, meds, etc):   As above  Future Appointments   Date Time Provider Department Center   11/7/2024 11:15 AM Adrien Mayer MD [facility]     Consultants     Consults       Date and Time Order Name Status Description    10/15/2024  3:05 PM Inpatient Hospitalist Consult      10/15/2024  3:05 PM Inpatient Hospitalist Consult      10/10/2024  9:10 PM Inpatient Nephrology Consult Completed           On Day of Discharge   VS: Temp:  [97.5 °F (36.4 °C)-97.9 °F (36.6 °C)] 97.5 °F (36.4 °C)  Heart Rate:  [61-79] 79  Resp:  [18] 18  BP: (123-135)/(50-80) 131/80  EXAM:  (refer to progress note from 11/5/2024)     Discharge Medications        New Medications        Instructions Start Date   apixaban 5 MG tablet tablet  Commonly known as: ELIQUIS   5 mg, Oral, 2 Times Daily      gabapentin 100 MG capsule  Commonly known as: NEURONTIN   100 mg, Oral, Every 12 Hours Scheduled      midodrine 2.5 MG tablet  Commonly known as: PROAMATINE   2.5 mg, Oral, 3 Times Daily Before Meals             Continue These Medications        Instructions Start Date   amiodarone 200 MG tablet  Commonly known as: PACERONE   200 mg, Oral, Daily      calcium carbonate 500 MG chewable tablet  Commonly known as: TUMS   2 tablets, Oral, Every 4 Hours PRN      carboxymethylcellulose 0.5 % solution  Commonly known as: REFRESH PLUS   1 drop, Both Eyes, 3 Times Daily PRN      cholecalciferol 25 MCG (1000 UT) tablet  Commonly known as: VITAMIN D3   1,000 Units, Oral, Daily      clotrimazole 1 % cream  Commonly known as: LOTRIMIN   1 Application, Topical, 2 Times Daily      ferrous sulfate 325 (65 FE) MG tablet   325 mg, Oral, Every Evening      insulin detemir 100 UNIT/ML injection  Commonly known as: LEVEMIR   10 Units, Subcutaneous, Daily       Insulin Lispro 100 UNIT/ML injection  Commonly known as: humaLOG   6 Units, Subcutaneous, 4 Times Daily Before Meals & Nightly      latanoprost 0.005 % ophthalmic solution  Commonly known as: XALATAN   Administer 1 drop to both eyes 2 (Two) Times a Day.      levothyroxine 200 MCG tablet  Commonly known as: SYNTHROID, LEVOTHROID   Take 1 tablet by mouth once daily      loperamide 2 MG capsule  Commonly known as: IMODIUM   2 mg, Oral, As Needed      multivitamin tablet tablet   1 tablet, Oral, Daily      nystatin 965990 UNIT/GM powder  Commonly known as: MYCOSTATIN   1 Application, Topical, 2 Times Daily      ondansetron ODT 4 MG disintegrating tablet  Commonly known as: ZOFRAN-ODT   4 mg, Translingual, Every 8 Hours PRN      pantoprazole 40 MG EC tablet  Commonly known as: PROTONIX   Take 1 tablet by mouth once daily      saccharomyces boulardii 250 MG capsule  Commonly known as: FLORASTOR   250 mg, Oral, 2 Times Daily             Stop These Medications      HYDROcodone-acetaminophen 5-325 MG per tablet  Commonly known as: NORCO     isosorbide mononitrate 30 MG 24 hr tablet  Commonly known as: IMDUR     lisinopril 10 MG tablet  Commonly known as: PRINIVIL,ZESTRIL     sucralfate 1 g tablet  Commonly known as: CARAFATE            Procedures   None in rehab  Imaging   None in rehab      CT Abdomen Pelvis Without Contrast    Result Date: 10/10/2024  CT ABDOMEN PELVIS WO CONTRAST Date of Exam: 10/10/2024 9:16 PM EDT Indication: Flank pain, kidney stone suspected Abdominal pain flank pain. Comparison: KUB 5/30/2024 Technique: Axial CT images were obtained of the abdomen and pelvis without the administration of contrast. Reconstructed coronal and sagittal images were also obtained. Automated exposure control and iterative construction methods were used. Findings: Reticulation at the pulmonary periphery with septal thickening is consistent with chronic interstitial lung disease. 3 mm noncalcified nodules are seen in the  left lower lobe on series 204 image 21 and 20. Follow-up CT scan of the chest is recommended in  6 months. Extensive coronary artery calcifications are evident. The liver is of normal size. Scattered tiny calcifications are consistent with old granulomatous disease, and are of doubtful significance. The gallbladder is not abnormally distended. No pancreatic or adrenal mass is evident. The spleen is of normal size. Vascular calcifications are seen in the kidneys. No ureteral stones are seen. There is no evidence of hydronephrosis. The urinary bladder is not abnormally distended. Post prostatectomy. The stomach is not abnormally distended. Bowel loops are of normal caliber. The appendix is normal. Moderate diverticulosis of the descending colon and sigmoid colon is evident. Ill-defined increased density in fat posterior to the proximal descending colon on series 201 image 104 may represent acute diverticulitis or scarring from prior episodes of diverticulitis. This could also represent a mural mass of the colon. Degenerative changes are seen in the lower thoracic and lumbar spine.     Impression: CT scan of the abdomen and pelvis without contrast demonstrating tiny noncalcified nodules in the left lower lobe. Follow-up CT scan of the chest is recommended in 6 months. Post prostatectomy. Moderate diverticulosis of the descending colon and sigmoid colon. Ill-defined increased density in fat posterior to the proximal descending colon may represent acute diverticulitis or scarring from prior episodes of diverticulitis. This could also represent an underlying mural abnormality of the colon. Electronically Signed: Armaan Garcia MD  10/10/2024 9:45 PM EDT  Workstation ID: [id number]    Discharge Details   Hospital Diet:     Diet Order   Procedures    Diet: Diabetic; Consistent Carbohydrate; Fluid Consistency: Thin (IDDSI 0)     CODE STATUS:    Code Status and Medical Interventions: CPR (Attempt to Resuscitate); Full Support    Ordered at: 10/15/24 1505     Level Of Support Discussed With:    Patient     Code Status (Patient has no pulse and is not breathing):    CPR (Attempt to Resuscitate)     Medical Interventions (Patient has pulse or is breathing):    Full Support     Additional Instructions for the Follow-ups that You Need to Schedule       Discharge Follow-up with PCP   As directed       Currently Documented PCP:    Adrien Mayer MD    PCP Phone Number:    [phone]     Follow Up Details: PCP 1 week        Discharge Follow-up with Specified Provider: Dr. Macias Nephrology appt follow up 1 month; 1 Month   As directed      To: Dr. Macias Nephrology appt follow up 1 month   Follow Up: 1 Month              [place] [facility]ertinent  Labs   LAB RESULTS:          Lab 11/05/24  0503   SODIUM 139   POTASSIUM 4.9   CHLORIDE 105   CO2 25.8   ANION GAP 8.2   BUN 38*   CREATININE 1.07   EGFR 66.3   GLUCOSE 116*   CALCIUM 9.5                         Brief Urine Lab Results  (Last result in the past 365 days)        Color   Clarity   Blood   Leuk Est   Nitrite   Protein   CREAT   Urine HCG        10/14/24 1239 Yellow   Clear   Negative   Negative   Negative   Trace                 Microbiology Results (last 10 days)       Procedure Component Value - Date/Time    COVID PRE-OP / PRE-PROCEDURE SCREENING ORDER (NO ISOLATION) - Swab, Nasal Cavity [[id number]]  (Normal) Collected: 11/04/24 0852    Lab Status: Final result Specimen: Swab from Nasal Cavity Updated: 11/04/24 1326    Narrative:      The following orders were created for panel order COVID PRE-OP / PRE-PROCEDURE SCREENING ORDER (NO ISOLATION) - Swab, Nasal Cavity.  Procedure                               Abnormality         Status                     ---------                               -----------         ------                     COVID-19,CEPHEID/ALEC,CO...[[id number]]  Normal              Final result                 Please view results for  these tests on the individual orders.    COVID-19,CEPHEID/ALEC,COR/KALPANA/PAD/SHANE/LAG/RYDER IN-HOUSE,NP SWAB IN TRANSPORT MEDIA 1 HR TAT, RT-PCR - Swab, Nasopharynx [[id number]]  (Normal) Collected: 11/04/24 0852    Lab Status: Final result Specimen: Swab from Nasopharynx Updated: 11/04/24 1326     COVID19 Not Detected    Narrative:      Fact sheet for providers: https://www.fda.gov/media/218254/download     Fact sheet for patients: https://www.fda.gov/media/565847/download  Fact sheet for providers: https://www.fda.gov/media/424736/download     Fact sheet for patients: https://www.fda.gov/media/396662/download    COVID PRE-OP / PRE-PROCEDURE SCREENING ORDER (NO ISOLATION) - Swab, Nasal Cavity [[id number]]  (Normal) Collected: 10/31/24 1246    Lab Status: Final result Specimen: Swab from Nasal Cavity Updated: 10/31/24 1438    Narrative:      The following orders were created for panel order COVID PRE-OP / PRE-PROCEDURE SCREENING ORDER (NO ISOLATION) - Swab, Nasal Cavity.  Procedure                               Abnormality         Status                     ---------                               -----------         ------                     COVID-19,CEPHEID/ALEC,CO...[[id number]]  Normal              Final result                 Please view results for these tests on the individual orders.    COVID-19,CEPHEID/ALEC,COR/KALPANA/PAD/SHANE/LAG/RYDER IN-HOUSE,NP SWAB IN TRANSPORT MEDIA 1 HR TAT, RT-PCR - Swab, Nasopharynx [[id number]]  (Normal) Collected: 10/31/24 1246    Lab Status: Final result Specimen: Swab from Nasopharynx Updated: 10/31/24 1438     COVID19 Not Detected    Narrative:      Fact sheet for providers: https://www.fda.gov/media/623590/download     Fact sheet for patients: https://www.fda.gov/media/691952/download  Fact sheet for providers: https://www.fda.gov/media/911408/download     Fact sheet for patients: https://www.fda.gov/media/756072/download    COVID PRE-OP / PRE-PROCEDURE SCREENING ORDER (NO ISOLATION) -  Swab, Nasopharynx [[id number]]  (Normal) Collected: 10/28/24 0918    Lab Status: Final result Specimen: Swab from Nasopharynx Updated: 10/28/24 1225    Narrative:      The following orders were created for panel order COVID PRE-OP / PRE-PROCEDURE SCREENING ORDER (NO ISOLATION) - Swab, Nasopharynx.  Procedure                               Abnormality         Status                     ---------                               -----------         ------                     COVID-19,CEPHEID/ALEC,CO...[[id number]]  Normal              Final result                 Please view results for these tests on the individual orders.    COVID-19,CEPHEID/ALEC,COR/KALPANA/PAD/SHANE/LAG/RYDER IN-HOUSE,NP SWAB IN TRANSPORT MEDIA 1 HR TAT, RT-PCR - Swab, Nasopharynx [[id number]]  (Normal) Collected: 10/28/24 0918    Lab Status: Final result Specimen: Swab from Nasopharynx Updated: 10/28/24 1225     COVID19 Not Detected    Narrative:      Fact sheet for providers: https://www.fda.gov/media/595434/download     Fact sheet for patients: https://www.fda.gov/media/304622/download  Fact sheet for providers: https://www.fda.gov/media/618561/download     Fact sheet for patients: https://www.fda.gov/media/202287/download          Labs Pending at Discharge:   Time spent on Discharge including face to face service: > 30 minutes  Electronically signed by ABDIEL Carty, 11/05/24, 12:52 PM EST.

## 2024-11-05 NOTE — PLAN OF CARE
Goal Outcome Evaluation:  Plan of Care Reviewed With: patient  PT A&O able to voice needs and wants denies pain, pt able to ambulate with walker and standby assist cont current POC

## 2024-11-05 NOTE — PLAN OF CARE
Goal Outcome Evaluation:  Plan of Care Reviewed With: patient        Progress: improving  Outcome Evaluation: Resident alert, oriented, able to make needs known to staff. Transfers with assist of one to bathroom. Participated in therapy as ordered, tolerated well, Scheduled for discharge tomorrow, Wednesday. Blood glucose stable for BF and dinner, elevated at lunch, covered with sliding scale. Dressing to rt heel intact, due to be changed tomorrow. resident pleasant and cooperative.

## 2024-11-05 NOTE — PROGRESS NOTES
[facility]   Hospitalist Progress Note       Patient Name: Darci Munson  : 1935  MRN: [id number]  Primary Care Physician: Adrien Mayer MD  Date of admission: 10/15/2024  Today's Date: 2024  Room / Bed:   305/2  Subjective   Chief Complaint: Hospital-acquired weakness     HPI:     Darci Munson is a 89 y.o. male past medical history significant for chronic atrial fibrillation/flutter, diabetes, CAD, hypertension, hyperlipidemia, hypothyroidism, history of prostate cancer, chronic kidney disease, glaucoma, mitral valve regurgitation, that was hospitalized for chief complaint of nausea vomiting and diarrhea was noted to have ITA with hyperkalemia in addition to lactic acidosis CT scan demonstrating nodules in the lower lobe which will require follow-up CT scan of the chest in 6 months concern for possible acute diverticulitis placed on antibiotics to cover GI tract IV fluids with improvement of symptoms seen by PT and OT deemed a good candidate for inpatient rehab is been transferred to skilled nursing facility for ongoing therapy.       Interval Followup: 2024    Resting in bed.    No new complaints or specific issues.    Has been tolerating PT and doing well, making progress with exercise.    Eager to be returning home Wednesday.    Creatinine 1.0   Medically stable  Glucose mostly controlled, but has been getting into some Halloween candy however.          REVIEW OF SYSTEMS:   Weakness  Objective   Temp:  [97.5 °F (36.4 °C)-97.9 °F (36.6 °C)] 97.5 °F (36.4 °C)  Heart Rate:  [61-79] 79  Resp:  [18] 18  BP: (123-135)/(50-80) 131/80  PHYSICAL EXAM   CON: WN. WD. NAD.   NECK:  No thyromegaly. No stridor.   RESP:  CTA. No wheezes. No crackles.  CV:  Rhythm irregular. Rate WNL. 2/6 SM noted.  No edema.  GI:  Soft and nontender. Nondistended.    EXT: Peripheral pulses intact.  No cyanosis.  PSYCH:  Alert. Oriented. Normal affect and mood.  NEURO:  No dysarthria or aphasia.   SKIN: No chronic  venous stasis changes or varicosities.  No cellulitis        Results from last 7 days   Lab Units 11/05/24  0503   SODIUM mmol/L 139   POTASSIUM mmol/L 4.9   CO2 mmol/L 25.8   CHLORIDE mmol/L 105   ANION GAP mmol/L 8.2   BUN mg/dL 38*   CREATININE mg/dL 1.07   GLUCOSE mg/dL 116*           COMPLEXITY OF DATA / DECISION MAKING     []  Moderate: One acute illness or mild exacerbation of chronic or 2 stable chronic or tx side effects   []  High:  Severe acute illness or severe exacerbation of chronic - potential for major debility / life threatening         I have personally reviewed the results from the time of this admission to 11/5/2024 12:38 EST:  []  Laboratory:  []  Microbiology: []  Radiology:  []  Telemetry:   []  Cardiology/Vascular:  []  Pathology:  []  Prior external records:  []  Independent historian provided additional details:      []  Discussed case with specialists:    []  Independent interpretation of ECG/Imaging etc:             []  Moderate: Rx management, low risk surgery, suboptimal social situation   []  High:  Rx with close monitoring for toxicity, mod-high risk surgery, DNR decision, Comfort initiated, IV pain meds    Assessment / Plan   Assessment:     Hospital-acquired weakness  Recent ITA  Recent hyperkalemia  Recent dehydration  Acute diverticulitis  Atrial fibrillation/flutter chronic  Diverticulosis  Diabetes  CAD  Hypertension  Gastroparesis  History of prostate cancer  Hypothyroidism     Plan:        Return home Wednesday.  Will need follow-up with PCP and nephrology ([facility]) after rehab  Continue current plan.  Sliding scale insulin protocol.  Also basal/bolus.  Daily physical therapy  Continue carb consistent diet with snacks and nutritional supplements  Continue amiodarone and Eliquis for atrial fibrillation  Renal function electrolytes periodically  Continue midodrine  Further treatment contingent upon his hospital course    VTE Prophylaxis:  Pharmacologic VTE prophylaxis orders are  present.      CODE STATUS:      Level Of Support Discussed With: Patient  Code Status (Patient has no pulse and is not breathing): CPR (Attempt to Resuscitate)  Medical Interventions (Patient has pulse or is breathing): Full Support

## 2024-11-06 VITALS
OXYGEN SATURATION: 98 % | SYSTOLIC BLOOD PRESSURE: 139 MMHG | BODY MASS INDEX: 22.32 KG/M2 | HEIGHT: 67 IN | TEMPERATURE: 97.6 F | DIASTOLIC BLOOD PRESSURE: 53 MMHG | HEART RATE: 73 BPM | WEIGHT: 142.2 LBS | RESPIRATION RATE: 18 BRPM

## 2024-11-06 LAB
GLUCOSE BLDC GLUCOMTR-MCNC: 121 MG/DL (ref 70–99)
GLUCOSE BLDC GLUCOMTR-MCNC: 191 MG/DL (ref 70–99)

## 2024-11-06 PROCEDURE — 82948 REAGENT STRIP/BLOOD GLUCOSE: CPT | Performed by: FAMILY MEDICINE

## 2024-11-06 PROCEDURE — 63710000001 INSULIN LISPRO (HUMAN) PER 5 UNITS: Performed by: FAMILY MEDICINE

## 2024-11-06 RX ADMIN — GABAPENTIN 100 MG: 100 CAPSULE ORAL at 08:12

## 2024-11-06 RX ADMIN — APIXABAN 5 MG: 5 TABLET, FILM COATED ORAL at 08:12

## 2024-11-06 RX ADMIN — Medication 250 MG: at 08:12

## 2024-11-06 RX ADMIN — PANTOPRAZOLE SODIUM 40 MG: 40 TABLET, DELAYED RELEASE ORAL at 06:25

## 2024-11-06 RX ADMIN — INSULIN LISPRO 2 UNITS: 100 INJECTION, SOLUTION INTRAVENOUS; SUBCUTANEOUS at 11:25

## 2024-11-06 RX ADMIN — MIDODRINE HYDROCHLORIDE 5 MG: 5 TABLET ORAL at 11:25

## 2024-11-06 RX ADMIN — MIDODRINE HYDROCHLORIDE 5 MG: 5 TABLET ORAL at 08:12

## 2024-11-06 RX ADMIN — LEVOTHYROXINE SODIUM 200 MCG: 0.1 TABLET ORAL at 06:25

## 2024-11-06 RX ADMIN — AMIODARONE HYDROCHLORIDE 200 MG: 200 TABLET ORAL at 08:12

## 2024-11-06 NOTE — PLAN OF CARE
Goal Outcome Evaluation:  Plan of Care Reviewed With: patient        Progress: improving  Outcome Evaluation: pt remains aox4 on room air. no complaints of pain or discomfort. pt up adlib to bathroom. BG monitired per orders. skin care completed. pt to discharge home, daughter to transport pt. waiting on daughter at this time

## 2024-11-06 NOTE — PLAN OF CARE
Goal Outcome Evaluation:  Plan of Care Reviewed With: patient  PT A&O times 4 able to voice needs and wants denies pain, Pt able to ambulate with standby assist, PT to discharge home today cont POC

## 2024-11-07 ENCOUNTER — OFFICE VISIT (OUTPATIENT)
Dept: FAMILY MEDICINE CLINIC | Age: 89
End: 2024-11-07
Payer: MEDICARE

## 2024-11-07 ENCOUNTER — READMISSION MANAGEMENT (OUTPATIENT)
Dept: CALL CENTER | Facility: HOSPITAL | Age: 89
End: 2024-11-07
Payer: MEDICARE

## 2024-11-07 ENCOUNTER — TRANSITIONAL CARE MANAGEMENT TELEPHONE ENCOUNTER (OUTPATIENT)
Dept: CALL CENTER | Facility: HOSPITAL | Age: 89
End: 2024-11-07
Payer: MEDICARE

## 2024-11-07 ENCOUNTER — TELEPHONE (OUTPATIENT)
Dept: FAMILY MEDICINE CLINIC | Age: 89
End: 2024-11-07

## 2024-11-07 VITALS
HEART RATE: 86 BPM | SYSTOLIC BLOOD PRESSURE: 110 MMHG | OXYGEN SATURATION: 100 % | WEIGHT: 151.4 LBS | TEMPERATURE: 97.4 F | HEIGHT: 67 IN | DIASTOLIC BLOOD PRESSURE: 56 MMHG | BODY MASS INDEX: 23.76 KG/M2

## 2024-11-07 DIAGNOSIS — E11.42 TYPE 2 DIABETES MELLITUS WITH DIABETIC POLYNEUROPATHY, WITHOUT LONG-TERM CURRENT USE OF INSULIN: ICD-10-CM

## 2024-11-07 DIAGNOSIS — R91.8 LUNG NODULES: ICD-10-CM

## 2024-11-07 DIAGNOSIS — R63.4 WEIGHT LOSS: ICD-10-CM

## 2024-11-07 DIAGNOSIS — E11.65 TYPE 2 DIABETES MELLITUS WITH HYPERGLYCEMIA, WITHOUT LONG-TERM CURRENT USE OF INSULIN: ICD-10-CM

## 2024-11-07 DIAGNOSIS — N18.32 STAGE 3B CHRONIC KIDNEY DISEASE: ICD-10-CM

## 2024-11-07 DIAGNOSIS — R53.81 PHYSICAL DEBILITY: Primary | ICD-10-CM

## 2024-11-07 DIAGNOSIS — Z23 IMMUNIZATION DUE: ICD-10-CM

## 2024-11-07 DIAGNOSIS — K29.70 GASTRITIS, PRESENCE OF BLEEDING UNSPECIFIED, UNSPECIFIED CHRONICITY, UNSPECIFIED GASTRITIS TYPE: ICD-10-CM

## 2024-11-07 DIAGNOSIS — I48.0 PAROXYSMAL ATRIAL FIBRILLATION: ICD-10-CM

## 2024-11-07 DIAGNOSIS — I10 PRIMARY HYPERTENSION: ICD-10-CM

## 2024-11-07 RX ORDER — PANTOPRAZOLE SODIUM 40 MG/1
TABLET, DELAYED RELEASE ORAL
Qty: 90 TABLET | Refills: 0 | Status: SHIPPED | OUTPATIENT
Start: 2024-11-07

## 2024-11-07 RX ORDER — TRAZODONE HYDROCHLORIDE 100 MG/1
100 TABLET ORAL DAILY
COMMUNITY
Start: 2024-08-20

## 2024-11-07 NOTE — TELEPHONE ENCOUNTER
Called SNF for medical records on flu vaccine and pneumonia when pt was admitted for rehab. No records in chart but pt believes he did get those done.

## 2024-11-07 NOTE — ASSESSMENT & PLAN NOTE
Clearly has fall last evening upon returning home from the skilled nursing unit demonstrates that he is going to require higher level of supervision.  His daughter states that she would be willing to let him move in with her for short period of time.  Discussed how it would be important at least for somebody to spend the night with him in the short interim if he does not go to her house.  He would benefit once again from chronic care management/nurse navigator services.  We discussed today possibility of adult , [facility] will have [facility] home health, but they have not heard from them yet.  Has a lifeline type device coming to the house still waiting on that.

## 2024-11-07 NOTE — OUTREACH NOTE
Prep Survey      Flowsheet Row Responses   [facility] facility patient discharged from? [facility]   Is LACE score < 7 ? Yes   Eligibility [facility]   Date of Admission 10/15/24   Date of Discharge 11/06/24   Discharge Disposition Home-Health Care Svc   Discharge diagnosis ITA with CKD,  Hyperkalemia,  Volume depletion   Does the patient have one of the following disease processes/diagnoses(primary or secondary)? Other   Does the patient have Home health ordered? Yes   What is the Home health agency?  [facility]   Is there a DME ordered? No   Prep survey completed? Yes            Katherine FAUST - Registered Nurse

## 2024-11-07 NOTE — PROGRESS NOTES
"Chief Complaint  Hospital Follow Up Visit (SNF Rehab 10/15/24 - 11/6/24 - physical debility )    Subjective          Darci Munson presents to [facility]  History of Present Illness    Accompanied to the visit today by his daughter Nya    Just discharged from the skilled nursing unit yesterday hospitalization October 11 through October 15.  Hospitalized with acute kidney injury, hyperkalemia, nausea vomiting and diarrhea.  There was concern for acute diverticulitis he was treated with antibiotics.  Due to extensive weakness following hospitalization was transferred to the skilled nursing unit where he stayed for approximately 3 weeks.    His renal function did improve during his hospital stay.  His Eliquis dosage was therefore increased back to 5 mg twice a day.    He got home last night was walking with his rollator and his legs just got really weak and gave way and he fell to the ground.  Says more or less he slid down to his knees and then just decided to lay down on the ground.  Laid there for couple hours.  Finally got enough strength that he was able to get up on his knees and could lift himself up from the floor.    He was not aware of any palpitations chest pain lightheadedness prior to the \"fall\"    Nya states that they have ordered him a lifeline type device and just has not arrived yet.    Nya was under the impression they sent him home just on Levemir.  His discharge summary however does still have Humalog listed.  Discharge summary states a little bit of an unusual regimen at 6 units before each meal and nightly.  Daughter does say sometimes they would hold his insulin at meals because they would check his sugar before he ate and it would be low.  This morning his blood sugar he says was about 200.  He has never used a continuous glucose monitor but says he would consider it.    Current Outpatient Medications on File Prior to Visit   Medication Sig Dispense Refill    " amiodarone (PACERONE) 200 MG tablet Take 1 tablet by mouth Daily.      apixaban (ELIQUIS) 5 MG tablet tablet Take 1 tablet by mouth 2 (Two) Times a Day. Indications: Atrial Fibrillation 60 tablet 0    calcium carbonate (TUMS) 500 MG chewable tablet Chew 2 tablets Every 4 (Four) Hours As Needed for Indigestion or Heartburn.      carboxymethylcellulose (REFRESH PLUS) 0.5 % solution Administer 1 drop to both eyes 3 (Three) Times a Day As Needed for Dry Eyes.      cholecalciferol (VITAMIN D3) 25 MCG (1000 UT) tablet Take 1 tablet by mouth Daily.      clotrimazole (LOTRIMIN) 1 % cream Apply 1 Application topically to the appropriate area as directed 2 (Two) Times a Day.      ferrous sulfate 325 (65 FE) MG tablet Take 1 tablet by mouth Every Evening.      insulin detemir (LEVEMIR) 100 UNIT/ML injection Inject 10 Units under the skin into the appropriate area as directed Daily.      latanoprost (XALATAN) 0.005 % ophthalmic solution Administer 1 drop to both eyes 2 (Two) Times a Day.      levothyroxine (SYNTHROID, LEVOTHROID) 200 MCG tablet Take 1 tablet by mouth once daily 90 tablet 1    loperamide (IMODIUM) 2 MG capsule Take 1 capsule by mouth As Needed for Diarrhea.      midodrine (PROAMATINE) 2.5 MG tablet Take 1 tablet by mouth 3 (Three) Times a Day Before Meals. 90 tablet 0    multivitamin (THERAGRAN) tablet tablet Take 1 tablet by mouth Daily.      traZODone (DESYREL) 100 MG tablet Take 1 tablet by mouth Daily.      nystatin (MYCOSTATIN) 654662 UNIT/GM powder Apply 1 Application topically to the appropriate area as directed 2 (Two) Times a Day. (Patient not taking: Reported on 11/7/2024)      ondansetron ODT (ZOFRAN-ODT) 4 MG disintegrating tablet Place 1 tablet on the tongue Every 8 (Eight) Hours As Needed for Nausea or Vomiting. (Patient not taking: Reported on 11/7/2024)      saccharomyces boulardii (FLORASTOR) 250 MG capsule Take 1 capsule by mouth 2 (Two) Times a Day. (Patient not taking: Reported on 11/7/2024)   "    [DISCONTINUED] Insulin Lispro (humaLOG) 100 UNIT/ML injection Inject 6 Units under the skin into the appropriate area as directed 4 (Four) Times a Day Before Meals & at Bedtime. (Patient not taking: Reported on 11/7/2024)       No current facility-administered medications on file prior to visit.       Review of Systems         Objective   Vital Signs:   /56 (BP Location: Left arm, Patient Position: Sitting, Cuff Size: Adult)   Pulse 86   Temp 97.4 °F (36.3 °C) (Temporal)   Ht 170.2 cm (67\")   Wt 68.7 kg (151 lb 6.4 oz)   SpO2 100%   BMI 23.71 kg/m²     Physical Exam     No acute distress  Color is good  Eyes are nonicteric  He is alert and conversant  No cervical or supraclavicular adenopathy  Heart is irregular with controlled rate 2/6 systolic murmur  Lungs with fair air movement no wheeze or crackles  Abdomen bowel sounds positive, soft, nontender  Lower extremities without edema  Does have a pressure wound on right posterior heel some overlying dead skin which trimmed with a scissors today.  Neurologic without lateralizing focal deficit  Musculoskeletal he demonstrates considerable weakness lower extremities still.      Result Review :                     Assessment and Plan    Diagnoses and all orders for this visit:    1. Physical debility (Primary)  Assessment & Plan:  Clearly has fall last evening upon returning home from the skilled nursing unit demonstrates that he is going to require higher level of supervision.  His daughter states that she would be willing to let him move in with her for short period of time.  Discussed how it would be important at least for somebody to spend the night with him in the short interim if he does not go to her house.  He would benefit once again from chronic care management/nurse navigator services.  We discussed today possibility of adult , he will have [facility] health, but they have not heard from them yet.  Has a lifeline type device coming " to the house still waiting on that.    Orders:  -     Ambulatory Referral to Case Management    2. Type 2 diabetes mellitus with hyperglycemia, without long-term current use of insulin  Assessment & Plan:  Will need to put some effort into trying to get his insulin regimen straightened out.  Continue with the Levemir is good.  Have to have him measure his blood sugar before each meal and at bedtime and will make adjustments in his Humalog as needed.  For now instructed him to hold off on taking the nightly dose.  See if we can get him set up for continuous glucose monitor.  He will require oversight in CGM use.  His daughter seems to be willing.    Orders:  -     Ambulatory Referral to Case Management  -     Insulin Lispro (humaLOG) 100 UNIT/ML injection; Inject 6 Units under the skin into the appropriate area as directed 3 (Three) Times a Day Before Meals.    3. Stage 3b chronic kidney disease  Assessment & Plan:  He had significant kidney injury at his initial hospitalization.  He does have appointment to see nephrology, Dr. Macias, December 2.    Orders:  -     Ambulatory Referral to Case Management    4. Paroxysmal atrial fibrillation  Assessment & Plan:  His rate is controlled today.  He is back on Eliquis which poses some risk given his propensity to fall.  Continue to follow with cardiology as recommended.    Orders:  -     Ambulatory Referral to Case Management    5. Primary hypertension  Assessment & Plan:  Blood pressure seems to be doing okay today.  Need to watch for evidence of orthostasis.      Orders:  -     Ambulatory Referral to Case Management    6. Lung nodules  Assessment & Plan:  Nodules identified on CT scan of the abdomen recommended to have follow-up in 6 months    Orders:  -     Ambulatory Referral to Case Management    7. Weight loss  Assessment & Plan:  He had considerable weight loss prior to hospitalization in his [facility] skilled nursing unit.  Seems to indicate the importance of  supervision of his dietary intake and addition of nutritional supplements.  Stressed the importance of good nutrition with he and his daughter in regards to regaining muscle strength.    Orders:  -     Ambulatory Referral to Case Management    8. Immunization due  -     Fluzone High-Dose 65+yrs (3769-3029)      I spent 45 minutes caring for Darci on this date of service. This time includes time spent by me in the following activities:preparing for the visit, reviewing tests, obtaining and/or reviewing a separately obtained history, performing a medically appropriate examination and/or evaluation , counseling and educating the patient/family/caregiver, ordering medications, tests, or procedures, referring and communicating with other health care professionals , and documenting information in the medical record.  Clearly he has needs with complicated home social situation requiring rather extensive visit today.  Follow Up   No follow-ups on file.  Patient was given instructions and counseling regarding his condition or for health maintenance advice. Please see specific information pulled into the AVS if appropriate.

## 2024-11-07 NOTE — OUTREACH NOTE
Call Center TCM Note      Flowsheet Row Responses   [facility] patient discharged from? [facility]  [SNF]   Does the patient have one of the following disease processes/diagnoses(primary or secondary)? Other   TCM attempt successful? Yes  [VR for Sharon Davalos]   Call start time 0937   Call end time 0949   Discharge diagnosis ITA with CKD,  Hyperkalemia,  Volume depletion   Person spoke with today (if not patient) and relationship Sharon, dtr   Medication alerts for this patient ELIQUIS--bleeding and falls precautions advised   Meds reviewed with patient/caregiver? Yes   Is the patient having any side effects they believe may be caused by any medication additions or changes? No   Does the patient have all medications ordered at discharge? No   What is keeping the patient from filling the prescriptions? --  [dtr reports pharmacy did not have new meds ordered at discharge (dtr reports pt had eliquis supply at home)]   Nursing Interventions Nurse called pharmacy  [Call to pharmacy,  eliquis and midodrine are ready for  @$47 each but did not receive prescription for gabapentin and no refills available-will send a message]   Prescription comments Discussed midodrine dosing with dtr and function of medication   Comments Hospital f/u 11/7/24@1115am   Does the patient have an appointment with their PCP within 7-14 days of discharge? Yes   What is the Home health agency?  [facility]--noted referral accepted, communicated to dtr   Has home health visited the patient within 72 hours of discharge? Unsure   What DME was ordered? Rollator   Has all DME been delivered? Yes   DME comments Discussed possible other DME needs, denies at this time   Psychosocial issues? Yes   Psychosocial comments Dtr discusses need to make decisions regarding caregiving options and pt had a fall during the night--will discuss   Did the patient receive a copy of their discharge instructions? Yes   Nursing interventions Reviewed  "instructions with patient   What is the patient's perception of their health status since discharge? Same  [Dtr reports pt weak and had a fall during the night described as\" his legs just gave out.\" Had to sit for two hours before he could get up to his knees.  Dtr denies any injuries at this time, pt was using walker at time of fall.  Pt to be assessed today]   Is the patient/caregiver able to teach back signs and symptoms related to disease process for when to call PCP? Yes   Is the patient/caregiver able to teach back signs and symptoms related to disease process for when to call 911? Yes   Additional teach back comments Pt checks his own BG and was 212 this am .   TCM call completed? Yes   Call end time 0949            Nicki Russ RN    11/7/2024, 09:58 EST        "

## 2024-11-07 NOTE — ASSESSMENT & PLAN NOTE
Will need to put some effort into trying to get his insulin regimen straightened out.  Continue with the Levemir is good.  Have to have him measure his blood sugar before each meal and at bedtime and will make adjustments in his Humalog as needed.  For now instructed him to hold off on taking the nightly dose.  See if we can get him set up for continuous glucose monitor.  He will require oversight in CGM use.  His daughter seems to be willing.

## 2024-11-07 NOTE — ASSESSMENT & PLAN NOTE
He had significant kidney injury at his initial hospitalization.  He does have appointment to see nephrology, Dr. Macias, December 2.

## 2024-11-08 RX ORDER — GABAPENTIN 100 MG/1
100 CAPSULE ORAL EVERY 12 HOURS SCHEDULED
Qty: 60 CAPSULE | Refills: 0 | Status: SHIPPED | OUTPATIENT
Start: 2024-11-08

## 2024-11-16 RX ORDER — INSULIN LISPRO 100 [IU]/ML
6 INJECTION, SOLUTION INTRAVENOUS; SUBCUTANEOUS
Start: 2024-11-16

## 2024-11-16 NOTE — ASSESSMENT & PLAN NOTE
His rate is controlled today.  He is back on Eliquis which poses some risk given his propensity to fall.  Continue to follow with cardiology as recommended.

## 2024-11-16 NOTE — ASSESSMENT & PLAN NOTE
He had considerable weight loss prior to hospitalization in his [facility] skilled nursing unit.  Seems to indicate the importance of supervision of his dietary intake and addition of nutritional supplements.  Stressed the importance of good nutrition with he and his daughter in regards to regaining muscle strength.

## 2025-03-17 NOTE — PLAN OF CARE
Goal Outcome Evaluation:  Plan of Care Reviewed With: patient           Outcome Evaluation: Pt is AOx 4 and is [place] and has bilateral hearing aids. VSS and no c/o pain or discomfort. Blood sugar was 167 insulin given. Will continue with his POC. Call light and personal items within reach.                              3

## (undated) DEVICE — SINGLE-USE BIOPSY FORCEPS: Brand: RADIAL JAW 4

## (undated) DEVICE — COLON KIT: Brand: MEDLINE INDUSTRIES, INC.

## (undated) DEVICE — PAD GRND E/S W/CORD SPLT A/

## (undated) DEVICE — SOL IRRG H2O PL/BG 1000ML STRL

## (undated) DEVICE — Device: Brand: DEFENDO AIR/WATER/SUCTION AND BIOPSY VALVE

## (undated) DEVICE — THE SINGLE USE ETRAP – POLYP TRAP IS USED FOR SUCTION RETRIEVAL OF ENDOSCOPICALLY REMOVED POLYPS.: Brand: ETRAP

## (undated) DEVICE — EGD OR ERCP KIT: Brand: MEDLINE INDUSTRIES, INC.

## (undated) DEVICE — SNAR POLYP CAPTIFLEX XS/OVL 11X2.4MM 240CM 1P/U